# Patient Record
Sex: MALE | Race: WHITE | NOT HISPANIC OR LATINO | Employment: UNEMPLOYED | URBAN - METROPOLITAN AREA
[De-identification: names, ages, dates, MRNs, and addresses within clinical notes are randomized per-mention and may not be internally consistent; named-entity substitution may affect disease eponyms.]

---

## 2017-01-12 ENCOUNTER — TELEPHONE (OUTPATIENT)
Dept: INTERNAL MEDICINE | Facility: CLINIC | Age: 57
End: 2017-01-12

## 2017-01-12 DIAGNOSIS — E11.9 TYPE 2 DIABETES MELLITUS WITHOUT COMPLICATION, WITH LONG-TERM CURRENT USE OF INSULIN (H): Primary | ICD-10-CM

## 2017-01-12 DIAGNOSIS — Z79.4 TYPE 2 DIABETES MELLITUS WITHOUT COMPLICATION, WITH LONG-TERM CURRENT USE OF INSULIN (H): Primary | ICD-10-CM

## 2017-01-12 NOTE — TELEPHONE ENCOUNTER
"Reason for Call:  Medication or medication refill:    Do you use a [facility]?  Name of the pharmacy and phone number for the current request:  [facility] [address]    Name of the medication requested: test strips and lancets for \"one touch verio\" meter    Other request: Pt's insurance company changed the type of meter and supplies that they will cover.  Pt has a meter.    Can we leave a detailed message on this number? YES    Phone number patient can be reached at: Home number on file [phone] (home)    Best Time: anytime    Call taken on 1/12/2017 at 8:54 AM by MAGDY FERREIRA    "

## 2017-01-13 ENCOUNTER — TELEPHONE (OUTPATIENT)
Dept: INTERNAL MEDICINE | Facility: CLINIC | Age: 57
End: 2017-01-13

## 2017-01-13 DIAGNOSIS — Z79.4 TYPE 2 DIABETES MELLITUS WITHOUT COMPLICATION, WITH LONG-TERM CURRENT USE OF INSULIN (H): Primary | ICD-10-CM

## 2017-01-13 DIAGNOSIS — E11.9 TYPE 2 DIABETES MELLITUS WITHOUT COMPLICATION, WITH LONG-TERM CURRENT USE OF INSULIN (H): Primary | ICD-10-CM

## 2017-01-13 RX ORDER — INSULIN GLARGINE 100 [IU]/ML
60 INJECTION, SOLUTION SUBCUTANEOUS AT BEDTIME
Qty: 60 ML | Refills: 3 | Status: SHIPPED | OUTPATIENT
Start: 2017-01-13 | End: 2018-01-26

## 2017-01-13 NOTE — TELEPHONE ENCOUNTER
Lantus is no longer covered.  Will need prescription for Basaglar.  Please send new prescription to pharm listed.

## 2017-01-13 NOTE — TELEPHONE ENCOUNTER
Reason for call: Other   Patient called regarding (reason for call): patient called wanted to talk to a nurse about his lantus solostar meds as insurance wont cover this med anymore  Additional comments: Please call patient to discuss what he should do now    Phone Number Pt can be reached at: Home number on file [phone] (home)  Best Time: anytime  Can we leave a detailed message on this number? YES

## 2017-01-24 ENCOUNTER — ANTICOAGULATION THERAPY VISIT (OUTPATIENT)
Dept: ANTICOAGULATION | Facility: CLINIC | Age: 57
End: 2017-01-24
Payer: COMMERCIAL

## 2017-01-24 DIAGNOSIS — I48.91 NEW ONSET ATRIAL FIBRILLATION (H): Primary | ICD-10-CM

## 2017-01-24 LAB — INR POINT OF CARE: 3.3 (ref 0.86–1.14)

## 2017-01-24 PROCEDURE — 99207 ZZC NO CHARGE NURSE ONLY: CPT

## 2017-01-24 PROCEDURE — 36416 COLLJ CAPILLARY BLOOD SPEC: CPT

## 2017-01-24 PROCEDURE — 85610 PROTHROMBIN TIME: CPT | Mod: QW

## 2017-01-24 RX ORDER — WARFARIN SODIUM 7.5 MG/1
TABLET ORAL
Qty: 100 TABLET | Refills: 0 | Status: SHIPPED | OUTPATIENT
Start: 2017-01-24 | End: 2017-04-18

## 2017-01-24 NOTE — PROGRESS NOTES
ANTICOAGULATION FOLLOW-UP CLINIC VISIT    Patient Name:  Mckay Hsu  Date:  1/24/2017  Contact Type:  Face to Face    SUBJECTIVE:     Patient Findings     Positives No Problem Findings           OBJECTIVE    INR PROTIME   Date Value Ref Range Status   01/24/2017 3.3* 0.86 - 1.14 Final       ASSESSMENT / PLAN  INR assessment SUPRA    Recheck INR In: 6 WEEKS    INR Location Clinic      Anticoagulation Summary as of 1/24/2017     INR goal 2.0-3.0   Selected INR 3.3! (1/24/2017)   Maintenance plan 11.25 mg (7.5 mg x 1.5) on Mon; 7.5 mg (7.5 mg x 1) all other days   Full instructions 1/24: 3.75 mg; Otherwise 11.25 mg on Mon; 7.5 mg all other days   Weekly total 56.25 mg   Plan last modified Rosalee Billingsley, RN (1/5/2016)   Next INR check 3/7/2017   Target end date     Indications   Long-term (current) use of anticoagulants [Z79.01] [Z79.01]  New onset atrial fibrillation (H) [I48.91]         Anticoagulation Episode Summary     INR check location     Preferred lab     Send INR reminders to [facility]    Comments             See the Encounter Report to view Anticoagulation Flowsheet and Dosing Calendar (Go to Encounters tab in chart review, and find the Anticoagulation Therapy Visit)        Rosalee Billingsley, RN

## 2017-01-26 DIAGNOSIS — Z79.4 TYPE 2 DIABETES MELLITUS WITHOUT COMPLICATION, WITH LONG-TERM CURRENT USE OF INSULIN (H): Primary | ICD-10-CM

## 2017-01-26 DIAGNOSIS — E11.9 TYPE 2 DIABETES MELLITUS WITHOUT COMPLICATION, WITH LONG-TERM CURRENT USE OF INSULIN (H): Primary | ICD-10-CM

## 2017-01-26 DIAGNOSIS — I10 ESSENTIAL HYPERTENSION, BENIGN: Primary | ICD-10-CM

## 2017-01-26 RX ORDER — METOPROLOL SUCCINATE 100 MG/1
100 TABLET, EXTENDED RELEASE ORAL DAILY
Qty: 90 TABLET | Refills: 2 | Status: SHIPPED | OUTPATIENT
Start: 2017-01-26 | End: 2017-10-17

## 2017-01-26 NOTE — TELEPHONE ENCOUNTER
metoprolol (TOPROL-XL) 100 MG 24 hr tablet      Last Written Prescription Date: 8/04/2016  Last Fill Quantity: 90, # refills: 1    Last Office Visit with [facility], [facility] or [facility] prescribing provider:  10/31/2016   Future Office Visit:        BP Readings from Last 3 Encounters:   10/31/16 122/72   08/11/16 128/82   02/09/16 132/80

## 2017-01-26 NOTE — TELEPHONE ENCOUNTER
insulin lispro          Last Written Prescription Date: 12/27/16  Last Fill Quantity: 3 ml, # refills: 0  Last Office Visit with [facility], P or [facility] prescribing provider:  10/31/16        BP Readings from Last 3 Encounters:   10/31/16 122/72   08/11/16 128/82   02/09/16 132/80     MICROL      169   5/28/2015  No results found for this basename: microalbumin  CREATININE   Date Value Ref Range Status   02/12/2016 0.86 0.66 - 1.25 mg/dL Final   ]  GFR ESTIMATE   Date Value Ref Range Status   02/12/2016 >90  Non  GFR Calc   >60 mL/min/1.7m2 Final   07/13/2015 >90  Non  GFR Calc   >60 mL/min/1.7m2 Final   05/28/2015 80 >60 mL/min/1.7m2 Final     Comment:     Non  GFR Calc     GFR ESTIMATE IF BLACK   Date Value Ref Range Status   02/12/2016 >90   GFR Calc   >60 mL/min/1.7m2 Final   07/13/2015 >90   GFR Calc   >60 mL/min/1.7m2 Final   05/28/2015 >90   GFR Calc   >60 mL/min/1.7m2 Final     CHOL      107   2/12/2016  HDL       45   2/12/2016  LDL       40   2/12/2016  TRIG      108   2/12/2016  CHOLHDLRATIO      2.6   10/2/2014  AST       19   2/12/2016  ALT       28   2/12/2016  A1C      5.6   10/31/2016  A1C      6.1   2/12/2016  A1C      8.5   7/13/2015  A1C      8.7   5/19/2015  A1C      9.0   10/2/2014  POTASSIUM   Date Value Ref Range Status   02/12/2016 4.0 3.4 - 5.3 mmol/L Final

## 2017-02-03 ENCOUNTER — OFFICE VISIT (OUTPATIENT)
Dept: CARDIOLOGY | Facility: CLINIC | Age: 57
End: 2017-02-03
Payer: COMMERCIAL

## 2017-02-03 DIAGNOSIS — Z95.0 CARDIAC PACEMAKER IN SITU: Primary | ICD-10-CM

## 2017-02-03 PROCEDURE — 93293 PM PHONE R-STRIP DEVICE EVAL: CPT | Performed by: INTERNAL MEDICINE

## 2017-02-03 NOTE — PROGRESS NOTES
~ 30 day phone teletrace ~  Appropriate  at time of check. Chronic Afib, taking Warfarin. Magnet response WNL. F/U scheduled q month. Shane FRIAS

## 2017-02-27 DIAGNOSIS — Z79.4 TYPE 2 DIABETES MELLITUS WITHOUT COMPLICATION, WITH LONG-TERM CURRENT USE OF INSULIN (H): ICD-10-CM

## 2017-02-27 DIAGNOSIS — E78.5 HYPERLIPIDEMIA LDL GOAL <100: ICD-10-CM

## 2017-02-27 DIAGNOSIS — E11.9 TYPE 2 DIABETES MELLITUS WITHOUT COMPLICATION, WITH LONG-TERM CURRENT USE OF INSULIN (H): ICD-10-CM

## 2017-02-27 RX ORDER — SIMVASTATIN 80 MG
40 TABLET ORAL AT BEDTIME
Qty: 45 TABLET | Refills: 0 | Status: SHIPPED | OUTPATIENT
Start: 2017-02-27 | End: 2017-05-22

## 2017-02-27 NOTE — TELEPHONE ENCOUNTER
simvastatin (ZOCOR) 80 MG tablet     Last Written Prescription Date: 2/09/2016  Last Fill Quantity: 45, # refills: 3  Last Office Visit with [facility] or [facility] prescribing provider: 10/31/2016       Lab Results   Component Value Date    CHOL 107 02/12/2016     Lab Results   Component Value Date    HDL 45 02/12/2016     Lab Results   Component Value Date    LDL 40 02/12/2016     Lab Results   Component Value Date    TRIG 108 02/12/2016     Lab Results   Component Value Date    CHOLHDLRATIO 2.6 10/02/2014     metFORMIN (GLUCOPHAGE) 1000 MG tablet         Last Written Prescription Date: 2/09/2016  Last Fill Quantity: 180, # refills: 3  Last Office Visit with [facility] or [facility] prescribing provider:  10/31/2016        BP Readings from Last 3 Encounters:   10/31/16 122/72   08/11/16 128/82   02/09/16 132/80     Lab Results   Component Value Date    MICROL 169 05/28/2015     No results found for: MICROALBUMIN  Creatinine   Date Value Ref Range Status   02/12/2016 0.86 0.66 - 1.25 mg/dL Final   ]  GFR Estimate   Date Value Ref Range Status   02/12/2016 >90  Non  GFR Calc   >60 mL/min/1.7m2 Final   07/13/2015 >90  Non  GFR Calc   >60 mL/min/1.7m2 Final   05/28/2015 80 >60 mL/min/1.7m2 Final     Comment:     Non  GFR Calc     GFR Estimate If Black   Date Value Ref Range Status   02/12/2016 >90   GFR Calc   >60 mL/min/1.7m2 Final   07/13/2015 >90   GFR Calc   >60 mL/min/1.7m2 Final   05/28/2015 >90   GFR Calc   >60 mL/min/1.7m2 Final     Lab Results   Component Value Date    CHOL 107 02/12/2016     Lab Results   Component Value Date    HDL 45 02/12/2016     Lab Results   Component Value Date    LDL 40 02/12/2016     Lab Results   Component Value Date    TRIG 108 02/12/2016     Lab Results   Component Value Date    CHOLHDLRATIO 2.6 10/02/2014     Lab Results   Component Value Date    AST 19 02/12/2016     Lab Results   Component  Value Date    ALT 28 02/12/2016     Lab Results   Component Value Date    A1C 5.6 10/31/2016    A1C 6.1 02/12/2016    A1C 8.5 07/13/2015    A1C 8.7 05/19/2015    A1C 9.0 10/02/2014     Potassium   Date Value Ref Range Status   02/12/2016 4.0 3.4 - 5.3 mmol/L Final

## 2017-03-03 ENCOUNTER — OFFICE VISIT (OUTPATIENT)
Dept: CARDIOLOGY | Facility: CLINIC | Age: 57
End: 2017-03-03
Payer: COMMERCIAL

## 2017-03-03 DIAGNOSIS — Z95.0 CARDIAC PACEMAKER IN SITU: Primary | ICD-10-CM

## 2017-03-03 PROCEDURE — 99207 ZZC NO CHARGE LOS: CPT | Performed by: INTERNAL MEDICINE

## 2017-03-03 NOTE — PROGRESS NOTES
~ 30 day phone teletrace / NO CHARGE ~  Appropriate  at time of check. Magnet response WNL. F/U scheduled q month. Shane FRIAS

## 2017-03-03 NOTE — MR AVS SNAPSHOT
"              After Visit Summary   3/3/2017    Mckay Hsu    MRN: [id number]           Patient Information     Date Of Birth          1960        Visit Information        Provider Department      3/3/2017 2:00 PM JACK ARCE [facility] AT [place]        Today's Diagnoses     Cardiac pacemaker in situ    -  1       Follow-ups after your visit        Your next 10 appointments already scheduled     Mar 07, 2017  2:30 PM CST   Anticoagulation Visit with [facility]ULATION CLINIC   [facility]    [address] [id number] [phone]            Apr 11, 2017  2:00 PM CDT   30 Day Phone Check with SANTIAGO TECH1   [facility] HEART [facility] ([facility])    [address] [place] [id number] [phone]              Who to contact     If you have questions or need follow up information about today's clinic visit or your schedule please contact [facility] directly at [phone].  Normal or non-critical lab and imaging results will be communicated to you by Muzyhart, letter or phone within 4 business days after the clinic has received the results. If you do not hear from us within 7 days, please contact the clinic through Muzyhart or phone. If you have a critical or abnormal lab result, we will notify you by phone as soon as possible.  Submit refill requests through Barkibu or call your pharmacy and they will forward the refill request to us. Please allow 3 business days for your refill to be completed.          Additional Information About Your Visit        MyChart Information     Barkibu lets you send messages to your doctor, view your test results, renew your prescriptions, schedule appointments and more. To sign up, go to [email or web address] . Click on \"Log in\" on the left side of the screen, which " "will take you to the Welcome page. Then click on \"Sign up Now\" on the right side of the page.     You will be asked to enter the access code listed below, as well as some personal information. Please follow the directions to create your username and password.     Your access code is: [email or web address]  Expires: 2017  2:13 PM     Your access code will  in 90 days. If you need help or a new code, please call your [place] clinic or [phone].        Care EveryWhere ID     This is your Care EveryWhere ID. This could be used by other organizations to access your [place] medical records  [id number]         Blood Pressure from Last 3 Encounters:   10/31/16 122/72   16 128/82   16 132/80    Weight from Last 3 Encounters:   10/31/16 (!) 140 kg (308 lb 11.2 oz)   16 (!) 142.1 kg (313 lb 3.2 oz)   16 (!) 140.8 kg (310 lb 4.8 oz)              Today, you had the following     No orders found for display       Primary Care Provider Office Phone # Fax #    David Almendarez [phone]       [place] [address] [place] [id number]        Thank you!     Thank you for choosing [facility] [place]  for your care. Our goal is always to provide you with excellent care. Hearing back from our patients is one way we can continue to improve our services. Please take a few minutes to complete the written survey that you may receive in the mail after your visit with us. Thank you!             Your Updated Medication List - Protect others around you: Learn how to safely use, store and throw away your medicines at www.disposemymeds.org.          This list is accurate as of: 3/3/17  2:13 PM.  Always use your most recent med list.                   Brand Name Dispense Instructions for use    amLODIPine 10 MG tablet    NORVASC    90 tablet    Take 1 tablet (10 mg) by mouth daily       blood glucose monitoring test strip    ONE TOUCH VERIO IQ "    100 strip    Use to test blood sugars 3 times daily or as directed.       fish oil-omega-3 fatty acids 1000 MG capsule      Take 4 capsules by mouth daily.       insulin glargine 100 UNIT/ML injection     60 mL    Inject 60 Units Subcutaneous At Bedtime       insulin lispro 100 UNIT/ML injection    HumaLOG KWIKpen    3 mL    Inject 1 Units Subcutaneous 3 times daily (before meals)       losartan 100 MG tablet    COZAAR    90 tablet    Take 1 tablet (100 mg) by mouth daily       metFORMIN 1000 MG tablet    GLUCOPHAGE    180 tablet    Take 1 tablet (1,000 mg) by mouth 2 times daily (with meals)       metoprolol 100 MG 24 hr tablet    TOPROL-XL    90 tablet    Take 1 tablet (100 mg) by mouth daily       * MICROLET LANCETS Misc     100 each    1 lancet by In Vitro route 3 times daily       * ONE TOUCH LANCETS Misc     200 each    1 lancet by In Vitro route 3 times daily       MULTIVITAMIN PO      1 qd       NITROSTAT 0.4 MG sublingual tablet   Generic drug:  nitroglycerin      1 TAB EVERY 5 MIN AS NEEDED, UP TO 3 PER EPISODE       simvastatin 80 MG tablet    ZOCOR    45 tablet    Take 0.5 tablets (40 mg) by mouth At Bedtime       warfarin 7.5 MG tablet    COUMADIN    100 tablet    1 tablet daily except 1 1/2 tablets on Mon as directed by the anticoagulation clinic       * Notice:  This list has 2 medication(s) that are the same as other medications prescribed for you. Read the directions carefully, and ask your doctor or other care provider to review them with you.

## 2017-03-07 ENCOUNTER — ANTICOAGULATION THERAPY VISIT (OUTPATIENT)
Dept: ANTICOAGULATION | Facility: CLINIC | Age: 57
End: 2017-03-07
Payer: COMMERCIAL

## 2017-03-07 LAB — INR POINT OF CARE: 2.4 (ref 0.86–1.14)

## 2017-03-07 PROCEDURE — 36416 COLLJ CAPILLARY BLOOD SPEC: CPT

## 2017-03-07 PROCEDURE — 85610 PROTHROMBIN TIME: CPT | Mod: QW

## 2017-03-07 NOTE — PROGRESS NOTES
ANTICOAGULATION FOLLOW-UP CLINIC VISIT    Patient Name:  Mckay Hsu  Date:  3/7/2017  Contact Type:  Face to Face    SUBJECTIVE:     Patient Findings     Positives No Problem Findings           OBJECTIVE    INR Protime   Date Value Ref Range Status   03/07/2017 2.4 (A) 0.86 - 1.14 Final       ASSESSMENT / PLAN  INR assessment THER    Recheck INR In: 6 WEEKS    INR Location Clinic      Anticoagulation Summary as of 3/7/2017     INR goal 2.0-3.0   Today's INR 2.4   Maintenance plan 11.25 mg (7.5 mg x 1.5) on Mon; 7.5 mg (7.5 mg x 1) all other days   Full instructions 11.25 mg on Mon; 7.5 mg all other days   Weekly total 56.25 mg   No change documented Rosalee Billingsley, RN   Plan last modified Rosalee Billingsley, RN (1/5/2016)   Next INR check 4/18/2017   Target end date     Indications   Long-term (current) use of anticoagulants [Z79.01] [Z79.01]  New onset atrial fibrillation (H) [I48.91]         Anticoagulation Episode Summary     INR check location     Preferred lab     Send INR reminders to  [facility]    Comments             See the Encounter Report to view Anticoagulation Flowsheet and Dosing Calendar (Go to Encounters tab in chart review, and find the Anticoagulation Therapy Visit)        Rosalee Billingsley, RN

## 2017-03-07 NOTE — MR AVS SNAPSHOT
Mckay Hsu   3/7/2017 2:30 PM   Anticoagulation Therapy Visit    Description:  56 year old male   Provider:   ANTICOAGULATION CLINIC   Department:   Anti Coagulation           INR as of 3/7/2017     Today's INR 2.4      Anticoagulation Summary as of 3/7/2017     INR goal 2.0-3.0   Today's INR 2.4   Full instructions 11.25 mg on Mon; 7.5 mg all other days   Next INR check 4/18/2017    Indications   Long-term (current) use of anticoagulants [Z79.01] [Z79.01]  New onset atrial fibrillation (H) [I48.91]         Your next Anticoagulation Clinic appointment(s)     Apr 18, 2017  2:30 PM CDT   Anticoagulation Visit with  ANTICOAGULATION CLINIC   [facility] ([facility])    [address] [place] [id number] [phone]              Contact Numbers     [facility]  Please call  [phone] to cancel and/or reschedule your appointment   Please call  [phone] with any problems or questions regarding your therapy.        March 2017 Details    Sun Mon Tue Wed Thu Fri Sat        1               2               3               4                 5               6               7      7.5 mg   See details      8      7.5 mg         9      7.5 mg         10      7.5 mg         11      7.5 mg           12      7.5 mg         13      11.25 mg         14      7.5 mg         15      7.5 mg         16      7.5 mg         17      7.5 mg         18      7.5 mg           19      7.5 mg         20      11.25 mg         21      7.5 mg         22      7.5 mg         23      7.5 mg         24      7.5 mg         25      7.5 mg           26      7.5 mg         27      11.25 mg         28      7.5 mg         29      7.5 mg         30      7.5 mg         31      7.5 mg           Date Details   03/07 This INR check               How to take your warfarin dose     To take:  7.5 mg Take 1 of the 7.5 mg tablets.    To take:  11.25 mg Take 1.5 of the 7.5 mg tablets.            April 2017 Details    Sun Mon Tue Wed Thu Fri Sat           1      7.5 mg           2      7.5 mg         3      11.25 mg         4      7.5 mg         5      7.5 mg         6      7.5 mg         7      7.5 mg         8      7.5 mg           9      7.5 mg         10      11.25 mg         11      7.5 mg         12      7.5 mg         13      7.5 mg         14      7.5 mg         15      7.5 mg           16      7.5 mg         17      11.25 mg         18            19               20               21               22                 23               24               25               26               27               28               29                 30                      Date Details   No additional details    Date of next INR:  4/18/2017         How to take your warfarin dose     To take:  7.5 mg Take 1 of the 7.5 mg tablets.    To take:  11.25 mg Take 1.5 of the 7.5 mg tablets.

## 2017-03-27 DIAGNOSIS — E11.9 TYPE 2 DIABETES MELLITUS WITHOUT COMPLICATION, WITH LONG-TERM CURRENT USE OF INSULIN (H): ICD-10-CM

## 2017-03-27 DIAGNOSIS — Z79.4 TYPE 2 DIABETES MELLITUS WITHOUT COMPLICATION, WITH LONG-TERM CURRENT USE OF INSULIN (H): ICD-10-CM

## 2017-03-28 RX ORDER — INSULIN LISPRO 100 [IU]/ML
INJECTION, SOLUTION INTRAVENOUS; SUBCUTANEOUS
Qty: 15 ML | Refills: 0 | Status: SHIPPED | OUTPATIENT
Start: 2017-03-28 | End: 2017-04-03

## 2017-03-28 NOTE — TELEPHONE ENCOUNTER
Routing refill request to provider for review/approval because:  Labs not current:  Microalbumin, LDL

## 2017-03-28 NOTE — TELEPHONE ENCOUNTER
insulin lispro (HUMALOG KWIKPEN) 100 UNIT/ML injection         Last Written Prescription Date: 2/27/2017  Last Fill Quantity: 3mL, # refills: 0  Last Office Visit with [facility], [facility] or [facility] prescribing provider:  10/31/2016        BP Readings from Last 3 Encounters:   10/31/16 122/72   08/11/16 128/82   02/09/16 132/80     Lab Results   Component Value Date    MICROL 169 05/28/2015     No results found for: MICROALBUMIN  Creatinine   Date Value Ref Range Status   02/12/2016 0.86 0.66 - 1.25 mg/dL Final   ]  GFR Estimate   Date Value Ref Range Status   02/12/2016 >90  Non  GFR Calc   >60 mL/min/1.7m2 Final   07/13/2015 >90  Non  GFR Calc   >60 mL/min/1.7m2 Final   05/28/2015 80 >60 mL/min/1.7m2 Final     Comment:     Non  GFR Calc     GFR Estimate If Black   Date Value Ref Range Status   02/12/2016 >90   GFR Calc   >60 mL/min/1.7m2 Final   07/13/2015 >90   GFR Calc   >60 mL/min/1.7m2 Final   05/28/2015 >90   GFR Calc   >60 mL/min/1.7m2 Final     Lab Results   Component Value Date    CHOL 107 02/12/2016     Lab Results   Component Value Date    HDL 45 02/12/2016     Lab Results   Component Value Date    LDL 40 02/12/2016     Lab Results   Component Value Date    TRIG 108 02/12/2016     Lab Results   Component Value Date    CHOLHDLRATIO 2.6 10/02/2014     Lab Results   Component Value Date    AST 19 02/12/2016     Lab Results   Component Value Date    ALT 28 02/12/2016     Lab Results   Component Value Date    A1C 5.6 10/31/2016    A1C 6.1 02/12/2016    A1C 8.5 07/13/2015    A1C 8.7 05/19/2015    A1C 9.0 10/02/2014     Potassium   Date Value Ref Range Status   02/12/2016 4.0 3.4 - 5.3 mmol/L Final

## 2017-03-29 DIAGNOSIS — I10 ESSENTIAL HYPERTENSION, BENIGN: ICD-10-CM

## 2017-03-29 RX ORDER — AMLODIPINE BESYLATE 10 MG/1
TABLET ORAL
Qty: 90 TABLET | Refills: 1 | Status: SHIPPED | OUTPATIENT
Start: 2017-03-29 | End: 2017-09-15

## 2017-03-29 NOTE — TELEPHONE ENCOUNTER
amLODIPine (NORVASC) 10 MG tablet      Last Written Prescription Date: 7/05/2016  Last Fill Quantity: 90, # refills: 2    Last Office Visit with [facility], [facility] or [facility] prescribing provider:  10/31/2016   Future Office Visit:        BP Readings from Last 3 Encounters:   10/31/16 122/72   08/11/16 128/82   02/09/16 132/80

## 2017-04-03 DIAGNOSIS — Z79.4 TYPE 2 DIABETES MELLITUS WITHOUT COMPLICATION, WITH LONG-TERM CURRENT USE OF INSULIN (H): ICD-10-CM

## 2017-04-03 DIAGNOSIS — E11.9 TYPE 2 DIABETES MELLITUS WITHOUT COMPLICATION, WITH LONG-TERM CURRENT USE OF INSULIN (H): ICD-10-CM

## 2017-04-03 NOTE — TELEPHONE ENCOUNTER
Reason for Call:  Medication or medication refill:    Do you use a [facility]?  Name of the pharmacy and phone number for the current request:  [facility]  [phone]    Name of the medication requested: Humalog    Other request: There is a discrepancy in the dose.  The prescription was written for 1 unit 3 times a day.  It should be 1 unit per 7 grams of carbs at mealtime.  The insurance company is complaining to the pharmacy who told the pt to call the clinic.  Pt does currently have the medication.    Can we leave a detailed message on this number? YES    Phone number patient can be reached at: Home number on file [phone] (home)    Best Time: anytime    Call taken on 4/3/2017 at 12:04 PM by MAGDY FERREIRA

## 2017-04-11 ENCOUNTER — OFFICE VISIT (OUTPATIENT)
Dept: CARDIOLOGY | Facility: CLINIC | Age: 57
End: 2017-04-11
Payer: COMMERCIAL

## 2017-04-11 DIAGNOSIS — Z95.0 CARDIAC PACEMAKER IN SITU: Primary | ICD-10-CM

## 2017-04-11 PROCEDURE — 99207 ZZC NO CHARGE LOS: CPT | Performed by: INTERNAL MEDICINE

## 2017-04-11 NOTE — MR AVS SNAPSHOT
After Visit Summary   4/11/2017    Mckay Hsu    MRN: [id number]           Patient Information     Date Of Birth          1960        Visit Information        Provider Department      4/11/2017 2:00 PM JACK ARCE [facility]        Today's Diagnoses     Cardiac pacemaker in situ    -  1       Follow-ups after your visit        Your next 10 appointments already scheduled     Apr 18, 2017  2:30 PM CDT   Anticoagulation Visit with [facility] AN[facility]ICOAGULATION CLINIC   [facility]    [address] [id number] [phone]            May 16, 2017  2:30 PM CDT   Pacemaker Check with JACK ADAMEN   [facility] ([facility])    [address] [place] [address] [address]  [facility] [id number] [phone]            Jun 20, 2017  2:00 PM CDT   Ech Complete with SHCVECHR1   [facility] CV Echocardiography (Cardiovascular Imaging at [facility])    [address] [place] [address]  [address]  [facility] [id number] [phone]           1.  Please bring or wear a comfortable two-piece outfit. 2.  You may eat, drink and take your normal medicines. 3.  For any questions that cannot be answered, please contact the ordering physician            Jun 27, 2017  3:15 PM CDT   [facility] EP RETURN with Soto Holliday MD   [facility] ([facility])    [address] [place] [address] [address]  [facility] [id number] [phone]              Who to contact     If you have questions or need follow up information about today's clinic visit or your schedule please contact [facility] directly at [phone].  Normal or non-critical lab and imaging results will be communicated to you by MyChart, letter or phone within 4 business days after the clinic has received the results. If you do not  "hear from us within 7 days, please contact the clinic through [email or web address] or phone. If you have a critical or abnormal lab result, we will notify you by phone as soon as possible.  Submit refill requests through [email or web address] or call your pharmacy and they will forward the refill request to us. Please allow 3 business days for your refill to be completed.          Additional Information About Your Visit        TapBookAuthorharTip Network Information     [email or web address] lets you send messages to your doctor, view your test results, renew your prescriptions, schedule appointments and more. To sign up, go to [email or web address] . Click on \"Log in\" on the left side of the screen, which will take you to the Welcome page. Then click on \"Sign up Now\" on the right side of the page.     You will be asked to enter the access code listed below, as well as some personal information. Please follow the directions to create your username and password.     Your access code is: [email or web address]  Expires: 2017  3:13 PM     Your access code will  in 90 days. If you need help or a new code, please call your [place] clinic or [phone].        Care EveryWhere ID     This is your Care EveryWhere ID. This could be used by other organizations to access your [place] medical records  [id number]         Blood Pressure from Last 3 Encounters:   10/31/16 122/72   16 128/82   16 132/80    Weight from Last 3 Encounters:   10/31/16 (!) 140 kg (308 lb 11.2 oz)   16 (!) 142.1 kg (313 lb 3.2 oz)   16 (!) 140.8 kg (310 lb 4.8 oz)              Today, you had the following     No orders found for display       Primary Care Provider Office Phone # Fax #    David Almendarez [phone]       [facility] [address] [facility] [address]        Thank you!     Thank you for choosing [facility] AT [place]  for your care. Our goal is always to provide you with excellent care. Hearing back " from our patients is one way we can continue to improve our services. Please take a few minutes to complete the written survey that you may receive in the mail after your visit with us. Thank you!             Your Updated Medication List - Protect others around you: Learn how to safely use, store and throw away your medicines at www.disposemymeds.org.          This list is accurate as of: 4/11/17  2:09 PM.  Always use your most recent med list.                   Brand Name Dispense Instructions for use    amLODIPine 10 MG tablet    NORVASC    90 tablet    TAKE 1 TABLET(10 MG) BY MOUTH DAILY       blood glucose monitoring test strip    ONE TOUCH VERIO IQ    100 strip    Use to test blood sugars 3 times daily or as directed.       fish oil-omega-3 fatty acids 1000 MG capsule      Take 4 capsules by mouth daily.       insulin glargine 100 UNIT/ML injection     60 mL    Inject 60 Units Subcutaneous At Bedtime       insulin lispro 100 UNIT/ML injection    HumaLOG KWIKpen    15 mL    Due for 6 month A1c check, 1 unit per 7g of carbs three times daily before meals       losartan 100 MG tablet    COZAAR    90 tablet    Take 1 tablet (100 mg) by mouth daily       metFORMIN 1000 MG tablet    GLUCOPHAGE    180 tablet    Take 1 tablet (1,000 mg) by mouth 2 times daily (with meals)       metoprolol 100 MG 24 hr tablet    TOPROL-XL    90 tablet    Take 1 tablet (100 mg) by mouth daily       * MICROLET LANCETS Misc     100 each    1 lancet by In Vitro route 3 times daily       * ONE TOUCH LANCETS Misc     200 each    1 lancet by In Vitro route 3 times daily       MULTIVITAMIN PO      1 qd       NITROSTAT 0.4 MG sublingual tablet   Generic drug:  nitroglycerin      1 TAB EVERY 5 MIN AS NEEDED, UP TO 3 PER EPISODE       simvastatin 80 MG tablet    ZOCOR    45 tablet    Take 0.5 tablets (40 mg) by mouth At Bedtime       warfarin 7.5 MG tablet    COUMADIN    100 tablet    1 tablet daily except 1 1/2 tablets on Mon as directed by the  anticoagulation clinic       * Notice:  This list has 2 medication(s) that are the same as other medications prescribed for you. Read the directions carefully, and ask your doctor or other care provider to review them with you.

## 2017-04-11 NOTE — PROGRESS NOTES
~30 day phone teletrace/ no charge  Appropriate  at time of check.  Magnet response WNL. F/U scheduled q 1 month in the clinic. ASHANTI WolfT

## 2017-04-18 ENCOUNTER — ANTICOAGULATION THERAPY VISIT (OUTPATIENT)
Dept: ANTICOAGULATION | Facility: CLINIC | Age: 57
End: 2017-04-18
Payer: COMMERCIAL

## 2017-04-18 DIAGNOSIS — I48.91 NEW ONSET ATRIAL FIBRILLATION (H): ICD-10-CM

## 2017-04-18 LAB — INR POINT OF CARE: 3.1 (ref 0.86–1.14)

## 2017-04-18 PROCEDURE — 36416 COLLJ CAPILLARY BLOOD SPEC: CPT

## 2017-04-18 PROCEDURE — 85610 PROTHROMBIN TIME: CPT | Mod: QW

## 2017-04-18 RX ORDER — WARFARIN SODIUM 7.5 MG/1
TABLET ORAL
Qty: 100 TABLET | Refills: 0 | Status: SHIPPED | OUTPATIENT
Start: 2017-04-18 | End: 2017-07-26

## 2017-04-18 NOTE — PROGRESS NOTES
ANTICOAGULATION FOLLOW-UP CLINIC VISIT    Patient Name:  Mckay Hsu  Date:  4/18/2017  Contact Type:  Face to Face    SUBJECTIVE:     Patient Findings     Positives No Problem Findings           OBJECTIVE    INR Protime   Date Value Ref Range Status   04/18/2017 3.1 (A) 0.86 - 1.14 Final       ASSESSMENT / PLAN  INR assessment THER    Recheck INR In: 6 WEEKS    INR Location Clinic      Anticoagulation Summary as of 4/18/2017     INR goal 2.0-3.0   Today's INR 3.1!   Maintenance plan 11.25 mg (7.5 mg x 1.5) on Mon; 7.5 mg (7.5 mg x 1) all other days   Full instructions 4/18: 3.75 mg; Otherwise 11.25 mg on Mon; 7.5 mg all other days   Weekly total 56.25 mg   Plan last modified Rosalee Billingsley RN (1/5/2016)   Next INR check    Target end date     Indications   Long-term (current) use of anticoagulants [Z79.01] [Z79.01]  New onset atrial fibrillation (H) [I48.91]         Anticoagulation Episode Summary     INR check location     Preferred lab     Send INR reminders to [facility]    Comments             See the Encounter Report to view Anticoagulation Flowsheet and Dosing Calendar (Go to Encounters tab in chart review, and find the Anticoagulation Therapy Visit)        Rosalee Billingsley RN

## 2017-04-18 NOTE — MR AVS SNAPSHOT
Mckay Hsu   4/18/2017 2:30 PM   Anticoagulation Therapy Visit    Description:  57 year old male   Provider:   ANTICOAGULATION CLINIC   Department:   Anti Coagulation           INR as of 4/18/2017     Today's INR 3.1!      Anticoagulation Summary as of 4/18/2017     INR goal 2.0-3.0   Today's INR 3.1!   Full instructions 4/18: 3.75 mg; Otherwise 11.25 mg on Mon; 7.5 mg all other days   Next INR check 5/30/2017    Indications   Long-term (current) use of anticoagulants [Z79.01] [Z79.01]  New onset atrial fibrillation (H) [I48.91]         Your next Anticoagulation Clinic appointment(s)     May 30, 2017  2:30 PM CDT   Anticoagulation Visit with  ANTICOAGULATION CLINIC   [facility] ([facility])    [address] [address] [phone]              Contact Numbers     [facility]  Please call  [phone] to cancel and/or reschedule your appointment   Please call  [phone] with any problems or questions regarding your therapy.        April 2017 Details    Sun Mon Tue Wed Thu Fri Sat           1                 2               3               4               5               6               7               8                 9               10               11               12               13               14               15                 16               17               18      3.75 mg   See details      19      7.5 mg         20      7.5 mg         21      7.5 mg         22      7.5 mg           23      7.5 mg         24      11.25 mg         25      7.5 mg         26      7.5 mg         27      7.5 mg         28      7.5 mg         29      7.5 mg           30      7.5 mg                Date Details   04/18 This INR check               How to take your warfarin dose     To take:  3.75 mg Take 0.5 of a 7.5 mg tablet.    To take:  7.5 mg Take 1 of the 7.5 mg tablets.    To take:  11.25 mg Take 1.5 of the 7.5 mg tablets.            May 2017 Details    Sun Mon Tue Wed Thu Fri Sat      1      11.25 mg         2      7.5 mg         3      7.5 mg         4      7.5 mg         5      7.5 mg         6      7.5 mg           7      7.5 mg         8      11.25 mg         9      7.5 mg         10      7.5 mg         11      7.5 mg         12      7.5 mg         13      7.5 mg           14      7.5 mg         15      11.25 mg         16      7.5 mg         17      7.5 mg         18      7.5 mg         19      7.5 mg         20      7.5 mg           21      7.5 mg         22      11.25 mg         23      7.5 mg         24      7.5 mg         25      7.5 mg         26      7.5 mg         27      7.5 mg           28      7.5 mg         29      11.25 mg         30            31                   Date Details   No additional details    Date of next INR:  5/30/2017         How to take your warfarin dose     To take:  7.5 mg Take 1 of the 7.5 mg tablets.    To take:  11.25 mg Take 1.5 of the 7.5 mg tablets.

## 2017-05-16 ENCOUNTER — ALLIED HEALTH/NURSE VISIT (OUTPATIENT)
Dept: CARDIOLOGY | Facility: CLINIC | Age: 57
End: 2017-05-16
Payer: COMMERCIAL

## 2017-05-16 DIAGNOSIS — Z95.0 CARDIAC PACEMAKER IN SITU: Primary | ICD-10-CM

## 2017-05-16 DIAGNOSIS — I44.2 ATRIOVENTRICULAR BLOCK, COMPLETE (H): ICD-10-CM

## 2017-05-16 PROCEDURE — 93281 PM DEVICE PROGR EVAL MULTI: CPT | Performed by: INTERNAL MEDICINE

## 2017-05-16 NOTE — MR AVS SNAPSHOT
After Visit Summary   5/16/2017    Mckay Hsu    MRN: [id number]           Patient Information     Date Of Birth          1960        Visit Information        Provider Department      5/16/2017 2:30 PM JACK ADAMEN [facility]        Today's Diagnoses     Cardiac pacemaker in situ    -  1    Atrioventricular block, complete (AV)           Follow-ups after your visit        Your next 10 appointments already scheduled     May 30, 2017  2:30 PM CDT   Anticoagulation Visit with OX ANTICOAGULATION CLINIC   [facility]    [address] [id number] [phone]            Jun 20, 2017 11:15 AM CDT   30 Day Phone Check with SANTIAGO TECH1   [facility] ([facility])    [address] [place], [place] [address]  [facility] [phone]            Jun 20, 2017  2:00 PM CDT   Ech Complete with SHCVECHR3   [facility] CV Echocardiography (Cardiovascular Imaging at [facility])    [address] [place] [address]  [address]  [facility] [phone]           1.  Please bring or wear a comfortable two-piece outfit. 2.  You may eat, drink and take your normal medicines. 3.  For any questions that cannot be answered, please contact the ordering physician            Jun 27, 2017  3:15 PM CDT   [facility] EP RETURN with Soto Holliday MD   [facility] ([facility])    [address] [place] [address] [address]  [facility] [phone] [phone]              Who to contact     If you have questions or need follow up information about today's clinic visit or your schedule please contact [facility]ART [facility] directly at [phone].  Normal or non-critical lab and imaging results will be communicated to you by MyChart, letter or phone within 4 business days after the  "clinic has received the results. If you do not hear from us within 7 days, please contact the clinic through BettingXpert or phone. If you have a critical or abnormal lab result, we will notify you by phone as soon as possible.  Submit refill requests through [employer]tingXpert or call your pharmacy and they will forward the refill request to us. Please allow 3 business days for your refill to be completed.          Additional Information About Your Visit        [email or web address] Information     [employer]tingXpert lets you send messages to your doctor, view your test results, renew your prescriptions, schedule appointments and more. To sign up, go to [email or web address] . Click on \"Log in\" on the left side of the screen, which will take you to the Welcome page. Then click on \"Sign up Now\" on the right side of the page.     You will be asked to enter the access code listed below, as well as some personal information. Please follow the directions to create your username and password.     Your access code is: [email or web address]  Expires: 2017  3:13 PM     Your access code will  in 90 days. If you need help or a new code, please call your [place] clinic or [phone].        Care EveryWhere ID     This is your Care EveryWhere ID. This could be used by other organizations to access your [place] medical records  [id number]         Blood Pressure from Last 3 Encounters:   10/31/16 122/72   16 128/82   16 132/80    Weight from Last 3 Encounters:   10/31/16 (!) 140 kg (308 lb 11.2 oz)   16 (!) 142.1 kg (313 lb 3.2 oz)   16 (!) 140.8 kg (310 lb 4.8 oz)              We Performed the Following     PM DEVICE PROGRAMMING EVAL, MULTI LEAD PACER (25007)        Primary Care Provider Office Phone # Fax #    David Almendarez [phone]       [facility] [address] [facility] [address]        Thank you!     Thank you for choosing [facility] AT [place]  for your care. " Our goal is always to provide you with excellent care. Hearing back from our patients is one way we can continue to improve our services. Please take a few minutes to complete the written survey that you may receive in the mail after your visit with us. Thank you!             Your Updated Medication List - Protect others around you: Learn how to safely use, store and throw away your medicines at www.disposemymeds.org.          This list is accurate as of: 5/16/17  2:44 PM.  Always use your most recent med list.                   Brand Name Dispense Instructions for use    amLODIPine 10 MG tablet    NORVASC    90 tablet    TAKE 1 TABLET(10 MG) BY MOUTH DAILY       blood glucose monitoring test strip    ONE TOUCH VERIO IQ    100 strip    Use to test blood sugars 3 times daily or as directed.       fish oil-omega-3 fatty acids 1000 MG capsule      Take 4 capsules by mouth daily.       insulin glargine 100 UNIT/ML injection     60 mL    Inject 60 Units Subcutaneous At Bedtime       insulin lispro 100 UNIT/ML injection    HumaLOG KWIKpen    15 mL    Due for 6 month A1c check, 1 unit per 7g of carbs three times daily before meals       losartan 100 MG tablet    COZAAR    90 tablet    Take 1 tablet (100 mg) by mouth daily       metFORMIN 1000 MG tablet    GLUCOPHAGE    180 tablet    Take 1 tablet (1,000 mg) by mouth 2 times daily (with meals)       metoprolol 100 MG 24 hr tablet    TOPROL-XL    90 tablet    Take 1 tablet (100 mg) by mouth daily       * MICROLET LANCETS Misc     100 each    1 lancet by In Vitro route 3 times daily       * ONE TOUCH LANCETS Misc     200 each    1 lancet by In Vitro route 3 times daily       MULTIVITAMIN PO      1 qd       NITROSTAT 0.4 MG sublingual tablet   Generic drug:  nitroglycerin      1 TAB EVERY 5 MIN AS NEEDED, UP TO 3 PER EPISODE       simvastatin 80 MG tablet    ZOCOR    45 tablet    Take 0.5 tablets (40 mg) by mouth At Bedtime       warfarin 7.5 MG tablet    COUMADIN    100  tablet    1 tablet daily except 1 1/2 tablets on Mon as directed by the anticoagulation clinic       * Notice:  This list has 2 medication(s) that are the same as other medications prescribed for you. Read the directions carefully, and ask your doctor or other care provider to review them with you.

## 2017-05-16 NOTE — PROGRESS NOTES
Medtronic InSync CRT-P Device Check  AP: N/A % : 100 % BiV paced  Mode: VVIR 70       Underlying Rhythm: permanent AF with a CHB; there is a junctional escape present at 40 BPM  Heart Rate: Stable with minimal variability  Sensing: WNL    Pacing Threshold: WNL   Impedance: WNL  Battery Status: Estimated at 13 months remaining  Atrial Arrhythmia: permanent AF_ On warfarin  Ventricular Arrhythmia: 4 beat run of NSVT  Setting Change: 0    Care Plan: Patient on monthly phone teletraces  As this device is on alert for unstable battery depletion and he is dependent. Return to monthly phone checks with Q 6 month threshold. ELIZABETH Fine    I have reviewed and interpreted the device interrogation, settings, programming and summary. The device is functioning within normal device parameters. I agree with the current findings, assessment and plan.

## 2017-05-22 DIAGNOSIS — E11.9 TYPE 2 DIABETES MELLITUS WITHOUT COMPLICATION, WITH LONG-TERM CURRENT USE OF INSULIN (H): ICD-10-CM

## 2017-05-22 DIAGNOSIS — Z79.4 TYPE 2 DIABETES MELLITUS WITHOUT COMPLICATION, WITH LONG-TERM CURRENT USE OF INSULIN (H): ICD-10-CM

## 2017-05-22 DIAGNOSIS — E78.5 HYPERLIPIDEMIA LDL GOAL <100: ICD-10-CM

## 2017-05-23 RX ORDER — SIMVASTATIN 80 MG
TABLET ORAL
Qty: 15 TABLET | Refills: 0 | Status: SHIPPED | OUTPATIENT
Start: 2017-05-23 | End: 2017-06-22

## 2017-05-23 NOTE — TELEPHONE ENCOUNTER
metFORMIN (GLUCOPHAGE) 1000 MG tablet         Last Written Prescription Date: 2/27/2017  Last Fill Quantity: 180, # refills: 0  Last Office Visit with [facility] or  Health prescribing provider:  10/31/2016        BP Readings from Last 3 Encounters:   10/31/16 122/72   08/11/16 128/82   02/09/16 132/80     Lab Results   Component Value Date    MICROL 169 05/28/2015     Lab Results   Component Value Date    UMALCR 43.33 05/28/2015     Creatinine   Date Value Ref Range Status   02/12/2016 0.86 0.66 - 1.25 mg/dL Final   ]  GFR Estimate   Date Value Ref Range Status   02/12/2016 >90  Non  GFR Calc   >60 mL/min/1.7m2 Final   07/13/2015 >90  Non  GFR Calc   >60 mL/min/1.7m2 Final   05/28/2015 80 >60 mL/min/1.7m2 Final     Comment:     Non  GFR Calc     GFR Estimate If Black   Date Value Ref Range Status   02/12/2016 >90   GFR Calc   >60 mL/min/1.7m2 Final   07/13/2015 >90   GFR Calc   >60 mL/min/1.7m2 Final   05/28/2015 >90   GFR Calc   >60 mL/min/1.7m2 Final     Lab Results   Component Value Date    CHOL 107 02/12/2016     Lab Results   Component Value Date    HDL 45 02/12/2016     Lab Results   Component Value Date    LDL 40 02/12/2016     Lab Results   Component Value Date    TRIG 108 02/12/2016     Lab Results   Component Value Date    CHOLHDLRATIO 2.6 10/02/2014     Lab Results   Component Value Date    AST 19 02/12/2016     Lab Results   Component Value Date    ALT 28 02/12/2016     Lab Results   Component Value Date    A1C 5.6 10/31/2016    A1C 6.1 02/12/2016    A1C 8.5 07/13/2015    A1C 8.7 05/19/2015    A1C 9.0 10/02/2014     Potassium   Date Value Ref Range Status   02/12/2016 4.0 3.4 - 5.3 mmol/L Final     simvastatin (ZOCOR) 80 MG tablet     Last Written Prescription Date: 2/27/2017  Last Fill Quantity: 45, # refills: 0  Last Office Visit with [facility], [facility] or  Health prescribing provider: 10/31/2016       Lab Results    Component Value Date    CHOL 107 02/12/2016     Lab Results   Component Value Date    HDL 45 02/12/2016     Lab Results   Component Value Date    LDL 40 02/12/2016     Lab Results   Component Value Date    TRIG 108 02/12/2016     Lab Results   Component Value Date    CHOLHDLRATIO 2.6 10/02/2014

## 2017-05-30 ENCOUNTER — ANTICOAGULATION THERAPY VISIT (OUTPATIENT)
Dept: ANTICOAGULATION | Facility: CLINIC | Age: 57
End: 2017-05-30
Payer: COMMERCIAL

## 2017-05-30 LAB — INR POINT OF CARE: 2.4 (ref 0.86–1.14)

## 2017-05-30 PROCEDURE — 36416 COLLJ CAPILLARY BLOOD SPEC: CPT

## 2017-05-30 PROCEDURE — 85610 PROTHROMBIN TIME: CPT | Mod: QW

## 2017-05-30 NOTE — PROGRESS NOTES
ANTICOAGULATION FOLLOW-UP CLINIC VISIT    Patient Name:  Mckay Hsu  Date:  5/30/2017  Contact Type:  Face to Face    SUBJECTIVE:     Patient Findings     Positives No Problem Findings           OBJECTIVE    INR Protime   Date Value Ref Range Status   05/30/2017 2.4 (A) 0.86 - 1.14 Final       ASSESSMENT / PLAN  INR assessment THER    Recheck INR In: 6 WEEKS    INR Location Clinic      Anticoagulation Summary as of 5/30/2017     INR goal 2.0-3.0   Today's INR 2.4   Maintenance plan 11.25 mg (7.5 mg x 1.5) on Mon; 7.5 mg (7.5 mg x 1) all other days   Full instructions 11.25 mg on Mon; 7.5 mg all other days   Weekly total 56.25 mg   No change documented Rosalee Billingsley, RN   Plan last modified Rosalee Billingsley, RN (1/5/2016)   Next INR check 7/11/2017   Target end date     Indications   Long-term (current) use of anticoagulants [Z79.01] [Z79.01]  New onset atrial fibrillation (H) [I48.91]         Anticoagulation Episode Summary     INR check location     Preferred lab     Send INR reminders to  [facility]    Comments             See the Encounter Report to view Anticoagulation Flowsheet and Dosing Calendar (Go to Encounters tab in chart review, and find the Anticoagulation Therapy Visit)        Rosalee Billingsley, RN

## 2017-05-30 NOTE — MR AVS SNAPSHOT
Mckay Hsu   5/30/2017 2:30 PM   Anticoagulation Therapy Visit    Description:  57 year old male   Provider:   ANTICOAGULATION CLINIC   Department:   Anti Coagulation           INR as of 5/30/2017     Today's INR 2.4      Anticoagulation Summary as of 5/30/2017     INR goal 2.0-3.0   Today's INR 2.4   Full instructions 11.25 mg on Mon; 7.5 mg all other days   Next INR check 7/11/2017    Indications   Long-term (current) use of anticoagulants [Z79.01] [Z79.01]  New onset atrial fibrillation (H) [I48.91]         Your next Anticoagulation Clinic appointment(s)     Jul 11, 2017  2:30 PM CDT   Anticoagulation Visit with  ANTICOAGULATION CLINIC   [facility]    [address] [place] [address] [phone]              Contact Numbers     [facility]  Please call  [phone] to cancel and/or reschedule your appointment   Please call  [phone] with any problems or questions regarding your therapy.        May 2017 Details    Sun Mon Tue Wed Thu Fri Sat      1               2               3               4               5               6                 7               8               9               10               11               12               13                 14               15               16               17               18               19               20                 21               22               23               24               25               26               27                 28               29               30      7.5 mg   See details      31      7.5 mg             Date Details   05/30 This INR check               How to take your warfarin dose     To take:  7.5 mg Take 1 of the 7.5 mg tablets.           June 2017 Details    Sun Mon Tue Wed Thu Fri Sat         1      7.5 mg         2      7.5 mg         3      7.5 mg           4      7.5 mg         5      11.25 mg         6      7.5 mg          7      7.5 mg         8      7.5 mg         9      7.5 mg         10      7.5 mg           11      7.5 mg         12      11.25 mg         13      7.5 mg         14      7.5 mg         15      7.5 mg         16      7.5 mg         17      7.5 mg           18      7.5 mg         19      11.25 mg         20      7.5 mg         21      7.5 mg         22      7.5 mg         23      7.5 mg         24      7.5 mg           25      7.5 mg         26      11.25 mg         27      7.5 mg         28      7.5 mg         29      7.5 mg         30      7.5 mg           Date Details   No additional details            How to take your warfarin dose     To take:  7.5 mg Take 1 of the 7.5 mg tablets.    To take:  11.25 mg Take 1.5 of the 7.5 mg tablets.           July 2017 Details    Sun Mon Tue Wed Thu Fri Sat           1      7.5 mg           2      7.5 mg         3      11.25 mg         4      7.5 mg         5      7.5 mg         6      7.5 mg         7      7.5 mg         8      7.5 mg           9      7.5 mg         10      11.25 mg         11            12               13               14               15                 16               17               18               19               20               21               22                 23               24               25               26               27               28               29                 30               31                     Date Details   No additional details    Date of next INR:  7/11/2017         How to take your warfarin dose     To take:  7.5 mg Take 1 of the 7.5 mg tablets.    To take:  11.25 mg Take 1.5 of the 7.5 mg tablets.

## 2017-06-08 DIAGNOSIS — Z79.4 TYPE 2 DIABETES MELLITUS WITHOUT COMPLICATION, WITH LONG-TERM CURRENT USE OF INSULIN (H): ICD-10-CM

## 2017-06-08 DIAGNOSIS — E11.9 TYPE 2 DIABETES MELLITUS WITHOUT COMPLICATION, WITH LONG-TERM CURRENT USE OF INSULIN (H): ICD-10-CM

## 2017-06-08 NOTE — TELEPHONE ENCOUNTER
blood glucose monitoring (ONE TOUCH VERIO IQ) test strip      Last Written Prescription Date: 1/12/2017  Last Fill Quantity: 100,  # refills: 3   Last Office Visit with [facility], [facility] or [facility] prescribing provider: 10/31/2016

## 2017-06-15 DIAGNOSIS — E78.5 HYPERLIPIDEMIA LDL GOAL <100: ICD-10-CM

## 2017-06-15 DIAGNOSIS — Z79.4 TYPE 2 DIABETES MELLITUS WITHOUT COMPLICATION, WITH LONG-TERM CURRENT USE OF INSULIN (H): ICD-10-CM

## 2017-06-15 DIAGNOSIS — E11.9 TYPE 2 DIABETES MELLITUS WITHOUT COMPLICATION, WITH LONG-TERM CURRENT USE OF INSULIN (H): ICD-10-CM

## 2017-06-15 NOTE — TELEPHONE ENCOUNTER
BP Readings from Last 3 Encounters:   10/31/16 122/72   08/11/16 128/82   02/09/16 132/80     Lab Results   Component Value Date    MICROL 169 05/28/2015     Lab Results   Component Value Date    UMALCR 43.33 05/28/2015     Creatinine   Date Value Ref Range Status   02/12/2016 0.86 0.66 - 1.25 mg/dL Final   ]  GFR Estimate   Date Value Ref Range Status   02/12/2016 >90  Non  GFR Calc   >60 mL/min/1.7m2 Final   07/13/2015 >90  Non  GFR Calc   >60 mL/min/1.7m2 Final   05/28/2015 80 >60 mL/min/1.7m2 Final     Comment:     Non  GFR Calc     GFR Estimate If Black   Date Value Ref Range Status   02/12/2016 >90   GFR Calc   >60 mL/min/1.7m2 Final   07/13/2015 >90   GFR Calc   >60 mL/min/1.7m2 Final   05/28/2015 >90   GFR Calc   >60 mL/min/1.7m2 Final     Lab Results   Component Value Date    CHOL 107 02/12/2016     Lab Results   Component Value Date    HDL 45 02/12/2016     Lab Results   Component Value Date    LDL 40 02/12/2016     Lab Results   Component Value Date    TRIG 108 02/12/2016     Lab Results   Component Value Date    CHOLHDLRATIO 2.6 10/02/2014     Lab Results   Component Value Date    AST 19 02/12/2016     Lab Results   Component Value Date    ALT 28 02/12/2016     Lab Results   Component Value Date    A1C 5.6 10/31/2016    A1C 6.1 02/12/2016    A1C 8.5 07/13/2015    A1C 8.7 05/19/2015    A1C 9.0 10/02/2014     Potassium   Date Value Ref Range Status   02/12/2016 4.0 3.4 - 5.3 mmol/L Final     Routing refill request to provider for review/approval because:  Colleen given x1 and patient did not follow up, please advise

## 2017-06-19 ENCOUNTER — TELEPHONE (OUTPATIENT)
Dept: INTERNAL MEDICINE | Facility: CLINIC | Age: 57
End: 2017-06-19

## 2017-06-20 ENCOUNTER — HOSPITAL ENCOUNTER (OUTPATIENT)
Dept: CARDIOLOGY | Facility: CLINIC | Age: 57
Discharge: HOME | End: 2017-06-20
Attending: NURSE PRACTITIONER | Admitting: NURSE PRACTITIONER
Payer: COMMERCIAL

## 2017-06-20 ENCOUNTER — OFFICE VISIT (OUTPATIENT)
Dept: CARDIOLOGY | Facility: CLINIC | Age: 57
End: 2017-06-20

## 2017-06-20 DIAGNOSIS — I42.9 PRIMARY CARDIOMYOPATHY (H): ICD-10-CM

## 2017-06-20 DIAGNOSIS — Z95.0 CARDIAC PACEMAKER IN SITU: Primary | ICD-10-CM

## 2017-06-20 PROCEDURE — 93306 TTE W/DOPPLER COMPLETE: CPT | Mod: 26 | Performed by: INTERNAL MEDICINE

## 2017-06-20 PROCEDURE — 25500064 ZZH RX 255 OP 636: Performed by: NURSE PRACTITIONER

## 2017-06-20 PROCEDURE — 40000264 ECHO COMPLETE WITH LUMASON

## 2017-06-20 RX ADMIN — SULFUR HEXAFLUORIDE 5 ML: KIT at 14:45

## 2017-06-20 NOTE — PROGRESS NOTES
~30 day phone teletrace/ NO CHARGE  Appropriate  at time of check.  Magnet response WNL. F/U scheduled q 1 month. ASHANTI WolfT

## 2017-06-20 NOTE — MR AVS SNAPSHOT
After Visit Summary   6/20/2017    Mckay Hsu    MRN: [id number]           Patient Information     Date Of Birth          1960        Visit Information        Provider Department      6/20/2017 11:15 AM JACK ARCE [facility] [place]        Today's Diagnoses     Cardiac pacemaker in situ    -  1       Follow-ups after your visit        Your next 10 appointments already scheduled     Jun 20, 2017  2:00 PM CDT   Ech Complete with [id number]   [facility] CV Echocardiography (Cardiovascular Imaging at [facility])    [address] [place] [address]  [address]  [facility] [address] [phone]           1.  Please bring or wear a comfortable two-piece outfit. 2.  You may eat, drink and take your normal medicines. 3.  For any questions that cannot be answered, please contact the ordering physician            Jun 26, 2017  8:40 AM CDT   Office Visit with David Almendarez MD   [facility] ([facility])    [address] [id number] [phone]           Bring a current list of meds and any records pertaining to this visit.  For Physicals, please bring immunization records and any forms needing to be filled out.  Please arrive 10 minutes early to complete paperwork.            Jun 27, 2017  3:15 PM CDT   P EP RETURN with Soto Holliday MD   [facility] [place] ([facility])    [address] [place] [address] [address]  [facility] [address] [phone]            Jul 11, 2017  2:30 PM CDT   Anticoagulation Visit with [facility] ANTICOAGULATION CLINIC   [facility] ([facility])    [address] [id number] [phone]            Aug 08, 2017  2:30 PM CDT   30 Day Phone Check with JACK ARCE   [facility] ([facility])    [address] [place] [address]  [address]  [place] [id number] [phone]              Who to contact     If you have questions or need follow up information about today's clinic visit or your schedule please contact [facility] [place] directly at [phone].  Normal or non-critical lab and imaging results will be communicated to you by MyChart, letter or phone within 4 business days after the clinic has received the results. If you do not hear from us within 7 days, please contact the clinic through MyChart or phone. If you have a critical or abnormal lab result, we will notify you by phone as soon as possible.  Submit refill requests through [employer] or call your pharmacy and they will forward the refill request to us. Please allow 3 business days for your refill to be completed.          Additional Information About Your Visit        [facility] Information     [employer] lets you send messages to your doctor, view your test results, renew your prescriptions, schedule appointments and more. To sign up, go to [email or web address] . Click on \"Log in\" on the left side of the screen, which will take you to the Welcome page. Then click on \"Sign up Now\" on the right side of the page.     You will be asked to enter the access code listed below, as well as some personal information. Please follow the directions to create your username and password.     Your access code is: [email or web address]  Expires: 2017 11:22 AM     Your access code will  in 90 days. If you need help or a new code, please call your [place] clinic or [phone].        Care EveryWhere ID     This is your Care EveryWhere ID. This could be used by other organizations to access your [place] medical records  [id number]         Blood Pressure from Last 3 Encounters:   10/31/16 122/72   16 128/82   16 132/80    Weight from Last 3 Encounters:   10/31/16 (!) 140 kg (308 lb 11.2 oz)   16 (!) 142.1 kg (313 lb 3.2 oz)   16 (!) " 140.8 kg (310 lb 4.8 oz)              Today, you had the following     No orders found for display       Primary Care Provider Office Phone # Fax #    David Almendarez [phone]       [facility] [address]  [facility] [address]        Thank you!     Thank you for choosing [facility] [place]  for your care. Our goal is always to provide you with excellent care. Hearing back from our patients is one way we can continue to improve our services. Please take a few minutes to complete the written survey that you may receive in the mail after your visit with us. Thank you!             Your Updated Medication List - Protect others around you: Learn how to safely use, store and throw away your medicines at www.disposemymeds.org.          This list is accurate as of: 6/20/17 11:22 AM.  Always use your most recent med list.                   Brand Name Dispense Instructions for use    amLODIPine 10 MG tablet    NORVASC    90 tablet    TAKE 1 TABLET(10 MG) BY MOUTH DAILY       blood glucose monitoring test strip    ONE TOUCH VERIO IQ    100 strip    Use to test blood sugars 3 times daily or as directed.       fish oil-omega-3 fatty acids 1000 MG capsule      Take 4 capsules by mouth daily.       insulin glargine 100 UNIT/ML injection     60 mL    Inject 60 Units Subcutaneous At Bedtime       insulin lispro 100 UNIT/ML injection    HumaLOG KWIKpen    15 mL    Due for 6 month A1c check, 1 unit per 7g of carbs three times daily before meals       losartan 100 MG tablet    COZAAR    90 tablet    Take 1 tablet (100 mg) by mouth daily       metFORMIN 1000 MG tablet    GLUCOPHAGE    60 tablet    TAKE 1 TABLET(1000 MG) BY MOUTH TWICE DAILY WITH MEALS       metoprolol 100 MG 24 hr tablet    TOPROL-XL    90 tablet    Take 1 tablet (100 mg) by mouth daily       * MICROLET LANCETS Misc     100 each    1 lancet by In Vitro route 3 times daily       * ONE TOUCH LANCETS  Misc     200 each    1 lancet by In Vitro route 3 times daily       MULTIVITAMIN PO      1 qd       NITROSTAT 0.4 MG sublingual tablet   Generic drug:  nitroglycerin      1 TAB EVERY 5 MIN AS NEEDED, UP TO 3 PER EPISODE       simvastatin 80 MG tablet    ZOCOR    15 tablet    TAKE 1/2 TABLET(40 MG) BY MOUTH AT BEDTIME       warfarin 7.5 MG tablet    COUMADIN    100 tablet    1 tablet daily except 1 1/2 tablets on Mon as directed by the anticoagulation clinic       * Notice:  This list has 2 medication(s) that are the same as other medications prescribed for you. Read the directions carefully, and ask your doctor or other care provider to review them with you.

## 2017-06-22 RX ORDER — SIMVASTATIN 80 MG
TABLET ORAL
Qty: 15 TABLET | Refills: 0 | Status: SHIPPED | OUTPATIENT
Start: 2017-06-22 | End: 2017-07-26

## 2017-06-26 ENCOUNTER — TELEPHONE (OUTPATIENT)
Dept: INTERNAL MEDICINE | Facility: CLINIC | Age: 57
End: 2017-06-26

## 2017-06-26 ENCOUNTER — OFFICE VISIT (OUTPATIENT)
Dept: INTERNAL MEDICINE | Facility: CLINIC | Age: 57
End: 2017-06-26
Payer: COMMERCIAL

## 2017-06-26 VITALS
HEIGHT: 72 IN | WEIGHT: 312 LBS | DIASTOLIC BLOOD PRESSURE: 78 MMHG | SYSTOLIC BLOOD PRESSURE: 120 MMHG | TEMPERATURE: 97.6 F | BODY MASS INDEX: 42.26 KG/M2 | OXYGEN SATURATION: 99 % | HEART RATE: 72 BPM

## 2017-06-26 DIAGNOSIS — Z79.4 TYPE 2 DIABETES MELLITUS WITHOUT COMPLICATION, WITH LONG-TERM CURRENT USE OF INSULIN (H): Primary | ICD-10-CM

## 2017-06-26 DIAGNOSIS — I48.20 CHRONIC ATRIAL FIBRILLATION (H): ICD-10-CM

## 2017-06-26 DIAGNOSIS — I10 ESSENTIAL HYPERTENSION, BENIGN: ICD-10-CM

## 2017-06-26 DIAGNOSIS — I42.9 PRIMARY CARDIOMYOPATHY (H): ICD-10-CM

## 2017-06-26 DIAGNOSIS — E11.9 TYPE 2 DIABETES MELLITUS WITHOUT COMPLICATION, WITH LONG-TERM CURRENT USE OF INSULIN (H): Primary | ICD-10-CM

## 2017-06-26 LAB
ALBUMIN SERPL-MCNC: 3.5 G/DL (ref 3.4–5)
ALP SERPL-CCNC: 69 U/L (ref 40–150)
ALT SERPL W P-5'-P-CCNC: 26 U/L (ref 0–70)
ANION GAP SERPL CALCULATED.3IONS-SCNC: 9 MMOL/L (ref 3–14)
AST SERPL W P-5'-P-CCNC: 23 U/L (ref 0–45)
BILIRUB SERPL-MCNC: 0.6 MG/DL (ref 0.2–1.3)
BUN SERPL-MCNC: 21 MG/DL (ref 7–30)
CALCIUM SERPL-MCNC: 9.1 MG/DL (ref 8.5–10.1)
CHLORIDE SERPL-SCNC: 103 MMOL/L (ref 94–109)
CHOLEST SERPL-MCNC: 104 MG/DL
CO2 SERPL-SCNC: 26 MMOL/L (ref 20–32)
CREAT SERPL-MCNC: 1.1 MG/DL (ref 0.66–1.25)
CREAT UR-MCNC: 197 MG/DL
GFR SERPL CREATININE-BSD FRML MDRD: 69 ML/MIN/1.7M2
GLUCOSE SERPL-MCNC: 99 MG/DL (ref 70–99)
HBA1C MFR BLD: 5.9 % (ref 4.3–6)
HDLC SERPL-MCNC: 46 MG/DL
LDLC SERPL CALC-MCNC: 28 MG/DL
MICROALBUMIN UR-MCNC: 99 MG/L
MICROALBUMIN/CREAT UR: 50.25 MG/G CR (ref 0–17)
NONHDLC SERPL-MCNC: 58 MG/DL
POTASSIUM SERPL-SCNC: 4 MMOL/L (ref 3.4–5.3)
PROT SERPL-MCNC: 7.7 G/DL (ref 6.8–8.8)
SODIUM SERPL-SCNC: 138 MMOL/L (ref 133–144)
TRIGL SERPL-MCNC: 151 MG/DL
TSH SERPL DL<=0.005 MIU/L-ACNC: 0.57 MU/L (ref 0.4–4)

## 2017-06-26 PROCEDURE — 36415 COLL VENOUS BLD VENIPUNCTURE: CPT | Performed by: INTERNAL MEDICINE

## 2017-06-26 PROCEDURE — 99214 OFFICE O/P EST MOD 30 MIN: CPT | Performed by: INTERNAL MEDICINE

## 2017-06-26 PROCEDURE — 80061 LIPID PANEL: CPT | Performed by: INTERNAL MEDICINE

## 2017-06-26 PROCEDURE — 80053 COMPREHEN METABOLIC PANEL: CPT | Performed by: INTERNAL MEDICINE

## 2017-06-26 PROCEDURE — 82043 UR ALBUMIN QUANTITATIVE: CPT | Performed by: INTERNAL MEDICINE

## 2017-06-26 PROCEDURE — 84443 ASSAY THYROID STIM HORMONE: CPT | Performed by: INTERNAL MEDICINE

## 2017-06-26 PROCEDURE — 83036 HEMOGLOBIN GLYCOSYLATED A1C: CPT | Performed by: INTERNAL MEDICINE

## 2017-06-26 NOTE — NURSING NOTE
Chief Complaint   Patient presents with     Recheck Medication     insurance issues       Initial /78  Pulse 72  Temp 97.6  F (36.4  C) (Oral)  Ht 6' (1.829 m)  Wt (!) 312 lb (141.5 kg)  SpO2 99%  BMI 42.31 kg/m2 Estimated body mass index is 42.31 kg/(m^2) as calculated from the following:    Height as of this encounter: 6' (1.829 m).    Weight as of this encounter: 312 lb (141.5 kg).  Medication Reconciliation: complete     Mary Renteria, CMA

## 2017-06-26 NOTE — TELEPHONE ENCOUNTER
Reason for Call:  Other prescription    Detailed comments: the insurance company, [employer] will pay for this type of RX - Novalog .  They will not pay for Humalog.  Short acting insulin.  He uses the [email or web address] - mail order - info on file    Phone Number Patient can be reached at: Home number on file [phone] (home)    Best Time: anytime    Can we leave a detailed message on this number? YES    Call taken on 6/26/2017 at 10:58 AM by Taylor Bonilla

## 2017-06-26 NOTE — PROGRESS NOTES
SUBJECTIVE:                                                    Mckay Hsu is a 57 year old male who presents to clinic today for the following health issues:    Diabetes Follow-up    Patient is checking blood sugars: three times daily.   Results are as follows:         am - 80         lunchtime - 110         suppertime - 100    Diabetic concerns: None     Symptoms of hypoglycemia (low blood sugar): shaky, hot     Paresthesias (numbness or burning in feet) or sores: No     Date of last diabetic eye exam: 8/2016      Amount of exercise or physical activity: None    Problems taking medications regularly: No    Medication side effects: none    Diet: low salt, low fat/cholesterol, diabetic and carbohydrate counting      Problem list and histories reviewed & adjusted, as indicated.  Additional history: as documented    Patient Active Problem List   Diagnosis     Essential hypertension, benign     Open wound of foot, excluding toe(s)     New onset atrial fibrillation (H)     HYPERLIPIDEMIA LDL GOAL <100     Health Care Home     Subclinical hyperthyroidism     Primary cardiomyopathy (H)     Tachycardia     Atrioventricular block, complete (AV)     Long-term (current) use of anticoagulants [Z79.01]     Chronic systolic congestive heart failure (H)     Morbid obesity due to excess calories (H)     Type 2 diabetes mellitus without complication, with long-term current use of insulin (H)     Past Surgical History:   Procedure Laterality Date     C NONSPECIFIC PROCEDURE  06/2007    debritement     CARDIOVERSION  9/2009, 10/2009     H ABLATION AV NODE  4/8/10     IMPLANT PACEMAKER  4/8/10    BIV PPM- Med[employer] InSync III     TONSILLECTOMY      as kid       Social History   Substance Use Topics     Smoking status: Never Smoker     Smokeless tobacco: Never Used     Alcohol use No     Family History   Problem Relation Age of Onset     DIABETES Mother      Hypertension Father      DIABETES Father      Hypertension Maternal  Grandmother      DIABETES Maternal Grandmother      DIABETES Maternal Grandfather      Hypertension Maternal Grandfather      Cardiovascular Maternal Grandfather      Hypertension Paternal Grandfather      Cardiovascular Paternal Grandfather      DIABETES Sister      Hypertension Sister          Current Outpatient Prescriptions   Medication Sig Dispense Refill     metFORMIN (GLUCOPHAGE) 1000 MG tablet TAKE 1 TABLET(1000 MG) BY MOUTH TWICE DAILY WITH MEALS 60 tablet 0     simvastatin (ZOCOR) 80 MG tablet TAKE 1/2 TABLET(40 MG) BY MOUTH AT BEDTIME 15 tablet 0     insulin lispro (HUMALOG KWIKPEN) 100 UNIT/ML injection Due for 6 month A1c check, 1 unit per 7g of carbs three times daily before meals 15 mL 0     blood glucose monitoring (ONE TOUCH VERIO IQ) test strip Use to test blood sugars 3 times daily or as directed. 100 strip 0     warfarin (COUMADIN) 7.5 MG tablet 1 tablet daily except 1 1/2 tablets on Mon as directed by the anticoagulation clinic 100 tablet 0     amLODIPine (NORVASC) 10 MG tablet TAKE 1 TABLET(10 MG) BY MOUTH DAILY 90 tablet 1     metoprolol (TOPROL-XL) 100 MG 24 hr tablet Take 1 tablet (100 mg) by mouth daily 90 tablet 2     insulin glargine (BASAGLAR KWIKPEN) 100 UNIT/ML injection Inject 60 Units Subcutaneous At Bedtime 60 mL 3     ONE TOUCH LANCETS MISC 1 lancet by In Vitro route 3 times daily 200 each 3     losartan (COZAAR) 100 MG tablet Take 1 tablet (100 mg) by mouth daily 90 tablet 3     MICROLET LANCETS MISC 1 lancet by In Vitro route 3 times daily 100 each 3     fish oil-omega-3 fatty acids 1000 MG capsule Take 4 capsules by mouth daily.       NITROSTAT 0.4 MG SL SUBL 1 TAB EVERY 5 MIN AS NEEDED, UP TO 3 PER EPISODE       MULTIVITAMIN OR 1 qd       No Known Allergies  BP Readings from Last 3 Encounters:   10/31/16 122/72   08/11/16 128/82   02/09/16 132/80    Wt Readings from Last 3 Encounters:   10/31/16 (!) 308 lb 11.2 oz (140 kg)   08/11/16 (!) 313 lb 3.2 oz (142.1 kg)   02/09/16 (!)  310 lb 4.8 oz (140.8 kg)                    Reviewed and updated as needed this visit by clinical staff       Reviewed and updated as needed this visit by Provider       ROS:  C: NEGATIVE for fever, chills, change in weight  E: NEGATIVE for vision changes or irritation  E/M: NEGATIVE for ear, mouth and throat problems  R: NEGATIVE for significant cough or SOB  CV: NEGATIVE for chest pain, palpitations or peripheral edema  GI: NEGATIVE for nausea, abdominal pain, heartburn, or change in bowel habits  : NEGATIVE for frequency, dysuria, or hematuria  M: NEGATIVE for significant arthralgias or myalgia  H: NEGATIVE for bleeding problems  P: NEGATIVE for changes in mood or affect    OBJECTIVE:                                                    /78  Pulse 72  Temp 97.6  F (36.4  C) (Oral)  Ht 6' (1.829 m)  Wt (!) 312 lb (141.5 kg)  SpO2 99%  BMI 42.31 kg/m2  Body mass index is 42.31 kg/(m^2).  GENERAL: alert and no distress  EYES: Eyes grossly normal to inspection, extraocular movements - intact, and PERRL  HENT: ear canals- normal; TMs- normal; Nose- normal; Mouth- no ulcers, no lesions  NECK: no tenderness, no adenopathy, no asymmetry, no masses, no stiffness; thyroid- normal to palpation  RESP: lungs clear to auscultation - no rales, no rhonchi, no wheezes  CV: irregular rates and rhythm, normal S1 S2, no click or rub   ABDOMEN: obese  MS: extremities- no gross deformities noted  PSYCH: Alert and oriented times 3; speech- coherent , normal rate and volume; able to articulate logical thoughts, able to abstract reason, no tangential thoughts, no hallucinations or delusions, affect- normal       ASSESSMENT/PLAN:                                                      (E11.9,  Z79.4) Type 2 diabetes mellitus without complication, with long-term current use of insulin (H)  (primary encounter diagnosis)  Comment: labs as ordered  Plan: Lipid Profile, Hemoglobin A1c, Albumin Random         Urine Quantitative, TSH  with free T4 reflex  He  Is also checking with insurance as apparently he has some issues with coverage as well as the need for ?therapeutic substitutions that he will let us know about.            (I48.2) Chronic atrial fibrillation (H)  Comment: rate controlled on therapy  Plan:     (I10) Essential hypertension, benign  Comment: stable on therapy  Plan: Comprehensive metabolic panel            (I42.8) Primary cardiomyopathy (H)  Comment: appears stable as noted  Plan:     See Patient Instructions    David Almendarez MD  [facility]    THE MEDICATION LIST HAS BEEN FULLY RECONCILED BY THE M.D. AND THE NURSING STAFF.  25 minutes spent with this patient, face to face, discussing treatment options for listed problems above as well as side effects of appropriate medications.  Counseling time extended beyond 50% of the clinic visit.  Medication dosing, treatment plan and follow-up were discussed. Also reviewed need for primary care testing for patient.

## 2017-06-26 NOTE — MR AVS SNAPSHOT
After Visit Summary   6/26/2017    Mckay Hsu    MRN: [id number]           Patient Information     Date Of Birth          1960        Visit Information        Provider Department      6/26/2017 8:40 AM David Almendarez MD [facility]        Today's Diagnoses     Type 2 diabetes mellitus without complication, with long-term current use of insulin (H)    -  1    Chronic atrial fibrillation (H)        Essential hypertension, benign        Primary cardiomyopathy (H)           Follow-ups after your visit        Follow-up notes from your care team     Return in about 6 months (around 12/26/2017), or if symptoms worsen or fail to improve, for Lab Work.      Your next 10 appointments already scheduled     Jun 27, 2017  3:15 PM CDT   [facility] EP RETURN with Soto Holliday MD   [facility] [place] ([facility])    [address] [place] [id number] [phone]            Jul 11, 2017  2:30 PM CDT   Anticoagulation Visit with [facility] ANTICOAGULATION CLINIC   [facility] ([facility])    [address] [id number] [phone]            Aug 08, 2017  2:30 PM CDT   30 Day Phone Check with SANTIAGO TECH1   [facility] HEART [facility] ([facility])    [address] [place] [id number] [phone]              Who to contact     If you have questions or need follow up information about today's clinic visit or your schedule please contact [facility] directly at [phone].  Normal or non-critical lab and imaging results will be communicated to you by MyChart, letter or phone within 4 business days after the clinic has received the results. If you do not hear from us within 7 days, please contact the clinic through MyChart or phone. If you have a critical or abnormal lab result, we will notify  "you by phone as soon as possible.  Submit refill requests through [employer] or call your pharmacy and they will forward the refill request to us. Please allow 3 business days for your refill to be completed.          Additional Information About Your Visit        [employer] Information     [employer] lets you send messages to your doctor, view your test results, renew your prescriptions, schedule appointments and more. To sign up, go to [email or web address] . Click on \"Log in\" on the left side of the screen, which will take you to the Welcome page. Then click on \"Sign up Now\" on the right side of the page.     You will be asked to enter the access code listed below, as well as some personal information. Please follow the directions to create your username and password.     Your access code is: [email or web address]  Expires: 2017 11:22 AM     Your access code will  in 90 days. If you need help or a new code, please call your [place] clinic or [phone].        Care EveryWhere ID     This is your Care EveryWhere ID. This could be used by other organizations to access your [place] medical records  [id number]        Your Vitals Were     Pulse Temperature Height Pulse Oximetry BMI (Body Mass Index)       72 97.6  F (36.4  C) (Oral) 6' (1.829 m) 99% 42.31 kg/m2        Blood Pressure from Last 3 Encounters:   17 120/78   10/31/16 122/72   16 128/82    Weight from Last 3 Encounters:   17 (!) 312 lb (141.5 kg)   10/31/16 (!) 308 lb 11.2 oz (140 kg)   16 (!) 313 lb 3.2 oz (142.1 kg)              We Performed the Following     Albumin Random Urine Quantitative     Comprehensive metabolic panel     Hemoglobin A1c     Lipid Profile     TSH with free T4 reflex        Primary Care Provider Office Phone # Fax #    David Almendarze [phone]       [facility] [address] [facility] [address]        Equal Access to Services     DERIK GRIJALVA AH: Carmelo barnes " Hinatrudy, waefrenda luqadaha, qaybta kaalmada yuko, jessica phipps demetricedane davisrobyn derrick. So [facility] [phone].    ATENCIÓN: Si fernando kelly, tiene a ruiz disposición servicios gratuitos de asistencia lingüística. Crystal al [phone].    We comply with applicable federal civil rights laws and [place] laws. We do not discriminate on the basis of race, color, national origin, age, disability sex, sexual orientation or gender identity.            Thank you!     Thank you for choosing [facility]  for your care. Our goal is always to provide you with excellent care. Hearing back from our patients is one way we can continue to improve our services. Please take a few minutes to complete the written survey that you may receive in the mail after your visit with us. Thank you!             Your Updated Medication List - Protect others around you: Learn how to safely use, store and throw away your medicines at www.disposemymeds.org.          This list is accurate as of: 6/26/17  8:56 AM.  Always use your most recent med list.                   Brand Name Dispense Instructions for use Diagnosis    amLODIPine 10 MG tablet    NORVASC    90 tablet    TAKE 1 TABLET(10 MG) BY MOUTH DAILY    Essential hypertension, benign       blood glucose monitoring test strip    ONE TOUCH VERIO IQ    100 strip    Use to test blood sugars 3 times daily or as directed.    Type 2 diabetes mellitus without complication, with long-term current use of insulin (H)       fish oil-omega-3 fatty acids 1000 MG capsule      Take 4 capsules by mouth daily.        insulin glargine 100 UNIT/ML injection     60 mL    Inject 60 Units Subcutaneous At Bedtime    Type 2 diabetes mellitus without complication, with long-term current use of insulin (H)       insulin lispro 100 UNIT/ML injection    HumaLOG KWIKpen    15 mL    Due for 6 month A1c check, 1 unit per 7g of carbs three times daily before meals    Type 2 diabetes mellitus  without complication, with long-term current use of insulin (H)       losartan 100 MG tablet    COZAAR    90 tablet    Take 1 tablet (100 mg) by mouth daily    Essential hypertension, benign       metFORMIN 1000 MG tablet    GLUCOPHAGE    60 tablet    TAKE 1 TABLET(1000 MG) BY MOUTH TWICE DAILY WITH MEALS    Type 2 diabetes mellitus without complication, with long-term current use of insulin (H)       metoprolol 100 MG 24 hr tablet    TOPROL-XL    90 tablet    Take 1 tablet (100 mg) by mouth daily    Essential hypertension, benign       MULTIVITAMIN PO      1 qd        NITROSTAT 0.4 MG sublingual tablet   Generic drug:  nitroglycerin      1 TAB EVERY 5 MIN AS NEEDED, UP TO 3 PER EPISODE        ONE TOUCH LANCETS Misc     200 each    1 lancet by In Vitro route 3 times daily    Type 2 diabetes mellitus without complication, with long-term current use of insulin (H)       simvastatin 80 MG tablet    ZOCOR    15 tablet    TAKE 1/2 TABLET(40 MG) BY MOUTH AT BEDTIME    Hyperlipidemia LDL goal <100       warfarin 7.5 MG tablet    COUMADIN    100 tablet    1 tablet daily except 1 1/2 tablets on Mon as directed by the anticoagulation clinic    New onset atrial fibrillation (H)

## 2017-06-26 NOTE — LETTER
[facility]  [address]  [address]      June 26, 2017      Mckay Hsu  [address] [place] [id number]          Dear Mckay,      I have enclosed a copy of your most recent labs done here at the clinic and if available some of your prior labs for comparison.     Your Hemoglobin A1C and blood sugar tests look good and I would continue with your medication without change.  These tests should be repeated in 6 months.    Your cholesterol looks good and I would not change anything at this point but would repeat your labs in 12 months.    In addition, your basic panel and liver function tests are completely normal and should be rechecked with your next cholesterol level.    Your thyroid function tests look good and thus I would not change anything at this point.    Please call me if you have further questions.        David Almendarez MD

## 2017-06-27 ENCOUNTER — OFFICE VISIT (OUTPATIENT)
Dept: CARDIOLOGY | Facility: CLINIC | Age: 57
End: 2017-06-27
Attending: INTERNAL MEDICINE
Payer: COMMERCIAL

## 2017-06-27 VITALS
HEIGHT: 72 IN | SYSTOLIC BLOOD PRESSURE: 125 MMHG | HEART RATE: 73 BPM | DIASTOLIC BLOOD PRESSURE: 84 MMHG | WEIGHT: 315 LBS | BODY MASS INDEX: 42.66 KG/M2

## 2017-06-27 DIAGNOSIS — I42.9 PRIMARY CARDIOMYOPATHY (H): ICD-10-CM

## 2017-06-27 PROCEDURE — 93000 ELECTROCARDIOGRAM COMPLETE: CPT | Performed by: INTERNAL MEDICINE

## 2017-06-27 PROCEDURE — 99214 OFFICE O/P EST MOD 30 MIN: CPT | Mod: 25 | Performed by: INTERNAL MEDICINE

## 2017-06-27 NOTE — LETTER
6/27/2017    David Almendarez MD  [address] [facility] [address]    RE: Mckay Hsu       Dear Colleague,    I had the pleasure of seeing Mckay Hsu in the [facility].    HPI and Plan:   See dictation    Orders Placed This Encounter   Procedures     Follow-Up with Cardiac Advanced Practice Provider     Follow-Up with Electrophysiologist     EKG 12-lead complete w/read - Clinics       No orders of the defined types were placed in this encounter.      There are no discontinued medications.      Encounter Diagnosis   Name Primary?     Primary cardiomyopathy (H)        CURRENT MEDICATIONS:  Current Outpatient Prescriptions   Medication Sig Dispense Refill     insulin aspart (NOVOLOG FLEXPEN) 100 UNIT/ML injection 1 unit per 7g of carbs three times daily before meals 30 mL 5     metFORMIN (GLUCOPHAGE) 1000 MG tablet TAKE 1 TABLET(1000 MG) BY MOUTH TWICE DAILY WITH MEALS 60 tablet 0     simvastatin (ZOCOR) 80 MG tablet TAKE 1/2 TABLET(40 MG) BY MOUTH AT BEDTIME 15 tablet 0     blood glucose monitoring (ONE TOUCH VERIO IQ) test strip Use to test blood sugars 3 times daily or as directed. 100 strip 0     warfarin (COUMADIN) 7.5 MG tablet 1 tablet daily except 1 1/2 tablets on Mon as directed by the anticoagulation clinic 100 tablet 0     amLODIPine (NORVASC) 10 MG tablet TAKE 1 TABLET(10 MG) BY MOUTH DAILY 90 tablet 1     metoprolol (TOPROL-XL) 100 MG 24 hr tablet Take 1 tablet (100 mg) by mouth daily 90 tablet 2     insulin glargine (BASAGLAR KWIKPEN) 100 UNIT/ML injection Inject 60 Units Subcutaneous At Bedtime 60 mL 3     ONE TOUCH LANCETS MISC 1 lancet by In Vitro route 3 times daily 200 each 3     losartan (COZAAR) 100 MG tablet Take 1 tablet (100 mg) by mouth daily 90 tablet 3     fish oil-omega-3 fatty acids 1000 MG capsule Take 4 capsules by mouth daily.       NITROSTAT 0.4 MG SL SUBL 1 TAB EVERY 5 MIN AS NEEDED, UP TO 3 PER EPISODE       MULTIVITAMIN OR 1 qd          ALLERGIES   No Known Allergies    PAST MEDICAL HISTORY:  Past Medical History:   Diagnosis Date     Atrial fibrillation (H)     s/p AVN ablation, BIV PPM     Congestive heart failure (H)     tachycardia-induced cardiomyopathy     Essential hypertension, benign      Hyperlipidemia LDL goal <100 10/31/2010     Hyponatremia 9/8/2009     Morbid obesity (H)      Open wound of foot except toe(s) alone, without mention of complication      Type 2 diabetes, HbA1C goal < 8% (H) 6/26/2012       PAST SURGICAL HISTORY:  Past Surgical History:   Procedure Laterality Date     C NONSPECIFIC PROCEDURE  06/2007    debritement     CARDIOVERSION  9/2009, 10/2009     H ABLATION AV NODE  4/8/10     IMPLANT PACEMAKER  4/8/10    BIV PPM- Med[employer] InSync III     TONSILLECTOMY      as kid       FAMILY HISTORY:  Family History   Problem Relation Age of Onset     DIABETES Mother      Hypertension Father      DIABETES Father      Hypertension Maternal Grandmother      DIABETES Maternal Grandmother      DIABETES Maternal Grandfather      Hypertension Maternal Grandfather      Cardiovascular Maternal Grandfather      Hypertension Paternal Grandfather      Cardiovascular Paternal Grandfather      DIABETES Sister      Hypertension Sister        SOCIAL HISTORY:  Social History     Social History     Marital status: Single     Spouse name: N/A     Number of children: N/A     Years of education: N/A     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[employer]     Social History Main Topics     Smoking status: Never Smoker     Smokeless tobacco: Never Used     Alcohol use No     Drug use: No     Sexual activity: No     Other Topics Concern     Caffeine Concern Yes     occasionally soda     Special Diet Yes     low sodium, low fat, DM diet      Exercise Yes     walking, stairs      Social History Narrative       Review of Systems:  Skin:  Negative       Eyes:  Positive for glasses    ENT:  Negative      Respiratory:  Negative       Cardiovascular:   Negative      Gastroenterology: Negative      Genitourinary:  Negative      Musculoskeletal:  Positive for arthritis;joint pain    Neurologic:  Negative      Psychiatric:  Negative      Heme/Lymph/Imm:  Negative      Endocrine:  Positive for diabetes      Physical Exam:  Vitals: /84  Pulse 73  Ht 1.829 m (6')  Wt (!) 143.5 kg (316 lb 4.8 oz)  BMI 42.9 kg/m2    Constitutional:  cooperative, alert and oriented, well developed, well nourished, in no acute distress        Skin:  warm and dry to the touch, no apparent skin lesions or masses noted        Head:  normocephalic, no masses or lesions        Eyes:           ENT:  no pallor or cyanosis, dentition good        Neck:  JVP normal;no carotid bruit        Chest:  clear to auscultation   pacemaker incision in the left infraclavicular area was well-healed      Cardiac: regular rhythm;normal S1 and S2;no S3 or S4;no murmurs, gallops or rubs detected                  Abdomen:  abdomen soft        Vascular: not assessed this visit                                        Extremities and Back:  no edema              Neurological:  affect appropriate, oriented to time, person and place;no gross motor deficits;affect appropriate          CC  Soto Holliday MD   [facility]  [address]  [place] [address]        PHYSICIAN:  Dr. Soto Holliday  PATIENT:  Mckay Hsu  MRN:  [id number]  YOB: 1960    Dr. David Betts    Dear Dr. Betts:    I saw the patient for followup of atrial fibrillation.  He is a 57-year-old, white male with obesity and type 2 diabetes.  He has a history of atrial fibrillation with rapid ventricular rate and tachycardia-induced cardiomyopathy.  After he failed amiodarone therapy, he underwent ablation with BiV pacer implant in April 2010 _____ AV node ablation.  He has not had any readmission for heart failure or cardiology problems.  The patient s ejection fraction has been normal.  He has no palpitations, shortness of  breath or fatigue at the present time.    The patient was an  and he has been looking for a job over the last few years.  So far he has not been successful.    PHYSICAL EXAMINATION:   VITAL SIGNS:  Blood pressure was 125/84, heart rate 70 beats per minute, body weight 316 pounds.  HEENT:  Eyes and ENT were unremarkable.  LUNGS:  Clear.  CARDIAC:  Rhythm was regular.  The heart sounds were normal with a murmur.  ABDOMEN:  Showed no hepatomegaly.    EXTREMITIES:  There was no pedal edema.    IMAGING:  His echocardiography on 06/20/2017 reported normal ejection fraction.      ASSESSMENT AND RECOMMENDATIONS:  The patient is doing well symptomatically.  The biventricular pacemaker function is normal.  Left ventricular function remains normal.  He has difficulty to lose weight and hopefully he will get some effect to his effort in the near future.  The blood pressure is acceptable.  Continue the current medications and return for followup in a year.      Thank you for allowing me to participate in the care of your patient.    Sincerely,     Soto Holliday MD     [facility]

## 2017-06-27 NOTE — MR AVS SNAPSHOT
After Visit Summary   6/27/2017    Mckay Hsu    MRN: [id number]           Patient Information     Date Of Birth          1960        Visit Information        Provider Department      6/27/2017 3:15 PM Soto Holliday MD [facility] AT [place]        Today's Diagnoses     Primary cardiomyopathy (H)           Follow-ups after your visit        Additional Services     Follow-Up with Cardiac Advanced Practice Provider           Follow-Up with Electrophysiologist                 Your next 10 appointments already scheduled     Jul 11, 2017  2:30 PM CDT   Anticoagulation Visit with OX ANTICOAGULATION CLINIC   [facility] ([facility])    [address] [address]   [phone]            Aug 08, 2017  2:30 PM CDT   30 Day Phone Check with JACK ARCE   [facility] HEART AT [place] ([facility])    [address] [place] [id number] [phone]              Future tests that were ordered for you today     Open Future Orders        Priority Expected Expires Ordered    Follow-Up with Cardiac Advanced Practice Provider Routine 6/27/2018 11/9/2018 6/27/2017    Follow-Up with Electrophysiologist Routine 6/27/2019 11/9/2019 6/27/2017            Who to contact     If you have questions or need follow up information about today's clinic visit or your schedule please contact [facility] AT [place] directly at [phone].  Normal or non-critical lab and imaging results will be communicated to you by MyChart, letter or phone within 4 business days after the clinic has received the results. If you do not hear from us within 7 days, please contact the clinic through MyChart or phone. If you have a critical or abnormal lab result, we will notify you by phone as soon as possible.  Submit refill requests through [employer] or call your  "pharmacy and they will forward the refill request to us. Please allow 3 business days for your refill to be completed.          Additional Information About Your Visit        MyChart Information     [employer] lets you send messages to your doctor, view your test results, renew your prescriptions, schedule appointments and more. To sign up, go to [email or web address] . Click on \"Log in\" on the left side of the screen, which will take you to the Welcome page. Then click on \"Sign up Now\" on the right side of the page.     You will be asked to enter the access code listed below, as well as some personal information. Please follow the directions to create your username and password.     Your access code is: [email or web address]  Expires: 2017 11:22 AM     Your access code will  in 90 days. If you need help or a new code, please call your [place] clinic or [phone].        Care EveryWhere ID     This is your [facility] EveryWhere ID. This could be used by other organizations to access your [place] medical records  [id number]        Your Vitals Were     Pulse Height BMI (Body Mass Index)             73 1.829 m (6') 42.9 kg/m2          Blood Pressure from Last 3 Encounters:   17 125/84   17 120/78   10/31/16 122/72    Weight from Last 3 Encounters:   17 (!) 143.5 kg (316 lb 4.8 oz)   17 (!) 141.5 kg (312 lb)   10/31/16 (!) 140 kg (308 lb 11.2 oz)              We Performed the Following     EKG 12-lead complete w/read - Clinics     Follow-Up with Electrophysiologist        Primary Care Provider Office Phone # Fax #    David Almendarez [phone]       [facility] [address] [place] [phone]        Equal Access to Services     DERIK GRIJALVA : Carmelo Solano, randee shah, qaybromaine pintoaljessica amado. So [facility] [phone].    ATENCIÓN: Si habla español, tiene a ruiz disposición servicios gratuitos de " asistencia lingüística. Crystal al [phone].    We comply with applicable federal civil rights laws and [place] laws. We do not discriminate on the basis of race, color, national origin, age, disability sex, sexual orientation or gender identity.            Thank you!     Thank you for choosing [facility] AT [place]  for your care. Our goal is always to provide you with excellent care. Hearing back from our patients is one way we can continue to improve our services. Please take a few minutes to complete the written survey that you may receive in the mail after your visit with us. Thank you!             Your Updated Medication List - Protect others around you: Learn how to safely use, store and throw away your medicines at www.disposemymeds.org.          This list is accurate as of: 6/27/17  3:34 PM.  Always use your most recent med list.                   Brand Name Dispense Instructions for use Diagnosis    amLODIPine 10 MG tablet    NORVASC    90 tablet    TAKE 1 TABLET(10 MG) BY MOUTH DAILY    Essential hypertension, benign       blood glucose monitoring test strip    ONE TOUCH VERIO IQ    100 strip    Use to test blood sugars 3 times daily or as directed.    Type 2 diabetes mellitus without complication, with long-term current use of insulin (H)       fish oil-omega-3 fatty acids 1000 MG capsule      Take 4 capsules by mouth daily.        insulin aspart 100 UNIT/ML injection    NovoLOG FLEXPEN    30 mL    1 unit per 7g of carbs three times daily before meals    Type 2 diabetes mellitus without complication, with long-term current use of insulin (H)       insulin glargine 100 UNIT/ML injection     60 mL    Inject 60 Units Subcutaneous At Bedtime    Type 2 diabetes mellitus without complication, with long-term current use of insulin (H)       losartan 100 MG tablet    COZAAR    90 tablet    Take 1 tablet (100 mg) by mouth daily    Essential hypertension, benign       metFORMIN  1000 MG tablet    GLUCOPHAGE    60 tablet    TAKE 1 TABLET(1000 MG) BY MOUTH TWICE DAILY WITH MEALS    Type 2 diabetes mellitus without complication, with long-term current use of insulin (H)       metoprolol 100 MG 24 hr tablet    TOPROL-XL    90 tablet    Take 1 tablet (100 mg) by mouth daily    Essential hypertension, benign       MULTIVITAMIN PO      1 qd        NITROSTAT 0.4 MG sublingual tablet   Generic drug:  nitroglycerin      1 TAB EVERY 5 MIN AS NEEDED, UP TO 3 PER EPISODE        ONE TOUCH LANCETS Misc     200 each    1 lancet by In Vitro route 3 times daily    Type 2 diabetes mellitus without complication, with long-term current use of insulin (H)       simvastatin 80 MG tablet    ZOCOR    15 tablet    TAKE 1/2 TABLET(40 MG) BY MOUTH AT BEDTIME    Hyperlipidemia LDL goal <100       warfarin 7.5 MG tablet    COUMADIN    100 tablet    1 tablet daily except 1 1/2 tablets on Mon as directed by the anticoagulation clinic    New onset atrial fibrillation (H)

## 2017-06-27 NOTE — PROGRESS NOTES
HPI and Plan:   See dictation    Orders Placed This Encounter   Procedures     Follow-Up with Cardiac Advanced Practice Provider     Follow-Up with Electrophysiologist     EKG 12-lead complete w/read - Clinics       No orders of the defined types were placed in this encounter.      There are no discontinued medications.      Encounter Diagnosis   Name Primary?     Primary cardiomyopathy (H)        CURRENT MEDICATIONS:  Current Outpatient Prescriptions   Medication Sig Dispense Refill     insulin aspart (NOVOLOG FLEXPEN) 100 UNIT/ML injection 1 unit per 7g of carbs three times daily before meals 30 mL 5     metFORMIN (GLUCOPHAGE) 1000 MG tablet TAKE 1 TABLET(1000 MG) BY MOUTH TWICE DAILY WITH MEALS 60 tablet 0     simvastatin (ZOCOR) 80 MG tablet TAKE 1/2 TABLET(40 MG) BY MOUTH AT BEDTIME 15 tablet 0     blood glucose monitoring (ONE TOUCH VERIO IQ) test strip Use to test blood sugars 3 times daily or as directed. 100 strip 0     warfarin (COUMADIN) 7.5 MG tablet 1 tablet daily except 1 1/2 tablets on Mon as directed by the anticoagulation clinic 100 tablet 0     amLODIPine (NORVASC) 10 MG tablet TAKE 1 TABLET(10 MG) BY MOUTH DAILY 90 tablet 1     metoprolol (TOPROL-XL) 100 MG 24 hr tablet Take 1 tablet (100 mg) by mouth daily 90 tablet 2     insulin glargine (BASAGLAR KWIKPEN) 100 UNIT/ML injection Inject 60 Units Subcutaneous At Bedtime 60 mL 3     ONE TOUCH LANCETS MISC 1 lancet by In Vitro route 3 times daily 200 each 3     losartan (COZAAR) 100 MG tablet Take 1 tablet (100 mg) by mouth daily 90 tablet 3     fish oil-omega-3 fatty acids 1000 MG capsule Take 4 capsules by mouth daily.       NITROSTAT 0.4 MG SL SUBL 1 TAB EVERY 5 MIN AS NEEDED, UP TO 3 PER EPISODE       MULTIVITAMIN OR 1 qd         ALLERGIES   No Known Allergies    PAST MEDICAL HISTORY:  Past Medical History:   Diagnosis Date     Atrial fibrillation (H)     s/p AVN ablation, BIV PPM     Congestive heart failure (H)     tachycardia-induced  cardiomyopathy     Essential hypertension, benign      Hyperlipidemia LDL goal <100 10/31/2010     Hyponatremia 9/8/2009     Morbid obesity (H)      Open wound of foot except toe(s) alone, without mention of complication      Type 2 diabetes, HbA1C goal < 8% (H) 6/26/2012       PAST SURGICAL HISTORY:  Past Surgical History:   Procedure Laterality Date     C NONSPECIFIC PROCEDURE  06/2007    debritement     CARDIOVERSION  9/2009, 10/2009     H ABLATION AV NODE  4/8/10     IMPLANT PACEMAKER  4/8/10    BIV PPM- Medtronic InSync III     TONSILLECTOMY      as kid       FAMILY HISTORY:  Family History   Problem Relation Age of Onset     DIABETES Mother      Hypertension Father      DIABETES Father      Hypertension Maternal Grandmother      DIABETES Maternal Grandmother      DIABETES Maternal Grandfather      Hypertension Maternal Grandfather      Cardiovascular Maternal Grandfather      Hypertension Paternal Grandfather      Cardiovascular Paternal Grandfather      DIABETES Sister      Hypertension Sister        SOCIAL HISTORY:  Social History     Social History     Marital status: Single     Spouse name: N/A     Number of children: N/A     Years of education: N/A     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[employer]     Social History Main Topics     Smoking status: Never Smoker     Smokeless tobacco: Never Used     Alcohol use No     Drug use: No     Sexual activity: No     Other Topics Concern     Caffeine Concern Yes     occasionally soda     Special Diet Yes     low sodium, low fat, DM diet      Exercise Yes     walking, stairs      Social History Narrative       Review of Systems:  Skin:  Negative       Eyes:  Positive for glasses    ENT:  Negative      Respiratory:  Negative       Cardiovascular:  Negative      Gastroenterology: Negative      Genitourinary:  Negative      Musculoskeletal:  Positive for arthritis;joint pain    Neurologic:  Negative      Psychiatric:  Negative      Heme/Lymph/Imm:  Negative       Endocrine:  Positive for diabetes      Physical Exam:  Vitals: /84  Pulse 73  Ht 1.829 m (6')  Wt (!) 143.5 kg (316 lb 4.8 oz)  BMI 42.9 kg/m2    Constitutional:  cooperative, alert and oriented, well developed, well nourished, in no acute distress        Skin:  warm and dry to the touch, no apparent skin lesions or masses noted        Head:  normocephalic, no masses or lesions        Eyes:           ENT:  no pallor or cyanosis, dentition good        Neck:  JVP normal;no carotid bruit        Chest:  clear to auscultation   pacemaker incision in the left infraclavicular area was well-healed      Cardiac: regular rhythm;normal S1 and S2;no S3 or S4;no murmurs, gallops or rubs detected                  Abdomen:  abdomen soft        Vascular: not assessed this visit                                        Extremities and Back:  no edema              Neurological:  affect appropriate, oriented to time, person and place;no gross motor deficits;affect appropriate              CC  Soto Holliday MD   [facility]  [address]  [address]  [place] [address]

## 2017-07-11 ENCOUNTER — ANTICOAGULATION THERAPY VISIT (OUTPATIENT)
Dept: ANTICOAGULATION | Facility: CLINIC | Age: 57
End: 2017-07-11
Payer: COMMERCIAL

## 2017-07-11 LAB — INR POINT OF CARE: 3 (ref 0.86–1.14)

## 2017-07-11 PROCEDURE — 36416 COLLJ CAPILLARY BLOOD SPEC: CPT

## 2017-07-11 PROCEDURE — 85610 PROTHROMBIN TIME: CPT | Mod: QW

## 2017-07-11 NOTE — PROGRESS NOTES
ANTICOAGULATION FOLLOW-UP CLINIC VISIT    Patient Name:  Mckay Hsu  Date:  7/11/2017  Contact Type:  Face to Face    SUBJECTIVE:     Patient Findings     Positives No Problem Findings           OBJECTIVE    INR Protime   Date Value Ref Range Status   07/11/2017 3.0 (A) 0.86 - 1.14 Final       ASSESSMENT / PLAN  INR assessment THER    Recheck INR In: 6 WEEKS    INR Location Clinic      Anticoagulation Summary as of 7/11/2017     INR goal 2.0-3.0   Today's INR 3.0   Maintenance plan 11.25 mg (7.5 mg x 1.5) on Mon; 7.5 mg (7.5 mg x 1) all other days   Full instructions 11.25 mg on Mon; 7.5 mg all other days   Weekly total 56.25 mg   No change documented Rosalee Billingsley, RN   Plan last modified Rosalee Billingsley, RN (1/5/2016)   Next INR check 8/22/2017   Target end date     Indications   Long-term (current) use of anticoagulants [Z79.01] [Z79.01]  Chronic atrial fibrillation (H) [I48.2]         Anticoagulation Episode Summary     INR check location     Preferred lab     Send [facility] reminders to [facility]    Comments             See the Encounter Report to view Anticoagulation Flowsheet and Dosing Calendar (Go to Encounters tab in chart review, and find the Anticoagulation Therapy Visit)        Rosalee Billingsley, RN

## 2017-07-11 NOTE — MR AVS SNAPSHOT
Mckay Hsu   7/11/2017 2:30 PM   Anticoagulation Therapy Visit    Description:  57 year old male   Provider:   ANTICOAGULATION CLINIC   Department:   Anti Coagulation           INR as of 7/11/2017     Today's INR 3.0      Anticoagulation Summary as of 7/11/2017     INR goal 2.0-3.0   Today's INR 3.0   Full instructions 11.25 mg on Mon; 7.5 mg all other days   Next INR check 8/22/2017    Indications   Long-term (current) use of anticoagulants [Z79.01] [Z79.01]  Chronic atrial fibrillation (H) [I48.2]         Your next Anticoagulation Clinic appointment(s)     Aug 22, 2017  2:30 PM CDT   Anticoagulation Visit with  ANTICOAGULATION CLINIC   [facility] ([facility])    [address] [place] [address] [phone]              Contact Numbers     [facility]  Please call  [phone] to cancel and/or reschedule your appointment   Please call  [phone] with any problems or questions regarding your therapy.        July 2017 Details    Sun Mon Tue Wed Thu Fri Sat           1                 2               3               4               5               6               7               8                 9               10               11      7.5 mg   See details      12      7.5 mg         13      7.5 mg         14      7.5 mg         15      7.5 mg           16      7.5 mg         17      11.25 mg         18      7.5 mg         19      7.5 mg         20      7.5 mg         21      7.5 mg         22      7.5 mg           23      7.5 mg         24      11.25 mg         25      7.5 mg         26      7.5 mg         27      7.5 mg         28      7.5 mg         29      7.5 mg           30      7.5 mg         31      11.25 mg               Date Details   07/11 This INR check               How to take your warfarin dose     To take:  7.5 mg Take 1 of the 7.5 mg tablets.    To take:  11.25 mg Take 1.5 of the 7.5 mg tablets.            August 2017 Details    Sun Mon Tue Wed Thu Fri Sat       1      7.5 mg         2      7.5 mg         3      7.5 mg         4      7.5 mg         5      7.5 mg           6      7.5 mg         7      11.25 mg         8      7.5 mg         9      7.5 mg         10      7.5 mg         11      7.5 mg         12      7.5 mg           13      7.5 mg         14      11.25 mg         15      7.5 mg         16      7.5 mg         17      7.5 mg         18      7.5 mg         19      7.5 mg           20      7.5 mg         21      11.25 mg         22            23               24               25               26                 27               28               29               30               31                  Date Details   No additional details    Date of next INR:  8/22/2017         How to take your warfarin dose     To take:  7.5 mg Take 1 of the 7.5 mg tablets.    To take:  11.25 mg Take 1.5 of the 7.5 mg tablets.

## 2017-07-12 DIAGNOSIS — E11.9 TYPE 2 DIABETES MELLITUS WITHOUT COMPLICATION, WITH LONG-TERM CURRENT USE OF INSULIN (H): ICD-10-CM

## 2017-07-12 DIAGNOSIS — Z79.4 TYPE 2 DIABETES MELLITUS WITHOUT COMPLICATION, WITH LONG-TERM CURRENT USE OF INSULIN (H): ICD-10-CM

## 2017-07-12 NOTE — TELEPHONE ENCOUNTER
blood glucose monitoring (ONE TOUCH VERIO IQ) test strip      Last Written Prescription Date: 06/08/2017  Last Fill Quantity: 100,  # refills: 0   Last Office Visit with [facility], [facility] or [facility] prescribing provider: 06/26/2017

## 2017-07-26 DIAGNOSIS — I48.91 NEW ONSET ATRIAL FIBRILLATION (H): ICD-10-CM

## 2017-07-26 DIAGNOSIS — E78.5 HYPERLIPIDEMIA LDL GOAL <100: ICD-10-CM

## 2017-07-26 DIAGNOSIS — E11.9 TYPE 2 DIABETES MELLITUS WITHOUT COMPLICATION, WITH LONG-TERM CURRENT USE OF INSULIN (H): ICD-10-CM

## 2017-07-26 DIAGNOSIS — Z79.4 TYPE 2 DIABETES MELLITUS WITHOUT COMPLICATION, WITH LONG-TERM CURRENT USE OF INSULIN (H): ICD-10-CM

## 2017-07-26 RX ORDER — SIMVASTATIN 80 MG
TABLET ORAL
Qty: 45 TABLET | Refills: 3 | Status: SHIPPED | OUTPATIENT
Start: 2017-07-26 | End: 2018-06-13

## 2017-07-26 RX ORDER — WARFARIN SODIUM 7.5 MG/1
TABLET ORAL
Qty: 100 TABLET | Refills: 0 | Status: SHIPPED | OUTPATIENT
Start: 2017-07-26 | End: 2017-10-24

## 2017-07-26 NOTE — TELEPHONE ENCOUNTER
metFORMIN (GLUCOPHAGE) 1000 MG tablet    Last Written Prescription Date: 06/22/2017  Last Fill Quantity: 60, # refills: 0    Last Office Visit with [facility] or [facility] prescribing provider:  06/26/2017   Future Office Visit:        BP Readings from Last 3 Encounters:   06/27/17 125/84   06/26/17 120/78   10/31/16 122/72     warfarin (COUMADIN) 7.5 MG tablet   Last Written Prescription Date: 04/18/2017  Last Fill Qty: 100, # refills: 0  Last Office Visit with [facility], [facility] or [facility] prescribing provider: 06/26/2017       Date and Result of Last PT/INR:   Lab Results   Component Value Date    INR 3.0 07/11/2017    INR 2.4 05/30/2017    INR 1.27 04/09/2010    INR 1.39 04/08/2010          simvastatin (ZOCOR) 80 MG tablet        Last Written Prescription Date: 06/22/2017  Last Fill Quantity: 15, # refills: 0  Last Office Visit with [facility], [facility] or [facility] prescribing provider: 06/26/2017       Lab Results   Component Value Date    CHOL 104 06/26/2017     Lab Results   Component Value Date    HDL 46 06/26/2017     Lab Results   Component Value Date    LDL 28 06/26/2017     Lab Results   Component Value Date    TRIG 151 06/26/2017     Lab Results   Component Value Date    CHOLHDLRATIO 2.6 10/02/2014

## 2017-07-28 NOTE — PROGRESS NOTES
PHYSICIAN:  Dr. Soto Holliday  PATIENT:  Mckay Hsu  MRN:  [id number]  YOB: 1960    Dr. David Betts    Dear Dr. Betts:    I saw the patient for followup of atrial fibrillation.  He is a 57-year-old, white male with obesity and type 2 diabetes.  He has a history of atrial fibrillation with rapid ventricular rate and tachycardia-induced cardiomyopathy.  After he failed amiodarone therapy, he underwent ablation with BiV pacer implant in April 2010 _____ AV node ablation.  He has not had any readmission for heart failure or cardiology problems.  The patient s ejection fraction has been normal.  He has no palpitations, shortness of breath or fatigue at the present time.    The patient was an  and he has been looking for a job over the last few years.  So far he has not been successful.    PHYSICAL EXAMINATION:   VITAL SIGNS:  Blood pressure was 125/84, heart rate 70 beats per minute, body weight 316 pounds.  HEENT:  Eyes and ENT were unremarkable.  LUNGS:  Clear.  CARDIAC:  Rhythm was regular.  The heart sounds were normal with a murmur.  ABDOMEN:  Showed no hepatomegaly.    EXTREMITIES:  There was no pedal edema.    IMAGING:  His echocardiography on 06/20/2017 reported normal ejection fraction.      ASSESSMENT AND RECOMMENDATIONS:  The patient is doing well symptomatically.  The biventricular pacemaker function is normal.  Left ventricular function remains normal.  He has difficulty to lose weight and hopefully he will get some effect to his effort in the near future.  The blood pressure is acceptable.  Continue the current medications and return for followup in a year.

## 2017-08-08 ENCOUNTER — OFFICE VISIT (OUTPATIENT)
Dept: CARDIOLOGY | Facility: CLINIC | Age: 57
End: 2017-08-08
Payer: COMMERCIAL

## 2017-08-08 DIAGNOSIS — Z95.0 CARDIAC PACEMAKER IN SITU: Primary | ICD-10-CM

## 2017-08-08 PROCEDURE — 93293 PM PHONE R-STRIP DEVICE EVAL: CPT | Performed by: INTERNAL MEDICINE

## 2017-08-08 NOTE — MR AVS SNAPSHOT
"              After Visit Summary   8/8/2017    Mckay Hsu    MRN: [id number]           Patient Information     Date Of Birth          1960        Visit Information        Provider Department      8/8/2017 2:30 PM [facility] TE[facility]1 [facility]        Today's Diagnoses     Cardiac pacemaker in situ    -  1       Follow-ups after your visit        Your next 10 appointments already scheduled     Aug 22, 2017  2:30 PM CDT   Anticoagulation Visit with [facility]ULATION CLINIC   [facility]    [address] [address] [phone]            Oct 03, 2017  2:30 PM CDT   Phone Device Check with S[facility]O TECH1   [facility] ([facility])    [address] [place] [address] [phone]              Who to contact     If you have questions or need follow up information about today's clinic visit or your schedule please contact [facility] directly at [phone].  Normal or non-critical lab and imaging results will be communicated to you by Auctomatichart, letter or phone within 4 business days after the clinic has received the results. If you do not hear from us within 7 days, please contact the clinic through Auctomatichart or phone. If you have a critical or abnormal lab result, we will notify you by phone as soon as possible.  Submit refill requests through Anexon or call your pharmacy and they will forward the refill request to us. Please allow 3 business days for your refill to be completed.          Additional Information About Your Visit        MyChart Information     Anexon lets you send messages to your doctor, view your test results, renew your prescriptions, schedule appointments and more. To sign up, go to [email or web address] . Click on \"Log in\" on the left side of the screen, which " "will take you to the Welcome page. Then click on \"Sign up Now\" on the right side of the page.     You will be asked to enter the access code listed below, as well as some personal information. Please follow the directions to create your username and password.     Your access code is: [email or web address]  Expires: 2017 11:22 AM     Your access code will  in 90 days. If you need help or a new code, please call your [place] clinic or [phone].        Care EveryWhere ID     This is your Care EveryWhere ID. This could be used by other organizations to access your [place] medical records  [id number]         Blood Pressure from Last 3 Encounters:   17 125/84   17 120/78   10/31/16 122/72    Weight from Last 3 Encounters:   17 (!) 143.5 kg (316 lb 4.8 oz)   17 (!) 141.5 kg (312 lb)   10/31/16 (!) 140 kg (308 lb 11.2 oz)              We Performed the Following     PM PHONE R STRIP EVAL UP TO 90 DAYS ([phone])        Primary Care Provider Office Phone # Fax #    David Almendarez [phone]       [facility] [address] [facility] [address]        Equal Access to Services     DERIK GRIJALVA AH: Hadii aad ku hadasho Soomaali, waaxda luqadaha, qaybta kaalmada adeegyada, jessica meza. So [facility] [phone].    ATENCIÓN: Si habla español, tiene a ruiz disposición servicios gratuitos de asistencia lingüística. Crystal al [phone].    We comply with applicable federal civil rights laws and [place] laws. We do not discriminate on the basis of race, color, national origin, age, disability sex, sexual orientation or gender identity.            Thank you!     Thank you for choosing [facility] [place]  for your care. Our goal is always to provide you with excellent care. Hearing back from our patients is one way we can continue to improve our services. Please take a few minutes to complete the written survey " that you may receive in the mail after your visit with us. Thank you!             Your Updated Medication List - Protect others around you: Learn how to safely use, store and throw away your medicines at www.disposemymeds.org.          This list is accurate as of: 8/8/17  2:36 PM.  Always use your most recent med list.                   Brand Name Dispense Instructions for use Diagnosis    amLODIPine 10 MG tablet    NORVASC    90 tablet    TAKE 1 TABLET(10 MG) BY MOUTH DAILY    Essential hypertension, benign       blood glucose monitoring test strip    ONE TOUCH VERIO IQ    100 strip    Use to test blood sugars 3 times daily or as directed.    Type 2 diabetes mellitus without complication, with long-term current use of insulin (H)       fish oil-omega-3 fatty acids 1000 MG capsule      Take 4 capsules by mouth daily.        insulin aspart 100 UNIT/ML injection    NovoLOG FLEXPEN    30 mL    1 unit per 7g of carbs three times daily before meals    Type 2 diabetes mellitus without complication, with long-term current use of insulin (H)       insulin glargine 100 UNIT/ML injection     60 mL    Inject 60 Units Subcutaneous At Bedtime    Type 2 diabetes mellitus without complication, with long-term current use of insulin (H)       losartan 100 MG tablet    COZAAR    90 tablet    Take 1 tablet (100 mg) by mouth daily    Essential hypertension, benign       metFORMIN 1000 MG tablet    GLUCOPHAGE    180 tablet    TAKE 1 TABLET(1000 MG) BY MOUTH TWICE DAILY WITH MEALS    Type 2 diabetes mellitus without complication, with long-term current use of insulin (H)       metoprolol 100 MG 24 hr tablet    TOPROL-XL    90 tablet    Take 1 tablet (100 mg) by mouth daily    Essential hypertension, benign       MULTIVITAMIN PO      1 qd        NITROSTAT 0.4 MG sublingual tablet   Generic drug:  nitroGLYcerin      1 TAB EVERY 5 MIN AS NEEDED, UP TO 3 PER EPISODE        ONE TOUCH LANCETS Misc     200 each    1 lancet by In Vitro route 3  times daily    Type 2 diabetes mellitus without complication, with long-term current use of insulin (H)       simvastatin 80 MG tablet    ZOCOR    45 tablet    TAKE 1/2 TABLET(40 MG) BY MOUTH AT BEDTIME    Hyperlipidemia LDL goal <100       warfarin 7.5 MG tablet    COUMADIN    100 tablet    1 tablet daily except 1 1/2 tablets on Mon as directed by the anticoagulation clinic    New onset atrial fibrillation (H)

## 2017-08-08 NOTE — PROGRESS NOTES
~30 day phone teletrace/ charged  Appropriate  at time of check.  Magnet response WNL. F/U scheduled q 1 month. RODRIGUEZ Wolf

## 2017-08-22 ENCOUNTER — ANTICOAGULATION THERAPY VISIT (OUTPATIENT)
Dept: ANTICOAGULATION | Facility: CLINIC | Age: 57
End: 2017-08-22
Payer: COMMERCIAL

## 2017-08-22 LAB — INR POINT OF CARE: 3.5 (ref 0.86–1.14)

## 2017-08-22 PROCEDURE — 36416 COLLJ CAPILLARY BLOOD SPEC: CPT

## 2017-08-22 PROCEDURE — 85610 PROTHROMBIN TIME: CPT | Mod: QW

## 2017-08-22 NOTE — MR AVS SNAPSHOT
Mckay Gonzalesrobyn Hsu   8/22/2017 2:30 PM   Anticoagulation Therapy Visit    Description:  57 year old male   Provider:   ANTICOAGULATION CLINIC   Department:   Anti Coagulation           INR as of 8/22/2017     Today's INR 3.5!      Anticoagulation Summary as of 8/22/2017     INR goal 2.0-3.0   Today's INR 3.5!   Full instructions 8/22: 3.75 mg; Otherwise 7.5 mg every day   Next INR check 9/26/2017    Indications   Long-term (current) use of anticoagulants [Z79.01] [Z79.01]  Chronic atrial fibrillation (H) [I48.2]         Your next Anticoagulation Clinic appointment(s)     Sep 26, 2017  2:30 PM CDT   Anticoagulation Visit with  ANTICOAGULATION CLINIC   [facility] ([facility])    [address] [place] [address] [phone]              Contact Numbers     [facility]  Please call  [phone] to cancel and/or reschedule your appointment   Please call  [phone] with any problems or questions regarding your therapy.        August 2017 Details    Sun Mon Tue Wed Thu Fri Sat       1               2               3               4               5                 6               7               8               9               10               11               12                 13               14               15               16               17               18               19                 20               21               22      3.75 mg   See details      23      7.5 mg         24      7.5 mg         25      7.5 mg         26      7.5 mg           27      7.5 mg         28      7.5 mg         29      7.5 mg         30      7.5 mg         31      7.5 mg            Date Details   08/22 This INR check               How to take your warfarin dose     To take:  3.75 mg Take 0.5 of a 7.5 mg tablet.    To take:  7.5 mg Take 1 of the 7.5 mg tablets.           September 2017 Details    Sun Mon Tue Wed Thu Fri Sat          1      7.5 mg          2      7.5 mg           3      7.5 mg         4      7.5 mg         5      7.5 mg         6      7.5 mg         7      7.5 mg         8      7.5 mg         9      7.5 mg           10      7.5 mg         11      7.5 mg         12      7.5 mg         13      7.5 mg         14      7.5 mg         15      7.5 mg         16      7.5 mg           17      7.5 mg         18      7.5 mg         19      7.5 mg         20      7.5 mg         21      7.5 mg         22      7.5 mg         23      7.5 mg           24      7.5 mg         25      7.5 mg         26            27               28               29               30                Date Details   No additional details    Date of next INR:  9/26/2017         How to take your warfarin dose     To take:  7.5 mg Take 1 of the 7.5 mg tablets.

## 2017-08-22 NOTE — PROGRESS NOTES
ANTICOAGULATION FOLLOW-UP CLINIC VISIT    Patient Name:  Mckay Hsu  Date:  8/22/2017  Contact Type:  Face to Face    SUBJECTIVE:     Patient Findings     Positives No Problem Findings           OBJECTIVE    INR Protime   Date Value Ref Range Status   08/22/2017 3.5 (A) 0.86 - 1.14 Final       ASSESSMENT / PLAN  INR assessment SUPRA    Recheck INR In: 5 WEEKS    INR Location Clinic      Anticoagulation Summary as of 8/22/2017     INR goal 2.0-3.0   Today's INR 3.5!   Maintenance plan 7.5 mg (7.5 mg x 1) every day   Full instructions 8/22: 3.75 mg; Otherwise 7.5 mg every day   Weekly total 52.5 mg   Plan last modified Rosalee Billingsley RN (8/22/2017)   Next INR check 9/26/2017   Target end date     Indications   Long-term (current) use of anticoagulants [Z79.01] [Z79.01]  Chronic atrial fibrillation (H) [I48.2]         Anticoagulation Episode Summary     INR check location     Preferred lab     Send INR reminders to  [facility]    Comments             See the Encounter Report to view Anticoagulation Flowsheet and Dosing Calendar (Go to Encounters tab in chart review, and find the Anticoagulation Therapy Visit)        Rosalee Billingsley RN

## 2017-09-15 DIAGNOSIS — I10 ESSENTIAL HYPERTENSION, BENIGN: ICD-10-CM

## 2017-09-15 RX ORDER — AMLODIPINE BESYLATE 10 MG/1
TABLET ORAL
Qty: 90 TABLET | Refills: 1 | Status: SHIPPED | OUTPATIENT
Start: 2017-09-15 | End: 2017-09-20

## 2017-09-15 NOTE — TELEPHONE ENCOUNTER
amLODIPine (NORVASC) 10 MG tablet      Last Written Prescription Date: 3/29/2017  Last Fill Quantity: 90, # refills: 1    Last Office Visit with [facility], [facility] or [facility] prescribing provider:  6/26/2017   Future Office Visit:        BP Readings from Last 3 Encounters:   06/27/17 125/84   06/26/17 120/78   10/31/16 122/72

## 2017-09-19 DIAGNOSIS — I10 ESSENTIAL HYPERTENSION, BENIGN: ICD-10-CM

## 2017-09-19 NOTE — TELEPHONE ENCOUNTER
Reason for Call:  Medication or medication refill:    Do you use a [facility]?   NO  Name of the pharmacy and phone number for the current request:  Stamped (mail order, info on pt chart)    Name of the medication requested:   amLODIPine (NORVASC) 10 MG tablet     Other request: patient talked to the mail order pharmacy, and they are still waiting on approval for this medication refill    Can we leave a detailed message on this number? YES    Phone number patient can be reached at: Home number on file [phone] (home)    Best Time: anytime    Call taken on 9/19/2017 at 5:34 PM by Taylor Bonilla

## 2017-09-20 RX ORDER — AMLODIPINE BESYLATE 10 MG/1
TABLET ORAL
Qty: 90 TABLET | Refills: 1 | Status: SHIPPED | OUTPATIENT
Start: 2017-09-20 | End: 2018-03-03

## 2017-09-20 NOTE — TELEPHONE ENCOUNTER
Medication was approved 9/15. Appear sent to wrong mail order pharmacy. Please send new Rx to updated pharmacy.

## 2017-09-26 ENCOUNTER — ANTICOAGULATION THERAPY VISIT (OUTPATIENT)
Dept: ANTICOAGULATION | Facility: CLINIC | Age: 57
End: 2017-09-26
Payer: COMMERCIAL

## 2017-09-26 DIAGNOSIS — Z79.01 LONG-TERM (CURRENT) USE OF ANTICOAGULANTS: Primary | ICD-10-CM

## 2017-09-26 LAB — INR POINT OF CARE: 3.5 (ref 0.86–1.14)

## 2017-09-26 PROCEDURE — 85610 PROTHROMBIN TIME: CPT | Mod: QW

## 2017-09-26 PROCEDURE — 36416 COLLJ CAPILLARY BLOOD SPEC: CPT

## 2017-09-26 NOTE — PROGRESS NOTES
ANTICOAGULATION FOLLOW-UP CLINIC VISIT    Patient Name:  Mckay Hsu  Date:  9/26/2017  Contact Type:  Face to Face    SUBJECTIVE:     Patient Findings     Positives No Problem Findings, Unexplained INR or factor level change           OBJECTIVE    INR Protime   Date Value Ref Range Status   09/26/2017 3.5 (A) 0.86 - 1.14 Final       ASSESSMENT / PLAN  INR assessment SUPRA    Recheck INR In: 3 WEEKS    INR Location Clinic      Anticoagulation Summary as of 9/26/2017     INR goal 2.0-3.0   Today's INR 3.5!   Maintenance plan 3.75 mg (7.5 mg x 0.5) on Tue, Fri; 7.5 mg (7.5 mg x 1) all other days   Full instructions 9/26: Hold; Otherwise 3.75 mg on Tue, Fri; 7.5 mg all other days   Weekly total 45 mg   Plan last modified Rosalee Billingsley RN (9/26/2017)   Next INR check 10/17/2017   Target end date     Indications   Long-term (current) use of anticoagulants [Z79.01] [Z79.01]  Chronic atrial fibrillation (H) [I48.2]         Anticoagulation Episode Summary     INR check location     Preferred lab     Send INR reminders to  [facility]    Comments             See the Encounter Report to view Anticoagulation Flowsheet and Dosing Calendar (Go to Encounters tab in chart review, and find the Anticoagulation Therapy Visit)        Rosalee Billingsley RN

## 2017-09-26 NOTE — MR AVS SNAPSHOT
Mckay Hsu   9/26/2017 2:30 PM   Anticoagulation Therapy Visit    Description:  57 year old male   Provider:   ANTICOAGULATION CLINIC   Department:   Anti Coagulation           INR as of 9/26/2017     Today's INR 3.5!      Anticoagulation Summary as of 9/26/2017     INR goal 2.0-3.0   Today's INR 3.5!   Full instructions 9/26: Hold; Otherwise 3.75 mg on Tue, Fri; 7.5 mg all other days   Next INR check 10/17/2017    Indications   Long-term (current) use of anticoagulants [Z79.01] [Z79.01]  Chronic atrial fibrillation (H) [I48.2]         Your next Anticoagulation Clinic appointment(s)     Sep 26, 2017  2:30 PM CDT   Anticoagulation Visit with  ANTICOAGULATION CLINIC   [facility]    [address] [address] [phone]            Oct 17, 2017  2:30 PM CDT   Anticoagulation Visit with  ANTICOAGULATION CLINIC   [facility] ([facility])    [address] [place] [address] [phone]              Contact Numbers     [facility]  Please call  [phone] to cancel and/or reschedule your appointment   Please call  [phone] with any problems or questions regarding your therapy.        September 2017 Details    Sun Mon Tue Wed Thu Fri Sat          1               2                 3               4               5               6               7               8               9                 10               11               12               13               14               15               16                 17               18               19               20               21               22               23                 24               25               26      Hold   See details      27      7.5 mg         28      7.5 mg         29      3.75 mg         30      7.5 mg          Date Details   09/26 This INR check               How to take  your warfarin dose     To take:  3.75 mg Take 0.5 of a 7.5 mg tablet.    To take:  7.5 mg Take 1 of the 7.5 mg tablets.    Hold Do not take your warfarin dose. See the Details table to the right for additional instructions.                October 2017 Details    Sun Mon Tue Wed Thu Fri Sat     1      7.5 mg         2      7.5 mg         3      3.75 mg         4      7.5 mg         5      7.5 mg         6      3.75 mg         7      7.5 mg           8      7.5 mg         9      7.5 mg         10      3.75 mg         11      7.5 mg         12      7.5 mg         13      3.75 mg         14      7.5 mg           15      7.5 mg         16      7.5 mg         17            18               19               20               21                 22               23               24               25               26               27               28                 29               30               31                    Date Details   No additional details    Date of next INR:  10/17/2017         How to take your warfarin dose     To take:  3.75 mg Take 0.5 of a 7.5 mg tablet.    To take:  7.5 mg Take 1 of the 7.5 mg tablets.

## 2017-10-03 ENCOUNTER — ALLIED HEALTH/NURSE VISIT (OUTPATIENT)
Dept: CARDIOLOGY | Facility: CLINIC | Age: 57
End: 2017-10-03

## 2017-10-03 DIAGNOSIS — Z95.0 CARDIAC PACEMAKER IN SITU: Primary | ICD-10-CM

## 2017-10-03 NOTE — MR AVS SNAPSHOT
"              After Visit Summary   10/3/2017    Mckay Hsu    MRN: [id number]           Patient Information     Date Of Birth          1960        Visit Information        Provider Department      10/3/2017 2:30 PM SANTIAGO TECH1 [facility] AT [place]        Today's Diagnoses     Cardiac pacemaker in situ    -  1       Follow-ups after your visit        Your next 10 appointments already scheduled     Oct 17, 2017  2:30 PM CDT   Anticoagulation Visit with [facility]X ANTICOAGULATION CLINIC   [facility] ([facility])    [address] [address] [phone]            Dec 05, 2017  2:30 PM CST   Phone Device Check with SANTIAGO TECH1   [facility] ([facility])    [address] [place] [address] [phone]              Who to contact     If you have questions or need follow up information about today's clinic visit or your schedule please contact [facility] directly at [phone].  Normal or non-critical lab and imaging results will be communicated to you by Veratecthart, letter or phone within 4 business days after the clinic has received the results. If you do not hear from us within 7 days, please contact the clinic through Veratecthart or phone. If you have a critical or abnormal lab result, we will notify you by phone as soon as possible.  Submit refill requests through Goomeo or call your pharmacy and they will forward the refill request to us. Please allow 3 business days for your refill to be completed.          Additional Information About Your Visit        Veratecthart Information     Goomeo lets you send messages to your doctor, view your test results, renew your prescriptions, schedule appointments and more. To sign up, go to [email or web address] . Click on \"Log in\" on the left side of the screen, which " "will take you to the Welcome page. Then click on \"Sign up Now\" on the right side of the page.     You will be asked to enter the access code listed below, as well as some personal information. Please follow the directions to create your username and password.     Your access code is: [email or web address]  Expires: 2018  2:35 PM     Your access code will  in 90 days. If you need help or a new code, please call your [facility] clinic or [phone].        Care EveryWhere ID     This is your Care EveryWhere ID. This could be used by other organizations to access your [facility] medical records  [id number]         Blood Pressure from Last 3 Encounters:   17 125/84   17 120/78   10/31/16 122/72    Weight from Last 3 Encounters:   17 (!) 143.5 kg (316 lb 4.8 oz)   17 (!) 141.5 kg (312 lb)   10/31/16 (!) 140 kg (308 lb 11.2 oz)              Today, you had the following     No orders found for display       Primary Care Provider Office Phone # Fax #    David Almendarez [phone]       [address] [place] [phone]        Equal Access to Services     DERIK GRIJALVA : Hadii aad ku hadasho Soomaali, waaxda luqadaha, qaybta kaalmada adeegyada, jessica hidalgo hayosmani saravia . So [facility] [phone].    ATENCIÓN: Si habla español, tiene a ruiz disposición servicios gratuitos de asistencia lingüística. Llame al [phone].    We comply with applicable federal civil rights laws and [place] laws. We do not discriminate on the basis of race, color, national origin, age, disability, sex, sexual orientation, or gender identity.            Thank you!     Thank you for choosing [facility] [place]  for your care. Our goal is always to provide you with excellent care. Hearing back from our patients is one way we can continue to improve our services. Please take a few minutes to complete the written survey that you may receive in the mail after " your visit with us. Thank you!             Your Updated Medication List - Protect others around you: Learn how to safely use, store and throw away your medicines at www.disposemymeds.org.          This list is accurate as of: 10/3/17  2:35 PM.  Always use your most recent med list.                   Brand Name Dispense Instructions for use Diagnosis    amLODIPine 10 MG tablet    NORVASC    90 tablet    TAKE 1 TABLET(10 MG) BY MOUTH DAILY    Essential hypertension, benign       BASAGLAR 100 UNIT/ML injection     60 mL    Inject 60 Units Subcutaneous At Bedtime    Type 2 diabetes mellitus without complication, with long-term current use of insulin (H)       blood glucose monitoring test strip    ONE TOUCH VERIO IQ    100 strip    Use to test blood sugars 3 times daily or as directed.    Type 2 diabetes mellitus without complication, with long-term current use of insulin (H)       fish oil-omega-3 fatty acids 1000 MG capsule      Take 4 capsules by mouth daily.        insulin aspart 100 UNIT/ML injection    NovoLOG FLEXPEN    30 mL    1 unit per 7g of carbs three times daily before meals    Type 2 diabetes mellitus without complication, with long-term current use of insulin (H)       losartan 100 MG tablet    COZAAR    90 tablet    Take 1 tablet (100 mg) by mouth daily    Essential hypertension, benign       metFORMIN 1000 MG tablet    GLUCOPHAGE    180 tablet    TAKE 1 TABLET(1000 MG) BY MOUTH TWICE DAILY WITH MEALS    Type 2 diabetes mellitus without complication, with long-term current use of insulin (H)       metoprolol 100 MG 24 hr tablet    TOPROL-XL    90 tablet    Take 1 tablet (100 mg) by mouth daily    Essential hypertension, benign       MULTIVITAMIN PO      1 qd        NITROSTAT 0.4 MG sublingual tablet   Generic drug:  nitroGLYcerin      1 TAB EVERY 5 MIN AS NEEDED, UP TO 3 PER EPISODE        ONE TOUCH LANCETS Misc     200 each    1 lancet by In Vitro route 3 times daily    Type 2 diabetes mellitus without  complication, with long-term current use of insulin (H)       simvastatin 80 MG tablet    ZOCOR    45 tablet    TAKE 1/2 TABLET(40 MG) BY MOUTH AT BEDTIME    Hyperlipidemia LDL goal <100       warfarin 7.5 MG tablet    COUMADIN    100 tablet    1 tablet daily except 1 1/2 tablets on Mon as directed by the anticoagulation clinic    New onset atrial fibrillation (H)

## 2017-10-03 NOTE — PROGRESS NOTES
~60 day phone teletrace- no charge  Appropriate  at time of check.  Magnet response WNL. F/U scheduled q 3 months. ASHANTI WolfT

## 2017-10-07 ENCOUNTER — HEALTH MAINTENANCE LETTER (OUTPATIENT)
Age: 57
End: 2017-10-07

## 2017-10-13 DIAGNOSIS — I10 ESSENTIAL HYPERTENSION, BENIGN: ICD-10-CM

## 2017-10-17 ENCOUNTER — ANTICOAGULATION THERAPY VISIT (OUTPATIENT)
Dept: ANTICOAGULATION | Facility: CLINIC | Age: 57
End: 2017-10-17
Payer: COMMERCIAL

## 2017-10-17 DIAGNOSIS — E11.9 TYPE 2 DIABETES MELLITUS WITHOUT COMPLICATION, WITH LONG-TERM CURRENT USE OF INSULIN (H): Primary | ICD-10-CM

## 2017-10-17 DIAGNOSIS — I10 ESSENTIAL HYPERTENSION, BENIGN: ICD-10-CM

## 2017-10-17 DIAGNOSIS — Z79.4 TYPE 2 DIABETES MELLITUS WITHOUT COMPLICATION, WITH LONG-TERM CURRENT USE OF INSULIN (H): Primary | ICD-10-CM

## 2017-10-17 DIAGNOSIS — Z23 NEED FOR PROPHYLACTIC VACCINATION AND INOCULATION AGAINST INFLUENZA: Primary | ICD-10-CM

## 2017-10-17 LAB — INR POINT OF CARE: 1.7 (ref 0.86–1.14)

## 2017-10-17 PROCEDURE — 90471 IMMUNIZATION ADMIN: CPT

## 2017-10-17 PROCEDURE — 36416 COLLJ CAPILLARY BLOOD SPEC: CPT

## 2017-10-17 PROCEDURE — 90686 IIV4 VACC NO PRSV 0.5 ML IM: CPT

## 2017-10-17 PROCEDURE — 85610 PROTHROMBIN TIME: CPT | Mod: QW

## 2017-10-17 RX ORDER — METOPROLOL SUCCINATE 100 MG/1
100 TABLET, EXTENDED RELEASE ORAL DAILY
Qty: 90 TABLET | Refills: 2 | Status: SHIPPED | OUTPATIENT
Start: 2017-10-17 | End: 2018-06-13

## 2017-10-17 RX ORDER — LOSARTAN POTASSIUM 100 MG/1
100 TABLET ORAL DAILY
Qty: 90 TABLET | Refills: 2 | Status: SHIPPED | OUTPATIENT
Start: 2017-10-17 | End: 2018-06-13

## 2017-10-17 NOTE — TELEPHONE ENCOUNTER
metoprolol (TOPROL-XL) 100 MG 24 hr tablet  Last Written Prescription Date: 01/26/2017  Last Fill Quantity: 90, # refills: 2    Last Office Visit with [facility], [facility] or [facility] prescribing provider:  06/26/2017   Future Office Visit:        BP Readings from Last 3 Encounters:   06/27/17 125/84   06/26/17 120/78   10/31/16 122/72     BD pen needles mini [id number]  Last Written Prescription Date:  n/a  Last Fill Quantity: n/a,   # refills: n/a  Last Office Visit with [facility], [facility] or [facility] prescribing provider: 06/26/2017  Future Office visit:       Routing refill request to provider for review/approval because:  Drug not active on patient's medication list

## 2017-10-17 NOTE — PROGRESS NOTES
ANTICOAGULATION FOLLOW-UP CLINIC VISIT    Patient Name:  Mckay Hsu  Date:  10/17/2017  Contact Type:  Face to Face    SUBJECTIVE:     Patient Findings     Positives Inflammation (Pt fell injury to knee, skinned, some pain involved)           OBJECTIVE    INR Protime   Date Value Ref Range Status   10/17/2017 1.7 (A) 0.86 - 1.14 Final       ASSESSMENT / PLAN  INR assessment SUB    Recheck INR In: 3 WEEKS    INR Location Clinic      Anticoagulation Summary as of 10/17/2017     INR goal 2.0-3.0   Today's INR 1.7!   Maintenance plan 3.75 mg (7.5 mg x 0.5) on Tue; 7.5 mg (7.5 mg x 1) all other days   Full instructions 10/17: 7.5 mg; Otherwise 3.75 mg on Tue; 7.5 mg all other days   Weekly total 48.75 mg   Plan last Rosalee Arroyo RN (10/17/2017)   Next INR check 11/7/2017   Target end date     Indications   Long-term (current) use of anticoagulants [Z79.01] [Z79.01]  Chronic atrial fibrillation (H) [I48.2]         Anticoagulation Episode Summary     INR check location     Preferred lab     Send INR reminders to  [facility]    Comments             See the Encounter Report to view Anticoagulation Flowsheet and Dosing Calendar (Go to Encounters tab in chart review, and find the Anticoagulation Therapy Visit)    Dosage adjustment made based on physician directed care plan.    Rosalee Billingsley RN               Injectable Influenza Immunization Documentation    1.  Is the person to be vaccinated sick today?   No    2. Does the person to be vaccinated have an allergy to a component   of the vaccine?   No    3. Has the person to be vaccinated ever had a serious reaction   to influenza vaccine in the past?   No    4. Has the person to be vaccinated ever had Guillain-Barré syndrome?   No    Form completed by Sarah Billingsley RN

## 2017-10-17 NOTE — MR AVS SNAPSHOT
Mckay Adán Hsu   10/17/2017 2:30 PM   Anticoagulation Therapy Visit    Description:  57 year old male   Provider:   ANTICOAGULATION CLINIC   Department:   Anti Coagulation           INR as of 10/17/2017     Today's INR 1.7!      Anticoagulation Summary as of 10/17/2017     INR goal 2.0-3.0   Today's INR 1.7!   Full instructions 10/17: 7.5 mg; Otherwise 3.75 mg on Tue; 7.5 mg all other days   Next INR check 11/7/2017    Indications   Long-term (current) use of anticoagulants [Z79.01] [Z79.01]  Chronic atrial fibrillation (H) [I48.2]         Your next Anticoagulation Clinic appointment(s)     Nov 07, 2017  2:30 PM CST   Anticoagulation Visit with  ANTICOAGULATION CLINIC   [facility] ([facility])    [address] [place] [address] [phone]              Contact Numbers     [facility]  Please call  [phone] to cancel and/or reschedule your appointment   Please call  [phone] with any problems or questions regarding your therapy.        October 2017 Details    Sun Mon Tue Wed Thu Fri Sat     1               2               3               4               5               6               7                 8               9               10               11               12               13               14                 15               16               17      7.5 mg   See details      18      7.5 mg         19      7.5 mg         20      7.5 mg         21      7.5 mg           22      7.5 mg         23      7.5 mg         24      3.75 mg         25      7.5 mg         26      7.5 mg         27      7.5 mg         28      7.5 mg           29      7.5 mg         30      7.5 mg         31      3.75 mg              Date Details   10/17 This INR check               How to take your warfarin dose     To take:  3.75 mg Take 0.5 of a 7.5 mg tablet.    To take:  7.5 mg Take 1 of the 7.5 mg tablets.           November 2017 Details     Sun Mon Tue Wed Thu Fri Sat        1      7.5 mg         2      7.5 mg         3      7.5 mg         4      7.5 mg           5      7.5 mg         6      7.5 mg         7            8               9               10               11                 12               13               14               15               16               17               18                 19               20               21               22               23               24               25                 26               27               28               29               30                  Date Details   No additional details    Date of next INR:  11/7/2017         How to take your warfarin dose     To take:  3.75 mg Take 0.5 of a 7.5 mg tablet.    To take:  7.5 mg Take 1 of the 7.5 mg tablets.

## 2017-10-24 DIAGNOSIS — I48.91 NEW ONSET ATRIAL FIBRILLATION (H): ICD-10-CM

## 2017-10-24 RX ORDER — WARFARIN SODIUM 7.5 MG/1
TABLET ORAL
Qty: 90 TABLET | Refills: 0 | Status: SHIPPED | OUTPATIENT
Start: 2017-10-24 | End: 2018-01-19

## 2017-11-07 ENCOUNTER — ANTICOAGULATION THERAPY VISIT (OUTPATIENT)
Dept: ANTICOAGULATION | Facility: CLINIC | Age: 57
End: 2017-11-07
Payer: COMMERCIAL

## 2017-11-07 LAB — INR POINT OF CARE: 2.6 (ref 0.86–1.14)

## 2017-11-07 PROCEDURE — 36416 COLLJ CAPILLARY BLOOD SPEC: CPT

## 2017-11-07 PROCEDURE — 85610 PROTHROMBIN TIME: CPT | Mod: QW

## 2017-11-07 NOTE — MR AVS SNAPSHOT
Mckay Adán Hsu   11/7/2017 2:30 PM   Anticoagulation Therapy Visit    Description:  57 year old male   Provider:   ANTICOAGULATION CLINIC   Department:   Anti Coagulation           INR as of 11/7/2017     Today's INR 2.6      Anticoagulation Summary as of 11/7/2017     INR goal 2.0-3.0   Today's INR 2.6   Full instructions 3.75 mg on Tue; 7.5 mg all other days   Next INR check 12/5/2017    Indications   Long-term (current) use of anticoagulants [Z79.01] [Z79.01]  Chronic atrial fibrillation (H) [I48.2]         Your next Anticoagulation Clinic appointment(s)     Nov 07, 2017  2:30 PM CST   Anticoagulation Visit with  ANTICOAGULATION CLINIC   [facility] ([facility])    [address] [address] [phone]            Nov 28, 2017  2:30 PM CST   Anticoagulation Visit with  ANTICOAGULATION CLINIC   [facility] ([facility])    [address] [place] [address] [phone]              Contact Numbers     [facility]  Please call  [phone] to cancel and/or reschedule your appointment   Please call  [phone] with any problems or questions regarding your therapy.        November 2017 Details    Sun Mon Tue Wed Thu Fri Sat        1               2               3               4                 5               6               7      3.75 mg   See details      8      7.5 mg         9      7.5 mg         10      7.5 mg         11      7.5 mg           12      7.5 mg         13      7.5 mg         14      3.75 mg         15      7.5 mg         16      7.5 mg         17      7.5 mg         18      7.5 mg           19      7.5 mg         20      7.5 mg         21      3.75 mg         22      7.5 mg         23      7.5 mg         24      7.5 mg         25      7.5 mg           26      7.5 mg         27      7.5 mg         28      3.75 mg         29      7.5 mg          30      7.5 mg            Date Details   11/07 This INR check               How to take your warfarin dose     To take:  3.75 mg Take 0.5 of a 7.5 mg tablet.    To take:  7.5 mg Take 1 of the 7.5 mg tablets.           December 2017 Details    Sun Mon Tue Wed Thu Fri Sat          1      7.5 mg         2      7.5 mg           3      7.5 mg         4      7.5 mg         5            6               7               8               9                 10               11               12               13               14               15               16                 17               18               19               20               21               22               23                 24               25               26               27               28               29               30                 31                      Date Details   No additional details    Date of next INR:  12/5/2017         How to take your warfarin dose     To take:  3.75 mg Take 0.5 of a 7.5 mg tablet.    To take:  7.5 mg Take 1 of the 7.5 mg tablets.

## 2017-11-07 NOTE — PROGRESS NOTES
ANTICOAGULATION FOLLOW-UP CLINIC VISIT    Patient Name:  Mckay Hsu  Date:  11/7/2017  Contact Type:  Face to Face    SUBJECTIVE:     Patient Findings     Positives No Problem Findings           OBJECTIVE    INR Protime   Date Value Ref Range Status   11/07/2017 2.6 (A) 0.86 - 1.14 Final       ASSESSMENT / PLAN  INR assessment THER    Recheck INR In: 3 WEEKS    INR Location Clinic      Anticoagulation Summary as of 11/7/2017     INR goal 2.0-3.0   Today's INR 2.6   Maintenance plan 3.75 mg (7.5 mg x 0.5) on Tue; 7.5 mg (7.5 mg x 1) all other days   Full instructions 3.75 mg on Tue; 7.5 mg all other days   Weekly total 48.75 mg   Plan last modified Rosalee Billingsley RN (10/17/2017)   Next INR check 12/5/2017   Target end date     Indications   Long-term (current) use of anticoagulants [Z79.01] [Z79.01]  Chronic atrial fibrillation (H) [I48.2]         Anticoagulation Episode Summary     INR check location     Preferred lab     Send INR reminders to [facility]    Comments             See the Encounter Report to view Anticoagulation Flowsheet and Dosing Calendar (Go to Encounters tab in chart review, and find the Anticoagulation Therapy Visit)        Rosalee Billingsley RN

## 2017-11-28 ENCOUNTER — ANTICOAGULATION THERAPY VISIT (OUTPATIENT)
Dept: ANTICOAGULATION | Facility: CLINIC | Age: 57
End: 2017-11-28
Payer: COMMERCIAL

## 2017-11-28 LAB — INR POINT OF CARE: 2.7 (ref 0.86–1.14)

## 2017-11-28 PROCEDURE — 85610 PROTHROMBIN TIME: CPT | Mod: QW

## 2017-11-28 PROCEDURE — 36416 COLLJ CAPILLARY BLOOD SPEC: CPT

## 2017-11-28 NOTE — MR AVS SNAPSHOT
Mckay Gonzalesrobyn Hsu   11/28/2017 2:30 PM   Anticoagulation Therapy Visit    Description:  57 year old male   Provider:   ANTICOAGULATION CLINIC   Department:   Anti Coagulation           INR as of 11/28/2017     Today's INR 2.7      Anticoagulation Summary as of 11/28/2017     INR goal 2.0-3.0   Today's INR 2.7   Full instructions 3.75 mg on Tue; 7.5 mg all other days   Next INR check 1/2/2018    Indications   Long-term (current) use of anticoagulants [Z79.01] [Z79.01]  Chronic atrial fibrillation (H) [I48.2]         Your next Anticoagulation Clinic appointment(s)     Jan 02, 2018  2:00 PM CST   Anticoagulation Visit with  ANTICOAGULATION CLINIC   [facility] ([facility])    [address] [place] [address] [phone]              Contact Numbers     [facility]  Please call  [phone] to cancel and/or reschedule your appointment   Please call  [phone] with any problems or questions regarding your therapy.        November 2017 Details    Sun Mon Tue Wed Thu Fri Sat        1               2               3               4                 5               6               7               8               9               10               11                 12               13               14               15               16               17               18                 19               20               21               22               23               24               25                 26               27               28      3.75 mg   See details      29      7.5 mg         30      7.5 mg            Date Details   11/28 This INR check               How to take your warfarin dose     To take:  3.75 mg Take 0.5 of a 7.5 mg tablet.    To take:  7.5 mg Take 1 of the 7.5 mg tablets.           December 2017 Details    Sun Mon Tue Wed Thu Fri Sat          1      7.5 mg         2      7.5 mg           3      7.5 mg         4      7.5  mg         5      3.75 mg         6      7.5 mg         7      7.5 mg         8      7.5 mg         9      7.5 mg           10      7.5 mg         11      7.5 mg         12      3.75 mg         13      7.5 mg         14      7.5 mg         15      7.5 mg         16      7.5 mg           17      7.5 mg         18      7.5 mg         19      3.75 mg         20      7.5 mg         21      7.5 mg         22      7.5 mg         23      7.5 mg           24      7.5 mg         25      7.5 mg         26      3.75 mg         27      7.5 mg         28      7.5 mg         29      7.5 mg         30      7.5 mg           31      7.5 mg                Date Details   No additional details            How to take your warfarin dose     To take:  3.75 mg Take 0.5 of a 7.5 mg tablet.    To take:  7.5 mg Take 1 of the 7.5 mg tablets.           January 2018 Details    Sun Mon Tue Wed Thu Fri Sat      1      7.5 mg         2            3               4               5               6                 7               8               9               10               11               12               13                 14               15               16               17               18               19               20                 21               22               23               24               25               26               27                 28               29               30               31                   Date Details   No additional details    Date of next INR:  1/2/2018         How to take your warfarin dose     To take:  3.75 mg Take 0.5 of a 7.5 mg tablet.    To take:  7.5 mg Take 1 of the 7.5 mg tablets.

## 2017-11-28 NOTE — PROGRESS NOTES
ANTICOAGULATION FOLLOW-UP CLINIC VISIT    Patient Name:  Mckay Hsu  Date:  11/28/2017  Contact Type:  Face to Face    SUBJECTIVE:        OBJECTIVE    INR Protime   Date Value Ref Range Status   11/28/2017 2.7 (A) 0.86 - 1.14 Final       ASSESSMENT / PLAN  INR assessment THER    Recheck INR In: 5 WEEKS    INR Location Clinic      Anticoagulation Summary as of 11/28/2017     INR goal 2.0-3.0   Today's INR 2.7   Maintenance plan 3.75 mg (7.5 mg x 0.5) on Tue; 7.5 mg (7.5 mg x 1) all other days   Full instructions 3.75 mg on Tue; 7.5 mg all other days   Weekly total 48.75 mg   No change documented Rosalee Billingsley RN   Plan last modified Rosalee Billingsley RN (10/17/2017)   Next INR check 1/2/2018   Target end date     Indications   Long-term (current) use of anticoagulants [Z79.01] [Z79.01]  Chronic atrial fibrillation (H) [I48.2]         Anticoagulation Episode Summary     INR check location     Preferred lab     Send INR reminders to [facility]    Comments             See the Encounter Report to view Anticoagulation Flowsheet and Dosing Calendar (Go to Encounters tab in chart review, and find the Anticoagulation Therapy Visit)    Dosage adjustment made based on physician directed care plan.    Rosalee Billingsley RN

## 2017-12-05 ENCOUNTER — ALLIED HEALTH/NURSE VISIT (OUTPATIENT)
Dept: CARDIOLOGY | Facility: CLINIC | Age: 57
End: 2017-12-05
Payer: COMMERCIAL

## 2017-12-05 DIAGNOSIS — Z95.0 CARDIAC PACEMAKER IN SITU: Primary | ICD-10-CM

## 2017-12-05 PROCEDURE — 93293 PM PHONE R-STRIP DEVICE EVAL: CPT | Performed by: INTERNAL MEDICINE

## 2017-12-05 NOTE — PROGRESS NOTES
~ 60 day phone teletrace ~  Appropriate  at time of check. Chronic Afib, AVNA, taking Warfarin. F/U scheduled q month. Shane FRIAS

## 2017-12-05 NOTE — MR AVS SNAPSHOT
"              After Visit Summary   12/5/2017    Mckay Hsu    MRN: [id number]           Patient Information     Date Of Birth          1960        Visit Information        Provider Department      12/5/2017 2:30 PM JACK ARCE [facility]        Today's Diagnoses     Cardiac pacemaker in situ    -  1       Follow-ups after your visit        Your next 10 appointments already scheduled     Jan 02, 2018  2:00 PM CST   Anticoagulation Visit with  ANTICOAGULATION CLINIC   [facility] ([facility])    [address] [id number] [phone]            Jan 16, 2018  2:30 PM CST   30 Day Phone Check with JACK ARCE   [facility]   [place] ([facility])    [address] [place], [place] [address]  [facility] [address] [phone]              Who to contact     If you have questions or need follow up information about today's clinic visit or your schedule please contact [facility] directly at [phone].  Normal or non-critical lab and imaging results will be communicated to you by AwesomePiecehart, letter or phone within 4 business days after the clinic has received the results. If you do not hear from us within 7 days, please contact the clinic through AwesomePiece[employer] or phone. If you have a critical or abnormal lab result, we will notify you by phone as soon as possible.  Submit refill requests through [employer] or call your pharmacy and they will forward the refill request to us. Please allow 3 business days for your refill to be completed.          Additional Information About Your Visit        MyChart Information     [employer] lets you send messages to your doctor, view your test results, renew your prescriptions, schedule appointments and more. To sign up, go to [email or web address] . Click on \"Log in\" on the left side of the screen, which will take " "you to the Welcome page. Then click on \"Sign up Now\" on the right side of the page.     You will be asked to enter the access code listed below, as well as some personal information. Please follow the directions to create your username and password.     Your access code is: [email or web address]  Expires: 2018  1:35 PM     Your access code will  in 90 days. If you need help or a new code, please call your [facility] clinic or [phone].        Care EveryWhere ID     This is your Care EveryWhere ID. This could be used by other organizations to access your [facility] medical records  [id number]         Blood Pressure from Last 3 Encounters:   17 125/84   17 120/78   10/31/16 122/72    Weight from Last 3 Encounters:   17 (!) 143.5 kg (316 lb 4.8 oz)   17 (!) 141.5 kg (312 lb)   10/31/16 (!) 140 kg (308 lb 11.2 oz)              We Performed the Following     PM PHONE R STRIP EVAL UP TO 90 DAYS (42017)        Primary Care Provider Office Phone # Fax #    David Almendarez [phone]       [address] [place] [phone]        Equal Access to Services     [facility]: Hadii aad ku hadasho Soomaali, waaxda luqadaha, qaybta kaalmada adeegyada, waxay gwen hayosmani saravia . So [facility] [phone].    ATENCIÓN: Si habla español, tiene a ruiz disposición servicios gratuitos de asistencia lingüística. Llame al [phone].    We comply with applicable federal civil rights laws and [place] laws. We do not discriminate on the basis of race, color, national origin, age, disability, sex, sexual orientation, or gender identity.            Thank you!     Thank you for choosing [facility]  for your care. Our goal is always to provide you with excellent care. Hearing back from our patients is one way we can continue to improve our services. Please take a few minutes to complete the written survey that you may receive in the mail " after your visit with us. Thank you!             Your Updated Medication List - Protect others around you: Learn how to safely use, store and throw away your medicines at www.disposemymeds.org.          This list is accurate as of: 12/5/17  2:40 PM.  Always use your most recent med list.                   Brand Name Dispense Instructions for use Diagnosis    amLODIPine 10 MG tablet    NORVASC    90 tablet    TAKE 1 TABLET(10 MG) BY MOUTH DAILY    Essential hypertension, benign       BASAGLAR 100 UNIT/ML injection     60 mL    Inject 60 Units Subcutaneous At Bedtime    Type 2 diabetes mellitus without complication, with long-term current use of insulin (H)       blood glucose monitoring test strip    ONETOUCH VERIO IQ    100 strip    Use to test blood sugars 3 times daily or as directed.    Type 2 diabetes mellitus without complication, with long-term current use of insulin (H)       fish oil-omega-3 fatty acids 1000 MG capsule      Take 4 capsules by mouth daily.        insulin aspart 100 UNIT/ML injection    NovoLOG FLEXPEN    30 mL    1 unit per 7g of carbs three times daily before meals    Type 2 diabetes mellitus without complication, with long-term current use of insulin (H)       insulin pen needle 31G X 5 MM    B-D U/F    100 each    Use 1 daily or as directed.    Type 2 diabetes mellitus without complication, with long-term current use of insulin (H)       losartan 100 MG tablet    COZAAR    90 tablet    Take 1 tablet (100 mg) by mouth daily    Essential hypertension, benign       metFORMIN 1000 MG tablet    GLUCOPHAGE    180 tablet    TAKE 1 TABLET(1000 MG) BY MOUTH TWICE DAILY WITH MEALS    Type 2 diabetes mellitus without complication, with long-term current use of insulin (H)       metoprolol 100 MG 24 hr tablet    TOPROL-XL    90 tablet    Take 1 tablet (100 mg) by mouth daily    Essential hypertension, benign       MULTIVITAMIN PO      1 qd        NITROSTAT 0.4 MG sublingual tablet   Generic drug:   nitroGLYcerin      1 TAB EVERY 5 MIN AS NEEDED, UP TO 3 PER EPISODE        ONETOUCH LANCETS Misc     200 each    1 lancet by In Vitro route 3 times daily    Type 2 diabetes mellitus without complication, with long-term current use of insulin (H)       simvastatin 80 MG tablet    ZOCOR    45 tablet    TAKE 1/2 TABLET(40 MG) BY MOUTH AT BEDTIME    Hyperlipidemia LDL goal <100       warfarin 7.5 MG tablet    COUMADIN    90 tablet    1 tablet daily except  1/2 tablets on Mon as directed by the anticoagulation clinic    New onset atrial fibrillation (H)

## 2017-12-21 DIAGNOSIS — Z79.4 TYPE 2 DIABETES MELLITUS WITHOUT COMPLICATION, WITH LONG-TERM CURRENT USE OF INSULIN (H): ICD-10-CM

## 2017-12-21 DIAGNOSIS — E11.9 TYPE 2 DIABETES MELLITUS WITHOUT COMPLICATION, WITH LONG-TERM CURRENT USE OF INSULIN (H): ICD-10-CM

## 2017-12-21 NOTE — LETTER
[facility]  [address] [phone]            Mckay Hsu  [address]  [place] [address]        December 21, 2017    Dear Doyle,    While refilling your prescription today, we noticed that you are due for an appointment with your provider.  We will refill your prescription for 30 days, but a follow-up appointment must be made before any additional refills can be approved.     Taking care of your health is important to us and we look forward to seeing you in the near future.  Please call us at [phone] or [phone] (or use [employer]) to schedule an appointment.     Please disregard this notice if you have already made an appointment.    Sincerely,        [facility]

## 2018-01-02 ENCOUNTER — ANTICOAGULATION THERAPY VISIT (OUTPATIENT)
Dept: ANTICOAGULATION | Facility: CLINIC | Age: 58
End: 2018-01-02
Payer: COMMERCIAL

## 2018-01-02 LAB — INR POINT OF CARE: 3 (ref 0.86–1.14)

## 2018-01-02 PROCEDURE — 36416 COLLJ CAPILLARY BLOOD SPEC: CPT

## 2018-01-02 PROCEDURE — 85610 PROTHROMBIN TIME: CPT | Mod: QW

## 2018-01-03 DIAGNOSIS — E11.9 TYPE 2 DIABETES MELLITUS WITHOUT COMPLICATION, WITH LONG-TERM CURRENT USE OF INSULIN (H): ICD-10-CM

## 2018-01-03 DIAGNOSIS — Z79.4 TYPE 2 DIABETES MELLITUS WITHOUT COMPLICATION, WITH LONG-TERM CURRENT USE OF INSULIN (H): ICD-10-CM

## 2018-01-03 NOTE — TELEPHONE ENCOUNTER
Last Written Prescription Date:  7/26/17  Last Fill Quantity: 180,  # refills: 1   Last Office Visit with [facility] or [facility] prescribing provider:  6/26/17   Future Office Visit:       Requested Prescriptions   Pending Prescriptions Disp Refills     metFORMIN (GLUCOPHAGE) 1000 MG tablet 180 tablet 1     Sig: TAKE 1 TABLET(1000 MG) BY MOUTH TWICE DAILY WITH MEALS    Biguanide Agents Failed    1/3/2018 10:34 AM       Failed - Patient's BP is less than 140/90    BP Readings from Last 3 Encounters:   06/27/17 125/84   06/26/17 120/78   10/31/16 122/72                Failed - Patient has documented A1c within the specified period of time.    Recent Labs   Lab Test  06/26/17   0845   A1C  5.9            Failed - Patient does NOT have a diagnosis of CHF.       Failed - Recent (6 mos) or future visit with authorizing provider's specialty    Patient had office visit in the last 6 months or has a visit in the next 30 days with authorizing provider.  See chart review.            Passed - Patient has documented LDL within the past 12 mos.    Recent Labs   Lab Test  06/26/17   0845   LDL  28            Passed - Patient has had a Microalbumin in the past 12 mos.    Recent Labs   Lab Test  06/26/17   0926   MICROL  99   UMALCR  50.25*            Passed - Patient is age 10 or older       Passed - Patient's CR is NOT>1.4 OR Patient's EGFR is NOT<45 within past 12 mos.    Recent Labs   Lab Test  06/26/17   0845   GFRESTIMATED  69   GFRESTBLACK  84       Recent Labs   Lab Test  06/26/17   0845   CR  1.10

## 2018-01-03 NOTE — LETTER
[facility]  [address] [id number] [phone]            Mckay Hsu  [address]  [place] [address]        January 3, 2018    Dear Doyle,    While refilling your prescription today, we noticed that you are due to have labs drawn.  We will refill your prescription for 30 days, but a follow-up appointment must be made before any additional refills can be approved.     Taking care of your health is important to us and we look forward to seeing you in the near future.  Please call us at [phone] or [phone] (or use [employer]) to schedule an appointment.     Please disregard this notice if you have already made an appointment.    Sincerely,        [facility]

## 2018-01-16 ENCOUNTER — OFFICE VISIT (OUTPATIENT)
Dept: CARDIOLOGY | Facility: CLINIC | Age: 58
End: 2018-01-16
Payer: COMMERCIAL

## 2018-01-16 DIAGNOSIS — Z95.0 CARDIAC PACEMAKER IN SITU: Primary | ICD-10-CM

## 2018-01-16 PROCEDURE — 99207 ZZC NO CHARGE LOS: CPT

## 2018-01-16 NOTE — MR AVS SNAPSHOT
After Visit Summary   1/16/2018    Mckay Hsu    MRN: [id number]           Patient Information     Date Of Birth          1960        Visit Information        Provider Department      1/16/2018 2:30 PM JACK ARCE [facility]        Today's Diagnoses     Cardiac pacemaker in situ    -  1       Follow-ups after your visit        Your next 10 appointments already scheduled     Jan 23, 2018 10:00 AM CST   Office Visit with David Almendarez MD   [facility] ([facility])    [address] [phone]           Bring a current list of meds and any records pertaining to this visit. For Physicals, please bring immunization records and any forms needing to be filled out. Please arrive 10 minutes early to complete paperwork.            Feb 06, 2018  2:30 PM CST   Anticoagulation Visit with [facility] ANTICOAGULATION CLINIC   [facility] ([facility])    [address] [place] [phone]            Feb 13, 2018  2:30 PM CST   30 Day Phone Check with JACK ARCE   [facility] ([facility])    [address] [place] [phone]              Who to contact     If you have questions or need follow up information about today's clinic visit or your schedule please contact [facility] directly at [phone].  Normal or non-critical lab and imaging results will be communicated to you by MyChart, letter or phone within 4 business days after the clinic has received the results. If you do not hear from us within 7 days, please contact the clinic through MyChart or phone. If you have a critical or abnormal lab result, we will notify you by phone as soon as possible.  Submit refill requests through [email or web address] or call  "your pharmacy and they will forward the refill request to us. Please allow 3 business days for your refill to be completed.          Additional Information About Your Visit        MyChart Information     [employer] lets you send messages to your doctor, view your test results, renew your prescriptions, schedule appointments and more. To sign up, go to [email or web address] . Click on \"Log in\" on the left side of the screen, which will take you to the Welcome page. Then click on \"Sign up Now\" on the right side of the page.     You will be asked to enter the access code listed below, as well as some personal information. Please follow the directions to create your username and password.     Your access code is: [email or web address]  Expires: 2018  3:01 PM     Your access code will  in 90 days. If you need help or a new code, please call your [facility] clinic or [phone].        Care EveryWhere ID     This is your Care EveryWhere ID. This could be used by other organizations to access your [facility] medical records  [id number]         Blood Pressure from Last 3 Encounters:   17 125/84   17 120/78   10/31/16 122/72    Weight from Last 3 Encounters:   17 (!) 143.5 kg (316 lb 4.8 oz)   17 (!) 141.5 kg (312 lb)   10/31/16 (!) 140 kg (308 lb 11.2 oz)              Today, you had the following     No orders found for display       Primary Care Provider Office Phone # Fax #    David Almendarez [phone]       [address] [place] [phone]        Equal Access to Services     [facility] : Hadii quin Solano, waefrenda luqadaha, qaybta kaalmada yuko, jessica meza. So [facility] [phone].    ATENCIÓN: Si habla español, tiene a ruiz disposición servicios gratuitos de asistencia lingüística. Llame al [phone].    We comply with applicable federal civil rights laws and [place] laws. We do not discriminate on the basis of race, " color, national origin, age, disability, sex, sexual orientation, or gender identity.            Thank you!     Thank you for choosing [facility]  for your care. Our goal is always to provide you with excellent care. Hearing back from our patients is one way we can continue to improve our services. Please take a few minutes to complete the written survey that you may receive in the mail after your visit with us. Thank you!             Your Updated Medication List - Protect others around you: Learn how to safely use, store and throw away your medicines at www.disposemymeds.org.          This list is accurate as of: 1/16/18  3:01 PM.  Always use your most recent med list.                   Brand Name Dispense Instructions for use Diagnosis    amLODIPine 10 MG tablet    NORVASC    90 tablet    TAKE 1 TABLET(10 MG) BY MOUTH DAILY    Essential hypertension, benign       BASAGLAR 100 UNIT/ML injection     60 mL    Inject 60 Units Subcutaneous At Bedtime    Type 2 diabetes mellitus without complication, with long-term current use of insulin (H)       blood glucose monitoring test strip    ONETOUCH VERIO IQ    100 strip    Use to test blood sugars 3 times daily or as directed.    Type 2 diabetes mellitus without complication, with long-term current use of insulin (H)       fish oil-omega-3 fatty acids 1000 MG capsule      Take 4 capsules by mouth daily.        insulin aspart 100 UNIT/ML injection    NovoLOG FLEXPEN    30 mL    1 unit per 7g of carbs three times daily before meals    Type 2 diabetes mellitus without complication, with long-term current use of insulin (H)       insulin pen needle 31G X 5 MM    B-D U/F    100 each    Use 1 daily or as directed.    Type 2 diabetes mellitus without complication, with long-term current use of insulin (H)       losartan 100 MG tablet    COZAAR    90 tablet    Take 1 tablet (100 mg) by mouth daily    Essential hypertension, benign        metFORMIN 1000 MG tablet    GLUCOPHAGE    60 tablet    TAKE 1 TABLET(1000 MG) BY MOUTH TWICE DAILY WITH MEALS    Type 2 diabetes mellitus without complication, with long-term current use of insulin (H)       metoprolol succinate 100 MG 24 hr tablet    TOPROL-XL    90 tablet    Take 1 tablet (100 mg) by mouth daily    Essential hypertension, benign       MULTIVITAMIN PO      1 qd        NITROSTAT 0.4 MG sublingual tablet   Generic drug:  nitroGLYcerin      1 TAB EVERY 5 MIN AS NEEDED, UP TO 3 PER EPISODE        ONETOUCH LANCETS Misc     200 each    1 lancet by In Vitro route 3 times daily    Type 2 diabetes mellitus without complication, with long-term current use of insulin (H)       simvastatin 80 MG tablet    ZOCOR    45 tablet    TAKE 1/2 TABLET(40 MG) BY MOUTH AT BEDTIME    Hyperlipidemia LDL goal <100       warfarin 7.5 MG tablet    COUMADIN    90 tablet    1 tablet daily except  1/2 tablets on Mon as directed by the anticoagulation clinic    New onset atrial fibrillation (H)

## 2018-01-19 DIAGNOSIS — I48.91 NEW ONSET ATRIAL FIBRILLATION (H): ICD-10-CM

## 2018-01-19 RX ORDER — WARFARIN SODIUM 7.5 MG/1
TABLET ORAL
Qty: 90 TABLET | Refills: 0 | Status: SHIPPED | OUTPATIENT
Start: 2018-01-19 | End: 2018-05-02

## 2018-01-19 NOTE — TELEPHONE ENCOUNTER
"Requested Prescriptions   Pending Prescriptions Disp Refills     warfarin (COUMADIN) 7.5 MG tablet 90 tablet 0     Si tablet daily except  1/2 tablets on Mon as directed by the anticoagulation clinic    Vitamin K Antagonists Failed    2018  2:12 PM       Failed - INR is within goal in the past 6 weeks    Confirm INR is within goal in the past 6 weeks.     Recent Labs   Lab Test 18   INR  3.0*                      Passed - Recent or future visit with authorizing provider's specialty    Patient had office visit in the last year or has a visit in the next 30 days with authorizing provider.  See \"Patient Info\" tab in inbasket, or \"Choose Columns\" in Meds & Orders section of the refill encounter.            Passed - Patient is 18 years of age or older          Last Written Prescription Date:  10/24/2017  Last Fill Quantity: 90,  # refills: 0   Last Office Visit with G, P or [facility] prescribing provider:  2017   Future Office Visit:    Next 5 appointments (look out 90 days)     2018 10:00 AM CST   Office Visit with David Almendarez MD   [facility] ([facility])    [address] [address] [phone]                   "

## 2018-01-23 ENCOUNTER — OFFICE VISIT (OUTPATIENT)
Dept: INTERNAL MEDICINE | Facility: CLINIC | Age: 58
End: 2018-01-23
Payer: COMMERCIAL

## 2018-01-23 VITALS
HEIGHT: 72 IN | SYSTOLIC BLOOD PRESSURE: 128 MMHG | BODY MASS INDEX: 42.66 KG/M2 | WEIGHT: 315 LBS | OXYGEN SATURATION: 99 % | TEMPERATURE: 98 F | HEART RATE: 99 BPM | DIASTOLIC BLOOD PRESSURE: 82 MMHG

## 2018-01-23 DIAGNOSIS — Z79.4 TYPE 2 DIABETES MELLITUS WITHOUT COMPLICATION, WITH LONG-TERM CURRENT USE OF INSULIN (H): Primary | ICD-10-CM

## 2018-01-23 DIAGNOSIS — I10 ESSENTIAL HYPERTENSION, BENIGN: ICD-10-CM

## 2018-01-23 DIAGNOSIS — I48.20 CHRONIC ATRIAL FIBRILLATION (H): ICD-10-CM

## 2018-01-23 DIAGNOSIS — I50.22 CHRONIC SYSTOLIC CONGESTIVE HEART FAILURE (H): ICD-10-CM

## 2018-01-23 DIAGNOSIS — E11.9 TYPE 2 DIABETES MELLITUS WITHOUT COMPLICATION, WITH LONG-TERM CURRENT USE OF INSULIN (H): Primary | ICD-10-CM

## 2018-01-23 DIAGNOSIS — E78.5 HYPERLIPIDEMIA LDL GOAL <100: ICD-10-CM

## 2018-01-23 DIAGNOSIS — I42.9 PRIMARY CARDIOMYOPATHY (H): ICD-10-CM

## 2018-01-23 DIAGNOSIS — Z12.11 COLON CANCER SCREENING: ICD-10-CM

## 2018-01-23 LAB — HBA1C MFR BLD: 6.1 % (ref 4.3–6)

## 2018-01-23 PROCEDURE — 99214 OFFICE O/P EST MOD 30 MIN: CPT | Performed by: INTERNAL MEDICINE

## 2018-01-23 PROCEDURE — 36415 COLL VENOUS BLD VENIPUNCTURE: CPT | Performed by: INTERNAL MEDICINE

## 2018-01-23 PROCEDURE — 83036 HEMOGLOBIN GLYCOSYLATED A1C: CPT | Performed by: INTERNAL MEDICINE

## 2018-01-23 NOTE — LETTER
[facility]  [address] [address]  [phone]      1/23/2018       Mckay Hsu  [address] [place] [id number]        Dear Mckay,      Your Hemoglobin A1C and blood sugar tests look good and I would continue with your medication without change.  These tests should be repeated in 6 months.    Sincerely,      David Almendarez MD  Internal Medicine

## 2018-01-23 NOTE — PROGRESS NOTES
SUBJECTIVE:   Mckay Hsu is a 57 year old male who presents to clinic today for the following health issues:    Diabetes Follow-up    Patient is checking blood sugars: three times daily.   Blood sugar testing frequency justification: Patient modifying lifestyle changes (diet, exercise) with blood sugars--watching diet   Results are as follows:       am - 108-109s       lunchtime - 118       suppertime - 105        Diabetic concerns: None     Symptoms of hypoglycemia (low blood sugar): none     Paresthesias (numbness or burning in feet) or sores: No     Date of last diabetic eye exam: due for one   BP Readings from Last 2 Encounters:   01/23/18 (!) 150/100   06/27/17 125/84     Hemoglobin A1C (%)   Date Value   06/26/2017 5.9   10/31/2016 5.6     LDL Cholesterol Calculated (mg/dL)   Date Value   06/26/2017 28   02/12/2016 40         Amount of exercise or physical activity: 2-3 days/week for an average of 15-30 minutes    Problems taking medications regularly: No    Medication side effects: none    Diet: regular (no restrictions)      Problem list and histories reviewed & adjusted, as indicated.  Additional history: as documented    Patient Active Problem List   Diagnosis     Essential hypertension, benign     Open wound of foot, excluding toe(s)     Chronic atrial fibrillation (H)     HYPERLIPIDEMIA LDL GOAL <100     Health Care Home     Subclinical hyperthyroidism     Primary cardiomyopathy (H)     Tachycardia     Atrioventricular block, complete (AV)     Long-term (current) use of anticoagulants [Z79.01]     Chronic systolic congestive heart failure (H)     Morbid obesity due to excess calories (H)     Type 2 diabetes mellitus without complication, with long-term current use of insulin (H)     Past Surgical History:   Procedure Laterality Date     C NONSPECIFIC PROCEDURE  06/2007    debritement     CARDIOVERSION  9/2009, 10/2009     H ABLATION AV NODE  4/8/10     IMPLANT PACEMAKER  4/8/10    BIV PPM-  Medtronic InSync III     TONSILLECTOMY      as kid       Social History   Substance Use Topics     Smoking status: Never Smoker     Smokeless tobacco: Never Used     Alcohol use No     Family History   Problem Relation Age of Onset     DIABETES Mother      Hypertension Father      DIABETES Father      Hypertension Maternal Grandmother      DIABETES Maternal Grandmother      DIABETES Maternal Grandfather      Hypertension Maternal Grandfather      Cardiovascular Maternal Grandfather      Hypertension Paternal Grandfather      Cardiovascular Paternal Grandfather      DIABETES Sister      Hypertension Sister          Current Outpatient Prescriptions   Medication Sig Dispense Refill     metFORMIN (GLUCOPHAGE) 1000 MG tablet TAKE 1 TABLET(1000 MG) BY MOUTH TWICE DAILY WITH MEALS 180 tablet 3     warfarin (COUMADIN) 7.5 MG tablet 1 tablet daily except  1/2 tablets on Mon as directed by the anticoagulation clinic 90 tablet 0     blood glucose monitoring (ONETOUCH VERIO IQ) test strip Use to test blood sugars 3 times daily or as directed. 100 strip 0     losartan (COZAAR) 100 MG tablet Take 1 tablet (100 mg) by mouth daily 90 tablet 2     metoprolol (TOPROL-XL) 100 MG 24 hr tablet Take 1 tablet (100 mg) by mouth daily 90 tablet 2     insulin pen needle (B-D U/F) 31G X 5 MM Use 1 daily or as directed. 100 each prn     amLODIPine (NORVASC) 10 MG tablet TAKE 1 TABLET(10 MG) BY MOUTH DAILY 90 tablet 1     simvastatin (ZOCOR) 80 MG tablet TAKE 1/2 TABLET(40 MG) BY MOUTH AT BEDTIME 45 tablet 3     insulin aspart (NOVOLOG FLEXPEN) 100 UNIT/ML injection 1 unit per 7g of carbs three times daily before meals 30 mL 5     insulin glargine (BASAGLAR KWIKPEN) 100 UNIT/ML injection Inject 60 Units Subcutaneous At Bedtime 60 mL 3     ONE TOUCH LANCETS MISC 1 lancet by In Vitro route 3 times daily 200 each 3     fish oil-omega-3 fatty acids 1000 MG capsule Take 4 capsules by mouth daily.       NITROSTAT 0.4 MG SL SUBL 1 TAB EVERY 5 MIN AS  NEEDED, UP TO 3 PER EPISODE       MULTIVITAMIN OR 1 qd       [DISCONTINUED] metFORMIN (GLUCOPHAGE) 1000 MG tablet TAKE 1 TABLET(1000 MG) BY MOUTH TWICE DAILY WITH MEALS 60 tablet 0     No Known Allergies  BP Readings from Last 3 Encounters:   06/27/17 125/84   06/26/17 120/78   10/31/16 122/72    Wt Readings from Last 3 Encounters:   06/27/17 (!) 316 lb 4.8 oz (143.5 kg)   06/26/17 (!) 312 lb (141.5 kg)   10/31/16 (!) 308 lb 11.2 oz (140 kg)           Reviewed and updated as needed this visit by clinical staff     Reviewed and updated as needed this visit by Provider         ROS:  C: NEGATIVE for fever, chills, change in weight  E/M: NEGATIVE for ear, mouth and throat problems  R: NEGATIVE for significant cough or SOB  CV: NEGATIVE for chest pain, palpitations  GI: NEGATIVE for nausea, abdominal pain, heartburn, or change in bowel habits  : NEGATIVE for frequency, dysuria, or hematuria  M: NEGATIVE for significant arthralgias or myalgia  H: NEGATIVE for bleeding problems  P: NEGATIVE for changes in mood or affect    OBJECTIVE:                                                    /82 (BP Location: Left arm, Patient Position: Chair, Cuff Size: Adult Large)  Pulse 99  Temp 98  F (36.7  C) (Oral)  Ht 6' (1.829 m)  Wt (!) 317 lb 11.2 oz (144.1 kg)  SpO2 99%  BMI 43.09 kg/m2  Body mass index is 43.09 kg/(m^2).  GENERAL: healthy, alert and no distress  EYES: Eyes grossly normal to inspection, extraocular movements - intact, and PERRL  HENT: ear canals- normal; TMs- normal; Nose- normal; Mouth- no ulcers, no lesions  NECK: no tenderness, no adenopathy, no asymmetry, no masses, no stiffness; thyroid- normal to palpation  RESP: lungs clear to auscultation - no rales, no rhonchi, no wheezes, pacer left chest  CV: regular rates and rhythm, normal S1 S2, no S3 or S4 and no click or rub   MS: extremities- no gross deformities noted  NEURO: no focal changes  PSYCH: Alert and oriented times 3; speech- coherent , normal  rate and volume; able to articulate logical thoughts, able to abstract reason, no tangential thoughts, no hallucinations or delusions, affect- normal     ASSESSMENT/PLAN:                                                      (E11.9,  Z79.4) Type 2 diabetes mellitus without complication, with long-term current use of insulin (H)  (primary encounter diagnosis)  Comment: Blood sugars appear to be fairly well controlled based on last labs and per patient discussion.  We will recheck an A1c level today and recommend he get all his routine labs done in July.  Plan: Hemoglobin A1c, metFORMIN (GLUCOPHAGE) 1000 MG         tablet, Comprehensive metabolic panel, Lipid         Profile, Hemoglobin A1c, Albumin Random Urine         Quantitative with Creat Ratio, TSH with free T4        reflex            (I42.8) Primary cardiomyopathy (H)  Comment: Currently stable medically managed with no acute complaints.  Plan: No changes in current medication therapy per    (I48.2) Chronic atrial fibrillation (H)  Comment: Rate controlled on therapy.  Plan: Tinea with Coumadin as ordered    (I50.22) Chronic systolic congestive heart failure (H)  Comment: Stable and appears euvolemic.  No adjustments in medication needed at this point.  Plan: We will follow up with cardiology as directed.    (I10) Essential hypertension, benign  Comment: Stable on current therapy continue medications as ordered.  Plan: Comprehensive metabolic panel            (E78.5) Hyperlipidemia LDL goal <100  Comment:   LDL Cholesterol Calculated   Date Value Ref Range Status   06/26/2017 28 <100 mg/dL Final     Comment:     Desirable:       <100 mg/dl   ]  Plan: Lipid Profile        At goal on therapy.  Repeat labs do as ordered continue with statin.    (Z12.11) Colon cancer screening  Comment: ADVISED COLONOSCOPY FOR ROUTINE PREVENTATIVE CARE.  Plan: GASTROENTEROLOGY ADULT REF PROCEDURE ONLY            See Patient Instructions    David Almendarez MD  [facility]  [facility]    THE MEDICATION LIST HAS BEEN FULLY RECONCILED BY THE [facility] AND THE NURSING STAFF.

## 2018-01-23 NOTE — MR AVS SNAPSHOT
After Visit Summary   1/23/2018    Mckay Hsu    MRN: [id number]           Patient Information     Date Of Birth          1960        Visit Information        Provider Department      1/23/2018 10:00 AM David Almendarez MD [facility]        Today's Diagnoses     Type 2 diabetes mellitus without complication, with long-term current use of insulin (H)    -  1    Primary cardiomyopathy (H)        Chronic atrial fibrillation (H)        Chronic systolic congestive heart failure (H)        Essential hypertension, benign        Hyperlipidemia LDL goal <100        Colon cancer screening           Follow-ups after your visit        Additional Services     GASTROENTEROLOGY ADULT REF PROCEDURE ONLY       Last Lab Result: Creatinine (mg/dL)       Date                     Value                 06/26/2017               1.10             ----------  Body mass index is 43.09 kg/(m^2).     Needed:  No  Language:  English    Patient will be contacted to schedule procedure.     Please be aware that coverage of these services is subject to the terms and limitations of your health insurance plan.  Call member services at your health plan with any benefit or coverage questions.  Any procedures must be performed at a [facility] facility OR coordinated by your clinic's referral office.    Please bring the following with you to your appointment:    (1) Any X-Rays, CTs or MRIs which have been performed.  Contact the facility where they were done to arrange for  prior to your scheduled appointment.    (2) List of current medications   (3) This referral request   (4) Any documents/labs given to you for this referral                  Follow-up notes from your care team     Return in about 6 months (around 7/23/2018), or if symptoms worsen or fail to improve.      Your next 10 appointments already scheduled     Feb 06, 2018  2:30 PM CST   Anticoagulation Visit with   "ANTICOAGULATION CLINIC   [facility]    [address] [id number] [phone]            Feb 13, 2018  2:30 PM CST   30 Day Phone Check with JACK ARCE   [facility]   [place] ([facility])    [address]  [place] [address] [phone]              Future tests that were ordered for you today     Open Future Orders        Priority Expected Expires Ordered    Comprehensive metabolic panel Routine 7/1/2018 7/31/2018 1/23/2018    Lipid Profile Routine 7/1/2018 7/31/2018 1/23/2018    Hemoglobin A1c Routine 7/1/2018 7/31/2018 1/23/2018    Albumin Random Urine Quantitative with Creat Ratio Routine 7/1/2018 7/31/2018 1/23/2018    TSH with free T4 reflex Routine 7/1/2018 7/31/2018 1/23/2018            Who to contact     If you have questions or need follow up information about today's clinic visit or your schedule please contact [facility] directly at [phone].  Normal or non-critical lab and imaging results will be communicated to you by MyChart, letter or phone within 4 business days after the clinic has received the results. If you do not hear from us within 7 days, please contact the clinic through [facility] or phone. If you have a critical or abnormal lab result, we will notify you by phone as soon as possible.  Submit refill requests through [facility] or call your pharmacy and they will forward the refill request to us. Please allow 3 business days for your refill to be completed.          Additional Information About Your Visit        [facility] Information     [facility] lets you send messages to your doctor, view your test results, renew your prescriptions, schedule appointments and more. To sign up, go to [email or web address] . Click on \"Log in\" on the left side of the screen, which will take you to the Welcome page. Then click on \"Sign up Now\" on " the right side of the page.     You will be asked to enter the access code listed below, as well as some personal information. Please follow the directions to create your username and password.     Your access code is: [email or web address]  Expires: 2018  3:01 PM     Your access code will  in 90 days. If you need help or a new code, please call your [place] clinic or [phone].        Care EveryWhere ID     This is your Care EveryWhere ID. This could be used by other organizations to access your [place] medical records  [id number]        Your Vitals Were     Pulse Temperature Height Pulse Oximetry BMI (Body Mass Index)       99 98  F (36.7  C) (Oral) 6' (1.829 m) 99% 43.09 kg/m2        Blood Pressure from Last 3 Encounters:   18 128/82   17 125/84   17 120/78    Weight from Last 3 Encounters:   18 (!) 317 lb 11.2 oz (144.1 kg)   17 (!) 316 lb 4.8 oz (143.5 kg)   17 (!) 312 lb (141.5 kg)              We Performed the Following     GASTROENTEROLOGY ADULT REF PROCEDURE ONLY     Hemoglobin A1c          Where to get your medicines      These medications were sent to [facility] - [place] - [address]  [address] [place] [address]     Phone:  [phone]     metFORMIN 1000 MG tablet          Primary Care Provider Office Phone # Fax #    David Almendarez [phone]       [address] [place] [address]        Equal Access to Services     [facility] : Hadmaicol Solano, randee shah, qajessica guallpa. So [facility] [phone].    ATENCIÓN: Si habla español, tiene a ruiz disposición servicios gratuitos de asistencia lingüística. Crystal al [phone].    We comply with applicable federal civil rights laws and [place] laws. We do not discriminate on the basis of race, color, national origin, age, disability, sex, sexual orientation, or gender identity.             Thank you!     Thank you for choosing [facility]  for your care. Our goal is always to provide you with excellent care. Hearing back from our patients is one way we can continue to improve our services. Please take a few minutes to complete the written survey that you may receive in the mail after your visit with us. Thank you!             Your Updated Medication List - Protect others around you: Learn how to safely use, store and throw away your medicines at www.disposemymeds.org.          This list is accurate as of: 1/23/18 10:08 AM.  Always use your most recent med list.                   Brand Name Dispense Instructions for use Diagnosis    amLODIPine 10 MG tablet    NORVASC    90 tablet    TAKE 1 TABLET(10 MG) BY MOUTH DAILY    Essential hypertension, benign       BASAGLAR 100 UNIT/ML injection     60 mL    Inject 60 Units Subcutaneous At Bedtime    Type 2 diabetes mellitus without complication, with long-term current use of insulin (H)       blood glucose monitoring test strip    ONETOUCH VERIO IQ    100 strip    Use to test blood sugars 3 times daily or as directed.    Type 2 diabetes mellitus without complication, with long-term current use of insulin (H)       fish oil-omega-3 fatty acids 1000 MG capsule      Take 4 capsules by mouth daily.        insulin aspart 100 UNIT/ML injection    NovoLOG FLEXPEN    30 mL    1 unit per 7g of carbs three times daily before meals    Type 2 diabetes mellitus without complication, with long-term current use of insulin (H)       insulin pen needle 31G X 5 MM    B-D U/F    100 each    Use 1 daily or as directed.    Type 2 diabetes mellitus without complication, with long-term current use of insulin (H)       losartan 100 MG tablet    COZAAR    90 tablet    Take 1 tablet (100 mg) by mouth daily    Essential hypertension, benign       metFORMIN 1000 MG tablet    GLUCOPHAGE    180 tablet    TAKE 1 TABLET(1000 MG) BY MOUTH TWICE DAILY WITH MEALS     Type 2 diabetes mellitus without complication, with long-term current use of insulin (H)       metoprolol succinate 100 MG 24 hr tablet    TOPROL-XL    90 tablet    Take 1 tablet (100 mg) by mouth daily    Essential hypertension, benign       MULTIVITAMIN PO      1 qd        NITROSTAT 0.4 MG sublingual tablet   Generic drug:  nitroGLYcerin      1 TAB EVERY 5 MIN AS NEEDED, UP TO 3 PER EPISODE        ONETOUCH LANCETS Misc     200 each    1 lancet by In Vitro route 3 times daily    Type 2 diabetes mellitus without complication, with long-term current use of insulin (H)       simvastatin 80 MG tablet    ZOCOR    45 tablet    TAKE 1/2 TABLET(40 MG) BY MOUTH AT BEDTIME    Hyperlipidemia LDL goal <100       warfarin 7.5 MG tablet    COUMADIN    90 tablet    1 tablet daily except  1/2 tablets on Mon as directed by the anticoagulation clinic    New onset atrial fibrillation (H)

## 2018-01-26 DIAGNOSIS — E11.9 TYPE 2 DIABETES MELLITUS WITHOUT COMPLICATION, WITH LONG-TERM CURRENT USE OF INSULIN (H): ICD-10-CM

## 2018-01-26 DIAGNOSIS — Z79.4 TYPE 2 DIABETES MELLITUS WITHOUT COMPLICATION, WITH LONG-TERM CURRENT USE OF INSULIN (H): ICD-10-CM

## 2018-01-26 RX ORDER — INSULIN GLARGINE 100 [IU]/ML
60 INJECTION, SOLUTION SUBCUTANEOUS AT BEDTIME
Qty: 60 ML | Refills: 3 | Status: SHIPPED | OUTPATIENT
Start: 2018-01-26 | End: 2019-03-06

## 2018-02-03 DIAGNOSIS — Z79.4 TYPE 2 DIABETES MELLITUS WITHOUT COMPLICATION, WITH LONG-TERM CURRENT USE OF INSULIN (H): ICD-10-CM

## 2018-02-03 DIAGNOSIS — E11.9 TYPE 2 DIABETES MELLITUS WITHOUT COMPLICATION, WITH LONG-TERM CURRENT USE OF INSULIN (H): ICD-10-CM

## 2018-02-03 NOTE — TELEPHONE ENCOUNTER
Requested Prescriptions   Pending Prescriptions Disp Refills     blood glucose monitoring (ONETOUCH VERIO IQ) test strip 100 strip 0     Sig: Use to test blood sugars 3 times daily or as directed.    There is no refill protocol information for this order        Last Written Prescription Date:  12/21/17  Last Fill Quantity: 100 STRIPS,  # refills: 0   Last Office Visit with [facility] provider:  01/23/18   Future Office Visit:

## 2018-02-06 ENCOUNTER — ANTICOAGULATION THERAPY VISIT (OUTPATIENT)
Dept: ANTICOAGULATION | Facility: CLINIC | Age: 58
End: 2018-02-06
Payer: COMMERCIAL

## 2018-02-06 LAB — INR POINT OF CARE: 2.4 (ref 0.86–1.14)

## 2018-02-06 PROCEDURE — 85610 PROTHROMBIN TIME: CPT | Mod: QW

## 2018-02-06 PROCEDURE — 36416 COLLJ CAPILLARY BLOOD SPEC: CPT

## 2018-02-06 NOTE — MR AVS SNAPSHOT
Mckay Gonzalesrobyn Hsu   2/6/2018 2:30 PM   Anticoagulation Therapy Visit    Description:  57 year old male   Provider:   ANTICOAGULATION CLINIC   Department:   Anti Coagulation           INR as of 2/6/2018     Today's INR 2.4      Anticoagulation Summary as of 2/6/2018     INR goal 2.0-3.0   Today's INR 2.4   Full instructions 3.75 mg on Tue; 7.5 mg all other days   Next INR check 3/20/2018    Indications   Long-term (current) use of anticoagulants [Z79.01] [Z79.01]  Chronic atrial fibrillation (H) [I48.2]         Your next Anticoagulation Clinic appointment(s)     Mar 20, 2018  2:30 PM CDT   Anticoagulation Visit with  ANTICOAGULATION CLINIC   [facility] ([facility]    [address] [place] [address] [phone]              Contact Numbers     [facility]  Please call  [phone] to cancel and/or reschedule your appointment   Please call  [phone] with any problems or questions regarding your therapy.        February 2018 Details    Sun Mon Tue Wed Thu Fri Sat         1               2               3                 4               5               6      3.75 mg   See details      7      7.5 mg         8      7.5 mg         9      7.5 mg         10      7.5 mg           11      7.5 mg         12      7.5 mg         13      3.75 mg         14      7.5 mg         15      7.5 mg         16      7.5 mg         17      7.5 mg           18      7.5 mg         19      7.5 mg         20      3.75 mg         21      7.5 mg         22      7.5 mg         23      7.5 mg         24      7.5 mg           25      7.5 mg         26      7.5 mg         27      3.75 mg         28      7.5 mg             Date Details   02/06 This INR check               How to take your warfarin dose     To take:  3.75 mg Take 0.5 of a 7.5 mg tablet.    To take:  7.5 mg Take 1 of the 7.5 mg tablets.           March 2018 Details    Sun Mon Tue Wed Thu Fri Sat          1      7.5 mg         2      7.5 mg         3      7.5 mg           4      7.5 mg         5      7.5 mg         6      3.75 mg         7      7.5 mg         8      7.5 mg         9      7.5 mg         10      7.5 mg           11      7.5 mg         12      7.5 mg         13      3.75 mg         14      7.5 mg         15      7.5 mg         16      7.5 mg         17      7.5 mg           18      7.5 mg         19      7.5 mg         20            21               22               23               24                 25               26               27               28               29               30               31                Date Details   No additional details    Date of next INR:  3/20/2018         How to take your warfarin dose     To take:  3.75 mg Take 0.5 of a 7.5 mg tablet.    To take:  7.5 mg Take 1 of the 7.5 mg tablets.

## 2018-02-06 NOTE — TELEPHONE ENCOUNTER
Requested Prescriptions   Pending Prescriptions Disp Refills     blood glucose monitoring (ONETOUCH VERIO IQ) test strip 100 strip 0     Sig: Use to test blood sugars 3 times daily or as directed.    Diabetic Supplies Protocol Passed    2/3/2018 11:52 AM       Passed - Patient is 18 years of age or older       Passed - Patient has had appt within past 6 mos    IV to PO - Antibiotics     None

## 2018-02-06 NOTE — PROGRESS NOTES
ANTICOAGULATION FOLLOW-UP CLINIC VISIT    Patient Name:  Mckay Hsu  Date:  2/6/2018  Contact Type:  Face to Face    SUBJECTIVE:     Patient Findings     Positives No Problem Findings           OBJECTIVE    INR Protime   Date Value Ref Range Status   02/06/2018 2.4 (A) 0.86 - 1.14 Final       ASSESSMENT / PLAN  INR assessment THER    Recheck INR In: 6 WEEKS    INR Location Clinic      Anticoagulation Summary as of 2/6/2018     INR goal 2.0-3.0   Today's INR 2.4   Maintenance plan 3.75 mg (7.5 mg x 0.5) on Tue; 7.5 mg (7.5 mg x 1) all other days   Full instructions 3.75 mg on Tue; 7.5 mg all other days   Weekly total 48.75 mg   No change documented Rosalee Billingsley RN   Plan last modified Rosalee Billingsley RN (10/17/2017)   Next INR check 3/20/2018   Target end date     Indications   Long-term (current) use of anticoagulants [Z79.01] [Z79.01]  Chronic atrial fibrillation (H) [I48.2]         Anticoagulation Episode Summary     INR check location     Preferred lab     Send INR reminders to [facility]    Comments             See the Encounter Report to view Anticoagulation Flowsheet and Dosing Calendar (Go to Encounters tab in chart review, and find the Anticoagulation Therapy Visit)    Dosage adjustment made based on physician directed care plan.    Rosalee Billingsley RN

## 2018-02-13 ENCOUNTER — OFFICE VISIT (OUTPATIENT)
Dept: CARDIOLOGY | Facility: CLINIC | Age: 58
End: 2018-02-13
Payer: COMMERCIAL

## 2018-02-13 DIAGNOSIS — Z95.0 CARDIAC PACEMAKER IN SITU: Primary | ICD-10-CM

## 2018-02-13 PROCEDURE — 99207 ZZC NO CHARGE LOS: CPT | Performed by: INTERNAL MEDICINE

## 2018-02-13 NOTE — PROGRESS NOTES
~ 30 day phone teletrace / NO CHARGE ~  Appropriate  at time of check. Magnet response WNL. F/U scheduled q 1 month. RODRIGUEZ Conrad

## 2018-02-13 NOTE — MR AVS SNAPSHOT
"              After Visit Summary   2/13/2018    Mckay Hsu    MRN: [id number]           Patient Information     Date Of Birth          1960        Visit Information        Provider Department      2/13/2018 2:30 PM JACK ARCE [facility]   [place]        Today's Diagnoses     Cardiac pacemaker in situ    -  1       Follow-ups after your visit        Your next 10 appointments already scheduled     Mar 20, 2018  2:30 PM CDT   Anticoagulation Visit with  ANTICOAGULATION CLINIC   [facility] ([facility])    [address] [address] [phone]            Mar 27, 2018  2:30 PM CDT   30 Day Phone Check with JACK ARCE   [facility]   [place] ([facility])    [address] [place], [place] [id number]  [facility] [address] [phone]              Who to contact     If you have questions or need follow up information about today's clinic visit or your schedule please contact [facility] directly at [phone].  Normal or non-critical lab and imaging results will be communicated to you by [facility]hart, letter or phone within 4 business days after the clinic has received the results. If you do not hear from us within 7 days, please contact the clinic through [facility] [facility]hart or phone. If you have a critical or abnormal lab result, we will notify you by phone as soon as possible.  Submit refill requests through [facility] or call your pharmacy and they will forward the refill request to us. Please allow 3 business days for your refill to be completed.          Additional Information About Your Visit        MyChart Information     [facility] lets you send messages to your doctor, view your test results, renew your prescriptions, schedule appointments and more. To sign up, go to [email or web address] . Click on \"Log in\" on the left side of the screen, which will take " "you to the Welcome page. Then click on \"Sign up Now\" on the right side of the page.     You will be asked to enter the access code listed below, as well as some personal information. Please follow the directions to create your username and password.     Your access code is: [email or web address]  Expires: 2018  3:01 PM     Your access code will  in 90 days. If you need help or a new code, please call your [place] clinic or [phone].        Care EveryWhere ID     This is your Care EveryWhere ID. This could be used by other organizations to access your [place] medical records  [id number]         Blood Pressure from Last 3 Encounters:   18 128/82   17 125/84   17 120/78    Weight from Last 3 Encounters:   18 (!) 144.1 kg (317 lb 11.2 oz)   17 (!) 143.5 kg (316 lb 4.8 oz)   17 (!) 141.5 kg (312 lb)              Today, you had the following     No orders found for display       Primary Care Provider Office Phone # Fax #    David Almendarez [phone]       [address] [place] [address]        Equal Access to Services     DERIK GRIJALVA : Hadii aad ku hadasho Soomaali, waaxda luqadaha, qaybta kaalmada adeegyada, jessica hidalgo hayosmani saravia . So [facility] [phone].    ATENCIÓN: Si habla español, tiene a ruiz disposición servicios gratuitos de asistencia lingüística. Llame al [phone].    We comply with applicable federal civil rights laws and [place] laws. We do not discriminate on the basis of race, color, national origin, age, disability, sex, sexual orientation, or gender identity.            Thank you!     Thank you for choosing [facility]  for your care. Our goal is always to provide you with excellent care. Hearing back from our patients is one way we can continue to improve our services. Please take a few minutes to complete the written survey that you may receive in the mail after your visit " with us. Thank you!             Your Updated Medication List - Protect others around you: Learn how to safely use, store and throw away your medicines at www.disposemymeds.org.          This list is accurate as of 2/13/18  2:39 PM.  Always use your most recent med list.                   Brand Name Dispense Instructions for use Diagnosis    amLODIPine 10 MG tablet    NORVASC    90 tablet    TAKE 1 TABLET(10 MG) BY MOUTH DAILY    Essential hypertension, benign       BASAGLAR 100 UNIT/ML injection     60 mL    Inject 60 Units Subcutaneous At Bedtime    Type 2 diabetes mellitus without complication, with long-term current use of insulin (H)       blood glucose monitoring test strip    ONETOUCH VERIO IQ    100 strip    Use to test blood sugars 3 times daily or as directed.    Type 2 diabetes mellitus without complication, with long-term current use of insulin (H)       fish oil-omega-3 fatty acids 1000 MG capsule      Take 4 capsules by mouth daily.        insulin aspart 100 UNIT/ML injection    NovoLOG FLEXPEN    30 mL    1 unit per 7g of carbs three times daily before meals    Type 2 diabetes mellitus without complication, with long-term current use of insulin (H)       insulin pen needle 31G X 5 MM    B-D U/F    100 each    Use 1 daily or as directed.    Type 2 diabetes mellitus without complication, with long-term current use of insulin (H)       losartan 100 MG tablet    COZAAR    90 tablet    Take 1 tablet (100 mg) by mouth daily    Essential hypertension, benign       metFORMIN 1000 MG tablet    GLUCOPHAGE    180 tablet    TAKE 1 TABLET(1000 MG) BY MOUTH TWICE DAILY WITH MEALS    Type 2 diabetes mellitus without complication, with long-term current use of insulin (H)       metoprolol succinate 100 MG 24 hr tablet    TOPROL-XL    90 tablet    Take 1 tablet (100 mg) by mouth daily    Essential hypertension, benign       MULTIVITAMIN PO      1 qd        NITROSTAT 0.4 MG sublingual tablet   Generic drug:   nitroGLYcerin      1 TAB EVERY 5 MIN AS NEEDED, UP TO 3 PER EPISODE        ONETOUCH LANCETS Misc     200 each    1 lancet by In Vitro route 3 times daily    Type 2 diabetes mellitus without complication, with long-term current use of insulin (H)       simvastatin 80 MG tablet    ZOCOR    45 tablet    TAKE 1/2 TABLET(40 MG) BY MOUTH AT BEDTIME    Hyperlipidemia LDL goal <100       warfarin 7.5 MG tablet    COUMADIN    90 tablet    1 tablet daily except  1/2 tablets on Mon as directed by the anticoagulation clinic    New onset atrial fibrillation (H)

## 2018-03-03 DIAGNOSIS — Z79.4 TYPE 2 DIABETES MELLITUS WITHOUT COMPLICATION, WITH LONG-TERM CURRENT USE OF INSULIN (H): ICD-10-CM

## 2018-03-03 DIAGNOSIS — E11.9 TYPE 2 DIABETES MELLITUS WITHOUT COMPLICATION, WITH LONG-TERM CURRENT USE OF INSULIN (H): ICD-10-CM

## 2018-03-03 DIAGNOSIS — I10 ESSENTIAL HYPERTENSION, BENIGN: ICD-10-CM

## 2018-03-03 NOTE — TELEPHONE ENCOUNTER
"Requested Prescriptions   Pending Prescriptions Disp Refills     amLODIPine (NORVASC) 10 MG tablet  Last Written Prescription Date:  17  Last Fill Quantity: 90 TABLET,  # refills: 1   Last office visit: 2018 with prescribing provider:  CATRACHO   Future Office Visit:     90 tablet 1     Sig: TAKE 1 TABLET(10 MG) BY MOUTH DAILY    Calcium Channel Blockers Protocol  Passed    3/3/2018  5:45 PM       Passed - Blood pressure under 140/90 in past 12 months    BP Readings from Last 3 Encounters:   18 128/82   17 125/84   17 120/78                Passed - Recent (12 mo) or future (30 days) visit within the authorizing provider's specialty    Patient had office visit in the last year or has a visit in the next 30 days with authorizing provider.  See \"Patient Info\" tab in inbasket, or \"Choose Columns\" in Meds & Orders section of the refill encounter.            Passed - Patient is age 18 or older       Passed - Normal serum creatinine on file in past 12 months    Recent Labs   Lab Test  17   0845   CR  1.10             ONETOUCH LANCETS MISC  Last Written Prescription Date:  17  Last Fill Quantity: 200 EACH,  # refills: 3   Last office visit: 2018 with prescribing provider:  CATRACHO   Future Office Visit:     200 each 3     Si lancet by In Vitro route 3 times daily    Diabetic Supplies Protocol Passed    3/3/2018  5:45 PM       Passed - Patient is 18 years of age or older       Passed - Recent (6 mo) or future (30 days) visit within the authorizing provider's specialty    Patient had office visit in the last 6 months or has a visit in the next 30 days with authorizing provider.  See \"Patient Info\" tab in inbasket, or \"Choose Columns\" in Meds & Orders section of the refill encounter.              "

## 2018-03-06 RX ORDER — AMLODIPINE BESYLATE 10 MG/1
TABLET ORAL
Qty: 90 TABLET | Refills: 3 | Status: SHIPPED | OUTPATIENT
Start: 2018-03-06 | End: 2018-08-30

## 2018-03-20 ENCOUNTER — ANTICOAGULATION THERAPY VISIT (OUTPATIENT)
Dept: ANTICOAGULATION | Facility: CLINIC | Age: 58
End: 2018-03-20
Payer: COMMERCIAL

## 2018-03-20 LAB — INR POINT OF CARE: 2.5 (ref 0.86–1.14)

## 2018-03-20 PROCEDURE — 85610 PROTHROMBIN TIME: CPT | Mod: QW

## 2018-03-20 PROCEDURE — 36416 COLLJ CAPILLARY BLOOD SPEC: CPT

## 2018-03-20 NOTE — PROGRESS NOTES
ANTICOAGULATION FOLLOW-UP CLINIC VISIT    Patient Name:  Mckay Hsu  Date:  3/20/2018  Contact Type:  Face to Face    SUBJECTIVE:     Patient Findings     Positives No Problem Findings           OBJECTIVE    INR Protime   Date Value Ref Range Status   03/20/2018 2.5 (A) 0.86 - 1.14 Final       ASSESSMENT / PLAN  INR assessment THER    Recheck INR In: 6 WEEKS    INR Location Clinic      Anticoagulation Summary as of 3/20/2018     INR goal 2.0-3.0   Today's INR 2.5   Maintenance plan 3.75 mg (7.5 mg x 0.5) on Tue; 7.5 mg (7.5 mg x 1) all other days   Full instructions 3.75 mg on Tue; 7.5 mg all other days   Weekly total 48.75 mg   Plan last modified Rosalee Billingsley RN (10/17/2017)   Next INR check 5/1/2018   Target end date     Indications   Long-term (current) use of anticoagulants [Z79.01] [Z79.01]  Chronic atrial fibrillation (H) [I48.2]         Anticoagulation Episode Summary     INR check location     Preferred lab     Send INR reminders to [facility]    Comments             See the Encounter Report to view Anticoagulation Flowsheet and Dosing Calendar (Go to Encounters tab in chart review, and find the Anticoagulation Therapy Visit)    Dosage adjustment made based on physician directed care plan.    Rosalee Billingsley RN

## 2018-03-20 NOTE — MR AVS SNAPSHOT
Mckay Gonzalesrobyn Hsu   3/20/2018 2:30 PM   Anticoagulation Therapy Visit    Description:  58 year old male   Provider:   ANTICOAGULATION CLINIC   Department:   Anti Coagulation           INR as of 3/20/2018     Today's INR 2.5      Anticoagulation Summary as of 3/20/2018     INR goal 2.0-3.0   Today's INR 2.5   Full instructions 3.75 mg on Tue; 7.5 mg all other days   Next INR check 5/1/2018    Indications   Long-term (current) use of anticoagulants [Z79.01] [Z79.01]  Chronic atrial fibrillation (H) [I48.2]         Your next Anticoagulation Clinic appointment(s)     May 01, 2018  2:30 PM CDT   Anticoagulation Visit with  ANTICOAGULATION CLINIC   [facility]    [address] [place] [address] [phone]              Contact Numbers     [facility]  Please call  [phone] to cancel and/or reschedule your appointment   Please call  [phone] with any problems or questions regarding your therapy.        March 2018 Details    Sun Mon Tue Wed Thu Fri Sat         1               2               3                 4               5               6               7               8               9               10                 11               12               13               14               15               16               17                 18               19               20      3.75 mg   See details      21      7.5 mg         22      7.5 mg         23      7.5 mg         24      7.5 mg           25      7.5 mg         26      7.5 mg         27      3.75 mg         28      7.5 mg         29      7.5 mg         30      7.5 mg         31      7.5 mg          Date Details   03/20 This INR check               How to take your warfarin dose     To take:  3.75 mg Take 0.5 of a 7.5 mg tablet.    To take:  7.5 mg Take 1 of the 7.5 mg tablets.           April 2018 Details    Sun Mon Tue Wed Thu Fri Sat     1      7.5 mg         2       7.5 mg         3      3.75 mg         4      7.5 mg         5      7.5 mg         6      7.5 mg         7      7.5 mg           8      7.5 mg         9      7.5 mg         10      3.75 mg         11      7.5 mg         12      7.5 mg         13      7.5 mg         14      7.5 mg           15      7.5 mg         16      7.5 mg         17      3.75 mg         18      7.5 mg         19      7.5 mg         20      7.5 mg         21      7.5 mg           22      7.5 mg         23      7.5 mg         24      3.75 mg         25      7.5 mg         26      7.5 mg         27      7.5 mg         28      7.5 mg           29      7.5 mg         30      7.5 mg               Date Details   No additional details            How to take your warfarin dose     To take:  3.75 mg Take 0.5 of a 7.5 mg tablet.    To take:  7.5 mg Take 1 of the 7.5 mg tablets.           May 2018 Details    Sun Mon Tue Wed Thu Fri Sat       1            2               3               4               5                 6               7               8               9               10               11               12                 13               14               15               16               17               18               19                 20               21               22               23               24               25               26                 27               28               29               30               31                  Date Details   No additional details    Date of next INR:  5/1/2018         How to take your warfarin dose     To take:  3.75 mg Take 0.5 of a 7.5 mg tablet.

## 2018-03-27 ENCOUNTER — OFFICE VISIT (OUTPATIENT)
Dept: CARDIOLOGY | Facility: CLINIC | Age: 58
End: 2018-03-27
Payer: COMMERCIAL

## 2018-03-27 DIAGNOSIS — Z95.0 CARDIAC PACEMAKER IN SITU: Primary | ICD-10-CM

## 2018-03-27 PROCEDURE — 93293 PM PHONE R-STRIP DEVICE EVAL: CPT | Performed by: INTERNAL MEDICINE

## 2018-03-27 NOTE — MR AVS SNAPSHOT
"              After Visit Summary   3/27/2018    Mckay Hsu    MRN: [id number]           Patient Information     Date Of Birth          1960        Visit Information        Provider Department      3/27/2018 2:30 PM JACK ARCE [facility]        Today's Diagnoses     Cardiac pacemaker in situ    -  1       Follow-ups after your visit        Your next 10 appointments already scheduled     Apr 24, 2018  2:30 PM CDT   30 Day Phone Check with JACK ARCE   [facility]   [place] ([facility])    [address] [facility] [address]  [facility] [id number] [phone] OPT 2            May 01, 2018  2:30 PM CDT   Anticoagulation Visit with  ANTICOAGULATION CLINIC   [facility]    [address] [address] [phone]              Who to contact     If you have questions or need follow up information about today's clinic visit or your schedule please contact [facility] directly at [phone].  Normal or non-critical lab and imaging results will be communicated to you by [employer], letter or phone within 4 business days after the clinic has received the results. If you do not hear from us within 7 days, please contact the clinic through [employer] or phone. If you have a critical or abnormal lab result, we will notify you by phone as soon as possible.  Submit refill requests through [employer] or call your pharmacy and they will forward the refill request to us. Please allow 3 business days for your refill to be completed.          Additional Information About Your Visit        MyChart Information     [employer] lets you send messages to your doctor, view your test results, renew your prescriptions, schedule appointments and more. To sign up, go to [email or web address] . Click on \"Log in\" on the left side of the screen, which will " "take you to the Welcome page. Then click on \"Sign up Now\" on the right side of the page.     You will be asked to enter the access code listed below, as well as some personal information. Please follow the directions to create your username and password.     Your access code is: [email or web address]  Expires: 2018  4:01 PM     Your access code will  in 90 days. If you need help or a new code, please call your [place] clinic or [phone].        Care EveryWhere ID     This is your Care EveryWhere ID. This could be used by other organizations to access your [place] medical records  [id number]         Blood Pressure from Last 3 Encounters:   18 128/82   17 125/84   17 120/78    Weight from Last 3 Encounters:   18 (!) 144.1 kg (317 lb 11.2 oz)   17 (!) 143.5 kg (316 lb 4.8 oz)   17 (!) 141.5 kg (312 lb)              We Performed the Following     PM PHONE R STRIP EVAL UP TO 90 DAYS ([phone])        Primary Care Provider Office Phone # Fax #    David Almendarez [phone]       [address] [place] [address]        Equal Access to Services     DERIK GRIJALVA : Hadii aad ku hadasho Soomaali, waaxda luqadaha, qaybta kaalmada adeegyada, jessica meza. So [facility] [phone].    ATENCIÓN: Si habla español, tiene a ruiz disposición servicios gratuitos de asistencia lingüística. Llmarly al [phone].    We comply with applicable federal civil rights laws and [place] laws. We do not discriminate on the basis of race, color, national origin, age, disability, sex, sexual orientation, or gender identity.            Thank you!     Thank you for choosing [facility]  for your care. Our goal is always to provide you with excellent care. Hearing back from our patients is one way we can continue to improve our services. Please take a few minutes to complete the written survey that you may receive in the " mail after your visit with us. Thank you!             Your Updated Medication List - Protect others around you: Learn how to safely use, store and throw away your medicines at www.disposemymeds.org.          This list is accurate as of 3/27/18  2:36 PM.  Always use your most recent med list.                   Brand Name Dispense Instructions for use Diagnosis    amLODIPine 10 MG tablet    NORVASC    90 tablet    TAKE 1 TABLET(10 MG) BY MOUTH DAILY    Essential hypertension, benign       BASAGLAR 100 UNIT/ML injection     60 mL    Inject 60 Units Subcutaneous At Bedtime    Type 2 diabetes mellitus without complication, with long-term current use of insulin (H)       blood glucose monitoring test strip    ONETOUCH VERIO IQ    100 strip    Use to test blood sugars 3 times daily or as directed.    Type 2 diabetes mellitus without complication, with long-term current use of insulin (H)       fish oil-omega-3 fatty acids 1000 MG capsule      Take 4 capsules by mouth daily.        insulin aspart 100 UNIT/ML injection    NovoLOG FLEXPEN    30 mL    1 unit per 7g of carbs three times daily before meals    Type 2 diabetes mellitus without complication, with long-term current use of insulin (H)       insulin pen needle 31G X 5 MM    B-D U/F    100 each    Use 1 daily or as directed.    Type 2 diabetes mellitus without complication, with long-term current use of insulin (H)       losartan 100 MG tablet    COZAAR    90 tablet    Take 1 tablet (100 mg) by mouth daily    Essential hypertension, benign       metFORMIN 1000 MG tablet    GLUCOPHAGE    180 tablet    TAKE 1 TABLET(1000 MG) BY MOUTH TWICE DAILY WITH MEALS    Type 2 diabetes mellitus without complication, with long-term current use of insulin (H)       metoprolol succinate 100 MG 24 hr tablet    TOPROL-XL    90 tablet    Take 1 tablet (100 mg) by mouth daily    Essential hypertension, benign       MULTIVITAMIN PO      1 qd        NITROSTAT 0.4 MG sublingual tablet    Generic drug:  nitroGLYcerin      1 TAB EVERY 5 MIN AS NEEDED, UP TO 3 PER EPISODE        ONETOUCH LANCETS Misc     200 each    1 lancet by In Vitro route 3 times daily    Type 2 diabetes mellitus without complication, with long-term current use of insulin (H)       simvastatin 80 MG tablet    ZOCOR    45 tablet    TAKE 1/2 TABLET(40 MG) BY MOUTH AT BEDTIME    Hyperlipidemia LDL goal <100       warfarin 7.5 MG tablet    COUMADIN    90 tablet    1 tablet daily except  1/2 tablets on Mon as directed by the anticoagulation clinic    New onset atrial fibrillation (H)

## 2018-03-27 NOTE — PROGRESS NOTES
~ 30 day phone teletrace  Appropriate  at time of check. Magnet response WNL. F/U scheduled q 1 month. RODRIGUEZ Conrad

## 2018-04-24 ENCOUNTER — OFFICE VISIT (OUTPATIENT)
Dept: CARDIOLOGY | Facility: CLINIC | Age: 58
End: 2018-04-24
Payer: COMMERCIAL

## 2018-04-24 DIAGNOSIS — Z95.0 CARDIAC PACEMAKER IN SITU: Primary | ICD-10-CM

## 2018-04-24 PROCEDURE — 99207 ZZC NO CHARGE LOS: CPT | Performed by: INTERNAL MEDICINE

## 2018-04-24 NOTE — PROGRESS NOTES
~ 30 day phone teletrace / no charge  Appropriate  at time of check. Magnet response WNL. F/U scheduled q 1 month. RODRIGUEZ Conrad

## 2018-04-24 NOTE — MR AVS SNAPSHOT
"              After Visit Summary   4/24/2018    Mckay Hsu    MRN: [id number]           Patient Information     Date Of Birth          1960        Visit Information        Provider Department      4/24/2018 2:30 PM SANTIAGO TECH1 [facility]        Today's Diagnoses     Cardiac pacemaker in situ    -  1       Follow-ups after your visit        Your next 10 appointments already scheduled     May 01, 2018  2:30 PM CDT   Anticoagulation Visit with  ANTICOAGULATION CLINIC   [facility] ([facility])    [address] [address] [phone]            May 22, 2018  2:30 PM CDT   Pacemaker Check with JACK ADAMEN   [facility] ([facility])    [address] [place], [place] [address]  [facility] [address] [phone] OPT 2              Who to contact     If you have questions or need follow up information about today's clinic visit or your schedule please contact [facility] directly at [phone].  Normal or non-critical lab and imaging results will be communicated to you by CityScanhart, letter or phone within 4 business days after the clinic has received the results. If you do not hear from us within 7 days, please contact the clinic through Porphyriot or phone. If you have a critical or abnormal lab result, we will notify you by phone as soon as possible.  Submit refill requests through Blue Perch or call your pharmacy and they will forward the refill request to us. Please allow 3 business days for your refill to be completed.          Additional Information About Your Visit        MyChart Information     Blue Perch lets you send messages to your doctor, view your test results, renew your prescriptions, schedule appointments and more. To sign up, go to [email or web address] . Click on \"Log in\" on the left side of the screen, which will " "take you to the Welcome page. Then click on \"Sign up Now\" on the right side of the page.     You will be asked to enter the access code listed below, as well as some personal information. Please follow the directions to create your username and password.     Your access code is: [email or web address]  Expires: 2018  2:36 PM     Your access code will  in 90 days. If you need help or a new code, please call your [place] clinic or [phone].        Care EveryWhere ID     This is your Care EveryWhere ID. This could be used by other organizations to access your [place] medical records  [id number]         Blood Pressure from Last 3 Encounters:   18 128/82   17 125/84   17 120/78    Weight from Last 3 Encounters:   18 (!) 144.1 kg (317 lb 11.2 oz)   17 (!) 143.5 kg (316 lb 4.8 oz)   17 (!) 141.5 kg (312 lb)              Today, you had the following     No orders found for display       Primary Care Provider Office Phone # Fax #    David Almendarez [phone]       [address] [place] [address]        Equal Access to Services     DERIK GRIJALVA : Hadii aad ku hadasho Soomaali, waaxda luqadaha, qaybta kaalmada adeegyada, jessica hidalgo hayosmani saravia . So [facility] [phone].    ATENCIÓN: Si habla español, tiene a ruiz disposición servicios gratuitos de asistencia lingüística. Llame al [phone].    We comply with applicable federal civil rights laws and [place] laws. We do not discriminate on the basis of race, color, national origin, age, disability, sex, sexual orientation, or gender identity.            Thank you!     Thank you for choosing [facility]  for your care. Our goal is always to provide you with excellent care. Hearing back from our patients is one way we can continue to improve our services. Please take a few minutes to complete the written survey that you may receive in the mail after your " visit with us. Thank you!             Your Updated Medication List - Protect others around you: Learn how to safely use, store and throw away your medicines at www.disposemymeds.org.          This list is accurate as of 4/24/18  2:36 PM.  Always use your most recent med list.                   Brand Name Dispense Instructions for use Diagnosis    amLODIPine 10 MG tablet    NORVASC    90 tablet    TAKE 1 TABLET(10 MG) BY MOUTH DAILY    Essential hypertension, benign       BASAGLAR 100 UNIT/ML injection     60 mL    Inject 60 Units Subcutaneous At Bedtime    Type 2 diabetes mellitus without complication, with long-term current use of insulin (H)       blood glucose monitoring test strip    ONETOUCH VERIO IQ    100 strip    Use to test blood sugars 3 times daily or as directed.    Type 2 diabetes mellitus without complication, with long-term current use of insulin (H)       fish oil-omega-3 fatty acids 1000 MG capsule      Take 4 capsules by mouth daily.        insulin aspart 100 UNIT/ML injection    NovoLOG FLEXPEN    30 mL    1 unit per 7g of carbs three times daily before meals    Type 2 diabetes mellitus without complication, with long-term current use of insulin (H)       insulin pen needle 31G X 5 MM    B-D U/F    100 each    Use 1 daily or as directed.    Type 2 diabetes mellitus without complication, with long-term current use of insulin (H)       losartan 100 MG tablet    COZAAR    90 tablet    Take 1 tablet (100 mg) by mouth daily    Essential hypertension, benign       metFORMIN 1000 MG tablet    GLUCOPHAGE    180 tablet    TAKE 1 TABLET(1000 MG) BY MOUTH TWICE DAILY WITH MEALS    Type 2 diabetes mellitus without complication, with long-term current use of insulin (H)       metoprolol succinate 100 MG 24 hr tablet    TOPROL-XL    90 tablet    Take 1 tablet (100 mg) by mouth daily    Essential hypertension, benign       MULTIVITAMIN PO      1 qd        NITROSTAT 0.4 MG sublingual tablet   Generic drug:   nitroGLYcerin      1 TAB EVERY 5 MIN AS NEEDED, UP TO 3 PER EPISODE        ONETOUCH LANCETS Misc     200 each    1 lancet by In Vitro route 3 times daily    Type 2 diabetes mellitus without complication, with long-term current use of insulin (H)       simvastatin 80 MG tablet    ZOCOR    45 tablet    TAKE 1/2 TABLET(40 MG) BY MOUTH AT BEDTIME    Hyperlipidemia LDL goal <100       warfarin 7.5 MG tablet    COUMADIN    90 tablet    1 tablet daily except  1/2 tablets on Mon as directed by the anticoagulation clinic    New onset atrial fibrillation (H)

## 2018-05-01 ENCOUNTER — ANTICOAGULATION THERAPY VISIT (OUTPATIENT)
Dept: ANTICOAGULATION | Facility: CLINIC | Age: 58
End: 2018-05-01
Payer: COMMERCIAL

## 2018-05-01 LAB — INR POINT OF CARE: 3.1 (ref 0.86–1.14)

## 2018-05-01 PROCEDURE — 36416 COLLJ CAPILLARY BLOOD SPEC: CPT

## 2018-05-01 PROCEDURE — 85610 PROTHROMBIN TIME: CPT | Mod: QW

## 2018-05-01 NOTE — PROGRESS NOTES
ANTICOAGULATION FOLLOW-UP CLINIC VISIT    Patient Name:  Mckay Hsu  Date:  5/1/2018  Contact Type:  Face to Face    SUBJECTIVE:     Patient Findings     Positives No Problem Findings           OBJECTIVE    INR Protime   Date Value Ref Range Status   05/01/2018 3.1 (A) 0.86 - 1.14 Final       ASSESSMENT / PLAN  INR assessment THER    Recheck INR In: 6 WEEKS    INR Location Clinic      Anticoagulation Summary as of 5/1/2018     INR goal 2.0-3.0   Today's INR 3.1!   Maintenance plan 3.75 mg (7.5 mg x 0.5) on Tue; 7.5 mg (7.5 mg x 1) all other days   Full instructions 3.75 mg on Tue; 7.5 mg all other days   Weekly total 48.75 mg   Plan last modified Rosalee Billingsley RN (10/17/2017)   Next INR check 6/12/2018   Target end date     Indications   Long-term (current) use of anticoagulants [Z79.01] [Z79.01]  Chronic atrial fibrillation (H) [I48.2]         Anticoagulation Episode Summary     INR check location     Preferred lab     Send INR reminders to [facility]    Comments             See the Encounter Report to view Anticoagulation Flowsheet and Dosing Calendar (Go to Encounters tab in chart review, and find the Anticoagulation Therapy Visit)    Dosage adjustment made based on physician directed care plan.    Rosalee Billingsley RN

## 2018-05-01 NOTE — MR AVS SNAPSHOT
Mckay Hsu   5/1/2018 2:30 PM   Anticoagulation Therapy Visit    Description:  58 year old male   Provider:   ANTICOAGULATION CLINIC   Department:   Anti Coagulation           INR as of 5/1/2018     Today's INR 3.1!      Anticoagulation Summary as of 5/1/2018     INR goal 2.0-3.0   Today's INR 3.1!   Full instructions 3.75 mg on Tue; 7.5 mg all other days   Next INR check 6/12/2018    Indications   Long-term (current) use of anticoagulants [Z79.01] [Z79.01]  Chronic atrial fibrillation (H) [I48.2]         Your next Anticoagulation Clinic appointment(s)     Jun 12, 2018  2:30 PM CDT   Anticoagulation Visit with  ANTICOAGULATION CLINIC   [facility] ([facility])    [address] [place] [address] [phone]              Contact Numbers     [facility]  Please call  [phone] to cancel and/or reschedule your appointment   Please call  [phone] with any problems or questions regarding your therapy.        May 2018 Details    Sun Mon Tue Wed Thu Fri Sat       1      3.75 mg   See details      2      7.5 mg         3      7.5 mg         4      7.5 mg         5      7.5 mg           6      7.5 mg         7      7.5 mg         8      3.75 mg         9      7.5 mg         10      7.5 mg         11      7.5 mg         12      7.5 mg           13      7.5 mg         14      7.5 mg         15      3.75 mg         16      7.5 mg         17      7.5 mg         18      7.5 mg         19      7.5 mg           20      7.5 mg         21      7.5 mg         22      3.75 mg         23      7.5 mg         24      7.5 mg         25      7.5 mg         26      7.5 mg           27      7.5 mg         28      7.5 mg         29      3.75 mg         30      7.5 mg         31      7.5 mg            Date Details   05/01 This INR check               How to take your warfarin dose     To take:  3.75 mg Take 0.5 of a 7.5 mg tablet.    To take:  7.5 mg  Take 1 of the 7.5 mg tablets.           June 2018 Details    Sun Mon Tue Wed Thu Fri Sat          1      7.5 mg         2      7.5 mg           3      7.5 mg         4      7.5 mg         5      3.75 mg         6      7.5 mg         7      7.5 mg         8      7.5 mg         9      7.5 mg           10      7.5 mg         11      7.5 mg         12            13               14               15               16                 17               18               19               20               21               22               23                 24               25               26               27               28               29               30                Date Details   No additional details    Date of next INR:  6/12/2018         How to take your warfarin dose     To take:  3.75 mg Take 0.5 of a 7.5 mg tablet.    To take:  7.5 mg Take 1 of the 7.5 mg tablets.

## 2018-05-02 DIAGNOSIS — I48.91 NEW ONSET ATRIAL FIBRILLATION (H): ICD-10-CM

## 2018-05-03 NOTE — TELEPHONE ENCOUNTER
"Requested Prescriptions   Pending Prescriptions Disp Refills     warfarin (COUMADIN) 7.5 MG tablet 90 tablet 0     Si tablet daily except  1/2 tablets on Mon as directed by the anticoagulation clinic    Vitamin K Antagonists Failed    2018  7:50 PM       Failed - INR is within goal in the past 6 weeks    Confirm INR is within goal in the past 6 weeks.     Recent Labs   Lab Test 18   INR  3.1*                      Passed - Recent (12 mo) or future (30 days) visit within the authorizing provider's specialty    Patient had office visit in the last 12 months or has a visit in the next 30 days with authorizing provider or within the authorizing provider's specialty.  See \"Patient Info\" tab in inbasket, or \"Choose Columns\" in Meds & Orders section of the refill encounter.           Passed - Patient is 18 years of age or older   Last Written Prescription Date:  2018  Last Fill Quantity: 90,  # refills: 0   Last office visit: 2018 with prescribing provider:  2018   Future Office Visit:           "

## 2018-05-04 RX ORDER — WARFARIN SODIUM 7.5 MG/1
TABLET ORAL
Qty: 90 TABLET | Refills: 0 | Status: SHIPPED | OUTPATIENT
Start: 2018-05-04 | End: 2018-08-30

## 2018-05-22 ENCOUNTER — ALLIED HEALTH/NURSE VISIT (OUTPATIENT)
Dept: CARDIOLOGY | Facility: CLINIC | Age: 58
End: 2018-05-22
Payer: COMMERCIAL

## 2018-05-22 DIAGNOSIS — Z95.0 CARDIAC PACEMAKER IN SITU: Primary | ICD-10-CM

## 2018-05-22 PROCEDURE — 93281 PM DEVICE PROGR EVAL MULTI: CPT | Performed by: INTERNAL MEDICINE

## 2018-05-22 NOTE — PROGRESS NOTES
Medtronic Insync III 8042 Pacemaker Device Check  AP: NA % BVP: 99.6 %  Mode: VVIR  bpm        Underlying Rhythm: AF with CHB achieved by an AVNA with a junctional escape in the 30's  Heart Rate: Stable with flat histogram, patient active is flat on the clinical trends.  Sensing: WNL    Pacing Threshold: WNL   Impedance: WNL  Battery Status: Approximately 5 months longevity.  Device Site: Good  Atrial Arrhythmia: Permanent AF, patient is on warfari.  Ventricular Arrhythmia: 0  Setting Change: 0    Care Plan: Follow up with Q monthly phone teletraces as device is nearing KAREL Thomason RN

## 2018-05-22 NOTE — MR AVS SNAPSHOT
"              After Visit Summary   5/22/2018    Mckay Hsu    MRN: [id number]           Patient Information     Date Of Birth          1960        Visit Information        Provider Department      5/22/2018 2:30 PM JACK ADAMEN [facility]        Today's Diagnoses     Cardiac pacemaker in situ    -  1       Follow-ups after your visit        Your next 10 appointments already scheduled     Jun 12, 2018  2:30 PM CDT   Anticoagulation Visit with  ANTICOAGULATION CLINIC   [facility] ([facility])    [address] [id number] [phone]            Jun 22, 2018 10:30 AM CDT   Phone Device Check with SANTIAGO TECH1   [facility] ([facility])    [address] [place], [place] [id number]  [facility] [address] [phone] OPT 2              Who to contact     If you have questions or need follow up information about today's clinic visit or your schedule please contact [facility] directly at [phone].  Normal or non-critical lab and imaging results will be communicated to you by [email or web address], letter or phone within 4 business days after the clinic has received the results. If you do not hear from us within 7 days, please contact the clinic through [email or web address] or phone. If you have a critical or abnormal lab result, we will notify you by phone as soon as possible.  Submit refill requests through Dazo or call your pharmacy and they will forward the refill request to us. Please allow 3 business days for your refill to be completed.          Additional Information About Your Visit        MyChart Information     Dazo lets you send messages to your doctor, view your test results, renew your prescriptions, schedule appointments and more. To sign up, go to [email or web address] . Click on \"Log in\" on the left side of the screen, which will " "take you to the Welcome page. Then click on \"Sign up Now\" on the right side of the page.     You will be asked to enter the access code listed below, as well as some personal information. Please follow the directions to create your username and password.     Your access code is: [email or web address]  Expires: 2018  2:36 PM     Your access code will  in 90 days. If you need help or a new code, please call your [place] clinic or [phone].        Care EveryWhere ID     This is your Care EveryWhere ID. This could be used by other organizations to access your [place] medical records  [id number]         Blood Pressure from Last 3 Encounters:   18 128/82   17 125/84   17 120/78    Weight from Last 3 Encounters:   18 (!) 144.1 kg (317 lb 11.2 oz)   17 (!) 143.5 kg (316 lb 4.8 oz)   17 (!) 141.5 kg (312 lb)              We Performed the Following     PM DEVICE PROGRAMMING EVAL, MULTI LEAD PACER (77102)        Primary Care Provider Office Phone # Fax #    David Almendarez [phone]       [address] [place] [address]        Equal Access to Services     DERIK GRIJALVA : Hadii aad ku hadasho Soomaali, waaxda luqadaha, qaybta kaalmada adeegyada, jessica meza. So [facility] [phone].    ATENCIÓN: Si habla español, tiene a ruiz disposición servicios gratuitos de asistencia lingüística. Llame al [phone].    We comply with applicable federal civil rights laws and [place] laws. We do not discriminate on the basis of race, color, national origin, age, disability, sex, sexual orientation, or gender identity.            Thank you!     Thank you for choosing [facility]  for your care. Our goal is always to provide you with excellent care. Hearing back from our patients is one way we can continue to improve our services. Please take a few minutes to complete the written survey that you may receive " in the mail after your visit with us. Thank you!             Your Updated Medication List - Protect others around you: Learn how to safely use, store and throw away your medicines at www.disposemymeds.org.          This list is accurate as of 5/22/18  3:04 PM.  Always use your most recent med list.                   Brand Name Dispense Instructions for use Diagnosis    amLODIPine 10 MG tablet    NORVASC    90 tablet    TAKE 1 TABLET(10 MG) BY MOUTH DAILY    Essential hypertension, benign       BASAGLAR 100 UNIT/ML injection     60 mL    Inject 60 Units Subcutaneous At Bedtime    Type 2 diabetes mellitus without complication, with long-term current use of insulin (H)       blood glucose monitoring test strip    ONETOUCH VERIO IQ    100 strip    Use to test blood sugars 3 times daily or as directed.    Type 2 diabetes mellitus without complication, with long-term current use of insulin (H)       fish oil-omega-3 fatty acids 1000 MG capsule      Take 4 capsules by mouth daily.        insulin aspart 100 UNIT/ML injection    NovoLOG FLEXPEN    30 mL    1 unit per 7g of carbs three times daily before meals    Type 2 diabetes mellitus without complication, with long-term current use of insulin (H)       insulin pen needle 31G X 5 MM    B-D U/F    100 each    Use 1 daily or as directed.    Type 2 diabetes mellitus without complication, with long-term current use of insulin (H)       losartan 100 MG tablet    COZAAR    90 tablet    Take 1 tablet (100 mg) by mouth daily    Essential hypertension, benign       metFORMIN 1000 MG tablet    GLUCOPHAGE    180 tablet    TAKE 1 TABLET(1000 MG) BY MOUTH TWICE DAILY WITH MEALS    Type 2 diabetes mellitus without complication, with long-term current use of insulin (H)       metoprolol succinate 100 MG 24 hr tablet    TOPROL-XL    90 tablet    Take 1 tablet (100 mg) by mouth daily    Essential hypertension, benign       MULTIVITAMIN PO      1 qd        NITROSTAT 0.4 MG sublingual tablet    Generic drug:  nitroGLYcerin      1 TAB EVERY 5 MIN AS NEEDED, UP TO 3 PER EPISODE        ONETOUCH LANCETS Misc     200 each    1 lancet by In Vitro route 3 times daily    Type 2 diabetes mellitus without complication, with long-term current use of insulin (H)       simvastatin 80 MG tablet    ZOCOR    45 tablet    TAKE 1/2 TABLET(40 MG) BY MOUTH AT BEDTIME    Hyperlipidemia LDL goal <100       warfarin 7.5 MG tablet    COUMADIN    90 tablet    1 tablet daily except  1/2 tablets on Mon as directed by the anticoagulation clinic    New onset atrial fibrillation (H)

## 2018-06-11 DIAGNOSIS — E11.9 TYPE 2 DIABETES MELLITUS WITHOUT COMPLICATION, WITH LONG-TERM CURRENT USE OF INSULIN (H): ICD-10-CM

## 2018-06-11 DIAGNOSIS — Z79.4 TYPE 2 DIABETES MELLITUS WITHOUT COMPLICATION, WITH LONG-TERM CURRENT USE OF INSULIN (H): ICD-10-CM

## 2018-06-11 DIAGNOSIS — E78.5 HYPERLIPIDEMIA LDL GOAL <100: ICD-10-CM

## 2018-06-11 DIAGNOSIS — I10 ESSENTIAL HYPERTENSION, BENIGN: ICD-10-CM

## 2018-06-11 LAB
ALBUMIN SERPL-MCNC: 3.4 G/DL (ref 3.4–5)
ALP SERPL-CCNC: 71 U/L (ref 40–150)
ALT SERPL W P-5'-P-CCNC: 28 U/L (ref 0–70)
ANION GAP SERPL CALCULATED.3IONS-SCNC: 6 MMOL/L (ref 3–14)
AST SERPL W P-5'-P-CCNC: 20 U/L (ref 0–45)
BILIRUB SERPL-MCNC: 0.5 MG/DL (ref 0.2–1.3)
BUN SERPL-MCNC: 20 MG/DL (ref 7–30)
CALCIUM SERPL-MCNC: 9.1 MG/DL (ref 8.5–10.1)
CHLORIDE SERPL-SCNC: 103 MMOL/L (ref 94–109)
CHOLEST SERPL-MCNC: 99 MG/DL
CO2 SERPL-SCNC: 28 MMOL/L (ref 20–32)
CREAT SERPL-MCNC: 1.1 MG/DL (ref 0.66–1.25)
CREAT UR-MCNC: 10 MG/DL
GFR SERPL CREATININE-BSD FRML MDRD: 69 ML/MIN/1.7M2
GLUCOSE SERPL-MCNC: 118 MG/DL (ref 70–99)
HBA1C MFR BLD: 6.4 % (ref 0–5.6)
HDLC SERPL-MCNC: 44 MG/DL
LDLC SERPL CALC-MCNC: 25 MG/DL
MICROALBUMIN UR-MCNC: 36 MG/L
MICROALBUMIN/CREAT UR: 339.05 MG/G CR (ref 0–17)
NONHDLC SERPL-MCNC: 55 MG/DL
POTASSIUM SERPL-SCNC: 4.5 MMOL/L (ref 3.4–5.3)
PROT SERPL-MCNC: 7.5 G/DL (ref 6.8–8.8)
SODIUM SERPL-SCNC: 137 MMOL/L (ref 133–144)
TRIGL SERPL-MCNC: 149 MG/DL
TSH SERPL DL<=0.005 MIU/L-ACNC: 0.42 MU/L (ref 0.4–4)

## 2018-06-11 PROCEDURE — 36415 COLL VENOUS BLD VENIPUNCTURE: CPT | Performed by: INTERNAL MEDICINE

## 2018-06-11 PROCEDURE — 80053 COMPREHEN METABOLIC PANEL: CPT | Performed by: INTERNAL MEDICINE

## 2018-06-11 PROCEDURE — 83036 HEMOGLOBIN GLYCOSYLATED A1C: CPT | Performed by: INTERNAL MEDICINE

## 2018-06-11 PROCEDURE — 82043 UR ALBUMIN QUANTITATIVE: CPT | Performed by: INTERNAL MEDICINE

## 2018-06-11 PROCEDURE — 80061 LIPID PANEL: CPT | Performed by: INTERNAL MEDICINE

## 2018-06-11 PROCEDURE — 84443 ASSAY THYROID STIM HORMONE: CPT | Performed by: INTERNAL MEDICINE

## 2018-06-12 ENCOUNTER — ANTICOAGULATION THERAPY VISIT (OUTPATIENT)
Dept: ANTICOAGULATION | Facility: CLINIC | Age: 58
End: 2018-06-12
Payer: COMMERCIAL

## 2018-06-12 LAB — INR POINT OF CARE: 2.9 (ref 0.86–1.14)

## 2018-06-12 PROCEDURE — 36416 COLLJ CAPILLARY BLOOD SPEC: CPT

## 2018-06-12 PROCEDURE — 85610 PROTHROMBIN TIME: CPT | Mod: QW

## 2018-06-12 NOTE — PROGRESS NOTES
ANTICOAGULATION FOLLOW-UP CLINIC VISIT    Patient Name:  Mckay Hsu  Date:  6/12/2018  Contact Type:  Face to Face    SUBJECTIVE:     Patient Findings     Positives No Problem Findings           OBJECTIVE    INR Protime   Date Value Ref Range Status   06/12/2018 2.9 (A) 0.86 - 1.14 Final       ASSESSMENT / PLAN  INR assessment THER    Recheck INR In: 6 WEEKS    INR Location Clinic      Anticoagulation Summary as of 6/12/2018     INR goal 2.0-3.0   Today's INR 2.9   Warfarin maintenance plan 3.75 mg (7.5 mg x 0.5) on Tue; 7.5 mg (7.5 mg x 1) all other days   Full warfarin instructions 3.75 mg on Tue; 7.5 mg all other days   Weekly warfarin total 48.75 mg   Plan last modified Rosalee Billingsley RN (10/17/2017)   Next INR check 7/24/2018   Target end date     Indications   Long-term (current) use of anticoagulants [Z79.01] [Z79.01]  Chronic atrial fibrillation (H) [I48.2]         Anticoagulation Episode Summary     INR check location     Preferred lab     Send INR reminders to [facility]    Comments             See the Encounter Report to view Anticoagulation Flowsheet and Dosing Calendar (Go to Encounters tab in chart review, and find the Anticoagulation Therapy Visit)    Dosage adjustment made based on physician directed care plan.    Rosalee Billingsley RN

## 2018-06-12 NOTE — MR AVS SNAPSHOT
Mckay Gonzalesrobyn Hsu   6/12/2018 2:30 PM   Anticoagulation Therapy Visit    Description:  58 year old male   Provider:   ANTICOAGULATION CLINIC   Department:   Anti Coagulation           INR as of 6/12/2018     Today's INR 2.9      Anticoagulation Summary as of 6/12/2018     INR goal 2.0-3.0   Today's INR 2.9   Full warfarin instructions 3.75 mg on Tue; 7.5 mg all other days   Next INR check 7/24/2018    Indications   Long-term (current) use of anticoagulants [Z79.01] [Z79.01]  Chronic atrial fibrillation (H) [I48.2]         Your next Anticoagulation Clinic appointment(s)     Jul 24, 2018  2:30 PM CDT   Anticoagulation Visit with  ANTICOAGULATION CLINIC   [facility]    [address] [place] [address] [phone]              Contact Numbers     [facility]  Please call  [phone] to cancel and/or reschedule your appointment   Please call  [phone] with any problems or questions regarding your therapy.        June 2018 Details    Sun Mon Tue Wed Thu Fri Sat          1               2                 3               4               5               6               7               8               9                 10               11               12      3.75 mg   See details      13      7.5 mg         14      7.5 mg         15      7.5 mg         16      7.5 mg           17      7.5 mg         18      7.5 mg         19      3.75 mg         20      7.5 mg         21      7.5 mg         22      7.5 mg         23      7.5 mg           24      7.5 mg         25      7.5 mg         26      3.75 mg         27      7.5 mg         28      7.5 mg         29      7.5 mg         30      7.5 mg          Date Details   06/12 This INR check               How to take your warfarin dose     To take:  3.75 mg Take 0.5 of a 7.5 mg tablet.    To take:  7.5 mg Take 1 of the 7.5 mg tablets.           July 2018 Details    Sun Mon Tue Wed Thu  Fri Sat     1      7.5 mg         2      7.5 mg         3      3.75 mg         4      7.5 mg         5      7.5 mg         6      7.5 mg         7      7.5 mg           8      7.5 mg         9      7.5 mg         10      3.75 mg         11      7.5 mg         12      7.5 mg         13      7.5 mg         14      7.5 mg           15      7.5 mg         16      7.5 mg         17      3.75 mg         18      7.5 mg         19      7.5 mg         20      7.5 mg         21      7.5 mg           22      7.5 mg         23      7.5 mg         24            25               26               27               28                 29               30               31                    Date Details   No additional details    Date of next INR:  7/24/2018         How to take your warfarin dose     To take:  3.75 mg Take 0.5 of a 7.5 mg tablet.    To take:  7.5 mg Take 1 of the 7.5 mg tablets.

## 2018-06-13 ENCOUNTER — OFFICE VISIT (OUTPATIENT)
Dept: INTERNAL MEDICINE | Facility: CLINIC | Age: 58
End: 2018-06-13
Payer: COMMERCIAL

## 2018-06-13 VITALS
OXYGEN SATURATION: 98 % | HEIGHT: 72 IN | SYSTOLIC BLOOD PRESSURE: 124 MMHG | WEIGHT: 315 LBS | HEART RATE: 90 BPM | DIASTOLIC BLOOD PRESSURE: 84 MMHG | BODY MASS INDEX: 42.66 KG/M2 | TEMPERATURE: 97.8 F | RESPIRATION RATE: 16 BRPM

## 2018-06-13 DIAGNOSIS — I10 ESSENTIAL HYPERTENSION, BENIGN: ICD-10-CM

## 2018-06-13 DIAGNOSIS — Z23 NEED FOR PROPHYLACTIC VACCINATION WITH TETANUS-DIPHTHERIA (TD): ICD-10-CM

## 2018-06-13 DIAGNOSIS — Z23 NEED FOR TDAP VACCINATION: ICD-10-CM

## 2018-06-13 DIAGNOSIS — E11.9 TYPE 2 DIABETES MELLITUS WITHOUT COMPLICATION, WITH LONG-TERM CURRENT USE OF INSULIN (H): ICD-10-CM

## 2018-06-13 DIAGNOSIS — Z79.4 TYPE 2 DIABETES MELLITUS WITHOUT COMPLICATION, WITH LONG-TERM CURRENT USE OF INSULIN (H): ICD-10-CM

## 2018-06-13 DIAGNOSIS — Z11.59 NEED FOR HEPATITIS C SCREENING TEST: ICD-10-CM

## 2018-06-13 DIAGNOSIS — E78.5 HYPERLIPIDEMIA LDL GOAL <100: ICD-10-CM

## 2018-06-13 DIAGNOSIS — I48.20 CHRONIC ATRIAL FIBRILLATION (H): ICD-10-CM

## 2018-06-13 DIAGNOSIS — Z11.4 SCREENING FOR HIV (HUMAN IMMUNODEFICIENCY VIRUS): ICD-10-CM

## 2018-06-13 DIAGNOSIS — I50.22 CHRONIC SYSTOLIC CONGESTIVE HEART FAILURE (H): ICD-10-CM

## 2018-06-13 DIAGNOSIS — E66.01 MORBID OBESITY DUE TO EXCESS CALORIES (H): ICD-10-CM

## 2018-06-13 DIAGNOSIS — Z00.00 ENCOUNTER FOR ROUTINE ADULT HEALTH EXAMINATION WITHOUT ABNORMAL FINDINGS: Primary | ICD-10-CM

## 2018-06-13 DIAGNOSIS — Z12.11 SCREEN FOR COLON CANCER: ICD-10-CM

## 2018-06-13 LAB — HGB BLD-MCNC: 14 G/DL (ref 13.3–17.7)

## 2018-06-13 PROCEDURE — 90715 TDAP VACCINE 7 YRS/> IM: CPT | Performed by: INTERNAL MEDICINE

## 2018-06-13 PROCEDURE — 86803 HEPATITIS C AB TEST: CPT | Performed by: INTERNAL MEDICINE

## 2018-06-13 PROCEDURE — 87389 HIV-1 AG W/HIV-1&-2 AB AG IA: CPT | Performed by: INTERNAL MEDICINE

## 2018-06-13 PROCEDURE — 85018 HEMOGLOBIN: CPT | Performed by: INTERNAL MEDICINE

## 2018-06-13 PROCEDURE — 99396 PREV VISIT EST AGE 40-64: CPT | Mod: 25 | Performed by: INTERNAL MEDICINE

## 2018-06-13 PROCEDURE — 90471 IMMUNIZATION ADMIN: CPT | Performed by: INTERNAL MEDICINE

## 2018-06-13 PROCEDURE — 36415 COLL VENOUS BLD VENIPUNCTURE: CPT | Performed by: INTERNAL MEDICINE

## 2018-06-13 RX ORDER — LOSARTAN POTASSIUM 100 MG/1
100 TABLET ORAL DAILY
Qty: 90 TABLET | Refills: 3 | Status: SHIPPED | OUTPATIENT
Start: 2018-06-13 | End: 2019-07-12

## 2018-06-13 RX ORDER — SIMVASTATIN 80 MG
TABLET ORAL
Qty: 45 TABLET | Refills: 3 | Status: SHIPPED | OUTPATIENT
Start: 2018-06-13 | End: 2019-07-11

## 2018-06-13 RX ORDER — METOPROLOL SUCCINATE 100 MG/1
100 TABLET, EXTENDED RELEASE ORAL DAILY
Qty: 90 TABLET | Refills: 3 | Status: SHIPPED | OUTPATIENT
Start: 2018-06-13 | End: 2019-06-04

## 2018-06-13 ASSESSMENT — PAIN SCALES - GENERAL: PAINLEVEL: NO PAIN (0)

## 2018-06-13 NOTE — MR AVS SNAPSHOT
After Visit Summary   6/13/2018    Mckay Hsu    MRN: [id number]           Patient Information     Date Of Birth          1960        Visit Information        Provider Department      6/13/2018 1:40 PM David Almendarez MD [facility]        Today's Diagnoses     Encounter for routine adult health examination without abnormal findings    -  1    Type 2 diabetes mellitus without complication, with long-term current use of insulin (H)        Chronic systolic congestive heart failure (H)        Chronic atrial fibrillation (H)        BENIGN HYPERTENSION        Morbid obesity due to excess calories (H)        Hyperlipidemia LDL goal <100        Screen for colon cancer        Need for hepatitis C screening test        Screening for HIV (human immunodeficiency virus)        Need for prophylactic vaccination with tetanus-diphtheria (TD)        Need for Tdap vaccination          Care Instructions      Preventive Health Recommendations  Male Ages 50 - 64    Yearly exam:             See your health care provider every year in order to  o   Review health changes.   o   Discuss preventive care.    o   Review your medicines if your doctor has prescribed any.     Have a cholesterol test every 5 years, or more frequently if you are at risk for high cholesterol/heart disease.     Have a diabetes test (fasting glucose) every three years. If you are at risk for diabetes, you should have this test more often.     Have a colonoscopy at age 50, or have a yearly FIT test (stool test). These exams will check for colon cancer.      Talk with your health care provider about whether or not a prostate cancer screening test (PSA) is right for you.    You should be tested each year for STDs (sexually transmitted diseases), if you re at risk.     Shots: Get a flu shot each year. Get a tetanus shot every 10 years.     Nutrition:    Eat at least 5 servings of fruits and vegetables daily.     Eat  whole-grain bread, whole-wheat pasta and brown rice instead of white grains and rice.     Talk to your provider about Calcium and Vitamin D.     Lifestyle    Exercise for at least 150 minutes a week (30 minutes a day, 5 days a week). This will help you control your weight and prevent disease.     Limit alcohol to one drink per day.     No smoking.     Wear sunscreen to prevent skin cancer.     See your dentist every six months for an exam and cleaning.     See your eye doctor every 1 to 2 years.            Follow-ups after your visit        Additional Services     GASTROENTEROLOGY ADULT REF PROCEDURE ONLY [facility] [phone]; No Provider Preference       Last Lab Result: Creatinine (mg/dL)       Date                     Value                 06/11/2018               1.10             ----------  There is no height or weight on file to calculate BMI.     Needed:  No  Language:  English    Patient will be contacted to schedule procedure.     Please be aware that coverage of these services is subject to the terms and limitations of your health insurance plan.  Call member services at your health plan with any benefit or coverage questions.  Any procedures must be performed at a [facility] facility OR coordinated by your clinic's referral office.    Please bring the following with you to your appointment:    (1) Any X-Rays, CTs or MRIs which have been performed.  Contact the facility where they were done to arrange for  prior to your scheduled appointment.    (2) List of current medications   (3) This referral request   (4) Any documents/labs given to you for this referral                  Follow-up notes from your care team     Return if symptoms worsen or fail to improve.      Your next 10 appointments already scheduled     Jun 13, 2018  1:40 PM CDT   PHYSICAL with David Almendarez MD   [facility]    [address] [place] [address]  "[facility] [id number] [phone]            Jun 22, 2018 10:30 AM CDT   Phone Device Check with SANTIAGO TECH1   [facility] ([facility])    [address]  [facility] [address] [phone] OPT 2            Jun 29, 2018  1:10 PM CDT   [facility] EP RETURN with Peggy Caruso PA-C   [facility] ([facility])    [address] [place], [place] [id number]  [facility] [address] [phone] OPT 2            Jul 24, 2018  2:30 PM CDT   Anticoagulation Visit with [facility]AGULATION CLINIC   [facility] ([facility])    [address] [id number] [phone]              Who to contact     If you have questions or need follow up information about today's clinic visit or your schedule please contact [facility] directly at [phone].  Normal or non-critical lab and imaging results will be communicated to you by [employer], letter or phone within 4 business days after the clinic has received the results. If you do not hear from us within 7 days, please contact the clinic through [facility] or phone. If you have a critical or abnormal lab result, we will notify you by phone as soon as possible.  Submit refill requests through [facility] or call your pharmacy and they will forward the refill request to us. Please allow 3 business days for your refill to be completed.          Additional Information About Your Visit        [facility] Information     [facility] lets you send messages to your doctor, view your test results, renew your prescriptions, schedule appointments and more. To sign up, go to [email or web address] . Click on \"Log in\" on the left side of the screen, which will take you to the Welcome page. Then click on \"Sign up Now\" on the right side of the page.     You will be asked to enter the access code listed below, as " well as some personal information. Please follow the directions to create your username and password.     Your access code is: [email or web address]  Expires: 2018  1:28 PM     Your access code will  in 90 days. If you need help or a new code, please call your [facility] clinic or [phone].        Care EveryWhere ID     This is your Care EveryWhere ID. This could be used by other organizations to access your [facility] medical records  [id number]        Your Vitals Were     Pulse Temperature Respirations Height Pulse Oximetry BMI (Body Mass Index)    90 97.8  F (36.6  C) (Oral) 16 6' (1.829 m) 98% 43.29 kg/m2       Blood Pressure from Last 3 Encounters:   18 124/84   18 128/82   17 125/84    Weight from Last 3 Encounters:   18 319 lb 3.2 oz (144.8 kg)   18 (!) 317 lb 11.2 oz (144.1 kg)   17 (!) 316 lb 4.8 oz (143.5 kg)              We Performed the Following     GASTROENTEROLOGY ADULT REF PROCEDURE ONLY [facility] [phone]; No Provider Preference     Hemoglobin     Hepatitis C Screen Reflex to HCV RNA Quant and Genotype     HIV Screening     TDAP VACCINE (ADACEL)     VACCINE ADMINISTRATION, INITIAL          Where to get your medicines      These medications were sent to [id number] - [place], [place] - [address]  [address] [place] [address] [place] [address]     Phone:  [phone]     insulin aspart 100 UNIT/ML injection    losartan 100 MG tablet    metoprolol succinate 100 MG 24 hr tablet    simvastatin 80 MG tablet          Primary Care Provider Office Phone # Fax #    David Almendarez [phone]       [address] [place] [phone]        Equal Access to Services     DERIK GRIJALVA : Carmelo Solano, wameghan shah, qaybta kaalmary huntley, jessica meza. [facility] [phone].    ATENCIÓN: Si habla español, tiene a ruiz disposición servicios gratuitos de asistencia lingüística. Llame al  [phone].    We comply with applicable federal civil rights laws and [place] laws. We do not discriminate on the basis of race, color, national origin, age, disability, sex, sexual orientation, or gender identity.            Thank you!     Thank you for choosing [facility]  for your care. Our goal is always to provide you with excellent care. Hearing back from our patients is one way we can continue to improve our services. Please take a few minutes to complete the written survey that you may receive in the mail after your visit with us. Thank you!             Your Updated Medication List - Protect others around you: Learn how to safely use, store and throw away your medicines at www.disposemymeds.org.          This list is accurate as of 6/13/18  1:39 PM.  Always use your most recent med list.                   Brand Name Dispense Instructions for use Diagnosis    amLODIPine 10 MG tablet    NORVASC    90 tablet    TAKE 1 TABLET(10 MG) BY MOUTH DAILY    Essential hypertension, benign       BASAGLAR 100 UNIT/ML injection     60 mL    Inject 60 Units Subcutaneous At Bedtime    Type 2 diabetes mellitus without complication, with long-term current use of insulin (H)       blood glucose monitoring test strip    ONETOUCH VERIO IQ    100 strip    Use to test blood sugars 3 times daily or as directed.    Type 2 diabetes mellitus without complication, with long-term current use of insulin (H)       fish oil-omega-3 fatty acids 1000 MG capsule      Take 4 capsules by mouth daily.        insulin aspart 100 UNIT/ML injection    NovoLOG FLEXPEN    30 mL    1 unit per 7g of carbs three times daily before meals    Type 2 diabetes mellitus without complication, with long-term current use of insulin (H)       insulin pen needle 31G X 5 MM    B-D U/F    100 each    Use 1 daily or as directed.    Type 2 diabetes mellitus without complication, with long-term current use of insulin (H)       losartan 100  MG tablet    COZAAR    90 tablet    Take 1 tablet (100 mg) by mouth daily    Essential hypertension, benign       metFORMIN 1000 MG tablet    GLUCOPHAGE    180 tablet    TAKE 1 TABLET(1000 MG) BY MOUTH TWICE DAILY WITH MEALS    Type 2 diabetes mellitus without complication, with long-term current use of insulin (H)       metoprolol succinate 100 MG 24 hr tablet    TOPROL-XL    90 tablet    Take 1 tablet (100 mg) by mouth daily    Essential hypertension, benign       MULTIVITAMIN PO      1 qd        NITROSTAT 0.4 MG sublingual tablet   Generic drug:  nitroGLYcerin      1 TAB EVERY 5 MIN AS NEEDED, UP TO 3 PER EPISODE        ONETOUCH LANCETS Misc     200 each    1 lancet by In Vitro route 3 times daily    Type 2 diabetes mellitus without complication, with long-term current use of insulin (H)       simvastatin 80 MG tablet    ZOCOR    45 tablet    TAKE 1/2 TABLET(40 MG) BY MOUTH AT BEDTIME    Hyperlipidemia LDL goal <100       warfarin 7.5 MG tablet    COUMADIN    90 tablet    1 tablet daily except  1/2 tablets on Mon as directed by the anticoagulation clinic    New onset atrial fibrillation (H)

## 2018-06-13 NOTE — PROGRESS NOTES
SUBJECTIVE:   CC: Mckay Hsu is an 58 year old male who presents for preventative health visit.     Healthy Habits:    Do you get at least three servings of calcium containing foods daily (dairy, green leafy vegetables, etc.)? yes    Amount of exercise or daily activities, outside of work: minimal     Problems taking medications regularly No    Medication side effects: No    Have you had an eye exam in the past two years? no    Do you see a dentist twice per year? no    Do you have sleep apnea, excessive snoring or daytime drowsiness? Unknown      Today's PHQ-2 Score:   PHQ-2 ( 1999 Pfizer) 6/26/2017 10/31/2016   Q1: Little interest or pleasure in doing things 0 0   Q2: Feeling down, depressed or hopeless 0 0   PHQ-2 Score 0 0       Abuse: Current or Past(Physical, Sexual or Emotional)- No  Do you feel safe in your environment - Yes    Social History   Substance Use Topics     Smoking status: Never Smoker     Smokeless tobacco: Never Used     Alcohol use No      If you drink alcohol do you typically have >3 drinks per day or >7 drinks per week? No                      Last PSA:   PSA   Date Value Ref Range Status   10/31/2016 0.56 0 - 4 ug/L Final     Comment:     Assay Method:  Chemiluminescence using Siemens Vista analyzer       Reviewed orders with patient. Reviewed health maintenance and updated orders accordingly - Yes      Reviewed and updated as needed this visit by clinical staff  Tobacco  Allergies  Meds  Problems  Med Hx  Surg Hx  Fam Hx  Soc Hx          Reviewed and updated as needed this visit by Provider       ROS:  CONSTITUTIONAL: NEGATIVE for fever, chills, change in weight  INTEGUMENTARY/SKIN: NEGATIVE for worrisome rashes, moles or lesions  EYES: NEGATIVE for vision changes or irritation  ENT: NEGATIVE for ear, mouth and throat problems  RESP: NEGATIVE for significant cough or SOB  CV: NEGATIVE for chest pain, palpitations or peripheral edema  GI: NEGATIVE for nausea, abdominal  pain, heartburn, or change in bowel habits   male: negative for dysuria, hematuria, decreased urinary stream, erectile dysfunction, urethral discharge  MUSCULOSKELETAL: NEGATIVE for significant arthralgias or myalgia  NEURO: NEGATIVE for weakness, dizziness or paresthesias  PSYCHIATRIC: NEGATIVE for changes in mood or affect    OBJECTIVE:   /84  Pulse 90  Temp 97.8  F (36.6  C) (Oral)  Resp 16  Ht 6' (1.829 m)  Wt 319 lb 3.2 oz (144.8 kg)  SpO2 98%  BMI 43.29 kg/m2  EXAM:  GENERAL: healthy, alert and no distress  EYES: Eyes grossly normal to inspection, PERRL and conjunctivae and sclerae normal  HENT: ear canals and TM's normal, nose and mouth without ulcers or lesions  NECK: no adenopathy, no asymmetry, masses, or scars and thyroid normal to palpation  RESP: lungs clear to auscultation - no rales, rhonchi or wheezes  CV: regular rate and rhythm, normal S1 S2, no S3 or S4, no murmur, click or rub, no peripheral edema and peripheral pulses strong  ABDOMEN: soft, nontender, no hepatosplenomegaly, no masses and bowel sounds normal, obese  RECTAL: normal sphincter tone, no rectal masses, prostate normal size, smooth, nontender without nodules or masses  MS: no gross musculoskeletal defects noted, trace edema  NEURO: No focal changes  PSYCH: mentation appears normal, affect normal/bright    ASSESSMENT/PLAN:   1. Encounter for routine adult health examination without abnormal findings  Advised the patient should consider getting a shingles vaccination  - Hemoglobin    2. Type 2 diabetes mellitus without complication, with long-term current use of insulin (H)  Stable on current therapy and continuing with medical management as ordered, recheck A1c in 6 months, recommended annual eye exam, reviewed proteinuria and need to continue ARB per  - insulin aspart (NOVOLOG FLEXPEN) 100 UNIT/ML injection; 1 unit per 7g of carbs three times daily before meals  Dispense: 30 mL; Refill: 5    3. Chronic systolic  congestive heart failure (H)  Stable and euvolemic with no acute complaints at present time.    4. Chronic atrial fibrillation (H)  Rate controlled on therapy continue with medication as ordered    5. BENIGN HYPERTENSION  At goal on therapy  - losartan (COZAAR) 100 MG tablet; Take 1 tablet (100 mg) by mouth daily  Dispense: 90 tablet; Refill: 3  - metoprolol succinate (TOPROL-XL) 100 MG 24 hr tablet; Take 1 tablet (100 mg) by mouth daily  Dispense: 90 tablet; Refill: 3    6. Morbid obesity due to excess calories (H)  Advised and reviewed weight chart and graft and advised continued and ongoing weight reduction    7. Hyperlipidemia LDL goal <100  At goal on therapy continue with notable LDLs.    LDL Cholesterol Calculated   Date Value Ref Range Status   06/11/2018 25 <100 mg/dL Final     Comment:     Desirable:       <100 mg/dl     - simvastatin (ZOCOR) 80 MG tablet; TAKE 1/2 TABLET(40 MG) BY MOUTH AT BEDTIME  Dispense: 45 tablet; Refill: 3    8. Screen for colon cancer  ADVISED COLONOSCOPY FOR ROUTINE PREVENTATIVE CARE.    - GASTROENTEROLOGY ADULT REF PROCEDURE ONLY [facility] [phone]; No Provider Preference    9. Need for hepatitis C screening test  Routine hepatitis C screening advised  - Hepatitis C Screen Reflex to HCV RNA Quant and Genotype    10. Screening for HIV (human immunodeficiency virus)  Routine HIV recommended  - HIV Screening    11. Need for prophylactic vaccination with tetanus-diphtheria (TD)  Updated    12. Need for Tdap vaccination  Updated  - TDAP VACCINE (ADACEL)  - VACCINE ADMINISTRATION, INITIAL    COUNSELING:  Reviewed preventive health counseling, as reflected in patient instructions       Regular exercise       Healthy diet/nutrition       reports that he has never smoked. He has never used smokeless tobacco.    Estimated body mass index is 43.09 kg/(m^2) as calculated from the following:    Height as of 1/23/18: 6' (1.829 m).    Weight as of 1/23/18: 317 lb 11.2 oz (144.1  kg).       Counseling Resources:  ATP IV Guidelines  Pooled Cohorts Equation Calculator  FRAX Risk Assessment  ICSI Preventive Guidelines  Dietary Guidelines for Americans, 2010  USDA's MyPlate  ASA Prophylaxis  Lung CA Screening    David Almendarez MD  [facility]    THE MEDICATION LIST HAS BEEN FULLY RECONCILED BY THE M.D. AND THE NURSING STAFF.

## 2018-06-13 NOTE — LETTER
[facility]  [address] [address]  [phone]      6/14/2018       Mckay Hsu  [address] [place] [address] [address] [place] [id number]        Dear Mckay,    Your hemoglobin is normal.    Your HIV and hepatitis C screen were normal.    Sincerely,      David Almendarez MD  Internal Medicine

## 2018-06-14 LAB
HCV AB SERPL QL IA: NONREACTIVE
HIV 1+2 AB+HIV1 P24 AG SERPL QL IA: NONREACTIVE

## 2018-06-22 ENCOUNTER — ALLIED HEALTH/NURSE VISIT (OUTPATIENT)
Dept: CARDIOLOGY | Facility: CLINIC | Age: 58
End: 2018-06-22
Payer: COMMERCIAL

## 2018-06-22 DIAGNOSIS — Z95.0 CARDIAC PACEMAKER IN SITU: Primary | ICD-10-CM

## 2018-06-22 PROCEDURE — 99207 ZZC NO CHARGE LOS: CPT

## 2018-06-22 NOTE — PROGRESS NOTES
~30 day phone teletrace- no charge  Appropriate  at time of check.  Magnet response WNL. F/U scheduled q 1 month. ASHANTI WolfT

## 2018-06-22 NOTE — MR AVS SNAPSHOT
After Visit Summary   6/22/2018    Mckay Hus    MRN: [id number]           Patient Information     Date Of Birth          1960        Visit Information        Provider Department      6/22/2018 10:30 AM JACK ARCE [facility]        Today's Diagnoses     Cardiac pacemaker in situ    -  1       Follow-ups after your visit        Your next 10 appointments already scheduled     Jun 29, 2018  1:10 PM CDT   [facility] EP RETURN with Peggy Caruso PA-C   [facility] ([facility])    [address] [place] [address] [address]  [facility] [id number] [phone] OPT 2            Jul 24, 2018  2:30 PM CDT   Anticoagulation Visit with [facility] ANTICOAGULATION CLINIC   [facility] ([facility])    [address] [id number] [phone]            Aug 03, 2018  2:30 PM CDT   Phone Device Check with JACK ARCE   [facility] ([facility])    [address] [place] [address] [address]  [facility] [address] [phone] OPT 2              Who to contact     If you have questions or need follow up information about today's clinic visit or your schedule please contact [facility] directly at [phone].  Normal or non-critical lab and imaging results will be communicated to you by MyChart, letter or phone within 4 business days after the clinic has received the results. If you do not hear from us within 7 days, please contact the clinic through MyChart or phone. If you have a critical or abnormal lab result, we will notify you by phone as soon as possible.  Submit refill requests through [employer] or call your pharmacy and they will forward the refill request to us. Please allow 3 business days for your refill to be completed.          Additional Information About Your Visit        [facility]  "Information     Access Systems lets you send messages to your doctor, view your test results, renew your prescriptions, schedule appointments and more. To sign up, go to [email or web address] . Click on \"Log in\" on the left side of the screen, which will take you to the Welcome page. Then click on \"Sign up Now\" on the right side of the page.     You will be asked to enter the access code listed below, as well as some personal information. Please follow the directions to create your username and password.     Your access code is: [email or web address]  Expires: 2018  1:28 PM     Your access code will  in 90 days. If you need help or a new code, please call your [place] clinic or [phone].        Care EveryWhere ID     This is your Care EveryWhere ID. This could be used by other organizations to access your [place] medical records  [id number]         Blood Pressure from Last 3 Encounters:   18 124/84   18 128/82   17 125/84    Weight from Last 3 Encounters:   18 144.8 kg (319 lb 3.2 oz)   18 (!) 144.1 kg (317 lb 11.2 oz)   17 (!) 143.5 kg (316 lb 4.8 oz)              Today, you had the following     No orders found for display       Primary Care Provider Office Phone # Fax #    David Almendarez [phone]       [address] [place] [address]        Equal Access to Services     [facility] : Hadii quin clements hadasho Soomaali, waaxda luqadaha, qaybta kaalmajessica howard . So [facility] [phone].    ATENCIÓN: Si habla español, tiene a ruiz disposición servicios gratuitos de asistencia lingüística. Llame al [phone].    We comply with applicable federal civil rights laws and [place] laws. We do not discriminate on the basis of race, color, national origin, age, disability, sex, sexual orientation, or gender identity.            Thank you!     Thank you for choosing [facility]   [facility]" for your care. Our goal is always to provide you with excellent care. Hearing back from our patients is one way we can continue to improve our services. Please take a few minutes to complete the written survey that you may receive in the mail after your visit with us. Thank you!             Your Updated Medication List - Protect others around you: Learn how to safely use, store and throw away your medicines at www.disposemymeds.org.          This list is accurate as of 6/22/18 10:39 AM.  Always use your most recent med list.                   Brand Name Dispense Instructions for use Diagnosis    amLODIPine 10 MG tablet    NORVASC    90 tablet    TAKE 1 TABLET(10 MG) BY MOUTH DAILY    Essential hypertension, benign       BASAGLAR 100 UNIT/ML injection     60 mL    Inject 60 Units Subcutaneous At Bedtime    Type 2 diabetes mellitus without complication, with long-term current use of insulin (H)       blood glucose monitoring test strip    ONETOUCH VERIO IQ    100 strip    Use to test blood sugars 3 times daily or as directed.    Type 2 diabetes mellitus without complication, with long-term current use of insulin (H)       fish oil-omega-3 fatty acids 1000 MG capsule      Take 4 capsules by mouth daily.        insulin aspart 100 UNIT/ML injection    NovoLOG FLEXPEN    30 mL    1 unit per 7g of carbs three times daily before meals    Type 2 diabetes mellitus without complication, with long-term current use of insulin (H)       insulin pen needle 31G X 5 MM    B-D U/F    100 each    Use 1 daily or as directed.    Type 2 diabetes mellitus without complication, with long-term current use of insulin (H)       losartan 100 MG tablet    COZAAR    90 tablet    Take 1 tablet (100 mg) by mouth daily    Essential hypertension, benign       metFORMIN 1000 MG tablet    GLUCOPHAGE    180 tablet    TAKE 1 TABLET(1000 MG) BY MOUTH TWICE DAILY WITH MEALS    Type 2 diabetes mellitus without complication, with long-term current use of  insulin (H)       metoprolol succinate 100 MG 24 hr tablet    TOPROL-XL    90 tablet    Take 1 tablet (100 mg) by mouth daily    Essential hypertension, benign       MULTIVITAMIN PO      1 qd        NITROSTAT 0.4 MG sublingual tablet   Generic drug:  nitroGLYcerin      1 TAB EVERY 5 MIN AS NEEDED, UP TO 3 PER EPISODE        ONETOUCH LANCETS Misc     200 each    1 lancet by In Vitro route 3 times daily    Type 2 diabetes mellitus without complication, with long-term current use of insulin (H)       simvastatin 80 MG tablet    ZOCOR    45 tablet    TAKE 1/2 TABLET(40 MG) BY MOUTH AT BEDTIME    Hyperlipidemia LDL goal <100       warfarin 7.5 MG tablet    COUMADIN    90 tablet    1 tablet daily except  1/2 tablets on Mon as directed by the anticoagulation clinic    New onset atrial fibrillation (H)

## 2018-06-29 ENCOUNTER — OFFICE VISIT (OUTPATIENT)
Dept: CARDIOLOGY | Facility: CLINIC | Age: 58
End: 2018-06-29
Attending: INTERNAL MEDICINE
Payer: COMMERCIAL

## 2018-06-29 VITALS
SYSTOLIC BLOOD PRESSURE: 122 MMHG | HEIGHT: 72 IN | DIASTOLIC BLOOD PRESSURE: 72 MMHG | BODY MASS INDEX: 42.66 KG/M2 | WEIGHT: 315 LBS | HEART RATE: 89 BPM

## 2018-06-29 DIAGNOSIS — I42.9 PRIMARY CARDIOMYOPATHY (H): ICD-10-CM

## 2018-06-29 DIAGNOSIS — Z95.0 CARDIAC PACEMAKER IN SITU: Primary | ICD-10-CM

## 2018-06-29 PROCEDURE — 99213 OFFICE O/P EST LOW 20 MIN: CPT | Performed by: PHYSICIAN ASSISTANT

## 2018-06-29 NOTE — LETTER
6/29/2018    David Almendarez MD  [address] [facility] [address]    RE: Mckay Hsu       Dear Colleague,    I had the pleasure of seeing Mckay Hsu in the [facility].    HPI and Plan:   See dictation #[id number]    Orders Placed This Encounter   Procedures     Follow-Up with Electrophysiologist       No orders of the defined types were placed in this encounter.      There are no discontinued medications.      Encounter Diagnoses   Name Primary?     Primary cardiomyopathy (H)      Cardiac pacemaker in situ Yes       CURRENT MEDICATIONS:  Current Outpatient Prescriptions   Medication Sig Dispense Refill     amLODIPine (NORVASC) 10 MG tablet TAKE 1 TABLET(10 MG) BY MOUTH DAILY 90 tablet 3     BASAGLAR 100 UNIT/ML injection Inject 60 Units Subcutaneous At Bedtime 60 mL 3     blood glucose monitoring (ONETOUCH VERIO IQ) test strip Use to test blood sugars 3 times daily or as directed. 100 strip 5     fish oil-omega-3 fatty acids 1000 MG capsule Take 4 capsules by mouth daily.       insulin aspart (NOVOLOG FLEXPEN) 100 UNIT/ML injection 1 unit per 7g of carbs three times daily before meals 30 mL 5     insulin pen needle (B-D U/F) 31G X 5 MM Use 1 daily or as directed. 100 each prn     losartan (COZAAR) 100 MG tablet Take 1 tablet (100 mg) by mouth daily 90 tablet 3     metFORMIN (GLUCOPHAGE) 1000 MG tablet TAKE 1 TABLET(1000 MG) BY MOUTH TWICE DAILY WITH MEALS 180 tablet 3     metoprolol succinate (TOPROL-XL) 100 MG 24 hr tablet Take 1 tablet (100 mg) by mouth daily 90 tablet 3     MULTIVITAMIN OR 1 qd       NITROSTAT 0.4 MG SL SUBL 1 TAB EVERY 5 MIN AS NEEDED, UP TO 3 PER EPISODE       ONETOUCH LANCETS MISC 1 lancet by In Vitro route 3 times daily 200 each 3     simvastatin (ZOCOR) 80 MG tablet TAKE 1/2 TABLET(40 MG) BY MOUTH AT BEDTIME 45 tablet 3     warfarin (COUMADIN) 7.5 MG tablet 1 tablet daily except  1/2 tablets on Mon as directed by the anticoagulation clinic 90  tablet 0       ALLERGIES   No Known Allergies    PAST MEDICAL HISTORY:  Past Medical History:   Diagnosis Date     Atrial fibrillation (H)     s/p AVN ablation, BIV PPM     Congestive heart failure (H)     tachycardia-induced cardiomyopathy     Essential hypertension, benign      Hyperlipidemia LDL goal <100 10/31/2010     Hyponatremia 9/8/2009     Morbid obesity (H)      Open wound of foot except toe(s) alone, without mention of complication      Type 2 diabetes, HbA1C goal < 8% (H) 6/26/2012       PAST SURGICAL HISTORY:  Past Surgical History:   Procedure Laterality Date     C NONSPECIFIC PROCEDURE  06/2007    debritement     CARDIOVERSION  9/2009, 10/2009     H ABLATION AV NODE  4/8/10     IMPLANT PACEMAKER  4/8/10    BIV PPM- Medtronic InSync III     TONSILLECTOMY      as kid       FAMILY HISTORY:  Family History   Problem Relation Age of Onset     Diabetes Mother      Hypertension Father      Diabetes Father      Hypertension Maternal Grandmother      Diabetes Maternal Grandmother      Diabetes Maternal Grandfather      Hypertension Maternal Grandfather      Cardiovascular Maternal Grandfather      Hypertension Paternal Grandfather      Cardiovascular Paternal Grandfather      Diabetes Sister      Hypertension Sister        SOCIAL HISTORY:  Social History     Social History     Marital status: Single     Spouse name: N/A     Number of children: N/A     Years of education: N/A     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[employer]     Social History Main Topics     Smoking status: Never Smoker     Smokeless tobacco: Never Used     Alcohol use No     Drug use: No     Sexual activity: No     Other Topics Concern     Caffeine Concern Yes     occasionally soda     Special Diet Yes     low sodium, low fat, DM diet      Exercise Yes     walking, stairs      Social History Narrative       Review of Systems:  Skin:  Negative       Eyes:  Positive for glasses    ENT:  Negative      Respiratory:  Negative for shortness  of breath;dyspnea on exertion;cough     Cardiovascular:  Negative for;palpitations;chest pain;fatigue;lightheadedness;dizziness Positive for;edema Edema 1-2 times per month  Gastroenterology: Negative for melena;hematochezia    Genitourinary:  Negative      Musculoskeletal:  Positive for arthritis    Neurologic:  Negative      Psychiatric:  Negative      Heme/Lymph/Imm:  Negative      Endocrine:  Positive for diabetes      Physical Exam:  Vitals: /72  Pulse 89  Ht 1.829 m (6')  Wt 143.8 kg (317 lb)  BMI 42.99 kg/m2    Constitutional:  cooperative, alert and oriented, well developed, well nourished, in no acute distress        Skin:  warm and dry to the touch, no apparent skin lesions or masses noted   pacemaker incision in the left infraclavicular area was well-healed        Eyes:  pupils equal and round;conjunctivae and lids unremarkable;sclera white;no xanthalasma;EOMS intact          ENT:  no pallor or cyanosis, dentition good        Neck:  JVP normal;no carotid bruit        Respiratory:  clear to auscultation         Cardiac: regular rhythm;normal S1 and S2;no S3 or S4;no murmurs, gallops or rubs detected                not assessed this visit                                        GI:  abdomen soft obese      Extremities and Muscular Skeletal:  no deformities, clubbing, cyanosis, erythema observed   bilateral LE edema;1+          Neurological:  no gross motor deficits        Psych:  Alert and Oriented x 3          Thank you for allowing me to participate in the care of your patient.      Sincerely,     BONIFACIO QuinteroC     [facility]    cc:   Soto Holliday MD  [address]  [address] [place] [id number]

## 2018-06-29 NOTE — MR AVS SNAPSHOT
After Visit Summary   6/29/2018    Mckay Hsu    MRN: [id number]           Patient Information     Date Of Birth          1960        Visit Information        Provider Department      6/29/2018 1:10 PM Peggy Caruso PA-C [facility]        Today's Diagnoses     Cardiac pacemaker in situ    -  1    Primary cardiomyopathy (H)          Care Instructions    1. Things today look good!      2. Blood pressure was rechecked and was 122/72.  This is good!    3. See us again in 1 year but call if issues prior! [phone]          Follow-ups after your visit        Additional Services     Follow-Up with Electrophysiologist                 Your next 10 appointments already scheduled     Jul 24, 2018  2:30 PM CDT   Anticoagulation Visit with  ANTICOAGULATION CLINIC   [facility] ([facility])    [address] [id number] [phone]            Aug 03, 2018  2:30 PM CDT   Phone Device Check with SANTIAGO TECH1   [facility] ([facility])    [address] [place], [place] [id number]  [facility] [id number] [phone] OPT 2              Future tests that were ordered for you today     Open Future Orders        Priority Expected Expires Ordered    Follow-Up with Electrophysiologist Routine 6/29/2019 6/30/2019 6/29/2018            Who to contact     If you have questions or need follow up information about today's clinic visit or your schedule please contact [facility] directly at [phone].  Normal or non-critical lab and imaging results will be communicated to you by MyChart, letter or phone within 4 business days after the clinic has received the results. If you do not hear from us within 7 days, please contact the clinic through MyChart or phone. If you have a critical or abnormal lab result, we will notify  "you by phone as soon as possible.  Submit refill requests through [employer] or call your pharmacy and they will forward the refill request to us. Please allow 3 business days for your refill to be completed.          Additional Information About Your Visit        [employer] Information     [employer] lets you send messages to your doctor, view your test results, renew your prescriptions, schedule appointments and more. To sign up, go to [email or web address] . Click on \"Log in\" on the left side of the screen, which will take you to the Welcome page. Then click on \"Sign up Now\" on the right side of the page.     You will be asked to enter the access code listed below, as well as some personal information. Please follow the directions to create your username and password.     Your access code is: [email or web address]  Expires: 2018  1:28 PM     Your access code will  in 90 days. If you need help or a new code, please call your [place] clinic or [phone].        Care EveryWhere ID     This is your Care EveryWhere ID. This could be used by other organizations to access your [place] medical records  [id number]        Your Vitals Were     Pulse Height BMI (Body Mass Index)             89 1.829 m (6') 42.99 kg/m2          Blood Pressure from Last 3 Encounters:   18 130/83   18 124/84   18 128/82    Weight from Last 3 Encounters:   18 143.8 kg (317 lb)   18 144.8 kg (319 lb 3.2 oz)   18 (!) 144.1 kg (317 lb 11.2 oz)              We Performed the Following     Follow-Up with Cardiac Advanced Practice Provider        Primary Care Provider Office Phone # Fax #    David Almendarez [phone]       [address] [place] [phone]        Equal Access to Services     KRISTY GRIJALVA : Carmelo Solano, randee shah, jessica castillo . [facility] [phone].    ATENCIÓN: Si lauren fish " disposición servicios gratuitos de asistencia lingüística. Crystal angel [phone].    We comply with applicable federal civil rights laws and [place] laws. We do not discriminate on the basis of race, color, national origin, age, disability, sex, sexual orientation, or gender identity.            Thank you!     Thank you for choosing [facility]  for your care. Our goal is always to provide you with excellent care. Hearing back from our patients is one way we can continue to improve our services. Please take a few minutes to complete the written survey that you may receive in the mail after your visit with us. Thank you!             Your Updated Medication List - Protect others around you: Learn how to safely use, store and throw away your medicines at www.disposemymeds.org.          This list is accurate as of 6/29/18  1:16 PM.  Always use your most recent med list.                   Brand Name Dispense Instructions for use Diagnosis    amLODIPine 10 MG tablet    NORVASC    90 tablet    TAKE 1 TABLET(10 MG) BY MOUTH DAILY    Essential hypertension, benign       BASAGLAR 100 UNIT/ML injection     60 mL    Inject 60 Units Subcutaneous At Bedtime    Type 2 diabetes mellitus without complication, with long-term current use of insulin (H)       blood glucose monitoring test strip    ONETOUCH VERIO IQ    100 strip    Use to test blood sugars 3 times daily or as directed.    Type 2 diabetes mellitus without complication, with long-term current use of insulin (H)       fish oil-omega-3 fatty acids 1000 MG capsule      Take 4 capsules by mouth daily.        insulin aspart 100 UNIT/ML injection    NovoLOG FLEXPEN    30 mL    1 unit per 7g of carbs three times daily before meals    Type 2 diabetes mellitus without complication, with long-term current use of insulin (H)       insulin pen needle 31G X 5 MM    B-D U/F    100 each    Use 1 daily or as directed.    Type 2 diabetes mellitus  without complication, with long-term current use of insulin (H)       losartan 100 MG tablet    COZAAR    90 tablet    Take 1 tablet (100 mg) by mouth daily    Essential hypertension, benign       metFORMIN 1000 MG tablet    GLUCOPHAGE    180 tablet    TAKE 1 TABLET(1000 MG) BY MOUTH TWICE DAILY WITH MEALS    Type 2 diabetes mellitus without complication, with long-term current use of insulin (H)       metoprolol succinate 100 MG 24 hr tablet    TOPROL-XL    90 tablet    Take 1 tablet (100 mg) by mouth daily    Essential hypertension, benign       MULTIVITAMIN PO      1 qd        NITROSTAT 0.4 MG sublingual tablet   Generic drug:  nitroGLYcerin      1 TAB EVERY 5 MIN AS NEEDED, UP TO 3 PER EPISODE        ONETOUCH LANCETS Misc     200 each    1 lancet by In Vitro route 3 times daily    Type 2 diabetes mellitus without complication, with long-term current use of insulin (H)       simvastatin 80 MG tablet    ZOCOR    45 tablet    TAKE 1/2 TABLET(40 MG) BY MOUTH AT BEDTIME    Hyperlipidemia LDL goal <100       warfarin 7.5 MG tablet    COUMADIN    90 tablet    1 tablet daily except  1/2 tablets on Mon as directed by the anticoagulation clinic    New onset atrial fibrillation (H)

## 2018-06-29 NOTE — PATIENT INSTRUCTIONS
1. Things today look good!      2. Blood pressure was rechecked and was 122/72.  This is good!    3. See us again in 1 year but call if issues prior! [phone]

## 2018-06-29 NOTE — PROGRESS NOTES
HPI and Plan:   See dictation #[id number]    Orders Placed This Encounter   Procedures     Follow-Up with Electrophysiologist       No orders of the defined types were placed in this encounter.      There are no discontinued medications.      Encounter Diagnoses   Name Primary?     Primary cardiomyopathy (H)      Cardiac pacemaker in situ Yes       CURRENT MEDICATIONS:  Current Outpatient Prescriptions   Medication Sig Dispense Refill     amLODIPine (NORVASC) 10 MG tablet TAKE 1 TABLET(10 MG) BY MOUTH DAILY 90 tablet 3     BASAGLAR 100 UNIT/ML injection Inject 60 Units Subcutaneous At Bedtime 60 mL 3     blood glucose monitoring (ONETOUCH VERIO IQ) test strip Use to test blood sugars 3 times daily or as directed. 100 strip 5     fish oil-omega-3 fatty acids 1000 MG capsule Take 4 capsules by mouth daily.       insulin aspart (NOVOLOG FLEXPEN) 100 UNIT/ML injection 1 unit per 7g of carbs three times daily before meals 30 mL 5     insulin pen needle (B-D U/F) 31G X 5 MM Use 1 daily or as directed. 100 each prn     losartan (COZAAR) 100 MG tablet Take 1 tablet (100 mg) by mouth daily 90 tablet 3     metFORMIN (GLUCOPHAGE) 1000 MG tablet TAKE 1 TABLET(1000 MG) BY MOUTH TWICE DAILY WITH MEALS 180 tablet 3     metoprolol succinate (TOPROL-XL) 100 MG 24 hr tablet Take 1 tablet (100 mg) by mouth daily 90 tablet 3     MULTIVITAMIN OR 1 qd       NITROSTAT 0.4 MG SL SUBL 1 TAB EVERY 5 MIN AS NEEDED, UP TO 3 PER EPISODE       ONETOUCH LANCETS MISC 1 lancet by In Vitro route 3 times daily 200 each 3     simvastatin (ZOCOR) 80 MG tablet TAKE 1/2 TABLET(40 MG) BY MOUTH AT BEDTIME 45 tablet 3     warfarin (COUMADIN) 7.5 MG tablet 1 tablet daily except  1/2 tablets on Mon as directed by the anticoagulation clinic 90 tablet 0       ALLERGIES   No Known Allergies    PAST MEDICAL HISTORY:  Past Medical History:   Diagnosis Date     Atrial fibrillation (H)     s/p AVN ablation, BIV PPM     Congestive heart failure (H)      tachycardia-induced cardiomyopathy     Essential hypertension, benign      Hyperlipidemia LDL goal <100 10/31/2010     Hyponatremia 9/8/2009     Morbid obesity (H)      Open wound of foot except toe(s) alone, without mention of complication      Type 2 diabetes, HbA1C goal < 8% (H) 6/26/2012       PAST SURGICAL HISTORY:  Past Surgical History:   Procedure Laterality Date     C NONSPECIFIC PROCEDURE  06/2007    debritement     CARDIOVERSION  9/2009, 10/2009     H ABLATION AV NODE  4/8/10     IMPLANT PACEMAKER  4/8/10    BIV PPM- Medtronic InSync III     TONSILLECTOMY      as kid       FAMILY HISTORY:  Family History   Problem Relation Age of Onset     Diabetes Mother      Hypertension Father      Diabetes Father      Hypertension Maternal Grandmother      Diabetes Maternal Grandmother      Diabetes Maternal Grandfather      Hypertension Maternal Grandfather      Cardiovascular Maternal Grandfather      Hypertension Paternal Grandfather      Cardiovascular Paternal Grandfather      Diabetes Sister      Hypertension Sister        SOCIAL HISTORY:  Social History     Social History     Marital status: Single     Spouse name: N/A     Number of children: N/A     Years of education: N/A     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[employer]     Social History Main Topics     Smoking status: Never Smoker     Smokeless tobacco: Never Used     Alcohol use No     Drug use: No     Sexual activity: No     Other Topics Concern     Caffeine Concern Yes     occasionally soda     Special Diet Yes     low sodium, low fat, DM diet      Exercise Yes     walking, stairs      Social History Narrative       Review of Systems:  Skin:  Negative       Eyes:  Positive for glasses    ENT:  Negative      Respiratory:  Negative for shortness of breath;dyspnea on exertion;cough     Cardiovascular:  Negative for;palpitations;chest pain;fatigue;lightheadedness;dizziness Positive for;edema Edema 1-2 times per month  Gastroenterology: Negative for  melena;hematochezia    Genitourinary:  Negative      Musculoskeletal:  Positive for arthritis    Neurologic:  Negative      Psychiatric:  Negative      Heme/Lymph/Imm:  Negative      Endocrine:  Positive for diabetes      Physical Exam:  Vitals: /72  Pulse 89  Ht 1.829 m (6')  Wt 143.8 kg (317 lb)  BMI 42.99 kg/m2    Constitutional:  cooperative, alert and oriented, well developed, well nourished, in no acute distress        Skin:  warm and dry to the touch, no apparent skin lesions or masses noted   pacemaker incision in the left infraclavicular area was well-healed        Eyes:  pupils equal and round;conjunctivae and lids unremarkable;sclera white;no xanthalasma;EOMS intact          ENT:  no pallor or cyanosis, dentition good        Neck:  JVP normal;no carotid bruit        Respiratory:  clear to auscultation         Cardiac: regular rhythm;normal S1 and S2;no S3 or S4;no murmurs, gallops or rubs detected                not assessed this visit                                        GI:  abdomen soft obese      Extremities and Muscular Skeletal:  no deformities, clubbing, cyanosis, erythema observed   bilateral LE edema;1+          Neurological:  no gross motor deficits        Psych:  Alert and Oriented x 3

## 2018-06-29 NOTE — LETTER
6/29/2018      David Almendarez MD  [address] [facility] [address]      RE: Octavia Hsu       Dear Colleague,    I had the pleasure of seeing Octavia Hsu in the [facility].    Service Date: 06/29/2018      HISTORY OF PRESENT ILLNESS:  I had the pleasure of seeing Mr. Hsu today when he came for annual followup.  He is a 58-year-old with a history of obesity and type 2 diabetes.  He had atrial fibrillation with a tachycardia-induced cardiomyopathy.  Unfortunately, he failed amiodarone and underwent AV node ablation and biventricular pacemaker implantation in 04/2010 (Medtronic).  He is currently getting monthly device interrogation secondary to approaching JULIA.      His last echocardiogram 06/2017 showed a preserved ejection fraction of 55-60% with 1+ tricuspid regurgitation.  Last full device interrogation was 05/22 showing he was 99.6% BiV paced.      Doyle tells me that overall he has done well.  He does continue to note some mild lower extremity edema which is worse in the hot humid weather such as what we are having today.  He denies any problems with orthopnea or PND.  He denies chest pain, pressure or tightness.  He notes that when he walks up a set of stairs he gets short of breath, but otherwise really feels like he is stable.        ASSESSMENT AND PLAN:     1.  Atrial fibrillation.  He remains with permanent atrial fibrillation status post AV node ablation and biventricular pacemaker.  We are following this closely given its issues with battery.      He will see Dr. Holliday again in 1 year, but I expect that we will end up seeing him sooner for generator replacement.      At this time, he will continue warfarin therapy which has been quite stable.  He will call us if he runs into any issues or concerns in the interim.         PATRIA SPENCER PA-C             D: 06/29/2018   T: 06/29/2018   MT: MARGIE      Name:     OCTAVIA HSU   MRN:      [id number]         Account:      [id number]   :      1960           Service Date: 2018      Document: [id number]           Outpatient Encounter Prescriptions as of 2018   Medication Sig Dispense Refill     amLODIPine (NORVASC) 10 MG tablet TAKE 1 TABLET(10 MG) BY MOUTH DAILY 90 tablet 3     BASAGLAR 100 UNIT/ML injection Inject 60 Units Subcutaneous At Bedtime 60 mL 3     blood glucose monitoring (ONETOUCH VERIO IQ) test strip Use to test blood sugars 3 times daily or as directed. 100 strip 5     fish oil-omega-3 fatty acids 1000 MG capsule Take 4 capsules by mouth daily.       insulin aspart (NOVOLOG FLEXPEN) 100 UNIT/ML injection 1 unit per 7g of carbs three times daily before meals 30 mL 5     insulin pen needle (B-D U/F) 31G X 5 MM Use 1 daily or as directed. 100 each prn     losartan (COZAAR) 100 MG tablet Take 1 tablet (100 mg) by mouth daily 90 tablet 3     metFORMIN (GLUCOPHAGE) 1000 MG tablet TAKE 1 TABLET(1000 MG) BY MOUTH TWICE DAILY WITH MEALS 180 tablet 3     metoprolol succinate (TOPROL-XL) 100 MG 24 hr tablet Take 1 tablet (100 mg) by mouth daily 90 tablet 3     MULTIVITAMIN OR 1 qd       NITROSTAT 0.4 MG SL SUBL 1 TAB EVERY 5 MIN AS NEEDED, UP TO 3 PER EPISODE       ONETOUCH LANCETS MISC 1 lancet by In Vitro route 3 times daily 200 each 3     simvastatin (ZOCOR) 80 MG tablet TAKE 1/2 TABLET(40 MG) BY MOUTH AT BEDTIME 45 tablet 3     warfarin (COUMADIN) 7.5 MG tablet 1 tablet daily except  1/2 tablets on Mon as directed by the anticoagulation clinic 90 tablet 0     No facility-administered encounter medications on file as of 2018.        Again, thank you for allowing me to participate in the care of your patient.      Sincerely,    Peggy Caruso PA-C     [facility]

## 2018-06-29 NOTE — PROGRESS NOTES
Service Date: 2018      HISTORY OF PRESENT ILLNESS:  I had the pleasure of seeing Mr. Zhao today when he came for annual followup.  He is a 58-year-old with a history of obesity and type 2 diabetes.  He had atrial fibrillation with a tachycardia-induced cardiomyopathy.  Unfortunately, he failed amiodarone and underwent AV node ablation and biventricular pacemaker implantation in 2010 (Medtronic).  He is currently getting monthly device interrogation secondary to approaching JULIA.      His last echocardiogram 2017 showed a preserved ejection fraction of 55-60% with 1+ tricuspid regurgitation.  Last full device interrogation was  showing he was 99.6% BiV paced.      Doyle tells me that overall he has done well.  He does continue to note some mild lower extremity edema which is worse in the hot humid weather such as what we are having today.  He denies any problems with orthopnea or PND.  He denies chest pain, pressure or tightness.  He notes that when he walks up a set of stairs he gets short of breath, but otherwise really feels like he is stable.        ASSESSMENT AND PLAN:     1.  Atrial fibrillation.  He remains with permanent atrial fibrillation status post AV node ablation and biventricular pacemaker.  We are following this closely given its issues with battery.      He will see Dr. Holliday again in 1 year, but I expect that we will end up seeing him sooner for generator replacement.      At this time, he will continue warfarin therapy which has been quite stable.  He will call us if he runs into any issues or concerns in the interim.         PATRIA SPENCER PA-C             D: 2018   T: 2018   MT: MARGIE      Name:     OCTAVIA ZHAO   MRN:      [id number]        Account:      [id number]   :      1960           Service Date: 2018      Document: [id number]

## 2018-07-04 DIAGNOSIS — I10 ESSENTIAL HYPERTENSION, BENIGN: ICD-10-CM

## 2018-07-04 DIAGNOSIS — E78.5 HYPERLIPIDEMIA LDL GOAL <100: ICD-10-CM

## 2018-07-04 RX ORDER — METOPROLOL SUCCINATE 100 MG/1
100 TABLET, EXTENDED RELEASE ORAL DAILY
Qty: 90 TABLET | Refills: 3 | Status: CANCELLED | OUTPATIENT
Start: 2018-07-04

## 2018-07-04 RX ORDER — SIMVASTATIN 80 MG
TABLET ORAL
Qty: 45 TABLET | Refills: 3 | Status: CANCELLED | OUTPATIENT
Start: 2018-07-04

## 2018-07-24 ENCOUNTER — ANTICOAGULATION THERAPY VISIT (OUTPATIENT)
Dept: ANTICOAGULATION | Facility: CLINIC | Age: 58
End: 2018-07-24
Payer: COMMERCIAL

## 2018-07-24 LAB — INR POINT OF CARE: 2.9 (ref 0.86–1.14)

## 2018-07-24 PROCEDURE — 36416 COLLJ CAPILLARY BLOOD SPEC: CPT

## 2018-07-24 PROCEDURE — 99207 ZZC NO CHARGE NURSE ONLY: CPT

## 2018-07-24 PROCEDURE — 85610 PROTHROMBIN TIME: CPT | Mod: QW

## 2018-07-24 NOTE — MR AVS SNAPSHOT
Mckay Gonzalesrobyn Hsu   7/24/2018 2:30 PM   Anticoagulation Therapy Visit    Description:  58 year old male   Provider:   ANTICOAGULATION CLINIC   Department:   Anti Coagulation           INR as of 7/24/2018     Today's INR 2.9      Anticoagulation Summary as of 7/24/2018     INR goal 2.0-3.0   Today's INR 2.9   Full warfarin instructions 3.75 mg on Tue; 7.5 mg all other days   Next INR check 9/4/2018    Indications   Long-term (current) use of anticoagulants [Z79.01] [Z79.01]  Chronic atrial fibrillation (H) [I48.2]         Your next Anticoagulation Clinic appointment(s)     Sep 04, 2018  2:30 PM CDT   Anticoagulation Visit with  ANTICOAGULATION CLINIC   [facility] ([facility]    [address] [place] [address] [phone]              Contact Numbers     [facility]  Please call  [phone] to cancel and/or reschedule your appointment   Please call  [phone] with any problems or questions regarding your therapy.        July 2018 Details    Sun Mon Tue Wed Thu Fri Sat     1               2               3               4               5               6               7                 8               9               10               11               12               13               14                 15               16               17               18               19               20               21                 22               23               24      3.75 mg   See details      25      7.5 mg         26      7.5 mg         27      7.5 mg         28      7.5 mg           29      7.5 mg         30      7.5 mg         31      3.75 mg              Date Details   07/24 This INR check               How to take your warfarin dose     To take:  3.75 mg Take 0.5 of a 7.5 mg tablet.    To take:  7.5 mg Take 1 of the 7.5 mg tablets.           August 2018 Details    Sun Mon Tue Wed Thu Fri Sat        1      7.5 mg         2      7.5 mg          3      7.5 mg         4      7.5 mg           5      7.5 mg         6      7.5 mg         7      3.75 mg         8      7.5 mg         9      7.5 mg         10      7.5 mg         11      7.5 mg           12      7.5 mg         13      7.5 mg         14      3.75 mg         15      7.5 mg         16      7.5 mg         17      7.5 mg         18      7.5 mg           19      7.5 mg         20      7.5 mg         21      3.75 mg         22      7.5 mg         23      7.5 mg         24      7.5 mg         25      7.5 mg           26      7.5 mg         27      7.5 mg         28      3.75 mg         29      7.5 mg         30      7.5 mg         31      7.5 mg           Date Details   No additional details            How to take your warfarin dose     To take:  3.75 mg Take 0.5 of a 7.5 mg tablet.    To take:  7.5 mg Take 1 of the 7.5 mg tablets.           September 2018 Details    Sun Mon Tue Wed Thu Fri Sat           1      7.5 mg           2      7.5 mg         3      7.5 mg         4            5               6               7               8                 9               10               11               12               13               14               15                 16               17               18               19               20               21               22                 23               24               25               26               27               28               29                 30                      Date Details   No additional details    Date of next INR:  9/4/2018         How to take your warfarin dose     To take:  3.75 mg Take 0.5 of a 7.5 mg tablet.    To take:  7.5 mg Take 1 of the 7.5 mg tablets.

## 2018-07-24 NOTE — PROGRESS NOTES
ANTICOAGULATION FOLLOW-UP CLINIC VISIT    Patient Name:  Mckay Hsu  Date:  7/24/2018  Contact Type:  Face to Face    SUBJECTIVE:     Patient Findings     Positives No Problem Findings           OBJECTIVE    INR Protime   Date Value Ref Range Status   07/24/2018 2.9 (A) 0.86 - 1.14 Final       ASSESSMENT / PLAN  INR assessment THER    Recheck INR In: 6 WEEKS    INR Location Clinic      Anticoagulation Summary as of 7/24/2018     INR goal 2.0-3.0   Today's INR 2.9   Warfarin maintenance plan 3.75 mg (7.5 mg x 0.5) on Tue; 7.5 mg (7.5 mg x 1) all other days   Full warfarin instructions 3.75 mg on Tue; 7.5 mg all other days   Weekly warfarin total 48.75 mg   Plan last modified Rosalee Billingsley RN (10/17/2017)   Next INR check 9/4/2018   Target end date     Indications   Long-term (current) use of anticoagulants [Z79.01] [Z79.01]  Chronic atrial fibrillation (H) [I48.2]         Anticoagulation Episode Summary     INR check location     Preferred lab     Send INR reminders to [facility]    Comments             See the Encounter Report to view Anticoagulation Flowsheet and Dosing Calendar (Go to Encounters tab in chart review, and find the Anticoagulation Therapy Visit)    Dosage adjustment made based on physician directed care plan.    Rosalee Billingsley RN

## 2018-07-31 ENCOUNTER — ALLIED HEALTH/NURSE VISIT (OUTPATIENT)
Dept: CARDIOLOGY | Facility: CLINIC | Age: 58
End: 2018-07-31
Payer: COMMERCIAL

## 2018-07-31 DIAGNOSIS — Z95.0 CARDIAC PACEMAKER IN SITU: Primary | ICD-10-CM

## 2018-07-31 PROCEDURE — 93293 PM PHONE R-STRIP DEVICE EVAL: CPT | Performed by: INTERNAL MEDICINE

## 2018-07-31 NOTE — PROGRESS NOTES
~ 30 day phone teletrace ~  Appropriate  at time of check. Chronic Afib, taking Warfarin. Magnet response WNL. F/U phone teletrace q month. Shane FRIAS

## 2018-07-31 NOTE — MR AVS SNAPSHOT
After Visit Summary   7/31/2018    Mckay Hsu    MRN: [id number]           Patient Information     Date Of Birth          1960        Visit Information        Provider Department      7/31/2018 2:45 PM JACK ARCE [facility]        Today's Diagnoses     Cardiac pacemaker in situ    -  1       Follow-ups after your visit        Your next 10 appointments already scheduled     Aug 28, 2018  2:30 PM CDT   30 Day Phone Check with JACK ARCE   [facility]   [place] ([facility])    [address]  [facility] [address] [phone] OPT 2            Sep 04, 2018  2:30 PM CDT   Anticoagulation Visit with  ANTICOAGULATION CLINIC   [facility] ([facility])    [address] [address] [phone]              Who to contact     If you have questions or need follow up information about today's clinic visit or your schedule please contact [facility] directly at [phone].  Normal or non-critical lab and imaging results will be communicated to you by MyChart, letter or phone within 4 business days after the clinic has received the results. If you do not hear from us within 7 days, please contact the clinic through MyChart or phone. If you have a critical or abnormal lab result, we will notify you by phone as soon as possible.  Submit refill requests through [employer] or call your pharmacy and they will forward the refill request to us. Please allow 3 business days for your refill to be completed.          Additional Information About Your Visit        Care EveryWhere ID     This is your Care EveryWhere ID. This could be used by other organizations to access your [facility] medical records  [id number]         Blood Pressure from Last 3 Encounters:   06/29/18 122/72   06/13/18 124/84   01/23/18 128/82     Weight from Last 3 Encounters:   06/29/18 143.8 kg (317 lb)   06/13/18 144.8 kg (319 lb 3.2 oz)   01/23/18 (!) 144.1 kg (317 lb 11.2 oz)              We Performed the Following     PM PHONE R STRIP EVAL UP TO 90 DAYS ([phone])        Primary Care Provider Office Phone # Fax #    David Almendarez [phone]       [address] [facility] [address]        Equal Access to Services     [facility] : Hadii aad ku hadasho Soomaali, waaxda luqadaha, qaybta kaalmada adeegyada, waxay idiin hayaan adeeg kharajack saravia . So [facility] [place] [facility] [phone].    ATENCIÓN: Si habla español, tiene a ruiz disposición servicios gratuitos de asistencia lingüística. [facility] [phone].    We comply with applicable federal civil rights laws and [place] laws. We do not discriminate on the basis of race, color, national origin, age, disability, sex, sexual orientation, or gender identity.            Thank you!     Thank you for choosing [facility]  for your care. Our goal is always to provide you with excellent care. Hearing back from our patients is one way we can continue to improve our services. Please take a few minutes to complete the written survey that you may receive in the mail after your visit with us. Thank you!             Your Updated Medication List - Protect others around you: Learn how to safely use, store and throw away your medicines at www.disposemymeds.org.          This list is accurate as of 7/31/18  2:57 PM.  Always use your most recent med list.                   Brand Name Dispense Instructions for use Diagnosis    amLODIPine 10 MG tablet    NORVASC    90 tablet    TAKE 1 TABLET(10 MG) BY MOUTH DAILY    Essential hypertension, benign       BASAGLAR 100 UNIT/ML injection     60 mL    Inject 60 Units Subcutaneous At Bedtime    Type 2 diabetes mellitus without complication, with long-term current use of insulin (H)       blood glucose monitoring test strip     ONETOUCH VERIO IQ    100 strip    Use to test blood sugars 3 times daily or as directed.    Type 2 diabetes mellitus without complication, with long-term current use of insulin (H)       fish oil-omega-3 fatty acids 1000 MG capsule      Take 4 capsules by mouth daily.        insulin aspart 100 UNIT/ML injection    NovoLOG FLEXPEN    30 mL    1 unit per 7g of carbs three times daily before meals    Type 2 diabetes mellitus without complication, with long-term current use of insulin (H)       insulin pen needle 31G X 5 MM    B-D U/F    100 each    Use 1 daily or as directed.    Type 2 diabetes mellitus without complication, with long-term current use of insulin (H)       losartan 100 MG tablet    COZAAR    90 tablet    Take 1 tablet (100 mg) by mouth daily    Essential hypertension, benign       metFORMIN 1000 MG tablet    GLUCOPHAGE    180 tablet    TAKE 1 TABLET(1000 MG) BY MOUTH TWICE DAILY WITH MEALS    Type 2 diabetes mellitus without complication, with long-term current use of insulin (H)       metoprolol succinate 100 MG 24 hr tablet    TOPROL-XL    90 tablet    Take 1 tablet (100 mg) by mouth daily    Essential hypertension, benign       MULTIVITAMIN PO      1 qd        NITROSTAT 0.4 MG sublingual tablet   Generic drug:  nitroGLYcerin      1 TAB EVERY 5 MIN AS NEEDED, UP TO 3 PER EPISODE        ONETOUCH LANCETS Misc     200 each    1 lancet by In Vitro route 3 times daily    Type 2 diabetes mellitus without complication, with long-term current use of insulin (H)       simvastatin 80 MG tablet    ZOCOR    45 tablet    TAKE 1/2 TABLET(40 MG) BY MOUTH AT BEDTIME    Hyperlipidemia LDL goal <100       warfarin 7.5 MG tablet    COUMADIN    90 tablet    1 tablet daily except  1/2 tablets on Mon as directed by the anticoagulation clinic    New onset atrial fibrillation (H)

## 2018-08-07 ENCOUNTER — TRANSFERRED RECORDS (OUTPATIENT)
Dept: HEALTH INFORMATION MANAGEMENT | Facility: CLINIC | Age: 58
End: 2018-08-07

## 2018-08-28 ENCOUNTER — OFFICE VISIT (OUTPATIENT)
Dept: CARDIOLOGY | Facility: CLINIC | Age: 58
End: 2018-08-28
Payer: COMMERCIAL

## 2018-08-28 DIAGNOSIS — Z95.0 CARDIAC PACEMAKER IN SITU: Primary | ICD-10-CM

## 2018-08-28 NOTE — PROGRESS NOTES
~ 30 day phone teletrace ~  Appropriate  at time of check. Chronic Afib, taking Warfarin. Magnet response WNL. F/U phone teletrace q month. RODRIGUEZ Conrad

## 2018-08-28 NOTE — MR AVS SNAPSHOT
After Visit Summary   8/28/2018    Mckay Hsu    MRN: [id number]           Patient Information     Date Of Birth          1960        Visit Information        Provider Department      8/28/2018 2:30 PM JACK ARCE [facility]        Today's Diagnoses     Cardiac pacemaker in situ    -  1       Follow-ups after your visit        Your next 10 appointments already scheduled     Sep 04, 2018  2:30 PM CDT   Anticoagulation Visit with  ANTICOAGULATION CLINIC   [facility] ([facility])    [address] [address] [phone]            Sep 25, 2018  2:30 PM CDT   Phone Device Check with SANTIAGO TECH1   [facility] [place] ([facility])    [address] [place], [place] [id number]  [facility] [address] [phone] OPT 2              Who to contact     If you have questions or need follow up information about today's clinic visit or your schedule please contact [facility] directly at [phone].  Normal or non-critical lab and imaging results will be communicated to you by MyChart, letter or phone within 4 business days after the clinic has received the results. If you do not hear from us within 7 days, please contact the clinic through MyChart or phone. If you have a critical or abnormal lab result, we will notify you by phone as soon as possible.  Submit refill requests through Solfo or call your pharmacy and they will forward the refill request to us. Please allow 3 business days for your refill to be completed.          Additional Information About Your Visit        Care EveryWhere ID     This is your Care EveryWhere ID. This could be used by other organizations to access your [facility] medical records  [id number]         Blood Pressure from Last 3 Encounters:   06/29/18 122/72   06/13/18 124/84   01/23/18 128/82     Weight from Last 3 Encounters:   06/29/18 143.8 kg (317 lb)   06/13/18 144.8 kg (319 lb 3.2 oz)   01/23/18 (!) 144.1 kg (317 lb 11.2 oz)              Today, you had the following     No orders found for display       Primary Care Provider Office Phone # Fax #    David Almendarez [phone]       [address] [facility] [address]        Equal Access to Services     DERIK GRIJALVA : Hadii aad ku hadasho Soomaali, waaxda luqadaha, qaybta kaalmada adeegyada, waxay idiin hayaan adeeg kharash laluis . So [facility] [phone].    ATENCIÓN: Si habla español, tiene a ruiz disposición servicios gratuitos de asistencia lingüística. Llame al [phone].    We comply with applicable federal civil rights laws and [place] laws. We do not discriminate on the basis of race, color, national origin, age, disability, sex, sexual orientation, or gender identity.            Thank you!     Thank you for choosing [facility]  for your care. Our goal is always to provide you with excellent care. Hearing back from our patients is one way we can continue to improve our services. Please take a few minutes to complete the written survey that you may receive in the mail after your visit with us. Thank you!             Your Updated Medication List - Protect others around you: Learn how to safely use, store and throw away your medicines at www.disposemymeds.org.          This list is accurate as of 8/28/18  2:47 PM.  Always use your most recent med list.                   Brand Name Dispense Instructions for use Diagnosis    amLODIPine 10 MG tablet    NORVASC    90 tablet    TAKE 1 TABLET(10 MG) BY MOUTH DAILY    Essential hypertension, benign       BASAGLAR 100 UNIT/ML injection     60 mL    Inject 60 Units Subcutaneous At Bedtime    Type 2 diabetes mellitus without complication, with long-term current use of insulin (H)       blood glucose monitoring test strip    ONETOUCH VERIO IQ     100 strip    Use to test blood sugars 3 times daily or as directed.    Type 2 diabetes mellitus without complication, with long-term current use of insulin (H)       fish oil-omega-3 fatty acids 1000 MG capsule      Take 4 capsules by mouth daily.        insulin aspart 100 UNIT/ML injection    NovoLOG FLEXPEN    30 mL    1 unit per 7g of carbs three times daily before meals    Type 2 diabetes mellitus without complication, with long-term current use of insulin (H)       insulin pen needle 31G X 5 MM    B-D U/F    100 each    Use 1 daily or as directed.    Type 2 diabetes mellitus without complication, with long-term current use of insulin (H)       losartan 100 MG tablet    COZAAR    90 tablet    Take 1 tablet (100 mg) by mouth daily    Essential hypertension, benign       metFORMIN 1000 MG tablet    GLUCOPHAGE    180 tablet    TAKE 1 TABLET(1000 MG) BY MOUTH TWICE DAILY WITH MEALS    Type 2 diabetes mellitus without complication, with long-term current use of insulin (H)       metoprolol succinate 100 MG 24 hr tablet    TOPROL-XL    90 tablet    Take 1 tablet (100 mg) by mouth daily    Essential hypertension, benign       MULTIVITAMIN PO      1 qd        NITROSTAT 0.4 MG sublingual tablet   Generic drug:  nitroGLYcerin      1 TAB EVERY 5 MIN AS NEEDED, UP TO 3 PER EPISODE        ONETOUCH LANCETS Misc     200 each    1 lancet by In Vitro route 3 times daily    Type 2 diabetes mellitus without complication, with long-term current use of insulin (H)       simvastatin 80 MG tablet    ZOCOR    45 tablet    TAKE 1/2 TABLET(40 MG) BY MOUTH AT BEDTIME    Hyperlipidemia LDL goal <100       warfarin 7.5 MG tablet    COUMADIN    90 tablet    1 tablet daily except  1/2 tablets on Mon as directed by the anticoagulation clinic    New onset atrial fibrillation (H)

## 2018-08-30 DIAGNOSIS — I48.91 NEW ONSET ATRIAL FIBRILLATION (H): ICD-10-CM

## 2018-08-30 DIAGNOSIS — I10 ESSENTIAL HYPERTENSION, BENIGN: ICD-10-CM

## 2018-08-30 RX ORDER — AMLODIPINE BESYLATE 10 MG/1
TABLET ORAL
Qty: 90 TABLET | Refills: 2 | Status: SHIPPED | OUTPATIENT
Start: 2018-08-30 | End: 2019-06-04

## 2018-08-30 NOTE — TELEPHONE ENCOUNTER
"Requested Prescriptions   Pending Prescriptions Disp Refills     amLODIPine (NORVASC) 10 MG tablet  Last Written Prescription Date:  03/06/2018  Last Fill Quantity: 90,  # refills: 03   Last Office Visit: 6/13/2018   Future Office Visit:      90 tablet 3     Sig: TAKE 1 TABLET(10 MG) BY MOUTH DAILY    Calcium Channel Blockers Protocol  Passed    8/30/2018  1:12 PM       Passed - Blood pressure under 140/90 in past 12 months    BP Readings from Last 3 Encounters:   06/29/18 122/72   06/13/18 124/84   01/23/18 128/82                Passed - Recent (12 mo) or future (30 days) visit within the authorizing provider's specialty    Patient had office visit in the last 12 months or has a visit in the next 30 days with authorizing provider or within the authorizing provider's specialty.  See \"Patient Info\" tab in inbasket, or \"Choose Columns\" in Meds & Orders section of the refill encounter.           Passed - Patient is age 18 or older       Passed - Normal serum creatinine on file in past 12 months    Recent Labs   Lab Test  06/11/18   0838   CR  1.10               "

## 2018-08-31 RX ORDER — WARFARIN SODIUM 7.5 MG/1
TABLET ORAL
Qty: 90 TABLET | Refills: 0 | Status: SHIPPED | OUTPATIENT
Start: 2018-08-31 | End: 2018-12-11

## 2018-09-04 ENCOUNTER — ANTICOAGULATION THERAPY VISIT (OUTPATIENT)
Dept: ANTICOAGULATION | Facility: CLINIC | Age: 58
End: 2018-09-04
Payer: COMMERCIAL

## 2018-09-04 LAB — INR POINT OF CARE: 2.7 (ref 0.86–1.14)

## 2018-09-04 PROCEDURE — 99207 ZZC NO CHARGE NURSE ONLY: CPT

## 2018-09-04 PROCEDURE — 36416 COLLJ CAPILLARY BLOOD SPEC: CPT

## 2018-09-04 PROCEDURE — 85610 PROTHROMBIN TIME: CPT | Mod: QW

## 2018-09-04 NOTE — MR AVS SNAPSHOT
Mckay Gonzalesrobyn Hsu   9/4/2018 2:30 PM   Anticoagulation Therapy Visit    Description:  58 year old male   Provider:   ANTICOAGULATION CLINIC   Department:   Anti Coagulation           INR as of 9/4/2018     Today's INR 2.7      Anticoagulation Summary as of 9/4/2018     INR goal 2.0-3.0   Today's INR 2.7   Full warfarin instructions 3.75 mg on Tue; 7.5 mg all other days   Next INR check 10/16/2018    Indications   Long-term (current) use of anticoagulants [Z79.01] [Z79.01]  Chronic atrial fibrillation (H) [I48.2]         Your next Anticoagulation Clinic appointment(s)     Oct 16, 2018  2:30 PM CDT   Anticoagulation Visit with  ANTICOAGULATION CLINIC   [facility] ([facility])    [address] [place] [address] [phone]              Contact Numbers     [facility]  Please call  [phone] to cancel and/or reschedule your appointment   Please call  [phone] with any problems or questions regarding your therapy.        September 2018 Details    Sun Mon Tue Wed Thu Fri Sat           1                 2               3               4      3.75 mg   See details      5      7.5 mg         6      7.5 mg         7      7.5 mg         8      7.5 mg           9      7.5 mg         10      7.5 mg         11      3.75 mg         12      7.5 mg         13      7.5 mg         14      7.5 mg         15      7.5 mg           16      7.5 mg         17      7.5 mg         18      3.75 mg         19      7.5 mg         20      7.5 mg         21      7.5 mg         22      7.5 mg           23      7.5 mg         24      7.5 mg         25      3.75 mg         26      7.5 mg         27      7.5 mg         28      7.5 mg         29      7.5 mg           30      7.5 mg                Date Details   09/04 This INR check               How to take your warfarin dose     To take:  3.75 mg Take 0.5 of a 7.5 mg tablet.    To take:  7.5 mg Take 1 of the 7.5 mg  tablets.           October 2018 Details    Sun Mon Tue Wed Thu Fri Sat      1      7.5 mg         2      3.75 mg         3      7.5 mg         4      7.5 mg         5      7.5 mg         6      7.5 mg           7      7.5 mg         8      7.5 mg         9      3.75 mg         10      7.5 mg         11      7.5 mg         12      7.5 mg         13      7.5 mg           14      7.5 mg         15      7.5 mg         16            17               18               19               20                 21               22               23               24               25               26               27                 28               29               30               31                   Date Details   No additional details    Date of next INR:  10/16/2018         How to take your warfarin dose     To take:  3.75 mg Take 0.5 of a 7.5 mg tablet.    To take:  7.5 mg Take 1 of the 7.5 mg tablets.

## 2018-09-04 NOTE — PROGRESS NOTES
ANTICOAGULATION FOLLOW-UP CLINIC VISIT    Patient Name:  Mckay Hsu  Date:  9/4/2018  Contact Type:  Face to Face    SUBJECTIVE:     Patient Findings     Positives No Problem Findings           OBJECTIVE    INR Protime   Date Value Ref Range Status   09/04/2018 2.7 (A) 0.86 - 1.14 Final       ASSESSMENT / PLAN  INR assessment THER    Recheck INR In: 6 WEEKS    INR Location Clinic      Anticoagulation Summary as of 9/4/2018     INR goal 2.0-3.0   Today's INR 2.7   Warfarin maintenance plan 3.75 mg (7.5 mg x 0.5) on Tue; 7.5 mg (7.5 mg x 1) all other days   Full warfarin instructions 3.75 mg on Tue; 7.5 mg all other days   Weekly warfarin total 48.75 mg   No change documented Rosalee Billingsley RN   Plan last modified Rosalee Billingsley RN (10/17/2017)   Next INR check 10/16/2018   Target end date     Indications   Long-term (current) use of anticoagulants [Z79.01] [Z79.01]  Chronic atrial fibrillation (H) [I48.2]         Anticoagulation Episode Summary     INR check location     Preferred lab     Send INR reminders to [facility]    Comments             See the Encounter Report to view Anticoagulation Flowsheet and Dosing Calendar (Go to Encounters tab in chart review, and find the Anticoagulation Therapy Visit)    Dosage adjustment made based on physician directed care plan.    Rosalee Billingsley RN

## 2018-09-25 ENCOUNTER — ALLIED HEALTH/NURSE VISIT (OUTPATIENT)
Dept: CARDIOLOGY | Facility: CLINIC | Age: 58
End: 2018-09-25
Payer: COMMERCIAL

## 2018-09-25 DIAGNOSIS — Z95.0 CARDIAC PACEMAKER IN SITU: Primary | ICD-10-CM

## 2018-09-25 PROCEDURE — 99207 ZZC NO CHARGE LOS: CPT

## 2018-09-25 NOTE — MR AVS SNAPSHOT
After Visit Summary   9/25/2018    Mckay Hsu    MRN: [id number]           Patient Information     Date Of Birth          1960        Visit Information        Provider Department      9/25/2018 2:30 PM JACK ARCE [facility]        Today's Diagnoses     Cardiac pacemaker in situ    -  1       Follow-ups after your visit        Your next 10 appointments already scheduled     Oct 16, 2018  2:30 PM CDT   Anticoagulation Visit with  ANTICOAGULATION CLINIC   [facility]    [address] [address] [phone]            Oct 30, 2018  2:30 PM CDT   Phone Device Check with SANTIAGO TECH1   [facility]   [place] ([facility])    [address] [id number]  [facility] [address] [phone] OPT 2              Who to contact     If you have questions or need follow up information about today's clinic visit or your schedule please contact [facility] directly at [phone].  Normal or non-critical lab and imaging results will be communicated to you by MyChart, letter or phone within 4 business days after the clinic has received the results. If you do not hear from us within 7 days, please contact the clinic through MyChart or phone. If you have a critical or abnormal lab result, we will notify you by phone as soon as possible.  Submit refill requests through Incuvo or call your pharmacy and they will forward the refill request to us. Please allow 3 business days for your refill to be completed.          Additional Information About Your Visit        Care EveryWhere ID     This is your Care EveryWhere ID. This could be used by other organizations to access your [facility] medical records  [id number]         Blood Pressure from Last 3 Encounters:   06/29/18 122/72   06/13/18 124/84   01/23/18 128/82     Weight from Last 3 Encounters:   06/29/18 143.8 kg (317 lb)   06/13/18 144.8 kg (319 lb 3.2 oz)   01/23/18 (!) 144.1 kg (317 lb 11.2 oz)              Today, you had the following     No orders found for display       Primary Care Provider Office Phone # Fax #    David Almendarez [phone]       [address] [facility] [address]        Equal Access to Services     DERIK GRIJALVA : Hadii aad ku hadasho Soomaali, waaxda luqadaha, qaybta kaalmada adeegyada, waxay idiin hayaan adeeg kharash laluis . So [facility] [phone].    ATENCIÓN: Si habla español, tiene a ruiz disposición servicios gratuitos de asistencia lingüística. Llame al [phone].    We comply with applicable federal civil rights laws and [place] laws. We do not discriminate on the basis of race, color, national origin, age, disability, sex, sexual orientation, or gender identity.            Thank you!     Thank you for choosing [facility]  for your care. Our goal is always to provide you with excellent care. Hearing back from our patients is one way we can continue to improve our services. Please take a few minutes to complete the written survey that you may receive in the mail after your visit with us. Thank you!             Your Updated Medication List - Protect others around you: Learn how to safely use, store and throw away your medicines at www.disposemymeds.org.          This list is accurate as of 9/25/18  2:37 PM.  Always use your most recent med list.                   Brand Name Dispense Instructions for use Diagnosis    amLODIPine 10 MG tablet    NORVASC    90 tablet    TAKE 1 TABLET(10 MG) BY MOUTH DAILY    Essential hypertension, benign       BASAGLAR 100 UNIT/ML injection     60 mL    Inject 60 Units Subcutaneous At Bedtime    Type 2 diabetes mellitus without complication, with long-term current use of insulin (H)       blood glucose monitoring test strip    ONETOUCH VERIO IQ     100 strip    Use to test blood sugars 3 times daily or as directed.    Type 2 diabetes mellitus without complication, with long-term current use of insulin (H)       fish oil-omega-3 fatty acids 1000 MG capsule      Take 4 capsules by mouth daily.        insulin aspart 100 UNIT/ML injection    NovoLOG FLEXPEN    30 mL    1 unit per 7g of carbs three times daily before meals    Type 2 diabetes mellitus without complication, with long-term current use of insulin (H)       insulin pen needle 31G X 5 MM    B-D U/F    100 each    Use 1 daily or as directed.    Type 2 diabetes mellitus without complication, with long-term current use of insulin (H)       losartan 100 MG tablet    COZAAR    90 tablet    Take 1 tablet (100 mg) by mouth daily    Essential hypertension, benign       metFORMIN 1000 MG tablet    GLUCOPHAGE    180 tablet    TAKE 1 TABLET(1000 MG) BY MOUTH TWICE DAILY WITH MEALS    Type 2 diabetes mellitus without complication, with long-term current use of insulin (H)       metoprolol succinate 100 MG 24 hr tablet    TOPROL-XL    90 tablet    Take 1 tablet (100 mg) by mouth daily    Essential hypertension, benign       MULTIVITAMIN PO      1 qd        NITROSTAT 0.4 MG sublingual tablet   Generic drug:  nitroGLYcerin      1 TAB EVERY 5 MIN AS NEEDED, UP TO 3 PER EPISODE        ONETOUCH LANCETS Misc     200 each    1 lancet by In Vitro route 3 times daily    Type 2 diabetes mellitus without complication, with long-term current use of insulin (H)       simvastatin 80 MG tablet    ZOCOR    45 tablet    TAKE 1/2 TABLET(40 MG) BY MOUTH AT BEDTIME    Hyperlipidemia LDL goal <100       warfarin 7.5 MG tablet    COUMADIN    90 tablet    1 tablet daily except  1/2 tablets on Mon as directed by the anticoagulation clinic    New onset atrial fibrillation (H)

## 2018-10-12 DIAGNOSIS — Z79.4 TYPE 2 DIABETES MELLITUS WITHOUT COMPLICATION, WITH LONG-TERM CURRENT USE OF INSULIN (H): ICD-10-CM

## 2018-10-12 DIAGNOSIS — E11.9 TYPE 2 DIABETES MELLITUS WITHOUT COMPLICATION, WITH LONG-TERM CURRENT USE OF INSULIN (H): ICD-10-CM

## 2018-10-12 NOTE — TELEPHONE ENCOUNTER
"Requested Prescriptions   Pending Prescriptions Disp Refills     blood glucose monitoring (ONETOUCH VERIO IQ) test strip 100 strip 5     Sig: Use to test blood sugars 3 times daily or as directed.    Diabetic Supplies Protocol Passed    10/12/2018 11:26 AM       Passed - Patient is 18 years of age or older       Passed - Recent (6 mo) or future (30 days) visit within the authorizing provider's specialty    Patient had office visit in the last 6 months or has a visit in the next 30 days with authorizing provider.  See \"Patient Info\" tab in inbasket, or \"Choose Columns\" in Meds & Orders section of the refill encounter.            Last Written Prescription Date:  2/06/2018  Last Fill Quantity: 100,  # refills: 5   Last office visit: 6/13/2018 with prescribing provider:  6/13/2018   Future Office Visit:      "

## 2018-10-16 ENCOUNTER — ANTICOAGULATION THERAPY VISIT (OUTPATIENT)
Dept: ANTICOAGULATION | Facility: CLINIC | Age: 58
End: 2018-10-16
Payer: COMMERCIAL

## 2018-10-16 DIAGNOSIS — Z23 NEED FOR PROPHYLACTIC VACCINATION AND INOCULATION AGAINST INFLUENZA: Primary | ICD-10-CM

## 2018-10-16 LAB — INR POINT OF CARE: 3.8 (ref 0.86–1.14)

## 2018-10-16 PROCEDURE — 90471 IMMUNIZATION ADMIN: CPT

## 2018-10-16 PROCEDURE — 36416 COLLJ CAPILLARY BLOOD SPEC: CPT

## 2018-10-16 PROCEDURE — 90682 RIV4 VACC RECOMBINANT DNA IM: CPT

## 2018-10-16 PROCEDURE — 85610 PROTHROMBIN TIME: CPT | Mod: QW

## 2018-10-16 NOTE — MR AVS SNAPSHOT
Mckay Gonzalesrobyn Hsu   10/16/2018 2:30 PM   Anticoagulation Therapy Visit    Description:  58 year old male   Provider:   ANTICOAGULATION CLINIC   Department:   Anti Coagulation           INR as of 10/16/2018     Today's INR 3.8!      Anticoagulation Summary as of 10/16/2018     INR goal 2.0-3.0   Today's INR 3.8!   Full warfarin instructions 10/16: Hold; Otherwise 3.75 mg on Tue; 7.5 mg all other days   Next INR check 11/20/2018    Indications   Long-term (current) use of anticoagulants [Z79.01] [Z79.01]  Chronic atrial fibrillation (H) [I48.2]         Your next Anticoagulation Clinic appointment(s)     Nov 20, 2018  1:30 PM CST   Anticoagulation Visit with  ANTICOAGULATION CLINIC   [facility] ([facility])    [address] [place] [address] [phone]              Contact Numbers     [facility]  Please call  [phone] to cancel and/or reschedule your appointment   Please call  [phone] with any problems or questions regarding your therapy.        October 2018 Details    Sun Mon Tue Wed Thu Fri Sat      1               2               3               4               5               6                 7               8               9               10               11               12               13                 14               15               16      Hold   See details      17      7.5 mg         18      7.5 mg         19      7.5 mg         20      7.5 mg           21      7.5 mg         22      7.5 mg         23      3.75 mg         24      7.5 mg         25      7.5 mg         26      7.5 mg         27      7.5 mg           28      7.5 mg         29      7.5 mg         30      3.75 mg         31      7.5 mg             Date Details   10/16 This INR check               How to take your warfarin dose     To take:  3.75 mg Take 0.5 of a 7.5 mg tablet.    To take:  7.5 mg Take 1 of the 7.5 mg tablets.    Hold Do not take your  warfarin dose. See the Details table to the right for additional instructions.                November 2018 Details    Sun Mon Tue Wed Thu Fri Sat         1      7.5 mg         2      7.5 mg         3      7.5 mg           4      7.5 mg         5      7.5 mg         6      3.75 mg         7      7.5 mg         8      7.5 mg         9      7.5 mg         10      7.5 mg           11      7.5 mg         12      7.5 mg         13      3.75 mg         14      7.5 mg         15      7.5 mg         16      7.5 mg         17      7.5 mg           18      7.5 mg         19      7.5 mg         20            21               22               23               24                 25               26               27               28               29               30                 Date Details   No additional details    Date of next INR:  11/20/2018         How to take your warfarin dose     To take:  3.75 mg Take 0.5 of a 7.5 mg tablet.    To take:  7.5 mg Take 1 of the 7.5 mg tablets.

## 2018-10-30 ENCOUNTER — ALLIED HEALTH/NURSE VISIT (OUTPATIENT)
Dept: CARDIOLOGY | Facility: CLINIC | Age: 58
End: 2018-10-30
Payer: COMMERCIAL

## 2018-10-30 ENCOUNTER — DOCUMENTATION ONLY (OUTPATIENT)
Dept: CARDIOLOGY | Facility: CLINIC | Age: 58
End: 2018-10-30

## 2018-10-30 DIAGNOSIS — I49.5 SINUS NODE DYSFUNCTION (H): Primary | ICD-10-CM

## 2018-10-30 DIAGNOSIS — Z95.0 CARDIAC PACEMAKER IN SITU: Primary | ICD-10-CM

## 2018-10-30 PROCEDURE — 93293 PM PHONE R-STRIP DEVICE EVAL: CPT | Performed by: INTERNAL MEDICINE

## 2018-10-30 NOTE — MR AVS SNAPSHOT
"              After Visit Summary   10/30/2018    Mckay Hsu    MRN: [id number]           Patient Information     Date Of Birth          1960        Visit Information        Provider Department      10/30/2018 2:30 PM [facility]        Today's Diagnoses     Cardiac pacemaker in situ    -  1       Follow-ups after your visit        Your next 10 appointments already scheduled     Nov 20, 2018  1:30 PM CST   Anticoagulation Visit with  ANTICOAGULATION CLINIC   [facility] ([facility])    [address] [address] [phone]              Who to contact     If you have questions or need follow up information about today's clinic visit or your schedule please contact [facility] directly at [phone].  Normal or non-critical lab and imaging results will be communicated to you by RECESS.hart, letter or phone within 4 business days after the clinic has received the results. If you do not hear from us within 7 days, please contact the clinic through [email or web address] or phone. If you have a critical or abnormal lab result, we will notify you by phone as soon as possible.  Submit refill requests through innRoad or call your pharmacy and they will forward the refill request to us. Please allow 3 business days for your refill to be completed.          Additional Information About Your Visit        MyChart Information     innRoad lets you send messages to your doctor, view your test results, renew your prescriptions, schedule appointments and more. To sign up, go to [email or web address] . Click on \"Log in\" on the left side of the screen, which will take you to the Welcome page. Then click on \"Sign up Now\" on the right side of the page.     You will be asked to enter the access code listed below, as well as some personal information. Please follow the directions to " create your username and password.     Your access code is: [email or web address]  Expires: 2019  2:57 PM     Your access code will  in 90 days. If you need help or a new code, please call your [facility] clinic or [phone].        Care EveryWhere ID     This is your Care EveryWhere ID. This could be used by other organizations to access your [facility] medical records  [id number]         Blood Pressure from Last 3 Encounters:   18 122/72   18 124/84   18 128/82    Weight from Last 3 Encounters:   18 143.8 kg (317 lb)   18 144.8 kg (319 lb 3.2 oz)   18 (!) 144.1 kg (317 lb 11.2 oz)              We Performed the Following     PM PHONE R STRIP EVAL UP TO 90 DAYS ([phone])        Primary Care Provider Office Phone # Fax #    David Almendarez [phone]       [address] [place] [phone]        Equal Access to Services     [facility] : Hadii aad ku hadasho Soomaali, waaxda luqadaha, qaybta kaalmada adeprudenceyada, jessica saravia . So [facility] [phone].    ATENCIÓN: Si habla español, tiene a ruiz disposición servicios gratuitos de asistencia lingüística. [facility] [phone].    We comply with applicable federal civil rights laws and [place] laws. We do not discriminate on the basis of race, color, national origin, age, disability, sex, sexual orientation, or gender identity.            Thank you!     Thank you for choosing [facility]  for your care. Our goal is always to provide you with excellent care. Hearing back from our patients is one way we can continue to improve our services. Please take a few minutes to complete the written survey that you may receive in the mail after your visit with us. Thank you!             Your Updated Medication List - Protect others around you: Learn how to safely use, store and throw away your medicines at www.myVBOemFixyaeds.org.          This list is accurate  as of 10/30/18  2:57 PM.  Always use your most recent med list.                   Brand Name Dispense Instructions for use Diagnosis    amLODIPine 10 MG tablet    NORVASC    90 tablet    TAKE 1 TABLET(10 MG) BY MOUTH DAILY    Essential hypertension, benign       BASAGLAR 100 UNIT/ML injection     60 mL    Inject 60 Units Subcutaneous At Bedtime    Type 2 diabetes mellitus without complication, with long-term current use of insulin (H)       blood glucose monitoring test strip    ONETOUCH VERIO IQ    100 strip    Use to test blood sugars 3 times daily or as directed.    Type 2 diabetes mellitus without complication, with long-term current use of insulin (H)       fish oil-omega-3 fatty acids 1000 MG capsule      Take 4 capsules by mouth daily.        insulin aspart 100 UNIT/ML injection    NovoLOG FLEXPEN    30 mL    1 unit per 7g of carbs three times daily before meals    Type 2 diabetes mellitus without complication, with long-term current use of insulin (H)       insulin pen needle 31G X 5 MM    B-D U/F    100 each    Use 1 daily or as directed.    Type 2 diabetes mellitus without complication, with long-term current use of insulin (H)       losartan 100 MG tablet    COZAAR    90 tablet    Take 1 tablet (100 mg) by mouth daily    Essential hypertension, benign       metFORMIN 1000 MG tablet    GLUCOPHAGE    180 tablet    TAKE 1 TABLET(1000 MG) BY MOUTH TWICE DAILY WITH MEALS    Type 2 diabetes mellitus without complication, with long-term current use of insulin (H)       metoprolol succinate 100 MG 24 hr tablet    TOPROL-XL    90 tablet    Take 1 tablet (100 mg) by mouth daily    Essential hypertension, benign       MULTIVITAMIN PO      1 qd        NITROSTAT 0.4 MG sublingual tablet   Generic drug:  nitroGLYcerin      1 TAB EVERY 5 MIN AS NEEDED, UP TO 3 PER EPISODE        ONETOUCH LANCETS Misc     200 each    1 lancet by In Vitro route 3 times daily    Type 2 diabetes mellitus without complication, with long-term  current use of insulin (H)       simvastatin 80 MG tablet    ZOCOR    45 tablet    TAKE 1/2 TABLET(40 MG) BY MOUTH AT BEDTIME    Hyperlipidemia LDL goal <100       warfarin 7.5 MG tablet    COUMADIN    90 tablet    1 tablet daily except  1/2 tablets on Mon as directed by the anticoagulation clinic    New onset atrial fibrillation (H)

## 2018-10-30 NOTE — PROGRESS NOTES
~30 day phone teletrace  Device is at JULIA. Will notify EP RN to schedule H&P and generator change. RODRIGUEZ Conrad

## 2018-10-30 NOTE — PROGRESS NOTES
Device has reached JULIA - paper orders filled out and signed by Dr Holliday, epic orders entered. Given to Madelaine in scheduling. SK

## 2018-11-16 ENCOUNTER — OFFICE VISIT (OUTPATIENT)
Dept: CARDIOLOGY | Facility: CLINIC | Age: 58
End: 2018-11-16
Payer: COMMERCIAL

## 2018-11-16 VITALS
WEIGHT: 315 LBS | DIASTOLIC BLOOD PRESSURE: 72 MMHG | SYSTOLIC BLOOD PRESSURE: 128 MMHG | HEIGHT: 72 IN | BODY MASS INDEX: 42.66 KG/M2

## 2018-11-16 DIAGNOSIS — Z95.0 BIVENTRICULAR CARDIAC PACEMAKER IN SITU: ICD-10-CM

## 2018-11-16 DIAGNOSIS — I48.20 CHRONIC ATRIAL FIBRILLATION (H): Primary | ICD-10-CM

## 2018-11-16 PROCEDURE — 99214 OFFICE O/P EST MOD 30 MIN: CPT | Performed by: PHYSICIAN ASSISTANT

## 2018-11-16 PROCEDURE — 93000 ELECTROCARDIOGRAM COMPLETE: CPT | Performed by: PHYSICIAN ASSISTANT

## 2018-11-16 NOTE — MR AVS SNAPSHOT
After Visit Summary   11/16/2018    Mckay Hsu    MRN: [id number]           Patient Information     Date Of Birth          1960        Visit Information        Provider Department      11/16/2018 3:10 PM Peggy Caruso PA-C [facility]        Today's Diagnoses     Chronic atrial fibrillation (H)    -  1      Care Instructions    1. Discussed plan for generator replacement 11/29    2. Device nurses are @ [phone] if any questions          Follow-ups after your visit        Your next 10 appointments already scheduled     Nov 20, 2018  1:30 PM CST   Anticoagulation Visit with [facility]X ANTICOAGULATION CLINIC   [facility]    [address] [address] [phone]            Nov 29, 2018  1:00 PM CST   Ep 90 Minute with SHCVR3   [facility] ([facility])    [address]  [facility] [id number] [phone]              Who to contact     If you have questions or need follow up information about today's clinic visit or your schedule please contact [facility] directly at [phone].  Normal or non-critical lab and imaging results will be communicated to you by GreatCallhart, letter or phone within 4 business days after the clinic has received the results. If you do not hear from us within 7 days, please contact the clinic through GreatCallhart or phone. If you have a critical or abnormal lab result, we will notify you by phone as soon as possible.  Submit refill requests through ProspectStream or call your pharmacy and they will forward the refill request to us. Please allow 3 business days for your refill to be completed.          Additional Information About Your Visit        GreatCallharNogle Technologies Information     ProspectStream lets you send messages to your doctor, view your test results, renew your  "prescriptions, schedule appointments and more. To sign up, go to [email or web address] . Click on \"Log in\" on the left side of the screen, which will take you to the Welcome page. Then click on \"Sign up Now\" on the right side of the page.     You will be asked to enter the access code listed below, as well as some personal information. Please follow the directions to create your username and password.     Your access code is: [email or web address]  Expires: 2019  1:57 PM     Your access code will  in 90 days. If you need help or a new code, please call your [facility] clinic or [phone].        Care EveryWhere ID     This is your Care EveryWhere ID. This could be used by other organizations to access your [facility] medical records  [id number]        Your Vitals Were     Height BMI (Body Mass Index)                1.829 m (6') 43.94 kg/m2           Blood Pressure from Last 3 Encounters:   18 128/72   18 122/72   18 124/84    Weight from Last 3 Encounters:   18 147 kg (324 lb)   18 143.8 kg (317 lb)   18 144.8 kg (319 lb 3.2 oz)              We Performed the Following     EKG 12-lead complete w/read - Clinics (performed today)        Primary Care Provider Office Phone # Fax #    David Almendarez [phone]       [address] [place] [phone]        Equal Access to Services     [facility]: Hadii quin clements hadasho Soomaali, waaxda luqadaha, qaybta kaalmada adeorlin, jessica saravia . So [facility] [phone].    ATENCIÓN: Si habla español, tiene a ruiz disposición servicios gratuitos de asistencia lingüística. Llame al [phone].    We comply with applicable federal civil rights laws and [place] laws. We do not discriminate on the basis of race, color, national origin, age, disability, sex, sexual orientation, or gender identity.            Thank you!     Thank you for choosing [facility]" [facility]  for your care. Our goal is always to provide you with excellent care. Hearing back from our patients is one way we can continue to improve our services. Please take a few minutes to complete the written survey that you may receive in the mail after your visit with us. Thank you!             Your Updated Medication List - Protect others around you: Learn how to safely use, store and throw away your medicines at www.disposemymeds.org.          This list is accurate as of 11/16/18  3:29 PM.  Always use your most recent med list.                   Brand Name Dispense Instructions for use Diagnosis    amLODIPine 10 MG tablet    NORVASC    90 tablet    TAKE 1 TABLET(10 MG) BY MOUTH DAILY    Essential hypertension, benign       blood glucose monitoring test strip    ONETOUCH VERIO IQ    100 strip    Use to test blood sugars 3 times daily or as directed.    Type 2 diabetes mellitus without complication, with long-term current use of insulin (H)       fish oil-omega-3 fatty acids 1000 MG capsule      Take 4 capsules by mouth daily.        insulin aspart 100 UNIT/ML injection    NovoLOG FLEXPEN    30 mL    1 unit per 7g of carbs three times daily before meals    Type 2 diabetes mellitus without complication, with long-term current use of insulin (H)       insulin glargine 100 UNIT/ML pen     60 mL    Inject 60 Units Subcutaneous At Bedtime    Type 2 diabetes mellitus without complication, with long-term current use of insulin (H)       insulin pen needle 31G X 5 MM miscellaneous    B-D U/F    100 each    Use 1 daily or as directed.    Type 2 diabetes mellitus without complication, with long-term current use of insulin (H)       losartan 100 MG tablet    COZAAR    90 tablet    Take 1 tablet (100 mg) by mouth daily    Essential hypertension, benign       metFORMIN 1000 MG tablet    GLUCOPHAGE    180 tablet    TAKE 1 TABLET(1000 MG) BY MOUTH TWICE DAILY WITH MEALS    Type 2 diabetes mellitus without complication, with  long-term current use of insulin (H)       metoprolol succinate 100 MG 24 hr tablet    TOPROL-XL    90 tablet    Take 1 tablet (100 mg) by mouth daily    Essential hypertension, benign       MULTIVITAMIN PO      1 qd        NITROSTAT 0.4 MG sublingual tablet   Generic drug:  nitroGLYcerin      1 TAB EVERY 5 MIN AS NEEDED, UP TO 3 PER EPISODE        ONETOUCH LANCETS Misc     200 each    1 lancet by In Vitro route 3 times daily    Type 2 diabetes mellitus without complication, with long-term current use of insulin (H)       simvastatin 80 MG tablet    ZOCOR    45 tablet    TAKE 1/2 TABLET(40 MG) BY MOUTH AT BEDTIME    Hyperlipidemia LDL goal <100       warfarin 7.5 MG tablet    COUMADIN    90 tablet    1 tablet daily except  1/2 tablets on Mon as directed by the anticoagulation clinic    New onset atrial fibrillation (H)

## 2018-11-16 NOTE — LETTER
"11/16/2018    David Almendarez MD  [address] [facility] [address]    RE: Mckay Hsu       Dear Colleague,    I had the pleasure of seeing Mckay Adán Shadi in the [facility].    HPI:   I had the pleasure of seeing Doyle when he came for preoperative evaluation prior to planned generator replacement. He sees Dr. Holliday for his history of:    1.  Atrial fibrillation with tachycardia induced cardiomyopathy, status post AV node ablation and biventricular pacemaker implant 4/2010.  There was concern for early battery depletion, and his device has been checked routinely.  He previously failed amiodarone.  He remains on warfarin    2.  Diabetes, on metformin and insulin   3.  Hypertension    I saw him back in 6/2018 at which time he was doing well.  He was noting some mild lower extremity edema which always appeared to be worse in the hot weather, but overall was doing well.  He has continued to get his device checked routinely, and on 10/30, triggered JULIA.  He has been scheduled for Medtronic biventricular generator replacement on 10/29.    Overall, he feels \"pretty well.\"  He knows that he does not feel quite as good since he triggered JULIA, and has noticed that his heart rate has dropped.  He notes this especially when trying to go up 14 stairs at his home.  I did confirm with Niru, our device nurse, that his device would automatically reprogram to VVI 65 to conserve battery status.    Echocardiogram 6/2017 showed an EF of 55-60% with 1+ tricuspid regurgitation.  EKG today showed  (does not appear to be Bi-V Capturing despite 2 V spikes b/c positive in both I and V1)    Assessment & Plan:    1.  Atrial fibrillation, status post AV node ablation and BiV pacemaker, now at JULIA    Patient is dependent, status post AV node ablation.  Last full device interrogation 5/2018 showed good sensing, pacing and impedance.    He is now switched to VVI 65, and does note some mild shortness of " breath with exertion    Remains on warfarin    PLAN:    Biventricular pacemaker generator replacement is scheduled for 11/29.  We discussed the risks, benefits and indications of the procedure, including but not limited to peripheral vessel injury, pneumothorax, cardiac puncture and/or tamponade, device malfunction, infection requiring explantation of the device and long term antibiotics. He voiced understanding and is willing to proceed. A consent form will be signed by the procedural physician.    He understands that he may be sent home the same day, and require a .  Has he is dependent, he may be requested to stay overnight.    I explained that the device clinic will likely call him the following day to set up 7-10-day follow-up, but then he will likely proceed with just routine 3-month remote monitoring.    Continue warfarin.  He tells me he was told to hold warfarin for 1 day prior to the procedure      2.  Tachycardia induced cardiomyopathy    Ejection fraction improved to 55-60% on echocardiogram 6/2017    Lower extremity edema noted, but no evidence of overt heart failure    PLAN:    Continue ARB    Continue device checks for AV node ablation/BiV pacemaker, ensuring that EKG changes after device reprogramming/reimplant are noted.        Jane Caruso PA-C, MSPAS      Orders Placed This Encounter   Procedures     EKG 12-lead complete w/read - Clinics (performed today)     No orders of the defined types were placed in this encounter.    There are no discontinued medications.      Encounter Diagnosis   Name Primary?     Chronic atrial fibrillation (H) Yes       CURRENT MEDICATIONS:  Current Outpatient Prescriptions   Medication Sig Dispense Refill     amLODIPine (NORVASC) 10 MG tablet TAKE 1 TABLET(10 MG) BY MOUTH DAILY 90 tablet 2     BASAGLAR 100 UNIT/ML injection Inject 60 Units Subcutaneous At Bedtime 60 mL 3     blood glucose monitoring (ONETOUCH VERIO IQ) test strip Use to test blood sugars 3 times  daily or as directed. 100 strip 1     fish oil-omega-3 fatty acids 1000 MG capsule Take 4 capsules by mouth daily.       insulin aspart (NOVOLOG FLEXPEN) 100 UNIT/ML injection 1 unit per 7g of carbs three times daily before meals 30 mL 5     insulin pen needle (B-D U/F) 31G X 5 MM Use 1 daily or as directed. 100 each prn     losartan (COZAAR) 100 MG tablet Take 1 tablet (100 mg) by mouth daily 90 tablet 3     metoprolol succinate (TOPROL-XL) 100 MG 24 hr tablet Take 1 tablet (100 mg) by mouth daily 90 tablet 3     MULTIVITAMIN OR 1 qd       NITROSTAT 0.4 MG SL SUBL 1 TAB EVERY 5 MIN AS NEEDED, UP TO 3 PER EPISODE       ONETOUCH LANCETS MISC 1 lancet by In Vitro route 3 times daily 200 each 3     simvastatin (ZOCOR) 80 MG tablet TAKE 1/2 TABLET(40 MG) BY MOUTH AT BEDTIME 45 tablet 3     warfarin (COUMADIN) 7.5 MG tablet 1 tablet daily except  1/2 tablets on Mon as directed by the anticoagulation clinic 90 tablet 0     metFORMIN (GLUCOPHAGE) 1000 MG tablet TAKE 1 TABLET(1000 MG) BY MOUTH TWICE DAILY WITH MEALS 180 tablet 3       ALLERGIES   No Known Allergies    PAST MEDICAL HISTORY:  Past Medical History:   Diagnosis Date     Atrial fibrillation (H)     s/p AVN ablation, BIV PPM     Congestive heart failure (H)     tachycardia-induced cardiomyopathy     Essential hypertension, benign      Hyperlipidemia LDL goal <100 10/31/2010     Hyponatremia 9/8/2009     Morbid obesity (H)      Open wound of foot except toe(s) alone, without mention of complication      Type 2 diabetes, HbA1C goal < 8% (H) 6/26/2012       PAST SURGICAL HISTORY:  Past Surgical History:   Procedure Laterality Date     C NONSPECIFIC PROCEDURE  06/2007    debritement     CARDIOVERSION  9/2009, 10/2009     H ABLATION AV NODE  4/8/10     IMPLANT PACEMAKER  4/8/10    BIV PPM- Medtronic InSync III     TONSILLECTOMY      as kid       FAMILY HISTORY:  Family History   Problem Relation Age of Onset     Diabetes Mother      Hypertension Father      Diabetes  Father      Hypertension Maternal Grandmother      Diabetes Maternal Grandmother      Diabetes Maternal Grandfather      Hypertension Maternal Grandfather      Cardiovascular Maternal Grandfather      Hypertension Paternal Grandfather      Cardiovascular Paternal Grandfather      Diabetes Sister      Hypertension Sister        SOCIAL HISTORY:  Social History     Social History     Marital status: Single     Spouse name: N/A     Number of children: N/A     Years of education: N/A     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[employer]     Social History Main Topics     Smoking status: Never Smoker     Smokeless tobacco: Never Used     Alcohol use No     Drug use: No     Sexual activity: No     Other Topics Concern     Caffeine Concern Yes     occasionally soda     Special Diet Yes     low sodium, low fat, DM diet      Exercise Yes     walking, stairs      Social History Narrative       Review of Systems:  Skin:  Negative     Eyes:  Positive for glasses;visual blurring  ENT:  Negative    Respiratory:  Positive for dyspnea on exertion  Cardiovascular:  Negative for;palpitations;chest pain Positive for;exercise intolerance;edema  Gastroenterology: Negative for melena;hematochezia  Genitourinary:  Negative    Musculoskeletal:  Positive for arthritis  Neurologic:  Negative    Psychiatric:  Negative    Heme/Lymph/Imm:  Negative    Endocrine:  Positive for diabetes    Physical Exam:  Vitals: /72  Ht 1.829 m (6')  Wt 147 kg (324 lb)  BMI 43.94 kg/m2    Constitutional:  cooperative, alert and oriented, well developed, well nourished, in no acute distress        Skin:  warm and dry to the touch, no apparent skin lesions or masses noted        Head:  normocephalic, no masses or lesions        Eyes:  pupils equal and round;conjunctivae and lids unremarkable;sclera white;no xanthalasma        ENT:  no pallor or cyanosis, dentition good        Neck:  JVP normal;no carotid bruit        Chest:  clear to auscultation         Cardiac: regular rhythm;normal S1 and S2;no S3 or S4;no murmurs, gallops or rubs detected                  Abdomen:  abdomen soft obese      Vascular: pulses full and equal                                      Extremities and Back:  no deformities, clubbing, cyanosis, erythema observed        Neurological:  no gross motor deficits          Recent Lab Results:  LIPID RESULTS:  Lab Results   Component Value Date    CHOL 99 06/11/2018    HDL 44 06/11/2018    LDL 25 06/11/2018    TRIG 149 06/11/2018    CHOLHDLRATIO 2.6 10/02/2014       LIVER ENZYME RESULTS:  Lab Results   Component Value Date    AST 20 06/11/2018    ALT 28 06/11/2018       CBC RESULTS:  Lab Results   Component Value Date    WBC 10.4 05/28/2015    RBC 4.88 05/28/2015    HGB 14.0 06/13/2018    HCT 43.4 05/28/2015    MCV 89 05/28/2015    MCH 29.7 05/28/2015    MCHC 33.4 05/28/2015    RDW 14.3 05/28/2015     05/28/2015       BMP RESULTS:  Lab Results   Component Value Date     06/11/2018    POTASSIUM 4.5 06/11/2018    CHLORIDE 103 06/11/2018    CO2 28 06/11/2018    ANIONGAP 6 06/11/2018     (H) 06/11/2018    BUN 20 06/11/2018    CR 1.10 06/11/2018    GFRESTIMATED 69 06/11/2018    GFRESTBLACK 83 06/11/2018    JACI 9.1 06/11/2018        A1C RESULTS:  Lab Results   Component Value Date    A1C 6.4 (H) 06/11/2018       INR RESULTS:  Lab Results   Component Value Date    INR 3.8 (A) 10/16/2018    INR 2.7 (A) 09/04/2018    INR 1.27 (H) 04/09/2010    INR 1.39 (H) 04/08/2010         Thank you for allowing me to participate in the care of your patient.    Sincerely,     Peggy Caruso PA-C     [facility]

## 2018-11-16 NOTE — PROGRESS NOTES
"HPI:   I had the pleasure of seeing Doyle when he came for preoperative evaluation prior to planned generator replacement. He sees Dr. Holliday for his history of:    1.  Atrial fibrillation with tachycardia induced cardiomyopathy, status post AV node ablation and biventricular pacemaker implant 4/2010.  There was concern for early battery depletion, and his device has been checked routinely.  He previously failed amiodarone.  He remains on warfarin    2.  Diabetes, on metformin and insulin   3.  Hypertension    I saw him back in 6/2018 at which time he was doing well.  He was noting some mild lower extremity edema which always appeared to be worse in the hot weather, but overall was doing well.  He has continued to get his device checked routinely, and on 10/30, triggered JULIA.  He has been scheduled for Medtronic biventricular generator replacement on 10/29.    Overall, he feels \"pretty well.\"  He knows that he does not feel quite as good since he triggered JULIA, and has noticed that his heart rate has dropped.  He notes this especially when trying to go up 14 stairs at his home.  I did confirm with Niru, our device nurse, that his device would automatically reprogram to VVI 65 to conserve battery status.    Echocardiogram 6/2017 showed an EF of 55-60% with 1+ tricuspid regurgitation.  EKG today showed  (does not appear to be Bi-V Capturing despite 2 V spikes b/c positive in both I and V1)    Assessment & Plan:    1.  Atrial fibrillation, status post AV node ablation and BiV pacemaker, now at JULIA    Patient is dependent, status post AV node ablation.  Last full device interrogation 5/2018 showed good sensing, pacing and impedance.    He is now switched to VVI 65, and does note some mild shortness of breath with exertion    Remains on warfarin    PLAN:    Biventricular pacemaker generator replacement is scheduled for 11/29.  We discussed the risks, benefits and indications of the procedure, including but not limited to " peripheral vessel injury, pneumothorax, cardiac puncture and/or tamponade, device malfunction, infection requiring explantation of the device and long term antibiotics. He voiced understanding and is willing to proceed. A consent form will be signed by the procedural physician.    He understands that he may be sent home the same day, and require a .  Has he is dependent, he may be requested to stay overnight.    I explained that the device clinic will likely call him the following day to set up 7-10-day follow-up, but then he will likely proceed with just routine 3-month remote monitoring.    Continue warfarin.  He tells me he was told to hold warfarin for 1 day prior to the procedure      2.  Tachycardia induced cardiomyopathy    Ejection fraction improved to 55-60% on echocardiogram 6/2017    Lower extremity edema noted, but no evidence of overt heart failure    PLAN:    Continue ARB    Continue device checks for AV node ablation/BiV pacemaker, ensuring that EKG changes after device reprogramming/reimplant are noted.        Jane Caruso PA-C, MSPAS      Orders Placed This Encounter   Procedures     EKG 12-lead complete w/read - Clinics (performed today)     No orders of the defined types were placed in this encounter.    There are no discontinued medications.      Encounter Diagnosis   Name Primary?     Chronic atrial fibrillation (H) Yes       CURRENT MEDICATIONS:  Current Outpatient Prescriptions   Medication Sig Dispense Refill     amLODIPine (NORVASC) 10 MG tablet TAKE 1 TABLET(10 MG) BY MOUTH DAILY 90 tablet 2     BASAGLAR 100 UNIT/ML injection Inject 60 Units Subcutaneous At Bedtime 60 mL 3     blood glucose monitoring (ONETOUCH VERIO IQ) test strip Use to test blood sugars 3 times daily or as directed. 100 strip 1     fish oil-omega-3 fatty acids 1000 MG capsule Take 4 capsules by mouth daily.       insulin aspart (NOVOLOG FLEXPEN) 100 UNIT/ML injection 1 unit per 7g of carbs three times daily before  meals 30 mL 5     insulin pen needle (B-D U/F) 31G X 5 MM Use 1 daily or as directed. 100 each prn     losartan (COZAAR) 100 MG tablet Take 1 tablet (100 mg) by mouth daily 90 tablet 3     metoprolol succinate (TOPROL-XL) 100 MG 24 hr tablet Take 1 tablet (100 mg) by mouth daily 90 tablet 3     MULTIVITAMIN OR 1 qd       NITROSTAT 0.4 MG SL SUBL 1 TAB EVERY 5 MIN AS NEEDED, UP TO 3 PER EPISODE       ONETOUCH LANCETS MISC 1 lancet by In Vitro route 3 times daily 200 each 3     simvastatin (ZOCOR) 80 MG tablet TAKE 1/2 TABLET(40 MG) BY MOUTH AT BEDTIME 45 tablet 3     warfarin (COUMADIN) 7.5 MG tablet 1 tablet daily except  1/2 tablets on Mon as directed by the anticoagulation clinic 90 tablet 0     metFORMIN (GLUCOPHAGE) 1000 MG tablet TAKE 1 TABLET(1000 MG) BY MOUTH TWICE DAILY WITH MEALS 180 tablet 3       ALLERGIES   No Known Allergies    PAST MEDICAL HISTORY:  Past Medical History:   Diagnosis Date     Atrial fibrillation (H)     s/p AVN ablation, BIV PPM     Congestive heart failure (H)     tachycardia-induced cardiomyopathy     Essential hypertension, benign      Hyperlipidemia LDL goal <100 10/31/2010     Hyponatremia 9/8/2009     Morbid obesity (H)      Open wound of foot except toe(s) alone, without mention of complication      Type 2 diabetes, HbA1C goal < 8% (H) 6/26/2012       PAST SURGICAL HISTORY:  Past Surgical History:   Procedure Laterality Date     C NONSPECIFIC PROCEDURE  06/2007    debritement     CARDIOVERSION  9/2009, 10/2009     H ABLATION AV NODE  4/8/10     IMPLANT PACEMAKER  4/8/10    BIV PPM- Medtronic InSync III     TONSILLECTOMY      as kid       FAMILY HISTORY:  Family History   Problem Relation Age of Onset     Diabetes Mother      Hypertension Father      Diabetes Father      Hypertension Maternal Grandmother      Diabetes Maternal Grandmother      Diabetes Maternal Grandfather      Hypertension Maternal Grandfather      Cardiovascular Maternal Grandfather      Hypertension Paternal  Grandfather      Cardiovascular Paternal Grandfather      Diabetes Sister      Hypertension Sister        SOCIAL HISTORY:  Social History     Social History     Marital status: Single     Spouse name: N/A     Number of children: N/A     Years of education: N/A     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[employer]     Social History Main Topics     Smoking status: Never Smoker     Smokeless tobacco: Never Used     Alcohol use No     Drug use: No     Sexual activity: No     Other Topics Concern     Caffeine Concern Yes     occasionally soda     Special Diet Yes     low sodium, low fat, DM diet      Exercise Yes     walking, stairs      Social History Narrative       Review of Systems:  Skin:  Negative     Eyes:  Positive for glasses;visual blurring  ENT:  Negative    Respiratory:  Positive for dyspnea on exertion  Cardiovascular:  Negative for;palpitations;chest pain Positive for;exercise intolerance;edema  Gastroenterology: Negative for melena;hematochezia  Genitourinary:  Negative    Musculoskeletal:  Positive for arthritis  Neurologic:  Negative    Psychiatric:  Negative    Heme/Lymph/Imm:  Negative    Endocrine:  Positive for diabetes    Physical Exam:  Vitals: /72  Ht 1.829 m (6')  Wt 147 kg (324 lb)  BMI 43.94 kg/m2    Constitutional:  cooperative, alert and oriented, well developed, well nourished, in no acute distress        Skin:  warm and dry to the touch, no apparent skin lesions or masses noted        Head:  normocephalic, no masses or lesions        Eyes:  pupils equal and round;conjunctivae and lids unremarkable;sclera white;no xanthalasma        ENT:  no pallor or cyanosis, dentition good        Neck:  JVP normal;no carotid bruit        Chest:  clear to auscultation        Cardiac: regular rhythm;normal S1 and S2;no S3 or S4;no murmurs, gallops or rubs detected                  Abdomen:  abdomen soft obese      Vascular: pulses full and equal                                      Extremities  and Back:  no deformities, clubbing, cyanosis, erythema observed        Neurological:  no gross motor deficits          Recent Lab Results:  LIPID RESULTS:  Lab Results   Component Value Date    CHOL 99 06/11/2018    HDL 44 06/11/2018    LDL 25 06/11/2018    TRIG 149 06/11/2018    CHOLHDLRATIO 2.6 10/02/2014       LIVER ENZYME RESULTS:  Lab Results   Component Value Date    AST 20 06/11/2018    ALT 28 06/11/2018       CBC RESULTS:  Lab Results   Component Value Date    WBC 10.4 05/28/2015    RBC 4.88 05/28/2015    HGB 14.0 06/13/2018    HCT 43.4 05/28/2015    MCV 89 05/28/2015    MCH 29.7 05/28/2015    MCHC 33.4 05/28/2015    RDW 14.3 05/28/2015     05/28/2015       BMP RESULTS:  Lab Results   Component Value Date     06/11/2018    POTASSIUM 4.5 06/11/2018    CHLORIDE 103 06/11/2018    CO2 28 06/11/2018    ANIONGAP 6 06/11/2018     (H) 06/11/2018    BUN 20 06/11/2018    CR 1.10 06/11/2018    GFRESTIMATED 69 06/11/2018    GFRESTBLACK 83 06/11/2018    JACI 9.1 06/11/2018        A1C RESULTS:  Lab Results   Component Value Date    A1C 6.4 (H) 06/11/2018       INR RESULTS:  Lab Results   Component Value Date    INR 3.8 (A) 10/16/2018    INR 2.7 (A) 09/04/2018    INR 1.27 (H) 04/09/2010    INR 1.39 (H) 04/08/2010

## 2018-11-16 NOTE — LETTER
"11/16/2018    David Almendarez MD  [address] [facility] [address]    RE: Mckay Hsu       Dear Colleague,    I had the pleasure of seeing Mckay Adán Shadi in the [facility].    HPI:   I had the pleasure of seeing Doyle when he came for preoperative evaluation prior to planned generator replacement. He sees Dr. Holliday for his history of:    1.  Atrial fibrillation with tachycardia induced cardiomyopathy, status post AV node ablation and biventricular pacemaker implant 4/2010.  There was concern for early battery depletion, and his device has been checked routinely.  He previously failed amiodarone.  He remains on warfarin    2.  Diabetes, on metformin and insulin   3.  Hypertension    I saw him back in 6/2018 at which time he was doing well.  He was noting some mild lower extremity edema which always appeared to be worse in the hot weather, but overall was doing well.  He has continued to get his device checked routinely, and on 10/30, triggered JULIA.  He has been scheduled for Medtronic biventricular generator replacement on 10/29.    Overall, he feels \"pretty well.\"  He knows that he does not feel quite as good since he triggered JULIA, and has noticed that his heart rate has dropped.  He notes this especially when trying to go up 14 stairs at his home.  I did confirm with Niru, our device nurse, that his device would automatically reprogram to VVI 65 to conserve battery status.    Echocardiogram 6/2017 showed an EF of 55-60% with 1+ tricuspid regurgitation.  EKG today showed  (does not appear to be Bi-V Capturing despite 2 V spikes b/c positive in both I and V1)    Assessment & Plan:    1.  Atrial fibrillation, status post AV node ablation and BiV pacemaker, now at JULIA    Patient is dependent, status post AV node ablation.  Last full device interrogation 5/2018 showed good sensing, pacing and impedance.    He is now switched to VVI 65, and does note some mild shortness of " breath with exertion    Remains on warfarin    PLAN:    Biventricular pacemaker generator replacement is scheduled for 11/29.  We discussed the risks, benefits and indications of the procedure, including but not limited to peripheral vessel injury, pneumothorax, cardiac puncture and/or tamponade, device malfunction, infection requiring explantation of the device and long term antibiotics. He voiced understanding and is willing to proceed. A consent form will be signed by the procedural physician.    He understands that he may be sent home the same day, and require a .  Has he is dependent, he may be requested to stay overnight.    I explained that the device clinic will likely call him the following day to set up 7-10-day follow-up, but then he will likely proceed with just routine 3-month remote monitoring.    Continue warfarin.  He tells me he was told to hold warfarin for 1 day prior to the procedure      2.  Tachycardia induced cardiomyopathy    Ejection fraction improved to 55-60% on echocardiogram 6/2017    Lower extremity edema noted, but no evidence of overt heart failure    PLAN:    Continue ARB    Continue device checks for AV node ablation/BiV pacemaker, ensuring that EKG changes after device reprogramming/reimplant are noted.        Jane Caruso PA-C, MSPAS      Orders Placed This Encounter   Procedures     EKG 12-lead complete w/read - Clinics (performed today)     No orders of the defined types were placed in this encounter.    There are no discontinued medications.      Encounter Diagnosis   Name Primary?     Chronic atrial fibrillation (H) Yes       CURRENT MEDICATIONS:  Current Outpatient Prescriptions   Medication Sig Dispense Refill     amLODIPine (NORVASC) 10 MG tablet TAKE 1 TABLET(10 MG) BY MOUTH DAILY 90 tablet 2     BASAGLAR 100 UNIT/ML injection Inject 60 Units Subcutaneous At Bedtime 60 mL 3     blood glucose monitoring (ONETOUCH VERIO IQ) test strip Use to test blood sugars 3 times  daily or as directed. 100 strip 1     fish oil-omega-3 fatty acids 1000 MG capsule Take 4 capsules by mouth daily.       insulin aspart (NOVOLOG FLEXPEN) 100 UNIT/ML injection 1 unit per 7g of carbs three times daily before meals 30 mL 5     insulin pen needle (B-D U/F) 31G X 5 MM Use 1 daily or as directed. 100 each prn     losartan (COZAAR) 100 MG tablet Take 1 tablet (100 mg) by mouth daily 90 tablet 3     metoprolol succinate (TOPROL-XL) 100 MG 24 hr tablet Take 1 tablet (100 mg) by mouth daily 90 tablet 3     MULTIVITAMIN OR 1 qd       NITROSTAT 0.4 MG SL SUBL 1 TAB EVERY 5 MIN AS NEEDED, UP TO 3 PER EPISODE       ONETOUCH LANCETS MISC 1 lancet by In Vitro route 3 times daily 200 each 3     simvastatin (ZOCOR) 80 MG tablet TAKE 1/2 TABLET(40 MG) BY MOUTH AT BEDTIME 45 tablet 3     warfarin (COUMADIN) 7.5 MG tablet 1 tablet daily except  1/2 tablets on Mon as directed by the anticoagulation clinic 90 tablet 0     metFORMIN (GLUCOPHAGE) 1000 MG tablet TAKE 1 TABLET(1000 MG) BY MOUTH TWICE DAILY WITH MEALS 180 tablet 3       ALLERGIES   No Known Allergies    PAST MEDICAL HISTORY:  Past Medical History:   Diagnosis Date     Atrial fibrillation (H)     s/p AVN ablation, BIV PPM     Congestive heart failure (H)     tachycardia-induced cardiomyopathy     Essential hypertension, benign      Hyperlipidemia LDL goal <100 10/31/2010     Hyponatremia 9/8/2009     Morbid obesity (H)      Open wound of foot except toe(s) alone, without mention of complication      Type 2 diabetes, HbA1C goal < 8% (H) 6/26/2012       PAST SURGICAL HISTORY:  Past Surgical History:   Procedure Laterality Date     C NONSPECIFIC PROCEDURE  06/2007    debritement     CARDIOVERSION  9/2009, 10/2009     H ABLATION AV NODE  4/8/10     IMPLANT PACEMAKER  4/8/10    BIV PPM- Medtronic InSync III     TONSILLECTOMY      as kid       FAMILY HISTORY:  Family History   Problem Relation Age of Onset     Diabetes Mother      Hypertension Father      Diabetes  Father      Hypertension Maternal Grandmother      Diabetes Maternal Grandmother      Diabetes Maternal Grandfather      Hypertension Maternal Grandfather      Cardiovascular Maternal Grandfather      Hypertension Paternal Grandfather      Cardiovascular Paternal Grandfather      Diabetes Sister      Hypertension Sister        SOCIAL HISTORY:  Social History     Social History     Marital status: Single     Spouse name: N/A     Number of children: N/A     Years of education: N/A     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[employer]     Social History Main Topics     Smoking status: Never Smoker     Smokeless tobacco: Never Used     Alcohol use No     Drug use: No     Sexual activity: No     Other Topics Concern     Caffeine Concern Yes     occasionally soda     Special Diet Yes     low sodium, low fat, DM diet      Exercise Yes     walking, stairs      Social History Narrative       Review of Systems:  Skin:  Negative     Eyes:  Positive for glasses;visual blurring  ENT:  Negative    Respiratory:  Positive for dyspnea on exertion  Cardiovascular:  Negative for;palpitations;chest pain Positive for;exercise intolerance;edema  Gastroenterology: Negative for melena;hematochezia  Genitourinary:  Negative    Musculoskeletal:  Positive for arthritis  Neurologic:  Negative    Psychiatric:  Negative    Heme/Lymph/Imm:  Negative    Endocrine:  Positive for diabetes    Physical Exam:  Vitals: /72  Ht 1.829 m (6')  Wt 147 kg (324 lb)  BMI 43.94 kg/m2    Constitutional:  cooperative, alert and oriented, well developed, well nourished, in no acute distress        Skin:  warm and dry to the touch, no apparent skin lesions or masses noted        Head:  normocephalic, no masses or lesions        Eyes:  pupils equal and round;conjunctivae and lids unremarkable;sclera white;no xanthalasma        ENT:  no pallor or cyanosis, dentition good        Neck:  JVP normal;no carotid bruit        Chest:  clear to auscultation         Cardiac: regular rhythm;normal S1 and S2;no S3 or S4;no murmurs, gallops or rubs detected                  Abdomen:  abdomen soft obese      Vascular: pulses full and equal                                      Extremities and Back:  no deformities, clubbing, cyanosis, erythema observed        Neurological:  no gross motor deficits          Recent Lab Results:  LIPID RESULTS:  Lab Results   Component Value Date    CHOL 99 06/11/2018    HDL 44 06/11/2018    LDL 25 06/11/2018    TRIG 149 06/11/2018    CHOLHDLRATIO 2.6 10/02/2014       LIVER ENZYME RESULTS:  Lab Results   Component Value Date    AST 20 06/11/2018    ALT 28 06/11/2018       CBC RESULTS:  Lab Results   Component Value Date    WBC 10.4 05/28/2015    RBC 4.88 05/28/2015    HGB 14.0 06/13/2018    HCT 43.4 05/28/2015    MCV 89 05/28/2015    MCH 29.7 05/28/2015    MCHC 33.4 05/28/2015    RDW 14.3 05/28/2015     05/28/2015       BMP RESULTS:  Lab Results   Component Value Date     06/11/2018    POTASSIUM 4.5 06/11/2018    CHLORIDE 103 06/11/2018    CO2 28 06/11/2018    ANIONGAP 6 06/11/2018     (H) 06/11/2018    BUN 20 06/11/2018    CR 1.10 06/11/2018    GFRESTIMATED 69 06/11/2018    GFRESTBLACK 83 06/11/2018    JACI 9.1 06/11/2018        A1C RESULTS:  Lab Results   Component Value Date    A1C 6.4 (H) 06/11/2018       INR RESULTS:  Lab Results   Component Value Date    INR 3.8 (A) 10/16/2018    INR 2.7 (A) 09/04/2018    INR 1.27 (H) 04/09/2010    INR 1.39 (H) 04/08/2010                 Thank you for allowing me to participate in the care of your patient.      Sincerely,     Peggy Caruso PA-C     [facility]    cc:   No referring provider defined for this encounter.

## 2018-11-16 NOTE — PATIENT INSTRUCTIONS
1. Discussed plan for generator replacement 11/29    2. Device nurses are @ [phone] if any questions

## 2018-11-20 ENCOUNTER — ANTICOAGULATION THERAPY VISIT (OUTPATIENT)
Dept: ANTICOAGULATION | Facility: CLINIC | Age: 58
End: 2018-11-20
Payer: COMMERCIAL

## 2018-11-20 LAB — INR POINT OF CARE: 2.1 (ref 0.86–1.14)

## 2018-11-20 PROCEDURE — 36416 COLLJ CAPILLARY BLOOD SPEC: CPT

## 2018-11-20 PROCEDURE — 85610 PROTHROMBIN TIME: CPT | Mod: QW

## 2018-11-20 NOTE — PROGRESS NOTES
ANTICOAGULATION FOLLOW-UP CLINIC VISIT    Patient Name:  Mckay Hsu  Date:  11/20/2018  Contact Type:  Face to Face    SUBJECTIVE:     Patient Findings     Positives Hospital admission (Pt is having pacemaker replacement done 11/29/18, will hold warfain prior 3 days), No Problem Findings           OBJECTIVE    INR Protime   Date Value Ref Range Status   11/20/2018 2.1 (A) 0.86 - 1.14 Final       ASSESSMENT / PLAN  INR assessment THER    Recheck INR In: 3 WEEKS    INR Location Clinic      Anticoagulation Summary as of 11/20/2018     INR goal 2.0-3.0   Today's INR 2.1   Warfarin maintenance plan 3.75 mg (7.5 mg x 0.5) on Tue; 7.5 mg (7.5 mg x 1) all other days   Full warfarin instructions 11/26: Hold; 11/27: Hold; 11/28: Hold; Otherwise 3.75 mg on Tue; 7.5 mg all other days   Weekly warfarin total 48.75 mg   Plan last modified Rosalee Billingsley RN (10/17/2017)   Next INR check 12/11/2018   Target end date     Indications   Long-term (current) use of anticoagulants [Z79.01] [Z79.01]  Chronic atrial fibrillation (H) [I48.2]         Anticoagulation Episode Summary     INR check location     Preferred lab     Send INR reminders to  [facility]    Comments             See the Encounter Report to view Anticoagulation Flowsheet and Dosing Calendar (Go to Encounters tab in chart review, and find the Anticoagulation Therapy Visit)    Dosage adjustment made based on physician directed care plan.    Rosalee Billingsley RN

## 2018-11-20 NOTE — MR AVS SNAPSHOT
Mckay Gonzalesn Shadi   11/20/2018 1:30 PM   Anticoagulation Therapy Visit    Description:  58 year old male   Provider:   ANTICOAGULATION CLINIC   Department:   Anti Coagulation           INR as of 11/20/2018     Today's INR 2.1      Anticoagulation Summary as of 11/20/2018     INR goal 2.0-3.0   Today's INR 2.1   Full warfarin instructions 11/26: Hold; 11/27: Hold; 11/28: Hold; Otherwise 3.75 mg on Tue; 7.5 mg all other days   Next INR check 12/11/2018    Indications   Long-term (current) use of anticoagulants [Z79.01] [Z79.01]  Chronic atrial fibrillation (H) [I48.2]         Contact Numbers     [facility]  Please call  [phone] to cancel and/or reschedule your appointment   Please call  [phone] with any problems or questions regarding your therapy.        November 2018 Details    Sun Mon Tue Wed Thu Fri Sat         1               2               3                 4               5               6               7               8               9               10                 11               12               13               14               15               16               17                 18               19               20      3.75 mg   See details      21      7.5 mg         22      7.5 mg         23      7.5 mg         24      7.5 mg           25      7.5 mg         26      Hold         27      Hold         28      Hold         29      7.5 mg         30      7.5 mg           Date Details   11/20 This INR check               How to take your warfarin dose     To take:  3.75 mg Take 0.5 of a 7.5 mg tablet.    To take:  7.5 mg Take 1 of the 7.5 mg tablets.    Hold Do not take your warfarin dose. See the Details table to the right for additional instructions.                December 2018 Details    Sun Mon Tue Wed Thu Fri Sat           1      7.5 mg           2      7.5 mg         3      7.5 mg         4      3.75 mg         5      7.5 mg         6      7.5 mg         7      7.5 mg          8      7.5 mg           9      7.5 mg         10      7.5 mg         11            12               13               14               15                 16               17               18               19               20               21               22                 23               24               25               26               27               28               29                 30               31                     Date Details   No additional details    Date of next INR:  12/11/2018         How to take your warfarin dose     To take:  3.75 mg Take 0.5 of a 7.5 mg tablet.    To take:  7.5 mg Take 1 of the 7.5 mg tablets.

## 2018-11-26 RX ORDER — CEFAZOLIN SODIUM IN 0.9 % NACL 3 G/100 ML
3 INTRAVENOUS SOLUTION, PIGGYBACK (ML) INTRAVENOUS
Status: CANCELLED | OUTPATIENT
Start: 2018-11-26

## 2018-11-26 RX ORDER — LIDOCAINE 40 MG/G
CREAM TOPICAL
Status: CANCELLED | OUTPATIENT
Start: 2018-11-26

## 2018-11-26 RX ORDER — SODIUM CHLORIDE 450 MG/100ML
INJECTION, SOLUTION INTRAVENOUS CONTINUOUS
Status: CANCELLED | OUTPATIENT
Start: 2018-11-26

## 2018-11-29 ENCOUNTER — HOSPITAL ENCOUNTER (OUTPATIENT)
Facility: CLINIC | Age: 58
Discharge: HOME | End: 2018-11-29
Attending: INTERNAL MEDICINE | Admitting: INTERNAL MEDICINE
Payer: COMMERCIAL

## 2018-11-29 ENCOUNTER — APPOINTMENT (OUTPATIENT)
Dept: CARDIOLOGY | Facility: CLINIC | Age: 58
End: 2018-11-29
Attending: INTERNAL MEDICINE
Payer: COMMERCIAL

## 2018-11-29 VITALS
BODY MASS INDEX: 42.66 KG/M2 | HEART RATE: 68 BPM | SYSTOLIC BLOOD PRESSURE: 136 MMHG | HEIGHT: 72 IN | RESPIRATION RATE: 16 BRPM | TEMPERATURE: 97.6 F | WEIGHT: 315 LBS | OXYGEN SATURATION: 95 % | DIASTOLIC BLOOD PRESSURE: 73 MMHG

## 2018-11-29 DIAGNOSIS — I49.5 SINUS NODE DYSFUNCTION (H): ICD-10-CM

## 2018-11-29 LAB
ANION GAP SERPL CALCULATED.3IONS-SCNC: 9 MMOL/L (ref 3–14)
BUN SERPL-MCNC: 18 MG/DL (ref 7–30)
CALCIUM SERPL-MCNC: 9.2 MG/DL (ref 8.5–10.1)
CHLORIDE SERPL-SCNC: 103 MMOL/L (ref 94–109)
CO2 SERPL-SCNC: 25 MMOL/L (ref 20–32)
CREAT SERPL-MCNC: 1.01 MG/DL (ref 0.66–1.25)
ERYTHROCYTE [DISTWIDTH] IN BLOOD BY AUTOMATED COUNT: 13.1 % (ref 10–15)
GFR SERPL CREATININE-BSD FRML MDRD: 76 ML/MIN/1.7M2
GLUCOSE SERPL-MCNC: 129 MG/DL (ref 70–99)
HCT VFR BLD AUTO: 39.2 % (ref 40–53)
HGB BLD-MCNC: 13.2 G/DL (ref 13.3–17.7)
INR BLD: 1.2 (ref 0.86–1.14)
MCH RBC QN AUTO: 29.9 PG (ref 26.5–33)
MCHC RBC AUTO-ENTMCNC: 33.7 G/DL (ref 31.5–36.5)
MCV RBC AUTO: 89 FL (ref 78–100)
PLATELET # BLD AUTO: 290 10E9/L (ref 150–450)
POTASSIUM SERPL-SCNC: 4 MMOL/L (ref 3.4–5.3)
RBC # BLD AUTO: 4.42 10E12/L (ref 4.4–5.9)
SODIUM SERPL-SCNC: 137 MMOL/L (ref 133–144)
WBC # BLD AUTO: 10.2 10E9/L (ref 4–11)

## 2018-11-29 PROCEDURE — 27210995 ZZH RX 272: Performed by: INTERNAL MEDICINE

## 2018-11-29 PROCEDURE — 27210784 ZZH KIT PACEMAKER CR8

## 2018-11-29 PROCEDURE — 36415 COLL VENOUS BLD VENIPUNCTURE: CPT | Performed by: INTERNAL MEDICINE

## 2018-11-29 PROCEDURE — 85610 PROTHROMBIN TIME: CPT | Mod: QW | Performed by: INTERNAL MEDICINE

## 2018-11-29 PROCEDURE — C2621 PMKR, OTHER THAN SING/DUAL: HCPCS

## 2018-11-29 PROCEDURE — 40000852 ZZH STATISTIC HEART CATH LAB OR EP LAB

## 2018-11-29 PROCEDURE — 25000128 H RX IP 250 OP 636: Performed by: INTERNAL MEDICINE

## 2018-11-29 PROCEDURE — 99152 MOD SED SAME PHYS/QHP 5/>YRS: CPT | Performed by: INTERNAL MEDICINE

## 2018-11-29 PROCEDURE — 25000125 ZZHC RX 250: Performed by: INTERNAL MEDICINE

## 2018-11-29 PROCEDURE — 27210795 ZZH PAD DEFIB QUICK CR4

## 2018-11-29 PROCEDURE — 99153 MOD SED SAME PHYS/QHP EA: CPT

## 2018-11-29 PROCEDURE — 80048 BASIC METABOLIC PNL TOTAL CA: CPT | Performed by: INTERNAL MEDICINE

## 2018-11-29 PROCEDURE — 40000065 ZZH STATISTIC EKG NON-CHARGEABLE

## 2018-11-29 PROCEDURE — 33229 REMV&REPLC PM GEN MULT LEADS: CPT

## 2018-11-29 PROCEDURE — 99152 MOD SED SAME PHYS/QHP 5/>YRS: CPT

## 2018-11-29 PROCEDURE — 85027 COMPLETE CBC AUTOMATED: CPT | Performed by: INTERNAL MEDICINE

## 2018-11-29 PROCEDURE — 33229 REMV&REPLC PM GEN MULT LEADS: CPT | Performed by: INTERNAL MEDICINE

## 2018-11-29 PROCEDURE — 93010 ELECTROCARDIOGRAM REPORT: CPT | Performed by: INTERNAL MEDICINE

## 2018-11-29 PROCEDURE — 93005 ELECTROCARDIOGRAM TRACING: CPT

## 2018-11-29 RX ORDER — PROTAMINE SULFATE 10 MG/ML
1-5 INJECTION, SOLUTION INTRAVENOUS
Status: DISCONTINUED | OUTPATIENT
Start: 2018-11-29 | End: 2018-11-29 | Stop reason: HOSPADM

## 2018-11-29 RX ORDER — FLUMAZENIL 0.1 MG/ML
0.2 INJECTION, SOLUTION INTRAVENOUS
Status: DISCONTINUED | OUTPATIENT
Start: 2018-11-29 | End: 2018-11-29 | Stop reason: HOSPADM

## 2018-11-29 RX ORDER — ATROPINE SULFATE 0.1 MG/ML
.5-1 INJECTION INTRAVENOUS
Status: DISCONTINUED | OUTPATIENT
Start: 2018-11-29 | End: 2018-11-29 | Stop reason: HOSPADM

## 2018-11-29 RX ORDER — CEFAZOLIN SODIUM IN 0.9 % NACL 3 G/100 ML
3 INTRAVENOUS SOLUTION, PIGGYBACK (ML) INTRAVENOUS
Status: COMPLETED | OUTPATIENT
Start: 2018-11-29 | End: 2018-11-29

## 2018-11-29 RX ORDER — NALOXONE HYDROCHLORIDE 0.4 MG/ML
0.4 INJECTION, SOLUTION INTRAMUSCULAR; INTRAVENOUS; SUBCUTANEOUS EVERY 5 MIN PRN
Status: DISCONTINUED | OUTPATIENT
Start: 2018-11-29 | End: 2018-11-29 | Stop reason: HOSPADM

## 2018-11-29 RX ORDER — LIDOCAINE HYDROCHLORIDE AND EPINEPHRINE 10; 10 MG/ML; UG/ML
10-30 INJECTION, SOLUTION INFILTRATION; PERINEURAL
Status: DISCONTINUED | OUTPATIENT
Start: 2018-11-29 | End: 2018-11-29 | Stop reason: HOSPADM

## 2018-11-29 RX ORDER — FENTANYL CITRATE 50 UG/ML
25-50 INJECTION, SOLUTION INTRAMUSCULAR; INTRAVENOUS
Status: DISCONTINUED | OUTPATIENT
Start: 2018-11-29 | End: 2018-11-29 | Stop reason: HOSPADM

## 2018-11-29 RX ORDER — SODIUM CHLORIDE 450 MG/100ML
INJECTION, SOLUTION INTRAVENOUS CONTINUOUS
Status: DISCONTINUED | OUTPATIENT
Start: 2018-11-29 | End: 2018-11-29 | Stop reason: HOSPADM

## 2018-11-29 RX ORDER — BUPIVACAINE HYDROCHLORIDE 2.5 MG/ML
10-30 INJECTION, SOLUTION EPIDURAL; INFILTRATION; INTRACAUDAL
Status: DISCONTINUED | OUTPATIENT
Start: 2018-11-29 | End: 2018-11-29 | Stop reason: HOSPADM

## 2018-11-29 RX ORDER — NALOXONE HYDROCHLORIDE 0.4 MG/ML
.1-.4 INJECTION, SOLUTION INTRAMUSCULAR; INTRAVENOUS; SUBCUTANEOUS
Status: DISCONTINUED | OUTPATIENT
Start: 2018-11-29 | End: 2018-11-29 | Stop reason: HOSPADM

## 2018-11-29 RX ORDER — ADENOSINE 3 MG/ML
6-12 INJECTION, SOLUTION INTRAVENOUS EVERY 5 MIN PRN
Status: DISCONTINUED | OUTPATIENT
Start: 2018-11-29 | End: 2018-11-29 | Stop reason: HOSPADM

## 2018-11-29 RX ORDER — KETOROLAC TROMETHAMINE 30 MG/ML
15-30 INJECTION, SOLUTION INTRAMUSCULAR; INTRAVENOUS
Status: DISCONTINUED | OUTPATIENT
Start: 2018-11-29 | End: 2018-11-29 | Stop reason: HOSPADM

## 2018-11-29 RX ORDER — ONDANSETRON 2 MG/ML
4 INJECTION INTRAMUSCULAR; INTRAVENOUS EVERY 4 HOURS PRN
Status: DISCONTINUED | OUTPATIENT
Start: 2018-11-29 | End: 2018-11-29 | Stop reason: HOSPADM

## 2018-11-29 RX ORDER — DIPHENHYDRAMINE HYDROCHLORIDE 50 MG/ML
25-50 INJECTION INTRAMUSCULAR; INTRAVENOUS
Status: DISCONTINUED | OUTPATIENT
Start: 2018-11-29 | End: 2018-11-29 | Stop reason: HOSPADM

## 2018-11-29 RX ORDER — IBUTILIDE FUMARATE 1 MG/10ML
1 INJECTION, SOLUTION INTRAVENOUS
Status: DISCONTINUED | OUTPATIENT
Start: 2018-11-29 | End: 2018-11-29 | Stop reason: HOSPADM

## 2018-11-29 RX ORDER — LORAZEPAM 2 MG/ML
.5-2 INJECTION INTRAMUSCULAR EVERY 10 MIN PRN
Status: DISCONTINUED | OUTPATIENT
Start: 2018-11-29 | End: 2018-11-29 | Stop reason: HOSPADM

## 2018-11-29 RX ORDER — BUPIVACAINE HYDROCHLORIDE 2.5 MG/ML
10-30 INJECTION, SOLUTION EPIDURAL; INFILTRATION; INTRACAUDAL
Status: COMPLETED | OUTPATIENT
Start: 2018-11-29 | End: 2018-11-29

## 2018-11-29 RX ORDER — LIDOCAINE HYDROCHLORIDE 10 MG/ML
10-30 INJECTION, SOLUTION EPIDURAL; INFILTRATION; INTRACAUDAL; PERINEURAL
Status: COMPLETED | OUTPATIENT
Start: 2018-11-29 | End: 2018-11-29

## 2018-11-29 RX ORDER — LIDOCAINE 40 MG/G
CREAM TOPICAL
Status: DISCONTINUED | OUTPATIENT
Start: 2018-11-29 | End: 2018-11-29 | Stop reason: HOSPADM

## 2018-11-29 RX ORDER — DOBUTAMINE HYDROCHLORIDE 200 MG/100ML
5-40 INJECTION INTRAVENOUS CONTINUOUS PRN
Status: DISCONTINUED | OUTPATIENT
Start: 2018-11-29 | End: 2018-11-29 | Stop reason: HOSPADM

## 2018-11-29 RX ORDER — MORPHINE SULFATE 2 MG/ML
1-2 INJECTION, SOLUTION INTRAMUSCULAR; INTRAVENOUS EVERY 5 MIN PRN
Status: DISCONTINUED | OUTPATIENT
Start: 2018-11-29 | End: 2018-11-29 | Stop reason: HOSPADM

## 2018-11-29 RX ORDER — FUROSEMIDE 10 MG/ML
20-100 INJECTION INTRAMUSCULAR; INTRAVENOUS
Status: DISCONTINUED | OUTPATIENT
Start: 2018-11-29 | End: 2018-11-29 | Stop reason: HOSPADM

## 2018-11-29 RX ORDER — OXYCODONE AND ACETAMINOPHEN 5; 325 MG/1; MG/1
1 TABLET ORAL EVERY 4 HOURS PRN
Status: DISCONTINUED | OUTPATIENT
Start: 2018-11-29 | End: 2018-11-29 | Stop reason: HOSPADM

## 2018-11-29 RX ORDER — IBUTILIDE FUMARATE 1 MG/10ML
0.01 INJECTION, SOLUTION INTRAVENOUS
Status: DISCONTINUED | OUTPATIENT
Start: 2018-11-29 | End: 2018-11-29 | Stop reason: HOSPADM

## 2018-11-29 RX ORDER — PROTAMINE SULFATE 10 MG/ML
5-40 INJECTION, SOLUTION INTRAVENOUS EVERY 10 MIN PRN
Status: DISCONTINUED | OUTPATIENT
Start: 2018-11-29 | End: 2018-11-29 | Stop reason: HOSPADM

## 2018-11-29 RX ORDER — LIDOCAINE HYDROCHLORIDE 10 MG/ML
10-30 INJECTION, SOLUTION EPIDURAL; INFILTRATION; INTRACAUDAL; PERINEURAL
Status: DISCONTINUED | OUTPATIENT
Start: 2018-11-29 | End: 2018-11-29 | Stop reason: HOSPADM

## 2018-11-29 RX ORDER — HEPARIN SODIUM 1000 [USP'U]/ML
1000-10000 INJECTION, SOLUTION INTRAVENOUS; SUBCUTANEOUS EVERY 5 MIN PRN
Status: DISCONTINUED | OUTPATIENT
Start: 2018-11-29 | End: 2018-11-29 | Stop reason: HOSPADM

## 2018-11-29 RX ADMIN — LIDOCAINE HYDROCHLORIDE 10 ML: 10 INJECTION, SOLUTION EPIDURAL; INFILTRATION; INTRACAUDAL; PERINEURAL at 13:26

## 2018-11-29 RX ADMIN — Medication 3 G: at 13:15

## 2018-11-29 RX ADMIN — SODIUM CHLORIDE: 4.5 INJECTION, SOLUTION INTRAVENOUS at 11:22

## 2018-11-29 RX ADMIN — FENTANYL CITRATE 50 MCG: 50 INJECTION, SOLUTION INTRAMUSCULAR; INTRAVENOUS at 13:30

## 2018-11-29 RX ADMIN — BACITRACIN 25000 UNITS: 5000 INJECTION, POWDER, FOR SOLUTION INTRAMUSCULAR at 13:30

## 2018-11-29 RX ADMIN — MIDAZOLAM 1 MG: 1 INJECTION INTRAMUSCULAR; INTRAVENOUS at 13:30

## 2018-11-29 RX ADMIN — MIDAZOLAM 1 MG: 1 INJECTION INTRAMUSCULAR; INTRAVENOUS at 13:20

## 2018-11-29 RX ADMIN — BUPIVACAINE HYDROCHLORIDE 25 MG: 2.5 INJECTION, SOLUTION EPIDURAL; INFILTRATION; INTRACAUDAL at 13:25

## 2018-11-29 RX ADMIN — FENTANYL CITRATE 50 MCG: 50 INJECTION, SOLUTION INTRAMUSCULAR; INTRAVENOUS at 13:20

## 2018-11-29 NOTE — PROGRESS NOTES
biv pacemaker replacement.  No complications.  May be discharged around 3-4 PM.  Resume home medications.

## 2018-11-29 NOTE — IP AVS SNAPSHOT
[place] [address]    [place] [address]    Phone:  [phone]                                       After Visit Summary   11/29/2018    Mckay Hsu    MRN: [id number]           After Visit Summary Signature Page     I have received my discharge instructions, and my questions have been answered. I have discussed any challenges I see with this plan with the nurse or doctor.    ..........................................................................................................................................  Patient/Patient Representative Signature      ..........................................................................................................................................  Patient Representative Print Name and Relationship to Patient    ..................................................               ................................................  Date                                   Time    ..........................................................................................................................................  Reviewed by Signature/Title    ...................................................              ..............................................  Date                                               Time          22EPIC Rev 08/18

## 2018-11-29 NOTE — PROGRESS NOTES
Patient is here for Bi-V PM Generator change, accompanied by his father/, Manuel.  No cell phone.  Patient has appropriately held his warfarin and diabetic meds.  POC INR 1.2.  [employer] rep here pre-procedure.  States understanding of discharge instructions/AVS to patient.    1400  Patient returned to [facility], s/p generator change to left chest, dressing is CDI/no bleeding or hematoma.    1430  Small amount of bleeding on dressing, less than dime size.  Denies pain.  Taking po liquids, declines food.  Received PM booklet/temporary ID/instructions of where to call when he receives home monitoring equipment.    1526  Patient was discharged per W/C with .

## 2018-11-29 NOTE — DISCHARGE INSTRUCTIONS
Pacemaker Generator Change Discharge Instructions    Dr. Holliday    After you go home:      Have an adult stay with you until tomorrow.    You may resume your normal diet.       For 24 hours - due to the sedation you received:    Relax and take it easy.    Do NOT make any important or legal decisions.    Do NOT drive or operate machines at home or at work.    Do NOT drink alcohol.    Care of Chest Incision:      Keep the bandage on at least 3 days. You may remove the dressing on 12/2/18. Change it only if it gets loose or soaked. If you need to change it, use 4x4-inch gauze and a large clear bandage.     If there is a pressure dressing (gauze & tape) - 24 hours after your procedure you may remove ONLY the top dressing. Leave the bottom dressing on.    Leave the strips of tape on. They will fall off on their own, or we will remove them at your first check-up.    Check your wound daily for signs of infection, such as increased redness, severe swelling or draining. Fever may also be a sign of infection. Call us if you see any of these signs.    If there are no signs of infection, you may shower after the bandage comes off in 3 days. If you take a tub bath, keep the wound dry.    No soaking the incision (swimming pool, bathtub, hot tub) for 2 weeks.    You may have mild to medium pain for 3 to 5 days. Take Acetaminophen (Tylenol) or Ibuprofen (Advil) for the pain. If the pain persists or is severe, call us.    Activity:      You can begin to use your arm as it feels comfortable to you.    No driving for one day & limit to necessary driving for one week.    Bleeding:      If you start bleeding from the incision site, sit down and press firmly on the site for 10 minutes.     Once bleeding stops, call [facility] as soon as you can.       Call 911 right away if you have heavy bleeding or bleeding that does not stop.      Medicines:      Take your medications, including blood thinners, unless your provider tells you not  to.    If you have stopped any other medicines, check with your provider about when to restart them.    Follow Up Appointments:      Follow up with Device Clinic at [facility] of patient preference in 7-10 days.    Call the clinic if:      You have a large or growing hard lump around the site.    The site is red, swollen, hot or tender.    Blood or fluid is draining from the site.    You have chills or a fever greater than 101 F (38 C).    You feel dizzy or light-headed.    Questions or concerns.      Telling others about your device:      Before you leave the hospital, you will receive a temporary ID card. A permanent card will be mailed to you about 6 to 8 weeks later. Always carry the ID card with you. It has important details about your device.    You may also get a Medical Alert bracelet or tag that says you have a pacemaker.  Go to [email or web address].     Always tell doctors, dentists and other care providers that you have a device implanted in you.    Let us know before you plan any surgeries. Your care team must take special steps to keep you safe during certain procedures. These steps will depend on the type of device you have. Your provider will need to see your ID card. They may need to call us for instructions.    Device Safety:      Please refer to device  s booklet for further information.        [facility] [place]:    [phone] [facility] (7 days a week)

## 2018-11-29 NOTE — IP AVS SNAPSHOT
MRN:[id number]                      After Visit Summary   11/29/2018    Mckay Hsu    MRN: [id number]           Visit Information        Department      11/29/2018 10:27 AM [facility]          Review of your medicines      UNREVIEWED medicines. Ask your doctor about these medicines        Dose / Directions    amLODIPine 10 MG tablet   Commonly known as:  NORVASC   Used for:  Essential hypertension, benign        TAKE 1 TABLET(10 MG) BY MOUTH DAILY   Quantity:  90 tablet   Refills:  2       fish oil-omega-3 fatty acids 1000 MG capsule        Dose:  4 g   Take 4 capsules by mouth daily.   Refills:  0       insulin aspart 100 UNIT/ML pen   Commonly known as:  NovoLOG FLEXPEN   Used for:  Type 2 diabetes mellitus without complication, with long-term current use of insulin (H)        1 unit per 7g of carbs three times daily before meals   Quantity:  30 mL   Refills:  5       insulin glargine 100 UNIT/ML pen   Used for:  Type 2 diabetes mellitus without complication, with long-term current use of insulin (H)        Dose:  60 Units   Inject 60 Units Subcutaneous At Bedtime   Quantity:  60 mL   Refills:  3       losartan 100 MG tablet   Commonly known as:  COZAAR   Used for:  Essential hypertension, benign        Dose:  100 mg   Take 1 tablet (100 mg) by mouth daily   Quantity:  90 tablet   Refills:  3       metFORMIN 1000 MG tablet   Commonly known as:  GLUCOPHAGE   Used for:  Type 2 diabetes mellitus without complication, with long-term current use of insulin (H)        TAKE 1 TABLET(1000 MG) BY MOUTH TWICE DAILY WITH MEALS   Quantity:  180 tablet   Refills:  3       metoprolol succinate  MG 24 hr tablet   Commonly known as:  TOPROL-XL   Used for:  Essential hypertension, benign        Dose:  100 mg   Take 1 tablet (100 mg) by mouth daily   Quantity:  90 tablet   Refills:  3       MULTIVITAMIN PO        1 qd   Refills:  0       NITROSTAT 0.4 MG sublingual tablet   Generic  drug:  nitroGLYcerin        1 TAB EVERY 5 MIN AS NEEDED, UP TO 3 PER EPISODE   Refills:  0       simvastatin 80 MG tablet   Commonly known as:  ZOCOR   Used for:  Hyperlipidemia LDL goal <100        TAKE 1/2 TABLET(40 MG) BY MOUTH AT BEDTIME   Quantity:  45 tablet   Refills:  3       warfarin 7.5 MG tablet   Commonly known as:  COUMADIN   Used for:  New onset atrial fibrillation (H)        1 tablet daily except  1/2 tablets on Mon as directed by the anticoagulation clinic   Quantity:  90 tablet   Refills:  0         CONTINUE these medicines which have NOT CHANGED        Dose / Directions    blood glucose monitoring test strip   Commonly known as:  ONETOUCH VERIO IQ   Used for:  Type 2 diabetes mellitus without complication, with long-term current use of insulin (H)        Use to test blood sugars 3 times daily or as directed.   Quantity:  100 strip   Refills:  1       insulin pen needle 31G X 5 MM miscellaneous   Commonly known as:  B-D U/F   Used for:  Type 2 diabetes mellitus without complication, with long-term current use of insulin (H)        Use 1 daily or as directed.   Quantity:  100 each   Refills:  prn       ONETOUCH LANCETS Misc   Used for:  Type 2 diabetes mellitus without complication, with long-term current use of insulin (H)        Dose:  1 lancet   1 lancet by In Vitro route 3 times daily   Quantity:  200 each   Refills:  3                Protect others around you: Learn how to safely use, store and throw away your medicines at www.disposemymeds.org.         Follow-ups after your visit        Your next 10 appointments already scheduled     Nov 29, 2018  1:00 PM CST   Ep 90 Minute with SHCVR2   [facility] ([facility])    [address]  [place] [address]   [phone]               Care Instructions        Further instructions from your care team       Pacemaker Generator Change Discharge Instructions    Dr. Holliday    After you go home:      Have  an adult stay with you until tomorrow.    You may resume your normal diet.       For 24 hours - due to the sedation you received:    Relax and take it easy.    Do NOT make any important or legal decisions.    Do NOT drive or operate machines at home or at work.    Do NOT drink alcohol.    Care of Chest Incision:      Keep the bandage on at least 3 days. You may remove the dressing on 12/2/18. Change it only if it gets loose or soaked. If you need to change it, use 4x4-inch gauze and a large clear bandage.     If there is a pressure dressing (gauze & tape) - 24 hours after your procedure you may remove ONLY the top dressing. Leave the bottom dressing on.    Leave the strips of tape on. They will fall off on their own, or we will remove them at your first check-up.    Check your wound daily for signs of infection, such as increased redness, severe swelling or draining. Fever may also be a sign of infection. Call us if you see any of these signs.    If there are no signs of infection, you may shower after the bandage comes off in 3 days. If you take a tub bath, keep the wound dry.    No soaking the incision (swimming pool, bathtub, hot tub) for 2 weeks.    You may have mild to medium pain for 3 to 5 days. Take Acetaminophen (Tylenol) or Ibuprofen (Advil) for the pain. If the pain persists or is severe, call us.    Activity:      You can begin to use your arm as it feels comfortable to you.    No driving for one day & limit to necessary driving for one week.    Bleeding:      If you start bleeding from the incision site, sit down and press firmly on the site for 10 minutes.     Once bleeding stops, call [email or web address] as soon as you can.       Call 911 right away if you have heavy bleeding or bleeding that does not stop.      Medicines:      Take your medications, including blood thinners, unless your provider tells you not to.    If you have stopped any other medicines, check with your provider about when to restart  "them.    Follow Up Appointments:      Follow up with Device Clinic at [facility] of patient preference in 7-10 days.    Call the clinic if:      You have a large or growing hard lump around the site.    The site is red, swollen, hot or tender.    Blood or fluid is draining from the site.    You have chills or a fever greater than 101 F (38 C).    You feel dizzy or light-headed.    Questions or concerns.      Telling others about your device:      Before you leave the hospital, you will receive a temporary ID card. A permanent card will be mailed to you about 6 to 8 weeks later. Always carry the ID card with you. It has important details about your device.    You may also get a Medical Alert bracelet or tag that says you have a pacemaker.  Go to [email or web address].     Always tell doctors, dentists and other care providers that you have a device implanted in you.    Let us know before you plan any surgeries. Your care team must take special steps to keep you safe during certain procedures. These steps will depend on the type of device you have. Your provider will need to see your ID card. They may need to call us for instructions.    Device Safety:      Please refer to device  s booklet for further information.        [facility] [place]:    [phone] [facility] (7 days a week)                   Additional Information About Your Visit        MyChart Information     [employer] lets you send messages to your doctor, view your test results, renew your prescriptions, schedule appointments and more. To sign up, go to [email or web address] . Click on \"Log in\" on the left side of the screen, which will take you to the Welcome page. Then click on \"Sign up Now\" on the right side of the page.     You will be asked to enter the access code listed below, as well as some personal information. Please follow the directions to create your username and password.     Your access code is: " [email or web address]  Expires: 2019  1:57 PM     Your access code will  in 90 days. If you need help or a new code, please call your [facility] clinic or [phone].        Care EveryWhere ID     This is your Care EveryWhere ID. This could be used by other organizations to access your [facility] medical records  [id number]        Your Vitals Were     Blood Pressure Pulse Temperature Respirations Height Weight    167/89 (BP Location: Right arm) 68 97.6  F (36.4  C) (Oral) 18 1.829 m (6') 145.2 kg (320 lb 1.6 oz)    Pulse Oximetry BMI (Body Mass Index)                97% 43.41 kg/m2           Primary Care Provider Office Phone # Fax #    David Almendarez [phone]      Equal Access to Services     [facility]: Hadii quin clements hadasho Soomaali, waaxda luqadaha, qaybta kaalmada adeegyada, jessica hidalgo hayosmani saravia . So [facility] [phone].    ATENCIÓN: Si habla español, tiene a ruiz disposición servicios gratuitos de asistencia lingüística. Crystal al [phone].    We comply with applicable federal civil rights laws and [place] laws. We do not discriminate on the basis of race, color, national origin, age, disability, sex, sexual orientation, or gender identity.            Thank you!     Thank you for choosing [facility] for your care. Our goal is always to provide you with excellent care. Hearing back from our patients is one way we can continue to improve our services. Please take a few minutes to complete the written survey that you may receive in the mail after you visit with us. Thank you!             Medication List: This is a list of all your medications and when to take them. Check marks below indicate your daily home schedule. Keep this list as a reference.      Medications           Morning Afternoon Evening Bedtime As Needed    amLODIPine 10 MG tablet   Commonly known as:  NORVASC   TAKE 1 TABLET(10 MG) BY MOUTH DAILY                                blood glucose monitoring test  strip   Commonly known as:  ONETOUCH VERIO IQ   Use to test blood sugars 3 times daily or as directed.                                fish oil-omega-3 fatty acids 1000 MG capsule   Take 4 capsules by mouth daily.                                insulin aspart 100 UNIT/ML pen   Commonly known as:  NovoLOG FLEXPEN   1 unit per 7g of carbs three times daily before meals                                insulin glargine 100 UNIT/ML pen   Inject 60 Units Subcutaneous At Bedtime                                insulin pen needle 31G X 5 MM miscellaneous   Commonly known as:  B-D U/F   Use 1 daily or as directed.                                losartan 100 MG tablet   Commonly known as:  COZAAR   Take 1 tablet (100 mg) by mouth daily                                metFORMIN 1000 MG tablet   Commonly known as:  GLUCOPHAGE   TAKE 1 TABLET(1000 MG) BY MOUTH TWICE DAILY WITH MEALS                                metoprolol succinate  MG 24 hr tablet   Commonly known as:  TOPROL-XL   Take 1 tablet (100 mg) by mouth daily                                MULTIVITAMIN PO   1 qd                                NITROSTAT 0.4 MG sublingual tablet   1 TAB EVERY 5 MIN AS NEEDED, UP TO 3 PER EPISODE   Generic drug:  nitroGLYcerin                                ONETOUCH LANCETS Misc   1 lancet by In Vitro route 3 times daily                                simvastatin 80 MG tablet   Commonly known as:  ZOCOR   TAKE 1/2 TABLET(40 MG) BY MOUTH AT BEDTIME                                warfarin 7.5 MG tablet   Commonly known as:  COUMADIN   1 tablet daily except  1/2 tablets on Mon as directed by the anticoagulation clinic

## 2018-11-30 ENCOUNTER — TELEPHONE (OUTPATIENT)
Dept: CARDIOLOGY | Facility: CLINIC | Age: 58
End: 2018-11-30

## 2018-11-30 NOTE — TELEPHONE ENCOUNTER
Post device implant discharge phone call.    Reviewed the following:  Remove outer dressing 3 days after implant. May shower after outer dressing removed. Leave steri-strips in place, will be removed at 1 week device check  Watch for redness, drainage, warmth, or fever. Call device clinic if any signs of infection.     1 week device check scheduled: 12/6/2018 at 1 PM    Pt states understanding of all instructions.

## 2018-12-05 LAB — INTERPRETATION ECG - MUSE: NORMAL

## 2018-12-06 ENCOUNTER — ALLIED HEALTH/NURSE VISIT (OUTPATIENT)
Dept: CARDIOLOGY | Facility: CLINIC | Age: 58
End: 2018-12-06
Payer: COMMERCIAL

## 2018-12-06 DIAGNOSIS — E11.9 TYPE 2 DIABETES MELLITUS WITHOUT COMPLICATION, WITH LONG-TERM CURRENT USE OF INSULIN (H): ICD-10-CM

## 2018-12-06 DIAGNOSIS — Z95.0 CARDIAC PACEMAKER IN SITU: Primary | ICD-10-CM

## 2018-12-06 DIAGNOSIS — Z79.4 TYPE 2 DIABETES MELLITUS WITHOUT COMPLICATION, WITH LONG-TERM CURRENT USE OF INSULIN (H): ICD-10-CM

## 2018-12-06 DIAGNOSIS — I42.9 PRIMARY CARDIOMYOPATHY (H): ICD-10-CM

## 2018-12-06 PROCEDURE — 93281 PM DEVICE PROGR EVAL MULTI: CPT | Performed by: INTERNAL MEDICINE

## 2018-12-06 NOTE — MR AVS SNAPSHOT
After Visit Summary   12/6/2018    Mckay Hsu    MRN: [id number]           Patient Information     Date Of Birth          1960        Visit Information        Provider Department      12/6/2018 1:00 PM [facility]        Today's Diagnoses     Cardiac pacemaker in situ    -  1    Primary cardiomyopathy (H)           Follow-ups after your visit        Your next 10 appointments already scheduled     Dec 11, 2018 12:00 PM CST   Anticoagulation Visit with  ANTICOAGULATION CLINIC   [facility] ([facility])    [address] [address] [phone]              Who to contact     If you have questions or need follow up information about today's clinic visit or your schedule please contact [facility] directly at [phone].  Normal or non-critical lab and imaging results will be communicated to you by MyChart, letter or phone within 4 business days after the clinic has received the results. If you do not hear from us within 7 days, please contact the clinic through MyChart or phone. If you have a critical or abnormal lab result, we will notify you by phone as soon as possible.  Submit refill requests through [employer] or call your pharmacy and they will forward the refill request to us. Please allow 3 business days for your refill to be completed.          Additional Information About Your Visit        Care EveryWhere ID     This is your Care EveryWhere ID. This could be used by other organizations to access your [facility] medical records  [id number]         Blood Pressure from Last 3 Encounters:   11/29/18 136/73   11/16/18 128/72   06/29/18 122/72    Weight from Last 3 Encounters:   11/29/18 145.2 kg (320 lb 1.6 oz)   11/16/18 147 kg (324 lb)   06/29/18 143.8 kg (317 lb)              We Performed the Following     PM DEVICE PROGRAMMING  GUTIERREZ MULTI LEAD PACER (85622)          Today's Medication Changes          These changes are accurate as of 12/6/18  1:14 PM.  If you have any questions, ask your nurse or doctor.               These medicines have changed or have updated prescriptions.        Dose/Directions    warfarin 7.5 MG tablet   Commonly known as:  COUMADIN   This may have changed:  additional instructions   Used for:  New onset atrial fibrillation (H)        1 tablet daily except  1/2 tablets on Mon as directed by the anticoagulation clinic   Quantity:  90 tablet   Refills:  0            Where to get your medicines      These medications were sent to [facility] - [place], [place] - [address] AT [facility]  [address], [place] [address]     Phone:  [phone]     insulin pen needle 31G X 5 MM miscellaneous                Primary Care Provider Office Phone # Fax #    David Almendarez [phone]       [address] [place] [address]        Equal Access to Services     [facility] : Hadii aad ku hadasho Soomaali, waaxda luqadaha, qaybta kaalmada adeegyada, waxay idiin hayaan moise kharajack saravia . So [facility] [phone].    ATENCIÓN: Si habla español, tiene a ruiz disposición servicios gratuitos de asistencia lingüística. [facility] [phone].    We comply with applicable federal civil rights laws and [place] laws. We do not discriminate on the basis of race, color, national origin, age, disability, sex, sexual orientation, or gender identity.            Thank you!     Thank you for choosing [facility]  for your care. Our goal is always to provide you with excellent care. Hearing back from our patients is one way we can continue to improve our services. Please take a few minutes to complete the written survey that you may receive in the mail after your visit with us. Thank you!             Your Updated Medication List -  Protect others around you: Learn how to safely use, store and throw away your medicines at www.disposemymeds.org.          This list is accurate as of 12/6/18  1:14 PM.  Always use your most recent med list.                   Brand Name Dispense Instructions for use Diagnosis    amLODIPine 10 MG tablet    NORVASC    90 tablet    TAKE 1 TABLET(10 MG) BY MOUTH DAILY    Essential hypertension, benign       blood glucose monitoring test strip    ONETOUCH VERIO IQ    100 strip    Use to test blood sugars 3 times daily or as directed.    Type 2 diabetes mellitus without complication, with long-term current use of insulin (H)       fish oil-omega-3 fatty acids 1000 MG capsule      Take 4 capsules by mouth daily.        insulin aspart 100 UNIT/ML pen    NovoLOG FLEXPEN    30 mL    1 unit per 7g of carbs three times daily before meals    Type 2 diabetes mellitus without complication, with long-term current use of insulin (H)       insulin glargine 100 UNIT/ML pen     60 mL    Inject 60 Units Subcutaneous At Bedtime    Type 2 diabetes mellitus without complication, with long-term current use of insulin (H)       insulin pen needle 31G X 5 MM miscellaneous    B-D U/F    100 each    Use 1 daily or as directed.    Type 2 diabetes mellitus without complication, with long-term current use of insulin (H)       losartan 100 MG tablet    COZAAR    90 tablet    Take 1 tablet (100 mg) by mouth daily    Essential hypertension, benign       metFORMIN 1000 MG tablet    GLUCOPHAGE    180 tablet    TAKE 1 TABLET(1000 MG) BY MOUTH TWICE DAILY WITH MEALS    Type 2 diabetes mellitus without complication, with long-term current use of insulin (H)       metoprolol succinate  MG 24 hr tablet    TOPROL-XL    90 tablet    Take 1 tablet (100 mg) by mouth daily    Essential hypertension, benign       MULTIVITAMIN PO      1 qd        NITROSTAT 0.4 MG sublingual tablet   Generic drug:  nitroGLYcerin      1 TAB EVERY 5 MIN AS NEEDED, UP TO 3 PER  EPISODE        ONETOUCH LANCETS Misc     200 each    1 lancet by In Vitro route 3 times daily    Type 2 diabetes mellitus without complication, with long-term current use of insulin (H)       simvastatin 80 MG tablet    ZOCOR    45 tablet    TAKE 1/2 TABLET(40 MG) BY MOUTH AT BEDTIME    Hyperlipidemia LDL goal <100       warfarin 7.5 MG tablet    COUMADIN    90 tablet    1 tablet daily except  1/2 tablets on Mon as directed by the anticoagulation clinic    New onset atrial fibrillation (H)

## 2018-12-06 NOTE — PROGRESS NOTES
Medtronic Percepta CRT-P 7 Day Pacemaker Device Check (Gen Change)   : 98.8 % BiV paced with 1.2% VSR   Mode: VVIR 70     Underlying Rhythm: AFib with CHB achieved by AVNA. Has a junctional escape at VVI 30  Heart Rate: Blunted at 70. Patient is inactive and denies activity intolerance  Sensing: WNL  Pacing Threshold: WNL   Impedance: WNL  Battery Status: 11 years   Device Site: Healing well. No hematoma or significant bruising noted. Steri strips removed today.   Atrial Arrhythmia: Chronic afib, on warfarin   Ventricular Arrhythmia: 0  Setting Change: LV amplitude increased from 1.5 V at 0.5ms to 2.0V at 0.5ms because LV threshold was 1.0V at 0.5ms.     Care Plan: Follow up in 3 months with remote check.   Marietta Jeong RN

## 2018-12-06 NOTE — TELEPHONE ENCOUNTER
"Requested Prescriptions   Pending Prescriptions Disp Refills     insulin pen needle (B-D U/F) 31G X 5 MM miscellaneous 100 each prn     Sig: Use 1 daily or as directed.    Diabetic Supplies Protocol Passed    12/6/2018 10:33 AM       Passed - Patient is 18 years of age or older       Passed - Recent (6 mo) or future (30 days) visit within the authorizing provider's specialty    Patient had office visit in the last 6 months or has a visit in the next 30 days with authorizing provider.  See \"Patient Info\" tab in inbasket, or \"Choose Columns\" in Meds & Orders section of the refill encounter.              "

## 2018-12-11 ENCOUNTER — ANTICOAGULATION THERAPY VISIT (OUTPATIENT)
Dept: ANTICOAGULATION | Facility: CLINIC | Age: 58
End: 2018-12-11
Payer: COMMERCIAL

## 2018-12-11 DIAGNOSIS — I48.91 NEW ONSET ATRIAL FIBRILLATION (H): ICD-10-CM

## 2018-12-11 LAB — INR POINT OF CARE: 2.5 (ref 0.86–1.14)

## 2018-12-11 PROCEDURE — 85610 PROTHROMBIN TIME: CPT | Mod: QW

## 2018-12-11 PROCEDURE — 36416 COLLJ CAPILLARY BLOOD SPEC: CPT

## 2018-12-11 RX ORDER — WARFARIN SODIUM 7.5 MG/1
TABLET ORAL
Qty: 90 TABLET | Refills: 0 | Status: SHIPPED | OUTPATIENT
Start: 2018-12-11 | End: 2019-03-14

## 2018-12-11 RX ORDER — WARFARIN SODIUM 7.5 MG/1
TABLET ORAL
Qty: 90 TABLET | Refills: 0 | Status: SHIPPED | OUTPATIENT
Start: 2018-12-11 | End: 2018-12-11

## 2018-12-11 NOTE — PROGRESS NOTES
ANTICOAGULATION FOLLOW-UP CLINIC VISIT    Patient Name:  Mckay Hsu  Date:  2018  Contact Type:  Face to Face    SUBJECTIVE:     Patient Findings     Positives:   Hospital admission (Pt had a pacemaker replacement 18, held warfarin for 3 days prior)           OBJECTIVE    INR Protime   Date Value Ref Range Status   2018 2.5 (A) 0.86 - 1.14 Final       ASSESSMENT / PLAN  INR assessment THER    Recheck INR In: 6 WEEKS    INR Location Clinic      Anticoagulation Summary  As of 2018    INR goal:   2.0-3.0   TTR:   73.5 % (2.8 y)   INR used for dosin.5 (2018)   Warfarin maintenance plan:   3.75 mg (7.5 mg x 0.5) every Tue; 7.5 mg (7.5 mg x 1) all other days   Full warfarin instructions:   3.75 mg every Tue; 7.5 mg all other days   Weekly warfarin total:   48.75 mg   Plan last modified:   Rosalee Billingsley RN (10/17/2017)   Next INR check:   2019   Target end date:       Indications    Long-term (current) use of anticoagulants [Z79.01] [Z79.01]  Chronic atrial fibrillation (H) [I48.2]             Anticoagulation Episode Summary     INR check location:       Preferred lab:       Send INR reminders to:   [facility]    Comments:               See the Encounter Report to view Anticoagulation Flowsheet and Dosing Calendar (Go to Encounters tab in chart review, and find the Anticoagulation Therapy Visit)    Dosage adjustment made based on physician directed care plan.    Rosalee Billingsley RN

## 2018-12-11 NOTE — TELEPHONE ENCOUNTER
Warfarin approved today wrong pharmacy this is the right pharmacy [facility] [place] address

## 2018-12-14 ENCOUNTER — TELEPHONE (OUTPATIENT)
Dept: INTERNAL MEDICINE | Facility: CLINIC | Age: 58
End: 2018-12-14

## 2018-12-14 DIAGNOSIS — Z79.4 TYPE 2 DIABETES MELLITUS WITHOUT COMPLICATION, WITH LONG-TERM CURRENT USE OF INSULIN (H): ICD-10-CM

## 2018-12-14 DIAGNOSIS — E11.9 TYPE 2 DIABETES MELLITUS WITHOUT COMPLICATION, WITH LONG-TERM CURRENT USE OF INSULIN (H): ICD-10-CM

## 2018-12-14 NOTE — TELEPHONE ENCOUNTER
Sent to [facility] pharmacy. Please send to [facility] in [facility].     Pt phone [phone], ok to lv detailed message

## 2018-12-26 ENCOUNTER — TELEPHONE (OUTPATIENT)
Dept: CARDIOLOGY | Facility: CLINIC | Age: 58
End: 2018-12-26

## 2018-12-26 NOTE — TELEPHONE ENCOUNTER
Pt called in with questions about his remote checks. He questioned the midnight time, explained that the time is arbitrary and he can sent manually any time that day. He states understanding.

## 2019-01-22 ENCOUNTER — ANTICOAGULATION THERAPY VISIT (OUTPATIENT)
Dept: ANTICOAGULATION | Facility: CLINIC | Age: 59
End: 2019-01-22
Payer: COMMERCIAL

## 2019-01-22 LAB — INR POINT OF CARE: 2.4 (ref 0.86–1.14)

## 2019-01-22 PROCEDURE — 36416 COLLJ CAPILLARY BLOOD SPEC: CPT

## 2019-01-22 PROCEDURE — 85610 PROTHROMBIN TIME: CPT | Mod: QW

## 2019-01-22 NOTE — PROGRESS NOTES
ANTICOAGULATION FOLLOW-UP CLINIC VISIT    Patient Name:  Mckay Hsu  Date:  2019  Contact Type:  Face to Face    SUBJECTIVE:     Patient Findings     Positives:   No Problem Findings           OBJECTIVE    INR Protime   Date Value Ref Range Status   2019 2.4 (A) 0.86 - 1.14 Final       ASSESSMENT / PLAN  INR assessment THER    Recheck INR In: 6 WEEKS    INR Location Clinic      Anticoagulation Summary  As of 2019    INR goal:   2.0-3.0   TTR:   74.5 % (2.9 y)   INR used for dosin.4 (2019)   Warfarin maintenance plan:   3.75 mg (7.5 mg x 0.5) every Tue; 7.5 mg (7.5 mg x 1) all other days   Full warfarin instructions:   3.75 mg every Tue; 7.5 mg all other days   Weekly warfarin total:   48.75 mg   No change documented:   Rosalee Billingsley RN   Plan last modified:   Rosalee Billingsley RN (10/17/2017)   Next INR check:   3/5/2019   Target end date:       Indications    Long-term (current) use of anticoagulants [Z79.01] [Z79.01]  Chronic atrial fibrillation (H) [I48.2]             Anticoagulation Episode Summary     INR check location:       Preferred lab:       Send INR reminders to:   [facility]    Comments:               See the Encounter Report to view Anticoagulation Flowsheet and Dosing Calendar (Go to Encounters tab in chart review, and find the Anticoagulation Therapy Visit)    Dosage adjustment made based on physician directed care plan.    Rosalee Billingsley RN

## 2019-01-24 DIAGNOSIS — Z79.4 TYPE 2 DIABETES MELLITUS WITHOUT COMPLICATION, WITH LONG-TERM CURRENT USE OF INSULIN (H): ICD-10-CM

## 2019-01-24 DIAGNOSIS — E11.9 TYPE 2 DIABETES MELLITUS WITHOUT COMPLICATION, WITH LONG-TERM CURRENT USE OF INSULIN (H): ICD-10-CM

## 2019-01-24 NOTE — TELEPHONE ENCOUNTER
"Requested Prescriptions   Pending Prescriptions Disp Refills     blood glucose (ONETOUCH VERIO IQ) test strip 100 strip 1     Sig: Use to test blood sugars 3 times daily or as directed.    Diabetic Supplies Protocol Failed - 1/24/2019 10:41 AM       Failed - Recent (6 mo) or future (30 days) visit within the authorizing provider's specialty    Patient had office visit in the last 6 months or has a visit in the next 30 days with authorizing provider.  See \"Patient Info\" tab in inbasket, or \"Choose Columns\" in Meds & Orders section of the refill encounter.           Passed - Medication is active on med list       Passed - Patient is 18 years of age or older        metFORMIN (GLUCOPHAGE) 1000 MG tablet 180 tablet 3     Sig: TAKE 1 TABLET(1000 MG) BY MOUTH TWICE DAILY WITH MEALS    Biguanide Agents Failed - 1/24/2019 10:41 AM       Failed - Patient has documented A1c within the specified period of time.    If HgbA1C is 8 or greater, it needs to be on file within the past 3 months.  If less than 8, must be on file within the past 6 months.     Recent Labs   Lab Test 06/11/18  0838   A1C 6.4*            Failed - Patient does NOT have a diagnosis of CHF.       Failed - Recent (6 mo) or future (30 days) visit within the authorizing provider's specialty    Patient had office visit in the last 6 months or has a visit in the next 30 days with authorizing provider or within the authorizing provider's specialty.  See \"Patient Info\" tab in inbasket, or \"Choose Columns\" in Meds & Orders section of the refill encounter.           Passed - Blood pressure less than 140/90 in past 6 months    BP Readings from Last 3 Encounters:   11/29/18 136/73   11/16/18 128/72   06/29/18 122/72                Passed - Patient has documented LDL within the past 12 mos.    Recent Labs   Lab Test 06/11/18  0838   LDL 25            Passed - Patient has had a Microalbumin in the past 15 mos.    Recent Labs   Lab Test 06/11/18  0850   MICROL 36   UMALCR " 339.05*            Passed - Patient is age 10 or older       Passed - Patient's CR is NOT>1.4 OR Patient's EGFR is NOT<45 within past 12 mos.    Recent Labs   Lab Test 11/29/18  1120   GFRESTIMATED 76   GFRESTBLACK >90       Recent Labs   Lab Test 11/29/18  1120   CR 1.01            Passed - Medication is active on med list        Last Written Prescription Date:  10/12/2018  Last Fill Quantity: 100 ,  # refills: 1   Last office visit: 6/13/2018 with prescribing provider:  Dr. Almendarez   Future Office Visit:      Last Written Prescription Date:  6/13/2018  Last Fill Quantity: *90,  # refills: 3   Last office visit: 6/13/2018 with prescribing provider:  Dr. Almendarez   Future Office Visit:

## 2019-01-24 NOTE — TELEPHONE ENCOUNTER
Medication is being filled for 1 time refill only due to:  due for an appointment.    Letter sent.

## 2019-01-24 NOTE — LETTER
[facility]  [address] [id number] [phone]            Mckay Hsu  [address]  [facility] [address]        January 24, 2019    Dear Doyle,    While refilling your prescription today, we noticed that you are due for an appointment with your provider.  We will refill your prescription for 30 days, but a follow-up appointment must be made before any additional refills can be approved.     Taking care of your health is important to us and we look forward to seeing you in the near future.  Please call us at [phone] or [phone] (or use [employer]) to schedule an appointment.     Please disregard this notice if you have already made an appointment.    Sincerely,        [facility]

## 2019-02-28 ENCOUNTER — TRANSFERRED RECORDS (OUTPATIENT)
Dept: HEALTH INFORMATION MANAGEMENT | Facility: CLINIC | Age: 59
End: 2019-02-28

## 2019-03-05 ENCOUNTER — ANTICOAGULATION THERAPY VISIT (OUTPATIENT)
Dept: ANTICOAGULATION | Facility: CLINIC | Age: 59
End: 2019-03-05
Payer: COMMERCIAL

## 2019-03-05 LAB — INR POINT OF CARE: 2.5 (ref 0.86–1.14)

## 2019-03-05 PROCEDURE — 85610 PROTHROMBIN TIME: CPT | Mod: QW

## 2019-03-05 PROCEDURE — 36416 COLLJ CAPILLARY BLOOD SPEC: CPT

## 2019-03-05 NOTE — PROGRESS NOTES
ANTICOAGULATION FOLLOW-UP CLINIC VISIT    Patient Name:  Mckay Hsu  Date:  3/5/2019  Contact Type:  Face to Face    SUBJECTIVE:     Patient Findings     Positives:   No Problem Findings           OBJECTIVE    INR Protime   Date Value Ref Range Status   2019 2.5 (A) 0.86 - 1.14 Final       ASSESSMENT / PLAN  INR assessment THER    Recheck INR In: 6 WEEKS    INR Location Clinic      Anticoagulation Summary  As of 3/5/2019    INR goal:   2.0-3.0   TTR:   75.5 % (3 y)   INR used for dosin.5 (3/5/2019)   Warfarin maintenance plan:   3.75 mg (7.5 mg x 0.5) every Tue; 7.5 mg (7.5 mg x 1) all other days   Full warfarin instructions:   3.75 mg every Tue; 7.5 mg all other days   Weekly warfarin total:   48.75 mg   No change documented:   Rosalee Billingsley RN   Plan last modified:   Rosalee Billingsley RN (10/17/2017)   Next INR check:   2019   Target end date:       Indications    Long-term (current) use of anticoagulants [Z79.01] [Z79.01]  Chronic atrial fibrillation (H) [I48.2]             Anticoagulation Episode Summary     INR check location:       Preferred lab:       Send INR reminders to:   [facility]    Comments:               See the Encounter Report to view Anticoagulation Flowsheet and Dosing Calendar (Go to Encounters tab in chart review, and find the Anticoagulation Therapy Visit)    Dosage adjustment made based on physician directed care plan.    Rosalee Billingsley RN

## 2019-03-06 ENCOUNTER — OFFICE VISIT (OUTPATIENT)
Dept: INTERNAL MEDICINE | Facility: CLINIC | Age: 59
End: 2019-03-06
Payer: COMMERCIAL

## 2019-03-06 VITALS
WEIGHT: 315 LBS | TEMPERATURE: 98 F | HEART RATE: 88 BPM | OXYGEN SATURATION: 98 % | DIASTOLIC BLOOD PRESSURE: 78 MMHG | BODY MASS INDEX: 42.66 KG/M2 | SYSTOLIC BLOOD PRESSURE: 130 MMHG | RESPIRATION RATE: 14 BRPM | HEIGHT: 72 IN

## 2019-03-06 DIAGNOSIS — E66.01 MORBID OBESITY DUE TO EXCESS CALORIES (H): ICD-10-CM

## 2019-03-06 DIAGNOSIS — Z12.11 SCREEN FOR COLON CANCER: ICD-10-CM

## 2019-03-06 DIAGNOSIS — Z13.89 SCREENING FOR DIABETIC PERIPHERAL NEUROPATHY: ICD-10-CM

## 2019-03-06 DIAGNOSIS — E11.9 TYPE 2 DIABETES MELLITUS WITHOUT COMPLICATION, WITH LONG-TERM CURRENT USE OF INSULIN (H): Primary | ICD-10-CM

## 2019-03-06 DIAGNOSIS — Z12.5 SCREENING PSA (PROSTATE SPECIFIC ANTIGEN): ICD-10-CM

## 2019-03-06 DIAGNOSIS — Z79.4 TYPE 2 DIABETES MELLITUS WITHOUT COMPLICATION, WITH LONG-TERM CURRENT USE OF INSULIN (H): Primary | ICD-10-CM

## 2019-03-06 DIAGNOSIS — I50.22 CHRONIC SYSTOLIC CONGESTIVE HEART FAILURE (H): ICD-10-CM

## 2019-03-06 DIAGNOSIS — I10 ESSENTIAL HYPERTENSION, BENIGN: ICD-10-CM

## 2019-03-06 LAB
HBA1C MFR BLD: 6.4 % (ref 0–5.6)
PSA SERPL-ACNC: 0.4 UG/L (ref 0–4)

## 2019-03-06 PROCEDURE — 36415 COLL VENOUS BLD VENIPUNCTURE: CPT | Performed by: INTERNAL MEDICINE

## 2019-03-06 PROCEDURE — 99214 OFFICE O/P EST MOD 30 MIN: CPT | Performed by: INTERNAL MEDICINE

## 2019-03-06 PROCEDURE — 99207 C FOOT EXAM  NO CHARGE: CPT | Mod: 25 | Performed by: INTERNAL MEDICINE

## 2019-03-06 PROCEDURE — G0103 PSA SCREENING: HCPCS | Performed by: INTERNAL MEDICINE

## 2019-03-06 PROCEDURE — 83036 HEMOGLOBIN GLYCOSYLATED A1C: CPT | Performed by: INTERNAL MEDICINE

## 2019-03-06 RX ORDER — INSULIN GLARGINE 100 [IU]/ML
60 INJECTION, SOLUTION SUBCUTANEOUS AT BEDTIME
Qty: 60 ML | Refills: 3 | Status: SHIPPED | OUTPATIENT
Start: 2019-03-06 | End: 2019-07-15

## 2019-03-06 ASSESSMENT — MIFFLIN-ST. JEOR: SCORE: 2308.16

## 2019-03-06 NOTE — PROGRESS NOTES
SUBJECTIVE:   Mckay Hsu is a 58 year old male who presents to clinic today for the following health issues:    Diabetes Follow-up    Patient is checking blood sugars: two-three time daily.    Blood sugar testing frequency justification: Patient modifying lifestyle changes (diet, exercise) with blood sugars  Results are as follows: 109 three month average          am - 115         suppertime - 90    Diabetic concerns: None     Symptoms of hypoglycemia (low blood sugar): Symptoms occur if sugars < 60.     Paresthesias (numbness or burning in feet) or sores: No     Date of last diabetic eye exam: 2/2019    BP Readings from Last 2 Encounters:   11/29/18 136/73   11/16/18 128/72     Hemoglobin A1C (%)   Date Value   06/11/2018 6.4 (H)   01/23/2018 6.1 (H)     LDL Cholesterol Calculated (mg/dL)   Date Value   06/11/2018 25   06/26/2017 28       Diabetes Management Resources  Hypertension Follow-up      Outpatient blood pressures are being checked at home.  Results are 120/70.    Low Salt Diet: low salt      Amount of exercise or physical activity: None    Problems taking medications regularly: No    Medication side effects: none    Diet: low salt and diabetic      Problem list and histories reviewed & adjusted, as indicated.  Additional history: as documented    Patient Active Problem List   Diagnosis     Essential hypertension, benign     Open wound of foot, excluding toe(s)     Chronic atrial fibrillation (H)     HYPERLIPIDEMIA LDL GOAL <100     Health Care Home     Subclinical hyperthyroidism     Primary cardiomyopathy (H)     Tachycardia     Atrioventricular block, complete (AV)     Long-term (current) use of anticoagulants [Z79.01]     Chronic systolic congestive heart failure (H)     Morbid obesity due to excess calories (H)     Type 2 diabetes mellitus without complication, with long-term current use of insulin (H)     Past Surgical History:   Procedure Laterality Date     C NONSPECIFIC PROCEDURE  06/2007     debritement     CARDIOVERSION  9/2009, 10/2009     H ABLATION AV NODE  4/8/10     IMPLANT PACEMAKER  4/8/10    BIV PPM- Medtronic InSync III     TONSILLECTOMY      as kid       Social History     Tobacco Use     Smoking status: Never Smoker     Smokeless tobacco: Never Used   Substance Use Topics     Alcohol use: No     Family History   Problem Relation Age of Onset     Diabetes Mother      Hypertension Father      Diabetes Father      Hypertension Maternal Grandmother      Diabetes Maternal Grandmother      Diabetes Maternal Grandfather      Hypertension Maternal Grandfather      Cardiovascular Maternal Grandfather      Hypertension Paternal Grandfather      Cardiovascular Paternal Grandfather      Diabetes Sister      Hypertension Sister          Current Outpatient Medications   Medication Sig Dispense Refill     amLODIPine (NORVASC) 10 MG tablet TAKE 1 TABLET(10 MG) BY MOUTH DAILY 90 tablet 2     BASAGLAR 100 UNIT/ML injection Inject 60 Units Subcutaneous At Bedtime 60 mL 3     blood glucose (ONETOUCH VERIO IQ) test strip Use to test blood sugars 3 times daily or as directed. 100 strip 0     fish oil-omega-3 fatty acids 1000 MG capsule Take 4 capsules by mouth daily.       insulin aspart (NOVOLOG FLEXPEN) 100 UNIT/ML injection 1 unit per 7g of carbs three times daily before meals 30 mL 5     insulin pen needle (B-D U/F) 31G X 5 MM miscellaneous Use 1 daily or as directed. 100 each 0     losartan (COZAAR) 100 MG tablet Take 1 tablet (100 mg) by mouth daily 90 tablet 3     metFORMIN (GLUCOPHAGE) 1000 MG tablet TAKE 1 TABLET(1000 MG) BY MOUTH TWICE DAILY WITH MEALS 60 tablet 0     metoprolol succinate (TOPROL-XL) 100 MG 24 hr tablet Take 1 tablet (100 mg) by mouth daily 90 tablet 3     MULTIVITAMIN OR 1 qd       NITROSTAT 0.4 MG SL SUBL 1 TAB EVERY 5 MIN AS NEEDED, UP TO 3 PER EPISODE       ONETOUCH LANCETS MISC 1 lancet by In Vitro route 3 times daily 200 each 3     simvastatin (ZOCOR) 80 MG tablet TAKE 1/2  TABLET(40 MG) BY MOUTH AT BEDTIME 45 tablet 3     warfarin (COUMADIN) 7.5 MG tablet 1 tablet daily except  1/2 tablets on Tues as directed by the anticoagulation clinic 90 tablet 0     No Known Allergies  Recent Labs   Lab Test 11/29/18  1120 06/11/18  0838 01/23/18  1005 06/26/17  0845  02/12/16  0922   A1C  --  6.4* 6.1* 5.9   < > 6.1*   LDL  --  25  --  28  --  40   HDL  --  44  --  46  --  45   TRIG  --  149  --  151*  --  108   ALT  --  28  --  26  --  28   CR 1.01 1.10  --  1.10  --  0.86   GFRESTIMATED 76 69  --  69  --  >90  Non  GFR Calc     GFRESTBLACK >90 83  --  84  --  >90   GFR Calc     POTASSIUM 4.0 4.5  --  4.0  --  4.0   TSH  --  0.42  --  0.57  --  0.69    < > = values in this interval not displayed.      BP Readings from Last 3 Encounters:   11/29/18 136/73   11/16/18 128/72   06/29/18 122/72    Wt Readings from Last 3 Encounters:   11/29/18 145.2 kg (320 lb 1.6 oz)   11/16/18 147 kg (324 lb)   06/29/18 143.8 kg (317 lb)           Labs reviewed in EPIC    Reviewed and updated as needed this visit by clinical staff       Reviewed and updated as needed this visit by Provider       ROS:  CONSTITUTIONAL: NEGATIVE for fever, chills, change in weight  ENT/MOUTH: NEGATIVE for ear, mouth and throat problems  RESP: NEGATIVE for significant cough or SOB  CV: NEGATIVE for chest pain, palpitations or peripheral edema  GI: NEGATIVE for nausea, abdominal pain, heartburn, or change in bowel habits  : NEGATIVE for frequency, dysuria, or hematuria  MUSCULOSKELETAL: NEGATIVE for significant arthralgias or myalgia  HEME: NEGATIVE for bleeding problems  PSYCHIATRIC: NEGATIVE for changes in mood or affect    OBJECTIVE:                                                    /78   Pulse 88   Temp 98  F (36.7  C) (Oral)   Resp 14   Ht 1.829 m (6')   Wt 144.5 kg (318 lb 9.6 oz)   SpO2 98%   BMI 43.21 kg/m    Body mass index is 43.21 kg/m .  GENERAL: alert and no distress  EYES:  Eyes grossly normal to inspection, extraocular movements - intact, and PERRL  HENT: ear canals- normal; TMs- normal; Nose- normal; Mouth- no ulcers, no lesions  NECK: no tenderness, no adenopathy, no asymmetry, no masses, no stiffness; thyroid- normal to palpation  RESP: lungs clear to auscultation - no rales, no rhonchi, no wheezes  CV: regular rates and rhythm, normal S1 S2, no S3 or S4 and no click or rub   ABDOMEN: obese, morbid, soft, no tenderness, no  hepatosplenomegaly, no masses, normal bowel sounds  MS: extremities- no gross deformities noted, no edema  NEURO: nonfocal  PSYCH: Alert and oriented times 3; speech- coherent , normal rate and volume; able to articulate logical thoughts, able to abstract reason, no tangential thoughts, no hallucinations or delusions, affect- normal     ASSESSMENT/PLAN:                                                      (E11.9,  Z79.4) Type 2 diabetes mellitus without complication, with long-term current use of insulin (H)  (primary encounter diagnosis)  Comment: labs ordered as fasting  Plan: Hemoglobin A1c, insulin glargine (BASAGLAR         KWIKPEN) 100 UNIT/ML pen, blood glucose         (ONETOUCH VERIO IQ) test strip, insulin aspart         (NOVOLOG FLEXPEN) 100 UNIT/ML pen, insulin pen         needle (B-D U/F) 31G X 5 MM miscellaneous,         metFORMIN (GLUCOPHAGE) 1000 MG tablet            (I50.22) Chronic systolic congestive heart failure (H)  Comment: stable and appears euvolemic  Plan:     (E66.01) Morbid obesity due to excess calories (H)  Comment: advised weight loss  Plan:     (I10) Essential hypertension, benign  Comment: stable on theapy  Plan: [place] with patient and needs to discuss with his pharmacy whether not he has had 1 of the restricted lot numbers for losartan.    (Z12.11) Screen for colon cancer  Comment: ADVISED COLONOSCOPY FOR ROUTINE PREVENTATIVE CARE.  Plan: GASTROENTEROLOGY ADULT REF PROCEDURE ONLY         [place] [phone]; No  Provider        Preference            (Z13.89) Screening for diabetic peripheral neuropathy  Comment: as noted  Plan: FOOT EXAM  NO CHARGE [63505.114]            (Z12.5) Screening PSA (prostate specific antigen)  Comment: ordered as screening  Plan: PROSTATE SPEC ANTIGEN SCREEN          See Patient Instructions    David Almendarez MD  [facility]    25 minutes spent with this patient, face to face, discussing treatment options for listed problems above as well as side effects of appropriate medications.  Counseling time extended beyond 50% of the clinic visit.  Medication dosing, treatment plan and follow-up were discussed. Also reviewed need for primary care testing for patient.

## 2019-03-06 NOTE — LETTER
[facility]  [address] [address]  [phone]      3/7/2019       Mckay Hsu  [address] [place] [id number]        Dear Mckay,      Your Hemoglobin A1C and blood sugar tests look good and I would continue with your medication without change.  These tests should be repeated in 6 months.    In addition, your PSA or prostate screening antigen is normal and should be repeated annually.    Sincerely,      David Almendarez MD  Internal Medicine

## 2019-03-14 DIAGNOSIS — I48.91 NEW ONSET ATRIAL FIBRILLATION (H): ICD-10-CM

## 2019-03-14 RX ORDER — WARFARIN SODIUM 7.5 MG/1
TABLET ORAL
Qty: 84 TABLET | Refills: 0 | Status: SHIPPED | OUTPATIENT
Start: 2019-03-14 | End: 2019-05-29

## 2019-03-14 NOTE — TELEPHONE ENCOUNTER
"Can you write this for 84 tablets for insurance purposes, please?    Requested Prescriptions   Pending Prescriptions Disp Refills     warfarin (COUMADIN) 7.5 MG tablet 90 tablet 0     Si tablet daily except  1/2 tablets on Tues as directed by the anticoagulation clinic    Vitamin K Antagonists Failed - 3/14/2019  4:02 PM       Failed - INR is within goal in the past 6 weeks    Confirm INR is within goal in the past 6 weeks.     Recent Labs   Lab Test 19   INR 2.5*                      Passed - Recent (12 mo) or future (30 days) visit within the authorizing provider's specialty    Patient had office visit in the last 12 months or has a visit in the next 30 days with authorizing provider or within the authorizing provider's specialty.  See \"Patient Info\" tab in inbasket, or \"Choose Columns\" in Meds & Orders section of the refill encounter.             Passed - Medication is active on med list       Passed - Patient is 18 years of age or older        Last Written Prescription Date:  18  Last Fill Quantity: 90,  # refills: 0   Last office visit: 3/6/2019 with prescribing provider:  3/6/19   Future Office Visit:    e  "

## 2019-04-04 ENCOUNTER — ANCILLARY PROCEDURE (OUTPATIENT)
Dept: CARDIOLOGY | Facility: CLINIC | Age: 59
End: 2019-04-04
Attending: INTERNAL MEDICINE
Payer: COMMERCIAL

## 2019-04-04 DIAGNOSIS — Z95.810 ICD (IMPLANTABLE CARDIOVERTER-DEFIBRILLATOR), BIVENTRICULAR, IN SITU: ICD-10-CM

## 2019-04-04 PROCEDURE — 93296 REM INTERROG EVL PM/IDS: CPT | Performed by: INTERNAL MEDICINE

## 2019-04-05 LAB
MDC_IDC_LEAD_IMPLANT_DT: NORMAL
MDC_IDC_LEAD_IMPLANT_DT: NORMAL
MDC_IDC_LEAD_LOCATION: NORMAL
MDC_IDC_LEAD_LOCATION: NORMAL
MDC_IDC_LEAD_LOCATION_DETAIL_1: NORMAL
MDC_IDC_LEAD_LOCATION_DETAIL_1: NORMAL
MDC_IDC_LEAD_MFG: NORMAL
MDC_IDC_LEAD_MFG: NORMAL
MDC_IDC_LEAD_MODEL: NORMAL
MDC_IDC_LEAD_MODEL: NORMAL
MDC_IDC_LEAD_POLARITY_TYPE: NORMAL
MDC_IDC_LEAD_POLARITY_TYPE: NORMAL
MDC_IDC_LEAD_SERIAL: NORMAL
MDC_IDC_LEAD_SERIAL: NORMAL
MDC_IDC_MSMT_BATTERY_DTM: NORMAL
MDC_IDC_MSMT_BATTERY_REMAINING_LONGEVITY: 125 MO
MDC_IDC_MSMT_BATTERY_RRT_TRIGGER: 2.6
MDC_IDC_MSMT_BATTERY_STATUS: NORMAL
MDC_IDC_MSMT_BATTERY_VOLTAGE: 3.14 V
MDC_IDC_MSMT_LEADCHNL_LV_IMPEDANCE_VALUE: 456 OHM
MDC_IDC_MSMT_LEADCHNL_LV_IMPEDANCE_VALUE: 551 OHM
MDC_IDC_MSMT_LEADCHNL_LV_IMPEDANCE_VALUE: 627 OHM
MDC_IDC_MSMT_LEADCHNL_LV_IMPEDANCE_VALUE: 703 OHM
MDC_IDC_MSMT_LEADCHNL_LV_IMPEDANCE_VALUE: 950 OHM
MDC_IDC_MSMT_LEADCHNL_RA_IMPEDANCE_VALUE: 3401 OHM
MDC_IDC_MSMT_LEADCHNL_RA_IMPEDANCE_VALUE: 3401 OHM
MDC_IDC_MSMT_LEADCHNL_RV_IMPEDANCE_VALUE: 342 OHM
MDC_IDC_MSMT_LEADCHNL_RV_IMPEDANCE_VALUE: 380 OHM
MDC_IDC_MSMT_LEADCHNL_RV_PACING_THRESHOLD_AMPLITUDE: 0.38 V
MDC_IDC_MSMT_LEADCHNL_RV_PACING_THRESHOLD_PULSEWIDTH: 0.4 MS
MDC_IDC_MSMT_LEADCHNL_RV_SENSING_INTR_AMPL: 10.75 MV
MDC_IDC_PG_IMPLANT_DTM: NORMAL
MDC_IDC_PG_MFG: NORMAL
MDC_IDC_PG_MODEL: NORMAL
MDC_IDC_PG_SERIAL: NORMAL
MDC_IDC_PG_TYPE: NORMAL
MDC_IDC_SESS_CLINIC_NAME: NORMAL
MDC_IDC_SESS_DTM: NORMAL
MDC_IDC_SESS_TYPE: NORMAL
MDC_IDC_SET_BRADY_LOWRATE: 70 {BEATS}/MIN
MDC_IDC_SET_BRADY_MAX_SENSOR_RATE: 130 {BEATS}/MIN
MDC_IDC_SET_BRADY_MODE: NORMAL
MDC_IDC_SET_CRT_LVRV_DELAY: 0 MS
MDC_IDC_SET_CRT_PACED_CHAMBERS: NORMAL
MDC_IDC_SET_LEADCHNL_LV_PACING_AMPLITUDE: 2 V
MDC_IDC_SET_LEADCHNL_LV_PACING_ANODE_ELECTRODE_1: NORMAL
MDC_IDC_SET_LEADCHNL_LV_PACING_ANODE_LOCATION_1: NORMAL
MDC_IDC_SET_LEADCHNL_LV_PACING_CAPTURE_MODE: NORMAL
MDC_IDC_SET_LEADCHNL_LV_PACING_CATHODE_ELECTRODE_1: NORMAL
MDC_IDC_SET_LEADCHNL_LV_PACING_CATHODE_LOCATION_1: NORMAL
MDC_IDC_SET_LEADCHNL_LV_PACING_POLARITY: NORMAL
MDC_IDC_SET_LEADCHNL_LV_PACING_PULSEWIDTH: 0.5 MS
MDC_IDC_SET_LEADCHNL_RA_SENSING_ANODE_ELECTRODE_1: NORMAL
MDC_IDC_SET_LEADCHNL_RA_SENSING_ANODE_LOCATION_1: NORMAL
MDC_IDC_SET_LEADCHNL_RA_SENSING_CATHODE_ELECTRODE_1: NORMAL
MDC_IDC_SET_LEADCHNL_RA_SENSING_CATHODE_LOCATION_1: NORMAL
MDC_IDC_SET_LEADCHNL_RA_SENSING_POLARITY: NORMAL
MDC_IDC_SET_LEADCHNL_RA_SENSING_SENSITIVITY: NORMAL
MDC_IDC_SET_LEADCHNL_RV_PACING_AMPLITUDE: 2 V
MDC_IDC_SET_LEADCHNL_RV_PACING_ANODE_ELECTRODE_1: NORMAL
MDC_IDC_SET_LEADCHNL_RV_PACING_ANODE_LOCATION_1: NORMAL
MDC_IDC_SET_LEADCHNL_RV_PACING_CAPTURE_MODE: NORMAL
MDC_IDC_SET_LEADCHNL_RV_PACING_CATHODE_ELECTRODE_1: NORMAL
MDC_IDC_SET_LEADCHNL_RV_PACING_CATHODE_LOCATION_1: NORMAL
MDC_IDC_SET_LEADCHNL_RV_PACING_POLARITY: NORMAL
MDC_IDC_SET_LEADCHNL_RV_PACING_PULSEWIDTH: 0.4 MS
MDC_IDC_SET_LEADCHNL_RV_SENSING_ANODE_ELECTRODE_1: NORMAL
MDC_IDC_SET_LEADCHNL_RV_SENSING_ANODE_LOCATION_1: NORMAL
MDC_IDC_SET_LEADCHNL_RV_SENSING_CATHODE_ELECTRODE_1: NORMAL
MDC_IDC_SET_LEADCHNL_RV_SENSING_CATHODE_LOCATION_1: NORMAL
MDC_IDC_SET_LEADCHNL_RV_SENSING_POLARITY: NORMAL
MDC_IDC_SET_LEADCHNL_RV_SENSING_SENSITIVITY: 0.9 MV
MDC_IDC_SET_ZONE_DETECTION_INTERVAL: 400 MS
MDC_IDC_SET_ZONE_TYPE: NORMAL
MDC_IDC_STAT_BRADY_AP_VP_PERCENT: 0 %
MDC_IDC_STAT_BRADY_AP_VS_PERCENT: 0 %
MDC_IDC_STAT_BRADY_AS_VP_PERCENT: 98.9 %
MDC_IDC_STAT_BRADY_AS_VS_PERCENT: 1.1 %
MDC_IDC_STAT_BRADY_DTM_END: NORMAL
MDC_IDC_STAT_BRADY_DTM_START: NORMAL
MDC_IDC_STAT_BRADY_RA_PERCENT_PACED: 0 %
MDC_IDC_STAT_BRADY_RV_PERCENT_PACED: 98.9 %
MDC_IDC_STAT_CRT_DTM_END: NORMAL
MDC_IDC_STAT_CRT_DTM_START: NORMAL
MDC_IDC_STAT_CRT_LV_PERCENT_PACED: 98.87 %
MDC_IDC_STAT_CRT_PERCENT_PACED: 98.87 %
MDC_IDC_STAT_EPISODE_RECENT_COUNT: 0
MDC_IDC_STAT_EPISODE_RECENT_COUNT_DTM_END: NORMAL
MDC_IDC_STAT_EPISODE_RECENT_COUNT_DTM_START: NORMAL
MDC_IDC_STAT_EPISODE_TOTAL_COUNT: 0
MDC_IDC_STAT_EPISODE_TOTAL_COUNT_DTM_END: NORMAL
MDC_IDC_STAT_EPISODE_TOTAL_COUNT_DTM_START: NORMAL
MDC_IDC_STAT_EPISODE_TYPE: NORMAL

## 2019-04-16 ENCOUNTER — ANTICOAGULATION THERAPY VISIT (OUTPATIENT)
Dept: ANTICOAGULATION | Facility: CLINIC | Age: 59
End: 2019-04-16
Payer: COMMERCIAL

## 2019-04-16 LAB — INR POINT OF CARE: 3 (ref 0.86–1.14)

## 2019-04-16 PROCEDURE — 36416 COLLJ CAPILLARY BLOOD SPEC: CPT

## 2019-04-16 PROCEDURE — 85610 PROTHROMBIN TIME: CPT | Mod: QW

## 2019-04-16 NOTE — PROGRESS NOTES
ANTICOAGULATION FOLLOW-UP CLINIC VISIT    Patient Name:  Mckay Hsu  Date:  4/16/2019  Contact Type:  Face to Face    SUBJECTIVE:     Patient Findings     Comments:   The patient was assessed for diet, medication, and activity level changes, missed or extra doses, bruising or bleeding, with no problem findings.             OBJECTIVE    INR Protime   Date Value Ref Range Status   04/16/2019 3.0 (A) 0.86 - 1.14 Final       ASSESSMENT / PLAN  INR assessment THER    Recheck INR In: 6 WEEKS    INR Location Clinic      Anticoagulation Summary  As of 4/16/2019    INR goal:   2.0-3.0   TTR:   76.4 % (3.2 y)   INR used for dosing:   3.0 (4/16/2019)   Warfarin maintenance plan:   3.75 mg (7.5 mg x 0.5) every Tue; 7.5 mg (7.5 mg x 1) all other days   Full warfarin instructions:   3.75 mg every Tue; 7.5 mg all other days   Weekly warfarin total:   48.75 mg   No change documented:   Rosalee Billingsley RN   Plan last modified:   Rosalee Billingsley RN (10/17/2017)   Next INR check:   5/28/2019   Target end date:       Indications    Long-term (current) use of anticoagulants [Z79.01] [Z79.01]  Chronic atrial fibrillation (H) [I48.2]             Anticoagulation Episode Summary     INR check location:       Preferred lab:       Send INR reminders to:   [facility]    Comments:               See the Encounter Report to view Anticoagulation Flowsheet and Dosing Calendar (Go to Encounters tab in chart review, and find the Anticoagulation Therapy Visit)    Dosage adjustment made based on physician directed care plan.    Rosalee Billingsley RN

## 2019-05-01 ENCOUNTER — TELEPHONE (OUTPATIENT)
Dept: INTERNAL MEDICINE | Facility: CLINIC | Age: 59
End: 2019-05-01

## 2019-05-01 NOTE — TELEPHONE ENCOUNTER
Pt is having a colonoscopy May 9th, is advised to stop coumadin and is diabetic.  Needs to know when to stop coumadin and what to do about the diabetes.   Please Advise    Ok to LM per pt.   Ara Ambrose RN

## 2019-05-01 NOTE — TELEPHONE ENCOUNTER
Left detailed message for pt with below instructions but he may CB anyway's  Arakatty Ambrose RN

## 2019-05-01 NOTE — TELEPHONE ENCOUNTER
Suggest stopping diabetic meds morning of procedure while n.p.o. and may consider dose reducing by 50% if on clear liquid diet day prior.  Suggest stopping Coumadin 5 days prior to procedure restarting the day of that evening

## 2019-05-09 ENCOUNTER — HOSPITAL ENCOUNTER (OUTPATIENT)
Facility: CLINIC | Age: 59
Discharge: HOME | End: 2019-05-09
Attending: SPECIALIST | Admitting: SPECIALIST
Payer: COMMERCIAL

## 2019-05-09 VITALS
WEIGHT: 315 LBS | SYSTOLIC BLOOD PRESSURE: 143 MMHG | BODY MASS INDEX: 41.75 KG/M2 | DIASTOLIC BLOOD PRESSURE: 82 MMHG | HEIGHT: 73 IN | RESPIRATION RATE: 16 BRPM | HEART RATE: 69 BPM | OXYGEN SATURATION: 97 %

## 2019-05-09 LAB
COLONOSCOPY: NORMAL
GLUCOSE BLDC GLUCOMTR-MCNC: 84 MG/DL (ref 70–99)

## 2019-05-09 PROCEDURE — G0105 COLORECTAL SCRN; HI RISK IND: HCPCS | Performed by: SPECIALIST

## 2019-05-09 PROCEDURE — G0500 MOD SEDAT ENDO SERVICE >5YRS: HCPCS | Performed by: SPECIALIST

## 2019-05-09 PROCEDURE — 45378 DIAGNOSTIC COLONOSCOPY: CPT | Performed by: SPECIALIST

## 2019-05-09 PROCEDURE — 82962 GLUCOSE BLOOD TEST: CPT

## 2019-05-09 PROCEDURE — 25000128 H RX IP 250 OP 636: Performed by: SPECIALIST

## 2019-05-09 PROCEDURE — 99153 MOD SED SAME PHYS/QHP EA: CPT | Performed by: SPECIALIST

## 2019-05-09 RX ORDER — ONDANSETRON 2 MG/ML
4 INJECTION INTRAMUSCULAR; INTRAVENOUS
Status: DISCONTINUED | OUTPATIENT
Start: 2019-05-09 | End: 2019-05-09 | Stop reason: HOSPADM

## 2019-05-09 RX ORDER — FENTANYL CITRATE 50 UG/ML
INJECTION, SOLUTION INTRAMUSCULAR; INTRAVENOUS PRN
Status: DISCONTINUED | OUTPATIENT
Start: 2019-05-09 | End: 2019-05-09 | Stop reason: HOSPADM

## 2019-05-09 RX ORDER — LIDOCAINE 40 MG/G
CREAM TOPICAL
Status: DISCONTINUED | OUTPATIENT
Start: 2019-05-09 | End: 2019-05-09 | Stop reason: HOSPADM

## 2019-05-09 ASSESSMENT — MIFFLIN-ST. JEOR: SCORE: 2320.85

## 2019-05-09 NOTE — H&P
Pre-Endoscopy History and Physical     Mckay Hsu MRN# [id number]   YOB: 1960 Age: 59 year old     Date of Procedure: 5/9/2019  Primary care provider: David Almendarez  Type of Endoscopy: Colonoscopy with possible biopsy, possible polypectomy  Reason for Procedure: history of polyps  Type of Anesthesia Anticipated: Conscious Sedation    HPI:    Mckay is a 59 year old male who will be undergoing the above procedure.      A history and physical has been performed. The patient's medications and allergies have been reviewed. The risks and benefits of the procedure and the sedation options and risks were discussed with the patient.  All questions were answered and informed consent was obtained.      He denies a personal or family history of anesthesia complications or bleeding disorders.     Patient Active Problem List   Diagnosis     Essential hypertension, benign     Open wound of foot, excluding toe(s)     Chronic atrial fibrillation (H)     HYPERLIPIDEMIA LDL GOAL <100     Health Care Home     Subclinical hyperthyroidism     Primary cardiomyopathy (H)     Tachycardia     Atrioventricular block, complete (AV)     Long-term (current) use of anticoagulants [Z79.01]     Chronic systolic congestive heart failure (H)     Morbid obesity due to excess calories (H)     Type 2 diabetes mellitus without complication, with long-term current use of insulin (H)        Past Medical History:   Diagnosis Date     Atrial fibrillation (H)     s/p AVN ablation, BIV PPM     Congestive heart failure (H)     tachycardia-induced cardiomyopathy     Essential hypertension, benign      Hyperlipidemia LDL goal <100 10/31/2010     Hyponatremia 9/8/2009     Morbid obesity (H)      Open wound of foot except toe(s) alone, without mention of complication      Retinitis pigmentosa      Type 2 diabetes, HbA1C goal < 8% (H) 6/26/2012        Past Surgical History:   Procedure Laterality Date     C NONSPECIFIC PROCEDURE  06/2007     debritement     CARDIOVERSION  9/2009, 10/2009     H ABLATION AV NODE  4/8/10     IMPLANT PACEMAKER  4/8/10    BIV PPM- Medtronic InSync III     TONSILLECTOMY      as kid       Social History     Tobacco Use     Smoking status: Never Smoker     Smokeless tobacco: Never Used   Substance Use Topics     Alcohol use: No       Family History   Problem Relation Age of Onset     Diabetes Mother      Hypertension Father      Diabetes Father      Hypertension Maternal Grandmother      Diabetes Maternal Grandmother      Diabetes Maternal Grandfather      Hypertension Maternal Grandfather      Cardiovascular Maternal Grandfather      Hypertension Paternal Grandfather      Cardiovascular Paternal Grandfather      Diabetes Sister      Hypertension Sister        Prior to Admission medications    Medication Sig Start Date End Date Taking? Authorizing Provider   amLODIPine (NORVASC) 10 MG tablet TAKE 1 TABLET(10 MG) BY MOUTH DAILY 8/30/18  Yes David Almendarez MD   fish oil-omega-3 fatty acids 1000 MG capsule Take 4 capsules by mouth daily. 1/16/13  Yes David Almendarez MD   insulin aspart (NOVOLOG FLEXPEN) 100 UNIT/ML pen 1 unit per 7g of carbs three times daily before meals 3/6/19  Yes David Almendarez MD   losartan (COZAAR) 100 MG tablet Take 1 tablet (100 mg) by mouth daily 6/13/18  Yes David Almendarez MD   metFORMIN (GLUCOPHAGE) 1000 MG tablet TAKE 1 TABLET(1000 MG) BY MOUTH TWICE DAILY WITH MEALS 3/6/19  Yes David Almendarez MD   metoprolol succinate (TOPROL-XL) 100 MG 24 hr tablet Take 1 tablet (100 mg) by mouth daily 6/13/18  Yes David lAmendarez MD   blood glucose (ONETOUCH VERIO IQ) test strip Use to test blood sugars 3 times daily or as directed. 3/6/19   David Almendarez MD   insulin glargine (BASAGLAR KWIKPEN) 100 UNIT/ML pen Inject 60 Units Subcutaneous At Bedtime 3/6/19   David Almendarez MD   insulin pen needle (B-D U/F) 31G X 5 MM miscellaneous Use 1 daily or as directed. 3/6/19   David Almendarez MD  "  MULTIVITAMIN OR 1 qd    Reported, Patient   NITROSTAT 0.4 MG SL SUBL 1 TAB EVERY 5 MIN AS NEEDED, UP TO 3 PER EPISODE    Reported, Patient   ONETOUCH LANCETS MISC 1 lancet by In Vitro route 3 times daily 3/6/18   David Almendarez MD   simvastatin (ZOCOR) 80 MG tablet TAKE 1/2 TABLET(40 MG) BY MOUTH AT BEDTIME 6/13/18   David Almendarez MD   warfarin (COUMADIN) 7.5 MG tablet 1 tablet daily except  1/2 tablets on Tues as directed by the anticoagulation clinic 3/14/19   David Almendarez MD       No Known Allergies     REVIEW OF SYSTEMS:   5 point ROS negative except as noted above in HPI, including Gen., Resp., CV, GI &  system review.    PHYSICAL EXAM:   BP (!) 170/9   Resp 14   Ht 1.854 m (6' 1\")   Wt 144.7 kg (319 lb)   SpO2 98%   BMI 42.09 kg/m   Estimated body mass index is 42.09 kg/m  as calculated from the following:    Height as of this encounter: 1.854 m (6' 1\").    Weight as of this encounter: 144.7 kg (319 lb).   GENERAL APPEARANCE: alert, and oriented  MENTAL STATUS: alert  AIRWAY EXAM: Mallampatti Class II (visualization of the soft palate, fauces, and uvula)  RESP: lungs clear to auscultation - no rales, rhonchi or wheezes  CV: regular rates and rhythm  DIAGNOSTICS:    Not indicated    IMPRESSION   ASA Class 3 - Severe systemic disease, but not incapacitating    PLAN:   Plan for Colonoscopy with possible biopsy, possible polypectomy. We discussed the risks, benefits and alternatives and the patient wished to proceed.    The above has been forwarded to the consulting provider.      Signed Electronically by: Godwin Santillan  May 9, 2019          "

## 2019-05-28 ENCOUNTER — ANTICOAGULATION THERAPY VISIT (OUTPATIENT)
Dept: ANTICOAGULATION | Facility: CLINIC | Age: 59
End: 2019-05-28
Payer: COMMERCIAL

## 2019-05-28 LAB — INR POINT OF CARE: 2.8 (ref 0.86–1.14)

## 2019-05-28 PROCEDURE — 85610 PROTHROMBIN TIME: CPT | Mod: QW

## 2019-05-28 PROCEDURE — 36416 COLLJ CAPILLARY BLOOD SPEC: CPT

## 2019-05-28 NOTE — PROGRESS NOTES
ANTICOAGULATION FOLLOW-UP CLINIC VISIT    Patient Name:  Mckay Hsu  Date:  2019  Contact Type:  Face to Face    SUBJECTIVE:  Patient Findings     Positives:   Hospital admission (colonoscopy 19, held warfarin 5 days prior, and started same day with warfarin)             OBJECTIVE    INR Protime   Date Value Ref Range Status   2019 2.8 (A) 0.86 - 1.14 Final       ASSESSMENT / PLAN  INR assessment THER    Recheck INR In: 6 WEEKS    INR Location Clinic      Anticoagulation Summary  As of 2019    INR goal:   2.0-3.0   TTR:   77.2 % (3.3 y)   INR used for dosin.8 (2019)   Warfarin maintenance plan:   3.75 mg (7.5 mg x 0.5) every Tue; 7.5 mg (7.5 mg x 1) all other days   Full warfarin instructions:   3.75 mg every Tue; 7.5 mg all other days   Weekly warfarin total:   48.75 mg   No change documented:   Rosalee Billingsley RN   Plan last modified:   Rosalee Billingsley RN (10/17/2017)   Next INR check:   2019   Target end date:       Indications    Long-term (current) use of anticoagulants [Z79.01] [Z79.01]  Chronic atrial fibrillation (H) [I48.2]             Anticoagulation Episode Summary     INR check location:       Preferred lab:       Send INR reminders to:   [facility]    Comments:               See the Encounter Report to view Anticoagulation Flowsheet and Dosing Calendar (Go to Encounters tab in chart review, and find the Anticoagulation Therapy Visit)    Dosage adjustment made based on physician directed care plan.    Rosalee Billingsley RN

## 2019-05-29 DIAGNOSIS — I48.91 NEW ONSET ATRIAL FIBRILLATION (H): ICD-10-CM

## 2019-05-29 RX ORDER — WARFARIN SODIUM 7.5 MG/1
TABLET ORAL
Qty: 84 TABLET | Refills: 0 | Status: SHIPPED | OUTPATIENT
Start: 2019-05-29 | End: 2019-08-08

## 2019-05-29 NOTE — TELEPHONE ENCOUNTER
"Requested Prescriptions   Pending Prescriptions Disp Refills     warfarin (COUMADIN) 7.5 MG tablet [Pharmacy Med Name: WARFARIN SOD 7.5MG TABLETS (YELLOW)] 84 tablet 0     Sig: TAKE 1 TABLET BY MOUTH DAILY EXCEPT ONE-HALF TABLET ON TUESDAYS AS DIRECTED BY THE ANTICOAGULATION CLINIC       Vitamin K Antagonists Failed - 5/29/2019  9:47 AM        Failed - INR is within goal in the past 6 weeks     Confirm INR is within goal in the past 6 weeks.     Recent Labs   Lab Test 05/28/19   INR 2.8*                       Passed - Recent (12 mo) or future (30 days) visit within the authorizing provider's specialty     Patient had office visit in the last 12 months or has a visit in the next 30 days with authorizing provider or within the authorizing provider's specialty.  See \"Patient Info\" tab in inbasket, or \"Choose Columns\" in Meds & Orders section of the refill encounter.              Passed - Medication is active on med list        Passed - Patient is 18 years of age or older          "

## 2019-05-31 DIAGNOSIS — I10 ESSENTIAL HYPERTENSION, BENIGN: ICD-10-CM

## 2019-05-31 DIAGNOSIS — E11.9 TYPE 2 DIABETES MELLITUS WITHOUT COMPLICATION, WITH LONG-TERM CURRENT USE OF INSULIN (H): ICD-10-CM

## 2019-05-31 DIAGNOSIS — Z79.4 TYPE 2 DIABETES MELLITUS WITHOUT COMPLICATION, WITH LONG-TERM CURRENT USE OF INSULIN (H): ICD-10-CM

## 2019-05-31 NOTE — TELEPHONE ENCOUNTER
"Requested Prescriptions   Pending Prescriptions Disp Refills     amLODIPine (NORVASC) 10 MG tablet 90 tablet 2     Sig: TAKE 1 TABLET(10 MG) BY MOUTH DAILY   Last Written Prescription Date:  8/30/2018  Last Fill Quantity: 90,  # refills: 2   Last Office Visit: 3/6/2019   Future Office Visit:         Calcium Channel Blockers Protocol  Failed - 5/31/2019  7:34 AM        Failed - Blood pressure under 140/90 in past 12 months     BP Readings from Last 3 Encounters:   05/09/19 143/82   03/06/19 130/78   11/29/18 136/73                 Passed - Recent (12 mo) or future (30 days) visit within the authorizing provider's specialty     Patient had office visit in the last 12 months or has a visit in the next 30 days with authorizing provider or within the authorizing provider's specialty.  See \"Patient Info\" tab in inbasket, or \"Choose Columns\" in Meds & Orders section of the refill encounter.              Passed - Medication is active on med list        Passed - Patient is age 18 or older        Passed - Normal serum creatinine on file in past 12 months     Recent Labs   Lab Test 11/29/18  1120   CR 1.01             metoprolol succinate ER (TOPROL-XL) 100 MG 24 hr tablet 90 tablet 3     Sig: Take 1 tablet (100 mg) by mouth daily   Last Written Prescription Date:  6/13/2018  Last Fill Quantity: 90,  # refills: 3   Last Office Visit: 3/6/2019   Future Office Visit:         Beta-Blockers Protocol Failed - 5/31/2019  7:34 AM        Failed - Blood pressure under 140/90 in past 12 months     BP Readings from Last 3 Encounters:   05/09/19 143/82   03/06/19 130/78   11/29/18 136/73                 Passed - Patient is age 6 or older        Passed - Recent (12 mo) or future (30 days) visit within the authorizing provider's specialty     Patient had office visit in the last 12 months or has a visit in the next 30 days with authorizing provider or within the authorizing provider's specialty.  See \"Patient Info\" tab in inbasket, or " "\"Choose Columns\" in Meds & Orders section of the refill encounter.              Passed - Medication is active on med list        insulin aspart (NOVOLOG FLEXPEN) 100 UNIT/ML pen 30 mL 5     Si unit per 7g of carbs three times daily before meals   Last Written Prescription Date:  3/6/2019  Last Fill Quantity: 30mL,  # refills: 5   Last Office Visit: 3/6/2019   Future Office Visit:         Short Acting Insulin Protocol Failed - 2019  7:34 AM        Failed - Blood pressure less than 140/90 in past 6 months     BP Readings from Last 3 Encounters:   19 143/82   19 130/78   18 136/73                 Passed - LDL on file in past 12 months     Recent Labs   Lab Test 18  0838   LDL 25             Passed - Microalbumin on file in past 12 months     Recent Labs   Lab Test 18  0850   MICROL 36   UMALCR 339.05*             Passed - Serum creatinine on file in past 12 months     Recent Labs   Lab Test 18  1120   CR 1.01             Passed - HgbA1C in past 3 or 6 months     If HgbA1C is 8 or greater, it needs to be on file within the past 3 months.  If less than 8, must be on file within the past 6 months.     Recent Labs   Lab Test 19  1452   A1C 6.4*             Passed - Medication is active on med list        Passed - Patient is age 18 or older        Passed - Recent (6 mo) or future (30 days) visit within the authorizing provider's specialty     Patient had office visit in the last 6 months or has a visit in the next 30 days with authorizing provider or within the authorizing provider's specialty.  See \"Patient Info\" tab in inbasket, or \"Choose Columns\" in Meds & Orders section of the refill encounter.              "

## 2019-06-04 RX ORDER — AMLODIPINE BESYLATE 10 MG/1
TABLET ORAL
Qty: 90 TABLET | Refills: 0 | Status: SHIPPED | OUTPATIENT
Start: 2019-06-04 | End: 2019-09-03

## 2019-06-04 RX ORDER — METOPROLOL SUCCINATE 100 MG/1
100 TABLET, EXTENDED RELEASE ORAL DAILY
Qty: 90 TABLET | Refills: 0 | Status: SHIPPED | OUTPATIENT
Start: 2019-06-04 | End: 2019-09-03

## 2019-06-04 NOTE — TELEPHONE ENCOUNTER
Prescription approved per [facility] Refill Protocol.last MD note says return in 6 months .Moni Galeana RN

## 2019-06-18 ENCOUNTER — TELEPHONE (OUTPATIENT)
Dept: INTERNAL MEDICINE | Facility: CLINIC | Age: 59
End: 2019-06-18

## 2019-06-18 RX ORDER — LANCETS
EACH MISCELLANEOUS
Qty: 100 EACH | Refills: 6 | Status: CANCELLED | OUTPATIENT
Start: 2019-06-18

## 2019-06-18 RX ORDER — GLUCOSAMINE HCL/CHONDROITIN SU 500-400 MG
CAPSULE ORAL
Qty: 100 EACH | Refills: 3 | Status: CANCELLED | OUTPATIENT
Start: 2019-06-18

## 2019-06-18 NOTE — TELEPHONE ENCOUNTER
Reason for Call:  Other call back    Detailed comments: Pt states that [employer] has changed their formulary.  Pt will now need changes to his diabetic  medication and supplies.  He will now need to use Humalog.  He will need to use Staci through [employer] for meter and test strips.  Please call pt to discuss.    Phone Number Patient can be reached at: Home number on file [phone] (home)    Best Time: anytime    Can we leave a detailed message on this number? YES    Call taken on 6/18/2019 at 12:53 PM by MAGDY FERREIRA

## 2019-06-18 NOTE — TELEPHONE ENCOUNTER
Effective July 1 formulary has changed insurance will cover Humalog, Staci [facility] meter,  And test strips. New meter and supplies pending. Irma Morales on 6/18/2019 at 2:07 PM

## 2019-06-18 NOTE — TELEPHONE ENCOUNTER
When due for refills patient should request and we can switch at that point.  request for new refills need to be entered appropriately for approval

## 2019-06-21 ENCOUNTER — OFFICE VISIT (OUTPATIENT)
Dept: CARDIOLOGY | Facility: CLINIC | Age: 59
End: 2019-06-21
Attending: PHYSICIAN ASSISTANT
Payer: COMMERCIAL

## 2019-06-21 VITALS
WEIGHT: 315 LBS | DIASTOLIC BLOOD PRESSURE: 85 MMHG | HEART RATE: 89 BPM | BODY MASS INDEX: 42.75 KG/M2 | SYSTOLIC BLOOD PRESSURE: 135 MMHG

## 2019-06-21 DIAGNOSIS — I48.19 PERSISTENT ATRIAL FIBRILLATION (H): Primary | ICD-10-CM

## 2019-06-21 DIAGNOSIS — I42.9 PRIMARY CARDIOMYOPATHY (H): ICD-10-CM

## 2019-06-21 DIAGNOSIS — Z95.0 CARDIAC PACEMAKER IN SITU: ICD-10-CM

## 2019-06-21 PROCEDURE — 99214 OFFICE O/P EST MOD 30 MIN: CPT | Performed by: INTERNAL MEDICINE

## 2019-06-21 NOTE — LETTER
6/21/2019    David Almendarez MD  [address] [facility] [address]    RE: Mckay Hsu       Dear Colleague,    I had the pleasure of seeing Mckay Hsu in the [facility].    HPI and Plan:   See dictation    Orders Placed This Encounter   Procedures     Follow-Up with Cardiac Advanced Practice Provider       No orders of the defined types were placed in this encounter.      There are no discontinued medications.      Encounter Diagnoses   Name Primary?     Primary cardiomyopathy (H)      Cardiac pacemaker in situ      Persistent atrial fibrillation (H) Yes       CURRENT MEDICATIONS:  Current Outpatient Medications   Medication Sig Dispense Refill     amLODIPine (NORVASC) 10 MG tablet TAKE 1 TABLET(10 MG) BY MOUTH DAILY 90 tablet 0     blood glucose (ONETOUCH VERIO IQ) test strip Use to test blood sugars 3 times daily or as directed. 100 strip 3     fish oil-omega-3 fatty acids 1000 MG capsule Take 4 capsules by mouth daily.       insulin aspart (NOVOLOG FLEXPEN) 100 UNIT/ML pen 1 unit per 7g of carbs three times daily before meals 30 mL 0     insulin glargine (BASAGLAR KWIKPEN) 100 UNIT/ML pen Inject 60 Units Subcutaneous At Bedtime 60 mL 3     insulin pen needle (B-D U/F) 31G X 5 MM miscellaneous Use 1 daily or as directed. 100 each 3     losartan (COZAAR) 100 MG tablet Take 1 tablet (100 mg) by mouth daily 90 tablet 3     metFORMIN (GLUCOPHAGE) 1000 MG tablet TAKE 1 TABLET(1000 MG) BY MOUTH TWICE DAILY WITH MEALS 180 tablet 3     metoprolol succinate ER (TOPROL-XL) 100 MG 24 hr tablet Take 1 tablet (100 mg) by mouth daily 90 tablet 0     MULTIVITAMIN OR 1 qd       NITROSTAT 0.4 MG SL SUBL 1 TAB EVERY 5 MIN AS NEEDED, UP TO 3 PER EPISODE       ONETOUCH LANCETS MISC 1 lancet by In Vitro route 3 times daily 200 each 3     simvastatin (ZOCOR) 80 MG tablet TAKE 1/2 TABLET(40 MG) BY MOUTH AT BEDTIME 45 tablet 3     warfarin (COUMADIN) 7.5 MG tablet TAKE 1 TABLET BY MOUTH  DAILY EXCEPT ONE-HALF TABLET ON TUESDAYS AS DIRECTED BY THE ANTICOAGULATION CLINIC 84 tablet 0       ALLERGIES   No Known Allergies    PAST MEDICAL HISTORY:  Past Medical History:   Diagnosis Date     Atrial fibrillation (H)     s/p AVN ablation, BIV PPM     Congestive heart failure (H)     tachycardia-induced cardiomyopathy     Essential hypertension, benign      Hyperlipidemia LDL goal <100 10/31/2010     Hyponatremia 9/8/2009     Morbid obesity (H)      Open wound of foot except toe(s) alone, without mention of complication      Retinitis pigmentosa      Type 2 diabetes, HbA1C goal < 8% (H) 6/26/2012       PAST SURGICAL HISTORY:  Past Surgical History:   Procedure Laterality Date     C NONSPECIFIC PROCEDURE  06/2007    debritement     CARDIOVERSION  9/2009, 10/2009     COLONOSCOPY N/A 5/9/2019    Procedure: COLONOSCOPY;  Surgeon: Godwin Santillan MD;  Location: [facility]     H ABLATION AV NODE  4/8/10     IMPLANT PACEMAKER  4/8/10    BIV PPM- Medtronic InSync III     TONSILLECTOMY      as kid       FAMILY HISTORY:  Family History   Problem Relation Age of Onset     Diabetes Mother      Hypertension Father      Diabetes Father      Hypertension Maternal Grandmother      Diabetes Maternal Grandmother      Diabetes Maternal Grandfather      Hypertension Maternal Grandfather      Cardiovascular Maternal Grandfather      Hypertension Paternal Grandfather      Cardiovascular Paternal Grandfather      Diabetes Sister      Hypertension Sister        SOCIAL HISTORY:  Social History     Socioeconomic History     Marital status: Single     Spouse name: None     Number of children: None     Years of education: None     Highest education level: None   Occupational History     Occupation: repair printer     Employer: [employer]   Social Needs     Financial resource strain: None     Food insecurity:     Worry: None     Inability: None     Transportation needs:     Medical: None     Non-medical: None   Tobacco Use     Smoking  status: Never Smoker     Smokeless tobacco: Never Used   Substance and Sexual Activity     Alcohol use: No     Drug use: No     Sexual activity: Never   Lifestyle     Physical activity:     Days per week: None     Minutes per session: None     Stress: None   Relationships     Social connections:     Talks on phone: None     Gets together: None     Attends Moravian service: None     Active member of club or organization: None     Attends meetings of clubs or organizations: None     Relationship status: None     Intimate partner violence:     Fear of current or ex partner: None     Emotionally abused: None     Physically abused: None     Forced sexual activity: None   Other Topics Concern      Service Not Asked     Blood Transfusions Not Asked     Caffeine Concern Yes     Comment: occasionally soda     Occupational Exposure Not Asked     Hobby Hazards Not Asked     Sleep Concern Not Asked     Stress Concern Not Asked     Weight Concern Not Asked     Special Diet Yes     Comment: low sodium, low fat, DM diet      Back Care Not Asked     Exercise Yes     Comment: walking, stairs      Bike Helmet Not Asked     Seat Belt Not Asked     Self-Exams Not Asked     Parent/sibling w/ CABG, MI or angioplasty before 65F 55M? Not Asked   Social History Narrative     None       Review of Systems:  Skin:  Negative       Eyes:  Positive for glasses;visual blurring    ENT:  Negative      Respiratory:  Negative       Cardiovascular:  Negative      Gastroenterology: Negative      Genitourinary:  Negative      Musculoskeletal:  Positive for arthritis    Neurologic:  Negative      Psychiatric:  Negative      Heme/Lymph/Imm:  Negative      Endocrine:  Positive for diabetes      Physical Exam:  Vitals: /85   Pulse 89   Wt 147 kg (324 lb)   BMI 42.75 kg/m       Constitutional:  cooperative, alert and oriented, well developed, well nourished, in no acute distress        Skin:  warm and dry to the touch, no apparent skin  lesions or masses noted   pacemaker incision in the left infraclavicular area was well-healed      Head:  normocephalic, no masses or lesions        Eyes:  pupils equal and round;conjunctivae and lids unremarkable;sclera white;no xanthalasma        Lymph:      ENT:  no pallor or cyanosis, dentition good        Neck:  JVP normal;no carotid bruit        Respiratory:  clear to auscultation         Cardiac: regular rhythm;normal S1 and S2;no S3 or S4;no murmurs, gallops or rubs detected                pulses full and equal                                        GI:  abdomen soft obese      Extremities and Muscular Skeletal:  no deformities, clubbing, cyanosis, erythema observed   bilateral LE edema;1+          Neurological:  no gross motor deficits        Psych:  Alert and Oriented x 3        CC  Peggy Caruso PA-C  [address]  [place] [id number]                Thank you for allowing me to participate in the care of your patient.      Sincerely,     Soto Holliday MD     [facility]    cc:   Peggy Caruso PA-C  [address]  [place] [id number]

## 2019-06-21 NOTE — LETTER
6/21/2019      David Almendarez MD  [address] [place] [id number]      RE: Octavia Hsu       Dear Colleague,    I had the pleasure of seeing Octavia Hsu in the [facility].    Service Date: 06/21/2019      HISTORY OF PRESENT ILLNESS:  I saw Mr. Hsu for followup of atrial fibrillation, status post AV node ablation with BiV pacemaker implantation.  He is a 59-year-old white male with a history of obesity and type 2 diabetes.  He initially presented with atrial fibrillation with tachycardia-induced cardiomyopathy.  He chose to have AV node ablation and BiV pacemaker implantation that was initially performed on 04/08/2010.  The patient had a pacemaker pulse generator replacement on 11/29/2018.  He has normalization of the LV function after AV node ablation.  Symptomatically, he has been doing well with no shortness of breath, palpitation or chest pain.  He is now unemployed.  On the other hand, he has no hospitalization or ER visit.      PHYSICAL EXAMINATION:   VITAL SIGNS:  Blood pressure was 135/85, heart rate 80 beats per minute, body weight is 324 pounds.   CHEST:  The pacemaker site looked well.   LUNGS:  Clear.   CARDIAC:  Rhythm was regular, and the heart sounds were normal with no murmur.   ABDOMEN:  Examination showed severe obesity.   EXTREMITIES:  There was no pedal edema.      ASSESSMENT AND RECOMMENDATIONS:  Mr. Hsu is doing well symptomatically.  The blood pressure is relatively stable, and he is encouraged to lose weight.  He will continue anticoagulation and return for Cardiology followup in 1 year.  If he has a stable condition, he can continue to see Ms. Caruso annually and see me again only as needed.      cc:   David Almendarez MD    [address]  [phone]         MAREN DIETRICH MD             D: 06/21/2019   T: 06/21/2019   MT: LINDA      Name:     OCTAVIA HSU   MRN:      [id number]        Account:       [id number]   :      1960           Service Date: 2019      Document: [id number]           Outpatient Encounter Medications as of 2019   Medication Sig Dispense Refill     amLODIPine (NORVASC) 10 MG tablet TAKE 1 TABLET(10 MG) BY MOUTH DAILY 90 tablet 0     blood glucose (ONETOUCH VERIO IQ) test strip Use to test blood sugars 3 times daily or as directed. 100 strip 3     fish oil-omega-3 fatty acids 1000 MG capsule Take 4 capsules by mouth daily.       insulin aspart (NOVOLOG FLEXPEN) 100 UNIT/ML pen 1 unit per 7g of carbs three times daily before meals 30 mL 0     insulin glargine (BASAGLAR KWIKPEN) 100 UNIT/ML pen Inject 60 Units Subcutaneous At Bedtime 60 mL 3     insulin pen needle (B-D U/F) 31G X 5 MM miscellaneous Use 1 daily or as directed. 100 each 3     losartan (COZAAR) 100 MG tablet Take 1 tablet (100 mg) by mouth daily 90 tablet 3     metFORMIN (GLUCOPHAGE) 1000 MG tablet TAKE 1 TABLET(1000 MG) BY MOUTH TWICE DAILY WITH MEALS 180 tablet 3     metoprolol succinate ER (TOPROL-XL) 100 MG 24 hr tablet Take 1 tablet (100 mg) by mouth daily 90 tablet 0     MULTIVITAMIN OR 1 qd       NITROSTAT 0.4 MG SL SUBL 1 TAB EVERY 5 MIN AS NEEDED, UP TO 3 PER EPISODE       ONETOUCH LANCETS MISC 1 lancet by In Vitro route 3 times daily 200 each 3     simvastatin (ZOCOR) 80 MG tablet TAKE 1/2 TABLET(40 MG) BY MOUTH AT BEDTIME 45 tablet 3     warfarin (COUMADIN) 7.5 MG tablet TAKE 1 TABLET BY MOUTH DAILY EXCEPT ONE-HALF TABLET ON  AS DIRECTED BY THE ANTICOAGULATION CLINIC 84 tablet 0     No facility-administered encounter medications on file as of 2019.        Again, thank you for allowing me to participate in the care of your patient.      Sincerely,    Soto Holliday MD     [facility]

## 2019-06-21 NOTE — PROGRESS NOTES
HPI and Plan:   See dictation    Orders Placed This Encounter   Procedures     Follow-Up with Cardiac Advanced Practice Provider       No orders of the defined types were placed in this encounter.      There are no discontinued medications.      Encounter Diagnoses   Name Primary?     Primary cardiomyopathy (H)      Cardiac pacemaker in situ      Persistent atrial fibrillation (H) Yes       CURRENT MEDICATIONS:  Current Outpatient Medications   Medication Sig Dispense Refill     amLODIPine (NORVASC) 10 MG tablet TAKE 1 TABLET(10 MG) BY MOUTH DAILY 90 tablet 0     blood glucose (ONETOUCH VERIO IQ) test strip Use to test blood sugars 3 times daily or as directed. 100 strip 3     fish oil-omega-3 fatty acids 1000 MG capsule Take 4 capsules by mouth daily.       insulin aspart (NOVOLOG FLEXPEN) 100 UNIT/ML pen 1 unit per 7g of carbs three times daily before meals 30 mL 0     insulin glargine (BASAGLAR KWIKPEN) 100 UNIT/ML pen Inject 60 Units Subcutaneous At Bedtime 60 mL 3     insulin pen needle (B-D U/F) 31G X 5 MM miscellaneous Use 1 daily or as directed. 100 each 3     losartan (COZAAR) 100 MG tablet Take 1 tablet (100 mg) by mouth daily 90 tablet 3     metFORMIN (GLUCOPHAGE) 1000 MG tablet TAKE 1 TABLET(1000 MG) BY MOUTH TWICE DAILY WITH MEALS 180 tablet 3     metoprolol succinate ER (TOPROL-XL) 100 MG 24 hr tablet Take 1 tablet (100 mg) by mouth daily 90 tablet 0     MULTIVITAMIN OR 1 qd       NITROSTAT 0.4 MG SL SUBL 1 TAB EVERY 5 MIN AS NEEDED, UP TO 3 PER EPISODE       ONETOUCH LANCETS MISC 1 lancet by In Vitro route 3 times daily 200 each 3     simvastatin (ZOCOR) 80 MG tablet TAKE 1/2 TABLET(40 MG) BY MOUTH AT BEDTIME 45 tablet 3     warfarin (COUMADIN) 7.5 MG tablet TAKE 1 TABLET BY MOUTH DAILY EXCEPT ONE-HALF TABLET ON TUESDAYS AS DIRECTED BY THE ANTICOAGULATION CLINIC 84 tablet 0       ALLERGIES   No Known Allergies    PAST MEDICAL HISTORY:  Past Medical History:   Diagnosis Date     Atrial fibrillation (H)      s/p AVN ablation, BIV PPM     Congestive heart failure (H)     tachycardia-induced cardiomyopathy     Essential hypertension, benign      Hyperlipidemia LDL goal <100 10/31/2010     Hyponatremia 9/8/2009     Morbid obesity (H)      Open wound of foot except toe(s) alone, without mention of complication      Retinitis pigmentosa      Type 2 diabetes, HbA1C goal < 8% (H) 6/26/2012       PAST SURGICAL HISTORY:  Past Surgical History:   Procedure Laterality Date     C NONSPECIFIC PROCEDURE  06/2007    debritement     CARDIOVERSION  9/2009, 10/2009     COLONOSCOPY N/A 5/9/2019    Procedure: COLONOSCOPY;  Surgeon: Godwin Santillan MD;  Location: [facility]     H ABLATION AV NODE  4/8/10     IMPLANT PACEMAKER  4/8/10    BIV PPM- Medtronic InSync III     TONSILLECTOMY      as kid       FAMILY HISTORY:  Family History   Problem Relation Age of Onset     Diabetes Mother      Hypertension Father      Diabetes Father      Hypertension Maternal Grandmother      Diabetes Maternal Grandmother      Diabetes Maternal Grandfather      Hypertension Maternal Grandfather      Cardiovascular Maternal Grandfather      Hypertension Paternal Grandfather      Cardiovascular Paternal Grandfather      Diabetes Sister      Hypertension Sister        SOCIAL HISTORY:  Social History     Socioeconomic History     Marital status: Single     Spouse name: None     Number of children: None     Years of education: None     Highest education level: None   Occupational History     Occupation: repair printer     Employer: [employer]     Financial resource strain: None     Food insecurity:     Worry: None     Inability: None     Transportation needs:     Medical: None     Non-medical: None   Tobacco Use     Smoking status: Never Smoker     Smokeless tobacco: Never Used   Substance and Sexual Activity     Alcohol use: No     Drug use: No     Sexual activity: Never   Lifestyle     Physical activity:     Days per week: None     Minutes per  session: None     Stress: None   Relationships     Social connections:     Talks on phone: None     Gets together: None     Attends Orthodoxy service: None     Active member of club or organization: None     Attends meetings of clubs or organizations: None     Relationship status: None     Intimate partner violence:     Fear of current or ex partner: None     Emotionally abused: None     Physically abused: None     Forced sexual activity: None   Other Topics Concern      Service Not Asked     Blood Transfusions Not Asked     Caffeine Concern Yes     Comment: occasionally soda     Occupational Exposure Not Asked     Hobby Hazards Not Asked     Sleep Concern Not Asked     Stress Concern Not Asked     Weight Concern Not Asked     Special Diet Yes     Comment: low sodium, low fat, DM diet      Back Care Not Asked     Exercise Yes     Comment: walking, stairs      Bike Helmet Not Asked     Seat Belt Not Asked     Self-Exams Not Asked     Parent/sibling w/ CABG, MI or angioplasty before 65F 55M? Not Asked   Social History Narrative     None       Review of Systems:  Skin:  Negative       Eyes:  Positive for glasses;visual blurring    ENT:  Negative      Respiratory:  Negative       Cardiovascular:  Negative      Gastroenterology: Negative      Genitourinary:  Negative      Musculoskeletal:  Positive for arthritis    Neurologic:  Negative      Psychiatric:  Negative      Heme/Lymph/Imm:  Negative      Endocrine:  Positive for diabetes      Physical Exam:  Vitals: /85   Pulse 89   Wt 147 kg (324 lb)   BMI 42.75 kg/m      Constitutional:  cooperative, alert and oriented, well developed, well nourished, in no acute distress        Skin:  warm and dry to the touch, no apparent skin lesions or masses noted   pacemaker incision in the left infraclavicular area was well-healed      Head:  normocephalic, no masses or lesions        Eyes:  pupils equal and round;conjunctivae and lids unremarkable;sclera white;no  xanthalasma        Lymph:      ENT:  no pallor or cyanosis, dentition good        Neck:  JVP normal;no carotid bruit        Respiratory:  clear to auscultation         Cardiac: regular rhythm;normal S1 and S2;no S3 or S4;no murmurs, gallops or rubs detected                pulses full and equal                                        GI:  abdomen soft obese      Extremities and Muscular Skeletal:  no deformities, clubbing, cyanosis, erythema observed   bilateral LE edema;1+          Neurological:  no gross motor deficits        Psych:  Alert and Oriented x 3        CC  Peggy Caruso PA-C  [address]  [place] [address]

## 2019-06-21 NOTE — PROGRESS NOTES
Service Date: 2019      HISTORY OF PRESENT ILLNESS:  I saw Mr. Hsu for followup of atrial fibrillation, status post AV node ablation with BiV pacemaker implantation.  He is a 59-year-old white male with a history of obesity and type 2 diabetes.  He initially presented with atrial fibrillation with tachycardia-induced cardiomyopathy.  He chose to have AV node ablation and BiV pacemaker implantation that was initially performed on 2010.  The patient had a pacemaker pulse generator replacement on 2018.  He has normalization of the LV function after AV node ablation.  Symptomatically, he has been doing well with no shortness of breath, palpitation or chest pain.  He is now unemployed.  On the other hand, he has no hospitalization or ER visit.      PHYSICAL EXAMINATION:   VITAL SIGNS:  Blood pressure was 135/85, heart rate 80 beats per minute, body weight is 324 pounds.   CHEST:  The pacemaker site looked well.   LUNGS:  Clear.   CARDIAC:  Rhythm was regular, and the heart sounds were normal with no murmur.   ABDOMEN:  Examination showed severe obesity.   EXTREMITIES:  There was no pedal edema.      ASSESSMENT AND RECOMMENDATIONS:  Mr. Hsu is doing well symptomatically.  The blood pressure is relatively stable, and he is encouraged to lose weight.  He will continue anticoagulation and return for Cardiology followup in 1 year.  If he has a stable condition, he can continue to see Ms. Caruso annually and see me again only as needed.      cc:   David Almendarez MD    [address]  [phone]         MAREN DIETRICH MD             D: 2019   T: 2019   MT: LINDA      Name:     OCTAVIA HSU   MRN:      [id number]        Account:      [id number]   :      1960           Service Date: 2019      Document: [id number]

## 2019-07-03 DIAGNOSIS — Z79.4 TYPE 2 DIABETES MELLITUS WITHOUT COMPLICATION, WITH LONG-TERM CURRENT USE OF INSULIN (H): ICD-10-CM

## 2019-07-03 DIAGNOSIS — E11.9 TYPE 2 DIABETES MELLITUS WITHOUT COMPLICATION, WITH LONG-TERM CURRENT USE OF INSULIN (H): ICD-10-CM

## 2019-07-03 NOTE — TELEPHONE ENCOUNTER
Prescription approved per [facility] Refill Protocol.    Micaela URENA RN, BSN, PHN

## 2019-07-03 NOTE — TELEPHONE ENCOUNTER
"Requested Prescriptions   Pending Prescriptions Disp Refills     blood glucose (ONETOUCH VERIO IQ) test strip 100 strip 3     Sig: Use to test blood sugars 3 times daily or as directed.       Diabetic Supplies Protocol Passed - 7/3/2019  1:51 PM        Passed - Medication is active on med list        Passed - Patient is 18 years of age or older        Passed - Recent (6 mo) or future (30 days) visit within the authorizing provider's specialty     Patient had office visit in the last 6 months or has a visit in the next 30 days with authorizing provider.  See \"Patient Info\" tab in inbasket, or \"Choose Columns\" in Meds & Orders section of the refill encounter.            Last Written Prescription Date:  3/6/19  Last Fill Quantity: 100,  # refills: 3   Last office visit: 3/6/2019 with prescribing provider:  3/6/19   Future Office Visit:      "

## 2019-07-09 ENCOUNTER — ANTICOAGULATION THERAPY VISIT (OUTPATIENT)
Dept: ANTICOAGULATION | Facility: CLINIC | Age: 59
End: 2019-07-09
Payer: COMMERCIAL

## 2019-07-09 LAB — INR POINT OF CARE: 2.8 (ref 0.86–1.14)

## 2019-07-09 PROCEDURE — 36416 COLLJ CAPILLARY BLOOD SPEC: CPT

## 2019-07-09 PROCEDURE — 85610 PROTHROMBIN TIME: CPT | Mod: QW

## 2019-07-09 NOTE — PROGRESS NOTES
ANTICOAGULATION FOLLOW-UP CLINIC VISIT    Patient Name:  Mckay Hsu  Date:  2019  Contact Type:  Face to Face    SUBJECTIVE:  Patient Findings     Comments:   The patient was assessed for diet, medication, and activity level changes, missed or extra doses, bruising or bleeding, with no problem findings.          Clinical Outcomes     Comments:   The patient was assessed for diet, medication, and activity level changes, missed or extra doses, bruising or bleeding, with no problem findings.             OBJECTIVE    INR Protime   Date Value Ref Range Status   2019 2.8 (A) 0.86 - 1.14 Final       ASSESSMENT / PLAN  INR assessment THER    Recheck INR In: 6 WEEKS    INR Location Clinic      Anticoagulation Summary  As of 2019    INR goal:   2.0-3.0   TTR:   78.0 % (3.4 y)   INR used for dosin.8 (2019)   Warfarin maintenance plan:   3.75 mg (7.5 mg x 0.5) every Tue; 7.5 mg (7.5 mg x 1) all other days   Full warfarin instructions:   3.75 mg every Tue; 7.5 mg all other days   Weekly warfarin total:   48.75 mg   Plan last modified:   Rosalee Billingsley RN (10/17/2017)   Next INR check:   2019   Target end date:       Indications    Long-term (current) use of anticoagulants [Z79.01] [Z79.01]  Chronic atrial fibrillation (H) [I48.2]             Anticoagulation Episode Summary     INR check location:       Preferred lab:       Send INR reminders to:   [facility]    Comments:               See the Encounter Report to view Anticoagulation Flowsheet and Dosing Calendar (Go to Encounters tab in chart review, and find the Anticoagulation Therapy Visit)    Dosage adjustment made based on physician directed care plan.    Rosalee Billingsley RN

## 2019-07-11 ENCOUNTER — ANCILLARY PROCEDURE (OUTPATIENT)
Dept: CARDIOLOGY | Facility: CLINIC | Age: 59
End: 2019-07-11
Attending: INTERNAL MEDICINE
Payer: COMMERCIAL

## 2019-07-11 ENCOUNTER — TELEPHONE (OUTPATIENT)
Dept: INTERNAL MEDICINE | Facility: CLINIC | Age: 59
End: 2019-07-11

## 2019-07-11 ENCOUNTER — TELEPHONE (OUTPATIENT)
Dept: CARDIOLOGY | Facility: CLINIC | Age: 59
End: 2019-07-11

## 2019-07-11 DIAGNOSIS — Z79.4 TYPE 2 DIABETES MELLITUS WITHOUT COMPLICATION, WITH LONG-TERM CURRENT USE OF INSULIN (H): Primary | ICD-10-CM

## 2019-07-11 DIAGNOSIS — E78.5 HYPERLIPIDEMIA LDL GOAL <100: ICD-10-CM

## 2019-07-11 DIAGNOSIS — Z95.810 ICD (IMPLANTABLE CARDIOVERTER-DEFIBRILLATOR), BIVENTRICULAR, IN SITU: ICD-10-CM

## 2019-07-11 DIAGNOSIS — E11.9 TYPE 2 DIABETES MELLITUS WITHOUT COMPLICATION, WITH LONG-TERM CURRENT USE OF INSULIN (H): Primary | ICD-10-CM

## 2019-07-11 PROCEDURE — 93294 REM INTERROG EVL PM/LDLS PM: CPT | Performed by: INTERNAL MEDICINE

## 2019-07-11 PROCEDURE — 93296 REM INTERROG EVL PM/IDS: CPT | Performed by: INTERNAL MEDICINE

## 2019-07-11 RX ORDER — GLUCOSAMINE HCL/CHONDROITIN SU 500-400 MG
CAPSULE ORAL
Qty: 100 EACH | Refills: 3 | Status: SHIPPED | OUTPATIENT
Start: 2019-07-11 | End: 2024-07-06

## 2019-07-11 RX ORDER — SIMVASTATIN 80 MG
TABLET ORAL
Qty: 45 TABLET | Refills: 0 | Status: SHIPPED | OUTPATIENT
Start: 2019-07-11 | End: 2019-10-01

## 2019-07-11 RX ORDER — LANCETS
EACH MISCELLANEOUS
Qty: 100 EACH | Refills: 6 | Status: SHIPPED | OUTPATIENT
Start: 2019-07-11 | End: 2019-07-16

## 2019-07-11 NOTE — TELEPHONE ENCOUNTER
"Requested Prescriptions   Pending Prescriptions Disp Refills     simvastatin (ZOCOR) 80 MG tablet [Pharmacy Med Name: SIMVASTATIN 80MG TABLETS] 45 tablet 0     Sig: TAKE ONE-HALF TABLET( 40 MG) BY MOUTH AT BEDTIME       Statins Protocol Failed - 7/11/2019  2:15 PM        Failed - LDL on file in past 12 months     Recent Labs   Lab Test 06/11/18  0838   LDL 25             Passed - No abnormal creatine kinase in past 12 months     No lab results found.             Passed - Recent (12 mo) or future (30 days) visit within the authorizing provider's specialty     Patient had office visit in the last 12 months or has a visit in the next 30 days with authorizing provider or within the authorizing provider's specialty.  See \"Patient Info\" tab in inbasket, or \"Choose Columns\" in Meds & Orders section of the refill encounter.              Passed - Medication is active on med list        Passed - Patient is age 18 or older        Last Written Prescription Date:  6/13/2018  Last Fill Quantity: 45,  # refills: 3   Last office visit: 3/6/2019 with prescribing provider:  David Almendarez     Future Office Visit:      Medication is being filled for 1 time refill only due to:  Patient needs to be seen because due for fasting labs.     Prescription approved per [facility] Refill Protocol.    Micaela URENA RN, BSN, PHN      "

## 2019-07-11 NOTE — TELEPHONE ENCOUNTER
Dr. Holliday,    [facility]: Your patient had an episode of NSVT on today's remote transmission. Episode lasted 7 beats, rates 160-285bpm. EF 55-60% (2017). No associated symptoms.         Medtronic Percepta CRT-P Remote PPM Device Check   BIVP: >99% VSR Pace: <0.1%; Chronic AFib, AVNA, taking Warfarin  Mode: VVIR  Presenting Rhythm: BIVP  Heart Rate: Adequate rates per histogram; OptiVol levels at baseline  Sensing: stable  Pacing Threshold: stable  Impedance: stable  Battery Status: 10.1 years  Atrial Arrhythmia: n/a  Ventricular Arrhythmia: 1 ventricular high rate. EGM shows VS events for NSVT lasting 7 beats, rates 160-285bpm. EF 55-60% (2017). No associated symptoms. Will notify Dr. Holliday of findings. Reviewed with SLangenbrunner,RN.     Care Plan: f/U PPM [facility] q 3 months. Gave patient results over the phone. ASHANTI WolfT

## 2019-07-11 NOTE — TELEPHONE ENCOUNTER
Reason for Call:  Other call back    Detailed comments: pt called to request new prescription be sent to his pharmacy due to the formulary list change. Pt has [employer] and they are no longer covering his diabetic meds and supplies unless pt changes to the new formulary list. Pt said that [employer] will cover Accu check  Guide Me meter, test stripes and lancets and Ariva Meter, test stripes, and lancets but pt said this one is too small and is pocket size. Pt prefers the Accu Check Guide Me. Pt also said that basaglar long acting insulin will not be covered but pt can switch to humolog that is covered. Pt also said that he used to take humolog and it worked fine then. Please call pt is any questions. Pt is out of the test stripes and will need the new prescriptions sent soon. Pt prefers [facility] mail order pharmacy in [place], [place].    Phone Number Patient can be reached at: Home number on file [phone] (home)    Best Time: anytime    Can we leave a detailed message on this number? YES    Call taken on 7/11/2019 at 3:31 PM by HOUSTON GARSIA

## 2019-07-12 DIAGNOSIS — I10 ESSENTIAL HYPERTENSION, BENIGN: ICD-10-CM

## 2019-07-12 RX ORDER — LOSARTAN POTASSIUM 100 MG/1
100 TABLET ORAL DAILY
Qty: 90 TABLET | Refills: 2 | Status: SHIPPED | OUTPATIENT
Start: 2019-07-12 | End: 2019-08-08

## 2019-07-12 NOTE — TELEPHONE ENCOUNTER
These messages need to include which insulin is now covered by the patients insurance then please place that order in computer at equivalent dosing for cosign.

## 2019-07-12 NOTE — TELEPHONE ENCOUNTER
Spoke to patient and informed him diabetic supplies had been ordered. Patient states that [facility] will no longer pay for the insulin Basaglar and would like doctor to prescribe a different long acting insulin insurance will pay for.

## 2019-07-12 NOTE — TELEPHONE ENCOUNTER
"Requested Prescriptions   Pending Prescriptions Disp Refills     losartan (COZAAR) 100 MG tablet 90 tablet 3     Sig: Take 1 tablet (100 mg) by mouth daily       Angiotensin-II Receptors Passed - 7/12/2019  9:43 AM        Passed - Blood pressure under 140/90 in past 12 months     BP Readings from Last 3 Encounters:   06/21/19 135/85   05/09/19 143/82   03/06/19 130/78                 Passed - Recent (12 mo) or future (30 days) visit within the authorizing provider's specialty     Patient had office visit in the last 12 months or has a visit in the next 30 days with authorizing provider or within the authorizing provider's specialty.  See \"Patient Info\" tab in inbasket, or \"Choose Columns\" in Meds & Orders section of the refill encounter.              Passed - Medication is active on med list        Passed - Patient is age 18 or older        Passed - Normal serum creatinine on file in past 12 months     Recent Labs   Lab Test 11/29/18  1120   CR 1.01             Passed - Normal serum potassium on file in past 12 months     Recent Labs   Lab Test 11/29/18  1120   POTASSIUM 4.0                    Last Written Prescription Date:  6/13/2018  Last Fill Quantity: 90,  # refills: 3   Last office visit: 3/6/2019 with prescribing provider:  3/06/2019   Future Office Visit:      "

## 2019-07-12 NOTE — TELEPHONE ENCOUNTER
Diabetic supplies filled but message needs to be verified as cannot substitute humalog for basaglar as the latter is long acting and the humalog is short acting.

## 2019-07-15 LAB
MDC_IDC_EPISODE_DTM: NORMAL
MDC_IDC_EPISODE_DURATION: 1 S
MDC_IDC_EPISODE_ID: 1
MDC_IDC_EPISODE_TYPE: NORMAL
MDC_IDC_LEAD_IMPLANT_DT: NORMAL
MDC_IDC_LEAD_IMPLANT_DT: NORMAL
MDC_IDC_LEAD_LOCATION: NORMAL
MDC_IDC_LEAD_LOCATION: NORMAL
MDC_IDC_LEAD_LOCATION_DETAIL_1: NORMAL
MDC_IDC_LEAD_LOCATION_DETAIL_1: NORMAL
MDC_IDC_LEAD_MFG: NORMAL
MDC_IDC_LEAD_MFG: NORMAL
MDC_IDC_LEAD_MODEL: NORMAL
MDC_IDC_LEAD_MODEL: NORMAL
MDC_IDC_LEAD_POLARITY_TYPE: NORMAL
MDC_IDC_LEAD_POLARITY_TYPE: NORMAL
MDC_IDC_LEAD_SERIAL: NORMAL
MDC_IDC_LEAD_SERIAL: NORMAL
MDC_IDC_MSMT_BATTERY_DTM: NORMAL
MDC_IDC_MSMT_BATTERY_REMAINING_LONGEVITY: 121 MO
MDC_IDC_MSMT_BATTERY_RRT_TRIGGER: 2.6
MDC_IDC_MSMT_BATTERY_STATUS: NORMAL
MDC_IDC_MSMT_BATTERY_VOLTAGE: 3.08 V
MDC_IDC_MSMT_LEADCHNL_LV_IMPEDANCE_VALUE: 456 OHM
MDC_IDC_MSMT_LEADCHNL_LV_IMPEDANCE_VALUE: 513 OHM
MDC_IDC_MSMT_LEADCHNL_LV_IMPEDANCE_VALUE: 627 OHM
MDC_IDC_MSMT_LEADCHNL_LV_IMPEDANCE_VALUE: 684 OHM
MDC_IDC_MSMT_LEADCHNL_LV_IMPEDANCE_VALUE: 931 OHM
MDC_IDC_MSMT_LEADCHNL_RA_IMPEDANCE_VALUE: 3401 OHM
MDC_IDC_MSMT_LEADCHNL_RA_IMPEDANCE_VALUE: 3401 OHM
MDC_IDC_MSMT_LEADCHNL_RV_IMPEDANCE_VALUE: 342 OHM
MDC_IDC_MSMT_LEADCHNL_RV_IMPEDANCE_VALUE: 380 OHM
MDC_IDC_MSMT_LEADCHNL_RV_PACING_THRESHOLD_AMPLITUDE: 0.62 V
MDC_IDC_MSMT_LEADCHNL_RV_PACING_THRESHOLD_PULSEWIDTH: 0.4 MS
MDC_IDC_MSMT_LEADCHNL_RV_SENSING_INTR_AMPL: 10.75 MV
MDC_IDC_PG_IMPLANT_DTM: NORMAL
MDC_IDC_PG_MFG: NORMAL
MDC_IDC_PG_MODEL: NORMAL
MDC_IDC_PG_SERIAL: NORMAL
MDC_IDC_PG_TYPE: NORMAL
MDC_IDC_SESS_CLINIC_NAME: NORMAL
MDC_IDC_SESS_DTM: NORMAL
MDC_IDC_SESS_TYPE: NORMAL
MDC_IDC_SET_BRADY_LOWRATE: 70 {BEATS}/MIN
MDC_IDC_SET_BRADY_MAX_SENSOR_RATE: 130 {BEATS}/MIN
MDC_IDC_SET_BRADY_MODE: NORMAL
MDC_IDC_SET_CRT_LVRV_DELAY: 0 MS
MDC_IDC_SET_CRT_PACED_CHAMBERS: NORMAL
MDC_IDC_SET_LEADCHNL_LV_PACING_AMPLITUDE: 2 V
MDC_IDC_SET_LEADCHNL_LV_PACING_ANODE_ELECTRODE_1: NORMAL
MDC_IDC_SET_LEADCHNL_LV_PACING_ANODE_LOCATION_1: NORMAL
MDC_IDC_SET_LEADCHNL_LV_PACING_CAPTURE_MODE: NORMAL
MDC_IDC_SET_LEADCHNL_LV_PACING_CATHODE_ELECTRODE_1: NORMAL
MDC_IDC_SET_LEADCHNL_LV_PACING_CATHODE_LOCATION_1: NORMAL
MDC_IDC_SET_LEADCHNL_LV_PACING_POLARITY: NORMAL
MDC_IDC_SET_LEADCHNL_LV_PACING_PULSEWIDTH: 0.5 MS
MDC_IDC_SET_LEADCHNL_RA_SENSING_ANODE_ELECTRODE_1: NORMAL
MDC_IDC_SET_LEADCHNL_RA_SENSING_ANODE_LOCATION_1: NORMAL
MDC_IDC_SET_LEADCHNL_RA_SENSING_CATHODE_ELECTRODE_1: NORMAL
MDC_IDC_SET_LEADCHNL_RA_SENSING_CATHODE_LOCATION_1: NORMAL
MDC_IDC_SET_LEADCHNL_RA_SENSING_POLARITY: NORMAL
MDC_IDC_SET_LEADCHNL_RA_SENSING_SENSITIVITY: NORMAL
MDC_IDC_SET_LEADCHNL_RV_PACING_AMPLITUDE: 2 V
MDC_IDC_SET_LEADCHNL_RV_PACING_ANODE_ELECTRODE_1: NORMAL
MDC_IDC_SET_LEADCHNL_RV_PACING_ANODE_LOCATION_1: NORMAL
MDC_IDC_SET_LEADCHNL_RV_PACING_CAPTURE_MODE: NORMAL
MDC_IDC_SET_LEADCHNL_RV_PACING_CATHODE_ELECTRODE_1: NORMAL
MDC_IDC_SET_LEADCHNL_RV_PACING_CATHODE_LOCATION_1: NORMAL
MDC_IDC_SET_LEADCHNL_RV_PACING_POLARITY: NORMAL
MDC_IDC_SET_LEADCHNL_RV_PACING_PULSEWIDTH: 0.4 MS
MDC_IDC_SET_LEADCHNL_RV_SENSING_ANODE_ELECTRODE_1: NORMAL
MDC_IDC_SET_LEADCHNL_RV_SENSING_ANODE_LOCATION_1: NORMAL
MDC_IDC_SET_LEADCHNL_RV_SENSING_CATHODE_ELECTRODE_1: NORMAL
MDC_IDC_SET_LEADCHNL_RV_SENSING_CATHODE_LOCATION_1: NORMAL
MDC_IDC_SET_LEADCHNL_RV_SENSING_POLARITY: NORMAL
MDC_IDC_SET_LEADCHNL_RV_SENSING_SENSITIVITY: 0.9 MV
MDC_IDC_SET_ZONE_DETECTION_INTERVAL: 400 MS
MDC_IDC_SET_ZONE_TYPE: NORMAL
MDC_IDC_STAT_BRADY_AP_VP_PERCENT: 0 %
MDC_IDC_STAT_BRADY_AP_VS_PERCENT: 0 %
MDC_IDC_STAT_BRADY_AS_VP_PERCENT: 99.93 %
MDC_IDC_STAT_BRADY_AS_VS_PERCENT: 0.07 %
MDC_IDC_STAT_BRADY_DTM_END: NORMAL
MDC_IDC_STAT_BRADY_DTM_START: NORMAL
MDC_IDC_STAT_BRADY_RA_PERCENT_PACED: 0 %
MDC_IDC_STAT_BRADY_RV_PERCENT_PACED: 99.93 %
MDC_IDC_STAT_CRT_DTM_END: NORMAL
MDC_IDC_STAT_CRT_DTM_START: NORMAL
MDC_IDC_STAT_CRT_LV_PERCENT_PACED: 99.91 %
MDC_IDC_STAT_CRT_PERCENT_PACED: 99.91 %
MDC_IDC_STAT_EPISODE_RECENT_COUNT: 0
MDC_IDC_STAT_EPISODE_RECENT_COUNT: 0
MDC_IDC_STAT_EPISODE_RECENT_COUNT: 1
MDC_IDC_STAT_EPISODE_RECENT_COUNT_DTM_END: NORMAL
MDC_IDC_STAT_EPISODE_RECENT_COUNT_DTM_START: NORMAL
MDC_IDC_STAT_EPISODE_TOTAL_COUNT: 0
MDC_IDC_STAT_EPISODE_TOTAL_COUNT: 0
MDC_IDC_STAT_EPISODE_TOTAL_COUNT: 1
MDC_IDC_STAT_EPISODE_TOTAL_COUNT_DTM_END: NORMAL
MDC_IDC_STAT_EPISODE_TOTAL_COUNT_DTM_START: NORMAL
MDC_IDC_STAT_EPISODE_TYPE: NORMAL

## 2019-07-16 ENCOUNTER — TELEPHONE (OUTPATIENT)
Dept: INTERNAL MEDICINE | Facility: CLINIC | Age: 59
End: 2019-07-16

## 2019-07-16 DIAGNOSIS — E11.9 TYPE 2 DIABETES MELLITUS WITHOUT COMPLICATION, WITH LONG-TERM CURRENT USE OF INSULIN (H): ICD-10-CM

## 2019-07-16 DIAGNOSIS — Z79.4 TYPE 2 DIABETES MELLITUS WITHOUT COMPLICATION, WITH LONG-TERM CURRENT USE OF INSULIN (H): ICD-10-CM

## 2019-07-16 RX ORDER — LANCETS
EACH MISCELLANEOUS
Qty: 100 EACH | Refills: 6 | Status: SHIPPED | OUTPATIENT
Start: 2019-07-16 | End: 2021-01-19

## 2019-07-16 NOTE — TELEPHONE ENCOUNTER
Chart reviewed patient stated on 7/11/2019 TE he wanted request for diabetic suppliest to be sent to [facility].    Sent prescription's there.     Micaela URENA RN, BSN, PHN

## 2019-07-16 NOTE — TELEPHONE ENCOUNTER
Reason for Call:  Medication or medication refill:    Do you use a [facility]?  Name of the pharmacy and phone number for the current request:  [facility] tele# [phone] same # for e-prescribe    Name of the medication requested: accu smart view controller, accu guide smart test strps, accu fast-clix lancets, alcohol prep pads, accu chek guide me kit, rx's sent in on the July 15 also affected    Other request: above meds affected sent to wrong pharmacy [facility] not the pts' pharmacy    Can we leave a detailed message on this number?     Phone number patient can be reached at: Other phone number:  See above    Best Time: asap    Call taken on 7/16/2019 at 12:32 PM by Mariela Torres

## 2019-07-17 ENCOUNTER — TELEPHONE (OUTPATIENT)
Dept: INTERNAL MEDICINE | Facility: CLINIC | Age: 59
End: 2019-07-17

## 2019-07-17 DIAGNOSIS — E11.9 TYPE 2 DIABETES MELLITUS WITHOUT COMPLICATION, WITH LONG-TERM CURRENT USE OF INSULIN (H): ICD-10-CM

## 2019-07-17 DIAGNOSIS — Z79.4 TYPE 2 DIABETES MELLITUS WITHOUT COMPLICATION, WITH LONG-TERM CURRENT USE OF INSULIN (H): ICD-10-CM

## 2019-07-17 NOTE — TELEPHONE ENCOUNTER
insulin glargine (LANTUS SOLOSTAR PEN) 100 UNIT/ML pen  Needs to go to [facility]    [facility] will not pay for [facility]

## 2019-07-26 ENCOUNTER — TELEPHONE (OUTPATIENT)
Dept: INTERNAL MEDICINE | Facility: CLINIC | Age: 59
End: 2019-07-26

## 2019-07-26 NOTE — TELEPHONE ENCOUNTER
Triage placed call to [facility].     There records show that Lantus and Lancets were shipped to address as of 7/18/2019 for a 30day supply.     Test strips will be expedited next day for him as a courtesy on behave of [employer].comargelia URENA RN, BSN, PHN

## 2019-07-26 NOTE — TELEPHONE ENCOUNTER
Reason for Call:  Other call back    Detailed comments: Patient would like his prescription for Lantus to be resent to [facility] in [place], [place].  Patient also needs new prescriptions for his Accu-chek Test strips, and Accu-Chek Lancets enough for a 30 day supply. Please contact patient first if you have any questions he is working with [facility] pharmacy at [phone].    Phone Number Patient can be reached at: Home number on file [phone] (home)    Best Time: Anytime    Can we leave a detailed message on this number? YES    Call taken on 7/26/2019 at 2:42 PM by Forest Elizondo

## 2019-07-26 NOTE — TELEPHONE ENCOUNTER
Attempted to reach patient. Busy tone x3. Please try again and inform him his scripts were sent to the [facility] he preferred.     Micaela URENA RN, BSN, PHN

## 2019-08-08 DIAGNOSIS — I48.91 NEW ONSET ATRIAL FIBRILLATION (H): ICD-10-CM

## 2019-08-08 DIAGNOSIS — I10 ESSENTIAL HYPERTENSION, BENIGN: ICD-10-CM

## 2019-08-08 RX ORDER — LOSARTAN POTASSIUM 100 MG/1
100 TABLET ORAL DAILY
Qty: 90 TABLET | Refills: 2 | Status: SHIPPED | OUTPATIENT
Start: 2019-08-08 | End: 2020-05-11

## 2019-08-08 RX ORDER — WARFARIN SODIUM 7.5 MG/1
TABLET ORAL
Qty: 84 TABLET | Refills: 0 | Status: SHIPPED | OUTPATIENT
Start: 2019-08-08 | End: 2019-10-19

## 2019-08-08 NOTE — TELEPHONE ENCOUNTER
"Requested Prescriptions   Signed Prescriptions Disp Refills    warfarin (COUMADIN) 7.5 MG tablet 84 tablet 0     Sig: TAKE 1 TABLET BY MOUTH DAILY EXCEPT ONE-HALF TABLET ON TUESDAY AS DIRECTED BY THE ANTICOAGULATION CLINIC       Vitamin K Antagonists Failed - 8/8/2019  1:53 PM        Failed - INR is within goal in the past 6 weeks     Confirm INR is within goal in the past 6 weeks.     Recent Labs   Lab Test 07/09/19   INR 2.8*                       Passed - Recent (12 mo) or future (30 days) visit within the authorizing provider's specialty     Patient had office visit in the last 12 months or has a visit in the next 30 days with authorizing provider or within the authorizing provider's specialty.  See \"Patient Info\" tab in inbasket, or \"Choose Columns\" in Meds & Orders section of the refill encounter.              Passed - Medication is active on med list        Passed - Patient is 18 years of age or older      Prescription approved per [facility] Refill Protocol.  "

## 2019-08-08 NOTE — TELEPHONE ENCOUNTER
"Requested Prescriptions   Pending Prescriptions Disp Refills     losartan (COZAAR) 100 MG tablet 90 tablet 2     Sig: Take 1 tablet (100 mg) by mouth daily       Angiotensin-II Receptors Passed - 8/8/2019  3:49 PM        Passed - Last blood pressure under 140/90 in past 12 months     BP Readings from Last 3 Encounters:   06/21/19 135/85   05/09/19 143/82   03/06/19 130/78                 Passed - Recent (12 mo) or future (30 days) visit within the authorizing provider's specialty     Patient had office visit in the last 12 months or has a visit in the next 30 days with authorizing provider or within the authorizing provider's specialty.  See \"Patient Info\" tab in inbasket, or \"Choose Columns\" in Meds & Orders section of the refill encounter.              Passed - Medication is active on med list        Passed - Patient is age 18 or older        Passed - Normal serum creatinine on file in past 12 months     Recent Labs   Lab Test 11/29/18  1120   CR 1.01             Passed - Normal serum potassium on file in past 12 months     Recent Labs   Lab Test 11/29/18  1120   POTASSIUM 4.0                      Prescription approved per [facility] Refill Protocol.    Micaela URENA, RN, BSN, PHN      "

## 2019-08-08 NOTE — TELEPHONE ENCOUNTER
Requested Prescriptions   Pending Prescriptions Disp Refills     losartan (COZAAR) 100 MG tablet 90 tablet 2     Sig: Take 1 tablet (100 mg) by mouth daily       There is no refill protocol information for this order      Last Written Prescription Date: 07/12/19  Last Fill Quantity: 90,  # refills: 2   Last office visit: 3/6/2019 with prescribing provider:  03/06/19   Future Office Visit:  0

## 2019-08-20 ENCOUNTER — ANTICOAGULATION THERAPY VISIT (OUTPATIENT)
Dept: ANTICOAGULATION | Facility: CLINIC | Age: 59
End: 2019-08-20
Payer: COMMERCIAL

## 2019-08-20 LAB — INR POINT OF CARE: 2.7 (ref 0.86–1.14)

## 2019-08-20 PROCEDURE — 36416 COLLJ CAPILLARY BLOOD SPEC: CPT

## 2019-08-20 PROCEDURE — 85610 PROTHROMBIN TIME: CPT | Mod: QW

## 2019-08-20 NOTE — PROGRESS NOTES
ANTICOAGULATION FOLLOW-UP CLINIC VISIT    Patient Name:  Mckay Hsu  Date:  2019  Contact Type:  Face to Face    SUBJECTIVE:  Patient Findings     Comments:   The patient was assessed for diet, medication, and activity level changes, missed or extra doses, bruising or bleeding, with no problem findings.          Clinical Outcomes     Comments:   The patient was assessed for diet, medication, and activity level changes, missed or extra doses, bruising or bleeding, with no problem findings.             OBJECTIVE    INR Protime   Date Value Ref Range Status   2019 2.7 (A) 0.86 - 1.14 Final       ASSESSMENT / PLAN  INR assessment THER    Recheck INR In: 6 WEEKS    INR Location Clinic      Anticoagulation Summary  As of 2019    INR goal:   2.0-3.0   TTR:   78.7 % (3.5 y)   INR used for dosin.7 (2019)   Warfarin maintenance plan:   3.75 mg (7.5 mg x 0.5) every Tue; 7.5 mg (7.5 mg x 1) all other days   Full warfarin instructions:   3.75 mg every Tue; 7.5 mg all other days   Weekly warfarin total:   48.75 mg   Plan last modified:   Rosalee Billingsley RN (10/17/2017)   Next INR check:   10/1/2019   Target end date:       Indications    Long-term (current) use of anticoagulants [Z79.01] [Z79.01]  Chronic atrial fibrillation (H) [I48.2]             Anticoagulation Episode Summary     INR check location:       Preferred lab:       Send INR reminders to:   [facility]    Comments:               See the Encounter Report to view Anticoagulation Flowsheet and Dosing Calendar (Go to Encounters tab in chart review, and find the Anticoagulation Therapy Visit)    Dosage adjustment made based on physician directed care plan.    Rosalee Billingsley RN

## 2019-09-03 DIAGNOSIS — I10 ESSENTIAL HYPERTENSION, BENIGN: ICD-10-CM

## 2019-09-03 RX ORDER — AMLODIPINE BESYLATE 10 MG/1
TABLET ORAL
Qty: 90 TABLET | Refills: 0 | Status: SHIPPED | OUTPATIENT
Start: 2019-09-03 | End: 2019-10-01

## 2019-09-03 RX ORDER — METOPROLOL SUCCINATE 100 MG/1
100 TABLET, EXTENDED RELEASE ORAL DAILY
Qty: 90 TABLET | Refills: 0 | Status: SHIPPED | OUTPATIENT
Start: 2019-09-03 | End: 2019-11-26

## 2019-09-03 NOTE — TELEPHONE ENCOUNTER
"Requested Prescriptions   Pending Prescriptions Disp Refills     metoprolol succinate ER (TOPROL-XL) 100 MG 24 hr tablet 90 tablet 0     Sig: Take 1 tablet (100 mg) by mouth daily       Beta-Blockers Protocol Passed - 9/3/2019  3:19 PM        Passed - Blood pressure under 140/90 in past 12 months     BP Readings from Last 3 Encounters:   06/21/19 135/85   05/09/19 143/82   03/06/19 130/78                 Passed - Patient is age 6 or older        Passed - Recent (12 mo) or future (30 days) visit within the authorizing provider's specialty     Patient had office visit in the last 12 months or has a visit in the next 30 days with authorizing provider or within the authorizing provider's specialty.  See \"Patient Info\" tab in inbasket, or \"Choose Columns\" in Meds & Orders section of the refill encounter.              Passed - Medication is active on med list        amLODIPine (NORVASC) 10 MG tablet 90 tablet 0     Sig: TAKE 1 TABLET(10 MG) BY MOUTH DAILY       Calcium Channel Blockers Protocol  Passed - 9/3/2019  3:19 PM        Passed - Blood pressure under 140/90 in past 12 months     BP Readings from Last 3 Encounters:   06/21/19 135/85   05/09/19 143/82   03/06/19 130/78                 Passed - Recent (12 mo) or future (30 days) visit within the authorizing provider's specialty     Patient had office visit in the last 12 months or has a visit in the next 30 days with authorizing provider or within the authorizing provider's specialty.  See \"Patient Info\" tab in inbasket, or \"Choose Columns\" in Meds & Orders section of the refill encounter.              Passed - Medication is active on med list        Passed - Patient is age 18 or older        Passed - Normal serum creatinine on file in past 12 months     Recent Labs   Lab Test 11/29/18  1120   CR 1.01               "

## 2019-10-01 ENCOUNTER — ANTICOAGULATION THERAPY VISIT (OUTPATIENT)
Dept: ANTICOAGULATION | Facility: CLINIC | Age: 59
End: 2019-10-01
Payer: COMMERCIAL

## 2019-10-01 DIAGNOSIS — E11.9 TYPE 2 DIABETES MELLITUS WITHOUT COMPLICATION, WITH LONG-TERM CURRENT USE OF INSULIN (H): ICD-10-CM

## 2019-10-01 DIAGNOSIS — Z79.4 TYPE 2 DIABETES MELLITUS WITHOUT COMPLICATION, WITH LONG-TERM CURRENT USE OF INSULIN (H): ICD-10-CM

## 2019-10-01 DIAGNOSIS — I10 ESSENTIAL HYPERTENSION, BENIGN: ICD-10-CM

## 2019-10-01 DIAGNOSIS — E78.5 HYPERLIPIDEMIA LDL GOAL <100: ICD-10-CM

## 2019-10-01 LAB — INR POINT OF CARE: 2.4 (ref 0.86–1.14)

## 2019-10-01 PROCEDURE — 85610 PROTHROMBIN TIME: CPT | Mod: QW

## 2019-10-01 PROCEDURE — 36416 COLLJ CAPILLARY BLOOD SPEC: CPT

## 2019-10-01 RX ORDER — AMLODIPINE BESYLATE 10 MG/1
TABLET ORAL
Qty: 90 TABLET | Refills: 0 | Status: SHIPPED | OUTPATIENT
Start: 2019-10-01 | End: 2019-12-18

## 2019-10-01 RX ORDER — SIMVASTATIN 80 MG
TABLET ORAL
Qty: 15 TABLET | Refills: 0 | Status: SHIPPED | OUTPATIENT
Start: 2019-10-01 | End: 2019-11-05

## 2019-10-01 NOTE — TELEPHONE ENCOUNTER
"Requested Prescriptions   Pending Prescriptions Disp Refills     simvastatin (ZOCOR) 80 MG tablet 45 tablet 0     Sig: TAKE ONE-HALF TABLET( 40 MG) BY MOUTH AT BEDTIME       Statins Protocol Failed - 10/1/2019 11:27 AM        Failed - LDL on file in past 12 months     Recent Labs   Lab Test 06/11/18  0838   LDL 25             Passed - No abnormal creatine kinase in past 12 months     No lab results found.             Passed - Recent (12 mo) or future (30 days) visit within the authorizing provider's specialty     Patient has had an office visit with the authorizing provider or a provider within the authorizing providers department within the previous 12 mos or has a future within next 30 days. See \"Patient Info\" tab in inbasket, or \"Choose Columns\" in Meds & Orders section of the refill encounter.              Passed - Medication is active on med list        Passed - Patient is age 18 or older        amLODIPine (NORVASC) 10 MG tablet 90 tablet 0     Sig: TAKE 1 TABLET(10 MG) BY MOUTH DAILY       Calcium Channel Blockers Protocol  Passed - 10/1/2019 11:27 AM        Passed - Blood pressure under 140/90 in past 12 months     BP Readings from Last 3 Encounters:   06/21/19 135/85   05/09/19 143/82   03/06/19 130/78                 Passed - Recent (12 mo) or future (30 days) visit within the authorizing provider's specialty     Patient has had an office visit with the authorizing provider or a provider within the authorizing providers department within the previous 12 mos or has a future within next 30 days. See \"Patient Info\" tab in inbasket, or \"Choose Columns\" in Meds & Orders section of the refill encounter.              Passed - Medication is active on med list        Passed - Patient is age 18 or older        Passed - Normal serum creatinine on file in past 12 months     Recent Labs   Lab Test 11/29/18  1120   CR 1.01             Medication is being filled for 1 time refill only due to:  Patient needs labs " lipids.

## 2019-10-01 NOTE — PROGRESS NOTES
ANTICOAGULATION FOLLOW-UP CLINIC VISIT    Patient Name:  Mckay Hsu  Date:  10/1/2019  Contact Type:  Face to Face    SUBJECTIVE:  Patient Findings     Comments:   The patient was assessed for diet, medication, and activity level changes, missed or extra doses, bruising or bleeding, with no problem findings.          Clinical Outcomes     Comments:   The patient was assessed for diet, medication, and activity level changes, missed or extra doses, bruising or bleeding, with no problem findings.             OBJECTIVE    INR Protime   Date Value Ref Range Status   10/01/2019 2.4 (A) 0.86 - 1.14 Final       ASSESSMENT / PLAN  INR assessment THER    Recheck INR In: 6 WEEKS    INR Location Clinic      Anticoagulation Summary  As of 10/1/2019    INR goal:   2.0-3.0   TTR:   79.4 % (3.6 y)   INR used for dosin.4 (10/1/2019)   Warfarin maintenance plan:   3.75 mg (7.5 mg x 0.5) every Tue; 7.5 mg (7.5 mg x 1) all other days   Full warfarin instructions:   3.75 mg every Tue; 7.5 mg all other days   Weekly warfarin total:   48.75 mg   Plan last modified:   Rosalee Billingsley RN (10/17/2017)   Next INR check:   2019   Target end date:       Indications    Long-term (current) use of anticoagulants [Z79.01] [Z79.01]  Chronic atrial fibrillation [I48.20]             Anticoagulation Episode Summary     INR check location:       Preferred lab:       [facility]    Comments:               See the Encounter Report to view Anticoagulation Flowsheet and Dosing Calendar (Go to Encounters tab in chart review, and find the Anticoagulation Therapy Visit)    Dosage adjustment made based on physician directed care plan.    Rosalee Billingsley RN

## 2019-10-17 ENCOUNTER — ANCILLARY PROCEDURE (OUTPATIENT)
Dept: CARDIOLOGY | Facility: CLINIC | Age: 59
End: 2019-10-17
Attending: INTERNAL MEDICINE
Payer: COMMERCIAL

## 2019-10-17 DIAGNOSIS — Z95.0 CARDIAC PACEMAKER IN SITU: Primary | ICD-10-CM

## 2019-10-17 DIAGNOSIS — Z95.810 ICD (IMPLANTABLE CARDIOVERTER-DEFIBRILLATOR), BIVENTRICULAR, IN SITU: ICD-10-CM

## 2019-10-17 PROCEDURE — 93296 REM INTERROG EVL PM/IDS: CPT | Performed by: INTERNAL MEDICINE

## 2019-10-17 PROCEDURE — 93294 REM INTERROG EVL PM/LDLS PM: CPT | Performed by: INTERNAL MEDICINE

## 2019-10-18 LAB
MDC_IDC_LEAD_IMPLANT_DT: NORMAL
MDC_IDC_LEAD_IMPLANT_DT: NORMAL
MDC_IDC_LEAD_LOCATION: NORMAL
MDC_IDC_LEAD_LOCATION: NORMAL
MDC_IDC_LEAD_LOCATION_DETAIL_1: NORMAL
MDC_IDC_LEAD_LOCATION_DETAIL_1: NORMAL
MDC_IDC_LEAD_MFG: NORMAL
MDC_IDC_LEAD_MFG: NORMAL
MDC_IDC_LEAD_MODEL: NORMAL
MDC_IDC_LEAD_MODEL: NORMAL
MDC_IDC_LEAD_POLARITY_TYPE: NORMAL
MDC_IDC_LEAD_POLARITY_TYPE: NORMAL
MDC_IDC_LEAD_SERIAL: NORMAL
MDC_IDC_LEAD_SERIAL: NORMAL
MDC_IDC_MSMT_BATTERY_DTM: NORMAL
MDC_IDC_MSMT_BATTERY_REMAINING_LONGEVITY: 117 MO
MDC_IDC_MSMT_BATTERY_RRT_TRIGGER: 2.6
MDC_IDC_MSMT_BATTERY_STATUS: NORMAL
MDC_IDC_MSMT_BATTERY_VOLTAGE: 3.05 V
MDC_IDC_MSMT_LEADCHNL_LV_IMPEDANCE_VALUE: 475 OHM
MDC_IDC_MSMT_LEADCHNL_LV_IMPEDANCE_VALUE: 551 OHM
MDC_IDC_MSMT_LEADCHNL_LV_IMPEDANCE_VALUE: 627 OHM
MDC_IDC_MSMT_LEADCHNL_LV_IMPEDANCE_VALUE: 684 OHM
MDC_IDC_MSMT_LEADCHNL_LV_IMPEDANCE_VALUE: 950 OHM
MDC_IDC_MSMT_LEADCHNL_RA_IMPEDANCE_VALUE: 3401 OHM
MDC_IDC_MSMT_LEADCHNL_RA_IMPEDANCE_VALUE: 3401 OHM
MDC_IDC_MSMT_LEADCHNL_RV_IMPEDANCE_VALUE: 323 OHM
MDC_IDC_MSMT_LEADCHNL_RV_IMPEDANCE_VALUE: 361 OHM
MDC_IDC_MSMT_LEADCHNL_RV_PACING_THRESHOLD_AMPLITUDE: 0.62 V
MDC_IDC_MSMT_LEADCHNL_RV_PACING_THRESHOLD_PULSEWIDTH: 0.4 MS
MDC_IDC_MSMT_LEADCHNL_RV_SENSING_INTR_AMPL: 10.75 MV
MDC_IDC_PG_IMPLANT_DTM: NORMAL
MDC_IDC_PG_MFG: NORMAL
MDC_IDC_PG_MODEL: NORMAL
MDC_IDC_PG_SERIAL: NORMAL
MDC_IDC_PG_TYPE: NORMAL
MDC_IDC_SESS_CLINIC_NAME: NORMAL
MDC_IDC_SESS_DTM: NORMAL
MDC_IDC_SESS_TYPE: NORMAL
MDC_IDC_SET_BRADY_LOWRATE: 70 {BEATS}/MIN
MDC_IDC_SET_BRADY_MAX_SENSOR_RATE: 130 {BEATS}/MIN
MDC_IDC_SET_BRADY_MODE: NORMAL
MDC_IDC_SET_CRT_LVRV_DELAY: 0 MS
MDC_IDC_SET_CRT_PACED_CHAMBERS: NORMAL
MDC_IDC_SET_LEADCHNL_LV_PACING_AMPLITUDE: 2 V
MDC_IDC_SET_LEADCHNL_LV_PACING_ANODE_ELECTRODE_1: NORMAL
MDC_IDC_SET_LEADCHNL_LV_PACING_ANODE_LOCATION_1: NORMAL
MDC_IDC_SET_LEADCHNL_LV_PACING_CAPTURE_MODE: NORMAL
MDC_IDC_SET_LEADCHNL_LV_PACING_CATHODE_ELECTRODE_1: NORMAL
MDC_IDC_SET_LEADCHNL_LV_PACING_CATHODE_LOCATION_1: NORMAL
MDC_IDC_SET_LEADCHNL_LV_PACING_POLARITY: NORMAL
MDC_IDC_SET_LEADCHNL_LV_PACING_PULSEWIDTH: 0.5 MS
MDC_IDC_SET_LEADCHNL_RA_SENSING_ANODE_ELECTRODE_1: NORMAL
MDC_IDC_SET_LEADCHNL_RA_SENSING_ANODE_LOCATION_1: NORMAL
MDC_IDC_SET_LEADCHNL_RA_SENSING_CATHODE_ELECTRODE_1: NORMAL
MDC_IDC_SET_LEADCHNL_RA_SENSING_CATHODE_LOCATION_1: NORMAL
MDC_IDC_SET_LEADCHNL_RA_SENSING_POLARITY: NORMAL
MDC_IDC_SET_LEADCHNL_RA_SENSING_SENSITIVITY: NORMAL
MDC_IDC_SET_LEADCHNL_RV_PACING_AMPLITUDE: 2 V
MDC_IDC_SET_LEADCHNL_RV_PACING_ANODE_ELECTRODE_1: NORMAL
MDC_IDC_SET_LEADCHNL_RV_PACING_ANODE_LOCATION_1: NORMAL
MDC_IDC_SET_LEADCHNL_RV_PACING_CAPTURE_MODE: NORMAL
MDC_IDC_SET_LEADCHNL_RV_PACING_CATHODE_ELECTRODE_1: NORMAL
MDC_IDC_SET_LEADCHNL_RV_PACING_CATHODE_LOCATION_1: NORMAL
MDC_IDC_SET_LEADCHNL_RV_PACING_POLARITY: NORMAL
MDC_IDC_SET_LEADCHNL_RV_PACING_PULSEWIDTH: 0.4 MS
MDC_IDC_SET_LEADCHNL_RV_SENSING_ANODE_ELECTRODE_1: NORMAL
MDC_IDC_SET_LEADCHNL_RV_SENSING_ANODE_LOCATION_1: NORMAL
MDC_IDC_SET_LEADCHNL_RV_SENSING_CATHODE_ELECTRODE_1: NORMAL
MDC_IDC_SET_LEADCHNL_RV_SENSING_CATHODE_LOCATION_1: NORMAL
MDC_IDC_SET_LEADCHNL_RV_SENSING_POLARITY: NORMAL
MDC_IDC_SET_LEADCHNL_RV_SENSING_SENSITIVITY: 0.9 MV
MDC_IDC_SET_ZONE_DETECTION_INTERVAL: 400 MS
MDC_IDC_SET_ZONE_TYPE: NORMAL
MDC_IDC_STAT_BRADY_AP_VP_PERCENT: 0 %
MDC_IDC_STAT_BRADY_AP_VS_PERCENT: 0 %
MDC_IDC_STAT_BRADY_AS_VP_PERCENT: 99.94 %
MDC_IDC_STAT_BRADY_AS_VS_PERCENT: 0.06 %
MDC_IDC_STAT_BRADY_DTM_END: NORMAL
MDC_IDC_STAT_BRADY_DTM_START: NORMAL
MDC_IDC_STAT_BRADY_RA_PERCENT_PACED: 0 %
MDC_IDC_STAT_BRADY_RV_PERCENT_PACED: 99.94 %
MDC_IDC_STAT_CRT_DTM_END: NORMAL
MDC_IDC_STAT_CRT_DTM_START: NORMAL
MDC_IDC_STAT_CRT_LV_PERCENT_PACED: 99.92 %
MDC_IDC_STAT_CRT_PERCENT_PACED: 99.92 %
MDC_IDC_STAT_EPISODE_RECENT_COUNT: 0
MDC_IDC_STAT_EPISODE_RECENT_COUNT_DTM_END: NORMAL
MDC_IDC_STAT_EPISODE_RECENT_COUNT_DTM_START: NORMAL
MDC_IDC_STAT_EPISODE_TOTAL_COUNT: 0
MDC_IDC_STAT_EPISODE_TOTAL_COUNT: 0
MDC_IDC_STAT_EPISODE_TOTAL_COUNT: 1
MDC_IDC_STAT_EPISODE_TOTAL_COUNT_DTM_END: NORMAL
MDC_IDC_STAT_EPISODE_TOTAL_COUNT_DTM_START: NORMAL
MDC_IDC_STAT_EPISODE_TYPE: NORMAL

## 2019-10-19 DIAGNOSIS — I48.91 NEW ONSET ATRIAL FIBRILLATION (H): ICD-10-CM

## 2019-10-20 NOTE — TELEPHONE ENCOUNTER
"Requested Prescriptions   Pending Prescriptions Disp Refills     warfarin ANTICOAGULANT (COUMADIN) 7.5 MG tablet [Pharmacy Med Name: WARFARIN SOD 7.5MG TABLETS (YELLOW)] 84 tablet 0     Sig: TAKE 1 TABLET BY MOUTH DAILY EXCEPT ONE-HALF TABLET ON TUESDAY AS DIRECTED BY THE ANTICOAGULATION CLINIC   Last Written Prescription Date:  8/8/2019  Last Fill Quantity: 84,  # refills: 0   Last Office Visit: 3/6/2019   Future Office Visit:         Vitamin K Antagonists Failed - 10/19/2019  9:38 AM        Failed - INR is within goal in the past 6 weeks     Confirm INR is within goal in the past 6 weeks.     Recent Labs   Lab Test 10/01/19   INR 2.4*                       Failed - Medication is active on med list        Passed - Recent (12 mo) or future (30 days) visit within the authorizing provider's specialty     Patient has had an office visit with the authorizing provider or a provider within the authorizing providers department within the previous 12 mos or has a future within next 30 days. See \"Patient Info\" tab in inbasket, or \"Choose Columns\" in Meds & Orders section of the refill encounter.              Passed - Patient is 18 years of age or older          "

## 2019-10-21 ENCOUNTER — DOCUMENTATION ONLY (OUTPATIENT)
Dept: INTERNAL MEDICINE | Facility: CLINIC | Age: 59
End: 2019-10-21

## 2019-10-21 DIAGNOSIS — Z79.4 TYPE 2 DIABETES MELLITUS WITHOUT COMPLICATION, WITH LONG-TERM CURRENT USE OF INSULIN (H): Primary | ICD-10-CM

## 2019-10-21 DIAGNOSIS — E11.9 TYPE 2 DIABETES MELLITUS WITHOUT COMPLICATION, WITH LONG-TERM CURRENT USE OF INSULIN (H): Primary | ICD-10-CM

## 2019-10-21 RX ORDER — WARFARIN SODIUM 7.5 MG/1
TABLET ORAL
Qty: 84 TABLET | Refills: 0 | Status: SHIPPED | OUTPATIENT
Start: 2019-10-21 | End: 2020-01-02

## 2019-10-22 ENCOUNTER — IMMUNIZATION (OUTPATIENT)
Dept: NURSING | Facility: CLINIC | Age: 59
End: 2019-10-22
Payer: COMMERCIAL

## 2019-10-22 DIAGNOSIS — Z79.4 TYPE 2 DIABETES MELLITUS WITHOUT COMPLICATION, WITH LONG-TERM CURRENT USE OF INSULIN (H): ICD-10-CM

## 2019-10-22 DIAGNOSIS — E78.5 HYPERLIPIDEMIA LDL GOAL <100: ICD-10-CM

## 2019-10-22 DIAGNOSIS — E11.9 TYPE 2 DIABETES MELLITUS WITHOUT COMPLICATION, WITH LONG-TERM CURRENT USE OF INSULIN (H): ICD-10-CM

## 2019-10-22 LAB
ALT SERPL W P-5'-P-CCNC: 25 U/L (ref 0–70)
ANION GAP SERPL CALCULATED.3IONS-SCNC: 8 MMOL/L (ref 3–14)
BUN SERPL-MCNC: 21 MG/DL (ref 7–30)
CALCIUM SERPL-MCNC: 9 MG/DL (ref 8.5–10.1)
CHLORIDE SERPL-SCNC: 102 MMOL/L (ref 94–109)
CHOLEST SERPL-MCNC: 114 MG/DL
CO2 SERPL-SCNC: 25 MMOL/L (ref 20–32)
CREAT SERPL-MCNC: 1.09 MG/DL (ref 0.66–1.25)
CREAT UR-MCNC: 151 MG/DL
GFR SERPL CREATININE-BSD FRML MDRD: 74 ML/MIN/{1.73_M2}
GLUCOSE SERPL-MCNC: 97 MG/DL (ref 70–99)
HBA1C MFR BLD: 6.1 % (ref 0–5.6)
HDLC SERPL-MCNC: 47 MG/DL
LDLC SERPL CALC-MCNC: 40 MG/DL
MICROALBUMIN UR-MCNC: 98 MG/L
MICROALBUMIN/CREAT UR: 64.77 MG/G CR (ref 0–17)
NONHDLC SERPL-MCNC: 67 MG/DL
POTASSIUM SERPL-SCNC: 4 MMOL/L (ref 3.4–5.3)
SODIUM SERPL-SCNC: 135 MMOL/L (ref 133–144)
TRIGL SERPL-MCNC: 134 MG/DL

## 2019-10-22 PROCEDURE — 84460 ALANINE AMINO (ALT) (SGPT): CPT | Performed by: INTERNAL MEDICINE

## 2019-10-22 PROCEDURE — 80061 LIPID PANEL: CPT | Performed by: INTERNAL MEDICINE

## 2019-10-22 PROCEDURE — 90471 IMMUNIZATION ADMIN: CPT

## 2019-10-22 PROCEDURE — 36415 COLL VENOUS BLD VENIPUNCTURE: CPT | Performed by: INTERNAL MEDICINE

## 2019-10-22 PROCEDURE — 82043 UR ALBUMIN QUANTITATIVE: CPT | Performed by: INTERNAL MEDICINE

## 2019-10-22 PROCEDURE — 80048 BASIC METABOLIC PNL TOTAL CA: CPT | Performed by: INTERNAL MEDICINE

## 2019-10-22 PROCEDURE — 83036 HEMOGLOBIN GLYCOSYLATED A1C: CPT | Performed by: INTERNAL MEDICINE

## 2019-10-22 PROCEDURE — 90682 RIV4 VACC RECOMBINANT DNA IM: CPT

## 2019-11-01 DIAGNOSIS — Z79.4 TYPE 2 DIABETES MELLITUS WITHOUT COMPLICATION, WITH LONG-TERM CURRENT USE OF INSULIN (H): ICD-10-CM

## 2019-11-01 DIAGNOSIS — E11.9 TYPE 2 DIABETES MELLITUS WITHOUT COMPLICATION, WITH LONG-TERM CURRENT USE OF INSULIN (H): ICD-10-CM

## 2019-11-01 NOTE — TELEPHONE ENCOUNTER
"Requested Prescriptions   Pending Prescriptions Disp Refills     insulin aspart (NOVOLOG FLEXPEN) 100 UNIT/ML pen 30 mL 0     Si unit per 7g of carbs three times daily before meals       Short Acting Insulin Protocol Passed - 2019  9:36 AM        Passed - Blood pressure less than 140/90 in past 6 months     BP Readings from Last 3 Encounters:   19 135/85   19 143/82   19 130/78                 Passed - LDL on file in past 12 months     Recent Labs   Lab Test 10/22/19  0803   LDL 40             Passed - Microalbumin on file in past 12 months     Recent Labs   Lab Test 10/22/19  0804   MICROL 98   UMALCR 64.77*             Passed - Serum creatinine on file in past 12 months     Recent Labs   Lab Test 10/22/19  0803   CR 1.09             Passed - HgbA1C in past 3 or 6 months     If HgbA1C is 8 or greater, it needs to be on file within the past 3 months.  If less than 8, must be on file within the past 6 months.     Recent Labs   Lab Test 10/22/19  0803   A1C 6.1*             Passed - Medication is active on med list        Passed - Patient is age 18 or older        Passed - Recent (6 mo) or future (30 days) visit within the authorizing provider's specialty     Patient had office visit in the last 6 months or has a visit in the next 30 days with authorizing provider or within the authorizing provider's specialty.  See \"Patient Info\" tab in inbasket, or \"Choose Columns\" in Meds & Orders section of the refill encounter.            Last Written Prescription Date:  2019  Last Fill Quantity: 30 mL,  # refills: 0   Last office visit: 3/6/2019 with prescribing provider:  3/06/2019   Future Office Visit:      "

## 2019-11-01 NOTE — TELEPHONE ENCOUNTER
Prescription approved per [facility] Refill Protocol.    Micaela URENA RN, BSN, PHN

## 2019-11-05 DIAGNOSIS — E78.5 HYPERLIPIDEMIA LDL GOAL <100: ICD-10-CM

## 2019-11-05 RX ORDER — SIMVASTATIN 80 MG
TABLET ORAL
Qty: 90 TABLET | Refills: 0 | Status: SHIPPED | OUTPATIENT
Start: 2019-11-05 | End: 2020-06-25

## 2019-11-05 NOTE — TELEPHONE ENCOUNTER
"Requested Prescriptions   Pending Prescriptions Disp Refills     simvastatin (ZOCOR) 80 MG tablet 90 tablet 3     Sig: TAKE ONE-HALF TABLET( 40 MG) BY MOUTH AT BEDTIME       Statins Protocol Passed - 11/5/2019  7:47 AM        Passed - LDL on file in past 12 months     Recent Labs   Lab Test 10/22/19  0803   LDL 40             Passed - No abnormal creatine kinase in past 12 months     No lab results found.             Passed - Recent (12 mo) or future (30 days) visit within the authorizing provider's specialty     Patient has had an office visit with the authorizing provider or a provider within the authorizing providers department within the previous 12 mos or has a future within next 30 days. See \"Patient Info\" tab in inbasket, or \"Choose Columns\" in Meds & Orders section of the refill encounter.              Passed - Medication is active on med list        Passed - Patient is age 18 or older          "

## 2019-11-12 ENCOUNTER — ANTICOAGULATION THERAPY VISIT (OUTPATIENT)
Dept: ANTICOAGULATION | Facility: CLINIC | Age: 59
End: 2019-11-12
Payer: COMMERCIAL

## 2019-11-12 LAB — INR POINT OF CARE: 2.6 (ref 0.86–1.14)

## 2019-11-12 PROCEDURE — 85610 PROTHROMBIN TIME: CPT | Mod: QW

## 2019-11-12 PROCEDURE — 36416 COLLJ CAPILLARY BLOOD SPEC: CPT

## 2019-11-12 NOTE — PROGRESS NOTES
ANTICOAGULATION FOLLOW-UP CLINIC VISIT    Patient Name:  Mckay Hsu  Date:  2019  Contact Type:  Face to Face    SUBJECTIVE:  Patient Findings     Comments:   The patient was assessed for diet, medication, and activity level changes, missed or extra doses, bruising or bleeding, with no problem findings.          Clinical Outcomes     Comments:   The patient was assessed for diet, medication, and activity level changes, missed or extra doses, bruising or bleeding, with no problem findings.             OBJECTIVE    INR Protime   Date Value Ref Range Status   2019 2.6 (A) 0.86 - 1.14 Final       ASSESSMENT / PLAN  INR assessment THER    Recheck INR In: 6 WEEKS    INR Location Clinic      Anticoagulation Summary  As of 2019    INR goal:   2.0-3.0   TTR:   80.0 % (3.7 y)   INR used for dosin.6 (2019)   Warfarin maintenance plan:   3.75 mg (7.5 mg x 0.5) every Tue; 7.5 mg (7.5 mg x 1) all other days   Full warfarin instructions:   3.75 mg every Tue; 7.5 mg all other days   Weekly warfarin total:   48.75 mg   No change documented:   Rosalee Billingsley, RN   Plan last modified:   Rosalee Billingsley RN (10/17/2017)   Next INR check:   2020   Target end date:       Indications    Long-term (current) use of anticoagulants [Z79.01] [Z79.01]  Chronic atrial fibrillation [I48.20]             Anticoagulation Episode Summary     INR check location:       Preferred lab:       Send INR reminders to:   [facility]    Comments:               See the Encounter Report to view Anticoagulation Flowsheet and Dosing Calendar (Go to Encounters tab in chart review, and find the Anticoagulation Therapy Visit)    Dosage adjustment made based on physician directed care plan.    Rosalee Billingsley RN

## 2019-11-26 DIAGNOSIS — I10 ESSENTIAL HYPERTENSION, BENIGN: ICD-10-CM

## 2019-11-26 RX ORDER — METOPROLOL SUCCINATE 100 MG/1
100 TABLET, EXTENDED RELEASE ORAL DAILY
Qty: 90 TABLET | Refills: 0 | Status: SHIPPED | OUTPATIENT
Start: 2019-11-26 | End: 2020-02-28

## 2019-11-26 NOTE — TELEPHONE ENCOUNTER
"Requested Prescriptions   Pending Prescriptions Disp Refills     metoprolol succinate ER (TOPROL-XL) 100 MG 24 hr tablet 90 tablet 0     Sig: Take 1 tablet (100 mg) by mouth daily       Beta-Blockers Protocol Passed - 11/26/2019  8:01 AM        Passed - Blood pressure under 140/90 in past 12 months     BP Readings from Last 3 Encounters:   06/21/19 135/85   05/09/19 143/82   03/06/19 130/78                 Passed - Patient is age 6 or older        Passed - Recent (12 mo) or future (30 days) visit within the authorizing provider's specialty     Patient has had an office visit with the authorizing provider or a provider within the authorizing providers department within the previous 12 mos or has a future within next 30 days. See \"Patient Info\" tab in inbasket, or \"Choose Columns\" in Meds & Orders section of the refill encounter.              Passed - Medication is active on med list        Routing refill request to provider for review/approval because:  Colleen given x1 and patient did not follow up, please advise  Patient needs to be seen because it has been more than 1 year since last office visit.        "

## 2019-12-18 DIAGNOSIS — I10 ESSENTIAL HYPERTENSION, BENIGN: ICD-10-CM

## 2019-12-18 DIAGNOSIS — E11.9 TYPE 2 DIABETES MELLITUS WITHOUT COMPLICATION, WITH LONG-TERM CURRENT USE OF INSULIN (H): ICD-10-CM

## 2019-12-18 DIAGNOSIS — Z79.4 TYPE 2 DIABETES MELLITUS WITHOUT COMPLICATION, WITH LONG-TERM CURRENT USE OF INSULIN (H): ICD-10-CM

## 2019-12-18 RX ORDER — AMLODIPINE BESYLATE 10 MG/1
TABLET ORAL
Qty: 90 TABLET | Refills: 0 | Status: SHIPPED | OUTPATIENT
Start: 2019-12-18 | End: 2020-03-09

## 2019-12-18 NOTE — TELEPHONE ENCOUNTER
"Requested Prescriptions   Pending Prescriptions Disp Refills     insulin aspart (NOVOLOG FLEXPEN) 100 UNIT/ML pen 30 mL 0     Si unit per 7g of carbs three times daily before meals       Short Acting Insulin Protocol Passed - 2019  8:10 AM        Passed - Blood pressure less than 140/90 in past 6 months     BP Readings from Last 3 Encounters:   19 135/85   19 143/82   19 130/78                 Passed - LDL on file in past 12 months     Recent Labs   Lab Test 10/22/19  0803   LDL 40             Passed - Microalbumin on file in past 12 months     Recent Labs   Lab Test 10/22/19  0804   MICROL 98   UMALCR 64.77*             Passed - Serum creatinine on file in past 12 months     Recent Labs   Lab Test 10/22/19  0803   CR 1.09             Passed - HgbA1C in past 3 or 6 months     If HgbA1C is 8 or greater, it needs to be on file within the past 3 months.  If less than 8, must be on file within the past 6 months.     Recent Labs   Lab Test 10/22/19  0803   A1C 6.1*             Passed - Medication is active on med list        Passed - Patient is age 18 or older        Passed - Recent (6 mo) or future (30 days) visit within the authorizing provider's specialty     Patient had office visit in the last 6 months or has a visit in the next 30 days with authorizing provider or within the authorizing provider's specialty.  See \"Patient Info\" tab in inbasket, or \"Choose Columns\" in Meds & Orders section of the refill encounter.            amLODIPine (NORVASC) 10 MG tablet 90 tablet 0     Sig: TAKE 1 TABLET(10 MG) BY MOUTH DAILY       Calcium Channel Blockers Protocol  Passed - 2019  8:10 AM        Passed - Blood pressure under 140/90 in past 12 months     BP Readings from Last 3 Encounters:   19 135/85   19 143/82   19 130/78                 Passed - Recent (12 mo) or future (30 days) visit within the authorizing provider's specialty     Patient has had an office visit with the " "authorizing provider or a provider within the authorizing providers department within the previous 12 mos or has a future within next 30 days. See \"Patient Info\" tab in inbasket, or \"Choose Columns\" in Meds & Orders section of the refill encounter.              Passed - Medication is active on med list        Passed - Patient is age 18 or older        Passed - Normal serum creatinine on file in past 12 months     Recent Labs   Lab Test 10/22/19  0803   CR 1.09             Medication is being filled for 1 time refill only due to:  due for office visit    "

## 2020-01-02 ENCOUNTER — ANTICOAGULATION THERAPY VISIT (OUTPATIENT)
Dept: ANTICOAGULATION | Facility: CLINIC | Age: 60
End: 2020-01-02
Payer: COMMERCIAL

## 2020-01-02 LAB — INR POINT OF CARE: 2.3 (ref 0.86–1.14)

## 2020-01-02 PROCEDURE — 85610 PROTHROMBIN TIME: CPT | Mod: QW

## 2020-01-02 PROCEDURE — 99207 ZZC NO CHARGE NURSE ONLY: CPT

## 2020-01-02 PROCEDURE — 36416 COLLJ CAPILLARY BLOOD SPEC: CPT

## 2020-01-02 NOTE — PROGRESS NOTES
ANTICOAGULATION FOLLOW-UP CLINIC VISIT    Patient Name:  Mckay Hsu  Date:  2020  Contact Type:  Face to Face    SUBJECTIVE:         OBJECTIVE    INR Protime   Date Value Ref Range Status   2020 2.3 (A) 0.86 - 1.14 Final       ASSESSMENT / PLAN  INR assessment THER    Recheck INR In: 6 WEEKS    INR Location Clinic      Anticoagulation Summary  As of 2020    INR goal:   2.0-3.0   TTR:   100.0 % (1 y)   INR used for dosin.3 (2020)   Warfarin maintenance plan:   3.75 mg (7.5 mg x 0.5) every Tue; 7.5 mg (7.5 mg x 1) all other days   Full warfarin instructions:   3.75 mg every Tue; 7.5 mg all other days   Weekly warfarin total:   48.75 mg   No change documented:   Rosalee Billingsley RN   Plan last modified:   Rosalee Billingsley RN (10/17/2017)   Next INR check:   2020   Target end date:       Indications    Long-term (current) use of anticoagulants [Z79.01] [Z79.01]  Chronic atrial fibrillation [I48.20]             Anticoagulation Episode Summary     INR check location:       Preferred lab:       Send INR reminders to:   [facility]    Comments:               See the Encounter Report to view Anticoagulation Flowsheet and Dosing Calendar (Go to Encounters tab in chart review, and find the Anticoagulation Therapy Visit)    Dosage adjustment made based on physician directed care plan.    Rosalee Billingsley RN

## 2020-01-07 ENCOUNTER — TELEPHONE (OUTPATIENT)
Dept: ANTICOAGULATION | Facility: CLINIC | Age: 60
End: 2020-01-07

## 2020-01-07 DIAGNOSIS — I48.91 NEW ONSET ATRIAL FIBRILLATION (H): Primary | ICD-10-CM

## 2020-01-08 PROBLEM — I48.91 NEW ONSET ATRIAL FIBRILLATION (H): Status: ACTIVE | Noted: 2020-01-08

## 2020-01-14 ENCOUNTER — TELEPHONE (OUTPATIENT)
Dept: INTERNAL MEDICINE | Facility: CLINIC | Age: 60
End: 2020-01-14

## 2020-01-14 DIAGNOSIS — Z79.4 TYPE 2 DIABETES MELLITUS WITHOUT COMPLICATION, WITH LONG-TERM CURRENT USE OF INSULIN (H): ICD-10-CM

## 2020-01-14 DIAGNOSIS — E11.9 TYPE 2 DIABETES MELLITUS WITHOUT COMPLICATION, WITH LONG-TERM CURRENT USE OF INSULIN (H): ICD-10-CM

## 2020-01-14 NOTE — TELEPHONE ENCOUNTER
Last office visit: 3/6/2019 with prescribing provider:     Future Office Visit:   Next 5 appointments (look out 90 days)    Feb 03, 2020  1:40 PM CST  Pre-Op physical with David Almendarez MD  [facility] [address] [place] [id number] [phone]

## 2020-01-14 NOTE — TELEPHONE ENCOUNTER
Requested Prescriptions   Pending Prescriptions Disp Refills     insulin glargine (LANTUS PEN) 100 UNIT/ML pen 60 mL 1     Sig: Inject 60 Units Subcutaneous At Bedtime       There is no refill protocol information for this order

## 2020-01-14 NOTE — TELEPHONE ENCOUNTER
Reason for Call:  Medication or medication refill:    Do you use a [facility]?  Name of the pharmacy and phone number for the current request:  [facility], [place] tele # [phone]    Name of the medication requested: lantus pt using last box    Other request: new rx questions on how it is written and the quantity    Can we leave a detailed message on this number? YES    Phone number patient can be reached at: Home number on file [phone] (home)    Best Time: asap    Call taken on 1/14/2020 at 3:33 PM by Mariela Torres

## 2020-01-21 ENCOUNTER — ANCILLARY PROCEDURE (OUTPATIENT)
Dept: CARDIOLOGY | Facility: CLINIC | Age: 60
End: 2020-01-21
Attending: INTERNAL MEDICINE
Payer: COMMERCIAL

## 2020-01-21 DIAGNOSIS — Z95.0 CARDIAC PACEMAKER IN SITU: ICD-10-CM

## 2020-01-21 DIAGNOSIS — I49.5 SINUS NODE DYSFUNCTION (H): Primary | ICD-10-CM

## 2020-01-21 DIAGNOSIS — Z95.0 BIVENTRICULAR CARDIAC PACEMAKER IN SITU: ICD-10-CM

## 2020-01-21 PROCEDURE — 93281 PM DEVICE PROGR EVAL MULTI: CPT | Performed by: INTERNAL MEDICINE

## 2020-02-03 ENCOUNTER — OFFICE VISIT (OUTPATIENT)
Dept: INTERNAL MEDICINE | Facility: CLINIC | Age: 60
End: 2020-02-03
Payer: COMMERCIAL

## 2020-02-03 VITALS
OXYGEN SATURATION: 98 % | HEART RATE: 75 BPM | RESPIRATION RATE: 14 BRPM | TEMPERATURE: 97.6 F | SYSTOLIC BLOOD PRESSURE: 128 MMHG | DIASTOLIC BLOOD PRESSURE: 72 MMHG

## 2020-02-03 DIAGNOSIS — I10 ESSENTIAL HYPERTENSION, BENIGN: ICD-10-CM

## 2020-02-03 DIAGNOSIS — Z01.818 PREOP GENERAL PHYSICAL EXAM: Primary | ICD-10-CM

## 2020-02-03 DIAGNOSIS — I50.22 CHRONIC SYSTOLIC CONGESTIVE HEART FAILURE (H): ICD-10-CM

## 2020-02-03 DIAGNOSIS — H25.9 SENILE CATARACT, UNSPECIFIED AGE-RELATED CATARACT TYPE, UNSPECIFIED LATERALITY: ICD-10-CM

## 2020-02-03 DIAGNOSIS — I42.9 PRIMARY CARDIOMYOPATHY (H): ICD-10-CM

## 2020-02-03 DIAGNOSIS — E11.9 TYPE 2 DIABETES MELLITUS WITHOUT COMPLICATION, WITH LONG-TERM CURRENT USE OF INSULIN (H): ICD-10-CM

## 2020-02-03 DIAGNOSIS — I48.20 CHRONIC ATRIAL FIBRILLATION (H): ICD-10-CM

## 2020-02-03 DIAGNOSIS — Z79.4 TYPE 2 DIABETES MELLITUS WITHOUT COMPLICATION, WITH LONG-TERM CURRENT USE OF INSULIN (H): ICD-10-CM

## 2020-02-03 LAB
GLUCOSE SERPL-MCNC: 114 MG/DL (ref 70–99)
HBA1C MFR BLD: 6.2 % (ref 0–5.6)
HGB BLD-MCNC: 14.1 G/DL (ref 13.3–17.7)
MDC_IDC_LEAD_IMPLANT_DT: NORMAL
MDC_IDC_LEAD_IMPLANT_DT: NORMAL
MDC_IDC_LEAD_LOCATION: NORMAL
MDC_IDC_LEAD_LOCATION: NORMAL
MDC_IDC_LEAD_LOCATION_DETAIL_1: NORMAL
MDC_IDC_LEAD_LOCATION_DETAIL_1: NORMAL
MDC_IDC_LEAD_MFG: NORMAL
MDC_IDC_LEAD_MFG: NORMAL
MDC_IDC_LEAD_MODEL: NORMAL
MDC_IDC_LEAD_MODEL: NORMAL
MDC_IDC_LEAD_POLARITY_TYPE: NORMAL
MDC_IDC_LEAD_POLARITY_TYPE: NORMAL
MDC_IDC_LEAD_SERIAL: NORMAL
MDC_IDC_LEAD_SERIAL: NORMAL
MDC_IDC_MSMT_BATTERY_DTM: NORMAL
MDC_IDC_MSMT_BATTERY_REMAINING_LONGEVITY: 117 MO
MDC_IDC_MSMT_BATTERY_RRT_TRIGGER: 2.6
MDC_IDC_MSMT_BATTERY_STATUS: NORMAL
MDC_IDC_MSMT_BATTERY_VOLTAGE: 3.03 V
MDC_IDC_MSMT_LEADCHNL_LV_IMPEDANCE_VALUE: 456 OHM
MDC_IDC_MSMT_LEADCHNL_LV_IMPEDANCE_VALUE: 551 OHM
MDC_IDC_MSMT_LEADCHNL_LV_IMPEDANCE_VALUE: 665 OHM
MDC_IDC_MSMT_LEADCHNL_LV_IMPEDANCE_VALUE: 741 OHM
MDC_IDC_MSMT_LEADCHNL_LV_IMPEDANCE_VALUE: 969 OHM
MDC_IDC_MSMT_LEADCHNL_RA_IMPEDANCE_VALUE: 3401 OHM
MDC_IDC_MSMT_LEADCHNL_RA_IMPEDANCE_VALUE: 3401 OHM
MDC_IDC_MSMT_LEADCHNL_RV_IMPEDANCE_VALUE: 380 OHM
MDC_IDC_MSMT_LEADCHNL_RV_IMPEDANCE_VALUE: 399 OHM
MDC_IDC_MSMT_LEADCHNL_RV_PACING_THRESHOLD_AMPLITUDE: 0.75 V
MDC_IDC_MSMT_LEADCHNL_RV_PACING_THRESHOLD_PULSEWIDTH: 0.4 MS
MDC_IDC_MSMT_LEADCHNL_RV_SENSING_INTR_AMPL: 11.75 MV
MDC_IDC_PG_IMPLANT_DTM: NORMAL
MDC_IDC_PG_MFG: NORMAL
MDC_IDC_PG_MODEL: NORMAL
MDC_IDC_PG_SERIAL: NORMAL
MDC_IDC_PG_TYPE: NORMAL
MDC_IDC_SESS_CLINIC_NAME: NORMAL
MDC_IDC_SESS_DTM: NORMAL
MDC_IDC_SESS_TYPE: NORMAL
MDC_IDC_SET_BRADY_LOWRATE: 70 {BEATS}/MIN
MDC_IDC_SET_BRADY_MAX_SENSOR_RATE: 130 {BEATS}/MIN
MDC_IDC_SET_BRADY_MODE: NORMAL
MDC_IDC_SET_CRT_LVRV_DELAY: 0 MS
MDC_IDC_SET_CRT_PACED_CHAMBERS: NORMAL
MDC_IDC_SET_LEADCHNL_LV_PACING_AMPLITUDE: 2 V
MDC_IDC_SET_LEADCHNL_LV_PACING_ANODE_ELECTRODE_1: NORMAL
MDC_IDC_SET_LEADCHNL_LV_PACING_ANODE_LOCATION_1: NORMAL
MDC_IDC_SET_LEADCHNL_LV_PACING_CAPTURE_MODE: NORMAL
MDC_IDC_SET_LEADCHNL_LV_PACING_CATHODE_ELECTRODE_1: NORMAL
MDC_IDC_SET_LEADCHNL_LV_PACING_CATHODE_LOCATION_1: NORMAL
MDC_IDC_SET_LEADCHNL_LV_PACING_POLARITY: NORMAL
MDC_IDC_SET_LEADCHNL_LV_PACING_PULSEWIDTH: 0.5 MS
MDC_IDC_SET_LEADCHNL_RA_SENSING_ANODE_ELECTRODE_1: NORMAL
MDC_IDC_SET_LEADCHNL_RA_SENSING_ANODE_LOCATION_1: NORMAL
MDC_IDC_SET_LEADCHNL_RA_SENSING_CATHODE_ELECTRODE_1: NORMAL
MDC_IDC_SET_LEADCHNL_RA_SENSING_CATHODE_LOCATION_1: NORMAL
MDC_IDC_SET_LEADCHNL_RA_SENSING_POLARITY: NORMAL
MDC_IDC_SET_LEADCHNL_RA_SENSING_SENSITIVITY: NORMAL
MDC_IDC_SET_LEADCHNL_RV_PACING_AMPLITUDE: 2 V
MDC_IDC_SET_LEADCHNL_RV_PACING_ANODE_ELECTRODE_1: NORMAL
MDC_IDC_SET_LEADCHNL_RV_PACING_ANODE_LOCATION_1: NORMAL
MDC_IDC_SET_LEADCHNL_RV_PACING_CAPTURE_MODE: NORMAL
MDC_IDC_SET_LEADCHNL_RV_PACING_CATHODE_ELECTRODE_1: NORMAL
MDC_IDC_SET_LEADCHNL_RV_PACING_CATHODE_LOCATION_1: NORMAL
MDC_IDC_SET_LEADCHNL_RV_PACING_POLARITY: NORMAL
MDC_IDC_SET_LEADCHNL_RV_PACING_PULSEWIDTH: 0.4 MS
MDC_IDC_SET_LEADCHNL_RV_SENSING_ANODE_ELECTRODE_1: NORMAL
MDC_IDC_SET_LEADCHNL_RV_SENSING_ANODE_LOCATION_1: NORMAL
MDC_IDC_SET_LEADCHNL_RV_SENSING_CATHODE_ELECTRODE_1: NORMAL
MDC_IDC_SET_LEADCHNL_RV_SENSING_CATHODE_LOCATION_1: NORMAL
MDC_IDC_SET_LEADCHNL_RV_SENSING_POLARITY: NORMAL
MDC_IDC_SET_LEADCHNL_RV_SENSING_SENSITIVITY: 0.9 MV
MDC_IDC_SET_ZONE_DETECTION_INTERVAL: 400 MS
MDC_IDC_SET_ZONE_TYPE: NORMAL
MDC_IDC_STAT_BRADY_AP_VP_PERCENT: 0 %
MDC_IDC_STAT_BRADY_AP_VS_PERCENT: 0 %
MDC_IDC_STAT_BRADY_AS_VP_PERCENT: 99.5 %
MDC_IDC_STAT_BRADY_AS_VS_PERCENT: 0.5 %
MDC_IDC_STAT_BRADY_DTM_END: NORMAL
MDC_IDC_STAT_BRADY_DTM_START: NORMAL
MDC_IDC_STAT_BRADY_RA_PERCENT_PACED: 0 %
MDC_IDC_STAT_BRADY_RV_PERCENT_PACED: 99.5 %
MDC_IDC_STAT_CRT_DTM_END: NORMAL
MDC_IDC_STAT_CRT_DTM_START: NORMAL
MDC_IDC_STAT_CRT_LV_PERCENT_PACED: 99.48 %
MDC_IDC_STAT_CRT_PERCENT_PACED: 99.48 %
MDC_IDC_STAT_EPISODE_RECENT_COUNT: 0
MDC_IDC_STAT_EPISODE_RECENT_COUNT: 0
MDC_IDC_STAT_EPISODE_RECENT_COUNT: 1
MDC_IDC_STAT_EPISODE_RECENT_COUNT_DTM_END: NORMAL
MDC_IDC_STAT_EPISODE_RECENT_COUNT_DTM_START: NORMAL
MDC_IDC_STAT_EPISODE_TOTAL_COUNT: 0
MDC_IDC_STAT_EPISODE_TOTAL_COUNT: 0
MDC_IDC_STAT_EPISODE_TOTAL_COUNT: 1
MDC_IDC_STAT_EPISODE_TOTAL_COUNT_DTM_END: NORMAL
MDC_IDC_STAT_EPISODE_TOTAL_COUNT_DTM_START: NORMAL
MDC_IDC_STAT_EPISODE_TYPE: NORMAL
PLATELET # BLD AUTO: 276 10E9/L (ref 150–450)
POTASSIUM SERPL-SCNC: 4.2 MMOL/L (ref 3.4–5.3)
T4 FREE SERPL-MCNC: 1.32 NG/DL (ref 0.76–1.46)
TSH SERPL DL<=0.005 MIU/L-ACNC: 0.39 MU/L (ref 0.4–4)

## 2020-02-03 PROCEDURE — 84132 ASSAY OF SERUM POTASSIUM: CPT | Performed by: INTERNAL MEDICINE

## 2020-02-03 PROCEDURE — 85049 AUTOMATED PLATELET COUNT: CPT | Performed by: INTERNAL MEDICINE

## 2020-02-03 PROCEDURE — 85018 HEMOGLOBIN: CPT | Performed by: INTERNAL MEDICINE

## 2020-02-03 PROCEDURE — 84439 ASSAY OF FREE THYROXINE: CPT | Performed by: INTERNAL MEDICINE

## 2020-02-03 PROCEDURE — 99215 OFFICE O/P EST HI 40 MIN: CPT | Performed by: INTERNAL MEDICINE

## 2020-02-03 PROCEDURE — 83036 HEMOGLOBIN GLYCOSYLATED A1C: CPT | Performed by: INTERNAL MEDICINE

## 2020-02-03 PROCEDURE — 36415 COLL VENOUS BLD VENIPUNCTURE: CPT | Performed by: INTERNAL MEDICINE

## 2020-02-03 PROCEDURE — 82947 ASSAY GLUCOSE BLOOD QUANT: CPT | Performed by: INTERNAL MEDICINE

## 2020-02-03 PROCEDURE — 84443 ASSAY THYROID STIM HORMONE: CPT | Performed by: INTERNAL MEDICINE

## 2020-02-03 NOTE — PROGRESS NOTES
[facility]  [address] [address]  [phone]  Dept: [phone]    PRE-OP EVALUATION:  Today's date: 2/3/2020    Mckay Hsu (: 1960) presents for pre-operative evaluation assessment as requested by Dr. Travis.  He requires evaluation and anesthesia risk assessment prior to undergoing surgery/procedure for treatment of right eye catract.    Proposed Surgery/ Procedure: Right eye cataract replacement as well as left  Date of Surgery/ Procedure: 20, 3/18/20  Time of Surgery/ Procedure: TBD   Hospital/Surgical Facility: [facility]   Fax number for surgical facility: [phone]  Primary Physician: David Almendarez  Type of Anesthesia Anticipated: to be determined    Patient has a Health Care Directive or Living Will:  NO    HPI:     HPI related to upcoming procedure: cataract extraction    See problem list for active medical problems.  Problems all longstanding and stable, except as noted/documented.  See ROS for pertinent symptoms related to these conditions.      MEDICAL HISTORY:     Patient Active Problem List    Diagnosis Date Noted     Health Care Home 2011     Priority: High     x  DX V65.8 REPLACED WITH [facility] (2013)       New onset atrial fibrillation (H) 2020     Priority: Medium     Type 2 diabetes mellitus without complication, with long-term current use of insulin (H) 10/31/2016     Priority: Medium     Chronic systolic congestive heart failure (H) 2016     Priority: Medium     Morbid obesity due to excess calories (H) 2016     Priority: Medium     Long-term (current) use of anticoagulants [Z79.01] 2016     Priority: Medium     Primary cardiomyopathy (H) 2014     Priority: Medium     Problem list name updated by automated process. Provider to review       Tachycardia 2014     Priority: Medium     Atrioventricular block, complete (AV) 2014     Priority:  Medium     S/p BIV PPM 4/2010-Medtronic InSync III 8042       Subclinical hyperthyroidism 06/26/2012     Priority: Medium     HYPERLIPIDEMIA LDL GOAL <100 10/31/2010     Priority: Medium     Chronic atrial fibrillation 09/08/2009     Priority: Medium     Essential hypertension, benign      Priority: Medium     Open wound of foot, excluding toe(s)      Priority: Medium     Problem list name updated by automated process. Provider to review        Past Medical History:   Diagnosis Date     Atrial fibrillation (H)     s/p AVN ablation, BIV PPM     Congestive heart failure (H)     tachycardia-induced cardiomyopathy     Essential hypertension, benign      Hyperlipidemia LDL goal <100 10/31/2010     Hyponatremia 9/8/2009     Morbid obesity (H)      Open wound of foot except toe(s) alone, without mention of complication      Retinitis pigmentosa      Type 2 diabetes, HbA1C goal < 8% (H) 6/26/2012     Past Surgical History:   Procedure Laterality Date     C NONSPECIFIC PROCEDURE  06/2007    debritement     CARDIOVERSION  9/2009, 10/2009     COLONOSCOPY N/A 5/9/2019    Procedure: COLONOSCOPY;  Surgeon: Godwin Santillan MD;  Location: [facility]     H ABLATION AV NODE  4/8/10     IMPLANT PACEMAKER  4/8/10    BIV PPM- Medtronic InSync III     TONSILLECTOMY      as kid     Current Outpatient Medications   Medication Sig Dispense Refill     alcohol swab prep pads Use to swab area of injection/lily as directed. 100 each 3     amLODIPine (NORVASC) 10 MG tablet TAKE 1 TABLET(10 MG) BY MOUTH DAILY 90 tablet 0     blood glucose (NO BRAND SPECIFIED) test strip Use to test blood sugar 3 times daily or as directed. To accompany: ACCU-CHEK GUIDE  strip 6     blood glucose (ONETOUCH VERIO IQ) test strip Use to test blood sugars 3 times daily or as directed. 100 strip 3     blood glucose calibration (NO BRAND SPECIFIED) solution To accompany: ACCU-CHEK GUIDE ME 1 Bottle 3     blood glucose monitoring (NO BRAND SPECIFIED) meter device kit  Use to test blood sugar 3 times daily or as directed. Preferred blood glucose meter OR supplies to accompany: ACCU-CHEK GUIDE ME 1 kit 0     fish oil-omega-3 fatty acids 1000 MG capsule Take 4 capsules by mouth daily.       insulin aspart (NOVOLOG FLEXPEN) 100 UNIT/ML pen 1 unit per 7g of carbs three times daily before meals 30 mL 0     insulin glargine (LANTUS PEN) 100 UNIT/ML pen Inject 60 Units Subcutaneous At Bedtime 60 mL 1     insulin pen needle (B-D U/F) 31G X 5 MM miscellaneous Use 1 daily or as directed. 100 each 3     losartan (COZAAR) 100 MG tablet Take 1 tablet (100 mg) by mouth daily 90 tablet 2     metFORMIN (GLUCOPHAGE) 1000 MG tablet TAKE 1 TABLET(1000 MG) BY MOUTH TWICE DAILY WITH MEALS 180 tablet 3     metoprolol succinate ER (TOPROL-XL) 100 MG 24 hr tablet Take 1 tablet (100 mg) by mouth daily 90 tablet 0     MULTIVITAMIN OR 1 qd       NITROSTAT 0.4 MG SL SUBL 1 TAB EVERY 5 MIN AS NEEDED, UP TO 3 PER EPISODE       ONETOUCH LANCETS MISC 1 lancet by In Vitro route 3 times daily 200 each 3     simvastatin (ZOCOR) 80 MG tablet TAKE ONE-HALF TABLET( 40 MG) BY MOUTH AT BEDTIME 90 tablet 0     thin (NO BRAND SPECIFIED) lancets Use with lanceting device. To accompany: Blood Glucose Monitor Brands: ACCU-CHEK GUIDE  each 6     warfarin ANTICOAGULANT (COUMADIN) 7.5 MG tablet TAKE 1 TABLET BY MOUTH DAILY EXCEPT ONE-HALF TABLET ON TUESDAY AS DIRECTED BY THE ANTICOAGULATION CLINIC 84 tablet 0     OTC products: None, except as noted above    No Known Allergies   Latex Allergy: NO    Social History     Tobacco Use     Smoking status: Never Smoker     Smokeless tobacco: Never Used   Substance Use Topics     Alcohol use: No     History   Drug Use No       REVIEW OF SYSTEMS:   CONSTITUTIONAL: NEGATIVE for fever, chills, change in weight  ENT/MOUTH: NEGATIVE for ear, mouth and throat problems  RESP: NEGATIVE for significant cough or SOB  CV: NEGATIVE for chest pain, palpitations or peripheral edema  GI:  NEGATIVE for nausea, abdominal pain, heartburn, or change in bowel habits  : NEGATIVE for frequency, dysuria, or hematuria  MUSCULOSKELETAL: NEGATIVE for significant arthralgias or myalgia  NEURO: NEGATIVE for weakness, dizziness or paresthesias  HEME: NEGATIVE for bleeding problems  PSYCHIATRIC: NEGATIVE for changes in mood or affect    EXAM:   /72 (BP Location: Left arm, Patient Position: Sitting, Cuff Size: Adult Large)   Pulse 75   Temp 97.6  F (36.4  C)   Resp 14   SpO2 98%     GENERAL APPEARANCE:  alert and no distress     EYES: + cataracts OU     HENT: ear canals and TM's normal and nose and mouth without ulcers or lesions     NECK: no adenopathy, no asymmetry, masses, or scars and thyroid normal to palpation     RESP: lungs clear to auscultation - no rales, rhonchi or wheezes     CV: regular rates and rhythm, normal S1 S2 and no click or rub, pacer site left chest     ABDOMEN:  Obese, soft, nontender, no HSM or masses and bowel sounds normal     MS: extremities normal- no gross deformities noted     NEURO: No focal changes     PSYCH: mentation appears normal. and affect normal/bright    DIAGNOSTICS:   No EKG required for low risk surgery (cataract, skin procedure, breast biopsy, etc)    Recent Labs   Lab Test 01/02/20 11/12/19 10/22/19  0803  03/06/19  1452  11/29/18  1120  06/13/18  1339  05/28/15  1413   HGB  --   --   --   --   --   --  13.2*  --  14.0  --  14.5   PLT  --   --   --   --   --   --  290  --   --   --  264   INR 2.3* 2.6*  --    < >  --    < >  --    < >  --    < >  --    NA  --   --  135  --   --   --  137  --   --    < > 136   POTASSIUM  --   --  4.0  --   --   --  4.0  --   --    < > 4.2   CR  --   --  1.09  --   --   --  1.01  --   --    < > 0.98   A1C  --   --  6.1*  --  6.4*  --   --   --   --    < >  --     < > = values in this interval not displayed.        IMPRESSION:   Reason for surgery/procedure: visual change    The proposed surgical procedure is considered LOW risk  for this procedure.    REVISED CARDIAC RISK INDEX  The patient has the following serious cardiovascular risks for perioperative complications such as (MI, PE, VFib and 3  AV Block):  Congestive Heart Failure (pulmonary edema, PND, s3 mary, CXR with pulmonary congestion, basilar rales) stable  INTERPRETATION: 2 risks: Class III (moderate risk - 6.6% complication rate)    The patient has the following additional risks for perioperative complications:      ICD-10-CM    1. Preop general physical exam Z01.818 Potassium     Hemoglobin     Platelet count   2. Senile cataract, unspecified age-related cataract type, unspecified laterality H25.9    3. Type 2 diabetes mellitus without complication, with long-term current use of insulin (H) E11.9 Glucose    Z79.4 TSH with free T4 reflex     Hemoglobin A1c   4. Primary cardiomyopathy (H) I42.8    5. Chronic systolic congestive heart failure (H) I50.22    6. Essential hypertension, benign I10    7. Chronic atrial fibrillation I48.20        RECOMMENDATIONS:     --Consult hospital rounder / IM to assist post-op medical management    Cardiovascular Risk  Performs 4 METs exercise without symptoms (Light housework (dusting, washing dishes), Climb a flight of stairs and Walk on level ground at 15 minutes per mile (4 miles/hour)) .   Patient is already on a Beta Blocker. Continue Betablocker therapy after surgery, using Beta blocker order set as necessary for NPO status.    Patient's been advised that if he is n.p.o. that he should be holding his insulin regimen until tolerating p.o. intake.    He has been advised to continue with the routine eye drops that have been recommended per his ophthalmologist.    He is also been informed that he may need another preop if his second eye procedure does not follow within 30 days from the last.    --Patient is to take all scheduled medications on the day of surgery EXCEPT for modifications listed below.    APPROVAL GIVEN to proceed with proposed  procedure, without further diagnostic evaluation       Signed Electronically by: David Almendarez MD    Copy of this evaluation report is provided to requesting physician., Dr. Thaddeus Yancey Preop Guidelines    Revised Cardiac Risk Index

## 2020-02-03 NOTE — LETTER
[facility][1]  [address] [address]  [phone]      2/3/2020       Mckay Hsu  [address] [facility] [address] [place] [id number]        Dear Mckay,    I have faxed the labs over for your surgery as scheduled.    Your Hemoglobin A1C and blood sugar tests look good and I would continue with your medication without change.  These tests should be repeated in 6 months.    Your thyroid function tests are slightly abnormal but stable and should be rechecked here in the clinic in 3 months with a follow-up visit with me.  I will look forward to seeing you at that time and please call to make an appointment.    Sincerely,      David Almendarez MD  Internal Medicine

## 2020-02-06 DIAGNOSIS — Z79.4 TYPE 2 DIABETES MELLITUS WITHOUT COMPLICATION, WITH LONG-TERM CURRENT USE OF INSULIN (H): ICD-10-CM

## 2020-02-06 DIAGNOSIS — E11.9 TYPE 2 DIABETES MELLITUS WITHOUT COMPLICATION, WITH LONG-TERM CURRENT USE OF INSULIN (H): ICD-10-CM

## 2020-02-06 NOTE — TELEPHONE ENCOUNTER
Pharmacy calling.     Need to know what max daily dose is on Novolog for insurance. Per patient he may use max of 60.     PCP please see updated sig. And sign if appropriate.    Micaela SMALLS, RN, PHN

## 2020-02-20 ENCOUNTER — ANTICOAGULATION THERAPY VISIT (OUTPATIENT)
Dept: ANTICOAGULATION | Facility: CLINIC | Age: 60
End: 2020-02-20
Payer: COMMERCIAL

## 2020-02-20 DIAGNOSIS — I48.91 NEW ONSET ATRIAL FIBRILLATION (H): Primary | ICD-10-CM

## 2020-02-20 LAB — INR POINT OF CARE: 2.6 (ref 0.86–1.14)

## 2020-02-20 PROCEDURE — 36416 COLLJ CAPILLARY BLOOD SPEC: CPT

## 2020-02-20 PROCEDURE — 85610 PROTHROMBIN TIME: CPT | Mod: QW

## 2020-02-20 NOTE — PROGRESS NOTES
ANTICOAGULATION FOLLOW-UP CLINIC VISIT    Patient Name:  Mckay Hsu  Date:  2020  Contact Type:  Face to Face    SUBJECTIVE:  Patient Findings     Comments:   The patient was assessed for diet, medication, and activity level changes, missed or extra doses, bruising or bleeding, with no problem findings.    Pt had cataract surgery this past week.        Clinical Outcomes     Comments:   The patient was assessed for diet, medication, and activity level changes, missed or extra doses, bruising or bleeding, with no problem findings.    Pt had cataract surgery this past week.           OBJECTIVE    INR Protime   Date Value Ref Range Status   2020 2.6 (A) 0.86 - 1.14 Final       ASSESSMENT / PLAN  INR assessment THER    Recheck INR In: 6 WEEKS    INR Location Clinic      Anticoagulation Summary  As of 2020    INR goal:   2.0-3.0   TTR:   100.0 % (1 y)   INR used for dosin.6 (2020)   Warfarin maintenance plan:   3.75 mg (7.5 mg x 0.5) every Tue; 7.5 mg (7.5 mg x 1) all other days   Full warfarin instructions:   3.75 mg every Tue; 7.5 mg all other days   Weekly warfarin total:   48.75 mg   No change documented:   Rosalee Billingsley, RN   Plan last modified:   Rosalee Billingsley RN (10/17/2017)   Next INR check:   2020   Target end date:   Indefinite    Indications    Long-term (current) use of anticoagulants [Z79.01] [Z79.01]  Chronic atrial fibrillation [I48.20]  New onset atrial fibrillation (H) [I48.91]             Anticoagulation Episode Summary     INR check location:       Preferred lab:       Send INR reminders to:   [facility]    Comments:         Anticoagulation Care Providers     Provider Role Specialty Phone number    David Almendarez MD Referring Internal Medicine [phone]            See the Encounter Report to view Anticoagulation Flowsheet and Dosing Calendar (Go to Encounters tab in chart review, and find the Anticoagulation Therapy  Visit)    Dosage adjustment made based on physician directed care plan.    Rosalee Billingsley RN

## 2020-02-28 DIAGNOSIS — I10 ESSENTIAL HYPERTENSION, BENIGN: ICD-10-CM

## 2020-02-28 RX ORDER — METOPROLOL SUCCINATE 100 MG/1
100 TABLET, EXTENDED RELEASE ORAL DAILY
Qty: 90 TABLET | Refills: 0 | Status: SHIPPED | OUTPATIENT
Start: 2020-02-28 | End: 2020-06-02

## 2020-02-28 NOTE — TELEPHONE ENCOUNTER
"Requested Prescriptions   Pending Prescriptions Disp Refills     metoprolol succinate ER (TOPROL-XL) 100 MG 24 hr tablet 90 tablet 0     Sig: Take 1 tablet (100 mg) by mouth daily       Beta-Blockers Protocol Passed - 2/28/2020 10:02 AM        Passed - Blood pressure under 140/90 in past 12 months     BP Readings from Last 3 Encounters:   02/03/20 128/72   06/21/19 135/85   05/09/19 143/82                 Passed - Patient is age 6 or older        Passed - Recent (12 mo) or future (30 days) visit within the authorizing provider's specialty     Patient has had an office visit with the authorizing provider or a provider within the authorizing providers department within the previous 12 mos or has a future within next 30 days. See \"Patient Info\" tab in inbasket, or \"Choose Columns\" in Meds & Orders section of the refill encounter.              Passed - Medication is active on med list          Prescription approved per [facility] Refill Protocol.    Micaela LOTTN, RN, PHN      "

## 2020-03-01 ENCOUNTER — TRANSFERRED RECORDS (OUTPATIENT)
Dept: HEALTH INFORMATION MANAGEMENT | Facility: CLINIC | Age: 60
End: 2020-03-01

## 2020-03-01 LAB — RETINOPATHY: NORMAL

## 2020-03-09 ENCOUNTER — OFFICE VISIT (OUTPATIENT)
Dept: INTERNAL MEDICINE | Facility: CLINIC | Age: 60
End: 2020-03-09
Payer: COMMERCIAL

## 2020-03-09 VITALS
TEMPERATURE: 97.5 F | HEART RATE: 76 BPM | WEIGHT: 315 LBS | DIASTOLIC BLOOD PRESSURE: 72 MMHG | OXYGEN SATURATION: 98 % | BODY MASS INDEX: 41.75 KG/M2 | SYSTOLIC BLOOD PRESSURE: 124 MMHG | RESPIRATION RATE: 15 BRPM | HEIGHT: 73 IN

## 2020-03-09 DIAGNOSIS — E05.90 SUBCLINICAL HYPERTHYROIDISM: ICD-10-CM

## 2020-03-09 DIAGNOSIS — I10 ESSENTIAL HYPERTENSION, BENIGN: ICD-10-CM

## 2020-03-09 DIAGNOSIS — I42.9 PRIMARY CARDIOMYOPATHY (H): ICD-10-CM

## 2020-03-09 DIAGNOSIS — Z79.4 TYPE 2 DIABETES MELLITUS WITHOUT COMPLICATION, WITH LONG-TERM CURRENT USE OF INSULIN (H): ICD-10-CM

## 2020-03-09 DIAGNOSIS — Z01.818 PREOP GENERAL PHYSICAL EXAM: Primary | ICD-10-CM

## 2020-03-09 DIAGNOSIS — I48.20 CHRONIC ATRIAL FIBRILLATION (H): ICD-10-CM

## 2020-03-09 DIAGNOSIS — E66.01 MORBID OBESITY DUE TO EXCESS CALORIES (H): ICD-10-CM

## 2020-03-09 DIAGNOSIS — H25.9 SENILE CATARACT, UNSPECIFIED AGE-RELATED CATARACT TYPE, UNSPECIFIED LATERALITY: ICD-10-CM

## 2020-03-09 DIAGNOSIS — E11.9 TYPE 2 DIABETES MELLITUS WITHOUT COMPLICATION, WITH LONG-TERM CURRENT USE OF INSULIN (H): ICD-10-CM

## 2020-03-09 PROCEDURE — 99215 OFFICE O/P EST HI 40 MIN: CPT | Performed by: INTERNAL MEDICINE

## 2020-03-09 RX ORDER — AMLODIPINE BESYLATE 10 MG/1
TABLET ORAL
Qty: 90 TABLET | Refills: 0 | Status: SHIPPED | OUTPATIENT
Start: 2020-03-09 | End: 2020-05-11

## 2020-03-09 ASSESSMENT — MIFFLIN-ST. JEOR: SCORE: 2361.68

## 2020-03-09 NOTE — PROGRESS NOTES
[facility]  [address] [address]  [phone]  Dept: [phone]    PRE-OP EVALUATION:  Today's date: 3/9/2020    Mckay Hsu (: 1960) presents for pre-operative evaluation assessment as requested by Dr. Travis.  He requires evaluation and anesthesia risk assessment prior to undergoing surgery/procedure for treatment of left eye cataract.    Proposed Surgery/ Procedure: Left eye cataract replacement   Date of Surgery/ Procedure: 20  Time of Surgery/ Procedure: [facility]   Hospital/Surgical Facility: [facility]   Fax number for surgical facility: [phone]  Primary Physician: David Almendarez  Type of Anesthesia Anticipated: Retrobulbar    Patient has a Health Care Directive or Living Will:  NO    HPI:     HPI related to upcoming procedure: He requires evaluation and anesthesia risk assessment prior to undergoing surgery/procedure for treatment of left eye cataract.    Proposed Surgery/ Procedure: Left eye cataract replacement   Date of Surgery/ Procedure: 20    See problem list for active medical problems.  Problems all longstanding and stable, except as noted/documented.  See ROS for pertinent symptoms related to these conditions.      MEDICAL HISTORY:     Patient Active Problem List    Diagnosis Date Noted     Health Care Home 2011     Priority: High     x  DX V65.8 REPLACED WITH [facility] (2013)       New onset atrial fibrillation (H) 2020     Priority: Medium     Type 2 diabetes mellitus without complication, with long-term current use of insulin (H) 10/31/2016     Priority: Medium     Chronic systolic congestive heart failure (H) 2016     Priority: Medium     Morbid obesity due to excess calories (H) 2016     Priority: Medium     Long-term (current) use of anticoagulants [Z79.01] 2016     Priority: Medium     Primary cardiomyopathy (H) 2014     Priority: Medium     Problem  list name updated by automated process. Provider to review       Tachycardia 06/17/2014     Priority: Medium     Atrioventricular block, complete (AV) 06/17/2014     Priority: Medium     S/p BIV PPM 4/2010-Medtronic InSync III 8042       Subclinical hyperthyroidism 06/26/2012     Priority: Medium     HYPERLIPIDEMIA LDL GOAL <100 10/31/2010     Priority: Medium     Chronic atrial fibrillation 09/08/2009     Priority: Medium     Essential hypertension, benign      Priority: Medium     Open wound of foot, excluding toe(s)      Priority: Medium     Problem list name updated by automated process. Provider to review        Past Medical History:   Diagnosis Date     Atrial fibrillation (H)     s/p AVN ablation, BIV PPM     Congestive heart failure (H)     tachycardia-induced cardiomyopathy     Essential hypertension, benign      Hyperlipidemia LDL goal <100 10/31/2010     Hyponatremia 9/8/2009     Morbid obesity (H)      Open wound of foot except toe(s) alone, without mention of complication      Retinitis pigmentosa      Type 2 diabetes, HbA1C goal < 8% (H) 6/26/2012     Past Surgical History:   Procedure Laterality Date     C NONSPECIFIC PROCEDURE  06/2007    debritement     CARDIOVERSION  9/2009, 10/2009     COLONOSCOPY N/A 5/9/2019    Procedure: COLONOSCOPY;  Surgeon: Godwin Santillan MD;  Location: [facility]     H ABLATION AV NODE  4/8/10     IMPLANT PACEMAKER  4/8/10    BIV PPM- Medtronic InSync III     TONSILLECTOMY      as kid     Current Outpatient Medications   Medication Sig Dispense Refill     alcohol swab prep pads Use to swab area of injection/lily as directed. 100 each 3     amLODIPine (NORVASC) 10 MG tablet TAKE 1 TABLET(10 MG) BY MOUTH DAILY 90 tablet 0     blood glucose (NO BRAND SPECIFIED) test strip Use to test blood sugar 3 times daily or as directed. To accompany: ACCU-CHEK GUIDE  strip 6     blood glucose (ONETOUCH VERIO IQ) test strip Use to test blood sugars 3 times daily or as directed. 100  strip 3     blood glucose calibration (NO BRAND SPECIFIED) solution To accompany: ACCU-CHEK GUIDE ME 1 Bottle 3     blood glucose monitoring (NO BRAND SPECIFIED) meter device kit Use to test blood sugar 3 times daily or as directed. Preferred blood glucose meter OR supplies to accompany: ACCU-CHEK GUIDE ME 1 kit 0     fish oil-omega-3 fatty acids 1000 MG capsule Take 4 capsules by mouth daily.       insulin aspart (NOVOLOG FLEXPEN) 100 UNIT/ML pen 1 unit per 7g of carbs three times daily before meals; Max daily dose of 60 30 mL 3     insulin glargine (LANTUS PEN) 100 UNIT/ML pen Inject 60 Units Subcutaneous At Bedtime 60 mL 1     insulin pen needle (B-D U/F) 31G X 5 MM miscellaneous Use 1 daily or as directed. 100 each 3     losartan (COZAAR) 100 MG tablet Take 1 tablet (100 mg) by mouth daily 90 tablet 2     metFORMIN (GLUCOPHAGE) 1000 MG tablet TAKE 1 TABLET(1000 MG) BY MOUTH TWICE DAILY WITH MEALS 180 tablet 3     metoprolol succinate ER (TOPROL-XL) 100 MG 24 hr tablet Take 1 tablet (100 mg) by mouth daily 90 tablet 0     MULTIVITAMIN OR 1 qd       NITROSTAT 0.4 MG SL SUBL 1 TAB EVERY 5 MIN AS NEEDED, UP TO 3 PER EPISODE       simvastatin (ZOCOR) 80 MG tablet TAKE ONE-HALF TABLET( 40 MG) BY MOUTH AT BEDTIME 90 tablet 0     thin (NO BRAND SPECIFIED) lancets Use with lanceting device. To accompany: Blood Glucose Monitor Brands: ACCU-CHEK GUIDE  each 6     warfarin ANTICOAGULANT (COUMADIN) 7.5 MG tablet TAKE 1 TABLET BY MOUTH DAILY EXCEPT ONE-HALF TABLET ON TUESDAY AS DIRECTED BY THE ANTICOAGULATION CLINIC 84 tablet 0     OTC products: None, except as noted above    No Known Allergies   Latex Allergy: NO    Social History     Tobacco Use     Smoking status: Never Smoker     Smokeless tobacco: Never Used   Substance Use Topics     Alcohol use: No     History   Drug Use No       REVIEW OF SYSTEMS:   CONSTITUTIONAL: NEGATIVE for fever, chills, change in weight  ENT/MOUTH: NEGATIVE for ear, mouth and throat  "problems  RESP: NEGATIVE for significant cough or SOB  CV: NEGATIVE for chest pain, palpitations or peripheral edema  GI: NEGATIVE for nausea, abdominal pain, heartburn, or change in bowel habits  : NEGATIVE for frequency, dysuria, or hematuria  MUSCULOSKELETAL: NEGATIVE for significant arthralgias or myalgia  NEURO: NEGATIVE for weakness, dizziness or paresthesias  ENDOCRINE: NEGATIVE for temperature intolerance, skin/hair changes  HEME: NEGATIVE for bleeding problems  PSYCHIATRIC: NEGATIVE for changes in mood or affect    EXAM:   /72   Pulse 76   Temp 97.5  F (36.4  C) (Oral)   Resp 15   Ht 1.854 m (6' 1\")   Wt 148.8 kg (328 lb)   SpO2 98%   BMI 43.27 kg/m      GENERAL APPEARANCE: healthy, alert and no distress     EYES: +cataract, + decreased VA     HENT: ear canals and TM's normal and nose and mouth without ulcers or lesions     NECK: no adenopathy, no asymmetry, masses, or scars and thyroid normal to palpation     RESP: lungs clear to auscultation - no rales, rhonchi or wheezes     CV: regular rates and rhythm, normal S1 S2, no S3 or S4 and no click or rub     ABDOMEN:  Obese, soft, nontender, no HSM or masses and bowel sounds normal     MS: extremities normal- no gross deformities noted     NEURO: No focal changes     PSYCH: mentation appears normal and affect normal/bright    DIAGNOSTICS:   No further labs or EKG required for low risk surgery (cataract, skin procedure, breast biopsy, etc)    Recent Labs   Lab Test 02/20/20 02/03/20  1308 01/02/20  10/22/19  0803  11/29/18  1120   HGB  --  14.1  --   --   --   --  13.2*   PLT  --  276  --   --   --   --  290   INR 2.6*  --  2.3*   < >  --    < >  --    NA  --   --   --   --  135  --  137   POTASSIUM  --  4.2  --   --  4.0  --  4.0   CR  --   --   --   --  1.09  --  1.01   A1C  --  6.2*  --   --  6.1*   < >  --     < > = values in this interval not displayed.     IMPRESSION:   Reason for surgery/procedure: visual change    The proposed surgical " procedure is considered LOW risk for this procedure.    REVISED CARDIAC RISK INDEX  The patient has the following serious cardiovascular risks for perioperative complications such as (MI, PE, VFib and 3  AV Block):  Diabetes Mellitus  INTERPRETATION: 1 risks: Class II (low risk - 0.9% complication rate) for this procedure    The patient has the following additional risks for perioperative complications:  No identified additional risks      ICD-10-CM    1. Preop general physical exam  Z01.818    2. Senile cataract, unspecified age-related cataract type, unspecified laterality  H25.9    3. Type 2 diabetes mellitus without complication, with long-term current use of insulin (H)  E11.9 metFORMIN (GLUCOPHAGE) 1000 MG tablet    Z79.4    4. Chronic atrial fibrillation  I48.20    5. Primary cardiomyopathy (H)  I42.8    6. Morbid obesity due to excess calories (H)  E66.01    7. Essential hypertension, benign  I10    8. Subclinical hyperthyroidism  E05.90 TSH with free T4 reflex   9. BENIGN HYPERTENSION  I10 amLODIPine (NORVASC) 10 MG tablet       RECOMMENDATIONS:       Cardiovascular Risk  Performs 4 METs exercise without symptoms (Light housework (dusting, washing dishes), Climb a flight of stairs and Walk on level ground at 15 minutes per mile (4 miles/hour)) .     --Patient is to take all scheduled medications on the day of surgery EXCEPT for modifications listed below.    Advised patient that if he is told to be NPO prior to his procedure that he should hold his diabetic medications accordingly    APPROVAL GIVEN to proceed with proposed procedure, without further diagnostic evaluation     Signed Electronically by: David Almendarez MD    Copy of this evaluation report is provided to requesting physician, Dr. Thaddeus Yancey Preop Guidelines    Revised Cardiac Risk Index

## 2020-03-31 ENCOUNTER — ANTICOAGULATION THERAPY VISIT (OUTPATIENT)
Dept: ANTICOAGULATION | Facility: CLINIC | Age: 60
End: 2020-03-31

## 2020-03-31 DIAGNOSIS — Z79.01 LONG TERM CURRENT USE OF ANTICOAGULANT THERAPY: ICD-10-CM

## 2020-03-31 DIAGNOSIS — I48.20 CHRONIC ATRIAL FIBRILLATION (H): Primary | ICD-10-CM

## 2020-03-31 DIAGNOSIS — Z79.01 LONG TERM (CURRENT) USE OF ANTICOAGULANTS: ICD-10-CM

## 2020-03-31 DIAGNOSIS — I48.20 CHRONIC ATRIAL FIBRILLATION (H): ICD-10-CM

## 2020-03-31 LAB
CAPILLARY BLOOD COLLECTION: NORMAL
INR PPP: 3.1 (ref 0.86–1.14)
INR PPP: NORMAL (ref 0.86–1.14)

## 2020-03-31 PROCEDURE — 85610 PROTHROMBIN TIME: CPT | Performed by: INTERNAL MEDICINE

## 2020-03-31 PROCEDURE — 36416 COLLJ CAPILLARY BLOOD SPEC: CPT | Performed by: INTERNAL MEDICINE

## 2020-03-31 NOTE — PROGRESS NOTES
INR 3.1, no changed in health, Pt will take 3.75mg x 2 days, then continue 7.5mg al days except 3.75mg Tues. INR check 6 weeks, Sarah Billingsley RN

## 2020-05-03 ENCOUNTER — ANCILLARY PROCEDURE (OUTPATIENT)
Dept: CARDIOLOGY | Facility: CLINIC | Age: 60
End: 2020-05-03
Attending: INTERNAL MEDICINE
Payer: COMMERCIAL

## 2020-05-03 DIAGNOSIS — Z95.0 BIVENTRICULAR CARDIAC PACEMAKER IN SITU: ICD-10-CM

## 2020-05-03 DIAGNOSIS — I49.5 SINUS NODE DYSFUNCTION (H): ICD-10-CM

## 2020-05-03 PROCEDURE — 93296 REM INTERROG EVL PM/IDS: CPT | Performed by: INTERNAL MEDICINE

## 2020-05-03 PROCEDURE — 93294 REM INTERROG EVL PM/LDLS PM: CPT | Performed by: INTERNAL MEDICINE

## 2020-05-04 DIAGNOSIS — I44.2 ATRIOVENTRICULAR BLOCK, COMPLETE (H): Primary | ICD-10-CM

## 2020-05-11 DIAGNOSIS — I10 ESSENTIAL HYPERTENSION, BENIGN: ICD-10-CM

## 2020-05-11 RX ORDER — LOSARTAN POTASSIUM 100 MG/1
100 TABLET ORAL DAILY
Qty: 90 TABLET | Refills: 2 | Status: SHIPPED | OUTPATIENT
Start: 2020-05-11 | End: 2020-08-18

## 2020-05-11 RX ORDER — AMLODIPINE BESYLATE 10 MG/1
TABLET ORAL
Qty: 90 TABLET | Refills: 2 | Status: SHIPPED | OUTPATIENT
Start: 2020-05-11 | End: 2021-01-22

## 2020-05-12 ENCOUNTER — ANTICOAGULATION THERAPY VISIT (OUTPATIENT)
Dept: ANTICOAGULATION | Facility: CLINIC | Age: 60
End: 2020-05-12

## 2020-05-12 DIAGNOSIS — Z79.01 LONG TERM CURRENT USE OF ANTICOAGULANT THERAPY: ICD-10-CM

## 2020-05-12 DIAGNOSIS — E05.90 SUBCLINICAL HYPERTHYROIDISM: ICD-10-CM

## 2020-05-12 DIAGNOSIS — I48.91 NEW ONSET ATRIAL FIBRILLATION (H): ICD-10-CM

## 2020-05-12 DIAGNOSIS — I48.20 CHRONIC ATRIAL FIBRILLATION (H): ICD-10-CM

## 2020-05-12 LAB
INR PPP: 3.4 (ref 0.86–1.14)
MDC_IDC_EPISODE_DTM: NORMAL
MDC_IDC_EPISODE_DURATION: 4 S
MDC_IDC_EPISODE_DURATION: 5 S
MDC_IDC_EPISODE_DURATION: 6 S
MDC_IDC_EPISODE_ID: 2
MDC_IDC_EPISODE_ID: 3
MDC_IDC_EPISODE_ID: 4
MDC_IDC_EPISODE_TYPE: NORMAL
MDC_IDC_LEAD_IMPLANT_DT: NORMAL
MDC_IDC_LEAD_IMPLANT_DT: NORMAL
MDC_IDC_LEAD_LOCATION: NORMAL
MDC_IDC_LEAD_LOCATION: NORMAL
MDC_IDC_LEAD_LOCATION_DETAIL_1: NORMAL
MDC_IDC_LEAD_LOCATION_DETAIL_1: NORMAL
MDC_IDC_LEAD_MFG: NORMAL
MDC_IDC_LEAD_MFG: NORMAL
MDC_IDC_LEAD_MODEL: NORMAL
MDC_IDC_LEAD_MODEL: NORMAL
MDC_IDC_LEAD_POLARITY_TYPE: NORMAL
MDC_IDC_LEAD_POLARITY_TYPE: NORMAL
MDC_IDC_LEAD_SERIAL: NORMAL
MDC_IDC_LEAD_SERIAL: NORMAL
MDC_IDC_MSMT_BATTERY_DTM: NORMAL
MDC_IDC_MSMT_BATTERY_REMAINING_LONGEVITY: 112 MO
MDC_IDC_MSMT_BATTERY_RRT_TRIGGER: 2.6
MDC_IDC_MSMT_BATTERY_STATUS: NORMAL
MDC_IDC_MSMT_BATTERY_VOLTAGE: 3.02 V
MDC_IDC_MSMT_LEADCHNL_LV_IMPEDANCE_VALUE: 456 OHM
MDC_IDC_MSMT_LEADCHNL_LV_IMPEDANCE_VALUE: 532 OHM
MDC_IDC_MSMT_LEADCHNL_LV_IMPEDANCE_VALUE: 627 OHM
MDC_IDC_MSMT_LEADCHNL_LV_IMPEDANCE_VALUE: 703 OHM
MDC_IDC_MSMT_LEADCHNL_LV_IMPEDANCE_VALUE: 931 OHM
MDC_IDC_MSMT_LEADCHNL_RA_IMPEDANCE_VALUE: 3401 OHM
MDC_IDC_MSMT_LEADCHNL_RA_IMPEDANCE_VALUE: 3401 OHM
MDC_IDC_MSMT_LEADCHNL_RV_IMPEDANCE_VALUE: 342 OHM
MDC_IDC_MSMT_LEADCHNL_RV_IMPEDANCE_VALUE: 380 OHM
MDC_IDC_MSMT_LEADCHNL_RV_PACING_THRESHOLD_AMPLITUDE: 0.75 V
MDC_IDC_MSMT_LEADCHNL_RV_PACING_THRESHOLD_PULSEWIDTH: 0.4 MS
MDC_IDC_MSMT_LEADCHNL_RV_SENSING_INTR_AMPL: 11.75 MV
MDC_IDC_PG_IMPLANT_DTM: NORMAL
MDC_IDC_PG_MFG: NORMAL
MDC_IDC_PG_MODEL: NORMAL
MDC_IDC_PG_SERIAL: NORMAL
MDC_IDC_PG_TYPE: NORMAL
MDC_IDC_SESS_CLINIC_NAME: NORMAL
MDC_IDC_SESS_DTM: NORMAL
MDC_IDC_SESS_TYPE: NORMAL
MDC_IDC_SET_BRADY_LOWRATE: 70 {BEATS}/MIN
MDC_IDC_SET_BRADY_MAX_SENSOR_RATE: 130 {BEATS}/MIN
MDC_IDC_SET_BRADY_MODE: NORMAL
MDC_IDC_SET_CRT_LVRV_DELAY: 0 MS
MDC_IDC_SET_CRT_PACED_CHAMBERS: NORMAL
MDC_IDC_SET_LEADCHNL_LV_PACING_AMPLITUDE: 2 V
MDC_IDC_SET_LEADCHNL_LV_PACING_ANODE_ELECTRODE_1: NORMAL
MDC_IDC_SET_LEADCHNL_LV_PACING_ANODE_LOCATION_1: NORMAL
MDC_IDC_SET_LEADCHNL_LV_PACING_CAPTURE_MODE: NORMAL
MDC_IDC_SET_LEADCHNL_LV_PACING_CATHODE_ELECTRODE_1: NORMAL
MDC_IDC_SET_LEADCHNL_LV_PACING_CATHODE_LOCATION_1: NORMAL
MDC_IDC_SET_LEADCHNL_LV_PACING_POLARITY: NORMAL
MDC_IDC_SET_LEADCHNL_LV_PACING_PULSEWIDTH: 0.5 MS
MDC_IDC_SET_LEADCHNL_RA_SENSING_ANODE_ELECTRODE_1: NORMAL
MDC_IDC_SET_LEADCHNL_RA_SENSING_ANODE_LOCATION_1: NORMAL
MDC_IDC_SET_LEADCHNL_RA_SENSING_CATHODE_ELECTRODE_1: NORMAL
MDC_IDC_SET_LEADCHNL_RA_SENSING_CATHODE_LOCATION_1: NORMAL
MDC_IDC_SET_LEADCHNL_RA_SENSING_POLARITY: NORMAL
MDC_IDC_SET_LEADCHNL_RA_SENSING_SENSITIVITY: NORMAL
MDC_IDC_SET_LEADCHNL_RV_PACING_AMPLITUDE: 2 V
MDC_IDC_SET_LEADCHNL_RV_PACING_ANODE_ELECTRODE_1: NORMAL
MDC_IDC_SET_LEADCHNL_RV_PACING_ANODE_LOCATION_1: NORMAL
MDC_IDC_SET_LEADCHNL_RV_PACING_CAPTURE_MODE: NORMAL
MDC_IDC_SET_LEADCHNL_RV_PACING_CATHODE_ELECTRODE_1: NORMAL
MDC_IDC_SET_LEADCHNL_RV_PACING_CATHODE_LOCATION_1: NORMAL
MDC_IDC_SET_LEADCHNL_RV_PACING_POLARITY: NORMAL
MDC_IDC_SET_LEADCHNL_RV_PACING_PULSEWIDTH: 0.4 MS
MDC_IDC_SET_LEADCHNL_RV_SENSING_ANODE_ELECTRODE_1: NORMAL
MDC_IDC_SET_LEADCHNL_RV_SENSING_ANODE_LOCATION_1: NORMAL
MDC_IDC_SET_LEADCHNL_RV_SENSING_CATHODE_ELECTRODE_1: NORMAL
MDC_IDC_SET_LEADCHNL_RV_SENSING_CATHODE_LOCATION_1: NORMAL
MDC_IDC_SET_LEADCHNL_RV_SENSING_POLARITY: NORMAL
MDC_IDC_SET_LEADCHNL_RV_SENSING_SENSITIVITY: 0.9 MV
MDC_IDC_SET_ZONE_DETECTION_INTERVAL: 400 MS
MDC_IDC_SET_ZONE_TYPE: NORMAL
MDC_IDC_STAT_BRADY_AP_VP_PERCENT: 0 %
MDC_IDC_STAT_BRADY_AP_VS_PERCENT: 0 %
MDC_IDC_STAT_BRADY_AS_VP_PERCENT: 99.42 %
MDC_IDC_STAT_BRADY_AS_VS_PERCENT: 0.58 %
MDC_IDC_STAT_BRADY_DTM_END: NORMAL
MDC_IDC_STAT_BRADY_DTM_START: NORMAL
MDC_IDC_STAT_BRADY_RA_PERCENT_PACED: 0 %
MDC_IDC_STAT_BRADY_RV_PERCENT_PACED: 99.42 %
MDC_IDC_STAT_CRT_DTM_END: NORMAL
MDC_IDC_STAT_CRT_DTM_START: NORMAL
MDC_IDC_STAT_CRT_LV_PERCENT_PACED: 99.39 %
MDC_IDC_STAT_CRT_PERCENT_PACED: 99.39 %
MDC_IDC_STAT_EPISODE_RECENT_COUNT: 0
MDC_IDC_STAT_EPISODE_RECENT_COUNT_DTM_END: NORMAL
MDC_IDC_STAT_EPISODE_RECENT_COUNT_DTM_START: NORMAL
MDC_IDC_STAT_EPISODE_TOTAL_COUNT: 0
MDC_IDC_STAT_EPISODE_TOTAL_COUNT: 0
MDC_IDC_STAT_EPISODE_TOTAL_COUNT: 1
MDC_IDC_STAT_EPISODE_TOTAL_COUNT_DTM_END: NORMAL
MDC_IDC_STAT_EPISODE_TOTAL_COUNT_DTM_START: NORMAL
MDC_IDC_STAT_EPISODE_TYPE: NORMAL
T4 FREE SERPL-MCNC: 1.4 NG/DL (ref 0.76–1.46)
TSH SERPL DL<=0.005 MIU/L-ACNC: 0.31 MU/L (ref 0.4–4)

## 2020-05-12 PROCEDURE — 84443 ASSAY THYROID STIM HORMONE: CPT | Performed by: INTERNAL MEDICINE

## 2020-05-12 PROCEDURE — 84439 ASSAY OF FREE THYROXINE: CPT | Performed by: INTERNAL MEDICINE

## 2020-05-12 PROCEDURE — 85610 PROTHROMBIN TIME: CPT | Performed by: INTERNAL MEDICINE

## 2020-05-12 PROCEDURE — 36415 COLL VENOUS BLD VENIPUNCTURE: CPT | Performed by: INTERNAL MEDICINE

## 2020-05-12 NOTE — PROGRESS NOTES
Last 2 readings have been supra therapeutic, will decrease maintenance dose ~ 8% and recheck INR in 3 weeks.    Irma Villeda RN  [facility]

## 2020-05-12 NOTE — LETTER
[facility]  [address] [place] [address]  [phone]      5/12/2020       Mckay Hsu  [address] [facility] [address] [place] [id number]        Dear Mckay,    Your thyroid function tests are still just slightly abnormal and fall in that in between range and indicate we need to keep a close eye on this.  I would recommend we repeat the labs again in 3 months and at that point make a decision whether or not we need to pursue any further evaluation if they remain slightly abnormal.  I will place orders in the computer for you for your lab appointment at that time.    Sincerely,      David Almendarez MD  Internal Medicine

## 2020-05-31 DIAGNOSIS — I48.91 NEW ONSET ATRIAL FIBRILLATION (H): ICD-10-CM

## 2020-06-01 RX ORDER — WARFARIN SODIUM 7.5 MG/1
TABLET ORAL
Qty: 84 TABLET | Refills: 1 | Status: SHIPPED | OUTPATIENT
Start: 2020-06-01 | End: 2020-11-17

## 2020-06-02 ENCOUNTER — ANTICOAGULATION THERAPY VISIT (OUTPATIENT)
Dept: NURSING | Facility: CLINIC | Age: 60
End: 2020-06-02
Payer: COMMERCIAL

## 2020-06-02 DIAGNOSIS — I48.20 CHRONIC ATRIAL FIBRILLATION (H): ICD-10-CM

## 2020-06-02 DIAGNOSIS — Z79.01 LONG TERM CURRENT USE OF ANTICOAGULANT THERAPY: ICD-10-CM

## 2020-06-02 DIAGNOSIS — I10 ESSENTIAL HYPERTENSION, BENIGN: ICD-10-CM

## 2020-06-02 DIAGNOSIS — I48.91 NEW ONSET ATRIAL FIBRILLATION (H): ICD-10-CM

## 2020-06-02 LAB
CAPILLARY BLOOD COLLECTION: NORMAL
INR PPP: 2.2 (ref 0.86–1.14)

## 2020-06-02 PROCEDURE — 36416 COLLJ CAPILLARY BLOOD SPEC: CPT | Performed by: INTERNAL MEDICINE

## 2020-06-02 PROCEDURE — 99207 ZZC NO CHARGE NURSE ONLY: CPT

## 2020-06-02 PROCEDURE — 85610 PROTHROMBIN TIME: CPT | Performed by: INTERNAL MEDICINE

## 2020-06-02 RX ORDER — METOPROLOL SUCCINATE 100 MG/1
100 TABLET, EXTENDED RELEASE ORAL DAILY
Qty: 90 TABLET | Refills: 0 | Status: SHIPPED | OUTPATIENT
Start: 2020-06-02 | End: 2020-07-09

## 2020-06-02 NOTE — PROGRESS NOTES
ANTICOAGULATION FOLLOW-UP CLINIC VISIT    Patient Name:  Mckay Hsu  Date:  2020  Contact Type:  Telephone    SUBJECTIVE:  Patient Findings     Comments:   The patient was assessed for diet, medication, and activity level changes, missed or extra doses, bruising or bleeding, with no problem findings.          Clinical Outcomes     Comments:   The patient was assessed for diet, medication, and activity level changes, missed or extra doses, bruising or bleeding, with no problem findings.             OBJECTIVE    Recent labs: (last 7 days)     20  1339   INR 2.20*       ASSESSMENT / PLAN  INR assessment THER    Recheck INR In: 4 WEEKS    INR Location Clinic      Anticoagulation Summary  As of 2020    INR goal:   2.0-3.0   TTR:   84.4 % (1 y)   INR used for dosin.20 (2020)   Warfarin maintenance plan:   3.75 mg (7.5 mg x 0.5) every Tue, Fri; 7.5 mg (7.5 mg x 1) all other days   Full warfarin instructions:   3.75 mg every Tue, Fri; 7.5 mg all other days   Weekly warfarin total:   45 mg   Plan last modified:   Irma Villeda RN (2020)   Next INR check:   2020   Target end date:   Indefinite    Indications    Long-term (current) use of anticoagulants [Z79.01] [Z79.01]  Chronic atrial fibrillation [I48.20]  New onset atrial fibrillation (H) [I48.91]             Anticoagulation Episode Summary     INR check location:       Preferred lab:       Send INR reminders to:   [facility]    Comments:         Anticoagulation Care Providers     Provider Role Specialty Phone number    David Almendarez MD Referring Internal Medicine [phone]            See the Encounter Report to view Anticoagulation Flowsheet and Dosing Calendar (Go to Encounters tab in chart review, and find the Anticoagulation Therapy Visit)        Collette Dunbar RN

## 2020-06-09 ENCOUNTER — DOCUMENTATION ONLY (OUTPATIENT)
Dept: INTERNAL MEDICINE | Facility: CLINIC | Age: 60
End: 2020-06-09

## 2020-06-25 DIAGNOSIS — E78.5 HYPERLIPIDEMIA LDL GOAL <100: ICD-10-CM

## 2020-06-25 RX ORDER — SIMVASTATIN 80 MG
TABLET ORAL
Qty: 90 TABLET | Refills: 2 | Status: SHIPPED | OUTPATIENT
Start: 2020-06-25 | End: 2020-07-09

## 2020-06-25 NOTE — TELEPHONE ENCOUNTER
Reason for Call:  Medication or medication refill:    Do you use a [facility]?  Name of the pharmacy and phone number for the current request:    [facility] - [place], [place] - [address]      Name of the medication requested: simvastatin (ZOCOR) 80 MG tablet       Other request: The patient called and stated that he needs this medication filled. He stated that he is very close to being out so he will need this filled as soon as possible.     Can we leave a detailed message on this number? YES    Phone number patient can be reached at: Home number on file [phone] (home)    Best Time: Any    Call taken on 6/25/2020 at 9:05 AM by Marietta Grider

## 2020-07-07 ENCOUNTER — ANTICOAGULATION THERAPY VISIT (OUTPATIENT)
Dept: INTERNAL MEDICINE | Facility: CLINIC | Age: 60
End: 2020-07-07

## 2020-07-07 DIAGNOSIS — I48.20 CHRONIC ATRIAL FIBRILLATION (H): ICD-10-CM

## 2020-07-07 DIAGNOSIS — Z79.01 LONG TERM CURRENT USE OF ANTICOAGULANT THERAPY: ICD-10-CM

## 2020-07-07 DIAGNOSIS — I48.91 NEW ONSET ATRIAL FIBRILLATION (H): ICD-10-CM

## 2020-07-07 LAB
CAPILLARY BLOOD COLLECTION: NORMAL
INR PPP: 2.7 (ref 0.86–1.14)

## 2020-07-07 PROCEDURE — 85610 PROTHROMBIN TIME: CPT | Performed by: INTERNAL MEDICINE

## 2020-07-07 PROCEDURE — 99207 ZZC NO CHARGE NURSE ONLY: CPT | Performed by: INTERNAL MEDICINE

## 2020-07-07 PROCEDURE — 36416 COLLJ CAPILLARY BLOOD SPEC: CPT | Performed by: INTERNAL MEDICINE

## 2020-07-07 NOTE — PROGRESS NOTES
ANTICOAGULATION FOLLOW-UP CLINIC VISIT    Patient Name:  Mckay Hsu  Date:  2020  Contact Type:  Telephone    SUBJECTIVE:  Patient Findings     Comments:   The patient was assessed for diet, medication, and activity level changes, missed or extra doses, bruising or bleeding, with no problem findings.          Clinical Outcomes     Negatives:   Major bleeding event, Thromboembolic event, Anticoagulation-related hospital admission, Anticoagulation-related ED visit, Anticoagulation-related fatality    Comments:   The patient was assessed for diet, medication, and activity level changes, missed or extra doses, bruising or bleeding, with no problem findings.             OBJECTIVE    Recent labs: (last 7 days)     20  1351   INR 2.70*       ASSESSMENT / PLAN  No question data found.  Anticoagulation Summary  As of 2020    INR goal:   2.0-3.0   TTR:   84.4 % (1 y)   INR used for dosin.70 (2020)   Warfarin maintenance plan:   3.75 mg (7.5 mg x 0.5) every Tue, Fri; 7.5 mg (7.5 mg x 1) all other days   Full warfarin instructions:   3.75 mg every Tue, Fri; 7.5 mg all other days   Weekly warfarin total:   45 mg   No change documented:   Divya Greene, RN   Plan last modified:   Irma Villeda RN (2020)   Next INR check:   2020   Priority:   Maintenance   Target end date:   Indefinite    Indications    Long-term (current) use of anticoagulants [Z79.01] [Z79.01]  Chronic atrial fibrillation (H) [I48.20]  New onset atrial fibrillation (H) [I48.91]             Anticoagulation Episode Summary     INR check location:       Preferred lab:       Send INR reminders to:   [facility]    Comments:         Anticoagulation Care Providers     Provider Role Specialty Phone number    David Almendarez MD Referring Internal Medicine [phone]            See the Encounter Report to view Anticoagulation Flowsheet and Dosing Calendar (Go to Encounters tab in chart review, and find  the Anticoagulation Therapy Visit)        Divya Greene RN

## 2020-07-08 DIAGNOSIS — E78.5 HYPERLIPIDEMIA LDL GOAL <100: ICD-10-CM

## 2020-07-08 DIAGNOSIS — I10 ESSENTIAL HYPERTENSION, BENIGN: ICD-10-CM

## 2020-07-09 RX ORDER — METOPROLOL SUCCINATE 100 MG/1
100 TABLET, EXTENDED RELEASE ORAL DAILY
Qty: 90 TABLET | Refills: 0 | Status: SHIPPED | OUTPATIENT
Start: 2020-07-09 | End: 2020-10-28

## 2020-07-09 RX ORDER — SIMVASTATIN 80 MG
TABLET ORAL
Qty: 90 TABLET | Refills: 2 | Status: SHIPPED | OUTPATIENT
Start: 2020-07-09 | End: 2021-07-14

## 2020-07-09 NOTE — TELEPHONE ENCOUNTER
Prescription approved per [facility] Refill Protocol.    Micaela LOTTN, RN, PHN

## 2020-07-24 DIAGNOSIS — E11.9 TYPE 2 DIABETES MELLITUS WITHOUT COMPLICATION, WITH LONG-TERM CURRENT USE OF INSULIN (H): ICD-10-CM

## 2020-07-24 DIAGNOSIS — Z79.4 TYPE 2 DIABETES MELLITUS WITHOUT COMPLICATION, WITH LONG-TERM CURRENT USE OF INSULIN (H): ICD-10-CM

## 2020-07-27 DIAGNOSIS — E11.9 TYPE 2 DIABETES MELLITUS WITHOUT COMPLICATION, WITH LONG-TERM CURRENT USE OF INSULIN (H): ICD-10-CM

## 2020-07-27 DIAGNOSIS — Z79.4 TYPE 2 DIABETES MELLITUS WITHOUT COMPLICATION, WITH LONG-TERM CURRENT USE OF INSULIN (H): ICD-10-CM

## 2020-08-03 ENCOUNTER — ANCILLARY PROCEDURE (OUTPATIENT)
Dept: CARDIOLOGY | Facility: CLINIC | Age: 60
End: 2020-08-03
Attending: INTERNAL MEDICINE
Payer: COMMERCIAL

## 2020-08-03 DIAGNOSIS — I44.2 ATRIOVENTRICULAR BLOCK, COMPLETE (H): ICD-10-CM

## 2020-08-03 PROCEDURE — 93296 REM INTERROG EVL PM/IDS: CPT | Performed by: INTERNAL MEDICINE

## 2020-08-03 PROCEDURE — 93294 REM INTERROG EVL PM/LDLS PM: CPT | Performed by: INTERNAL MEDICINE

## 2020-08-05 LAB
MDC_IDC_EPISODE_DTM: NORMAL
MDC_IDC_EPISODE_DTM: NORMAL
MDC_IDC_EPISODE_DURATION: 1 S
MDC_IDC_EPISODE_DURATION: 10 S
MDC_IDC_EPISODE_ID: 2
MDC_IDC_EPISODE_ID: 5
MDC_IDC_EPISODE_TYPE: NORMAL
MDC_IDC_EPISODE_TYPE: NORMAL
MDC_IDC_LEAD_IMPLANT_DT: NORMAL
MDC_IDC_LEAD_IMPLANT_DT: NORMAL
MDC_IDC_LEAD_LOCATION: NORMAL
MDC_IDC_LEAD_LOCATION: NORMAL
MDC_IDC_LEAD_LOCATION_DETAIL_1: NORMAL
MDC_IDC_LEAD_LOCATION_DETAIL_1: NORMAL
MDC_IDC_LEAD_MFG: NORMAL
MDC_IDC_LEAD_MFG: NORMAL
MDC_IDC_LEAD_MODEL: NORMAL
MDC_IDC_LEAD_MODEL: NORMAL
MDC_IDC_LEAD_POLARITY_TYPE: NORMAL
MDC_IDC_LEAD_POLARITY_TYPE: NORMAL
MDC_IDC_LEAD_SERIAL: NORMAL
MDC_IDC_LEAD_SERIAL: NORMAL
MDC_IDC_MSMT_BATTERY_DTM: NORMAL
MDC_IDC_MSMT_BATTERY_REMAINING_LONGEVITY: 110 MO
MDC_IDC_MSMT_BATTERY_RRT_TRIGGER: 2.6
MDC_IDC_MSMT_BATTERY_STATUS: NORMAL
MDC_IDC_MSMT_BATTERY_VOLTAGE: 3.02 V
MDC_IDC_MSMT_LEADCHNL_LV_IMPEDANCE_VALUE: 437 OHM
MDC_IDC_MSMT_LEADCHNL_LV_IMPEDANCE_VALUE: 532 OHM
MDC_IDC_MSMT_LEADCHNL_LV_IMPEDANCE_VALUE: 684 OHM
MDC_IDC_MSMT_LEADCHNL_LV_IMPEDANCE_VALUE: 741 OHM
MDC_IDC_MSMT_LEADCHNL_LV_IMPEDANCE_VALUE: 931 OHM
MDC_IDC_MSMT_LEADCHNL_RA_IMPEDANCE_VALUE: 3401 OHM
MDC_IDC_MSMT_LEADCHNL_RA_IMPEDANCE_VALUE: 3401 OHM
MDC_IDC_MSMT_LEADCHNL_RV_IMPEDANCE_VALUE: 342 OHM
MDC_IDC_MSMT_LEADCHNL_RV_IMPEDANCE_VALUE: 380 OHM
MDC_IDC_MSMT_LEADCHNL_RV_PACING_THRESHOLD_AMPLITUDE: 0.75 V
MDC_IDC_MSMT_LEADCHNL_RV_PACING_THRESHOLD_PULSEWIDTH: 0.4 MS
MDC_IDC_MSMT_LEADCHNL_RV_SENSING_INTR_AMPL: 11.75 MV
MDC_IDC_PG_IMPLANT_DTM: NORMAL
MDC_IDC_PG_MFG: NORMAL
MDC_IDC_PG_MODEL: NORMAL
MDC_IDC_PG_SERIAL: NORMAL
MDC_IDC_PG_TYPE: NORMAL
MDC_IDC_SESS_CLINIC_NAME: NORMAL
MDC_IDC_SESS_DTM: NORMAL
MDC_IDC_SESS_TYPE: NORMAL
MDC_IDC_SET_BRADY_LOWRATE: 70 {BEATS}/MIN
MDC_IDC_SET_BRADY_MAX_SENSOR_RATE: 130 {BEATS}/MIN
MDC_IDC_SET_BRADY_MODE: NORMAL
MDC_IDC_SET_CRT_LVRV_DELAY: 0 MS
MDC_IDC_SET_CRT_PACED_CHAMBERS: NORMAL
MDC_IDC_SET_LEADCHNL_LV_PACING_AMPLITUDE: 2 V
MDC_IDC_SET_LEADCHNL_LV_PACING_ANODE_ELECTRODE_1: NORMAL
MDC_IDC_SET_LEADCHNL_LV_PACING_ANODE_LOCATION_1: NORMAL
MDC_IDC_SET_LEADCHNL_LV_PACING_CAPTURE_MODE: NORMAL
MDC_IDC_SET_LEADCHNL_LV_PACING_CATHODE_ELECTRODE_1: NORMAL
MDC_IDC_SET_LEADCHNL_LV_PACING_CATHODE_LOCATION_1: NORMAL
MDC_IDC_SET_LEADCHNL_LV_PACING_POLARITY: NORMAL
MDC_IDC_SET_LEADCHNL_LV_PACING_PULSEWIDTH: 0.5 MS
MDC_IDC_SET_LEADCHNL_RA_SENSING_ANODE_ELECTRODE_1: NORMAL
MDC_IDC_SET_LEADCHNL_RA_SENSING_ANODE_LOCATION_1: NORMAL
MDC_IDC_SET_LEADCHNL_RA_SENSING_CATHODE_ELECTRODE_1: NORMAL
MDC_IDC_SET_LEADCHNL_RA_SENSING_CATHODE_LOCATION_1: NORMAL
MDC_IDC_SET_LEADCHNL_RA_SENSING_POLARITY: NORMAL
MDC_IDC_SET_LEADCHNL_RA_SENSING_SENSITIVITY: NORMAL
MDC_IDC_SET_LEADCHNL_RV_PACING_AMPLITUDE: 2 V
MDC_IDC_SET_LEADCHNL_RV_PACING_ANODE_ELECTRODE_1: NORMAL
MDC_IDC_SET_LEADCHNL_RV_PACING_ANODE_LOCATION_1: NORMAL
MDC_IDC_SET_LEADCHNL_RV_PACING_CAPTURE_MODE: NORMAL
MDC_IDC_SET_LEADCHNL_RV_PACING_CATHODE_ELECTRODE_1: NORMAL
MDC_IDC_SET_LEADCHNL_RV_PACING_CATHODE_LOCATION_1: NORMAL
MDC_IDC_SET_LEADCHNL_RV_PACING_POLARITY: NORMAL
MDC_IDC_SET_LEADCHNL_RV_PACING_PULSEWIDTH: 0.4 MS
MDC_IDC_SET_LEADCHNL_RV_SENSING_ANODE_ELECTRODE_1: NORMAL
MDC_IDC_SET_LEADCHNL_RV_SENSING_ANODE_LOCATION_1: NORMAL
MDC_IDC_SET_LEADCHNL_RV_SENSING_CATHODE_ELECTRODE_1: NORMAL
MDC_IDC_SET_LEADCHNL_RV_SENSING_CATHODE_LOCATION_1: NORMAL
MDC_IDC_SET_LEADCHNL_RV_SENSING_POLARITY: NORMAL
MDC_IDC_SET_LEADCHNL_RV_SENSING_SENSITIVITY: 0.9 MV
MDC_IDC_SET_ZONE_DETECTION_INTERVAL: 400 MS
MDC_IDC_SET_ZONE_TYPE: NORMAL
MDC_IDC_STAT_BRADY_AP_VP_PERCENT: 0 %
MDC_IDC_STAT_BRADY_AP_VS_PERCENT: 0 %
MDC_IDC_STAT_BRADY_AS_VP_PERCENT: 99.18 %
MDC_IDC_STAT_BRADY_AS_VS_PERCENT: 0.82 %
MDC_IDC_STAT_BRADY_DTM_END: NORMAL
MDC_IDC_STAT_BRADY_DTM_START: NORMAL
MDC_IDC_STAT_BRADY_RA_PERCENT_PACED: 0 %
MDC_IDC_STAT_BRADY_RV_PERCENT_PACED: 99.18 %
MDC_IDC_STAT_CRT_DTM_END: NORMAL
MDC_IDC_STAT_CRT_DTM_START: NORMAL
MDC_IDC_STAT_CRT_LV_PERCENT_PACED: 99.16 %
MDC_IDC_STAT_CRT_PERCENT_PACED: 99.16 %
MDC_IDC_STAT_EPISODE_RECENT_COUNT: 0
MDC_IDC_STAT_EPISODE_RECENT_COUNT: 0
MDC_IDC_STAT_EPISODE_RECENT_COUNT: 1
MDC_IDC_STAT_EPISODE_RECENT_COUNT_DTM_END: NORMAL
MDC_IDC_STAT_EPISODE_RECENT_COUNT_DTM_START: NORMAL
MDC_IDC_STAT_EPISODE_TOTAL_COUNT: 0
MDC_IDC_STAT_EPISODE_TOTAL_COUNT: 0
MDC_IDC_STAT_EPISODE_TOTAL_COUNT: 2
MDC_IDC_STAT_EPISODE_TOTAL_COUNT_DTM_END: NORMAL
MDC_IDC_STAT_EPISODE_TOTAL_COUNT_DTM_START: NORMAL
MDC_IDC_STAT_EPISODE_TYPE: NORMAL

## 2020-08-14 DIAGNOSIS — I10 ESSENTIAL HYPERTENSION, BENIGN: ICD-10-CM

## 2020-08-14 RX ORDER — LOSARTAN POTASSIUM 100 MG/1
TABLET ORAL
Qty: 90 TABLET | Refills: 2 | OUTPATIENT
Start: 2020-08-14

## 2020-08-17 DIAGNOSIS — I10 ESSENTIAL HYPERTENSION, BENIGN: ICD-10-CM

## 2020-08-17 NOTE — TELEPHONE ENCOUNTER
"Requested Prescriptions   Pending Prescriptions Disp Refills     losartan (COZAAR) 100 MG tablet 90 tablet 2     Sig: Take 1 tablet (100 mg) by mouth daily       There is no refill protocol information for this order      Refused Prescriptions Disp Refills     losartan (COZAAR) 100 MG tablet [Pharmacy Med Name: LOSARTAN 100MG TABLETS] 90 tablet 2     Sig: TAKE 1 TABLET BY MOUTH DAILY       Angiotensin-II Receptors Passed - 8/14/2020 11:06 AM        Passed - Last blood pressure under 140/90 in past 12 months     BP Readings from Last 3 Encounters:   03/09/20 124/72   02/03/20 128/72   06/21/19 135/85                 Passed - Recent (12 mo) or future (30 days) visit within the authorizing provider's specialty     Patient has had an office visit with the authorizing provider or a provider within the authorizing providers department within the previous 12 mos or has a future within next 30 days. See \"Patient Info\" tab in inbasket, or \"Choose Columns\" in Meds & Orders section of the refill encounter.              Passed - Medication is active on med list        Passed - Patient is age 18 or older        Passed - Normal serum creatinine on file in past 12 months     Recent Labs   Lab Test 10/22/19  0803   CR 1.09       Ok to refill medication if creatinine is low          Passed - Normal serum potassium on file in past 12 months     Recent Labs   Lab Test 02/03/20  1308   POTASSIUM 4.2                       "

## 2020-08-18 ENCOUNTER — ANTICOAGULATION THERAPY VISIT (OUTPATIENT)
Dept: ANTICOAGULATION | Facility: CLINIC | Age: 60
End: 2020-08-18

## 2020-08-18 DIAGNOSIS — Z79.01 LONG TERM CURRENT USE OF ANTICOAGULANT THERAPY: ICD-10-CM

## 2020-08-18 DIAGNOSIS — I48.20 CHRONIC ATRIAL FIBRILLATION (H): ICD-10-CM

## 2020-08-18 DIAGNOSIS — I48.91 NEW ONSET ATRIAL FIBRILLATION (H): ICD-10-CM

## 2020-08-18 LAB
CAPILLARY BLOOD COLLECTION: NORMAL
INR PPP: 2.6 (ref 0.86–1.14)

## 2020-08-18 PROCEDURE — 36416 COLLJ CAPILLARY BLOOD SPEC: CPT | Performed by: INTERNAL MEDICINE

## 2020-08-18 PROCEDURE — 85610 PROTHROMBIN TIME: CPT | Performed by: INTERNAL MEDICINE

## 2020-08-18 RX ORDER — LOSARTAN POTASSIUM 100 MG/1
TABLET ORAL
Qty: 90 TABLET | Refills: 2 | OUTPATIENT
Start: 2020-08-18

## 2020-08-18 RX ORDER — LOSARTAN POTASSIUM 100 MG/1
100 TABLET ORAL DAILY
Qty: 90 TABLET | Refills: 1 | Status: SHIPPED | OUTPATIENT
Start: 2020-08-18 | End: 2021-01-19

## 2020-08-18 NOTE — PROGRESS NOTES
ANTICOAGULATION MANAGEMENT     Patient Name:  Mckay Hsu  Date:  8/18/2020    ASSESSMENT /SUBJECTIVE:    Today's INR result of 2.6 is therapeutic. Goal INR of 2.0-3.0      Warfarin dose taken: Warfarin taken as previously instructed    Diet: No new diet changes affecting INR    Medication changes/ interactions: No new medications/supplements affecting INR    Previous INR: Therapeutic     S/S of bleeding or thromboembolism: No    New injury or illness: No    Upcoming surgery, procedure or cardioversion: No    Additional findings: None      PLAN:    Spoke with Mckay regarding INR result and instructed:     Warfarin Dosing Instructions: Continue your current warfarin dose 3.75mg Tu/Fri and 7.5 mg AOD    Instructed patient to follow up no later than: 6 weeks  Lab visit scheduled    Education provided: Monitoring for bleeding signs and symptoms and Monitoring for clotting signs and symptoms      Doyle verbalizes understanding and agrees to warfarin dosing plan.    Instructed to call the Anticoagulation Clinic for any changes, questions or concerns. (#[phone])        Irma Villeda RN      OBJECTIVE:  Recent labs: (last 7 days)     08/18/20  1352   INR 2.60*         INR assessment THER    Recheck INR In: 6 WEEKS    INR Location Clinic      Anticoagulation Summary  As of 8/18/2020    INR goal:   2.0-3.0   TTR:   84.4 % (1 y)   INR used for dosing:   No new INR was available at the time of this encounter.   Warfarin maintenance plan:   3.75 mg (7.5 mg x 0.5) every Tue, Fri; 7.5 mg (7.5 mg x 1) all other days   Full warfarin instructions:   3.75 mg every Tue, Fri; 7.5 mg all other days   Weekly warfarin total:   45 mg   No change documented:   Irma Villeda RN   Plan last modified:   Irma Villeda RN (5/12/2020)   Next INR check:   9/29/2020   Priority:   Maintenance   Target end date:   Indefinite    Indications    Long-term (current) use of anticoagulants [Z79.01] [Z79.01]  Chronic atrial  fibrillation (H) [I48.20]  New onset atrial fibrillation (H) [I48.91]             Anticoagulation Episode Summary     INR check location:       Preferred lab:       [facility]    Comments:         Anticoagulation Care Providers     Provider Role Specialty Phone number    David Almendarez MD Referring Internal Medicine [phone]

## 2020-09-29 ENCOUNTER — ANTICOAGULATION THERAPY VISIT (OUTPATIENT)
Dept: NURSING | Facility: CLINIC | Age: 60
End: 2020-09-29
Payer: COMMERCIAL

## 2020-09-29 DIAGNOSIS — I48.20 CHRONIC ATRIAL FIBRILLATION (H): ICD-10-CM

## 2020-09-29 DIAGNOSIS — I48.91 NEW ONSET ATRIAL FIBRILLATION (H): ICD-10-CM

## 2020-09-29 DIAGNOSIS — Z79.01 LONG TERM CURRENT USE OF ANTICOAGULANT THERAPY: ICD-10-CM

## 2020-09-29 LAB
CAPILLARY BLOOD COLLECTION: NORMAL
INR PPP: 2.5 (ref 0.86–1.14)

## 2020-09-29 PROCEDURE — 85610 PROTHROMBIN TIME: CPT | Performed by: INTERNAL MEDICINE

## 2020-09-29 PROCEDURE — 36416 COLLJ CAPILLARY BLOOD SPEC: CPT | Performed by: INTERNAL MEDICINE

## 2020-09-29 PROCEDURE — 99207 ZZC NO CHARGE NURSE ONLY: CPT

## 2020-09-29 NOTE — PROGRESS NOTES
ANTICOAGULATION FOLLOW-UP CLINIC VISIT    Patient Name:  Mckay Hsu  Date:  2020  Contact Type:  Telephone    SUBJECTIVE:  Patient Findings     Comments:   The patient was assessed for diet, medication, and activity level changes, missed or extra doses, bruising or bleeding, with no problem findings.          Clinical Outcomes     Comments:   The patient was assessed for diet, medication, and activity level changes, missed or extra doses, bruising or bleeding, with no problem findings.             OBJECTIVE    Recent labs: (last 7 days)     20  1356   INR 2.50*       ASSESSMENT / PLAN  INR assessment THER    Recheck INR In: 6 WEEKS    INR Location Clinic      Anticoagulation Summary  As of 2020    INR goal:   2.0-3.0   TTR:   84.4 % (1 y)   INR used for dosin.50 (2020)   Warfarin maintenance plan:   3.75 mg (7.5 mg x 0.5) every Tue, Fri; 7.5 mg (7.5 mg x 1) all other days   Full warfarin instructions:   3.75 mg every Tue, Fri; 7.5 mg all other days   Weekly warfarin total:   45 mg   No change documented:   Collette Dunbar RN   Plan last modified:   Irma Villeda RN (2020)   Next INR check:   11/10/2020   Priority:   Maintenance   Target end date:   Indefinite    Indications    Long-term (current) use of anticoagulants [Z79.01] [Z79.01]  Chronic atrial fibrillation (H) [I48.20]  New onset atrial fibrillation (H) [I48.91]             Anticoagulation Episode Summary     INR check location:       Preferred lab:       Send INR reminders to:   [facility]    Comments:         Anticoagulation Care Providers     Provider Role Specialty Phone number    David Almendarez MD Referring Internal Medicine [phone]            See the Encounter Report to view Anticoagulation Flowsheet and Dosing Calendar (Go to Encounters tab in chart review, and find the Anticoagulation Therapy Visit)        Collette Dunbar, RN

## 2020-10-28 DIAGNOSIS — I10 ESSENTIAL HYPERTENSION, BENIGN: ICD-10-CM

## 2020-10-28 RX ORDER — METOPROLOL SUCCINATE 100 MG/1
100 TABLET, EXTENDED RELEASE ORAL DAILY
Qty: 90 TABLET | Refills: 1 | Status: SHIPPED | OUTPATIENT
Start: 2020-10-28 | End: 2021-05-13

## 2020-11-02 ENCOUNTER — ANCILLARY PROCEDURE (OUTPATIENT)
Dept: CARDIOLOGY | Facility: CLINIC | Age: 60
End: 2020-11-02
Attending: INTERNAL MEDICINE
Payer: COMMERCIAL

## 2020-11-02 DIAGNOSIS — Z95.0 CARDIAC PACEMAKER IN SITU: ICD-10-CM

## 2020-11-02 PROCEDURE — 93296 REM INTERROG EVL PM/IDS: CPT | Performed by: INTERNAL MEDICINE

## 2020-11-02 PROCEDURE — 93294 REM INTERROG EVL PM/LDLS PM: CPT | Performed by: INTERNAL MEDICINE

## 2020-11-04 LAB
MDC_IDC_EPISODE_DTM: NORMAL
MDC_IDC_EPISODE_DURATION: 5 S
MDC_IDC_EPISODE_ID: 6
MDC_IDC_EPISODE_TYPE: NORMAL
MDC_IDC_LEAD_IMPLANT_DT: NORMAL
MDC_IDC_LEAD_IMPLANT_DT: NORMAL
MDC_IDC_LEAD_LOCATION: NORMAL
MDC_IDC_LEAD_LOCATION: NORMAL
MDC_IDC_LEAD_LOCATION_DETAIL_1: NORMAL
MDC_IDC_LEAD_LOCATION_DETAIL_1: NORMAL
MDC_IDC_LEAD_MFG: NORMAL
MDC_IDC_LEAD_MFG: NORMAL
MDC_IDC_LEAD_MODEL: NORMAL
MDC_IDC_LEAD_MODEL: NORMAL
MDC_IDC_LEAD_POLARITY_TYPE: NORMAL
MDC_IDC_LEAD_POLARITY_TYPE: NORMAL
MDC_IDC_LEAD_SERIAL: NORMAL
MDC_IDC_LEAD_SERIAL: NORMAL
MDC_IDC_MSMT_BATTERY_DTM: NORMAL
MDC_IDC_MSMT_BATTERY_REMAINING_LONGEVITY: 105 MO
MDC_IDC_MSMT_BATTERY_RRT_TRIGGER: 2.6
MDC_IDC_MSMT_BATTERY_STATUS: NORMAL
MDC_IDC_MSMT_BATTERY_VOLTAGE: 3.02 V
MDC_IDC_MSMT_LEADCHNL_LV_IMPEDANCE_VALUE: 418 OHM
MDC_IDC_MSMT_LEADCHNL_LV_IMPEDANCE_VALUE: 494 OHM
MDC_IDC_MSMT_LEADCHNL_LV_IMPEDANCE_VALUE: 627 OHM
MDC_IDC_MSMT_LEADCHNL_LV_IMPEDANCE_VALUE: 684 OHM
MDC_IDC_MSMT_LEADCHNL_LV_IMPEDANCE_VALUE: 912 OHM
MDC_IDC_MSMT_LEADCHNL_RA_IMPEDANCE_VALUE: 3401 OHM
MDC_IDC_MSMT_LEADCHNL_RA_IMPEDANCE_VALUE: 3401 OHM
MDC_IDC_MSMT_LEADCHNL_RV_IMPEDANCE_VALUE: 323 OHM
MDC_IDC_MSMT_LEADCHNL_RV_IMPEDANCE_VALUE: 361 OHM
MDC_IDC_MSMT_LEADCHNL_RV_PACING_THRESHOLD_AMPLITUDE: 0.75 V
MDC_IDC_MSMT_LEADCHNL_RV_PACING_THRESHOLD_PULSEWIDTH: 0.4 MS
MDC_IDC_MSMT_LEADCHNL_RV_SENSING_INTR_AMPL: 11.75 MV
MDC_IDC_PG_IMPLANT_DTM: NORMAL
MDC_IDC_PG_MFG: NORMAL
MDC_IDC_PG_MODEL: NORMAL
MDC_IDC_PG_SERIAL: NORMAL
MDC_IDC_PG_TYPE: NORMAL
MDC_IDC_SESS_CLINIC_NAME: NORMAL
MDC_IDC_SESS_DTM: NORMAL
MDC_IDC_SESS_TYPE: NORMAL
MDC_IDC_SET_BRADY_LOWRATE: 70 {BEATS}/MIN
MDC_IDC_SET_BRADY_MAX_SENSOR_RATE: 130 {BEATS}/MIN
MDC_IDC_SET_BRADY_MODE: NORMAL
MDC_IDC_SET_CRT_LVRV_DELAY: 0 MS
MDC_IDC_SET_CRT_PACED_CHAMBERS: NORMAL
MDC_IDC_SET_LEADCHNL_LV_PACING_AMPLITUDE: 2 V
MDC_IDC_SET_LEADCHNL_LV_PACING_ANODE_ELECTRODE_1: NORMAL
MDC_IDC_SET_LEADCHNL_LV_PACING_ANODE_LOCATION_1: NORMAL
MDC_IDC_SET_LEADCHNL_LV_PACING_CAPTURE_MODE: NORMAL
MDC_IDC_SET_LEADCHNL_LV_PACING_CATHODE_ELECTRODE_1: NORMAL
MDC_IDC_SET_LEADCHNL_LV_PACING_CATHODE_LOCATION_1: NORMAL
MDC_IDC_SET_LEADCHNL_LV_PACING_POLARITY: NORMAL
MDC_IDC_SET_LEADCHNL_LV_PACING_PULSEWIDTH: 0.5 MS
MDC_IDC_SET_LEADCHNL_RA_SENSING_ANODE_ELECTRODE_1: NORMAL
MDC_IDC_SET_LEADCHNL_RA_SENSING_ANODE_LOCATION_1: NORMAL
MDC_IDC_SET_LEADCHNL_RA_SENSING_CATHODE_ELECTRODE_1: NORMAL
MDC_IDC_SET_LEADCHNL_RA_SENSING_CATHODE_LOCATION_1: NORMAL
MDC_IDC_SET_LEADCHNL_RA_SENSING_POLARITY: NORMAL
MDC_IDC_SET_LEADCHNL_RA_SENSING_SENSITIVITY: NORMAL
MDC_IDC_SET_LEADCHNL_RV_PACING_AMPLITUDE: 2 V
MDC_IDC_SET_LEADCHNL_RV_PACING_ANODE_ELECTRODE_1: NORMAL
MDC_IDC_SET_LEADCHNL_RV_PACING_ANODE_LOCATION_1: NORMAL
MDC_IDC_SET_LEADCHNL_RV_PACING_CAPTURE_MODE: NORMAL
MDC_IDC_SET_LEADCHNL_RV_PACING_CATHODE_ELECTRODE_1: NORMAL
MDC_IDC_SET_LEADCHNL_RV_PACING_CATHODE_LOCATION_1: NORMAL
MDC_IDC_SET_LEADCHNL_RV_PACING_POLARITY: NORMAL
MDC_IDC_SET_LEADCHNL_RV_PACING_PULSEWIDTH: 0.4 MS
MDC_IDC_SET_LEADCHNL_RV_SENSING_ANODE_ELECTRODE_1: NORMAL
MDC_IDC_SET_LEADCHNL_RV_SENSING_ANODE_LOCATION_1: NORMAL
MDC_IDC_SET_LEADCHNL_RV_SENSING_CATHODE_ELECTRODE_1: NORMAL
MDC_IDC_SET_LEADCHNL_RV_SENSING_CATHODE_LOCATION_1: NORMAL
MDC_IDC_SET_LEADCHNL_RV_SENSING_POLARITY: NORMAL
MDC_IDC_SET_LEADCHNL_RV_SENSING_SENSITIVITY: 0.9 MV
MDC_IDC_SET_ZONE_DETECTION_INTERVAL: 400 MS
MDC_IDC_SET_ZONE_TYPE: NORMAL
MDC_IDC_STAT_BRADY_AP_VP_PERCENT: 0 %
MDC_IDC_STAT_BRADY_AP_VS_PERCENT: 0 %
MDC_IDC_STAT_BRADY_AS_VP_PERCENT: 99.56 %
MDC_IDC_STAT_BRADY_AS_VS_PERCENT: 0.44 %
MDC_IDC_STAT_BRADY_DTM_END: NORMAL
MDC_IDC_STAT_BRADY_DTM_START: NORMAL
MDC_IDC_STAT_BRADY_RA_PERCENT_PACED: 0 %
MDC_IDC_STAT_BRADY_RV_PERCENT_PACED: 99.56 %
MDC_IDC_STAT_CRT_DTM_END: NORMAL
MDC_IDC_STAT_CRT_DTM_START: NORMAL
MDC_IDC_STAT_CRT_LV_PERCENT_PACED: 99.54 %
MDC_IDC_STAT_CRT_PERCENT_PACED: 99.54 %
MDC_IDC_STAT_EPISODE_RECENT_COUNT: 0
MDC_IDC_STAT_EPISODE_RECENT_COUNT_DTM_END: NORMAL
MDC_IDC_STAT_EPISODE_RECENT_COUNT_DTM_START: NORMAL
MDC_IDC_STAT_EPISODE_TOTAL_COUNT: 0
MDC_IDC_STAT_EPISODE_TOTAL_COUNT: 0
MDC_IDC_STAT_EPISODE_TOTAL_COUNT: 2
MDC_IDC_STAT_EPISODE_TOTAL_COUNT_DTM_END: NORMAL
MDC_IDC_STAT_EPISODE_TOTAL_COUNT_DTM_START: NORMAL
MDC_IDC_STAT_EPISODE_TYPE: NORMAL

## 2020-11-10 ENCOUNTER — ANTICOAGULATION THERAPY VISIT (OUTPATIENT)
Dept: ANTICOAGULATION | Facility: CLINIC | Age: 60
End: 2020-11-10

## 2020-11-10 DIAGNOSIS — I48.20 CHRONIC ATRIAL FIBRILLATION (H): ICD-10-CM

## 2020-11-10 DIAGNOSIS — Z79.01 LONG TERM CURRENT USE OF ANTICOAGULANT THERAPY: ICD-10-CM

## 2020-11-10 DIAGNOSIS — I48.91 NEW ONSET ATRIAL FIBRILLATION (H): ICD-10-CM

## 2020-11-10 LAB
CAPILLARY BLOOD COLLECTION: NORMAL
INR PPP: 1.9 (ref 0.86–1.14)

## 2020-11-10 PROCEDURE — 36416 COLLJ CAPILLARY BLOOD SPEC: CPT | Performed by: INTERNAL MEDICINE

## 2020-11-10 PROCEDURE — 85610 PROTHROMBIN TIME: CPT | Performed by: INTERNAL MEDICINE

## 2020-11-10 NOTE — PROGRESS NOTES
ANTICOAGULATION MANAGEMENT     Patient Name:  Mckay Hsu  Date:  11/10/2020    ASSESSMENT /SUBJECTIVE:    Today's INR result of 1.9 is subtherapeutic. Goal INR of 2.0-3.0      Warfarin dose taken: Warfarin taken as instructed    Diet: No new diet changes affecting INR    Medication changes/ interactions: No new medications/supplements affecting INR    Previous INR: Therapeutic     S/S of bleeding or thromboembolism: No    New injury or illness: No    Upcoming surgery, procedure or cardioversion: No    Additional findings: None      PLAN:    Telephone call with Mckay regarding INR result and instructed:     Warfarin Dosing Instructions: Continue your current warfarin dose 3.75mg Tue/Fri and 7.5mg AOD, per protocol, last 2 INR's in range so will continue maintenance dosing     Instructed patient to follow up no later than: 2 weeks  Lab visit scheduled    Education provided: Target INR goal and significance of current INR result      Doyle verbalizes understanding and agrees to warfarin dosing plan.    Instructed to call the Anticoagulation Clinic for any changes, questions or concerns. (#[phone])        Irma Villeda RN      OBJECTIVE:  Recent labs: (last 7 days)     11/10/20  1337   INR 1.90*         INR assessment SUB    Recheck INR In: 2 WEEKS    INR Location Clinic      Anticoagulation Summary  As of 11/10/2020    INR goal:  2.0-3.0   TTR:  82.5 % (1 y)   INR used for dosing:  No new INR was available at the time of this encounter.   Warfarin maintenance plan:  3.75 mg (7.5 mg x 0.5) every Tue, Fri; 7.5 mg (7.5 mg x 1) all other days   Full warfarin instructions:  3.75 mg every Tue, Fri; 7.5 mg all other days   Weekly warfarin total:  45 mg   Plan last modified:  Irma Villeda RN (5/12/2020)   Next INR check:  11/24/2020   Priority:  Maintenance   Target end date:  Indefinite    Indications    Long-term (current) use of anticoagulants [Z79.01] [Z79.01]  Chronic atrial fibrillation (H)  [I48.20]  New onset atrial fibrillation (H) [I48.91]             Anticoagulation Episode Summary     INR check location:      Preferred lab:      [facility]    Comments:        Anticoagulation Care Providers     Provider Role Specialty Phone number    David Almendarez MD Referring Internal Medicine [phone]

## 2020-11-17 DIAGNOSIS — I48.91 NEW ONSET ATRIAL FIBRILLATION (H): ICD-10-CM

## 2020-11-17 RX ORDER — WARFARIN SODIUM 7.5 MG/1
TABLET ORAL
Qty: 84 TABLET | Refills: 1 | Status: SHIPPED | OUTPATIENT
Start: 2020-11-17 | End: 2021-05-13

## 2020-12-01 ENCOUNTER — ANTICOAGULATION THERAPY VISIT (OUTPATIENT)
Dept: ANTICOAGULATION | Facility: CLINIC | Age: 60
End: 2020-12-01

## 2020-12-01 DIAGNOSIS — I48.91 NEW ONSET ATRIAL FIBRILLATION (H): ICD-10-CM

## 2020-12-01 DIAGNOSIS — I48.20 CHRONIC ATRIAL FIBRILLATION (H): ICD-10-CM

## 2020-12-01 DIAGNOSIS — Z79.01 LONG TERM CURRENT USE OF ANTICOAGULANT THERAPY: ICD-10-CM

## 2020-12-01 LAB
CAPILLARY BLOOD COLLECTION: NORMAL
INR PPP: 3.2 (ref 0.86–1.14)

## 2020-12-01 PROCEDURE — 36416 COLLJ CAPILLARY BLOOD SPEC: CPT | Performed by: INTERNAL MEDICINE

## 2020-12-01 PROCEDURE — 99207 PR NO CHARGE NURSE ONLY: CPT

## 2020-12-01 PROCEDURE — 85610 PROTHROMBIN TIME: CPT | Performed by: INTERNAL MEDICINE

## 2020-12-01 NOTE — PROGRESS NOTES
ANTICOAGULATION FOLLOW-UP CLINIC VISIT    Patient Name:  Mckay Hsu  Date:  12/1/2020  Contact Type:  Telephone    SUBJECTIVE:  Patient Findings     Comments:  Signs/symptoms of thrombosis - .nop  Laboratory test error suspected - .nop  The patient was assessed for diet, medication, and activity level changes, missed or extra doses, bruising or bleeding, with no problem findings. Will increase greems          Clinical Outcomes     Comments:  The patient was assessed for diet, medication, and activity level changes, missed or extra doses, bruising or bleeding, with no problem findings. Will increase greems             OBJECTIVE    Recent labs: (last 7 days)     12/01/20  1354   INR 3.20*       ASSESSMENT / PLAN  INR assessment SUPRA    Recheck INR In: 4 WEEKS    INR Location Clinic      Anticoagulation Summary  As of 12/1/2020    INR goal:  2.0-3.0   TTR:  81.1 % (1 y)   INR used for dosing:  3.20 (12/1/2020)   Warfarin maintenance plan:  3.75 mg (7.5 mg x 0.5) every Tue, Fri; 7.5 mg (7.5 mg x 1) all other days   Full warfarin instructions:  3.75 mg every Tue, Fri; 7.5 mg all other days   Weekly warfarin total:  45 mg   No change documented:  Collette Dunbar RN   Plan last modified:  Irma Villeda RN (5/12/2020)   Next INR check:  12/29/2020   Priority:  Maintenance   Target end date:  Indefinite    Indications    Long-term (current) use of anticoagulants [Z79.01] [Z79.01]  Chronic atrial fibrillation (H) [I48.20]  New onset atrial fibrillation (H) [I48.91]             Anticoagulation Episode Summary     INR check location:      Preferred lab:      Send INR reminders to:  [facility]    Comments:        Anticoagulation Care Providers     Provider Role Specialty Phone number    David Almendarez MD Referring Internal Medicine [phone]            See the Encounter Report to view Anticoagulation Flowsheet and Dosing Calendar (Go to Encounters tab in chart review, and find the  Anticoagulation Therapy Visit)        Collette Dunbar RN

## 2020-12-23 DIAGNOSIS — Z79.4 TYPE 2 DIABETES MELLITUS WITHOUT COMPLICATION, WITH LONG-TERM CURRENT USE OF INSULIN (H): ICD-10-CM

## 2020-12-23 DIAGNOSIS — E11.9 TYPE 2 DIABETES MELLITUS WITHOUT COMPLICATION, WITH LONG-TERM CURRENT USE OF INSULIN (H): ICD-10-CM

## 2020-12-23 RX ORDER — INSULIN ASPART 100 [IU]/ML
INJECTION, SOLUTION INTRAVENOUS; SUBCUTANEOUS
Qty: 30 ML | Refills: 0 | Status: SHIPPED | OUTPATIENT
Start: 2020-12-23 | End: 2021-02-10

## 2020-12-23 NOTE — TELEPHONE ENCOUNTER
Routing refill request to provider for review/approval because:  Labs not current:  Creat, a1c

## 2020-12-23 NOTE — LETTER
[facility]  [address] [id number] [phone]            Mckay Hsu  [address]  [place] [address]        December 23, 2020    Dear Doyle,    While refilling your prescription today, we noticed that you are due for an appointment with your provider.  We will refill your prescription for 30 days, but a follow-up appointment must be made before any additional refills can be approved.     Taking care of your health is important to us and we look forward to seeing you in the near future.  Please call us at [phone] or [phone] (or use [employer]) to schedule an appointment.     Please disregard this notice if you have already made an appointment.    Sincerely,        [facility]

## 2021-01-05 ENCOUNTER — DOCUMENTATION ONLY (OUTPATIENT)
Dept: INTERNAL MEDICINE | Facility: CLINIC | Age: 61
End: 2021-01-05

## 2021-01-05 ENCOUNTER — ANTICOAGULATION THERAPY VISIT (OUTPATIENT)
Dept: ANTICOAGULATION | Facility: CLINIC | Age: 61
End: 2021-01-05

## 2021-01-05 DIAGNOSIS — I48.20 CHRONIC ATRIAL FIBRILLATION (H): ICD-10-CM

## 2021-01-05 DIAGNOSIS — Z79.01 LONG TERM CURRENT USE OF ANTICOAGULANT THERAPY: ICD-10-CM

## 2021-01-05 DIAGNOSIS — I48.91 NEW ONSET ATRIAL FIBRILLATION (H): ICD-10-CM

## 2021-01-05 DIAGNOSIS — I48.20 CHRONIC ATRIAL FIBRILLATION (H): Primary | ICD-10-CM

## 2021-01-05 LAB
CAPILLARY BLOOD COLLECTION: NORMAL
INR PPP: 2.8 (ref 0.86–1.14)

## 2021-01-05 PROCEDURE — 36416 COLLJ CAPILLARY BLOOD SPEC: CPT | Performed by: INTERNAL MEDICINE

## 2021-01-05 PROCEDURE — 85610 PROTHROMBIN TIME: CPT | Performed by: INTERNAL MEDICINE

## 2021-01-05 NOTE — PROGRESS NOTES
ANTICOAGULATION MANAGEMENT     Patient Name:  Mckay Hsu  Date:  1/5/2021    ASSESSMENT /SUBJECTIVE:    Today's INR result of 2.8 is therapeutic. Goal INR of 2.0-3.0      Warfarin dose taken: Warfarin taken as instructed    Diet: No new diet changes affecting INR    Medication changes/ interactions: No new medications/supplements affecting INR    Previous INR: Therapeutic     S/S of bleeding or thromboembolism: No    New injury or illness: No    Upcoming surgery, procedure or cardioversion: No    Additional findings: None      PLAN:    Telephone call with Mckay regarding INR result and instructed:     Warfarin Dosing Instructions: Continue your current warfarin dose 3.75mg Tue/Fri and 7.5mg AOD    Instructed patient to follow up no later than: 6 weeks  Lab visit scheduled    Education provided: None required      Doyle verbalizes understanding and agrees to warfarin dosing plan.    Instructed to call the Anticoagulation Clinic for any changes, questions or concerns. (#[phone])        Irma Villeda RN      OBJECTIVE:  Recent labs: (last 7 days)     01/05/21  1258   INR 2.80*         No question data found.  Anticoagulation Summary  As of 1/5/2021    INR goal:  2.0-3.0   TTR:  76.4 % (1 y)   INR used for dosing:  No new INR was available at the time of this encounter.   Warfarin maintenance plan:  3.75 mg (7.5 mg x 0.5) every Tue, Fri; 7.5 mg (7.5 mg x 1) all other days   Full warfarin instructions:  3.75 mg every Tue, Fri; 7.5 mg all other days   Weekly warfarin total:  45 mg   No change documented:  Irma Villeda RN   Plan last modified:  Irma Villeda RN (5/12/2020)   Next INR check:  2/16/2021   Priority:  Maintenance   Target end date:  Indefinite    Indications    Long-term (current) use of anticoagulants [Z79.01] [Z79.01]  Chronic atrial fibrillation (H) [I48.20]  New onset atrial fibrillation (H) [I48.91]             Anticoagulation Episode Summary     INR check location:       Preferred lab:      Send INR reminders to:  [facility]    Comments:        Anticoagulation Care Providers     Provider Role Specialty Phone number    David Almendarez MD Referring Internal Medicine [phone]

## 2021-01-05 NOTE — PROGRESS NOTES
ANTICOAGULATION MANAGEMENT      Mckay Hsu due for annual renewal of referral to anticoagulation monitoring. Order pended for your review and signature.      ANTICOAGULATION SUMMARY      Warfarin indication(s)     Atrial fibrillation    Heart valve present?  NO       Current goal range   INR: 2.0-3.0     Goal appropriate for indication? Yes, INR 2-3 appropriate for hx of DVT, PE, hypercoagulable state, Afib, LVAD, or bileaflet AVR without risk factors     Current duration of therapy Indefinite/long term therapy   Time in Therapeutic Range (TTR)  (Goal > 60%) 76.4 %       Office visit with referring provider's group within last year yes on 3/9/20       Irma Villeda RN

## 2021-01-19 ENCOUNTER — VIRTUAL VISIT (OUTPATIENT)
Dept: INTERNAL MEDICINE | Facility: CLINIC | Age: 61
End: 2021-01-19
Payer: COMMERCIAL

## 2021-01-19 DIAGNOSIS — Z79.4 TYPE 2 DIABETES MELLITUS WITHOUT COMPLICATION, WITH LONG-TERM CURRENT USE OF INSULIN (H): Primary | ICD-10-CM

## 2021-01-19 DIAGNOSIS — E78.5 HYPERLIPIDEMIA LDL GOAL <100: ICD-10-CM

## 2021-01-19 DIAGNOSIS — E11.9 TYPE 2 DIABETES MELLITUS WITHOUT COMPLICATION, WITH LONG-TERM CURRENT USE OF INSULIN (H): Primary | ICD-10-CM

## 2021-01-19 DIAGNOSIS — Z12.5 SCREENING PSA (PROSTATE SPECIFIC ANTIGEN): ICD-10-CM

## 2021-01-19 DIAGNOSIS — I48.20 CHRONIC ATRIAL FIBRILLATION (H): ICD-10-CM

## 2021-01-19 DIAGNOSIS — I10 ESSENTIAL HYPERTENSION, BENIGN: ICD-10-CM

## 2021-01-19 DIAGNOSIS — E05.90 SUBCLINICAL HYPERTHYROIDISM: ICD-10-CM

## 2021-01-19 PROBLEM — I48.91 NEW ONSET ATRIAL FIBRILLATION (H): Status: RESOLVED | Noted: 2020-01-08 | Resolved: 2021-01-19

## 2021-01-19 PROCEDURE — 99214 OFFICE O/P EST MOD 30 MIN: CPT | Mod: 95 | Performed by: INTERNAL MEDICINE

## 2021-01-19 RX ORDER — LOSARTAN POTASSIUM 100 MG/1
100 TABLET ORAL DAILY
Qty: 90 TABLET | Refills: 3 | Status: SHIPPED | OUTPATIENT
Start: 2021-01-19 | End: 2022-02-15

## 2021-01-19 RX ORDER — INSULIN ASPART 100 [IU]/ML
INJECTION, SOLUTION INTRAVENOUS; SUBCUTANEOUS
Qty: 30 ML | Refills: 0 | Status: CANCELLED | OUTPATIENT
Start: 2021-01-19

## 2021-01-19 NOTE — PROGRESS NOTES
"Doyle is a 60 year old who is being evaluated via a billable telephone visit.      What phone number would you like to be contacted at? [phone]  How would you like to obtain your AVS? Mail a copy     Assessment & Plan     Type 2 diabetes mellitus without complication, with long-term current use of insulin (H)  Stable on therapy as noted, labs as fasting  - Comprehensive metabolic panel; Future  - Hemoglobin A1c; Future  - Albumin Random Urine Quantitative with Creat Ratio; Future  - Advised f/u eye exam clinic notes.    Chronic atrial fibrillation (H)  Rate controlled and therapy, on coumadin.    BENIGN HYPERTENSION  Stable on therapy  - losartan (COZAAR) 100 MG tablet; Take 1 tablet (100 mg) by mouth daily  - Comprehensive metabolic panel; Future    Subclinical hyperthyroidism  Labs as ordered  - TSH with free T4 reflex; Future    Hyperlipidemia LDL goal <100  Lipids as fasting ordered  - Lipid Profile; Future    Screening PSA (prostate specific antigen)  As per annual screening  - PSA, screen; Future    Very vaccine for shingles and called Shingrix is recommended + Flu shot.    13 minutes spent on the date of the encounter doing chart review, review of test results, patient visit and documentation      BMI:   Estimated body mass index is 43.27 kg/m  as calculated from the following:    Height as of 3/9/20: 1.854 m (6' 1\").    Weight as of 3/9/20: 148.8 kg (328 lb).   Weight management plan: Discussed healthy diet and exercise guidelines    Work on weight loss  Regular exercise  See Patient Instructions    Return in about 53 weeks (around 1/25/2022) for Annual Wellness Visit.    David Almendarez MD  [facility]    Subjective     Doyle is a 60 year old who presents to clinic today for the following health issues     HPI     Diabetes Follow-up    How often are you checking your blood sugar? Three times daily  Blood sugar testing frequency justification:  Adjustment of " medication(s)  What time of day are you checking your blood sugars (select all that apply)?  Before meals  Have you had any blood sugars above 200?  Yes - a couple of readings   Have you had any blood sugars below 70?  No    What symptoms do you notice when your blood sugar is low?  None    What concerns do you have today about your diabetes? None     Do you have any of these symptoms? (Select all that apply)  No numbness or tingling in feet.  No redness, sores or blisters on feet.  No complaints of excessive thirst.  No reports of blurry vision.  No significant changes to weight.    Have you had a diabetic eye exam in the last 12 months? Yes- Date of last eye exam: March 2020,  Location: [facility]         BP Readings from Last 2 Encounters:   03/09/20 124/72   02/03/20 128/72     Hemoglobin A1C (%)   Date Value   02/03/2020 6.2 (H)   10/22/2019 6.1 (H)     LDL Cholesterol Calculated (mg/dL)   Date Value   10/22/2019 40   06/11/2018 25               Hypertension Follow-up      Do you check your blood pressure regularly outside of the clinic? Yes     Are you following a low salt diet? Yes    Are your blood pressures ever more than 140 on the top number (systolic) OR more   than 90 on the bottom number (diastolic), for example 140/90? No    Review of Systems   CONSTITUTIONAL: NEGATIVE for fever, chills, change in weight  EYES: NEGATIVE for vision changes or irritation  ENT/MOUTH: NEGATIVE for ear, mouth and throat problems  RESP: NEGATIVE for significant cough or SOB  CV: NEGATIVE for chest pain, palpitations or peripheral edema  GI: NEGATIVE for nausea, abdominal pain, heartburn, or change in bowel habits  : NEGATIVE for frequency, dysuria, or hematuria  MUSCULOSKELETAL: NEGATIVE for significant arthralgias or myalgia  NEURO: NEGATIVE for weakness, dizziness or paresthesias  HEME: NEGATIVE for bleeding problems  PSYCHIATRIC: NEGATIVE for changes in mood or affect      Objective       Vitals:  No vitals were  obtained today due to virtual visit.    Physical Exam   healthy, alert and no distress  PSYCH: Alert and oriented times 3; coherent speech, normal   rate and volume, able to articulate logical thoughts, able   to abstract reason, no tangential thoughts, no hallucinations   or delusions  His affect is normal  RESP: No cough, no audible wheezing, able to talk in full sentences  Remainder of exam unable to be completed due to telephone visits      TSH   Date Value Ref Range Status   05/12/2020 0.31 (L) 0.40 - 4.00 mU/L Final       Phone call duration: 13 minutes;

## 2021-01-19 NOTE — PATIENT INSTRUCTIONS
Patient Education   Personalized Prevention Plan  You are due for the preventive services outlined below.  Your care team is available to assist you in scheduling these services.  If you have already completed any of these items, please share that information with your care team to update in your medical record.  Health Maintenance Due   Topic Date Due     Heart Failure Action Plan  1960     Hepatitis B Vaccine (1 of 3 - Risk 3-dose series) 03/20/1980     Zoster (Shingles) Vaccine (1 of 2) 03/20/2011     Preventive Care Visit  06/13/2019     Complete Blood Count  11/29/2019     Eye Exam  02/28/2020     Diabetic Foot Exam  03/06/2020     Basic Metabolic Panel  04/22/2020     A1C Lab  08/03/2020     Flu Vaccine (1) 09/01/2020     Liver Monitoring Lab  10/22/2020     Cholesterol Lab  10/22/2020     Kidney Microalbumin Urine Test  10/22/2020     Creatinine Lab  10/22/2020     PHQ-2  01/01/2021

## 2021-01-20 DIAGNOSIS — I10 ESSENTIAL HYPERTENSION, BENIGN: ICD-10-CM

## 2021-01-22 RX ORDER — AMLODIPINE BESYLATE 10 MG/1
TABLET ORAL
Qty: 90 TABLET | Refills: 2 | Status: SHIPPED | OUTPATIENT
Start: 2021-01-22 | End: 2021-05-26

## 2021-01-26 ENCOUNTER — ANTICOAGULATION THERAPY VISIT (OUTPATIENT)
Dept: ANTICOAGULATION | Facility: CLINIC | Age: 61
End: 2021-01-26

## 2021-01-26 DIAGNOSIS — Z12.5 SCREENING PSA (PROSTATE SPECIFIC ANTIGEN): ICD-10-CM

## 2021-01-26 DIAGNOSIS — I48.20 CHRONIC ATRIAL FIBRILLATION (H): ICD-10-CM

## 2021-01-26 DIAGNOSIS — E78.5 HYPERLIPIDEMIA LDL GOAL <100: ICD-10-CM

## 2021-01-26 DIAGNOSIS — Z79.4 TYPE 2 DIABETES MELLITUS WITHOUT COMPLICATION, WITH LONG-TERM CURRENT USE OF INSULIN (H): ICD-10-CM

## 2021-01-26 DIAGNOSIS — E11.9 TYPE 2 DIABETES MELLITUS WITHOUT COMPLICATION, WITH LONG-TERM CURRENT USE OF INSULIN (H): ICD-10-CM

## 2021-01-26 DIAGNOSIS — Z79.01 LONG TERM CURRENT USE OF ANTICOAGULANT THERAPY: ICD-10-CM

## 2021-01-26 DIAGNOSIS — E05.90 SUBCLINICAL HYPERTHYROIDISM: ICD-10-CM

## 2021-01-26 DIAGNOSIS — I10 ESSENTIAL HYPERTENSION, BENIGN: ICD-10-CM

## 2021-01-26 LAB
ALBUMIN SERPL-MCNC: 3.2 G/DL (ref 3.4–5)
ALP SERPL-CCNC: 74 U/L (ref 40–150)
ALT SERPL W P-5'-P-CCNC: 25 U/L (ref 0–70)
ANION GAP SERPL CALCULATED.3IONS-SCNC: 5 MMOL/L (ref 3–14)
AST SERPL W P-5'-P-CCNC: 23 U/L (ref 0–45)
BILIRUB SERPL-MCNC: 0.5 MG/DL (ref 0.2–1.3)
BUN SERPL-MCNC: 19 MG/DL (ref 7–30)
CALCIUM SERPL-MCNC: 9.7 MG/DL (ref 8.5–10.1)
CAPILLARY BLOOD COLLECTION: NORMAL
CHLORIDE SERPL-SCNC: 101 MMOL/L (ref 94–109)
CHOLEST SERPL-MCNC: 123 MG/DL
CO2 SERPL-SCNC: 28 MMOL/L (ref 20–32)
CREAT SERPL-MCNC: 1.03 MG/DL (ref 0.66–1.25)
CREAT UR-MCNC: 141 MG/DL
GFR SERPL CREATININE-BSD FRML MDRD: 79 ML/MIN/{1.73_M2}
GLUCOSE SERPL-MCNC: 163 MG/DL (ref 70–99)
HBA1C MFR BLD: 6.9 % (ref 0–5.6)
HDLC SERPL-MCNC: 43 MG/DL
INR PPP: 2.3 (ref 0.86–1.14)
LDLC SERPL CALC-MCNC: 40 MG/DL
MICROALBUMIN UR-MCNC: 203 MG/L
MICROALBUMIN/CREAT UR: 143.97 MG/G CR (ref 0–17)
NONHDLC SERPL-MCNC: 80 MG/DL
POTASSIUM SERPL-SCNC: 4.3 MMOL/L (ref 3.4–5.3)
PROT SERPL-MCNC: 8 G/DL (ref 6.8–8.8)
PSA SERPL-ACNC: 0.5 UG/L (ref 0–4)
SODIUM SERPL-SCNC: 134 MMOL/L (ref 133–144)
TRIGL SERPL-MCNC: 200 MG/DL
TSH SERPL DL<=0.005 MIU/L-ACNC: 0.48 MU/L (ref 0.4–4)

## 2021-01-26 PROCEDURE — 80061 LIPID PANEL: CPT | Performed by: INTERNAL MEDICINE

## 2021-01-26 PROCEDURE — 84443 ASSAY THYROID STIM HORMONE: CPT | Performed by: INTERNAL MEDICINE

## 2021-01-26 PROCEDURE — 85610 PROTHROMBIN TIME: CPT | Performed by: INTERNAL MEDICINE

## 2021-01-26 PROCEDURE — 82043 UR ALBUMIN QUANTITATIVE: CPT | Performed by: INTERNAL MEDICINE

## 2021-01-26 PROCEDURE — 80053 COMPREHEN METABOLIC PANEL: CPT | Performed by: INTERNAL MEDICINE

## 2021-01-26 PROCEDURE — 36416 COLLJ CAPILLARY BLOOD SPEC: CPT | Performed by: INTERNAL MEDICINE

## 2021-01-26 PROCEDURE — G0103 PSA SCREENING: HCPCS | Performed by: INTERNAL MEDICINE

## 2021-01-26 PROCEDURE — 83036 HEMOGLOBIN GLYCOSYLATED A1C: CPT | Performed by: INTERNAL MEDICINE

## 2021-01-26 NOTE — PROGRESS NOTES
ANTICOAGULATION MANAGEMENT     Patient Name:  Mckay Hsu  Date:  1/26/2021    ASSESSMENT /SUBJECTIVE:    Today's INR result of 2.3 is therapeutic. Goal INR of 2.0-3.0      Warfarin dose taken: Warfarin taken as instructed    Diet: No new diet changes affecting INR    Medication changes/ interactions: No new medications/supplements affecting INR    Previous INR: Therapeutic     S/S of bleeding or thromboembolism: No    New injury or illness: No    Upcoming surgery, procedure or cardioversion: No    Additional findings: patient had labs done today for his PCP so INR was done also       PLAN:    Telephone call with Mckay regarding INR result and instructed:     Warfarin Dosing Instructions: Continue your current warfarin dose 3.75mg Tue/Fri and 7.5mg AOD    Instructed patient to follow up no later than: 6 weeks  Lab visit scheduled patient to have labs done in [facility]  Much closer to his home.     Education provided: None required      Doyle verbalizes understanding and agrees to warfarin dosing plan.    Instructed to call the Anticoagulation Clinic for any changes, questions or concerns. (#[phone])        Irma Villeda RN      OBJECTIVE:  Recent labs: (last 7 days)     01/26/21  0754   INR 2.30*         INR assessment THER    Recheck INR In: 6 WEEKS    INR Location Clinic      Anticoagulation Summary  As of 1/26/2021    INR goal:  2.0-3.0   TTR:  76.3 % (1 y)   INR used for dosing:  No new INR was available at the time of this encounter.   Warfarin maintenance plan:  3.75 mg (7.5 mg x 0.5) every Tue, Fri; 7.5 mg (7.5 mg x 1) all other days   Full warfarin instructions:  3.75 mg every Tue, Fri; 7.5 mg all other days   Weekly warfarin total:  45 mg   No change documented:  Irma Villeda RN   Plan last modified:  Irma Villeda RN (5/12/2020)   Next INR check:  3/9/2021   Priority:  Maintenance   Target end date:  Indefinite    Indications    Long-term (current) use of anticoagulants [Z79.01]  [Z79.01]  Chronic atrial fibrillation (H) [I48.20]  New onset atrial fibrillation (H) (Resolved) [I48.91]             Anticoagulation Episode Summary     INR check location:      Preferred lab:      Send INR reminders to:  [facility]    Comments:        Anticoagulation Care Providers     Provider Role Specialty Phone number    David Almendarez MD Referring Internal Medicine [phone]

## 2021-02-09 DIAGNOSIS — E11.9 TYPE 2 DIABETES MELLITUS WITHOUT COMPLICATION, WITH LONG-TERM CURRENT USE OF INSULIN (H): ICD-10-CM

## 2021-02-09 DIAGNOSIS — Z79.4 TYPE 2 DIABETES MELLITUS WITHOUT COMPLICATION, WITH LONG-TERM CURRENT USE OF INSULIN (H): ICD-10-CM

## 2021-02-10 RX ORDER — INSULIN ASPART 100 [IU]/ML
INJECTION, SOLUTION INTRAVENOUS; SUBCUTANEOUS
Qty: 30 ML | Refills: 0 | Status: SHIPPED | OUTPATIENT
Start: 2021-02-10 | End: 2021-06-02

## 2021-02-10 RX ORDER — FLURBIPROFEN SODIUM 0.3 MG/ML
SOLUTION/ DROPS OPHTHALMIC
Qty: 100 EACH | Refills: 0 | Status: SHIPPED | OUTPATIENT
Start: 2021-02-10 | End: 2022-04-22

## 2021-03-04 ENCOUNTER — ANCILLARY PROCEDURE (OUTPATIENT)
Dept: CARDIOLOGY | Facility: CLINIC | Age: 61
End: 2021-03-04
Attending: INTERNAL MEDICINE
Payer: COMMERCIAL

## 2021-03-04 DIAGNOSIS — Z95.0 CARDIAC PACEMAKER IN SITU: ICD-10-CM

## 2021-03-04 PROCEDURE — 93296 REM INTERROG EVL PM/IDS: CPT | Performed by: INTERNAL MEDICINE

## 2021-03-04 PROCEDURE — 93294 REM INTERROG EVL PM/LDLS PM: CPT | Performed by: INTERNAL MEDICINE

## 2021-03-09 ENCOUNTER — ANTICOAGULATION THERAPY VISIT (OUTPATIENT)
Dept: ANTICOAGULATION | Facility: CLINIC | Age: 61
End: 2021-03-09

## 2021-03-09 DIAGNOSIS — I48.20 CHRONIC ATRIAL FIBRILLATION (H): ICD-10-CM

## 2021-03-09 DIAGNOSIS — Z79.01 LONG TERM CURRENT USE OF ANTICOAGULANT THERAPY: ICD-10-CM

## 2021-03-09 LAB
CAPILLARY BLOOD COLLECTION: NORMAL
INR PPP: 2.6 (ref 0.86–1.14)
MDC_IDC_EPISODE_DTM: NORMAL
MDC_IDC_EPISODE_DURATION: 1 S
MDC_IDC_EPISODE_DURATION: 4 S
MDC_IDC_EPISODE_ID: 3
MDC_IDC_EPISODE_ID: 7
MDC_IDC_EPISODE_ID: 8
MDC_IDC_EPISODE_ID: 9
MDC_IDC_EPISODE_TYPE: NORMAL
MDC_IDC_LEAD_IMPLANT_DT: NORMAL
MDC_IDC_LEAD_IMPLANT_DT: NORMAL
MDC_IDC_LEAD_LOCATION: NORMAL
MDC_IDC_LEAD_LOCATION: NORMAL
MDC_IDC_LEAD_LOCATION_DETAIL_1: NORMAL
MDC_IDC_LEAD_LOCATION_DETAIL_1: NORMAL
MDC_IDC_LEAD_MFG: NORMAL
MDC_IDC_LEAD_MFG: NORMAL
MDC_IDC_LEAD_MODEL: NORMAL
MDC_IDC_LEAD_MODEL: NORMAL
MDC_IDC_LEAD_POLARITY_TYPE: NORMAL
MDC_IDC_LEAD_POLARITY_TYPE: NORMAL
MDC_IDC_LEAD_SERIAL: NORMAL
MDC_IDC_LEAD_SERIAL: NORMAL
MDC_IDC_MSMT_BATTERY_DTM: NORMAL
MDC_IDC_MSMT_BATTERY_REMAINING_LONGEVITY: 103 MO
MDC_IDC_MSMT_BATTERY_RRT_TRIGGER: 2.6
MDC_IDC_MSMT_BATTERY_STATUS: NORMAL
MDC_IDC_MSMT_BATTERY_VOLTAGE: 3.01 V
MDC_IDC_MSMT_LEADCHNL_LV_IMPEDANCE_VALUE: 399 OHM
MDC_IDC_MSMT_LEADCHNL_LV_IMPEDANCE_VALUE: 494 OHM
MDC_IDC_MSMT_LEADCHNL_LV_IMPEDANCE_VALUE: 608 OHM
MDC_IDC_MSMT_LEADCHNL_LV_IMPEDANCE_VALUE: 684 OHM
MDC_IDC_MSMT_LEADCHNL_LV_IMPEDANCE_VALUE: 893 OHM
MDC_IDC_MSMT_LEADCHNL_RA_IMPEDANCE_VALUE: 3401 OHM
MDC_IDC_MSMT_LEADCHNL_RA_IMPEDANCE_VALUE: 3401 OHM
MDC_IDC_MSMT_LEADCHNL_RV_IMPEDANCE_VALUE: 380 OHM
MDC_IDC_MSMT_LEADCHNL_RV_IMPEDANCE_VALUE: 418 OHM
MDC_IDC_MSMT_LEADCHNL_RV_PACING_THRESHOLD_AMPLITUDE: 0.75 V
MDC_IDC_MSMT_LEADCHNL_RV_PACING_THRESHOLD_PULSEWIDTH: 0.4 MS
MDC_IDC_MSMT_LEADCHNL_RV_SENSING_INTR_AMPL: 11.75 MV
MDC_IDC_PG_IMPLANT_DTM: NORMAL
MDC_IDC_PG_MFG: NORMAL
MDC_IDC_PG_MODEL: NORMAL
MDC_IDC_PG_SERIAL: NORMAL
MDC_IDC_PG_TYPE: NORMAL
MDC_IDC_SESS_CLINIC_NAME: NORMAL
MDC_IDC_SESS_DTM: NORMAL
MDC_IDC_SESS_TYPE: NORMAL
MDC_IDC_SET_BRADY_LOWRATE: 70 {BEATS}/MIN
MDC_IDC_SET_BRADY_MAX_SENSOR_RATE: 130 {BEATS}/MIN
MDC_IDC_SET_BRADY_MODE: NORMAL
MDC_IDC_SET_CRT_LVRV_DELAY: 0 MS
MDC_IDC_SET_CRT_PACED_CHAMBERS: NORMAL
MDC_IDC_SET_LEADCHNL_LV_PACING_AMPLITUDE: 2 V
MDC_IDC_SET_LEADCHNL_LV_PACING_ANODE_ELECTRODE_1: NORMAL
MDC_IDC_SET_LEADCHNL_LV_PACING_ANODE_LOCATION_1: NORMAL
MDC_IDC_SET_LEADCHNL_LV_PACING_CAPTURE_MODE: NORMAL
MDC_IDC_SET_LEADCHNL_LV_PACING_CATHODE_ELECTRODE_1: NORMAL
MDC_IDC_SET_LEADCHNL_LV_PACING_CATHODE_LOCATION_1: NORMAL
MDC_IDC_SET_LEADCHNL_LV_PACING_POLARITY: NORMAL
MDC_IDC_SET_LEADCHNL_LV_PACING_PULSEWIDTH: 0.5 MS
MDC_IDC_SET_LEADCHNL_RA_SENSING_ANODE_ELECTRODE_1: NORMAL
MDC_IDC_SET_LEADCHNL_RA_SENSING_ANODE_LOCATION_1: NORMAL
MDC_IDC_SET_LEADCHNL_RA_SENSING_CATHODE_ELECTRODE_1: NORMAL
MDC_IDC_SET_LEADCHNL_RA_SENSING_CATHODE_LOCATION_1: NORMAL
MDC_IDC_SET_LEADCHNL_RA_SENSING_POLARITY: NORMAL
MDC_IDC_SET_LEADCHNL_RA_SENSING_SENSITIVITY: NORMAL
MDC_IDC_SET_LEADCHNL_RV_PACING_AMPLITUDE: 2 V
MDC_IDC_SET_LEADCHNL_RV_PACING_ANODE_ELECTRODE_1: NORMAL
MDC_IDC_SET_LEADCHNL_RV_PACING_ANODE_LOCATION_1: NORMAL
MDC_IDC_SET_LEADCHNL_RV_PACING_CAPTURE_MODE: NORMAL
MDC_IDC_SET_LEADCHNL_RV_PACING_CATHODE_ELECTRODE_1: NORMAL
MDC_IDC_SET_LEADCHNL_RV_PACING_CATHODE_LOCATION_1: NORMAL
MDC_IDC_SET_LEADCHNL_RV_PACING_POLARITY: NORMAL
MDC_IDC_SET_LEADCHNL_RV_PACING_PULSEWIDTH: 0.4 MS
MDC_IDC_SET_LEADCHNL_RV_SENSING_ANODE_ELECTRODE_1: NORMAL
MDC_IDC_SET_LEADCHNL_RV_SENSING_ANODE_LOCATION_1: NORMAL
MDC_IDC_SET_LEADCHNL_RV_SENSING_CATHODE_ELECTRODE_1: NORMAL
MDC_IDC_SET_LEADCHNL_RV_SENSING_CATHODE_LOCATION_1: NORMAL
MDC_IDC_SET_LEADCHNL_RV_SENSING_POLARITY: NORMAL
MDC_IDC_SET_LEADCHNL_RV_SENSING_SENSITIVITY: 0.9 MV
MDC_IDC_SET_ZONE_DETECTION_INTERVAL: 400 MS
MDC_IDC_SET_ZONE_TYPE: NORMAL
MDC_IDC_STAT_BRADY_AP_VP_PERCENT: 0 %
MDC_IDC_STAT_BRADY_AP_VS_PERCENT: 0 %
MDC_IDC_STAT_BRADY_AS_VP_PERCENT: 99.96 %
MDC_IDC_STAT_BRADY_AS_VS_PERCENT: 0.04 %
MDC_IDC_STAT_BRADY_DTM_END: NORMAL
MDC_IDC_STAT_BRADY_DTM_START: NORMAL
MDC_IDC_STAT_BRADY_RA_PERCENT_PACED: 0 %
MDC_IDC_STAT_BRADY_RV_PERCENT_PACED: 99.96 %
MDC_IDC_STAT_CRT_DTM_END: NORMAL
MDC_IDC_STAT_CRT_DTM_START: NORMAL
MDC_IDC_STAT_CRT_LV_PERCENT_PACED: 99.94 %
MDC_IDC_STAT_CRT_PERCENT_PACED: 99.94 %
MDC_IDC_STAT_EPISODE_RECENT_COUNT: 0
MDC_IDC_STAT_EPISODE_RECENT_COUNT: 0
MDC_IDC_STAT_EPISODE_RECENT_COUNT: 1
MDC_IDC_STAT_EPISODE_RECENT_COUNT_DTM_END: NORMAL
MDC_IDC_STAT_EPISODE_RECENT_COUNT_DTM_START: NORMAL
MDC_IDC_STAT_EPISODE_TOTAL_COUNT: 0
MDC_IDC_STAT_EPISODE_TOTAL_COUNT: 0
MDC_IDC_STAT_EPISODE_TOTAL_COUNT: 3
MDC_IDC_STAT_EPISODE_TOTAL_COUNT_DTM_END: NORMAL
MDC_IDC_STAT_EPISODE_TOTAL_COUNT_DTM_START: NORMAL
MDC_IDC_STAT_EPISODE_TYPE: NORMAL

## 2021-03-09 PROCEDURE — 99207 PR NO CHARGE NURSE ONLY: CPT

## 2021-03-09 PROCEDURE — 36416 COLLJ CAPILLARY BLOOD SPEC: CPT | Performed by: INTERNAL MEDICINE

## 2021-03-09 PROCEDURE — 85610 PROTHROMBIN TIME: CPT | Performed by: INTERNAL MEDICINE

## 2021-03-09 NOTE — PROGRESS NOTES
ANTICOAGULATION FOLLOW-UP CLINIC VISIT    Patient Name:  Mckay Hsu  Date:  3/9/2021  Contact Type:  Telephone    SUBJECTIVE:  Patient Findings     Comments:  The patient was assessed for diet, medication, and activity level changes, missed or extra doses, bruising or bleeding, with no problem findings.          Clinical Outcomes     Comments:  The patient was assessed for diet, medication, and activity level changes, missed or extra doses, bruising or bleeding, with no problem findings.             OBJECTIVE    Recent labs: (last 7 days)     21  1234   INR 2.60*       ASSESSMENT / PLAN  INR assessment THER    Recheck INR In: 6 WEEKS    INR Location Clinic      Anticoagulation Summary  As of 3/9/2021    INR goal:  2.0-3.0   TTR:  76.4 % (1 y)   INR used for dosin.60 (3/9/2021)   Warfarin maintenance plan:  3.75 mg (7.5 mg x 0.5) every Tue, Fri; 7.5 mg (7.5 mg x 1) all other days   Full warfarin instructions:  3.75 mg every Tue, Fri; 7.5 mg all other days   Weekly warfarin total:  45 mg   Plan last modified:  Irma Villeda RN (2020)   Next INR check:  2021   Priority:  Maintenance   Target end date:  Indefinite    Indications    Long-term (current) use of anticoagulants [Z79.01] [Z79.01]  Chronic atrial fibrillation (H) [I48.20]  New onset atrial fibrillation (H) (Resolved) [I48.91]             Anticoagulation Episode Summary     INR check location:      Preferred lab:      Send INR reminders to:  [facility]    Comments:        Anticoagulation Care Providers     Provider Role Specialty Phone number    David Almendarez MD Referring Internal Medicine [phone]            See the Encounter Report to view Anticoagulation Flowsheet and Dosing Calendar (Go to Encounters tab in chart review, and find the Anticoagulation Therapy Visit)        Collette Dunbar, RN

## 2021-03-16 DIAGNOSIS — Z79.4 TYPE 2 DIABETES MELLITUS WITHOUT COMPLICATION, WITH LONG-TERM CURRENT USE OF INSULIN (H): ICD-10-CM

## 2021-03-16 DIAGNOSIS — E11.9 TYPE 2 DIABETES MELLITUS WITHOUT COMPLICATION, WITH LONG-TERM CURRENT USE OF INSULIN (H): ICD-10-CM

## 2021-04-20 ENCOUNTER — ANTICOAGULATION THERAPY VISIT (OUTPATIENT)
Dept: INTERNAL MEDICINE | Facility: CLINIC | Age: 61
End: 2021-04-20

## 2021-04-20 DIAGNOSIS — I48.20 CHRONIC ATRIAL FIBRILLATION (H): ICD-10-CM

## 2021-04-20 DIAGNOSIS — Z79.01 LONG TERM CURRENT USE OF ANTICOAGULANT THERAPY: ICD-10-CM

## 2021-04-20 LAB
CAPILLARY BLOOD COLLECTION: NORMAL
INR PPP: 3 (ref 0.86–1.14)

## 2021-04-20 PROCEDURE — 85610 PROTHROMBIN TIME: CPT | Performed by: INTERNAL MEDICINE

## 2021-04-20 PROCEDURE — 36416 COLLJ CAPILLARY BLOOD SPEC: CPT | Performed by: INTERNAL MEDICINE

## 2021-04-20 PROCEDURE — 99207 PR NO CHARGE NURSE ONLY: CPT | Performed by: INTERNAL MEDICINE

## 2021-04-20 NOTE — PROGRESS NOTES
ANTICOAGULATION FOLLOW-UP CLINIC VISIT    Patient Name:  Mckay Hsu  Date:  4/20/2021  Contact Type:  Telephone    SUBJECTIVE:  Patient Findings     Comments:  The patient was assessed for diet, medication, and activity level changes, missed or extra doses, bruising or bleeding, with no problem findings.          Clinical Outcomes     Comments:  The patient was assessed for diet, medication, and activity level changes, missed or extra doses, bruising or bleeding, with no problem findings.             OBJECTIVE    Recent labs: (last 7 days)     04/20/21  1320   INR 3.00*       ASSESSMENT / PLAN  INR assessment THER    Recheck INR In: 6 WEEKS    INR Location Clinic      Anticoagulation Summary  As of 4/20/2021    INR goal:  2.0-3.0   TTR:  83.9 % (1 y)   INR used for dosing:  3.00 (4/20/2021)   Warfarin maintenance plan:  3.75 mg (7.5 mg x 0.5) every Tue, Fri; 7.5 mg (7.5 mg x 1) all other days   Full warfarin instructions:  3.75 mg every Tue, Fri; 7.5 mg all other days   Weekly warfarin total:  45 mg   Plan last modified:  Irma Villeda RN (5/12/2020)   Next INR check:  6/1/2021   Priority:  Maintenance   Target end date:  Indefinite    Indications    Long-term (current) use of anticoagulants [Z79.01] [Z79.01]  Chronic atrial fibrillation (H) [I48.20]  New onset atrial fibrillation (H) (Resolved) [I48.91]             Anticoagulation Episode Summary     INR check location:      Preferred lab:      Send INR reminders to:  [facility]    Comments:        Anticoagulation Care Providers     Provider Role Specialty Phone number    David Almendarez MD Referring Internal Medicine [phone]            See the Encounter Report to view Anticoagulation Flowsheet and Dosing Calendar (Go to Encounters tab in chart review, and find the Anticoagulation Therapy Visit)        Collette Dunbar, RN

## 2021-04-28 ENCOUNTER — TELEPHONE (OUTPATIENT)
Dept: INTERNAL MEDICINE | Facility: CLINIC | Age: 61
End: 2021-04-28

## 2021-04-28 DIAGNOSIS — E11.9 TYPE 2 DIABETES MELLITUS WITHOUT COMPLICATION, WITH LONG-TERM CURRENT USE OF INSULIN (H): ICD-10-CM

## 2021-04-28 DIAGNOSIS — Z79.4 TYPE 2 DIABETES MELLITUS WITHOUT COMPLICATION, WITH LONG-TERM CURRENT USE OF INSULIN (H): ICD-10-CM

## 2021-05-06 NOTE — TELEPHONE ENCOUNTER
Pt called to check on this. Clarified that it is for Accucheck glide test strips to be sent to    [id number] - [place], [place] - [address] AT [facility] & [facility]

## 2021-05-12 DIAGNOSIS — I48.91 NEW ONSET ATRIAL FIBRILLATION (H): ICD-10-CM

## 2021-05-12 DIAGNOSIS — I10 ESSENTIAL HYPERTENSION, BENIGN: ICD-10-CM

## 2021-05-13 RX ORDER — METOPROLOL SUCCINATE 100 MG/1
100 TABLET, EXTENDED RELEASE ORAL DAILY
Qty: 90 TABLET | Refills: 1 | Status: SHIPPED | OUTPATIENT
Start: 2021-05-13 | End: 2021-11-24

## 2021-05-13 RX ORDER — WARFARIN SODIUM 7.5 MG/1
TABLET ORAL
Qty: 84 TABLET | Refills: 1 | Status: SHIPPED | OUTPATIENT
Start: 2021-05-13 | End: 2021-10-19

## 2021-05-13 NOTE — TELEPHONE ENCOUNTER
Metoprolol    Routing refill request to provider for review/approval because:  BP not at goal    Micaela LOTTN, RN, PHN

## 2021-05-26 DIAGNOSIS — Z79.4 TYPE 2 DIABETES MELLITUS WITHOUT COMPLICATION, WITH LONG-TERM CURRENT USE OF INSULIN (H): ICD-10-CM

## 2021-05-26 DIAGNOSIS — I10 ESSENTIAL HYPERTENSION, BENIGN: ICD-10-CM

## 2021-05-26 DIAGNOSIS — E11.9 TYPE 2 DIABETES MELLITUS WITHOUT COMPLICATION, WITH LONG-TERM CURRENT USE OF INSULIN (H): ICD-10-CM

## 2021-05-26 RX ORDER — AMLODIPINE BESYLATE 10 MG/1
TABLET ORAL
Qty: 90 TABLET | Refills: 2 | Status: SHIPPED | OUTPATIENT
Start: 2021-05-26 | End: 2022-02-15

## 2021-05-26 NOTE — TELEPHONE ENCOUNTER
Routing refill request to provider for review/approval because:  BP not current:    BP Readings from Last 3 Encounters:   03/09/20 124/72   02/03/20 128/72   06/21/19 135/85        Shelly LOPEZ RN, BSN, PHN

## 2021-06-01 ENCOUNTER — ANTICOAGULATION THERAPY VISIT (OUTPATIENT)
Dept: ANTICOAGULATION | Facility: CLINIC | Age: 61
End: 2021-06-01

## 2021-06-01 DIAGNOSIS — Z79.01 LONG TERM CURRENT USE OF ANTICOAGULANT THERAPY: ICD-10-CM

## 2021-06-01 DIAGNOSIS — I48.20 CHRONIC ATRIAL FIBRILLATION (H): ICD-10-CM

## 2021-06-01 LAB — INR PPP: 2.8 (ref 0.86–1.14)

## 2021-06-01 PROCEDURE — 85610 PROTHROMBIN TIME: CPT | Performed by: INTERNAL MEDICINE

## 2021-06-01 PROCEDURE — 99207 PR NO CHARGE NURSE ONLY: CPT

## 2021-06-01 PROCEDURE — 36416 COLLJ CAPILLARY BLOOD SPEC: CPT | Performed by: INTERNAL MEDICINE

## 2021-06-01 NOTE — PROGRESS NOTES
ANTICOAGULATION FOLLOW-UP CLINIC VISIT    Patient Name:  Mckay Hsu  Date:  2021  Contact Type:  Telephone    SUBJECTIVE:  Patient Findings     Comments:  The patient was assessed for diet, medication, and activity level changes, missed or extra doses, bruising or bleeding, with no problem findings.          Clinical Outcomes     Comments:  The patient was assessed for diet, medication, and activity level changes, missed or extra doses, bruising or bleeding, with no problem findings.             OBJECTIVE    Recent labs: (last 7 days)     21  1338   INR 2.80*       ASSESSMENT / PLAN  INR assessment THER    Recheck INR In: 6 WEEKS    INR Location Clinic      Anticoagulation Summary  As of 2021    INR goal:  2.0-3.0   TTR:  92.0 % (1 y)   INR used for dosin.80 (2021)   Warfarin maintenance plan:  3.75 mg (7.5 mg x 0.5) every Tue, Fri; 7.5 mg (7.5 mg x 1) all other days   Full warfarin instructions:  3.75 mg every Tue, Fri; 7.5 mg all other days   Weekly warfarin total:  45 mg   No change documented:  Collette Dunbar RN   Plan last modified:  Irma Villeda RN (2020)   Next INR check:  2021   Priority:  Maintenance   Target end date:  Indefinite    Indications    Long-term (current) use of anticoagulants [Z79.01] [Z79.01]  Chronic atrial fibrillation (H) [I48.20]  New onset atrial fibrillation (H) (Resolved) [I48.91]             Anticoagulation Episode Summary     INR check location:      Preferred lab:      Send INR reminders to:  [facility]    Comments:        Anticoagulation Care Providers     Provider Role Specialty Phone number    David Almendarez MD Referring Internal Medicine [phone]            See the Encounter Report to view Anticoagulation Flowsheet and Dosing Calendar (Go to Encounters tab in chart review, and find the Anticoagulation Therapy Visit)        Collette Dunbar, RN

## 2021-06-02 DIAGNOSIS — Z79.4 TYPE 2 DIABETES MELLITUS WITHOUT COMPLICATION, WITH LONG-TERM CURRENT USE OF INSULIN (H): ICD-10-CM

## 2021-06-02 DIAGNOSIS — E11.9 TYPE 2 DIABETES MELLITUS WITHOUT COMPLICATION, WITH LONG-TERM CURRENT USE OF INSULIN (H): ICD-10-CM

## 2021-06-02 RX ORDER — INSULIN ASPART 100 [IU]/ML
INJECTION, SOLUTION INTRAVENOUS; SUBCUTANEOUS
Qty: 30 ML | Refills: 0 | Status: SHIPPED | OUTPATIENT
Start: 2021-06-02 | End: 2021-07-20

## 2021-06-02 NOTE — TELEPHONE ENCOUNTER
Patient is calling to try to find out what is going on with his medications request. Last week we received a refill request for his insulin aspart (NOVOLOG FLEXPEN) 100 UNIT/ML pen but Rx was sent in and now the encounter is closed.   Please review and advise, patient can be reached at [phone].      Analisa Stack   [facility]  Central Scheduler

## 2021-06-08 ENCOUNTER — ANCILLARY PROCEDURE (OUTPATIENT)
Dept: CARDIOLOGY | Facility: CLINIC | Age: 61
End: 2021-06-08
Attending: INTERNAL MEDICINE
Payer: COMMERCIAL

## 2021-06-08 DIAGNOSIS — Z95.0 CARDIAC PACEMAKER IN SITU: ICD-10-CM

## 2021-06-08 DIAGNOSIS — I49.5 SINUS NODE DYSFUNCTION (H): Primary | ICD-10-CM

## 2021-06-08 PROCEDURE — 93281 PM DEVICE PROGR EVAL MULTI: CPT | Performed by: INTERNAL MEDICINE

## 2021-06-09 LAB
MDC_IDC_EPISODE_DTM: NORMAL
MDC_IDC_EPISODE_DTM: NORMAL
MDC_IDC_EPISODE_DURATION: 2 S
MDC_IDC_EPISODE_DURATION: 5 S
MDC_IDC_EPISODE_ID: 10
MDC_IDC_EPISODE_ID: 4
MDC_IDC_EPISODE_TYPE: NORMAL
MDC_IDC_EPISODE_TYPE: NORMAL
MDC_IDC_LEAD_IMPLANT_DT: NORMAL
MDC_IDC_LEAD_IMPLANT_DT: NORMAL
MDC_IDC_LEAD_LOCATION: NORMAL
MDC_IDC_LEAD_LOCATION: NORMAL
MDC_IDC_LEAD_LOCATION_DETAIL_1: NORMAL
MDC_IDC_LEAD_LOCATION_DETAIL_1: NORMAL
MDC_IDC_LEAD_MFG: NORMAL
MDC_IDC_LEAD_MFG: NORMAL
MDC_IDC_LEAD_MODEL: NORMAL
MDC_IDC_LEAD_MODEL: NORMAL
MDC_IDC_LEAD_POLARITY_TYPE: NORMAL
MDC_IDC_LEAD_POLARITY_TYPE: NORMAL
MDC_IDC_LEAD_SERIAL: NORMAL
MDC_IDC_LEAD_SERIAL: NORMAL
MDC_IDC_MSMT_BATTERY_DTM: NORMAL
MDC_IDC_MSMT_BATTERY_REMAINING_LONGEVITY: 103 MO
MDC_IDC_MSMT_BATTERY_RRT_TRIGGER: 2.6
MDC_IDC_MSMT_BATTERY_STATUS: NORMAL
MDC_IDC_MSMT_BATTERY_VOLTAGE: 3.01 V
MDC_IDC_MSMT_LEADCHNL_LV_IMPEDANCE_VALUE: 418 OHM
MDC_IDC_MSMT_LEADCHNL_LV_IMPEDANCE_VALUE: 532 OHM
MDC_IDC_MSMT_LEADCHNL_LV_IMPEDANCE_VALUE: 684 OHM
MDC_IDC_MSMT_LEADCHNL_LV_IMPEDANCE_VALUE: 760 OHM
MDC_IDC_MSMT_LEADCHNL_LV_IMPEDANCE_VALUE: 950 OHM
MDC_IDC_MSMT_LEADCHNL_LV_PACING_THRESHOLD_AMPLITUDE: 1 V
MDC_IDC_MSMT_LEADCHNL_LV_PACING_THRESHOLD_PULSEWIDTH: 0.5 MS
MDC_IDC_MSMT_LEADCHNL_RA_IMPEDANCE_VALUE: 3401 OHM
MDC_IDC_MSMT_LEADCHNL_RA_IMPEDANCE_VALUE: 3401 OHM
MDC_IDC_MSMT_LEADCHNL_RV_IMPEDANCE_VALUE: 399 OHM
MDC_IDC_MSMT_LEADCHNL_RV_IMPEDANCE_VALUE: 456 OHM
MDC_IDC_MSMT_LEADCHNL_RV_PACING_THRESHOLD_AMPLITUDE: 0.75 V
MDC_IDC_MSMT_LEADCHNL_RV_PACING_THRESHOLD_PULSEWIDTH: 0.4 MS
MDC_IDC_MSMT_LEADCHNL_RV_SENSING_INTR_AMPL: 10.88 MV
MDC_IDC_PG_IMPLANT_DTM: NORMAL
MDC_IDC_PG_MFG: NORMAL
MDC_IDC_PG_MODEL: NORMAL
MDC_IDC_PG_SERIAL: NORMAL
MDC_IDC_PG_TYPE: NORMAL
MDC_IDC_SESS_CLINIC_NAME: NORMAL
MDC_IDC_SESS_DTM: NORMAL
MDC_IDC_SESS_TYPE: NORMAL
MDC_IDC_SET_BRADY_LOWRATE: 70 {BEATS}/MIN
MDC_IDC_SET_BRADY_MAX_SENSOR_RATE: 130 {BEATS}/MIN
MDC_IDC_SET_BRADY_MODE: NORMAL
MDC_IDC_SET_CRT_LVRV_DELAY: 0 MS
MDC_IDC_SET_CRT_PACED_CHAMBERS: NORMAL
MDC_IDC_SET_LEADCHNL_LV_PACING_AMPLITUDE: 2 V
MDC_IDC_SET_LEADCHNL_LV_PACING_ANODE_ELECTRODE_1: NORMAL
MDC_IDC_SET_LEADCHNL_LV_PACING_ANODE_LOCATION_1: NORMAL
MDC_IDC_SET_LEADCHNL_LV_PACING_CAPTURE_MODE: NORMAL
MDC_IDC_SET_LEADCHNL_LV_PACING_CATHODE_ELECTRODE_1: NORMAL
MDC_IDC_SET_LEADCHNL_LV_PACING_CATHODE_LOCATION_1: NORMAL
MDC_IDC_SET_LEADCHNL_LV_PACING_POLARITY: NORMAL
MDC_IDC_SET_LEADCHNL_LV_PACING_PULSEWIDTH: 0.5 MS
MDC_IDC_SET_LEADCHNL_RA_SENSING_ANODE_ELECTRODE_1: NORMAL
MDC_IDC_SET_LEADCHNL_RA_SENSING_ANODE_LOCATION_1: NORMAL
MDC_IDC_SET_LEADCHNL_RA_SENSING_CATHODE_ELECTRODE_1: NORMAL
MDC_IDC_SET_LEADCHNL_RA_SENSING_CATHODE_LOCATION_1: NORMAL
MDC_IDC_SET_LEADCHNL_RA_SENSING_POLARITY: NORMAL
MDC_IDC_SET_LEADCHNL_RA_SENSING_SENSITIVITY: NORMAL
MDC_IDC_SET_LEADCHNL_RV_PACING_AMPLITUDE: 2 V
MDC_IDC_SET_LEADCHNL_RV_PACING_ANODE_ELECTRODE_1: NORMAL
MDC_IDC_SET_LEADCHNL_RV_PACING_ANODE_LOCATION_1: NORMAL
MDC_IDC_SET_LEADCHNL_RV_PACING_CAPTURE_MODE: NORMAL
MDC_IDC_SET_LEADCHNL_RV_PACING_CATHODE_ELECTRODE_1: NORMAL
MDC_IDC_SET_LEADCHNL_RV_PACING_CATHODE_LOCATION_1: NORMAL
MDC_IDC_SET_LEADCHNL_RV_PACING_POLARITY: NORMAL
MDC_IDC_SET_LEADCHNL_RV_PACING_PULSEWIDTH: 0.4 MS
MDC_IDC_SET_LEADCHNL_RV_SENSING_ANODE_ELECTRODE_1: NORMAL
MDC_IDC_SET_LEADCHNL_RV_SENSING_ANODE_LOCATION_1: NORMAL
MDC_IDC_SET_LEADCHNL_RV_SENSING_CATHODE_ELECTRODE_1: NORMAL
MDC_IDC_SET_LEADCHNL_RV_SENSING_CATHODE_LOCATION_1: NORMAL
MDC_IDC_SET_LEADCHNL_RV_SENSING_POLARITY: NORMAL
MDC_IDC_SET_LEADCHNL_RV_SENSING_SENSITIVITY: 0.9 MV
MDC_IDC_SET_ZONE_DETECTION_INTERVAL: 400 MS
MDC_IDC_SET_ZONE_TYPE: NORMAL
MDC_IDC_STAT_BRADY_AP_VP_PERCENT: 0 %
MDC_IDC_STAT_BRADY_AP_VS_PERCENT: 0 %
MDC_IDC_STAT_BRADY_AS_VP_PERCENT: 99.64 %
MDC_IDC_STAT_BRADY_AS_VS_PERCENT: 0.36 %
MDC_IDC_STAT_BRADY_DTM_END: NORMAL
MDC_IDC_STAT_BRADY_DTM_START: NORMAL
MDC_IDC_STAT_BRADY_RA_PERCENT_PACED: 0 %
MDC_IDC_STAT_BRADY_RV_PERCENT_PACED: 99.64 %
MDC_IDC_STAT_CRT_DTM_END: NORMAL
MDC_IDC_STAT_CRT_DTM_START: NORMAL
MDC_IDC_STAT_CRT_LV_PERCENT_PACED: 99.61 %
MDC_IDC_STAT_CRT_PERCENT_PACED: 99.61 %
MDC_IDC_STAT_EPISODE_RECENT_COUNT: 0
MDC_IDC_STAT_EPISODE_RECENT_COUNT: 0
MDC_IDC_STAT_EPISODE_RECENT_COUNT: 3
MDC_IDC_STAT_EPISODE_RECENT_COUNT_DTM_END: NORMAL
MDC_IDC_STAT_EPISODE_RECENT_COUNT_DTM_START: NORMAL
MDC_IDC_STAT_EPISODE_TOTAL_COUNT: 0
MDC_IDC_STAT_EPISODE_TOTAL_COUNT: 0
MDC_IDC_STAT_EPISODE_TOTAL_COUNT: 4
MDC_IDC_STAT_EPISODE_TOTAL_COUNT_DTM_END: NORMAL
MDC_IDC_STAT_EPISODE_TOTAL_COUNT_DTM_START: NORMAL
MDC_IDC_STAT_EPISODE_TYPE: NORMAL

## 2021-06-11 DIAGNOSIS — Z79.4 TYPE 2 DIABETES MELLITUS WITHOUT COMPLICATION, WITH LONG-TERM CURRENT USE OF INSULIN (H): ICD-10-CM

## 2021-06-11 DIAGNOSIS — E11.9 TYPE 2 DIABETES MELLITUS WITHOUT COMPLICATION, WITH LONG-TERM CURRENT USE OF INSULIN (H): ICD-10-CM

## 2021-06-11 NOTE — TELEPHONE ENCOUNTER
Metformin   Routing refill request to provider for review/approval because:  Patient does NOT have a diagnosis of CHF

## 2021-06-23 NOTE — PROGRESS NOTES
"[facility]      I had the pleasure of seeing Doyle when he came for annual follow-up.  This 61 year old sees Dr. Holliday for his history of:    1. AFib; tachycardia-induced CM (resolved), s/p AV node ablation and Medtronic CRT-P 4/2010 with gen change 11/29/2018. He previously failed amiodarone. Remains on warfarin for [id number] 2 (diabetes, hypertension)  2. Diabetes, on metformin and insulin   3. Hypertension  4. NSVT - 17 beat run noted on last PPM check.  Asx'c  5. Retinitis Pigmentosa    I last saw Doyle in 2018 for planned generator replacement.  He saw Dr. Holliday 6/2019 at which time he was doing well.  They reviewed normalization of his LV EF after AV node ablation/CRT-P.  Annual follow-up was recommended and is now back for routine follow-up.    Interval History:  Activity levels have really dropped off with COVID19 restrictions. He's been home most of the time but still able to do all of his AODLs, house cleaning chores, etc without SOB, CP.    No complaints of chest pain, pressure or tightness.  No orthopnea, PND or change in mild LE edema. No change in exercise tolerance or breathing. Overall, he is feeling well.     Takes BPs at home daily, typically 110-120s/70-80s and HR 70s.    Reviewed the episode of NSVT seen on PPM interrogation on 6/5. He was trying to get all of his groceries inside in the heat/humidity. Noted that \"he was working hard\" and \"pushing it\" at the time.     VITALS:  Vitals: /86   Pulse 71   Ht 1.816 m (5' 11.5\")   Wt 142 kg (313 lb)   BMI 43.05 kg/m      Diagnostic Testing:  MDT CRT-P Device interrogation 6/8/2021, showed 99.6% BiV P in VVIR 70/130. 1 NSVT x 17 beats @ 140-160 bpm on 6/5 @ 1332  Echocardiogram 6/2017-EF 55-60%.  No RWMA. Trivial AoScl.  1+ TR  Stress Testing 8/2009-no ischemia/infarction    Plan:  Echo - will call with results  Annual follow-up with PPM check in 1 year    Assessment/Plan:    1. Permanent AFib    S/p AVN ablation/CRT-P (MDT) 2010, " gen change 11/2018    Device interrogation as above.  Adequate histogram    Remains on warfarin for [id number] 2 (HTN, DM)    PLAN:    Continue current medications    Annual in-person PPM check and will schedule with my appt given his Retinitis Pigmentosa makes it hard to drive.      2. NSVT    Noted on PPM interrogation 6/2021.  17 beat run @ 140-160bpm.  Asx'c but remembers was exerting himself at that time.    Last echo 2017 with nl EF    Remains on Metoprolol  mg daily    PLAN:    Updated Echo. Will call with results as it's very difficult for him to get to clinic     Continue metoprolol Xl 100 mg daily      Addendum 7/22: Echo with nl EF.  Will plan stress test if VT reoccurs despite BB therapy.      Jane Caruso PA-C, MSPAS      Orders Placed This Encounter   Procedures     Follow-Up with Cardiac Advanced Practice Provider     Echocardiogram Complete     No orders of the defined types were placed in this encounter.    There are no discontinued medications.      Encounter Diagnoses   Name Primary?     Cardiac pacemaker in situ Yes     ICD (implantable cardioverter-defibrillator), biventricular, in situ        CURRENT MEDICATIONS:  Current Outpatient Medications   Medication Sig Dispense Refill     alcohol swab prep pads Use to swab area of injection/lily as directed. 100 each 3     amLODIPine (NORVASC) 10 MG tablet TAKE 1 TABLET(10 MG) BY MOUTH DAILY 90 tablet 2     blood glucose (NO BRAND SPECIFIED) test strip Use to test blood sugar 3 times daily or as directed. To accompany: ACCU-CHEK GUIDE  strip 11     blood glucose calibration (NO BRAND SPECIFIED) solution To accompany: ACCU-CHEK GUIDE ME 1 Bottle 3     blood glucose monitoring (NO BRAND SPECIFIED) meter device kit Use to test blood sugar 3 times daily or as directed. Preferred blood glucose meter OR supplies to accompany: ACCU-CHEK GUIDE ME 1 kit 0     fish oil-omega-3 fatty acids 1000 MG capsule Take 4 capsules by mouth daily.        "insulin aspart (NOVOLOG FLEXPEN) 100 UNIT/ML pen 1 unit per 7g of carbs three times daily before meals; Max daily dose of 60 30 mL 0     insulin glargine (LANTUS PEN) 100 UNIT/ML pen Inject 60 Units Subcutaneous At Bedtime 60 mL 0     insulin pen needle (B-D U/F) 31G X 5 MM miscellaneous Use 1 daily or as directed. 100 each 0     losartan (COZAAR) 100 MG tablet Take 1 tablet (100 mg) by mouth daily 90 tablet 3     metFORMIN (GLUCOPHAGE) 1000 MG tablet TAKE 1 TABLET(1000 MG) BY MOUTH TWICE DAILY WITH MEALS 180 tablet 0     metoprolol succinate ER (TOPROL-XL) 100 MG 24 hr tablet Take 1 tablet (100 mg) by mouth daily 90 tablet 1     MULTIVITAMIN OR 1 qd       NITROSTAT 0.4 MG SL SUBL 1 TAB EVERY 5 MIN AS NEEDED, UP TO 3 PER EPISODE       simvastatin (ZOCOR) 80 MG tablet TAKE ONE-HALF TABLET( 40 MG) BY MOUTH AT BEDTIME 90 tablet 2     warfarin ANTICOAGULANT (COUMADIN) 7.5 MG tablet TAKE ONE TABLET BY MOUTH DAILY, EXCEPT ONE-HALF TABLET ON TUE/FRI AS DIRECTED BY THE ANTICOAGULATION CLINIC 84 tablet 1       ALLERGIES   No Known Allergies      Review of Systems:  Skin:  Negative     Eyes:  Positive for glasses;visual blurring  ENT:  Negative    Respiratory:  Negative for dyspnea on exertion;shortness of breath;cough  Cardiovascular:  Negative for;palpitations;chest pain;dizziness;lightheadedness;fatigue Positive for;exercise intolerance;edema  Gastroenterology: Negative melena;hematochezia  Genitourinary:  Negative hematuria  Musculoskeletal:  Positive for arthritis  Neurologic:  Negative stroke  Psychiatric:  Negative    Heme/Lymph/Imm:  Negative bleeding disorder  Endocrine:  Positive for diabetes    Physical Exam:  Vitals: /86   Pulse 71   Ht 1.816 m (5' 11.5\")   Wt 142 kg (313 lb)   BMI 43.05 kg/m      Constitutional:  cooperative, alert and oriented, well developed, well nourished, in no acute distress        Skin:  warm and dry to the touch, no apparent skin lesions or masses noted        Head:  " normocephalic, no masses or lesions        Eyes:  pupils equal and round;conjunctivae and lids unremarkable;sclera white        ENT:  no pallor or cyanosis, dentition good        Neck:  JVP normal;no carotid bruit        Chest:  clear to auscultation        Cardiac: regular rhythm;normal S1 and S2;no S3 or S4;no murmurs, gallops or rubs detected                  Abdomen:  abdomen soft obese      Vascular: pulses full and equal                                      Extremities and Back:  no deformities, clubbing, cyanosis, erythema observed        Neurological:  no gross motor deficits            PAST MEDICAL HISTORY:  Past Medical History:   Diagnosis Date     Atrial fibrillation (H)     s/p AVN ablation, BIV PPM     Congestive heart failure (H)     tachycardia-induced cardiomyopathy     Essential hypertension, benign      Hyperlipidemia LDL goal <100 10/31/2010     Hyponatremia 9/8/2009     Morbid obesity (H)      Open wound of foot except toe(s) alone, without mention of complication      Retinitis pigmentosa      Type 2 diabetes, HbA1C goal < 8% (H) 6/26/2012       PAST SURGICAL HISTORY:  Past Surgical History:   Procedure Laterality Date     CARDIOVERSION  9/2009, 10/2009     COLONOSCOPY N/A 5/9/2019    Procedure: COLONOSCOPY;  Surgeon: Godwin Santillan MD;  Location: [facility]     H ABLATION AV NODE  4/8/10     IMPLANT PACEMAKER  4/8/10    BIV PPM- Medtronic InSync III     TONSILLECTOMY      as kid     Z NONSPECIFIC PROCEDURE  06/2007    debritement       FAMILY HISTORY:  Family History   Problem Relation Age of Onset     Diabetes Mother      Hypertension Father      Diabetes Father      Hypertension Maternal Grandmother      Diabetes Maternal Grandmother      Diabetes Maternal Grandfather      Hypertension Maternal Grandfather      Cardiovascular Maternal Grandfather      Hypertension Paternal Grandfather      Cardiovascular Paternal Grandfather      Diabetes Sister      Hypertension Sister        SOCIAL  HISTORY:  Social History     Socioeconomic History     Marital status: Single     Spouse name: None     Number of children: None     Years of education: None     Highest education level: None   Occupational History     Occupation: repair printer     Employer: [employer]   Social [employer]     Financial resource strain: None     Food insecurity     Worry: None     Inability: None     Transportation needs     Medical: None     Non-medical: None   Tobacco Use     Smoking status: Never Smoker     Smokeless tobacco: Never Used   Substance and Sexual Activity     Alcohol use: No     Drug use: No     Sexual activity: Never   Lifestyle     Physical activity     Days per week: None     Minutes per session: None     Stress: None   Relationships     Social connections     Talks on phone: None     Gets together: None     Attends Zoroastrianism service: None     Active member of club or organization: None     Attends meetings of clubs or organizations: None     Relationship status: None     Intimate partner violence     Fear of current or ex partner: None     Emotionally abused: None     Physically abused: None     Forced sexual activity: None   Other Topics Concern      Service Not Asked     Blood Transfusions Not Asked     Caffeine Concern Yes     Comment: occasionally soda     Occupational Exposure Not Asked     Hobby Hazards Not Asked     Sleep Concern Not Asked     Stress Concern Not Asked     Weight Concern Not Asked     Special Diet Yes     Comment: low sodium, low fat, DM diet      Back Care Not Asked     Exercise Yes     Comment: walking, stairs      Bike Helmet Not Asked     Seat Belt Not Asked     Self-Exams Not Asked     Parent/sibling w/ CABG, MI or angioplasty before 65F 55M? Not Asked   Social History Narrative     None

## 2021-06-24 ENCOUNTER — OFFICE VISIT (OUTPATIENT)
Dept: CARDIOLOGY | Facility: CLINIC | Age: 61
End: 2021-06-24
Payer: COMMERCIAL

## 2021-06-24 VITALS
BODY MASS INDEX: 42.39 KG/M2 | SYSTOLIC BLOOD PRESSURE: 136 MMHG | HEART RATE: 71 BPM | DIASTOLIC BLOOD PRESSURE: 86 MMHG | WEIGHT: 313 LBS | HEIGHT: 72 IN

## 2021-06-24 DIAGNOSIS — Z95.810 ICD (IMPLANTABLE CARDIOVERTER-DEFIBRILLATOR), BIVENTRICULAR, IN SITU: ICD-10-CM

## 2021-06-24 DIAGNOSIS — Z95.0 CARDIAC PACEMAKER IN SITU: Primary | ICD-10-CM

## 2021-06-24 PROCEDURE — 99214 OFFICE O/P EST MOD 30 MIN: CPT | Performed by: PHYSICIAN ASSISTANT

## 2021-06-24 ASSESSMENT — MIFFLIN-ST. JEOR: SCORE: 2259.82

## 2021-06-24 NOTE — PATIENT INSTRUCTIONS
"Doyle - it was nice to see you today!    1. Reviewed that overall things are going well heart-wise. Limited activity due to COVID restrictions  2. Reviewed episode on 6/5/2021 - funny rhythm from bottom of heart when \"really pushing it.\"     PLAN:  1. Get echocardiogram to get pumping function of heart update (last checked in 2017). We'll call with results  2. See us back in 1 year (we'll work on getting this set up at same time as Pacer check) but CALL if issues prior! [phone]  "

## 2021-06-24 NOTE — LETTER
"6/24/2021    David Almendarez MD  [address] [facility] [address]    RE: Mckay Hsu       Dear Colleague,    I had the pleasure of seeing Mckay Hsu in the [facility].    [facility]      I had the pleasure of seeing Doyle when he came for annual follow-up.  This 61 year old sees Dr. Holliday for his history of:    1. AFib; tachycardia-induced CM (resolved), s/p AV node ablation and Medtronic CRT-P 4/2010 with gen change 11/29/2018. He previously failed amiodarone. Remains on warfarin for [id number] 2 (diabetes, hypertension)  2. Diabetes, on metformin and insulin   3. Hypertension  4. NSVT - 17 beat run noted on last PPM check.  Asx'c  5. Retinitis Pigmentosa    I last saw Doyle in 2018 for planned generator replacement.  He saw Dr. Holliday 6/2019 at which time he was doing well.  They reviewed normalization of his LV EF after AV node ablation/CRT-P.  Annual follow-up was recommended and is now back for routine follow-up.    Interval History:  Activity levels have really dropped off with COVID19 restrictions. He's been home most of the time but still able to do all of his AODLs, house cleaning chores, etc without SOB, CP.    No complaints of chest pain, pressure or tightness.  No orthopnea, PND or change in mild LE edema. No change in exercise tolerance or breathing. Overall, he is feeling well.     Takes BPs at home daily, typically 110-120s/70-80s and HR 70s.    Reviewed the episode of NSVT seen on PPM interrogation on 6/5. He was trying to get all of his groceries inside in the heat/humidity. Noted that \"he was working hard\" and \"pushing it\" at the time.     VITALS:  Vitals: /86   Pulse 71   Ht 1.816 m (5' 11.5\")   Wt 142 kg (313 lb)   BMI 43.05 kg/m      Diagnostic Testing:  MDT CRT-P Device interrogation 6/8/2021, showed 99.6% BiV P in VVIR 70/130. 1 NSVT x 17 beats @ 140-160 bpm on 6/5 @ 1332  Echocardiogram 6/2017-EF " 55-60%.  No RWMA. Trivial AoScl.  1+ TR  Stress Testing 8/2009-no ischemia/infarction    Plan:  Echo - will call with results  Annual follow-up with PPM check in 1 year    Assessment/Plan:    1. Permanent AFib    S/p AVN ablation/CRT-P (MDT) 2010, gen change 11/2018    Device interrogation as above.  Adequate histogram    Remains on warfarin for [id number] 2 (HTN, DM)    PLAN:    Continue current medications    Annual in-person PPM check and will schedule with my appt given his Retinitis Pigmentosa makes it hard to drive.      2. NSVT    Noted on PPM interrogation 6/2021.  17 beat run @ 140-160bpm.  Asx'c but remembers was exerting himself at that time.    Last echo 2017 with nl EF    Remains on Metoprolol  mg daily    PLAN:    Updated Echo. Will call with results as it's very difficult for him to get to clinic     Continue metoprolol Xl 100 mg daily      Addendum 7/22: Echo with nl EF.  Will plan stress test if VT reoccurs despite BB therapy.      Jane Caruso PA-C, MSPAS      Orders Placed This Encounter   Procedures     Follow-Up with Cardiac Advanced Practice Provider     Echocardiogram Complete     No orders of the defined types were placed in this encounter.    There are no discontinued medications.      Encounter Diagnoses   Name Primary?     Cardiac pacemaker in situ Yes     ICD (implantable cardioverter-defibrillator), biventricular, in situ        CURRENT MEDICATIONS:  Current Outpatient Medications   Medication Sig Dispense Refill     alcohol swab prep pads Use to swab area of injection/lily as directed. 100 each 3     amLODIPine (NORVASC) 10 MG tablet TAKE 1 TABLET(10 MG) BY MOUTH DAILY 90 tablet 2     blood glucose (NO BRAND SPECIFIED) test strip Use to test blood sugar 3 times daily or as directed. To accompany: ACCU-CHEK GUIDE  strip 11     blood glucose calibration (NO BRAND SPECIFIED) solution To accompany: ACCU-CHEK GUIDE ME 1 Bottle 3     blood glucose monitoring (NO BRAND SPECIFIED)  "meter device kit Use to test blood sugar 3 times daily or as directed. Preferred blood glucose meter OR supplies to accompany: ACCU-CHEK GUIDE ME 1 kit 0     fish oil-omega-3 fatty acids 1000 MG capsule Take 4 capsules by mouth daily.       insulin aspart (NOVOLOG FLEXPEN) 100 UNIT/ML pen 1 unit per 7g of carbs three times daily before meals; Max daily dose of 60 30 mL 0     insulin glargine (LANTUS PEN) 100 UNIT/ML pen Inject 60 Units Subcutaneous At Bedtime 60 mL 0     insulin pen needle (B-D U/F) 31G X 5 MM miscellaneous Use 1 daily or as directed. 100 each 0     losartan (COZAAR) 100 MG tablet Take 1 tablet (100 mg) by mouth daily 90 tablet 3     metFORMIN (GLUCOPHAGE) 1000 MG tablet TAKE 1 TABLET(1000 MG) BY MOUTH TWICE DAILY WITH MEALS 180 tablet 0     metoprolol succinate ER (TOPROL-XL) 100 MG 24 hr tablet Take 1 tablet (100 mg) by mouth daily 90 tablet 1     MULTIVITAMIN OR 1 qd       NITROSTAT 0.4 MG SL SUBL 1 TAB EVERY 5 MIN AS NEEDED, UP TO 3 PER EPISODE       simvastatin (ZOCOR) 80 MG tablet TAKE ONE-HALF TABLET( 40 MG) BY MOUTH AT BEDTIME 90 tablet 2     warfarin ANTICOAGULANT (COUMADIN) 7.5 MG tablet TAKE ONE TABLET BY MOUTH DAILY, EXCEPT ONE-HALF TABLET ON TUE/FRI AS DIRECTED BY THE ANTICOAGULATION CLINIC 84 tablet 1       ALLERGIES   No Known Allergies      Review of Systems:  Skin:  Negative     Eyes:  Positive for glasses;visual blurring  ENT:  Negative    Respiratory:  Negative for dyspnea on exertion;shortness of breath;cough  Cardiovascular:  Negative for;palpitations;chest pain;dizziness;lightheadedness;fatigue Positive for;exercise intolerance;edema  Gastroenterology: Negative melena;hematochezia  Genitourinary:  Negative hematuria  Musculoskeletal:  Positive for arthritis  Neurologic:  Negative stroke  Psychiatric:  Negative    Heme/Lymph/Imm:  Negative bleeding disorder  Endocrine:  Positive for diabetes    Physical Exam:  Vitals: /86   Pulse 71   Ht 1.816 m (5' 11.5\")   Wt 142 kg " (313 lb)   BMI 43.05 kg/m      Constitutional:  cooperative, alert and oriented, well developed, well nourished, in no acute distress        Skin:  warm and dry to the touch, no apparent skin lesions or masses noted        Head:  normocephalic, no masses or lesions        Eyes:  pupils equal and round;conjunctivae and lids unremarkable;sclera white        ENT:  no pallor or cyanosis, dentition good        Neck:  JVP normal;no carotid bruit        Chest:  clear to auscultation        Cardiac: regular rhythm;normal S1 and S2;no S3 or S4;no murmurs, gallops or rubs detected                  Abdomen:  abdomen soft obese      Vascular: pulses full and equal                                      Extremities and Back:  no deformities, clubbing, cyanosis, erythema observed        Neurological:  no gross motor deficits            PAST MEDICAL HISTORY:  Past Medical History:   Diagnosis Date     Atrial fibrillation (H)     s/p AVN ablation, BIV PPM     Congestive heart failure (H)     tachycardia-induced cardiomyopathy     Essential hypertension, benign      Hyperlipidemia LDL goal <100 10/31/2010     Hyponatremia 9/8/2009     Morbid obesity (H)      Open wound of foot except toe(s) alone, without mention of complication      Retinitis pigmentosa      Type 2 diabetes, HbA1C goal < 8% (H) 6/26/2012       PAST SURGICAL HISTORY:  Past Surgical History:   Procedure Laterality Date     CARDIOVERSION  9/2009, 10/2009     COLONOSCOPY N/A 5/9/2019    Procedure: COLONOSCOPY;  Surgeon: Godwin Santillan MD;  Location: [facility]     H ABLATION AV NODE  4/8/10     IMPLANT PACEMAKER  4/8/10    BIV PPM- Medtronic InSync III     TONSILLECTOMY      as kid     ZZC NONSPECIFIC PROCEDURE  06/2007    debritement       FAMILY HISTORY:  Family History   Problem Relation Age of Onset     Diabetes Mother      Hypertension Father      Diabetes Father      Hypertension Maternal Grandmother      Diabetes Maternal Grandmother      Diabetes Maternal  Grandfather      Hypertension Maternal Grandfather      Cardiovascular Maternal Grandfather      Hypertension Paternal Grandfather      Cardiovascular Paternal Grandfather      Diabetes Sister      Hypertension Sister        SOCIAL HISTORY:  Social History     Socioeconomic History     Marital status: Single     Spouse name: None     Number of children: None     Years of education: None     Highest education level: None   Occupational History     Occupation: repair printer     Employer: [employer]   Social Needs     Financial resource strain: None     Food insecurity     Worry: None     Inability: None     Transportation needs     Medical: None     Non-medical: None   Tobacco Use     Smoking status: Never Smoker     Smokeless tobacco: Never Used   Substance and Sexual Activity     Alcohol use: No     Drug use: No     Sexual activity: Never   Lifestyle     Physical activity     Days per week: None     Minutes per session: None     Stress: None   Relationships     Social connections     Talks on phone: None     Gets together: None     Attends Lutheran service: None     Active member of club or organization: None     Attends meetings of clubs or organizations: None     Relationship status: None     Intimate partner violence     Fear of current or ex partner: None     Emotionally abused: None     Physically abused: None     Forced sexual activity: None   Other Topics Concern      Service Not Asked     Blood Transfusions Not Asked     Caffeine Concern Yes     Comment: occasionally soda     Occupational Exposure Not Asked     Hobby Hazards Not Asked     Sleep Concern Not Asked     Stress Concern Not Asked     Weight Concern Not Asked     Special Diet Yes     Comment: low sodium, low fat, DM diet      Back Care Not Asked     Exercise Yes     Comment: walking, stairs      Bike Helmet Not Asked     Seat Belt Not Asked     Self-Exams Not Asked     Parent/sibling w/ CABG, MI or angioplasty before 65F 55M? Not Asked    Social History Narrative     None     Thank you for allowing me to participate in the care of your patient.      Sincerely,     Peggy Caruso PA-C     [facility]    cc:   No referring provider defined for this encounter.

## 2021-07-07 DIAGNOSIS — I50.22 CHRONIC SYSTOLIC CONGESTIVE HEART FAILURE (H): Primary | ICD-10-CM

## 2021-07-07 DIAGNOSIS — I48.20 CHRONIC ATRIAL FIBRILLATION (H): ICD-10-CM

## 2021-07-13 ENCOUNTER — LAB (OUTPATIENT)
Dept: LAB | Facility: CLINIC | Age: 61
End: 2021-07-13
Payer: COMMERCIAL

## 2021-07-13 ENCOUNTER — ANTICOAGULATION THERAPY VISIT (OUTPATIENT)
Dept: ANTICOAGULATION | Facility: CLINIC | Age: 61
End: 2021-07-13

## 2021-07-13 DIAGNOSIS — I48.20 CHRONIC ATRIAL FIBRILLATION (H): ICD-10-CM

## 2021-07-13 DIAGNOSIS — Z79.01 LONG TERM CURRENT USE OF ANTICOAGULANT THERAPY: Primary | ICD-10-CM

## 2021-07-13 LAB — INR BLD: 2.5 (ref 0.9–1.1)

## 2021-07-13 PROCEDURE — 85610 PROTHROMBIN TIME: CPT

## 2021-07-13 PROCEDURE — 36416 COLLJ CAPILLARY BLOOD SPEC: CPT

## 2021-07-13 NOTE — PROGRESS NOTES
ANTICOAGULATION MANAGEMENT     Mckay Hsu 61 year old male is on warfarin with therapeutic INR result. (Goal INR 2.0-3.0)    Recent labs: (last 7 days)     07/13/21  1325   INR 2.5*       ASSESSMENT     Source(s): Patient/Caregiver Call       Warfarin doses taken: Warfarin taken as instructed    Diet: No new diet changes identified    New illness, injury, or hospitalization: No    Medication/supplement changes: None noted    Signs or symptoms of bleeding or clotting: No    Previous INR: Therapeutic last 2(+) visits    Additional findings: None     PLAN     Recommended plan for no diet, medication or health factor changes affecting INR     Dosing Instructions: Continue your current warfarin dose with next INR in 6 weeks       Summary  As of 7/13/2021    Next INR check:               Telephone call with Mckay who verbalizes understanding and agrees to plan    Lab visit scheduled    Education provided: None required    Plan made per [facility] anticoagulation protocol    Nevaeh Sanon RN  Anticoagulation Clinic  7/13/2021    _______________________________________________________________________     Anticoagulation Episode Summary     Current INR goal:  2.0-3.0   TTR:  92.0 % (1 y)   Target end date:  Indefinite   Send INR reminders to:  MARILEE DRAKE [facility]    Indications    Long-term (current) use of anticoagulants [Z79.01] [Z79.01]  Chronic atrial fibrillation (H) [I48.20]  New onset atrial fibrillation (H) (Resolved) [I48.91]           Comments:           Anticoagulation Care Providers     Provider Role Specialty Phone number    David Almendarez MD Referring Internal Medicine [phone]

## 2021-07-14 DIAGNOSIS — E78.5 HYPERLIPIDEMIA LDL GOAL <100: ICD-10-CM

## 2021-07-14 RX ORDER — SIMVASTATIN 80 MG
TABLET ORAL
Qty: 90 TABLET | Refills: 1 | Status: SHIPPED | OUTPATIENT
Start: 2021-07-14 | End: 2022-07-08

## 2021-07-14 NOTE — TELEPHONE ENCOUNTER
Routing refill request to provider for review/approval because:  Drug interaction warning      Analilia Black RN  [facility]

## 2021-07-19 DIAGNOSIS — Z79.4 TYPE 2 DIABETES MELLITUS WITHOUT COMPLICATION, WITH LONG-TERM CURRENT USE OF INSULIN (H): ICD-10-CM

## 2021-07-19 DIAGNOSIS — E11.9 TYPE 2 DIABETES MELLITUS WITHOUT COMPLICATION, WITH LONG-TERM CURRENT USE OF INSULIN (H): ICD-10-CM

## 2021-07-20 RX ORDER — INSULIN ASPART 100 [IU]/ML
INJECTION, SOLUTION INTRAVENOUS; SUBCUTANEOUS
Qty: 30 ML | Refills: 0 | Status: SHIPPED | OUTPATIENT
Start: 2021-07-20 | End: 2021-12-31

## 2021-07-20 NOTE — TELEPHONE ENCOUNTER
Routing refill request to provider for review/approval because:  Patient needs to be seen per protocol.     Analilia Black RN  [facility]

## 2021-07-21 ENCOUNTER — HOSPITAL ENCOUNTER (OUTPATIENT)
Dept: CARDIOLOGY | Facility: CLINIC | Age: 61
Discharge: HOME | End: 2021-07-21
Attending: PHYSICIAN ASSISTANT | Admitting: PHYSICIAN ASSISTANT
Payer: COMMERCIAL

## 2021-07-21 DIAGNOSIS — Z95.0 CARDIAC PACEMAKER IN SITU: Primary | ICD-10-CM

## 2021-07-21 DIAGNOSIS — Z95.810 ICD (IMPLANTABLE CARDIOVERTER-DEFIBRILLATOR), BIVENTRICULAR, IN SITU: ICD-10-CM

## 2021-07-21 DIAGNOSIS — Z79.4 TYPE 2 DIABETES MELLITUS WITHOUT COMPLICATION, WITH LONG-TERM CURRENT USE OF INSULIN (H): ICD-10-CM

## 2021-07-21 DIAGNOSIS — E11.9 TYPE 2 DIABETES MELLITUS WITHOUT COMPLICATION, WITH LONG-TERM CURRENT USE OF INSULIN (H): ICD-10-CM

## 2021-07-21 LAB — LVEF ECHO: NORMAL

## 2021-07-21 PROCEDURE — 999N000208 ECHOCARDIOGRAM COMPLETE

## 2021-07-21 PROCEDURE — 255N000002 HC RX 255 OP 636: Performed by: PHYSICIAN ASSISTANT

## 2021-07-21 PROCEDURE — 93306 TTE W/DOPPLER COMPLETE: CPT | Mod: 26 | Performed by: INTERNAL MEDICINE

## 2021-07-21 RX ADMIN — HUMAN ALBUMIN MICROSPHERES AND PERFLUTREN 3 ML: 10; .22 INJECTION, SOLUTION INTRAVENOUS at 14:34

## 2021-07-21 NOTE — TELEPHONE ENCOUNTER
"Patients pharmacy is trying to just dispense 50 strips at at a time for patient twice monthly.    Would provider either please change script to include   \"use to test blood sugar 3 times days daily or as directed please provide 30 day supply at once\"    Or  \"use to test blood sugar 4 times daily or as directed\"    Pharmacist adds up the 3 times daily and since only adds up to 90 strips. Won't fill the 100 qty.    Please change if appropriate and call patient to let him know which change you made.  [phone] ok to leave detailed message    Thank you   "

## 2021-07-21 NOTE — TELEPHONE ENCOUNTER
Please place order as to prescription being requested.  It appears the message is referencing test strips but the prescription that is unsigned is referencing insulin    Thanks

## 2021-07-22 ENCOUNTER — TELEPHONE (OUTPATIENT)
Dept: CARDIOLOGY | Facility: CLINIC | Age: 61
End: 2021-07-22

## 2021-07-22 NOTE — TELEPHONE ENCOUNTER
----- Message from BONIFACIO ReddingC sent at 7/22/2021  7:02 AM CDT -----  Pls let Doyle know good news - Echo done for NSVT showed nl EF 55-60%. No sig valve abnls and no sig change c/w 6/2017. Will keep watching PPM checks - if more VT seen, would get stress test.  No changes needed at this time. See us back 6/2022 with annual PPM check, earlier if needed  Jane ANDRES OV and annual device check already ordered for 6/2022. Called pt, gave him results and Jane LOPEZ's message above. Pt states understanding.

## 2021-07-23 RX ORDER — INSULIN GLARGINE 100 [IU]/ML
INJECTION, SOLUTION SUBCUTANEOUS
Qty: 60 ML | Refills: 0 | Status: SHIPPED | OUTPATIENT
Start: 2021-07-23 | End: 2021-12-31

## 2021-07-23 NOTE — TELEPHONE ENCOUNTER
Pt needed both, reordered as appropriate for both.    Put strips as 90 days supply as this should be available with supplies, but also added 30 day note.   Carrie Kiran, RN  [facility] RN Triage Team

## 2021-07-28 NOTE — TELEPHONE ENCOUNTER
Signed Prescriptions:                        Disp   Refills    LANTUS SOLOSTAR 100 UNIT/ML soln           60 mL  0        Sig: ADMINISTER 60 UNITS UNDER THE SKIN AT BEDTIME  Authorizing Provider: MIKHAIL HAYDEN  Ordering User: GUILHERME MEDEROS    blood glucose (NO BRAND SPECIFIED) test st*300 st*3        Sig: Use to test blood sugar 3 times daily or as directed.           To accompany: ACCU-CHEK GUIDE ME  Please dispense           90 days if able, at least 30 days minimum  Authorizing Provider: MIKHAIL HAYDEN  Ordering User: GUILHERME MEDEROS

## 2021-08-24 ENCOUNTER — ANTICOAGULATION THERAPY VISIT (OUTPATIENT)
Dept: ANTICOAGULATION | Facility: CLINIC | Age: 61
End: 2021-08-24

## 2021-08-24 ENCOUNTER — LAB (OUTPATIENT)
Dept: LAB | Facility: CLINIC | Age: 61
End: 2021-08-24
Payer: COMMERCIAL

## 2021-08-24 DIAGNOSIS — Z79.01 LONG TERM CURRENT USE OF ANTICOAGULANT THERAPY: Primary | ICD-10-CM

## 2021-08-24 DIAGNOSIS — I48.20 CHRONIC ATRIAL FIBRILLATION (H): ICD-10-CM

## 2021-08-24 LAB — INR BLD: 1.6 (ref 0.9–1.1)

## 2021-08-24 PROCEDURE — 99207 PR NO CHARGE NURSE ONLY: CPT

## 2021-08-24 PROCEDURE — 85610 PROTHROMBIN TIME: CPT

## 2021-08-24 PROCEDURE — 36416 COLLJ CAPILLARY BLOOD SPEC: CPT

## 2021-08-24 NOTE — PROGRESS NOTES
ANTICOAGULATION FOLLOW-UP CLINIC VISIT    Patient Name:  Mckay Hsu  Date:  2021  Contact Type:  Telephone    SUBJECTIVE:         OBJECTIVE    Recent labs: (last 7 days)     21  1331   INR 1.6*       ASSESSMENT / PLAN  INR assessment THER    Recheck INR In: 2 WEEKS    INR Location Clinic      Anticoagulation Summary  As of 2021    INR goal:  2.0-3.0   TTR:  86.9 % (1 y)   INR used for dosin.6 (2021)   Warfarin maintenance plan:  3.75 mg (7.5 mg x 0.5) every Tue, Fri; 7.5 mg (7.5 mg x 1) all other days   Full warfarin instructions:  : 7.5 mg; Otherwise 3.75 mg every Tue, Fri; 7.5 mg all other days   Weekly warfarin total:  45 mg   Plan last modified:  Irma Villeda RN (2020)   Next INR check:  2021   Priority:  High   Target end date:  Indefinite    Indications    Long-term (current) use of anticoagulants [Z79.01] [Z79.01]  Chronic atrial fibrillation (H) [I48.20]  New onset atrial fibrillation (H) (Resolved) [I48.91]             Anticoagulation Episode Summary     INR check location:      Preferred lab:      Send INR reminders to:  [facility]    Comments:        Anticoagulation Care Providers     Provider Role Specialty Phone number    David Almendarez MD Referring Internal Medicine [phone]            See the Encounter Report to view Anticoagulation Flowsheet and Dosing Calendar (Go to Encounters tab in chart review, and find the Anticoagulation Therapy Visit)        Collette Dunbar RN

## 2021-09-07 ENCOUNTER — LAB (OUTPATIENT)
Dept: LAB | Facility: CLINIC | Age: 61
End: 2021-09-07
Payer: COMMERCIAL

## 2021-09-07 ENCOUNTER — ANTICOAGULATION THERAPY VISIT (OUTPATIENT)
Dept: ANTICOAGULATION | Facility: CLINIC | Age: 61
End: 2021-09-07

## 2021-09-07 DIAGNOSIS — I48.20 CHRONIC ATRIAL FIBRILLATION (H): ICD-10-CM

## 2021-09-07 DIAGNOSIS — Z79.01 LONG TERM CURRENT USE OF ANTICOAGULANT THERAPY: Primary | ICD-10-CM

## 2021-09-07 LAB — INR BLD: 2.1 (ref 0.9–1.1)

## 2021-09-07 PROCEDURE — 36416 COLLJ CAPILLARY BLOOD SPEC: CPT

## 2021-09-07 PROCEDURE — 85610 PROTHROMBIN TIME: CPT

## 2021-09-07 NOTE — PROGRESS NOTES
ANTICOAGULATION MANAGEMENT     Mckay DIAZ Shadi 61 year old male is on warfarin with therapeutic INR result. (Goal INR 2.0-3.0)    Recent labs: (last 7 days)     09/07/21  1321   INR 2.1*       ASSESSMENT     Source(s): Chart Review and Patient/Caregiver Call       Warfarin doses taken: Warfarin taken as instructed    Diet: No new diet changes identified    New illness, injury, or hospitalization: No    Medication/supplement changes: None noted    Signs or symptoms of bleeding or clotting: No    Previous INR: Subtherapeutic    Additional findings: None     PLAN     Recommended plan for no diet, medication or health factor changes affecting INR     Dosing Instructions: Continue your current warfarin dose with next INR in 2 weeks       Summary  As of 9/7/2021    Full warfarin instructions:  3.75 mg every Tue, Fri; 7.5 mg all other days   Next INR check:  9/21/2021             Telephone call with Mckay who verbalizes understanding and agrees to plan    Lab visit scheduled    Education provided: Please call back if any changes to your diet, medications or how you've been taking warfarin    Plan made per [facility] anticoagulation protocol    Sydni Song RN  Anticoagulation Clinic  9/7/2021    _______________________________________________________________________     Anticoagulation Episode Summary     Current INR goal:  2.0-3.0   TTR:  83.8 % (1 y)   Target end date:  Indefinite   Send INR reminders to:  [facility]    Indications    Long-term (current) use of anticoagulants [Z79.01] [Z79.01]  Chronic atrial fibrillation (H) [I48.20]  New onset atrial fibrillation (H) (Resolved) [I48.91]           Comments:           Anticoagulation Care Providers     Provider Role Specialty Phone number    David Almendarez MD Referring Internal Medicine [phone]

## 2021-09-08 ENCOUNTER — ANCILLARY PROCEDURE (OUTPATIENT)
Dept: CARDIOLOGY | Facility: CLINIC | Age: 61
End: 2021-09-08
Attending: INTERNAL MEDICINE
Payer: COMMERCIAL

## 2021-09-08 DIAGNOSIS — I49.5 SINUS NODE DYSFUNCTION (H): ICD-10-CM

## 2021-09-08 DIAGNOSIS — Z95.0 CARDIAC PACEMAKER IN SITU: ICD-10-CM

## 2021-09-08 LAB
MDC_IDC_LEAD_IMPLANT_DT: NORMAL
MDC_IDC_LEAD_IMPLANT_DT: NORMAL
MDC_IDC_LEAD_LOCATION: NORMAL
MDC_IDC_LEAD_LOCATION: NORMAL
MDC_IDC_LEAD_LOCATION_DETAIL_1: NORMAL
MDC_IDC_LEAD_LOCATION_DETAIL_1: NORMAL
MDC_IDC_LEAD_MFG: NORMAL
MDC_IDC_LEAD_MFG: NORMAL
MDC_IDC_LEAD_MODEL: NORMAL
MDC_IDC_LEAD_MODEL: NORMAL
MDC_IDC_LEAD_POLARITY_TYPE: NORMAL
MDC_IDC_LEAD_POLARITY_TYPE: NORMAL
MDC_IDC_LEAD_SERIAL: NORMAL
MDC_IDC_LEAD_SERIAL: NORMAL
MDC_IDC_MSMT_BATTERY_DTM: NORMAL
MDC_IDC_MSMT_BATTERY_REMAINING_LONGEVITY: 94 MO
MDC_IDC_MSMT_BATTERY_RRT_TRIGGER: 2.6
MDC_IDC_MSMT_BATTERY_STATUS: NORMAL
MDC_IDC_MSMT_BATTERY_VOLTAGE: 3 V
MDC_IDC_MSMT_LEADCHNL_LV_IMPEDANCE_VALUE: 361 OHM
MDC_IDC_MSMT_LEADCHNL_LV_IMPEDANCE_VALUE: 456 OHM
MDC_IDC_MSMT_LEADCHNL_LV_IMPEDANCE_VALUE: 589 OHM
MDC_IDC_MSMT_LEADCHNL_LV_IMPEDANCE_VALUE: 665 OHM
MDC_IDC_MSMT_LEADCHNL_LV_IMPEDANCE_VALUE: 836 OHM
MDC_IDC_MSMT_LEADCHNL_RA_IMPEDANCE_VALUE: 3401 OHM
MDC_IDC_MSMT_LEADCHNL_RA_IMPEDANCE_VALUE: 3401 OHM
MDC_IDC_MSMT_LEADCHNL_RV_IMPEDANCE_VALUE: 323 OHM
MDC_IDC_MSMT_LEADCHNL_RV_IMPEDANCE_VALUE: 361 OHM
MDC_IDC_MSMT_LEADCHNL_RV_PACING_THRESHOLD_AMPLITUDE: 0.75 V
MDC_IDC_MSMT_LEADCHNL_RV_PACING_THRESHOLD_PULSEWIDTH: 0.4 MS
MDC_IDC_MSMT_LEADCHNL_RV_SENSING_INTR_AMPL: 10.88 MV
MDC_IDC_PG_IMPLANT_DTM: NORMAL
MDC_IDC_PG_MFG: NORMAL
MDC_IDC_PG_MODEL: NORMAL
MDC_IDC_PG_SERIAL: NORMAL
MDC_IDC_PG_TYPE: NORMAL
MDC_IDC_SESS_CLINIC_NAME: NORMAL
MDC_IDC_SESS_DTM: NORMAL
MDC_IDC_SESS_TYPE: NORMAL
MDC_IDC_SET_BRADY_LOWRATE: 70 {BEATS}/MIN
MDC_IDC_SET_BRADY_MAX_SENSOR_RATE: 130 {BEATS}/MIN
MDC_IDC_SET_BRADY_MODE: NORMAL
MDC_IDC_SET_CRT_LVRV_DELAY: 0 MS
MDC_IDC_SET_CRT_PACED_CHAMBERS: NORMAL
MDC_IDC_SET_LEADCHNL_LV_PACING_AMPLITUDE: 2 V
MDC_IDC_SET_LEADCHNL_LV_PACING_ANODE_ELECTRODE_1: NORMAL
MDC_IDC_SET_LEADCHNL_LV_PACING_ANODE_LOCATION_1: NORMAL
MDC_IDC_SET_LEADCHNL_LV_PACING_CAPTURE_MODE: NORMAL
MDC_IDC_SET_LEADCHNL_LV_PACING_CATHODE_ELECTRODE_1: NORMAL
MDC_IDC_SET_LEADCHNL_LV_PACING_CATHODE_LOCATION_1: NORMAL
MDC_IDC_SET_LEADCHNL_LV_PACING_POLARITY: NORMAL
MDC_IDC_SET_LEADCHNL_LV_PACING_PULSEWIDTH: 0.5 MS
MDC_IDC_SET_LEADCHNL_RA_SENSING_ANODE_ELECTRODE_1: NORMAL
MDC_IDC_SET_LEADCHNL_RA_SENSING_ANODE_LOCATION_1: NORMAL
MDC_IDC_SET_LEADCHNL_RA_SENSING_CATHODE_ELECTRODE_1: NORMAL
MDC_IDC_SET_LEADCHNL_RA_SENSING_CATHODE_LOCATION_1: NORMAL
MDC_IDC_SET_LEADCHNL_RA_SENSING_POLARITY: NORMAL
MDC_IDC_SET_LEADCHNL_RA_SENSING_SENSITIVITY: NORMAL
MDC_IDC_SET_LEADCHNL_RV_PACING_AMPLITUDE: 2 V
MDC_IDC_SET_LEADCHNL_RV_PACING_ANODE_ELECTRODE_1: NORMAL
MDC_IDC_SET_LEADCHNL_RV_PACING_ANODE_LOCATION_1: NORMAL
MDC_IDC_SET_LEADCHNL_RV_PACING_CAPTURE_MODE: NORMAL
MDC_IDC_SET_LEADCHNL_RV_PACING_CATHODE_ELECTRODE_1: NORMAL
MDC_IDC_SET_LEADCHNL_RV_PACING_CATHODE_LOCATION_1: NORMAL
MDC_IDC_SET_LEADCHNL_RV_PACING_POLARITY: NORMAL
MDC_IDC_SET_LEADCHNL_RV_PACING_PULSEWIDTH: 0.4 MS
MDC_IDC_SET_LEADCHNL_RV_SENSING_ANODE_ELECTRODE_1: NORMAL
MDC_IDC_SET_LEADCHNL_RV_SENSING_ANODE_LOCATION_1: NORMAL
MDC_IDC_SET_LEADCHNL_RV_SENSING_CATHODE_ELECTRODE_1: NORMAL
MDC_IDC_SET_LEADCHNL_RV_SENSING_CATHODE_LOCATION_1: NORMAL
MDC_IDC_SET_LEADCHNL_RV_SENSING_POLARITY: NORMAL
MDC_IDC_SET_LEADCHNL_RV_SENSING_SENSITIVITY: 0.9 MV
MDC_IDC_SET_ZONE_DETECTION_INTERVAL: 400 MS
MDC_IDC_SET_ZONE_TYPE: NORMAL
MDC_IDC_STAT_BRADY_AP_VP_PERCENT: 0 %
MDC_IDC_STAT_BRADY_AP_VS_PERCENT: 0 %
MDC_IDC_STAT_BRADY_AS_VP_PERCENT: 99.98 %
MDC_IDC_STAT_BRADY_AS_VS_PERCENT: 0.02 %
MDC_IDC_STAT_BRADY_DTM_END: NORMAL
MDC_IDC_STAT_BRADY_DTM_START: NORMAL
MDC_IDC_STAT_BRADY_RA_PERCENT_PACED: 0 %
MDC_IDC_STAT_BRADY_RV_PERCENT_PACED: 99.98 %
MDC_IDC_STAT_CRT_DTM_END: NORMAL
MDC_IDC_STAT_CRT_DTM_START: NORMAL
MDC_IDC_STAT_CRT_LV_PERCENT_PACED: 99.95 %
MDC_IDC_STAT_CRT_PERCENT_PACED: 99.95 %
MDC_IDC_STAT_EPISODE_RECENT_COUNT: 0
MDC_IDC_STAT_EPISODE_RECENT_COUNT_DTM_END: NORMAL
MDC_IDC_STAT_EPISODE_RECENT_COUNT_DTM_START: NORMAL
MDC_IDC_STAT_EPISODE_TOTAL_COUNT: 0
MDC_IDC_STAT_EPISODE_TOTAL_COUNT: 4
MDC_IDC_STAT_EPISODE_TOTAL_COUNT_DTM_END: NORMAL
MDC_IDC_STAT_EPISODE_TOTAL_COUNT_DTM_START: NORMAL
MDC_IDC_STAT_EPISODE_TYPE: NORMAL

## 2021-09-08 PROCEDURE — 93296 REM INTERROG EVL PM/IDS: CPT | Performed by: INTERNAL MEDICINE

## 2021-09-08 PROCEDURE — 93294 REM INTERROG EVL PM/LDLS PM: CPT | Performed by: INTERNAL MEDICINE

## 2021-09-09 DIAGNOSIS — E11.9 TYPE 2 DIABETES MELLITUS WITHOUT COMPLICATION, WITH LONG-TERM CURRENT USE OF INSULIN (H): ICD-10-CM

## 2021-09-09 DIAGNOSIS — Z79.4 TYPE 2 DIABETES MELLITUS WITHOUT COMPLICATION, WITH LONG-TERM CURRENT USE OF INSULIN (H): ICD-10-CM

## 2021-09-09 NOTE — LETTER
[facility]  [address] [id number] [phone]            Mckay Hsu  [address]  [place] [address]        September 27, 2021    Dear Doyle,    While refilling your metFORMIN (GLUCOPHAGE) 1000 MG tablet prescription today, we noticed that you are due to have labs drawn.  We will refill your prescription for 90 days, but a follow-up appointment must be made before any additional refills can be approved.     Taking care of your health is important to us and we look forward to seeing you in the near future.  Please call us at [phone] or [phone] (or use [facility]) to schedule an appointment.     Please disregard this notice if you have already made an appointment.    Sincerely,        [facility]

## 2021-09-10 NOTE — TELEPHONE ENCOUNTER
Routing refill request to provider for review/approval because:  Failed protocol due to:   A1c over due  No diagnosis of CHF  No visit last 6 months     Rob Amezcua, RN  [facility] Triage Nurse

## 2021-09-28 ENCOUNTER — ANTICOAGULATION THERAPY VISIT (OUTPATIENT)
Dept: ANTICOAGULATION | Facility: CLINIC | Age: 61
End: 2021-09-28

## 2021-09-28 ENCOUNTER — LAB (OUTPATIENT)
Dept: LAB | Facility: CLINIC | Age: 61
End: 2021-09-28
Payer: COMMERCIAL

## 2021-09-28 DIAGNOSIS — Z79.01 LONG TERM CURRENT USE OF ANTICOAGULANT THERAPY: Primary | ICD-10-CM

## 2021-09-28 DIAGNOSIS — I48.20 CHRONIC ATRIAL FIBRILLATION (H): ICD-10-CM

## 2021-09-28 LAB — INR BLD: 2 (ref 0.9–1.1)

## 2021-09-28 PROCEDURE — 85610 PROTHROMBIN TIME: CPT

## 2021-09-28 PROCEDURE — 36416 COLLJ CAPILLARY BLOOD SPEC: CPT

## 2021-09-28 NOTE — PROGRESS NOTES
ANTICOAGULATION MANAGEMENT     Mckay Hsu 61 year old male is on warfarin with therapeutic INR result. (Goal INR 2.0-3.0)    Recent labs: (last 7 days)     09/28/21  1324   INR 2.0*       ASSESSMENT     Source(s): Patient/Caregiver Call       Warfarin doses taken: Warfarin taken as instructed    Diet: No new diet changes identified    New illness, injury, or hospitalization: No    Medication/supplement changes: None noted    Signs or symptoms of bleeding or clotting: No    Previous INR: Therapeutic last visit; previously outside of goal range    Additional findings: None     PLAN     Recommended plan for no diet, medication or health factor changes affecting INR     Dosing Instructions: Continue your current warfarin dose with next INR in 3 weeks       Summary  As of 9/28/2021    Full warfarin instructions:  3.75 mg every Tue, Fri; 7.5 mg all other days   Next INR check:  10/19/2021             Telephone call with Mckay who verbalizes understanding and agrees to plan    Lab visit scheduled    Education provided: Monitoring for bleeding signs and symptoms and Monitoring for clotting signs and symptoms    Plan made per [facility] anticoagulation protocol    Irma Villeda RN  Anticoagulation Clinic  9/28/2021    _______________________________________________________________________     Anticoagulation Episode Summary     Current INR goal:  2.0-3.0   TTR:  83.8 % (1 y)   Target end date:  Indefinite   Send INR reminders to:  [facility]    Indications    Long-term (current) use of anticoagulants [Z79.01] [Z79.01]  Chronic atrial fibrillation (H) [I48.20]  New onset atrial fibrillation (H) (Resolved) [I48.91]           Comments:           Anticoagulation Care Providers     Provider Role Specialty Phone number    David Almendarez MD Referring Internal Medicine [phone]

## 2021-10-13 NOTE — PROGRESS NOTES
ANTICOAGULATION FOLLOW-UP CLINIC VISIT    Patient Name:  Mckay Hsu  Date:  10/16/2018  Contact Type:  Face to Face    SUBJECTIVE:     Patient Findings     Positives Change in diet/appetite (pt has had less greens than usual)           OBJECTIVE    INR Protime   Date Value Ref Range Status   10/16/2018 3.8 (A) 0.86 - 1.14 Final       ASSESSMENT / PLAN  INR assessment SUPRA    Recheck INR In: 4 WEEKS    INR Location Clinic      Anticoagulation Summary as of 10/16/2018     INR goal 2.0-3.0   Today's INR 3.8!   Warfarin maintenance plan 3.75 mg (7.5 mg x 0.5) on Tue; 7.5 mg (7.5 mg x 1) all other days   Full warfarin instructions 10/16: Hold; Otherwise 3.75 mg on Tue; 7.5 mg all other days   Weekly warfarin total 48.75 mg   Plan last modified Rosalee Billingsley RN (10/17/2017)   Next INR check 11/13/2018   Target end date     Indications   Long-term (current) use of anticoagulants [Z79.01] [Z79.01]  Chronic atrial fibrillation (H) [I48.2]         Anticoagulation Episode Summary     INR check location     Preferred lab     Send INR reminders to  [facility]    Comments             See the Encounter Report to view Anticoagulation Flowsheet and Dosing Calendar (Go to Encounters tab in chart review, and find the Anticoagulation Therapy Visit)    Dosage adjustment made based on physician directed care plan.    Rosalee Billingsley RN               
  Injectable Influenza Immunization Documentation    1.  Is the person to be vaccinated sick today?   No    2. Does the person to be vaccinated have an allergy to a component   of the vaccine?   No  Egg Allergy Algorithm Link    3. Has the person to be vaccinated ever had a serious reaction   to influenza vaccine in the past?   No    4. Has the person to be vaccinated ever had Guillain-Barré syndrome?   No    Form completed by Sarah Billingsley RN           
No

## 2021-10-19 ENCOUNTER — LAB (OUTPATIENT)
Dept: LAB | Facility: CLINIC | Age: 61
End: 2021-10-19
Payer: COMMERCIAL

## 2021-10-19 ENCOUNTER — ANTICOAGULATION THERAPY VISIT (OUTPATIENT)
Dept: ANTICOAGULATION | Facility: CLINIC | Age: 61
End: 2021-10-19

## 2021-10-19 DIAGNOSIS — I48.20 CHRONIC ATRIAL FIBRILLATION (H): ICD-10-CM

## 2021-10-19 DIAGNOSIS — I48.91 NEW ONSET ATRIAL FIBRILLATION (H): ICD-10-CM

## 2021-10-19 DIAGNOSIS — Z79.01 LONG TERM CURRENT USE OF ANTICOAGULANT THERAPY: Primary | ICD-10-CM

## 2021-10-19 LAB — INR BLD: 2.4 (ref 0.9–1.1)

## 2021-10-19 PROCEDURE — 99207 PR NO CHARGE NURSE ONLY: CPT

## 2021-10-19 PROCEDURE — 85610 PROTHROMBIN TIME: CPT

## 2021-10-19 PROCEDURE — 36416 COLLJ CAPILLARY BLOOD SPEC: CPT

## 2021-10-19 RX ORDER — WARFARIN SODIUM 7.5 MG/1
TABLET ORAL
Qty: 84 TABLET | Refills: 1 | Status: SHIPPED | OUTPATIENT
Start: 2021-10-19 | End: 2022-03-22

## 2021-10-19 NOTE — PROGRESS NOTES
ANTICOAGULATION FOLLOW-UP CLINIC VISIT    Patient Name:  Mckay Hsu  Date:  10/19/2021  Contact Type:  Telephone    SUBJECTIVE:         OBJECTIVE    Recent labs: (last 7 days)     10/19/21  1322   INR 2.4*       ASSESSMENT / PLAN  INR assessment THER    Recheck INR In: 4 WEEKS    INR Location Clinic      Anticoagulation Summary  As of 10/19/2021    INR goal:  2.0-3.0   TTR:  83.8 % (1 y)   INR used for dosin.4 (10/19/2021)   Warfarin maintenance plan:  3.75 mg (7.5 mg x 0.5) every Tue, Fri; 7.5 mg (7.5 mg x 1) all other days   Full warfarin instructions:  3.75 mg every Tue, Fri; 7.5 mg all other days   Weekly warfarin total:  45 mg   No change documented:  Collette Dunbar RN   Plan last modified:  Irma Villeda RN (2020)   Next INR check:  2021   Priority:  Maintenance   Target end date:  Indefinite    Indications    Long-term (current) use of anticoagulants [Z79.01] [Z79.01]  Chronic atrial fibrillation (H) [I48.20]  New onset atrial fibrillation (H) (Resolved) [I48.91]             Anticoagulation Episode Summary     INR check location:      Preferred lab:      Send INR reminders to:  [facility]    Comments:        Anticoagulation Care Providers     Provider Role Specialty Phone number    David Almendarez MD Referring Internal Medicine [phone]            See the Encounter Report to view Anticoagulation Flowsheet and Dosing Calendar (Go to Encounters tab in chart review, and find the Anticoagulation Therapy Visit)        Collette Dunbar, RN

## 2021-10-19 NOTE — PROGRESS NOTES
ANTICOAGULATION MANAGEMENT     Mckay Hsu 61 year old male is on warfarin with therapeutic INR result. (Goal INR 2.0-3.0)    Recent labs: (last 7 days)     10/19/21  1322   INR 2.4*       ASSESSMENT     Source(s): Chart Review and Patient/Caregiver Call       Warfarin doses taken: Warfarin taken as instructed    Diet: No new diet changes identified    New illness, injury, or hospitalization: No    Medication/supplement changes: None noted    Signs or symptoms of bleeding or clotting: No    Previous INR: Therapeutic last 2(+) visits    Additional findings: None     PLAN     Recommended plan for no diet, medication or health factor changes affecting INR     Dosing Instructions: Continue your current warfarin dose with next INR in 4 weeks       Summary  As of 10/19/2021    Full warfarin instructions:  3.75 mg every Tue, Fri; 7.5 mg all other days   Next INR check:  11/16/2021             Telephone call with Mckay who verbalizes understanding and agrees to plan    Lab visit scheduled    Education provided: Please call back if any changes to your diet, medications or how you've been taking warfarin    Plan made per [facility] anticoagulation protocol    Collette Dunbar RN  Anticoagulation Clinic  10/19/2021    _______________________________________________________________________     Anticoagulation Episode Summary     Current INR goal:  2.0-3.0   TTR:  83.8 % (1 y)   Target end date:  Indefinite   Send INR reminders to:  [facility]    Indications    Long-term (current) use of anticoagulants [Z79.01] [Z79.01]  Chronic atrial fibrillation (H) [I48.20]  New onset atrial fibrillation (H) (Resolved) [I48.91]           Comments:           Anticoagulation Care Providers     Provider Role Specialty Phone number    David Almendarez MD Referring Internal Medicine [phone]

## 2021-10-22 DIAGNOSIS — Z79.4 TYPE 2 DIABETES MELLITUS WITHOUT COMPLICATION, WITH LONG-TERM CURRENT USE OF INSULIN (H): ICD-10-CM

## 2021-10-22 DIAGNOSIS — E11.9 TYPE 2 DIABETES MELLITUS WITHOUT COMPLICATION, WITH LONG-TERM CURRENT USE OF INSULIN (H): ICD-10-CM

## 2021-10-22 RX ORDER — LANCETS
EACH MISCELLANEOUS
Qty: 100 EACH | Refills: 6 | Status: SHIPPED | OUTPATIENT
Start: 2021-10-22 | End: 2024-07-06

## 2021-10-22 RX ORDER — LANCETS
EACH MISCELLANEOUS
Qty: 100 EACH | Refills: 6 | Status: CANCELLED | OUTPATIENT
Start: 2021-10-22

## 2021-11-02 ENCOUNTER — TELEPHONE (OUTPATIENT)
Dept: INTERNAL MEDICINE | Facility: CLINIC | Age: 61
End: 2021-11-02

## 2021-11-02 NOTE — TELEPHONE ENCOUNTER
Patient called stating he received the wrong lancets from the pharmacy, informed patient that the script we sent over did not specify a brand so recommended he call [facility] to see if he can dispense the brand he is needing. Patient verbalized understanding    Rob Amezcua RN

## 2021-11-16 ENCOUNTER — LAB (OUTPATIENT)
Dept: LAB | Facility: CLINIC | Age: 61
End: 2021-11-16
Payer: COMMERCIAL

## 2021-11-16 ENCOUNTER — ANTICOAGULATION THERAPY VISIT (OUTPATIENT)
Dept: ANTICOAGULATION | Facility: CLINIC | Age: 61
End: 2021-11-16

## 2021-11-16 DIAGNOSIS — Z79.01 LONG TERM CURRENT USE OF ANTICOAGULANT THERAPY: Primary | ICD-10-CM

## 2021-11-16 DIAGNOSIS — I48.20 CHRONIC ATRIAL FIBRILLATION (H): ICD-10-CM

## 2021-11-16 LAB — INR BLD: 3.1 (ref 0.9–1.1)

## 2021-11-16 PROCEDURE — 36416 COLLJ CAPILLARY BLOOD SPEC: CPT

## 2021-11-16 PROCEDURE — 85610 PROTHROMBIN TIME: CPT

## 2021-11-16 NOTE — PROGRESS NOTES
ANTICOAGULATION MANAGEMENT     Mckay Hsu 61 year old male is on warfarin with therapeutic INR result. (Goal INR 2.0-3.0)    Recent labs: (last 7 days)     11/16/21  1308   INR 3.1*       ASSESSMENT     Source(s): Chart Review and Patient/Caregiver Call       Warfarin doses taken: Warfarin taken as instructed    Diet: No new diet changes identified    New illness, injury, or hospitalization: No    Medication/supplement changes: None noted    Signs or symptoms of bleeding or clotting: No    Previous INR: Therapeutic last 2(+) visits    Additional findings: None     PLAN     Recommended plan for no diet, medication or health factor changes affecting INR     Dosing Instructions: Continue your current warfarin dose with next INR in 2 weeks       Summary  As of 11/16/2021    Full warfarin instructions:  3.75 mg every Tue, Fri; 7.5 mg all other days   Next INR check:  11/30/2021             Telephone call with Mckay who verbalizes understanding and agrees to plan    Lab visit scheduled    Education provided: Goal range and significance of current result, Monitoring for bleeding signs and symptoms and Monitoring for clotting signs and symptoms    Plan made per [facility] anticoagulation protocol    Irma Villeda RN  Anticoagulation Clinic  11/16/2021    _______________________________________________________________________     Anticoagulation Episode Summary     Current INR goal:  2.0-3.0   TTR:  85.1 % (1 y)   Target end date:  Indefinite   Send INR reminders to:  [facility]    Indications    Long-term (current) use of anticoagulants [Z79.01] [Z79.01]  Chronic atrial fibrillation (H) [I48.20]  New onset atrial fibrillation (H) (Resolved) [I48.91]           Comments:           Anticoagulation Care Providers     Provider Role Specialty Phone number    David Almendarez MD Referring Internal Medicine [phone]

## 2021-11-24 DIAGNOSIS — I10 ESSENTIAL HYPERTENSION, BENIGN: ICD-10-CM

## 2021-11-24 RX ORDER — METOPROLOL SUCCINATE 100 MG/1
100 TABLET, EXTENDED RELEASE ORAL DAILY
Qty: 90 TABLET | Refills: 0 | Status: SHIPPED | OUTPATIENT
Start: 2021-11-24 | End: 2022-02-07

## 2021-11-24 NOTE — TELEPHONE ENCOUNTER
Prescription approved per [facility] Refill Protocol.  Rob Amezcua RN  [facility] Triage Nurse

## 2021-11-30 ENCOUNTER — ANTICOAGULATION THERAPY VISIT (OUTPATIENT)
Dept: ANTICOAGULATION | Facility: CLINIC | Age: 61
End: 2021-11-30

## 2021-11-30 ENCOUNTER — LAB (OUTPATIENT)
Dept: LAB | Facility: CLINIC | Age: 61
End: 2021-11-30
Payer: COMMERCIAL

## 2021-11-30 DIAGNOSIS — I48.20 CHRONIC ATRIAL FIBRILLATION (H): ICD-10-CM

## 2021-11-30 DIAGNOSIS — Z79.01 LONG TERM CURRENT USE OF ANTICOAGULANT THERAPY: Primary | ICD-10-CM

## 2021-11-30 LAB — INR BLD: 2.9 (ref 0.9–1.1)

## 2021-11-30 PROCEDURE — 36416 COLLJ CAPILLARY BLOOD SPEC: CPT

## 2021-11-30 PROCEDURE — 85610 PROTHROMBIN TIME: CPT

## 2021-11-30 PROCEDURE — 99207 PR NO CHARGE NURSE ONLY: CPT

## 2021-11-30 NOTE — PROGRESS NOTES
ANTICOAGULATION FOLLOW-UP CLINIC VISIT    Patient Name:  Mckay Hsu  Date:  2021  Contact Type:  Telephone    SUBJECTIVE:         OBJECTIVE    Recent labs: (last 7 days)     21  1228   INR 2.9*       ASSESSMENT / PLAN  INR assessment THER    Recheck INR In: 5 WEEKS    INR Location Clinic      Anticoagulation Summary  As of 2021    INR goal:  2.0-3.0   TTR:  83.6 % (1 y)   INR used for dosin.9 (2021)   Warfarin maintenance plan:  3.75 mg (7.5 mg x 0.5) every Tue, Fri; 7.5 mg (7.5 mg x 1) all other days   Full warfarin instructions:  3.75 mg every Tue, Fri; 7.5 mg all other days   Weekly warfarin total:  45 mg   Plan last modified:  Irma Villeda RN (2020)   Next INR check:  2022   Priority:  Maintenance   Target end date:  Indefinite    Indications    Long-term (current) use of anticoagulants [Z79.01] [Z79.01]  Chronic atrial fibrillation (H) [I48.20]  New onset atrial fibrillation (H) (Resolved) [I48.91]             Anticoagulation Episode Summary     INR check location:      Preferred lab:      [place] reminders [place]  [facility] [place] [place]    Comments:        Anticoagulation Care Providers     Provider Role Specialty Phone number    David Almendarez MD Referring Internal Medicine [phone]            See the Encounter Report to view Anticoagulation Flowsheet and Dosing Calendar (Go to Encounters tab in chart review, and find the Anticoagulation Therapy Visit)        Collette Dunbar RN               ANTICOAGULATION MANAGEMENT     Mckay Hsu 61 year old male is on warfarin with therapeutic INR result. (Goal INR 2.0-3.0)    Recent labs: (last 7 days)     21  1228   INR 2.9*       ASSESSMENT     Source(s): Chart Review and Patient/Caregiver Call       Warfarin doses taken: Warfarin taken as instructed    Diet: No new diet changes identified    New illness, injury, or hospitalization: No    Medication/supplement changes: None noted    Signs or symptoms  of bleeding or clotting: No    Previous INR: Supratherapeutic    Additional findings: None     PLAN     Recommended plan for no diet, medication or health factor changes affecting INR     Dosing Instructions: Continue your current warfarin dose with next INR in 5 weeks       Summary  As of 11/30/2021    Full warfarin instructions:  3.75 mg every Tue, Fri; 7.5 mg all other days   Next INR check:  1/4/2022             Telephone call with Mckay who verbalizes understanding and agrees to plan    Lab visit scheduled    Education provided: Please call back if any changes to your diet, medications or how you've been taking warfarin    Plan made per [facility] anticoagulation protocol    Collette Dunbar RN  Anticoagulation Clinic  11/30/2021    _______________________________________________________________________     Anticoagulation Episode Summary     Current INR goal:  2.0-3.0   TTR:  83.6 % (1 y)   Target end date:  Indefinite   Send INR reminders to:  [facility]    Indications    Long-term (current) use of anticoagulants [Z79.01] [Z79.01]  Chronic atrial fibrillation (H) [I48.20]  New onset atrial fibrillation (H) (Resolved) [I48.91]           Comments:           Anticoagulation Care Providers     Provider Role Specialty Phone number    David Almendarez MD Referring Internal Medicine [phone]

## 2021-12-15 ENCOUNTER — ANCILLARY PROCEDURE (OUTPATIENT)
Dept: CARDIOLOGY | Facility: CLINIC | Age: 61
End: 2021-12-15
Attending: INTERNAL MEDICINE
Payer: COMMERCIAL

## 2021-12-15 ENCOUNTER — TELEPHONE (OUTPATIENT)
Dept: CARDIOLOGY | Facility: CLINIC | Age: 61
End: 2021-12-15

## 2021-12-15 DIAGNOSIS — Z95.0 CARDIAC PACEMAKER IN SITU: ICD-10-CM

## 2021-12-15 LAB
MDC_IDC_EPISODE_DTM: NORMAL
MDC_IDC_EPISODE_DTM: NORMAL
MDC_IDC_EPISODE_DURATION: 1 S
MDC_IDC_EPISODE_DURATION: 6 S
MDC_IDC_EPISODE_ID: 11
MDC_IDC_EPISODE_ID: 5
MDC_IDC_EPISODE_TYPE: NORMAL
MDC_IDC_EPISODE_TYPE: NORMAL
MDC_IDC_LEAD_IMPLANT_DT: NORMAL
MDC_IDC_LEAD_IMPLANT_DT: NORMAL
MDC_IDC_LEAD_LOCATION: NORMAL
MDC_IDC_LEAD_LOCATION: NORMAL
MDC_IDC_LEAD_LOCATION_DETAIL_1: NORMAL
MDC_IDC_LEAD_LOCATION_DETAIL_1: NORMAL
MDC_IDC_LEAD_MFG: NORMAL
MDC_IDC_LEAD_MFG: NORMAL
MDC_IDC_LEAD_MODEL: NORMAL
MDC_IDC_LEAD_MODEL: NORMAL
MDC_IDC_LEAD_POLARITY_TYPE: NORMAL
MDC_IDC_LEAD_POLARITY_TYPE: NORMAL
MDC_IDC_LEAD_SERIAL: NORMAL
MDC_IDC_LEAD_SERIAL: NORMAL
MDC_IDC_MSMT_BATTERY_DTM: NORMAL
MDC_IDC_MSMT_BATTERY_REMAINING_LONGEVITY: 93 MO
MDC_IDC_MSMT_BATTERY_RRT_TRIGGER: 2.6
MDC_IDC_MSMT_BATTERY_STATUS: NORMAL
MDC_IDC_MSMT_BATTERY_VOLTAGE: 3 V
MDC_IDC_MSMT_LEADCHNL_LV_IMPEDANCE_VALUE: 361 OHM
MDC_IDC_MSMT_LEADCHNL_LV_IMPEDANCE_VALUE: 475 OHM
MDC_IDC_MSMT_LEADCHNL_LV_IMPEDANCE_VALUE: 608 OHM
MDC_IDC_MSMT_LEADCHNL_LV_IMPEDANCE_VALUE: 684 OHM
MDC_IDC_MSMT_LEADCHNL_LV_IMPEDANCE_VALUE: 874 OHM
MDC_IDC_MSMT_LEADCHNL_RA_IMPEDANCE_VALUE: 3401 OHM
MDC_IDC_MSMT_LEADCHNL_RA_IMPEDANCE_VALUE: 3401 OHM
MDC_IDC_MSMT_LEADCHNL_RV_IMPEDANCE_VALUE: 361 OHM
MDC_IDC_MSMT_LEADCHNL_RV_IMPEDANCE_VALUE: 418 OHM
MDC_IDC_MSMT_LEADCHNL_RV_PACING_THRESHOLD_AMPLITUDE: 0.75 V
MDC_IDC_MSMT_LEADCHNL_RV_PACING_THRESHOLD_PULSEWIDTH: 0.4 MS
MDC_IDC_MSMT_LEADCHNL_RV_SENSING_INTR_AMPL: 10.88 MV
MDC_IDC_PG_IMPLANT_DTM: NORMAL
MDC_IDC_PG_MFG: NORMAL
MDC_IDC_PG_MODEL: NORMAL
MDC_IDC_PG_SERIAL: NORMAL
MDC_IDC_PG_TYPE: NORMAL
MDC_IDC_SESS_CLINIC_NAME: NORMAL
MDC_IDC_SESS_DTM: NORMAL
MDC_IDC_SESS_TYPE: NORMAL
MDC_IDC_SET_BRADY_LOWRATE: 70 {BEATS}/MIN
MDC_IDC_SET_BRADY_MAX_SENSOR_RATE: 130 {BEATS}/MIN
MDC_IDC_SET_BRADY_MODE: NORMAL
MDC_IDC_SET_CRT_LVRV_DELAY: 0 MS
MDC_IDC_SET_CRT_PACED_CHAMBERS: NORMAL
MDC_IDC_SET_LEADCHNL_LV_PACING_AMPLITUDE: 2 V
MDC_IDC_SET_LEADCHNL_LV_PACING_ANODE_ELECTRODE_1: NORMAL
MDC_IDC_SET_LEADCHNL_LV_PACING_ANODE_LOCATION_1: NORMAL
MDC_IDC_SET_LEADCHNL_LV_PACING_CAPTURE_MODE: NORMAL
MDC_IDC_SET_LEADCHNL_LV_PACING_CATHODE_ELECTRODE_1: NORMAL
MDC_IDC_SET_LEADCHNL_LV_PACING_CATHODE_LOCATION_1: NORMAL
MDC_IDC_SET_LEADCHNL_LV_PACING_POLARITY: NORMAL
MDC_IDC_SET_LEADCHNL_LV_PACING_PULSEWIDTH: 0.5 MS
MDC_IDC_SET_LEADCHNL_RA_SENSING_ANODE_ELECTRODE_1: NORMAL
MDC_IDC_SET_LEADCHNL_RA_SENSING_ANODE_LOCATION_1: NORMAL
MDC_IDC_SET_LEADCHNL_RA_SENSING_CATHODE_ELECTRODE_1: NORMAL
MDC_IDC_SET_LEADCHNL_RA_SENSING_CATHODE_LOCATION_1: NORMAL
MDC_IDC_SET_LEADCHNL_RA_SENSING_POLARITY: NORMAL
MDC_IDC_SET_LEADCHNL_RA_SENSING_SENSITIVITY: NORMAL
MDC_IDC_SET_LEADCHNL_RV_PACING_AMPLITUDE: 2 V
MDC_IDC_SET_LEADCHNL_RV_PACING_ANODE_ELECTRODE_1: NORMAL
MDC_IDC_SET_LEADCHNL_RV_PACING_ANODE_LOCATION_1: NORMAL
MDC_IDC_SET_LEADCHNL_RV_PACING_CAPTURE_MODE: NORMAL
MDC_IDC_SET_LEADCHNL_RV_PACING_CATHODE_ELECTRODE_1: NORMAL
MDC_IDC_SET_LEADCHNL_RV_PACING_CATHODE_LOCATION_1: NORMAL
MDC_IDC_SET_LEADCHNL_RV_PACING_POLARITY: NORMAL
MDC_IDC_SET_LEADCHNL_RV_PACING_PULSEWIDTH: 0.4 MS
MDC_IDC_SET_LEADCHNL_RV_SENSING_ANODE_ELECTRODE_1: NORMAL
MDC_IDC_SET_LEADCHNL_RV_SENSING_ANODE_LOCATION_1: NORMAL
MDC_IDC_SET_LEADCHNL_RV_SENSING_CATHODE_ELECTRODE_1: NORMAL
MDC_IDC_SET_LEADCHNL_RV_SENSING_CATHODE_LOCATION_1: NORMAL
MDC_IDC_SET_LEADCHNL_RV_SENSING_POLARITY: NORMAL
MDC_IDC_SET_LEADCHNL_RV_SENSING_SENSITIVITY: 0.9 MV
MDC_IDC_SET_ZONE_DETECTION_INTERVAL: 400 MS
MDC_IDC_SET_ZONE_TYPE: NORMAL
MDC_IDC_STAT_BRADY_AP_VP_PERCENT: 0 %
MDC_IDC_STAT_BRADY_AP_VS_PERCENT: 0 %
MDC_IDC_STAT_BRADY_AS_VP_PERCENT: 99.97 %
MDC_IDC_STAT_BRADY_AS_VS_PERCENT: 0.03 %
MDC_IDC_STAT_BRADY_DTM_END: NORMAL
MDC_IDC_STAT_BRADY_DTM_START: NORMAL
MDC_IDC_STAT_BRADY_RA_PERCENT_PACED: 0 %
MDC_IDC_STAT_BRADY_RV_PERCENT_PACED: 99.97 %
MDC_IDC_STAT_CRT_DTM_END: NORMAL
MDC_IDC_STAT_CRT_DTM_START: NORMAL
MDC_IDC_STAT_CRT_LV_PERCENT_PACED: 99.94 %
MDC_IDC_STAT_CRT_PERCENT_PACED: 99.94 %
MDC_IDC_STAT_EPISODE_RECENT_COUNT: 0
MDC_IDC_STAT_EPISODE_RECENT_COUNT: 1
MDC_IDC_STAT_EPISODE_RECENT_COUNT_DTM_END: NORMAL
MDC_IDC_STAT_EPISODE_RECENT_COUNT_DTM_START: NORMAL
MDC_IDC_STAT_EPISODE_TOTAL_COUNT: 0
MDC_IDC_STAT_EPISODE_TOTAL_COUNT: 5
MDC_IDC_STAT_EPISODE_TOTAL_COUNT_DTM_END: NORMAL
MDC_IDC_STAT_EPISODE_TOTAL_COUNT_DTM_START: NORMAL
MDC_IDC_STAT_EPISODE_TYPE: NORMAL

## 2021-12-15 PROCEDURE — 93296 REM INTERROG EVL PM/IDS: CPT | Performed by: INTERNAL MEDICINE

## 2021-12-15 PROCEDURE — 93294 REM INTERROG EVL PM/LDLS PM: CPT | Performed by: INTERNAL MEDICINE

## 2021-12-15 NOTE — TELEPHONE ENCOUNTER
BRIAN Real your patient had another run of NSVT on his remote check. The episode lasted 7 beats, rates 150-165 bpm. The episode occurred back on 9/8/21. Pt does not recall any symptoms. EF shows 55-60% (2021). I saw a note from you 7/22/21 that you might want to have pt complete a stress test if he has more runs of NSVT. Any changes? Thanks!           ** Data was compiled on 12/14/21 for review and interpretation on the scheduled date 12/15/21**  [employer] Percepta CRT-P Remote PPM Device Check  BiVP: 100%, chronic Afib, AVNA, taking Warfarin  Mode: VVIR 70/130  Presenting Rhythm: BIVP  Heart Rate: adequate heart rates per histogram; OptiVol fluid above baseline. Possible accumulation since 11/10/21  Sensing: stable  Pacing Threshold: stable  Impedance: stable  Battery Status: 7.8 years remaining  Atrial Arrhythmia: N/A  Ventricular Arrhythmia: 1 ventricular high rate logged. EGM shows VS events suggestiv of NSVT (AVNA) lasting 7 beats, rates 150-165 bpm. Epiosde occurred on 9/8/21 at 1:16 am. EF: 55-60% (2021) No symptoms.    Care Plan: F/u PPM [facility] q 3 months. F/U with Jane Caruso PA-C. Gave results over the phone. RODRIGUEZ Flower    I have reviewed and interpreted the device interrogation, settings, programming and nurse's summary. The device is functioning within normal device parameters. I agree with the current findings, assessment and plan.

## 2021-12-20 NOTE — TELEPHONE ENCOUNTER
Sorry for delay - was out last week.    As very brief (7 beats) and asx'c will not plan stress at this time given nl EF. I will check in after his next check 3/16.    Last Lara

## 2021-12-31 DIAGNOSIS — E11.9 TYPE 2 DIABETES MELLITUS WITHOUT COMPLICATION, WITH LONG-TERM CURRENT USE OF INSULIN (H): ICD-10-CM

## 2021-12-31 DIAGNOSIS — Z79.4 TYPE 2 DIABETES MELLITUS WITHOUT COMPLICATION, WITH LONG-TERM CURRENT USE OF INSULIN (H): ICD-10-CM

## 2021-12-31 RX ORDER — INSULIN ASPART 100 [IU]/ML
INJECTION, SOLUTION INTRAVENOUS; SUBCUTANEOUS
Qty: 60 ML | Refills: 0 | Status: SHIPPED | OUTPATIENT
Start: 2021-12-31 | End: 2022-02-15

## 2021-12-31 RX ORDER — INSULIN GLARGINE 100 [IU]/ML
INJECTION, SOLUTION SUBCUTANEOUS
Qty: 60 ML | Refills: 0 | Status: SHIPPED | OUTPATIENT
Start: 2021-12-31 | End: 2022-02-15

## 2021-12-31 NOTE — TELEPHONE ENCOUNTER
Script filled but patient needs to be reminded to follow-up for lab work and clinic visit as is overdue

## 2021-12-31 NOTE — TELEPHONE ENCOUNTER
Routing refill request to provider for review/approval because:  Lab Results   Component Value Date    A1C 6.9 01/26/2021    A1C 6.2 02/03/2020    A1C 6.1 10/22/2019    A1C 6.4 03/06/2019    A1C 6.4 06/11/2018     No visit last 6 months    Lab pended for a1c as patient as lab appointment on 1/4. Please route to team to schedule diabetes follow up visit     Rob Amezcua RN  [facility] Triage Nurse

## 2021-12-31 NOTE — TELEPHONE ENCOUNTER
Called pt and notified him of the message below. He will call to set up appts next week.     Francesca Cheema RN

## 2022-01-04 ENCOUNTER — LAB (OUTPATIENT)
Dept: LAB | Facility: CLINIC | Age: 62
End: 2022-01-04
Payer: COMMERCIAL

## 2022-01-04 ENCOUNTER — ANTICOAGULATION THERAPY VISIT (OUTPATIENT)
Dept: ANTICOAGULATION | Facility: CLINIC | Age: 62
End: 2022-01-04

## 2022-01-04 DIAGNOSIS — I48.20 CHRONIC ATRIAL FIBRILLATION (H): ICD-10-CM

## 2022-01-04 DIAGNOSIS — Z79.01 LONG TERM CURRENT USE OF ANTICOAGULANT THERAPY: Primary | ICD-10-CM

## 2022-01-04 LAB — INR BLD: 2.1 (ref 0.9–1.1)

## 2022-01-04 PROCEDURE — 99207 PR NO CHARGE NURSE ONLY: CPT

## 2022-01-04 PROCEDURE — 85610 PROTHROMBIN TIME: CPT

## 2022-01-04 PROCEDURE — 36416 COLLJ CAPILLARY BLOOD SPEC: CPT

## 2022-01-04 NOTE — PROGRESS NOTES
ANTICOAGULATION FOLLOW-UP CLINIC VISIT    Patient Name:  Mckay Hsu  Date:  2022  Contact Type:  Telephone    SUBJECTIVE:         OBJECTIVE    Recent labs: (last 7 days)     22  1258   INR 2.1*       ASSESSMENT / PLAN  INR assessment THER    Recheck INR In: 5 WEEKS    INR Location Clinic      Anticoagulation Summary  As of 2022    INR goal:  2.0-3.0   TTR:  88.8 % (1 y)   INR used for dosin.1 (2022)   Warfarin maintenance plan:  3.75 mg (7.5 mg x 0.5) every Tue, Fri; 7.5 mg (7.5 mg x 1) all other days   Full warfarin instructions:  3.75 mg every Tue, Fri; 7.5 mg all other days   Weekly warfarin total:  45 mg   No change documented:  Collette Dunbar RN   Plan last modified:  Irma Villeda RN (2020)   Next INR check:  2022   Priority:  Maintenance   Target end date:  Indefinite    Indications    Long-term (current) use of anticoagulants [Z79.01] [Z79.01]  Chronic atrial fibrillation (H) [I48.20]  New onset atrial fibrillation (H) (Resolved) [I48.91]             Anticoagulation Episode Summary     INR check location:      Preferred lab:      Send INR reminders to:  [facility]    Comments:        Anticoagulation Care Providers     Provider Role Specialty Phone number    David Almendarez MD Referring Internal Medicine [phone]            See the Encounter Report to view Anticoagulation Flowsheet and Dosing Calendar (Go to Encounters tab in chart review, and find the Anticoagulation Therapy Visit)        Collette Dunbar RN          ANTICOAGULATION MANAGEMENT     Mckay Hsu 61 year old male is on warfarin with therapeutic INR result. (Goal INR 2.0-3.0)    Recent labs: (last 7 days)     22  1258   INR 2.1*       ASSESSMENT     Source(s): Chart Review talked with SEBASTIEN      Warfarin doses taken: Warfarin taken as instructed    Diet: No new diet changes identified    New illness, injury, or hospitalization: No    Medication/supplement changes: None  noted    Signs or symptoms of bleeding or clotting: No    Previous INR: Therapeutic last 2(+) visits    Additional findings: None     PLAN     Recommended plan for no diet, medication or health factor changes affecting INR     Dosing Instructions: Continue your current warfarin dose with next INR in 5 weeks       Summary  As of 1/4/2022    Full warfarin instructions:  3.75 mg every Tue, Fri; 7.5 mg all other days   Next INR check:  2/8/2022             Telephone call with Mckay who verbalizes understanding and agrees to plan    Lab visit scheduled    Education provided: Please call back if any changes to your diet, medications or how you've been taking warfarin    Plan made per [facility] anticoagulation protocol    Collette Dunbar RN  Anticoagulation Clinic  1/4/2022    _______________________________________________________________________     Anticoagulation Episode Summary     Current INR goal:  2.0-3.0   TTR:  88.8 % (1 y)   Target end date:  Indefinite   Send INR reminders to:  [facility]    Indications    Long-term (current) use of anticoagulants [Z79.01] [Z79.01]  Chronic atrial fibrillation (H) [I48.20]  New onset atrial fibrillation (H) (Resolved) [I48.91]           Comments:           Anticoagulation Care Providers     Provider Role Specialty Phone number    David Almendarez MD Referring Internal Medicine [phone]

## 2022-01-07 ENCOUNTER — DOCUMENTATION ONLY (OUTPATIENT)
Dept: INTERNAL MEDICINE | Facility: CLINIC | Age: 62
End: 2022-01-07
Payer: COMMERCIAL

## 2022-01-07 DIAGNOSIS — I48.20 CHRONIC ATRIAL FIBRILLATION (H): Primary | ICD-10-CM

## 2022-01-07 DIAGNOSIS — Z79.01 LONG TERM CURRENT USE OF ANTICOAGULANT THERAPY: ICD-10-CM

## 2022-01-07 NOTE — PROGRESS NOTES
ANTICOAGULATION MANAGEMENT      Mckay Hsu due for annual renewal of referral to anticoagulation monitoring. Order pended for your review and signature.      ANTICOAGULATION SUMMARY      Warfarin indication(s)     Atrial fibrillation    Heart valve present?  NO       Current goal range   INR: 2.0-3.0     Goal appropriate for indication? Yes, INR 2-3 appropriate for hx of DVT, PE, hypercoagulable state, Afib, LVAD, or bileaflet AVR without risk factors     Current duration of therapy Indefinite/long term therapy   Time in Therapeutic Range (TTR)  (Goal > 60%) 88%       Office visit with referring provider's group within last year yes on 1/19/2021       Sydni Song RN

## 2022-01-18 ENCOUNTER — LAB (OUTPATIENT)
Dept: LAB | Facility: CLINIC | Age: 62
End: 2022-01-18
Payer: COMMERCIAL

## 2022-01-18 DIAGNOSIS — E11.9 TYPE 2 DIABETES MELLITUS WITHOUT COMPLICATION, WITH LONG-TERM CURRENT USE OF INSULIN (H): ICD-10-CM

## 2022-01-18 DIAGNOSIS — Z79.4 TYPE 2 DIABETES MELLITUS WITHOUT COMPLICATION, WITH LONG-TERM CURRENT USE OF INSULIN (H): ICD-10-CM

## 2022-01-18 LAB — HBA1C MFR BLD: 6.6 % (ref 0–5.6)

## 2022-01-18 PROCEDURE — 83036 HEMOGLOBIN GLYCOSYLATED A1C: CPT

## 2022-01-18 PROCEDURE — 36415 COLL VENOUS BLD VENIPUNCTURE: CPT

## 2022-02-06 DIAGNOSIS — I10 ESSENTIAL HYPERTENSION, BENIGN: ICD-10-CM

## 2022-02-07 RX ORDER — METOPROLOL SUCCINATE 100 MG/1
TABLET, EXTENDED RELEASE ORAL
Qty: 90 TABLET | Refills: 0 | Status: SHIPPED | OUTPATIENT
Start: 2022-02-07 | End: 2022-05-09

## 2022-02-07 NOTE — TELEPHONE ENCOUNTER
Prescription approved per [facility] Refill Protocol.  Alicia Pretty, RN  [facility] Triage Nurse

## 2022-02-08 ENCOUNTER — ANTICOAGULATION THERAPY VISIT (OUTPATIENT)
Dept: ANTICOAGULATION | Facility: CLINIC | Age: 62
End: 2022-02-08

## 2022-02-08 ENCOUNTER — LAB (OUTPATIENT)
Dept: LAB | Facility: CLINIC | Age: 62
End: 2022-02-08
Payer: COMMERCIAL

## 2022-02-08 DIAGNOSIS — Z79.01 LONG TERM CURRENT USE OF ANTICOAGULANT THERAPY: ICD-10-CM

## 2022-02-08 DIAGNOSIS — I48.20 CHRONIC ATRIAL FIBRILLATION (H): ICD-10-CM

## 2022-02-08 DIAGNOSIS — Z79.01 LONG TERM CURRENT USE OF ANTICOAGULANT THERAPY: Primary | ICD-10-CM

## 2022-02-08 LAB — INR BLD: 3.3 (ref 0.9–1.1)

## 2022-02-08 PROCEDURE — 85610 PROTHROMBIN TIME: CPT

## 2022-02-08 PROCEDURE — 36416 COLLJ CAPILLARY BLOOD SPEC: CPT

## 2022-02-08 NOTE — PROGRESS NOTES
ANTICOAGULATION MANAGEMENT     Mckay Hsu 61 year old male is on warfarin with supratherapeutic INR result. (Goal INR 2.0-3.0)    Recent labs: (last 7 days)     02/08/22  1250   INR 3.3*       ASSESSMENT     Source(s): Chart Review and Patient/Caregiver Call       Warfarin doses taken: Warfarin taken as instructed    Diet: Decreased greens/vitamin K in diet; plans to resume previous intake    New illness, injury, or hospitalization: No    Medication/supplement changes: None noted    Signs or symptoms of bleeding or clotting: No    Previous INR: Therapeutic last visit; previously outside of goal range    Additional findings: None     PLAN     Recommended plan for no diet, medication or health factor changes affecting INR     Dosing Instructions: Continue your current warfarin dose with next INR in 2 weeks       Summary  As of 2/8/2022    Full warfarin instructions:  3.75 mg every Tue, Fri; 7.5 mg all other days   Next INR check:  2/22/2022             Telephone call with Mckay who verbalizes understanding and agrees to plan    Lab visit scheduled    Education provided: Importance of consistent vitamin K intake    Plan made per [facility] anticoagulation protocol    Collette Dunbar RN  Anticoagulation Clinic  2/8/2022    _______________________________________________________________________     Anticoagulation Episode Summary     Current INR goal:  2.0-3.0   TTR:  86.4 % (1 y)   Target end date:  Indefinite   Send INR reminders to:  [facility]    Indications    Long-term (current) use of anticoagulants [Z79.01] [Z79.01]  Chronic atrial fibrillation (H) [I48.20]  New onset atrial fibrillation (H) (Resolved) [I48.91]           Comments:           Anticoagulation Care Providers     Provider Role Specialty Phone number    David Almendarez MD Referring Internal Medicine [phone]

## 2022-02-14 ENCOUNTER — TRANSFERRED RECORDS (OUTPATIENT)
Dept: HEALTH INFORMATION MANAGEMENT | Facility: CLINIC | Age: 62
End: 2022-02-14
Payer: COMMERCIAL

## 2022-02-15 ENCOUNTER — OFFICE VISIT (OUTPATIENT)
Dept: INTERNAL MEDICINE | Facility: CLINIC | Age: 62
End: 2022-02-15
Payer: COMMERCIAL

## 2022-02-15 VITALS
HEIGHT: 72 IN | OXYGEN SATURATION: 98 % | WEIGHT: 304.9 LBS | SYSTOLIC BLOOD PRESSURE: 126 MMHG | RESPIRATION RATE: 15 BRPM | HEART RATE: 75 BPM | DIASTOLIC BLOOD PRESSURE: 70 MMHG | TEMPERATURE: 98 F | BODY MASS INDEX: 41.3 KG/M2

## 2022-02-15 DIAGNOSIS — E11.9 TYPE 2 DIABETES MELLITUS WITHOUT COMPLICATION, WITH LONG-TERM CURRENT USE OF INSULIN (H): ICD-10-CM

## 2022-02-15 DIAGNOSIS — Z12.5 SCREENING PSA (PROSTATE SPECIFIC ANTIGEN): ICD-10-CM

## 2022-02-15 DIAGNOSIS — H35.52 RETINITIS PIGMENTOSA: Primary | ICD-10-CM

## 2022-02-15 DIAGNOSIS — I10 ESSENTIAL HYPERTENSION, BENIGN: ICD-10-CM

## 2022-02-15 DIAGNOSIS — Z79.4 TYPE 2 DIABETES MELLITUS WITHOUT COMPLICATION, WITH LONG-TERM CURRENT USE OF INSULIN (H): ICD-10-CM

## 2022-02-15 DIAGNOSIS — I50.22 CHRONIC SYSTOLIC CONGESTIVE HEART FAILURE (H): ICD-10-CM

## 2022-02-15 DIAGNOSIS — E78.5 HYPERLIPIDEMIA LDL GOAL <100: ICD-10-CM

## 2022-02-15 PROCEDURE — 99214 OFFICE O/P EST MOD 30 MIN: CPT | Performed by: INTERNAL MEDICINE

## 2022-02-15 RX ORDER — INSULIN GLARGINE 100 [IU]/ML
INJECTION, SOLUTION SUBCUTANEOUS
Qty: 60 ML | Refills: 5 | Status: SHIPPED | OUTPATIENT
Start: 2022-02-15 | End: 2023-03-17

## 2022-02-15 RX ORDER — AMLODIPINE BESYLATE 10 MG/1
TABLET ORAL
Qty: 90 TABLET | Refills: 2 | Status: SHIPPED | OUTPATIENT
Start: 2022-02-15 | End: 2023-02-24

## 2022-02-15 RX ORDER — LOSARTAN POTASSIUM 100 MG/1
100 TABLET ORAL DAILY
Qty: 90 TABLET | Refills: 3 | Status: SHIPPED | OUTPATIENT
Start: 2022-02-15 | End: 2023-02-06

## 2022-02-15 RX ORDER — INSULIN ASPART 100 [IU]/ML
INJECTION, SOLUTION INTRAVENOUS; SUBCUTANEOUS
Qty: 60 ML | Refills: 5 | Status: SHIPPED | OUTPATIENT
Start: 2022-02-15 | End: 2022-04-22

## 2022-02-15 ASSESSMENT — MIFFLIN-ST. JEOR: SCORE: 2223.08

## 2022-02-15 NOTE — PROGRESS NOTES
Assessment & Plan     Retinitis pigmentosa  Noted ongoing and progressive visual loss.  Transportation forms have been filled out for patient on his behalf through his insurance you care.  We have faxed those forms for him.    Chronic systolic congestive heart failure (H)  Stable and appears overall euvolemic.  Continue with current medical manage  - CBC with Platelets; Future    Type 2 diabetes mellitus without complication, with long-term current use of insulin (H)  Lab Results   Component Value Date    A1C 6.6 01/18/2022    A1C 6.9 01/26/2021    A1C 6.2 02/03/2020    A1C 6.1 10/22/2019    A1C 6.4 03/06/2019    A1C 6.4 06/11/2018     Stable continue with medical management as noted.  Refill insulin  - CBC with Platelets; Future  - BASIC METABOLIC PANEL; Future  - Lipid panel reflex to direct LDL Fasting; Future  - Albumin Random Urine Quantitative with Creat Ratio; Future  - insulin aspart (NOVOLOG FLEXPEN) 100 UNIT/ML pen; INJECT 1 UNIT SUBCUTANEOUSLY PER 7 GRAMS OF CARBS THREE TIMES DAILY BEFORE MEALS. MAX 60 UNITS PER DAY  - insulin glargine (LANTUS SOLOSTAR) 100 UNIT/ML pen; ADMINISTER 60 UNITS UNDER THE SKIN AT BEDTIME  - losartan (COZAAR) 100 MG tablet; Take 1 tablet (100 mg) by mouth daily    Essential hypertension, benign  At goal on therapy continue without medication change.  Recheck blood pressure along with A1c 6-month  - BASIC METABOLIC PANEL; Future  - amLODIPine (NORVASC) 10 MG tablet; TAKE 1 TABLET(10 MG) BY MOUTH DAILY    Hyperlipidemia LDL goal <100  LDL Cholesterol Calculated   Date Value Ref Range Status   01/26/2021 40 <100 mg/dL Final     Comment:     Desirable:       <100 mg/dl     Stable at goal on therapy lipid panel pending  - ALT; Future  - Lipid panel reflex to direct LDL Fasting; Future    Screening PSA (prostate specific antigen)  Ordered as routine screening based on age  - PSA, screen; Future      Discussed updated vaccination with Shingrix.     BMI:   Estimated body mass index is  "41.93 kg/m  as calculated from the following:    Height as of this encounter: 1.816 m (5' 11.5\").    Weight as of this encounter: 138.3 kg (304 lb 14.4 oz).   Weight management plan: Discussed healthy diet and exercise guidelines    Work on weight loss  Regular exercise    Return in about 6 months (around 8/15/2022) for BP Recheck, diabetes check 6 months.    David Almendarez MD  [facility]    Joey Kwon is a 61 year old who presents for the following health issues  accompanied by his self.    HPI     Diabetes Follow-up    How often are you checking your blood sugar? Three times daily  Blood sugar testing frequency justification:  Adjustment of medication(s)  What time of day are you checking your blood sugars (select all that apply)?  Before meals  Have you had any blood sugars above 200?  No  Have you had any blood sugars below 70?  No    What symptoms do you notice when your blood sugar is low?  Shaky    What concerns do you have today about your diabetes? None     Do you have any of these symptoms? (Select all that apply)  No numbness or tingling in feet.  No redness, sores or blisters on feet.  No complaints of excessive thirst.  No reports of blurry vision.  No significant changes to weight.    Have you had a diabetic eye exam in the last 12 months? No      Hyperlipidemia Follow-Up      Are you regularly taking any medication or supplement to lower your cholesterol?   Yes- SIMVASTATIN    Are you having muscle aches or other side effects that you think could be caused by your cholesterol lowering medication?  No    Hypertension Follow-up      Do you check your blood pressure regularly outside of the clinic? Yes     Are you following a low salt diet? Yes    Are your blood pressures ever more than 140 on the top number (systolic) OR more   than 90 on the bottom number (diastolic), for example 140/90? No    BP Readings from Last 2 Encounters:   06/24/21 136/86   03/09/20 " "124/72     Hemoglobin A1C POCT (%)   Date Value   01/26/2021 6.9 (H)   02/03/2020 6.2 (H)     Hemoglobin A1C (%)   Date Value   01/18/2022 6.6 (H)     LDL Cholesterol Calculated (mg/dL)   Date Value   01/26/2021 40   10/22/2019 40       Other concerns:  1. [employer] transportation forms due to RP diagnosis  2. Lantus was only authorized for 30 days by insurance       Review of Systems   CONSTITUTIONAL: NEGATIVE for fever, chills, change in weight  ENT/MOUTH: NEGATIVE for ear, mouth and throat problems  RESP: NEGATIVE for significant cough or SOB  CV: NEGATIVE for chest pain, palpitations or peripheral edema  GI: NEGATIVE for nausea, abdominal pain, heartburn, or change in bowel habits  : NEGATIVE for frequency, dysuria, or hematuria  MUSCULOSKELETAL: NEGATIVE for significant arthralgias or myalgia  NEURO: NEGATIVE for weakness, dizziness or paresthesias  HEME: NEGATIVE for bleeding problems  PSYCHIATRIC: NEGATIVE for changes in mood or affect      Objective    /70   Pulse 75   Temp 98  F (36.7  C) (Temporal)   Resp 15   Ht 1.816 m (5' 11.5\")   Wt 138.3 kg (304 lb 14.4 oz)   SpO2 98%   BMI 41.93 kg/m    Body mass index is 41.93 kg/m .  Physical Exam   GENERAL: alert and no distress  EYES: altered with reduced VA.  HENT: ear canals and TM's normal, nose and mouth without ulcers or lesions  NECK: no adenopathy, no asymmetry, masses, or scars and thyroid normal to palpation  RESP: lungs clear to auscultation - no rales, rhonchi or wheezes  CV: regular rate and rhythm, normal S1 S2, no S3 or S4, no click or rub  ABDOMEN: obese  NEURO: No focal changes  PSYCH: mentation appears normal, affect normal/bright      Lab Results   Component Value Date    A1C 6.6 01/18/2022    A1C 6.9 01/26/2021    A1C 6.2 02/03/2020    A1C 6.1 10/22/2019    A1C 6.4 03/06/2019    A1C 6.4 06/11/2018     LDL Cholesterol Calculated   Date Value Ref Range Status   01/26/2021 40 <100 mg/dL Final     Comment:     Desirable:       <100 mg/dl "     Lab Results   Component Value Date    ALT 25 01/26/2021     Creatinine   Date Value Ref Range Status   01/26/2021 1.03 0.66 - 1.25 mg/dL Final     PSA   Date Value Ref Range Status   01/26/2021 0.50 0 - 4 ug/L Final     Comment:     Assay Method:  Chemiluminescence using Siemens Vista analyzer

## 2022-03-01 ENCOUNTER — LAB (OUTPATIENT)
Dept: LAB | Facility: CLINIC | Age: 62
End: 2022-03-01
Payer: COMMERCIAL

## 2022-03-01 ENCOUNTER — ANTICOAGULATION THERAPY VISIT (OUTPATIENT)
Dept: ANTICOAGULATION | Facility: CLINIC | Age: 62
End: 2022-03-01

## 2022-03-01 DIAGNOSIS — Z79.01 LONG TERM CURRENT USE OF ANTICOAGULANT THERAPY: ICD-10-CM

## 2022-03-01 DIAGNOSIS — Z79.01 LONG TERM CURRENT USE OF ANTICOAGULANT THERAPY: Primary | ICD-10-CM

## 2022-03-01 DIAGNOSIS — I48.20 CHRONIC ATRIAL FIBRILLATION (H): ICD-10-CM

## 2022-03-01 LAB — INR BLD: 2.6 (ref 0.9–1.1)

## 2022-03-01 PROCEDURE — 85610 PROTHROMBIN TIME: CPT

## 2022-03-01 PROCEDURE — 36416 COLLJ CAPILLARY BLOOD SPEC: CPT

## 2022-03-01 NOTE — PROGRESS NOTES
ANTICOAGULATION MANAGEMENT     Mckay DIAZ Shadi 61 year old male is on warfarin with therapeutic INR result. (Goal INR 2.0-3.0)    Recent labs: (last 7 days)     03/01/22  1241   INR 2.6*       ASSESSMENT       Source(s): Chart Review and Patient/Caregiver Call       Warfarin doses taken: Warfarin taken as instructed    Diet: No new diet changes identified    New illness, injury, or hospitalization: No    Medication/supplement changes: None noted    Signs or symptoms of bleeding or clotting: No    Previous INR: Supratherapeutic    Additional findings: None       PLAN     Recommended plan for no diet, medication or health factor changes affecting INR     Dosing Instructions: Continue your current warfarin dose with next INR in 3 weeks       Summary  As of 3/1/2022    Full warfarin instructions:  3.75 mg every Tue, Fri; 7.5 mg all other days   Next INR check:  3/22/2022             Telephone call with Mckay who verbalizes understanding and agrees to plan    Lab visit scheduled    Education provided: Please call back if any changes to your diet, medications or how you've been taking warfarin    Plan made per [facility] anticoagulation protocol    Collette Dunbar RN  Anticoagulation Clinic  3/1/2022    _______________________________________________________________________     Anticoagulation Episode Summary     Current INR goal:  2.0-3.0   TTR:  84.0 % (1 y)   Target end date:  Indefinite   Send INR reminders to:  [facility]    Indications    Long-term (current) use of anticoagulants [Z79.01] [Z79.01]  Chronic atrial fibrillation (H) [I48.20]  New onset atrial fibrillation (H) (Resolved) [I48.91]           Comments:           Anticoagulation Care Providers     Provider Role Specialty Phone number    David Almendarez MD Referring Internal Medicine [phone]

## 2022-03-08 DIAGNOSIS — Z79.4 TYPE 2 DIABETES MELLITUS WITHOUT COMPLICATION, WITH LONG-TERM CURRENT USE OF INSULIN (H): ICD-10-CM

## 2022-03-08 DIAGNOSIS — E11.9 TYPE 2 DIABETES MELLITUS WITHOUT COMPLICATION, WITH LONG-TERM CURRENT USE OF INSULIN (H): ICD-10-CM

## 2022-03-08 NOTE — TELEPHONE ENCOUNTER
Routing refill request to provider for review/approval because:  Biguanide Agents Failed 03/08/2022 05:55 AM   Protocol Details  Patient's CR is NOT>1.4 OR Patient's EGFR is NOT<45 within past 12 mos.    Patient does NOT have a diagnosis of CHF.

## 2022-03-16 ENCOUNTER — ANCILLARY PROCEDURE (OUTPATIENT)
Dept: CARDIOLOGY | Facility: CLINIC | Age: 62
End: 2022-03-16
Attending: INTERNAL MEDICINE
Payer: COMMERCIAL

## 2022-03-16 DIAGNOSIS — Z95.0 CARDIAC PACEMAKER IN SITU: ICD-10-CM

## 2022-03-16 PROCEDURE — 93294 REM INTERROG EVL PM/LDLS PM: CPT | Performed by: INTERNAL MEDICINE

## 2022-03-16 PROCEDURE — 93296 REM INTERROG EVL PM/IDS: CPT | Performed by: INTERNAL MEDICINE

## 2022-03-17 ENCOUNTER — DOCUMENTATION ONLY (OUTPATIENT)
Dept: CARDIOLOGY | Facility: CLINIC | Age: 62
End: 2022-03-17
Payer: COMMERCIAL

## 2022-03-17 LAB
MDC_IDC_LEAD_IMPLANT_DT: NORMAL
MDC_IDC_LEAD_IMPLANT_DT: NORMAL
MDC_IDC_LEAD_LOCATION: NORMAL
MDC_IDC_LEAD_LOCATION: NORMAL
MDC_IDC_LEAD_LOCATION_DETAIL_1: NORMAL
MDC_IDC_LEAD_LOCATION_DETAIL_1: NORMAL
MDC_IDC_LEAD_MFG: NORMAL
MDC_IDC_LEAD_MFG: NORMAL
MDC_IDC_LEAD_MODEL: NORMAL
MDC_IDC_LEAD_MODEL: NORMAL
MDC_IDC_LEAD_POLARITY_TYPE: NORMAL
MDC_IDC_LEAD_POLARITY_TYPE: NORMAL
MDC_IDC_LEAD_SERIAL: NORMAL
MDC_IDC_LEAD_SERIAL: NORMAL
MDC_IDC_MSMT_BATTERY_DTM: NORMAL
MDC_IDC_MSMT_BATTERY_REMAINING_LONGEVITY: 87 MO
MDC_IDC_MSMT_BATTERY_RRT_TRIGGER: 2.6
MDC_IDC_MSMT_BATTERY_STATUS: NORMAL
MDC_IDC_MSMT_BATTERY_VOLTAGE: 3 V
MDC_IDC_MSMT_LEADCHNL_LV_IMPEDANCE_VALUE: 361 OHM
MDC_IDC_MSMT_LEADCHNL_LV_IMPEDANCE_VALUE: 456 OHM
MDC_IDC_MSMT_LEADCHNL_LV_IMPEDANCE_VALUE: 570 OHM
MDC_IDC_MSMT_LEADCHNL_LV_IMPEDANCE_VALUE: 627 OHM
MDC_IDC_MSMT_LEADCHNL_LV_IMPEDANCE_VALUE: 817 OHM
MDC_IDC_MSMT_LEADCHNL_RA_IMPEDANCE_VALUE: 3401 OHM
MDC_IDC_MSMT_LEADCHNL_RA_IMPEDANCE_VALUE: 3401 OHM
MDC_IDC_MSMT_LEADCHNL_RV_IMPEDANCE_VALUE: 323 OHM
MDC_IDC_MSMT_LEADCHNL_RV_IMPEDANCE_VALUE: 361 OHM
MDC_IDC_MSMT_LEADCHNL_RV_PACING_THRESHOLD_AMPLITUDE: 0.75 V
MDC_IDC_MSMT_LEADCHNL_RV_PACING_THRESHOLD_PULSEWIDTH: 0.4 MS
MDC_IDC_MSMT_LEADCHNL_RV_SENSING_INTR_AMPL: 10.88 MV
MDC_IDC_PG_IMPLANT_DTM: NORMAL
MDC_IDC_PG_MFG: NORMAL
MDC_IDC_PG_MODEL: NORMAL
MDC_IDC_PG_SERIAL: NORMAL
MDC_IDC_PG_TYPE: NORMAL
MDC_IDC_SESS_CLINIC_NAME: NORMAL
MDC_IDC_SESS_DTM: NORMAL
MDC_IDC_SESS_TYPE: NORMAL
MDC_IDC_SET_BRADY_LOWRATE: 70 {BEATS}/MIN
MDC_IDC_SET_BRADY_MAX_SENSOR_RATE: 130 {BEATS}/MIN
MDC_IDC_SET_BRADY_MODE: NORMAL
MDC_IDC_SET_CRT_LVRV_DELAY: 0 MS
MDC_IDC_SET_CRT_PACED_CHAMBERS: NORMAL
MDC_IDC_SET_LEADCHNL_LV_PACING_AMPLITUDE: 2 V
MDC_IDC_SET_LEADCHNL_LV_PACING_ANODE_ELECTRODE_1: NORMAL
MDC_IDC_SET_LEADCHNL_LV_PACING_ANODE_LOCATION_1: NORMAL
MDC_IDC_SET_LEADCHNL_LV_PACING_CAPTURE_MODE: NORMAL
MDC_IDC_SET_LEADCHNL_LV_PACING_CATHODE_ELECTRODE_1: NORMAL
MDC_IDC_SET_LEADCHNL_LV_PACING_CATHODE_LOCATION_1: NORMAL
MDC_IDC_SET_LEADCHNL_LV_PACING_POLARITY: NORMAL
MDC_IDC_SET_LEADCHNL_LV_PACING_PULSEWIDTH: 0.5 MS
MDC_IDC_SET_LEADCHNL_RA_SENSING_ANODE_ELECTRODE_1: NORMAL
MDC_IDC_SET_LEADCHNL_RA_SENSING_ANODE_LOCATION_1: NORMAL
MDC_IDC_SET_LEADCHNL_RA_SENSING_CATHODE_ELECTRODE_1: NORMAL
MDC_IDC_SET_LEADCHNL_RA_SENSING_CATHODE_LOCATION_1: NORMAL
MDC_IDC_SET_LEADCHNL_RA_SENSING_POLARITY: NORMAL
MDC_IDC_SET_LEADCHNL_RA_SENSING_SENSITIVITY: NORMAL
MDC_IDC_SET_LEADCHNL_RV_PACING_AMPLITUDE: 2 V
MDC_IDC_SET_LEADCHNL_RV_PACING_ANODE_ELECTRODE_1: NORMAL
MDC_IDC_SET_LEADCHNL_RV_PACING_ANODE_LOCATION_1: NORMAL
MDC_IDC_SET_LEADCHNL_RV_PACING_CAPTURE_MODE: NORMAL
MDC_IDC_SET_LEADCHNL_RV_PACING_CATHODE_ELECTRODE_1: NORMAL
MDC_IDC_SET_LEADCHNL_RV_PACING_CATHODE_LOCATION_1: NORMAL
MDC_IDC_SET_LEADCHNL_RV_PACING_POLARITY: NORMAL
MDC_IDC_SET_LEADCHNL_RV_PACING_PULSEWIDTH: 0.4 MS
MDC_IDC_SET_LEADCHNL_RV_SENSING_ANODE_ELECTRODE_1: NORMAL
MDC_IDC_SET_LEADCHNL_RV_SENSING_ANODE_LOCATION_1: NORMAL
MDC_IDC_SET_LEADCHNL_RV_SENSING_CATHODE_ELECTRODE_1: NORMAL
MDC_IDC_SET_LEADCHNL_RV_SENSING_CATHODE_LOCATION_1: NORMAL
MDC_IDC_SET_LEADCHNL_RV_SENSING_POLARITY: NORMAL
MDC_IDC_SET_LEADCHNL_RV_SENSING_SENSITIVITY: 0.9 MV
MDC_IDC_SET_ZONE_DETECTION_INTERVAL: 400 MS
MDC_IDC_SET_ZONE_TYPE: NORMAL
MDC_IDC_STAT_BRADY_AP_VP_PERCENT: 0 %
MDC_IDC_STAT_BRADY_AP_VS_PERCENT: 0 %
MDC_IDC_STAT_BRADY_AS_VP_PERCENT: 99.98 %
MDC_IDC_STAT_BRADY_AS_VS_PERCENT: 0.02 %
MDC_IDC_STAT_BRADY_DTM_END: NORMAL
MDC_IDC_STAT_BRADY_DTM_START: NORMAL
MDC_IDC_STAT_BRADY_RA_PERCENT_PACED: 0 %
MDC_IDC_STAT_BRADY_RV_PERCENT_PACED: 99.98 %
MDC_IDC_STAT_CRT_DTM_END: NORMAL
MDC_IDC_STAT_CRT_DTM_START: NORMAL
MDC_IDC_STAT_CRT_LV_PERCENT_PACED: 99.95 %
MDC_IDC_STAT_CRT_PERCENT_PACED: 99.95 %
MDC_IDC_STAT_EPISODE_RECENT_COUNT: 0
MDC_IDC_STAT_EPISODE_RECENT_COUNT_DTM_END: NORMAL
MDC_IDC_STAT_EPISODE_RECENT_COUNT_DTM_START: NORMAL
MDC_IDC_STAT_EPISODE_TOTAL_COUNT: 0
MDC_IDC_STAT_EPISODE_TOTAL_COUNT: 5
MDC_IDC_STAT_EPISODE_TOTAL_COUNT_DTM_END: NORMAL
MDC_IDC_STAT_EPISODE_TOTAL_COUNT_DTM_START: NORMAL
MDC_IDC_STAT_EPISODE_TYPE: NORMAL

## 2022-03-17 NOTE — PROGRESS NOTES
Reviewed that 3/16 device interrogation showed no ventricular arrhythmias.  Had evaluated to determine if stress testing needed for increasing episodes.    OV set for 6/2022

## 2022-03-22 ENCOUNTER — ANTICOAGULATION THERAPY VISIT (OUTPATIENT)
Dept: ANTICOAGULATION | Facility: CLINIC | Age: 62
End: 2022-03-22

## 2022-03-22 ENCOUNTER — LAB (OUTPATIENT)
Dept: LAB | Facility: CLINIC | Age: 62
End: 2022-03-22
Payer: COMMERCIAL

## 2022-03-22 DIAGNOSIS — I48.20 CHRONIC ATRIAL FIBRILLATION (H): ICD-10-CM

## 2022-03-22 DIAGNOSIS — I48.91 NEW ONSET ATRIAL FIBRILLATION (H): ICD-10-CM

## 2022-03-22 DIAGNOSIS — Z79.01 LONG TERM CURRENT USE OF ANTICOAGULANT THERAPY: ICD-10-CM

## 2022-03-22 DIAGNOSIS — Z79.01 LONG TERM CURRENT USE OF ANTICOAGULANT THERAPY: Primary | ICD-10-CM

## 2022-03-22 LAB — INR BLD: 3 (ref 0.9–1.1)

## 2022-03-22 PROCEDURE — 85610 PROTHROMBIN TIME: CPT

## 2022-03-22 PROCEDURE — 36416 COLLJ CAPILLARY BLOOD SPEC: CPT

## 2022-03-22 RX ORDER — WARFARIN SODIUM 7.5 MG/1
TABLET ORAL
Qty: 80 TABLET | Refills: 1 | Status: SHIPPED | OUTPATIENT
Start: 2022-03-22 | End: 2022-11-17

## 2022-03-22 NOTE — PROGRESS NOTES
ANTICOAGULATION MANAGEMENT     Mckay EMILY Shadi 62 year old male is on warfarin with therapeutic INR result. (Goal INR 2.0-3.0)    Recent labs: (last 7 days)     03/22/22  1304   INR 3.0*       ASSESSMENT       Source(s): Chart Review and Patient/Caregiver Call       Warfarin doses taken: Warfarin taken as instructed    Diet: No new diet changes identified    New illness, injury, or hospitalization: No    Medication/supplement changes: None noted    Signs or symptoms of bleeding or clotting: No    Previous INR: Therapeutic last visit; previously outside of goal range    Additional findings: None       PLAN     Recommended plan for no diet, medication or health factor changes affecting INR     Dosing Instructions: Continue your current warfarin dose with next INR in 4 weeks       Summary  As of 3/22/2022    Full warfarin instructions:  3.75 mg every Tue, Fri; 7.5 mg all other days   Next INR check:  4/19/2022             Telephone call with Doyle who verbalizes understanding and agrees to plan    Lab visit scheduled    Education provided: Please call back if any changes to your diet, medications or how you've been taking warfarin, Monitoring for bleeding signs and symptoms, Monitoring for clotting signs and symptoms and Importance of notifying clinic for changes in medications; a sooner lab recheck maybe needed.    Plan made per [facility] anticoagulation protocol    Elvia Davis RN  Anticoagulation Clinic  3/22/2022    _______________________________________________________________________     Anticoagulation Episode Summary     Current INR goal:  2.0-3.0   TTR:  84.0 % (1 y)   Target end date:  Indefinite   Send INR reminders to:  [facility]    Indications    Long-term (current) use of anticoagulants [Z79.01] [Z79.01]  Chronic atrial fibrillation (H) [I48.20]  New onset atrial fibrillation (H) (Resolved) [I48.91]           Comments:           Anticoagulation Care Providers     Provider Role Specialty  Phone number    David Almendarez MD Referring Internal Medicine [phone]

## 2022-04-05 ENCOUNTER — LAB (OUTPATIENT)
Dept: LAB | Facility: CLINIC | Age: 62
End: 2022-04-05
Payer: COMMERCIAL

## 2022-04-05 DIAGNOSIS — E78.5 HYPERLIPIDEMIA LDL GOAL <100: ICD-10-CM

## 2022-04-05 DIAGNOSIS — Z12.5 SCREENING PSA (PROSTATE SPECIFIC ANTIGEN): ICD-10-CM

## 2022-04-05 DIAGNOSIS — I50.22 CHRONIC SYSTOLIC CONGESTIVE HEART FAILURE (H): ICD-10-CM

## 2022-04-05 DIAGNOSIS — Z79.4 TYPE 2 DIABETES MELLITUS WITHOUT COMPLICATION, WITH LONG-TERM CURRENT USE OF INSULIN (H): ICD-10-CM

## 2022-04-05 DIAGNOSIS — I10 ESSENTIAL HYPERTENSION, BENIGN: ICD-10-CM

## 2022-04-05 DIAGNOSIS — E11.9 TYPE 2 DIABETES MELLITUS WITHOUT COMPLICATION, WITH LONG-TERM CURRENT USE OF INSULIN (H): ICD-10-CM

## 2022-04-05 LAB
CREAT UR-MCNC: 231 MG/DL
ERYTHROCYTE [DISTWIDTH] IN BLOOD BY AUTOMATED COUNT: 14.6 % (ref 10–15)
HCT VFR BLD AUTO: 43.4 % (ref 40–53)
HGB BLD-MCNC: 14.1 G/DL (ref 13.3–17.7)
MCH RBC QN AUTO: 29.6 PG (ref 26.5–33)
MCHC RBC AUTO-ENTMCNC: 32.5 G/DL (ref 31.5–36.5)
MCV RBC AUTO: 91 FL (ref 78–100)
MICROALBUMIN UR-MCNC: 145 MG/L
MICROALBUMIN/CREAT UR: 62.77 MG/G CR (ref 0–17)
PLATELET # BLD AUTO: 306 10E3/UL (ref 150–450)
RBC # BLD AUTO: 4.76 10E6/UL (ref 4.4–5.9)
WBC # BLD AUTO: 9.9 10E3/UL (ref 4–11)

## 2022-04-05 PROCEDURE — 36415 COLL VENOUS BLD VENIPUNCTURE: CPT

## 2022-04-05 PROCEDURE — 80061 LIPID PANEL: CPT

## 2022-04-05 PROCEDURE — 84460 ALANINE AMINO (ALT) (SGPT): CPT

## 2022-04-05 PROCEDURE — G0103 PSA SCREENING: HCPCS

## 2022-04-05 PROCEDURE — 85027 COMPLETE CBC AUTOMATED: CPT

## 2022-04-05 PROCEDURE — 82043 UR ALBUMIN QUANTITATIVE: CPT

## 2022-04-05 PROCEDURE — 80048 BASIC METABOLIC PNL TOTAL CA: CPT

## 2022-04-06 LAB
ALT SERPL W P-5'-P-CCNC: 27 U/L (ref 0–70)
ANION GAP SERPL CALCULATED.3IONS-SCNC: 8 MMOL/L (ref 3–14)
BUN SERPL-MCNC: 16 MG/DL (ref 7–30)
CALCIUM SERPL-MCNC: 9.4 MG/DL (ref 8.5–10.1)
CHLORIDE BLD-SCNC: 103 MMOL/L (ref 94–109)
CHOLEST SERPL-MCNC: 111 MG/DL
CO2 SERPL-SCNC: 25 MMOL/L (ref 20–32)
CREAT SERPL-MCNC: 1.17 MG/DL (ref 0.66–1.25)
FASTING STATUS PATIENT QL REPORTED: YES
GFR SERPL CREATININE-BSD FRML MDRD: 71 ML/MIN/1.73M2
GLUCOSE BLD-MCNC: 120 MG/DL (ref 70–99)
HDLC SERPL-MCNC: 46 MG/DL
LDLC SERPL CALC-MCNC: 36 MG/DL
NONHDLC SERPL-MCNC: 65 MG/DL
POTASSIUM BLD-SCNC: 4.1 MMOL/L (ref 3.4–5.3)
PSA SERPL-MCNC: 0.4 UG/L (ref 0–4)
SODIUM SERPL-SCNC: 136 MMOL/L (ref 133–144)
TRIGL SERPL-MCNC: 147 MG/DL

## 2022-04-19 ENCOUNTER — LAB (OUTPATIENT)
Dept: LAB | Facility: CLINIC | Age: 62
End: 2022-04-19
Payer: COMMERCIAL

## 2022-04-19 ENCOUNTER — ANTICOAGULATION THERAPY VISIT (OUTPATIENT)
Dept: ANTICOAGULATION | Facility: CLINIC | Age: 62
End: 2022-04-19

## 2022-04-19 DIAGNOSIS — I48.20 CHRONIC ATRIAL FIBRILLATION (H): ICD-10-CM

## 2022-04-19 DIAGNOSIS — Z79.01 LONG TERM CURRENT USE OF ANTICOAGULANT THERAPY: Primary | ICD-10-CM

## 2022-04-19 LAB — INR BLD: 4.2 (ref 0.9–1.1)

## 2022-04-19 PROCEDURE — 36416 COLLJ CAPILLARY BLOOD SPEC: CPT

## 2022-04-19 PROCEDURE — 85610 PROTHROMBIN TIME: CPT

## 2022-04-19 NOTE — PROGRESS NOTES
ANTICOAGULATION MANAGEMENT     Mckay EMILY Shadi 62 year old male is on warfarin with supratherapeutic INR result. (Goal INR 2.0-3.0)    Recent labs: (last 7 days)     04/19/22  1230   INR 4.2*       ASSESSMENT       Source(s): Chart Review and Patient/Caregiver Call       Warfarin doses taken: Warfarin taken as instructed    Diet: Decreased greens/vitamin K in diet; plans to resume previous intake    New illness, injury, or hospitalization: No    Medication/supplement changes: None noted    Signs or symptoms of bleeding or clotting: No    Previous INR: Therapeutic last 2(+) visits    Additional findings: None       PLAN     Recommended plan for no diet, medication or health factor changes affecting INR     Dosing Instructions: hold today and 1/2 dose tomorrow with next INR in 1 weeks       Summary  As of 4/19/2022    Full warfarin instructions:  4/19: Hold; 4/20: 3.75 mg; Otherwise 3.75 mg every Tue, Fri; 7.5 mg all other days   Next INR check:  4/26/2022             Telephone call with Doyle who verbalizes understanding and agrees to plan    Lab visit scheduled    Education provided: None required    Plan made per [facility] anticoagulation protocol    Collette Dunbar RN  Anticoagulation Clinic  4/19/2022    _______________________________________________________________________     Anticoagulation Episode Summary     Current INR goal:  2.0-3.0   TTR:  76.3 % (1 y)   Target end date:  Indefinite   Send INR reminders to:  [facility]    Indications    Long-term (current) use of anticoagulants [Z79.01] [Z79.01]  Chronic atrial fibrillation (H) [I48.20]  New onset atrial fibrillation (H) (Resolved) [I48.91]           Comments:           Anticoagulation Care Providers     Provider Role Specialty Phone number    David Almendarez MD Referring Internal Medicine [phone]

## 2022-04-21 DIAGNOSIS — Z79.4 TYPE 2 DIABETES MELLITUS WITHOUT COMPLICATION, WITH LONG-TERM CURRENT USE OF INSULIN (H): ICD-10-CM

## 2022-04-21 DIAGNOSIS — E11.9 TYPE 2 DIABETES MELLITUS WITHOUT COMPLICATION, WITH LONG-TERM CURRENT USE OF INSULIN (H): ICD-10-CM

## 2022-04-22 RX ORDER — INSULIN ASPART 100 [IU]/ML
INJECTION, SOLUTION INTRAVENOUS; SUBCUTANEOUS
Qty: 60 ML | Refills: 0 | Status: SHIPPED | OUTPATIENT
Start: 2022-04-22 | End: 2023-03-17

## 2022-04-22 RX ORDER — FLURBIPROFEN SODIUM 0.3 MG/ML
SOLUTION/ DROPS OPHTHALMIC
Qty: 100 EACH | Refills: 0 | Status: SHIPPED | OUTPATIENT
Start: 2022-04-22 | End: 2023-05-01

## 2022-05-03 ENCOUNTER — LAB (OUTPATIENT)
Dept: LAB | Facility: CLINIC | Age: 62
End: 2022-05-03
Payer: COMMERCIAL

## 2022-05-03 ENCOUNTER — ANTICOAGULATION THERAPY VISIT (OUTPATIENT)
Dept: ANTICOAGULATION | Facility: CLINIC | Age: 62
End: 2022-05-03

## 2022-05-03 DIAGNOSIS — I48.20 CHRONIC ATRIAL FIBRILLATION (H): ICD-10-CM

## 2022-05-03 DIAGNOSIS — Z79.01 LONG TERM CURRENT USE OF ANTICOAGULANT THERAPY: ICD-10-CM

## 2022-05-03 DIAGNOSIS — Z79.01 LONG TERM CURRENT USE OF ANTICOAGULANT THERAPY: Primary | ICD-10-CM

## 2022-05-03 LAB — INR BLD: 2.5 (ref 0.9–1.1)

## 2022-05-03 PROCEDURE — 36415 COLL VENOUS BLD VENIPUNCTURE: CPT

## 2022-05-03 PROCEDURE — 85610 PROTHROMBIN TIME: CPT

## 2022-05-03 NOTE — PROGRESS NOTES
ANTICOAGULATION MANAGEMENT     Mckay EMILY Shadi 62 year old male is on warfarin with therapeutic INR result. (Goal INR 2.0-3.0)    Recent labs: (last 7 days)     05/03/22  1253   INR 2.5*       ASSESSMENT       Source(s): Chart Review and Patient/Caregiver Call       Warfarin doses taken: Warfarin taken as instructed    Diet: No new diet changes identified    New illness, injury, or hospitalization: No    Medication/supplement changes: None noted    Signs or symptoms of bleeding or clotting: No    Previous INR: Supratherapeutic    Additional findings: None       PLAN     Recommended plan for no diet, medication or health factor changes affecting INR     Dosing Instructions: continue your current warfarin dose with next INR in 3 weeks       Summary  As of 5/3/2022    Full warfarin instructions:  3.75 mg every Tue, Fri; 7.5 mg all other days   Next INR check:  5/24/2022             Telephone call with Doyle who verbalizes understanding and agrees to plan    Lab visit scheduled    Education provided: Please call back if any changes to your diet, medications or how you've been taking warfarin    Plan made per [facility] anticoagulation protocol    Collette Dunbar RN  Anticoagulation Clinic  5/3/2022    _______________________________________________________________________     Anticoagulation Episode Summary     Current INR goal:  2.0-3.0   TTR:  73.6 % (1 y)   Target end date:  Indefinite   Send INR reminders to:  [facility]    Indications    Long-term (current) use of anticoagulants [Z79.01] [Z79.01]  Chronic atrial fibrillation (H) [I48.20]  New onset atrial fibrillation (H) (Resolved) [I48.91]           Comments:           Anticoagulation Care Providers     Provider Role Specialty Phone number    David Almendarez MD Referring Internal Medicine [phone]

## 2022-05-07 DIAGNOSIS — I10 ESSENTIAL HYPERTENSION, BENIGN: ICD-10-CM

## 2022-05-09 RX ORDER — METOPROLOL SUCCINATE 100 MG/1
TABLET, EXTENDED RELEASE ORAL
Qty: 90 TABLET | Refills: 2 | Status: SHIPPED | OUTPATIENT
Start: 2022-05-09 | End: 2023-02-24

## 2022-05-09 NOTE — TELEPHONE ENCOUNTER
Prescription approved per [facility] Refill Protocol.    Toña Braswell RN

## 2022-05-24 ENCOUNTER — ANTICOAGULATION THERAPY VISIT (OUTPATIENT)
Dept: ANTICOAGULATION | Facility: CLINIC | Age: 62
End: 2022-05-24

## 2022-05-24 ENCOUNTER — LAB (OUTPATIENT)
Dept: LAB | Facility: CLINIC | Age: 62
End: 2022-05-24
Payer: COMMERCIAL

## 2022-05-24 DIAGNOSIS — Z79.01 LONG TERM CURRENT USE OF ANTICOAGULANT THERAPY: Primary | ICD-10-CM

## 2022-05-24 DIAGNOSIS — I48.20 CHRONIC ATRIAL FIBRILLATION (H): ICD-10-CM

## 2022-05-24 DIAGNOSIS — Z79.01 LONG TERM CURRENT USE OF ANTICOAGULANT THERAPY: ICD-10-CM

## 2022-05-24 LAB — INR BLD: 2.5 (ref 0.9–1.1)

## 2022-05-24 PROCEDURE — 85610 PROTHROMBIN TIME: CPT

## 2022-05-24 PROCEDURE — 36416 COLLJ CAPILLARY BLOOD SPEC: CPT

## 2022-05-24 NOTE — PROGRESS NOTES
ANTICOAGULATION MANAGEMENT     Mckay EMILY Shadi 62 year old male is on warfarin with therapeutic INR result. (Goal INR 2.0-3.0)    Recent labs: (last 7 days)     05/24/22  1242   INR 2.5*       ASSESSMENT       Source(s): Chart Review and Patient/Caregiver Call       Warfarin doses taken: Warfarin taken as instructed    Diet: No new diet changes identified    New illness, injury, or hospitalization: No    Medication/supplement changes: None noted    Signs or symptoms of bleeding or clotting: No    Previous INR: Therapeutic last visit; previously outside of goal range    Additional findings: None       PLAN     Recommended plan for no diet, medication or health factor changes affecting INR     Dosing Instructions: continue your current warfarin dose with next INR in 4 weeks       Summary  As of 5/24/2022    Full warfarin instructions:  3.75 mg every Tue, Fri; 7.5 mg all other days   Next INR check:  6/21/2022             Telephone call with Doyle who verbalizes understanding and agrees to plan    Lab visit scheduled    Education provided: Please call back if any changes to your diet, medications or how you've been taking warfarin    Plan made per [facility] anticoagulation protocol    Collette Dunbar RN  Anticoagulation Clinic  5/24/2022    _______________________________________________________________________     Anticoagulation Episode Summary     Current INR goal:  2.0-3.0   TTR:  73.6 % (1 y)   Target end date:  Indefinite   Send INR reminders to:  [facility]    Indications    Long-term (current) use of anticoagulants [Z79.01] [Z79.01]  Chronic atrial fibrillation (H) [I48.20]  New onset atrial fibrillation (H) (Resolved) [I48.91]           Comments:           Anticoagulation Care Providers     Provider Role Specialty Phone number    David Almendarez MD Referring Internal Medicine [phone]

## 2022-06-13 NOTE — PROGRESS NOTES
"[facility] HEART CLINIC    I had the pleasure of seeing Doyle when he came for annual follow-up.  This 62 year old sees Dr. Holliday for his history of:    1. Permanent  AFib; tachycardia-induced CM (resolved), s/p AV node ablation and Medtronic CRT-P 4/2010 with gen change 11/29/2018. He previously failed amiodarone. Remains on warfarin for [id number] 2 (diabetes, hypertension)  2. Diabetes, on metformin and insulin.  A1c 6.6% 1/2022  3. Hypertension  4. NSVT - 17 beat run noted PPM check 6/2021.  Asx'c.  Normal EF on echo 7/2021. Another episode 12/2021  5. Retinitis Pigmentosa    I saw Doyle 6/2021 at which time he was doing well overall, though activity levels had really dropped off with COVID-19 restrictions.  He continued to have mild LE edema.  BPs were under excellent control 110-120s.  He had had an episode of NSVT noted on PPM interrogation, a/w \"heavy work.\"  I recommended echocardiogram to ensure LVEF remained stable.    Echo done 7/2021 showed normal LVEF 55-60%.  We agreed for annual follow-up with annual pacemaker interrogation, noting we can get a stress test if recurrent VT was seen.  He had a brief, asymptomatic episode noted on interrogation 12/2021.    Interval History:  Doyle continues to do \"OK\" from a CV perspective. Doing all AODLs without issues; he does have groceries delivered. Notes the only thing that seems to limit him is his vision.  No CP, SOB.     No issues with PPM site. No change in chronic R>L LE edema. No orthopnea, PND.     VITALS:  Vitals: /82 (BP Location: Right arm, Patient Position: Sitting)   Pulse 74   Ht 1.816 m (5' 11.5\")   Wt 138.1 kg (304 lb 8 oz)   SpO2 99%   BMI 41.88 kg/m      Diagnostic Testing:  MDT Device interrogation today, showed 99.64%BiVP and VSRP 0.04% in VVIR 70/130. No ventricular arrhythmias  Echocardiogram 7/2021 LVEF 55-60%.  Borderline concentric LVH.  No RWMA.  No sig valve abnormalities  Echocardiogram 6/2017-EF 55-60%.  No RWMA. Trivial " AoScl.  1+ TR  Stress Testing 8/2009-no ischemia/infarction      Plan:  Annual follow-up with annual PPM check    Assessment/Plan:  1. Permanent AFib    S/p AVN ablation and CRT-P as above. Gen change 11/2018    Remains asx'c with permanent AFib.     PLAN:    Continue current device schedule    He does not drive - will see us back at time of annual device interrogation in 1 year      2. NSVT    Noted on PPM interrogation 2019, 6/2021 and again 12/2021. None since    Remains on high dose Metoprolol Xl 100 mg daily    LVEF on echo 2021 wnl    PLAN:    Continue BB    Continue device interrogations. Confirms he has his bedside monitor plugged in at all times        Jane Caruso PA-C, MSPAS      No orders of the defined types were placed in this encounter.    No orders of the defined types were placed in this encounter.    There are no discontinued medications.      Encounter Diagnoses   Name Primary?     Cardiac pacemaker in situ      ICD (implantable cardioverter-defibrillator), biventricular, in situ        CURRENT MEDICATIONS:  Current Outpatient Medications   Medication Sig Dispense Refill     alcohol swab prep pads Use to swab area of injection/lily as directed. 100 each 3     amLODIPine (NORVASC) 10 MG tablet TAKE 1 TABLET(10 MG) BY MOUTH DAILY 90 tablet 2     B-D U/F insulin pen needle USE 1 DAILY OR AS DIRECTED 100 each 0     blood glucose (NO BRAND SPECIFIED) test strip Use to test blood sugar 3 times daily or as directed. To accompany: ACCU-CHEK GUIDE ME  Please dispense 90 days if able, at least 30 days minimum 300 strip 3     blood glucose calibration (NO BRAND SPECIFIED) solution To accompany: ACCU-CHEK GUIDE ME 1 Bottle 3     blood glucose monitoring (NO BRAND SPECIFIED) meter device kit Use to test blood sugar 3 times daily or as directed. Preferred blood glucose meter OR supplies to accompany: ACCU-CHEK GUIDE ME 1 kit 0     fish oil-omega-3 fatty acids 1000 MG capsule Take 4 capsules by mouth daily.    "    insulin aspart (NOVOLOG FLEXPEN) 100 UNIT/ML pen INJECT 1 UNIT SUBCUTANEOUSLY PER 7 GRAMS OF CARBS 3 TIMES DAILY BEFORE MEALS. MAX 60 UNITS PER DAY 60 mL 0     insulin glargine (LANTUS SOLOSTAR) 100 UNIT/ML pen ADMINISTER 60 UNITS UNDER THE SKIN AT BEDTIME 60 mL 5     losartan (COZAAR) 100 MG tablet Take 1 tablet (100 mg) by mouth daily 90 tablet 3     metFORMIN (GLUCOPHAGE) 1000 MG tablet TAKE 1 TABLET(1000 MG) BY MOUTH TWICE DAILY WITH MEALS 180 tablet 1     metoprolol succinate ER (TOPROL XL) 100 MG 24 hr tablet TAKE 1 TABLET(100 MG) BY MOUTH DAILY 90 tablet 2     MULTIVITAMIN OR 1 qd       NITROSTAT 0.4 MG SL SUBL 1 TAB EVERY 5 MIN AS NEEDED, UP TO 3 PER EPISODE       simvastatin (ZOCOR) 80 MG tablet TAKE ONE-HALF TABLET( 40 MG) BY MOUTH AT BEDTIME 90 tablet 1     thin (NO BRAND SPECIFIED) lancets Use to test blood sugar 4 times daily or as directed. 100 each 6     warfarin ANTICOAGULANT (COUMADIN) 7.5 MG tablet TAKE ONE TABLET BY MOUTH DAILY, EXCEPT ONE-HALF TABLET ON TUE/FRI AS DIRECTED BY THE ANTICOAGULATION CLINIC 80 tablet 1       ALLERGIES   No Known Allergies      Review of Systems:  Skin:  Negative     Eyes:  Positive for glasses;visual blurring  ENT:  Negative    Respiratory:  Negative for dyspnea on exertion;shortness of breath;cough  Cardiovascular:  Negative for;palpitations;chest pain;dizziness;lightheadedness;fatigue Positive for;exercise intolerance;edema  Gastroenterology: Negative melena;hematochezia  Genitourinary:  Negative hematuria  Musculoskeletal:  Positive for arthritis  Neurologic:  Negative stroke  Psychiatric:  Negative    Heme/Lymph/Imm:  Negative bleeding disorder  Endocrine:  Positive for diabetes    Physical Exam:  Vitals: /82 (BP Location: Right arm, Patient Position: Sitting)   Pulse 74   Ht 1.816 m (5' 11.5\")   Wt 138.1 kg (304 lb 8 oz)   SpO2 99%   BMI 41.88 kg/m      Constitutional:  cooperative, alert and oriented, well developed, well nourished, in no acute " distress        Skin:  warm and dry to the touch, no apparent skin lesions or masses noted        Head:  normocephalic, no masses or lesions        Eyes:  pupils equal and round;conjunctivae and lids unremarkable;sclera white        ENT:  not assessed this visit        Neck:  not assessed this visit        Chest:  clear to auscultation        Cardiac: regular rhythm;normal S1 and S2;no S3 or S4;no murmurs, gallops or rubs detected                  Abdomen:  abdomen soft obese      Vascular: pulses full and equal                                      Extremities and Back:  no deformities, clubbing, cyanosis, erythema observed        Neurological:  no gross motor deficits            PAST MEDICAL HISTORY:  Past Medical History:   Diagnosis Date     Atrial fibrillation (H)     s/p AVN ablation, BIV PPM     Congestive heart failure (H)     tachycardia-induced cardiomyopathy     Essential hypertension, benign      Hyperlipidemia LDL goal <100 10/31/2010     Hyponatremia 9/8/2009     Morbid obesity (H)      Open wound of foot except toe(s) alone, without mention of complication      Retinitis pigmentosa      Type 2 diabetes, HbA1C goal < 8% (H) 6/26/2012       PAST SURGICAL HISTORY:  Past Surgical History:   Procedure Laterality Date     CARDIOVERSION  9/2009, 10/2009     COLONOSCOPY N/A 5/9/2019    Procedure: COLONOSCOPY;  Surgeon: Godwin Santillan MD;  Location: [facility]     H ABLATION AV NODE  4/8/10     IMPLANT PACEMAKER  4/8/10    BIV PPM- Medtronic InSync III     TONSILLECTOMY      as kid     ZZC NONSPECIFIC PROCEDURE  06/2007    debritement       FAMILY HISTORY:  Family History   Problem Relation Age of Onset     Diabetes Mother      Hypertension Father      Diabetes Father      Hypertension Maternal Grandmother      Diabetes Maternal Grandmother      Diabetes Maternal Grandfather      Hypertension Maternal Grandfather      Cardiovascular Maternal Grandfather      Hypertension Paternal Grandfather      Cardiovascular  Paternal Grandfather      Diabetes Sister      Hypertension Sister        SOCIAL HISTORY:  Social History     Socioeconomic History     Marital status: Single   Occupational History     Occupation: repair printer     Employer: [employer]   Tobacco Use     Smoking status: Never Smoker     Smokeless tobacco: Never Used   Substance and Sexual Activity     Alcohol use: No     Drug use: No     Sexual activity: Never   Other Topics Concern     Caffeine Concern Yes     Comment: occasionally soda     Special Diet Yes     Comment: low sodium, low fat, DM diet      Exercise Yes     Comment: walking, stairs

## 2022-06-14 ENCOUNTER — ANCILLARY PROCEDURE (OUTPATIENT)
Dept: CARDIOLOGY | Facility: CLINIC | Age: 62
End: 2022-06-14
Attending: PHYSICIAN ASSISTANT
Payer: COMMERCIAL

## 2022-06-14 VITALS
SYSTOLIC BLOOD PRESSURE: 125 MMHG | BODY MASS INDEX: 41.24 KG/M2 | DIASTOLIC BLOOD PRESSURE: 82 MMHG | OXYGEN SATURATION: 99 % | HEIGHT: 72 IN | WEIGHT: 304.5 LBS | HEART RATE: 74 BPM

## 2022-06-14 DIAGNOSIS — Z95.810 ICD (IMPLANTABLE CARDIOVERTER-DEFIBRILLATOR), BIVENTRICULAR, IN SITU: ICD-10-CM

## 2022-06-14 DIAGNOSIS — Z95.0 CARDIAC PACEMAKER IN SITU: ICD-10-CM

## 2022-06-14 PROCEDURE — 99214 OFFICE O/P EST MOD 30 MIN: CPT | Mod: 25 | Performed by: PHYSICIAN ASSISTANT

## 2022-06-14 PROCEDURE — 93280 PM DEVICE PROGR EVAL DUAL: CPT | Performed by: INTERNAL MEDICINE

## 2022-06-14 NOTE — LETTER
"6/14/2022    David Almendarez MD  [address] [facility] [address]    RE: Mckay Hsu       Dear Colleague,     I had the pleasure of seeing Mckay EMILY Hsu in the [facility].  [facility]INIC    I had the pleasure of seeing Doyle when he came for annual follow-up.  This 62 year old sees Dr. Holliday for his history of:    1. Permanent  AFib; tachycardia-induced CM (resolved), s/p AV node ablation and Medtronic CRT-P 4/2010 with gen change 11/29/2018. He previously failed amiodarone. Remains on warfarin for [id number] 2 (diabetes, hypertension)  2. Diabetes, on metformin and insulin.  A1c 6.6% 1/2022  3. Hypertension  4. NSVT - 17 beat run noted PPM check 6/2021.  Asx'c.  Normal EF on echo 7/2021. Another episode 12/2021  5. Retinitis Pigmentosa    I saw Doyle 6/2021 at which time he was doing well overall, though activity levels had really dropped off with COVID-19 restrictions.  He continued to have mild LE edema.  BPs were under excellent control 110-120s.  He had had an episode of NSVT noted on PPM interrogation, a/w \"heavy work.\"  I recommended echocardiogram to ensure LVEF remained stable.    Echo done 7/2021 showed normal LVEF 55-60%.  We agreed for annual follow-up with annual pacemaker interrogation, noting we can get a stress test if recurrent VT was seen.  He had a brief, asymptomatic episode noted on interrogation 12/2021.    Interval History:  Doyle continues to do \"OK\" from a CV perspective. Doing all AODLs without issues; he does have groceries delivered. Notes the only thing that seems to limit him is his vision.  No CP, SOB.     No issues with PPM site. No change in chronic R>L LE edema. No orthopnea, PND.     VITALS:  Vitals: /82 (BP Location: Right arm, Patient Position: Sitting)   Pulse 74   Ht 1.816 m (5' 11.5\")   Wt 138.1 kg (304 lb 8 oz)   SpO2 99%   BMI 41.88 kg/m      Diagnostic Testing:  MDT Device interrogation today, showed 99.64%BiVP and VSRP " 0.04% in VVIR 70/130. No ventricular arrhythmias  Echocardiogram 7/2021 LVEF 55-60%.  Borderline concentric LVH.  No RWMA.  No sig valve abnormalities  Echocardiogram 6/2017-EF 55-60%.  No RWMA. Trivial AoScl.  1+ TR  Stress Testing 8/2009-no ischemia/infarction      Plan:  Annual follow-up with annual PPM check    Assessment/Plan:  1. Permanent AFib    S/p AVN ablation and CRT-P as above. Gen change 11/2018    Remains asx'c with permanent AFib.     PLAN:    Continue current device schedule    He does not drive - will see us back at time of annual device interrogation in 1 year      2. NSVT    Noted on PPM interrogation 2019, 6/2021 and again 12/2021. None since    Remains on high dose Metoprolol Xl 100 mg daily    LVEF on echo 2021 wnl    PLAN:    Continue BB    Continue device interrogations. Confirms he has his bedside monitor plugged in at all times        Jane Caruso PA-C, MSPAS      No orders of the defined types were placed in this encounter.    No orders of the defined types were placed in this encounter.    There are no discontinued medications.      Encounter Diagnoses   Name Primary?     Cardiac pacemaker in situ      ICD (implantable cardioverter-defibrillator), biventricular, in situ        CURRENT MEDICATIONS:  Current Outpatient Medications   Medication Sig Dispense Refill     alcohol swab prep pads Use to swab area of injection/lily as directed. 100 each 3     amLODIPine (NORVASC) 10 MG tablet TAKE 1 TABLET(10 MG) BY MOUTH DAILY 90 tablet 2     B-D U/F insulin pen needle USE 1 DAILY OR AS DIRECTED 100 each 0     blood glucose (NO BRAND SPECIFIED) test strip Use to test blood sugar 3 times daily or as directed. To accompany: ACCU-CHEK GUIDE ME  Please dispense 90 days if able, at least 30 days minimum 300 strip 3     blood glucose calibration (NO BRAND SPECIFIED) solution To accompany: ACCU-CHEK GUIDE ME 1 Bottle 3     blood glucose monitoring (NO BRAND SPECIFIED) meter device kit Use to test blood  sugar 3 times daily or as directed. Preferred blood glucose meter OR supplies to accompany: ACCU-CHEK GUIDE ME 1 kit 0     fish oil-omega-3 fatty acids 1000 MG capsule Take 4 capsules by mouth daily.       insulin aspart (NOVOLOG FLEXPEN) 100 UNIT/ML pen INJECT 1 UNIT SUBCUTANEOUSLY PER 7 GRAMS OF CARBS 3 TIMES DAILY BEFORE MEALS. MAX 60 UNITS PER DAY 60 mL 0     insulin glargine (LANTUS SOLOSTAR) 100 UNIT/ML pen ADMINISTER 60 UNITS UNDER THE SKIN AT BEDTIME 60 mL 5     losartan (COZAAR) 100 MG tablet Take 1 tablet (100 mg) by mouth daily 90 tablet 3     metFORMIN (GLUCOPHAGE) 1000 MG tablet TAKE 1 TABLET(1000 MG) BY MOUTH TWICE DAILY WITH MEALS 180 tablet 1     metoprolol succinate ER (TOPROL XL) 100 MG 24 hr tablet TAKE 1 TABLET(100 MG) BY MOUTH DAILY 90 tablet 2     MULTIVITAMIN OR 1 qd       NITROSTAT 0.4 MG SL SUBL 1 TAB EVERY 5 MIN AS NEEDED, UP TO 3 PER EPISODE       simvastatin (ZOCOR) 80 MG tablet TAKE ONE-HALF TABLET( 40 MG) BY MOUTH AT BEDTIME 90 tablet 1     thin (NO BRAND SPECIFIED) lancets Use to test blood sugar 4 times daily or as directed. 100 each 6     warfarin ANTICOAGULANT (COUMADIN) 7.5 MG tablet TAKE ONE TABLET BY MOUTH DAILY, EXCEPT ONE-HALF TABLET ON TUE/FRI AS DIRECTED BY THE ANTICOAGULATION CLINIC 80 tablet 1       ALLERGIES   No Known Allergies      Review of Systems:  Skin:  Negative     Eyes:  Positive for glasses;visual blurring  ENT:  Negative    Respiratory:  Negative for dyspnea on exertion;shortness of breath;cough  Cardiovascular:  Negative for;palpitations;chest pain;dizziness;lightheadedness;fatigue Positive for;exercise intolerance;edema  Gastroenterology: Negative melena;hematochezia  Genitourinary:  Negative hematuria  Musculoskeletal:  Positive for arthritis  Neurologic:  Negative stroke  Psychiatric:  Negative    Heme/Lymph/Imm:  Negative bleeding disorder  Endocrine:  Positive for diabetes    Physical Exam:  Vitals: /82 (BP Location: Right arm, Patient Position:  "Sitting)   Pulse 74   Ht 1.816 m (5' 11.5\")   Wt 138.1 kg (304 lb 8 oz)   SpO2 99%   BMI 41.88 kg/m      Constitutional:  cooperative, alert and oriented, well developed, well nourished, in no acute distress        Skin:  warm and dry to the touch, no apparent skin lesions or masses noted        Head:  normocephalic, no masses or lesions        Eyes:  pupils equal and round;conjunctivae and lids unremarkable;sclera white        ENT:  not assessed this visit        Neck:  not assessed this visit        Chest:  clear to auscultation        Cardiac: regular rhythm;normal S1 and S2;no S3 or S4;no murmurs, gallops or rubs detected                  Abdomen:  abdomen soft obese      Vascular: pulses full and equal                                      Extremities and Back:  no deformities, clubbing, cyanosis, erythema observed        Neurological:  no gross motor deficits            PAST MEDICAL HISTORY:  Past Medical History:   Diagnosis Date     Atrial fibrillation (H)     s/p AVN ablation, BIV PPM     Congestive heart failure (H)     tachycardia-induced cardiomyopathy     Essential hypertension, benign      Hyperlipidemia LDL goal <100 10/31/2010     Hyponatremia 9/8/2009     Morbid obesity (H)      Open wound of foot except toe(s) alone, without mention of complication      Retinitis pigmentosa      Type 2 diabetes, HbA1C goal < 8% (H) 6/26/2012       PAST SURGICAL HISTORY:  Past Surgical History:   Procedure Laterality Date     CARDIOVERSION  9/2009, 10/2009     COLONOSCOPY N/A 5/9/2019    Procedure: COLONOSCOPY;  Surgeon: Godwin Santillan MD;  Location:  GI     H ABLATION AV NODE  4/8/10     IMPLANT PACEMAKER  4/8/10    BIV PPM- Medtronic InSync III     TONSILLECTOMY      as kid     [facility] NONSPECIFIC PROCEDURE  06/2007    debritement       FAMILY HISTORY:  Family History   Problem Relation Age of Onset     Diabetes Mother      Hypertension Father      Diabetes Father      Hypertension Maternal Grandmother      " Diabetes Maternal Grandmother      Diabetes Maternal Grandfather      Hypertension Maternal Grandfather      Cardiovascular Maternal Grandfather      Hypertension Paternal Grandfather      Cardiovascular Paternal Grandfather      Diabetes Sister      Hypertension Sister        SOCIAL HISTORY:  Social History     Socioeconomic History     Marital status: Single   Occupational History     Occupation: repair printer     Employer: [employer]   Tobacco Use     Smoking status: Never Smoker     Smokeless tobacco: Never Used   Substance and Sexual Activity     Alcohol use: No     Drug use: No     Sexual activity: Never   Other Topics Concern     Caffeine Concern Yes     Comment: occasionally soda     Special Diet Yes     Comment: low sodium, low fat, DM diet      Exercise Yes     Comment: walking, stairs            Thank you for allowing me to participate in the care of your patient.      Sincerely,     Peggy Caruso PA-C     [facility]  cc:   Peggy Caruso PA-C  [address] [place] [id number]

## 2022-06-14 NOTE — PATIENT INSTRUCTIONS
Doyle - it was good to see you today!    Pacer check today looked great!        Device nurses for any concerns/questions: [phone]

## 2022-06-16 NOTE — TELEPHONE ENCOUNTER
Addended by: Andreina Herndon on: 6/16/2022 03:59 PM     Modules accepted: Orders Routing refill request to provider for review/approval because:  Patient has a dx of CHF.    Pended 90 days as patient is due for OV A1c in 3 months.    Care Team -  Please contact patient to schedule Diabetes Visit and labs in 3 months in order to obtain additional refills.    Thanks!    Analisa Trevino, MSN, RN  Triage RN  [facility]

## 2022-06-18 LAB
MDC_IDC_EPISODE_DTM: NORMAL
MDC_IDC_EPISODE_DURATION: 12 S
MDC_IDC_EPISODE_ID: 12
MDC_IDC_EPISODE_TYPE: NORMAL
MDC_IDC_LEAD_IMPLANT_DT: NORMAL
MDC_IDC_LEAD_IMPLANT_DT: NORMAL
MDC_IDC_LEAD_LOCATION: NORMAL
MDC_IDC_LEAD_LOCATION: NORMAL
MDC_IDC_LEAD_LOCATION_DETAIL_1: NORMAL
MDC_IDC_LEAD_LOCATION_DETAIL_1: NORMAL
MDC_IDC_LEAD_MFG: NORMAL
MDC_IDC_LEAD_MFG: NORMAL
MDC_IDC_LEAD_MODEL: NORMAL
MDC_IDC_LEAD_MODEL: NORMAL
MDC_IDC_LEAD_POLARITY_TYPE: NORMAL
MDC_IDC_LEAD_POLARITY_TYPE: NORMAL
MDC_IDC_LEAD_SERIAL: NORMAL
MDC_IDC_LEAD_SERIAL: NORMAL
MDC_IDC_MSMT_BATTERY_DTM: NORMAL
MDC_IDC_MSMT_BATTERY_REMAINING_LONGEVITY: 89 MO
MDC_IDC_MSMT_BATTERY_RRT_TRIGGER: 2.6
MDC_IDC_MSMT_BATTERY_STATUS: NORMAL
MDC_IDC_MSMT_BATTERY_VOLTAGE: 3 V
MDC_IDC_MSMT_LEADCHNL_LV_IMPEDANCE_VALUE: 361 OHM
MDC_IDC_MSMT_LEADCHNL_LV_IMPEDANCE_VALUE: 456 OHM
MDC_IDC_MSMT_LEADCHNL_LV_IMPEDANCE_VALUE: 646 OHM
MDC_IDC_MSMT_LEADCHNL_LV_IMPEDANCE_VALUE: 722 OHM
MDC_IDC_MSMT_LEADCHNL_LV_IMPEDANCE_VALUE: 893 OHM
MDC_IDC_MSMT_LEADCHNL_RA_IMPEDANCE_VALUE: 3401 OHM
MDC_IDC_MSMT_LEADCHNL_RA_IMPEDANCE_VALUE: 3401 OHM
MDC_IDC_MSMT_LEADCHNL_RV_IMPEDANCE_VALUE: 361 OHM
MDC_IDC_MSMT_LEADCHNL_RV_IMPEDANCE_VALUE: 418 OHM
MDC_IDC_MSMT_LEADCHNL_RV_PACING_THRESHOLD_AMPLITUDE: 0.75 V
MDC_IDC_MSMT_LEADCHNL_RV_PACING_THRESHOLD_PULSEWIDTH: 0.4 MS
MDC_IDC_MSMT_LEADCHNL_RV_SENSING_INTR_AMPL: 10.12 MV
MDC_IDC_PG_IMPLANT_DTM: NORMAL
MDC_IDC_PG_MFG: NORMAL
MDC_IDC_PG_MODEL: NORMAL
MDC_IDC_PG_SERIAL: NORMAL
MDC_IDC_PG_TYPE: NORMAL
MDC_IDC_SESS_CLINIC_NAME: NORMAL
MDC_IDC_SESS_DTM: NORMAL
MDC_IDC_SESS_TYPE: NORMAL
MDC_IDC_SET_BRADY_LOWRATE: 70 {BEATS}/MIN
MDC_IDC_SET_BRADY_MAX_SENSOR_RATE: 130 {BEATS}/MIN
MDC_IDC_SET_BRADY_MODE: NORMAL
MDC_IDC_SET_CRT_LVRV_DELAY: 0 MS
MDC_IDC_SET_CRT_PACED_CHAMBERS: NORMAL
MDC_IDC_SET_LEADCHNL_LV_PACING_AMPLITUDE: 2 V
MDC_IDC_SET_LEADCHNL_LV_PACING_ANODE_ELECTRODE_1: NORMAL
MDC_IDC_SET_LEADCHNL_LV_PACING_ANODE_LOCATION_1: NORMAL
MDC_IDC_SET_LEADCHNL_LV_PACING_CAPTURE_MODE: NORMAL
MDC_IDC_SET_LEADCHNL_LV_PACING_CATHODE_ELECTRODE_1: NORMAL
MDC_IDC_SET_LEADCHNL_LV_PACING_CATHODE_LOCATION_1: NORMAL
MDC_IDC_SET_LEADCHNL_LV_PACING_POLARITY: NORMAL
MDC_IDC_SET_LEADCHNL_LV_PACING_PULSEWIDTH: 0.5 MS
MDC_IDC_SET_LEADCHNL_RA_SENSING_ANODE_ELECTRODE_1: NORMAL
MDC_IDC_SET_LEADCHNL_RA_SENSING_ANODE_LOCATION_1: NORMAL
MDC_IDC_SET_LEADCHNL_RA_SENSING_CATHODE_ELECTRODE_1: NORMAL
MDC_IDC_SET_LEADCHNL_RA_SENSING_CATHODE_LOCATION_1: NORMAL
MDC_IDC_SET_LEADCHNL_RA_SENSING_POLARITY: NORMAL
MDC_IDC_SET_LEADCHNL_RA_SENSING_SENSITIVITY: NORMAL
MDC_IDC_SET_LEADCHNL_RV_PACING_AMPLITUDE: 2 V
MDC_IDC_SET_LEADCHNL_RV_PACING_ANODE_ELECTRODE_1: NORMAL
MDC_IDC_SET_LEADCHNL_RV_PACING_ANODE_LOCATION_1: NORMAL
MDC_IDC_SET_LEADCHNL_RV_PACING_CAPTURE_MODE: NORMAL
MDC_IDC_SET_LEADCHNL_RV_PACING_CATHODE_ELECTRODE_1: NORMAL
MDC_IDC_SET_LEADCHNL_RV_PACING_CATHODE_LOCATION_1: NORMAL
MDC_IDC_SET_LEADCHNL_RV_PACING_POLARITY: NORMAL
MDC_IDC_SET_LEADCHNL_RV_PACING_PULSEWIDTH: 0.4 MS
MDC_IDC_SET_LEADCHNL_RV_SENSING_ANODE_ELECTRODE_1: NORMAL
MDC_IDC_SET_LEADCHNL_RV_SENSING_ANODE_LOCATION_1: NORMAL
MDC_IDC_SET_LEADCHNL_RV_SENSING_CATHODE_ELECTRODE_1: NORMAL
MDC_IDC_SET_LEADCHNL_RV_SENSING_CATHODE_LOCATION_1: NORMAL
MDC_IDC_SET_LEADCHNL_RV_SENSING_POLARITY: NORMAL
MDC_IDC_SET_LEADCHNL_RV_SENSING_SENSITIVITY: 0.9 MV
MDC_IDC_SET_ZONE_DETECTION_INTERVAL: 400 MS
MDC_IDC_SET_ZONE_TYPE: NORMAL
MDC_IDC_STAT_BRADY_AP_VP_PERCENT: 0 %
MDC_IDC_STAT_BRADY_AP_VS_PERCENT: 0 %
MDC_IDC_STAT_BRADY_AS_VP_PERCENT: 99.98 %
MDC_IDC_STAT_BRADY_AS_VS_PERCENT: 0.02 %
MDC_IDC_STAT_BRADY_DTM_END: NORMAL
MDC_IDC_STAT_BRADY_DTM_START: NORMAL
MDC_IDC_STAT_BRADY_RA_PERCENT_PACED: 0 %
MDC_IDC_STAT_BRADY_RV_PERCENT_PACED: 99.98 %
MDC_IDC_STAT_CRT_DTM_END: NORMAL
MDC_IDC_STAT_CRT_DTM_START: NORMAL
MDC_IDC_STAT_CRT_LV_PERCENT_PACED: 99.95 %
MDC_IDC_STAT_CRT_PERCENT_PACED: 99.95 %
MDC_IDC_STAT_EPISODE_RECENT_COUNT: 0
MDC_IDC_STAT_EPISODE_RECENT_COUNT: 0
MDC_IDC_STAT_EPISODE_RECENT_COUNT: 1
MDC_IDC_STAT_EPISODE_RECENT_COUNT_DTM_END: NORMAL
MDC_IDC_STAT_EPISODE_RECENT_COUNT_DTM_START: NORMAL
MDC_IDC_STAT_EPISODE_TOTAL_COUNT: 0
MDC_IDC_STAT_EPISODE_TOTAL_COUNT: 0
MDC_IDC_STAT_EPISODE_TOTAL_COUNT: 5
MDC_IDC_STAT_EPISODE_TOTAL_COUNT_DTM_END: NORMAL
MDC_IDC_STAT_EPISODE_TOTAL_COUNT_DTM_START: NORMAL
MDC_IDC_STAT_EPISODE_TYPE: NORMAL

## 2022-06-21 ENCOUNTER — LAB (OUTPATIENT)
Dept: LAB | Facility: CLINIC | Age: 62
End: 2022-06-21
Payer: COMMERCIAL

## 2022-06-21 ENCOUNTER — ANTICOAGULATION THERAPY VISIT (OUTPATIENT)
Dept: ANTICOAGULATION | Facility: CLINIC | Age: 62
End: 2022-06-21

## 2022-06-21 DIAGNOSIS — I48.20 CHRONIC ATRIAL FIBRILLATION (H): ICD-10-CM

## 2022-06-21 DIAGNOSIS — Z79.01 LONG TERM CURRENT USE OF ANTICOAGULANT THERAPY: ICD-10-CM

## 2022-06-21 DIAGNOSIS — Z79.01 LONG TERM CURRENT USE OF ANTICOAGULANT THERAPY: Primary | ICD-10-CM

## 2022-06-21 LAB — INR BLD: 2.9 (ref 0.9–1.1)

## 2022-06-21 PROCEDURE — 36415 COLL VENOUS BLD VENIPUNCTURE: CPT

## 2022-06-21 PROCEDURE — 85610 PROTHROMBIN TIME: CPT

## 2022-06-21 NOTE — PROGRESS NOTES
ANTICOAGULATION MANAGEMENT     Mckay EMILY Shadi 62 year old male is on warfarin with therapeutic INR result. (Goal INR 2.0-3.0)    Recent labs: (last 7 days)     06/21/22  1340   INR 2.9*       ASSESSMENT       Source(s): Chart Review and Patient/Caregiver Call       Warfarin doses taken: Warfarin taken as instructed    Diet: Decreased greens/vitamin K in diet; plans to resume previous intake    New illness, injury, or hospitalization: No    Medication/supplement changes: None noted    Signs or symptoms of bleeding or clotting: No    Previous INR: Therapeutic last 2(+) visits    Additional findings: None       PLAN     Recommended plan for temporary change(s) affecting INR     Dosing Instructions: continue your current warfarin dose with next INR in 4 weeks       Summary  As of 6/21/2022    Full warfarin instructions:  3.75 mg every Tue, Fri; 7.5 mg all other days   Next INR check:  7/19/2022             Telephone call with Doyle who verbalizes understanding and agrees to plan    Lab visit scheduled    Education provided: Please call back if any changes to your diet, medications or how you've been taking warfarin, Monitoring for bleeding signs and symptoms, Monitoring for clotting signs and symptoms and Importance of notifying clinic for changes in medications; a sooner lab recheck maybe needed.    Plan made per [facility] anticoagulation protocol    Elvia Davis RN  Anticoagulation Clinic  6/21/2022    _______________________________________________________________________     Anticoagulation Episode Summary     Current INR goal:  2.0-3.0   TTR:  73.6 % (1 y)   Target end date:  Indefinite   Send INR reminders to:  [facility]    Indications    Long-term (current) use of anticoagulants [Z79.01] [Z79.01]  Chronic atrial fibrillation (H) [I48.20]  New onset atrial fibrillation (H) (Resolved) [I48.91]           Comments:           Anticoagulation Care Providers     Provider Role Specialty Phone number     David Almendarez MD [facility] Internal Medicine [phone]

## 2022-07-06 DIAGNOSIS — E78.5 HYPERLIPIDEMIA LDL GOAL <100: ICD-10-CM

## 2022-07-08 RX ORDER — SIMVASTATIN 80 MG
TABLET ORAL
Qty: 90 TABLET | Refills: 1 | Status: SHIPPED | OUTPATIENT
Start: 2022-07-08 | End: 2023-04-25

## 2022-07-08 NOTE — TELEPHONE ENCOUNTER
Prescription approved per [facility] Refill Protocol.  Rob Amezcua RN  [facility] Triage Nurse

## 2022-07-19 ENCOUNTER — ANTICOAGULATION THERAPY VISIT (OUTPATIENT)
Dept: ANTICOAGULATION | Facility: CLINIC | Age: 62
End: 2022-07-19

## 2022-07-19 ENCOUNTER — LAB (OUTPATIENT)
Dept: LAB | Facility: CLINIC | Age: 62
End: 2022-07-19
Payer: COMMERCIAL

## 2022-07-19 DIAGNOSIS — I48.20 CHRONIC ATRIAL FIBRILLATION (H): ICD-10-CM

## 2022-07-19 DIAGNOSIS — Z79.01 LONG TERM CURRENT USE OF ANTICOAGULANT THERAPY: Primary | ICD-10-CM

## 2022-07-19 DIAGNOSIS — Z79.01 LONG TERM CURRENT USE OF ANTICOAGULANT THERAPY: ICD-10-CM

## 2022-07-19 LAB — INR BLD: 2.4 (ref 0.9–1.1)

## 2022-07-19 PROCEDURE — 36415 COLL VENOUS BLD VENIPUNCTURE: CPT

## 2022-07-19 PROCEDURE — 85610 PROTHROMBIN TIME: CPT

## 2022-07-19 NOTE — PROGRESS NOTES
ANTICOAGULATION MANAGEMENT     Mckay EMILY Shadi 62 year old male is on warfarin with therapeutic INR result. (Goal INR 2.0-3.0)    Recent labs: (last 7 days)     07/19/22  1320   INR 2.4*       ASSESSMENT       Source(s): Chart Review and Patient/Caregiver Call       Warfarin doses taken: Warfarin taken as instructed    Diet: No new diet changes identified    New illness, injury, or hospitalization: No    Medication/supplement changes: None noted    Signs or symptoms of bleeding or clotting: No    Previous INR: Therapeutic last 2(+) visits    Additional findings: None       PLAN     Recommended plan for no diet, medication or health factor changes affecting INR     Dosing Instructions: continue your current warfarin dose with next INR in 5 weeks       Summary  As of 7/19/2022    Full warfarin instructions:  3.75 mg every Tue, Fri; 7.5 mg all other days   Next INR check:  8/23/2022             Telephone call with Doyle who verbalizes understanding and agrees to plan    Lab visit scheduled    Education provided: Please call back if any changes to your diet, medications or how you've been taking warfarin    Plan made per [facility] anticoagulation protocol    Gigi Castillo RN  Anticoagulation Clinic  7/19/2022    _______________________________________________________________________     Anticoagulation Episode Summary     Current INR goal:  2.0-3.0   TTR:  73.6 % (1 y)   Target end date:  Indefinite   Send INR reminders to:  [facility]    Indications    Chronic atrial fibrillation (H) [I48.20]  Long-term (current) use of anticoagulants [Z79.01] [Z79.01]           Comments:           Anticoagulation Care Providers     Provider Role Specialty Phone number    David Almendarez MD Referring Internal Medicine [phone]

## 2022-08-12 ENCOUNTER — TELEPHONE (OUTPATIENT)
Dept: INTERNAL MEDICINE | Facility: CLINIC | Age: 62
End: 2022-08-12

## 2022-08-12 NOTE — TELEPHONE ENCOUNTER
Patient called in wanting to know his A1C numbers from April informed it was drawn 1/18/22 a1c number provided and informed that future order is in chart to have lab redrawn    Rob Amezcua RN

## 2022-08-17 DIAGNOSIS — Z79.4 TYPE 2 DIABETES MELLITUS WITHOUT COMPLICATION, WITH LONG-TERM CURRENT USE OF INSULIN (H): ICD-10-CM

## 2022-08-17 DIAGNOSIS — E11.9 TYPE 2 DIABETES MELLITUS WITHOUT COMPLICATION, WITH LONG-TERM CURRENT USE OF INSULIN (H): ICD-10-CM

## 2022-08-23 ENCOUNTER — ANTICOAGULATION THERAPY VISIT (OUTPATIENT)
Dept: ANTICOAGULATION | Facility: CLINIC | Age: 62
End: 2022-08-23

## 2022-08-23 ENCOUNTER — LAB (OUTPATIENT)
Dept: LAB | Facility: CLINIC | Age: 62
End: 2022-08-23
Payer: COMMERCIAL

## 2022-08-23 DIAGNOSIS — I48.20 CHRONIC ATRIAL FIBRILLATION (H): Primary | ICD-10-CM

## 2022-08-23 DIAGNOSIS — I48.20 CHRONIC ATRIAL FIBRILLATION (H): ICD-10-CM

## 2022-08-23 DIAGNOSIS — E11.9 TYPE 2 DIABETES MELLITUS WITHOUT COMPLICATION, WITH LONG-TERM CURRENT USE OF INSULIN (H): Primary | ICD-10-CM

## 2022-08-23 DIAGNOSIS — Z79.4 TYPE 2 DIABETES MELLITUS WITHOUT COMPLICATION, WITH LONG-TERM CURRENT USE OF INSULIN (H): Primary | ICD-10-CM

## 2022-08-23 DIAGNOSIS — Z79.01 LONG TERM CURRENT USE OF ANTICOAGULANT THERAPY: ICD-10-CM

## 2022-08-23 LAB
HBA1C MFR BLD: 6.8 % (ref 0–5.6)
INR BLD: 2.3 (ref 0.9–1.1)

## 2022-08-23 PROCEDURE — 83036 HEMOGLOBIN GLYCOSYLATED A1C: CPT

## 2022-08-23 PROCEDURE — 85610 PROTHROMBIN TIME: CPT

## 2022-08-23 PROCEDURE — 36415 COLL VENOUS BLD VENIPUNCTURE: CPT

## 2022-08-23 NOTE — PROGRESS NOTES
ANTICOAGULATION MANAGEMENT     Mckay EMILY Shadi 62 year old male is on warfarin with therapeutic INR result. (Goal INR 2.0-3.0)    Recent labs: (last 7 days)     08/23/22  1336   INR 2.3*       ASSESSMENT       Source(s): Chart Review and Patient/Caregiver Call       Warfarin doses taken: Warfarin taken as instructed    Diet: No new diet changes identified    New illness, injury, or hospitalization: No    Medication/supplement changes: None noted    Signs or symptoms of bleeding or clotting: No    Previous INR: Therapeutic last 2(+) visits    Additional findings: None       PLAN     Recommended plan for no diet, medication or health factor changes affecting INR     Dosing Instructions: Continue your current warfarin dose with next INR in 6 weeks       Summary  As of 8/23/2022    Full warfarin instructions:  3.75 mg every Tue, Fri; 7.5 mg all other days   Next INR check:  10/4/2022             Telephone call with Doyle who verbalizes understanding and agrees to plan    Lab visit scheduled    Education provided: Please call back if any changes to your diet, medications or how you've been taking warfarin    Plan made per [facility] anticoagulation protocol    Collette Dunbar RN  Anticoagulation Clinic  8/23/2022    _______________________________________________________________________     Anticoagulation Episode Summary     Current INR goal:  2.0-3.0   TTR:  78.3 % (1 y)   Target end date:  Indefinite   Send INR reminders to:  [facility]    Indications    Chronic atrial fibrillation (H) [I48.20]  Long-term (current) use of anticoagulants [Z79.01] [Z79.01]           Comments:           Anticoagulation Care Providers     Provider Role Specialty Phone number    David Almendarez MD Referring Internal Medicine [phone]

## 2022-09-06 DIAGNOSIS — Z79.4 TYPE 2 DIABETES MELLITUS WITHOUT COMPLICATION, WITH LONG-TERM CURRENT USE OF INSULIN (H): ICD-10-CM

## 2022-09-06 DIAGNOSIS — E11.9 TYPE 2 DIABETES MELLITUS WITHOUT COMPLICATION, WITH LONG-TERM CURRENT USE OF INSULIN (H): ICD-10-CM

## 2022-09-07 NOTE — TELEPHONE ENCOUNTER
Routing refill request to provider for review/approval because:  Failed protocol due to:    Patient does NOT have a diagnosis of CHF.    Recent (6 mo) or future (30 days) visit within the authorizing provider's specialty     Alicia Pretty RN

## 2022-09-13 ENCOUNTER — ANCILLARY PROCEDURE (OUTPATIENT)
Dept: CARDIOLOGY | Facility: CLINIC | Age: 62
End: 2022-09-13
Attending: INTERNAL MEDICINE
Payer: COMMERCIAL

## 2022-09-13 DIAGNOSIS — Z95.0 CARDIAC PACEMAKER IN SITU: ICD-10-CM

## 2022-09-13 DIAGNOSIS — Z95.810 ICD (IMPLANTABLE CARDIOVERTER-DEFIBRILLATOR), BIVENTRICULAR, IN SITU: ICD-10-CM

## 2022-09-13 PROCEDURE — 93294 REM INTERROG EVL PM/LDLS PM: CPT | Performed by: INTERNAL MEDICINE

## 2022-09-13 PROCEDURE — 93296 REM INTERROG EVL PM/IDS: CPT | Performed by: INTERNAL MEDICINE

## 2022-09-17 LAB
MDC_IDC_EPISODE_DTM: NORMAL
MDC_IDC_EPISODE_DURATION: 7 S
MDC_IDC_EPISODE_ID: 13
MDC_IDC_EPISODE_TYPE: NORMAL
MDC_IDC_LEAD_IMPLANT_DT: NORMAL
MDC_IDC_LEAD_IMPLANT_DT: NORMAL
MDC_IDC_LEAD_LOCATION: NORMAL
MDC_IDC_LEAD_LOCATION: NORMAL
MDC_IDC_LEAD_LOCATION_DETAIL_1: NORMAL
MDC_IDC_LEAD_LOCATION_DETAIL_1: NORMAL
MDC_IDC_LEAD_MFG: NORMAL
MDC_IDC_LEAD_MFG: NORMAL
MDC_IDC_LEAD_MODEL: NORMAL
MDC_IDC_LEAD_MODEL: NORMAL
MDC_IDC_LEAD_POLARITY_TYPE: NORMAL
MDC_IDC_LEAD_POLARITY_TYPE: NORMAL
MDC_IDC_LEAD_SERIAL: NORMAL
MDC_IDC_LEAD_SERIAL: NORMAL
MDC_IDC_MSMT_BATTERY_DTM: NORMAL
MDC_IDC_MSMT_BATTERY_REMAINING_LONGEVITY: 82 MO
MDC_IDC_MSMT_BATTERY_RRT_TRIGGER: 2.6
MDC_IDC_MSMT_BATTERY_STATUS: NORMAL
MDC_IDC_MSMT_BATTERY_VOLTAGE: 2.99 V
MDC_IDC_MSMT_LEADCHNL_LV_IMPEDANCE_VALUE: 323 OHM
MDC_IDC_MSMT_LEADCHNL_LV_IMPEDANCE_VALUE: 437 OHM
MDC_IDC_MSMT_LEADCHNL_LV_IMPEDANCE_VALUE: 570 OHM
MDC_IDC_MSMT_LEADCHNL_LV_IMPEDANCE_VALUE: 627 OHM
MDC_IDC_MSMT_LEADCHNL_LV_IMPEDANCE_VALUE: 798 OHM
MDC_IDC_MSMT_LEADCHNL_RA_IMPEDANCE_VALUE: 3401 OHM
MDC_IDC_MSMT_LEADCHNL_RA_IMPEDANCE_VALUE: 3401 OHM
MDC_IDC_MSMT_LEADCHNL_RV_IMPEDANCE_VALUE: 342 OHM
MDC_IDC_MSMT_LEADCHNL_RV_IMPEDANCE_VALUE: 361 OHM
MDC_IDC_MSMT_LEADCHNL_RV_PACING_THRESHOLD_AMPLITUDE: 0.62 V
MDC_IDC_MSMT_LEADCHNL_RV_PACING_THRESHOLD_PULSEWIDTH: 0.4 MS
MDC_IDC_MSMT_LEADCHNL_RV_SENSING_INTR_AMPL: 10.12 MV
MDC_IDC_PG_IMPLANT_DTM: NORMAL
MDC_IDC_PG_MFG: NORMAL
MDC_IDC_PG_MODEL: NORMAL
MDC_IDC_PG_SERIAL: NORMAL
MDC_IDC_PG_TYPE: NORMAL
MDC_IDC_SESS_CLINIC_NAME: NORMAL
MDC_IDC_SESS_DTM: NORMAL
MDC_IDC_SESS_TYPE: NORMAL
MDC_IDC_SET_BRADY_LOWRATE: 70 {BEATS}/MIN
MDC_IDC_SET_BRADY_MAX_SENSOR_RATE: 130 {BEATS}/MIN
MDC_IDC_SET_BRADY_MODE: NORMAL
MDC_IDC_SET_CRT_LVRV_DELAY: 0 MS
MDC_IDC_SET_CRT_PACED_CHAMBERS: NORMAL
MDC_IDC_SET_LEADCHNL_LV_PACING_AMPLITUDE: 2 V
MDC_IDC_SET_LEADCHNL_LV_PACING_ANODE_ELECTRODE_1: NORMAL
MDC_IDC_SET_LEADCHNL_LV_PACING_ANODE_LOCATION_1: NORMAL
MDC_IDC_SET_LEADCHNL_LV_PACING_CAPTURE_MODE: NORMAL
MDC_IDC_SET_LEADCHNL_LV_PACING_CATHODE_ELECTRODE_1: NORMAL
MDC_IDC_SET_LEADCHNL_LV_PACING_CATHODE_LOCATION_1: NORMAL
MDC_IDC_SET_LEADCHNL_LV_PACING_POLARITY: NORMAL
MDC_IDC_SET_LEADCHNL_LV_PACING_PULSEWIDTH: 0.5 MS
MDC_IDC_SET_LEADCHNL_RA_SENSING_ANODE_ELECTRODE_1: NORMAL
MDC_IDC_SET_LEADCHNL_RA_SENSING_ANODE_LOCATION_1: NORMAL
MDC_IDC_SET_LEADCHNL_RA_SENSING_CATHODE_ELECTRODE_1: NORMAL
MDC_IDC_SET_LEADCHNL_RA_SENSING_CATHODE_LOCATION_1: NORMAL
MDC_IDC_SET_LEADCHNL_RA_SENSING_POLARITY: NORMAL
MDC_IDC_SET_LEADCHNL_RA_SENSING_SENSITIVITY: NORMAL
MDC_IDC_SET_LEADCHNL_RV_PACING_AMPLITUDE: 2 V
MDC_IDC_SET_LEADCHNL_RV_PACING_ANODE_ELECTRODE_1: NORMAL
MDC_IDC_SET_LEADCHNL_RV_PACING_ANODE_LOCATION_1: NORMAL
MDC_IDC_SET_LEADCHNL_RV_PACING_CAPTURE_MODE: NORMAL
MDC_IDC_SET_LEADCHNL_RV_PACING_CATHODE_ELECTRODE_1: NORMAL
MDC_IDC_SET_LEADCHNL_RV_PACING_CATHODE_LOCATION_1: NORMAL
MDC_IDC_SET_LEADCHNL_RV_PACING_POLARITY: NORMAL
MDC_IDC_SET_LEADCHNL_RV_PACING_PULSEWIDTH: 0.4 MS
MDC_IDC_SET_LEADCHNL_RV_SENSING_ANODE_ELECTRODE_1: NORMAL
MDC_IDC_SET_LEADCHNL_RV_SENSING_ANODE_LOCATION_1: NORMAL
MDC_IDC_SET_LEADCHNL_RV_SENSING_CATHODE_ELECTRODE_1: NORMAL
MDC_IDC_SET_LEADCHNL_RV_SENSING_CATHODE_LOCATION_1: NORMAL
MDC_IDC_SET_LEADCHNL_RV_SENSING_POLARITY: NORMAL
MDC_IDC_SET_LEADCHNL_RV_SENSING_SENSITIVITY: 0.9 MV
MDC_IDC_SET_ZONE_DETECTION_INTERVAL: 400 MS
MDC_IDC_SET_ZONE_TYPE: NORMAL
MDC_IDC_STAT_BRADY_AP_VP_PERCENT: 0 %
MDC_IDC_STAT_BRADY_AP_VS_PERCENT: 0 %
MDC_IDC_STAT_BRADY_AS_VP_PERCENT: 99.98 %
MDC_IDC_STAT_BRADY_AS_VS_PERCENT: 0.02 %
MDC_IDC_STAT_BRADY_DTM_END: NORMAL
MDC_IDC_STAT_BRADY_DTM_START: NORMAL
MDC_IDC_STAT_BRADY_RA_PERCENT_PACED: 0 %
MDC_IDC_STAT_BRADY_RV_PERCENT_PACED: 99.98 %
MDC_IDC_STAT_CRT_DTM_END: NORMAL
MDC_IDC_STAT_CRT_DTM_START: NORMAL
MDC_IDC_STAT_CRT_LV_PERCENT_PACED: 99.95 %
MDC_IDC_STAT_CRT_PERCENT_PACED: 99.95 %
MDC_IDC_STAT_EPISODE_RECENT_COUNT: 0
MDC_IDC_STAT_EPISODE_RECENT_COUNT_DTM_END: NORMAL
MDC_IDC_STAT_EPISODE_RECENT_COUNT_DTM_START: NORMAL
MDC_IDC_STAT_EPISODE_TOTAL_COUNT: 0
MDC_IDC_STAT_EPISODE_TOTAL_COUNT: 5
MDC_IDC_STAT_EPISODE_TOTAL_COUNT_DTM_END: NORMAL
MDC_IDC_STAT_EPISODE_TOTAL_COUNT_DTM_START: NORMAL
MDC_IDC_STAT_EPISODE_TYPE: NORMAL

## 2022-10-11 ENCOUNTER — TELEPHONE (OUTPATIENT)
Dept: INTERNAL MEDICINE | Facility: CLINIC | Age: 62
End: 2022-10-11

## 2022-10-11 NOTE — TELEPHONE ENCOUNTER
ANTICOAGULATION     Mckay Hsu is overdue for INR check.      Spoke with Doyle and scheduled lab appointment on 10/18/22    Gigi Castillo RN

## 2022-10-18 ENCOUNTER — LAB (OUTPATIENT)
Dept: LAB | Facility: CLINIC | Age: 62
End: 2022-10-18
Payer: COMMERCIAL

## 2022-10-18 ENCOUNTER — ANTICOAGULATION THERAPY VISIT (OUTPATIENT)
Dept: ANTICOAGULATION | Facility: CLINIC | Age: 62
End: 2022-10-18

## 2022-10-18 DIAGNOSIS — Z79.01 LONG TERM CURRENT USE OF ANTICOAGULANT THERAPY: ICD-10-CM

## 2022-10-18 DIAGNOSIS — I10 ESSENTIAL HYPERTENSION, BENIGN: Primary | ICD-10-CM

## 2022-10-18 DIAGNOSIS — I48.20 CHRONIC ATRIAL FIBRILLATION (H): Primary | ICD-10-CM

## 2022-10-18 DIAGNOSIS — I48.20 CHRONIC ATRIAL FIBRILLATION (H): ICD-10-CM

## 2022-10-18 LAB — INR BLD: 1.7 (ref 0.9–1.1)

## 2022-10-18 PROCEDURE — 36415 COLL VENOUS BLD VENIPUNCTURE: CPT

## 2022-10-18 PROCEDURE — 85610 PROTHROMBIN TIME: CPT

## 2022-10-18 NOTE — PROGRESS NOTES
ANTICOAGULATION MANAGEMENT     Mckay EMILY Shadi 62 year old male is on warfarin with subtherapeutic INR result. (Goal INR 2.0-3.0)    Recent labs: (last 7 days)     10/18/22  1240   INR 1.7*       ASSESSMENT       Source(s): Chart Review and Patient/Caregiver Call       Warfarin doses taken: Warfarin taken as instructed    Diet: No new diet changes identified    New illness, injury, or hospitalization: Yes: stomach trouble 2 weeks ago    Medication/supplement changes: None noted    Signs or symptoms of bleeding or clotting: No    Previous INR: Therapeutic last 2(+) visits    Additional findings: Maybe alittle less greens       PLAN     Recommended plan for temporary change(s) affecting INR     Dosing Instructions: booster dose then continue your current warfarin dose with next INR in 2 weeks       Summary  As of 10/18/2022    Full warfarin instructions:  10/18: 7.5 mg; Otherwise 3.75 mg every Tue, Fri; 7.5 mg all other days   Next INR check:  11/1/2022             Telephone call with Doyle who verbalizes understanding and agrees to plan    Lab visit scheduled    Education provided: Please call back if any changes to your diet, medications or how you've been taking warfarin    Plan made per [facility] anticoagulation protocol    Collette Dunbar RN  Anticoagulation Clinic  10/18/2022    _______________________________________________________________________     Anticoagulation Episode Summary     Current INR goal:  2.0-3.0   TTR:  74.1 % (1 y)   Target end date:  Indefinite   Send INR reminders to:  [facility]    Indications    Chronic atrial fibrillation (H) [I48.20]  Long-term (current) use of anticoagulants [Z79.01] [Z79.01]           Comments:           Anticoagulation Care Providers     Provider Role Specialty Phone number    David Almendarez MD Referring Internal Medicine [phone]

## 2022-10-24 NOTE — PROGRESS NOTES
Called patient with pre-procedure instructions for device implant:     Anticoagulation: Hold warfarin X 3 days   Oral diabetes meds: Hold Metformin  Insulin: Hold AM insulin  Diuretic: N/A  Contrast allergy: Denies  Pt informed to be NPO at midnight  (if procedure scheduled 1pm or later, may have clear liquid breakfast before 8am)    Pt has post-procedure transportation and 24 hours monitoring set up.   Pt aware of no driving for 24 hours post procedure due to sedation. ELIZABETH Fine    Pt aware of arrival time and location. Pt verbalized understanding of instructions.      Assistance: Needs assistance  Homemaking Responsibilities: No (Meals on Wheels 2x/week, granddaughter/neighbors have been cooking/cleaning)  Ambulation Assistance: Independent (Toby with 4ww)  Transfer Assistance: Independent  Active : No- son drives   Occupation: Retired  Leisure & Hobbies: Walking     Examination of body systems (includes body structures/functions, activity/participation limitations):  Observation:  Supine in bed upon arrival. Cooperative with therapy. Inc anxiety   Vision:  WFL  Hearing:  [facility]  Cardiopulmonary:  stable vitals on room air though heavy breathing with inc anxiety     Musculoskeletal  ROM R/L:  [facility] BLEs  Strength R/L:  Minimal LE strength deficits observed in function and endurance. Grossly 4/5 BLEs though inc pain to right knee against resistance. Mobility/treatment:   Rolling L/R:  SBA for safety with use of bed rail   Supine to sit:  SBA for safety with inc time. HOB elevated ~45 deg   Sit to supine: SBA for safety with inc time. HOB elevated ~45 deg  Transfers:   Sit to stand: CGA for safety from EOB  Stand to sit: CGA for safety to EOB  Step pivot: CGA for safety with RW   Sitting balance:  SBA for safety at EOB, static and light dynamic without UE support   Standing balance:  CGA for safety at RW, static and during mobility with BUE support at all times   Gait: ~30ft with RW CGA for safety. Slower mili with dec bilat step length and slight fwd posture. Felt weak and slightly lightheaded during ambulation limiting further mobility. Educated pt on POC, role of PT, DME use ,discharge recommendations, differences between various d/c options. [facility] 6 Clicks Inpatient Mobility:  AM-PAC Inpatient Mobility Raw Score : 18    Safety: patient left in bed with alarm, call light within reach, RN notified, gait belt used. Assessment:  Pt is a 77year old female admitted with SOB.  Diagnosed with acute hypoxemic respiratory failure with severe sepsis 2* hospital acquired PNA of bilat lungs. Recommend swing bed once medically stable. At baseline she is Toby with gross mobility and needs some assist with ADLs/IADLs. She performed below baseline this date with dec strength, balance ,and activity tolerance. She would benefit from continued therapy to address her current deficits, dec potential fall risk, and restore function. Complexity: Moderate  Prognosis: Good, no significant barriers to participation at this time. Plan: 3-5 times per week  Discharge Recommendations: [address] (swing bed)  Equipment: Pt would benefit from a RW for more stability and control with ambulation.      Goals:  Short Term Goals  Time Frame for Short Term Goals: 2 weeks  Short Term Goal 1: Pt will perform bed mobility Toby  Short Term Goal 2: Pt will transfer Toby  Short Term Goal 3: Pt will ambulate 100ft with LRAD Toby  Short Term Goal 4: Pt will perform standing light dynamic activity with single UE support x 3 mintues Toby       Treatment plan:  Bed mobility, transfers, balance, gait, TA, TX     Recommendations for NURSING mobility: ambulate with RW to the bathroom     Time:   Time in: 1444  Time out: 1504  Timed treatment minutes: 10  Total time: 20 minutes     Electronically signed by:    Davy Lozano [id number]  10/24/2022, 3:11 PM

## 2022-11-01 ENCOUNTER — ANTICOAGULATION THERAPY VISIT (OUTPATIENT)
Dept: ANTICOAGULATION | Facility: CLINIC | Age: 62
End: 2022-11-01

## 2022-11-01 ENCOUNTER — LAB (OUTPATIENT)
Dept: LAB | Facility: CLINIC | Age: 62
End: 2022-11-01
Payer: COMMERCIAL

## 2022-11-01 DIAGNOSIS — Z79.01 LONG TERM CURRENT USE OF ANTICOAGULANT THERAPY: ICD-10-CM

## 2022-11-01 DIAGNOSIS — I48.20 CHRONIC ATRIAL FIBRILLATION (H): ICD-10-CM

## 2022-11-01 DIAGNOSIS — I48.20 CHRONIC ATRIAL FIBRILLATION (H): Primary | ICD-10-CM

## 2022-11-01 LAB — INR BLD: 2.5 (ref 0.9–1.1)

## 2022-11-01 PROCEDURE — 85610 PROTHROMBIN TIME: CPT

## 2022-11-01 PROCEDURE — 36416 COLLJ CAPILLARY BLOOD SPEC: CPT

## 2022-11-01 NOTE — PROGRESS NOTES
ANTICOAGULATION MANAGEMENT     Mckay A Shadi 62 year old male is on warfarin with therapeutic INR result. (Goal INR 2.0-3.0)    Recent labs: (last 7 days)     11/01/22  1327   INR 2.5*       ASSESSMENT       Source(s): Chart Review and Patient/Caregiver Call       Warfarin doses taken: Warfarin taken as instructed    Diet: No new diet changes identified    New illness, injury, or hospitalization: No    Medication/supplement changes: None noted    Signs or symptoms of bleeding or clotting: No    Previous INR: Subtherapeutic    Additional findings: None       PLAN     Recommended plan for no diet, medication or health factor changes affecting INR     Dosing Instructions: Continue your current warfarin dose with next INR in 4 weeks       Summary  As of 11/1/2022    Full warfarin instructions:  3.75 mg every Tue, Fri; 7.5 mg all other days; Starting 11/1/2022   Next INR check:  11/29/2022             Telephone call with Doyle who verbalizes understanding and agrees to plan    Lab visit scheduled    Education provided:     Please call back if any changes to your diet, medications or how you've been taking warfarin    Plan made per [facility] anticoagulation protocol    Collette Dunbar RN  Anticoagulation Clinic  11/1/2022    _______________________________________________________________________     Anticoagulation Episode Summary     Current INR goal:  2.0-3.0   TTR:  72.7 % (1 y)   Target end date:  Indefinite   Send INR reminders to:  [facility]    Indications    Chronic atrial fibrillation (H) [I48.20]  Long-term (current) use of anticoagulants [Z79.01] [Z79.01]           Comments:           Anticoagulation Care Providers     Provider Role Specialty Phone number    David Almendarez MD Referring Internal Medicine [phone]

## 2022-11-15 DIAGNOSIS — I48.91 NEW ONSET ATRIAL FIBRILLATION (H): ICD-10-CM

## 2022-11-17 RX ORDER — WARFARIN SODIUM 7.5 MG/1
TABLET ORAL
Qty: 90 TABLET | Refills: 1 | Status: SHIPPED | OUTPATIENT
Start: 2022-11-17 | End: 2023-04-25

## 2022-11-17 NOTE — TELEPHONE ENCOUNTER
ANTICOAGULATION MANAGEMENT:  Medication Refill    Anticoagulation Summary  As of 11/1/2022    Warfarin maintenance plan:  3.75 mg (7.5 mg x 0.5) every Tue, Fri; 7.5 mg (7.5 mg x 1) all other days; Starting 11/1/2022   Next INR check:  11/29/2022   Target end date:  Indefinite    Indications    Chronic atrial fibrillation (H) [I48.20]  Long-term (current) use of anticoagulants [Z79.01] [Z79.01]             Anticoagulation Care Providers     Provider Role Specialty Phone number    David Almendarez MD Referring Internal Medicine [phone]          Visit with referring provider/group within last year: Yes    [facility] referral signed within last year: Yes    Mckay meets all criteria for refill (current [facility] referral, office visit with referring provider/group in last year, lab monitoring up to date or not exceeding 2 weeks overdue). Rx instructions and quantity supplied updated to match patient's current dosing plan. Warfarin 90 day supply with 1 refill granted per [facility] protocol     Gigi Castillo RN  Anticoagulation Clinic

## 2022-11-17 NOTE — TELEPHONE ENCOUNTER
Routing refill request to anticoagulation for review/approval because:  INR Check    Alicia Pretty, RN

## 2022-12-03 DIAGNOSIS — Z79.4 TYPE 2 DIABETES MELLITUS WITHOUT COMPLICATION, WITH LONG-TERM CURRENT USE OF INSULIN (H): ICD-10-CM

## 2022-12-03 DIAGNOSIS — E11.9 TYPE 2 DIABETES MELLITUS WITHOUT COMPLICATION, WITH LONG-TERM CURRENT USE OF INSULIN (H): ICD-10-CM

## 2022-12-03 NOTE — LETTER
[facility]  [address] [id number] [phone]            Mckay Hsu  [address]  [facility] [place] [facility] [address]        December 5, 2022    Dear Doyle,    While refilling your metFORMIN (GLUCOPHAGE) 1000 MG tablet prescription today, we noticed that you are due for an appointment with your provider.  We will refill your prescription for 90 days, but a follow-up appointment must be made before any additional refills can be approved.     Taking care of your health is important to us and we look forward to seeing you in the near future.  Please call us at [phone] or [phone] (or use [employer]) to schedule an appointment.     Please disregard this notice if you have already made an appointment.    Sincerely,        [facility]   SOB, LE edema

## 2022-12-05 NOTE — TELEPHONE ENCOUNTER
Routing refill request to provider for review/approval because:  Patient needs to be seen because:  has not been seen over 6 months  Failed protocol: pt does NOT have a diagnosis of CHF  Sharon JACOME RN  [facility]

## 2022-12-06 ENCOUNTER — LAB (OUTPATIENT)
Dept: LAB | Facility: CLINIC | Age: 62
End: 2022-12-06
Payer: COMMERCIAL

## 2022-12-06 ENCOUNTER — ANTICOAGULATION THERAPY VISIT (OUTPATIENT)
Dept: ANTICOAGULATION | Facility: CLINIC | Age: 62
End: 2022-12-06

## 2022-12-06 DIAGNOSIS — Z79.01 LONG TERM CURRENT USE OF ANTICOAGULANT THERAPY: ICD-10-CM

## 2022-12-06 DIAGNOSIS — I48.20 CHRONIC ATRIAL FIBRILLATION (H): ICD-10-CM

## 2022-12-06 DIAGNOSIS — I48.20 CHRONIC ATRIAL FIBRILLATION (H): Primary | ICD-10-CM

## 2022-12-06 LAB — INR BLD: 2.7 (ref 0.9–1.1)

## 2022-12-06 PROCEDURE — 85610 PROTHROMBIN TIME: CPT

## 2022-12-06 PROCEDURE — 36416 COLLJ CAPILLARY BLOOD SPEC: CPT

## 2022-12-06 NOTE — PROGRESS NOTES
ANTICOAGULATION MANAGEMENT     Mckay EMILY Shadi 62 year old male is on warfarin with therapeutic INR result. (Goal INR 2.0-3.0)    Recent labs: (last 7 days)     12/06/22  1337   INR 2.7*       ASSESSMENT       Source(s): Chart Review and Patient/Caregiver Call       Warfarin doses taken: Warfarin taken as instructed    Diet: No new diet changes identified    New illness, injury, or hospitalization: No    Medication/supplement changes: None noted    Signs or symptoms of bleeding or clotting: No    Previous INR: Therapeutic last visit; previously outside of goal range    Additional findings: None       PLAN     Recommended plan for no diet, medication or health factor changes affecting INR     Dosing Instructions: Continue your current warfarin dose with next INR in 5 weeks       Summary  As of 12/6/2022    Full warfarin instructions:  3.75 mg every Tue, Fri; 7.5 mg all other days; Starting 12/6/2022   Next INR check:  1/10/2023             Telephone call with Doyle who verbalizes understanding and agrees to plan    Lab visit scheduled    Education provided:     Please call back if any changes to your diet, medications or how you've been taking warfarin    Plan made per [facility] anticoagulation protocol    Collette Dunbar RN  Anticoagulation Clinic  12/6/2022    _______________________________________________________________________     Anticoagulation Episode Summary     Current INR goal:  2.0-3.0   TTR:  75.7 % (1 y)   Target end date:  Indefinite   Send INR reminders to:  [facility]    Indications    Chronic atrial fibrillation (H) [I48.20]  Long-term (current) use of anticoagulants [Z79.01] [Z79.01]           Comments:           Anticoagulation Care Providers     Provider Role Specialty Phone number    David Almendarez MD Referring Internal Medicine [phone]

## 2022-12-20 ENCOUNTER — ANCILLARY PROCEDURE (OUTPATIENT)
Dept: CARDIOLOGY | Facility: CLINIC | Age: 62
End: 2022-12-20
Attending: INTERNAL MEDICINE
Payer: COMMERCIAL

## 2022-12-20 DIAGNOSIS — Z95.0 CARDIAC PACEMAKER IN SITU: ICD-10-CM

## 2022-12-20 DIAGNOSIS — Z95.810 ICD (IMPLANTABLE CARDIOVERTER-DEFIBRILLATOR), BIVENTRICULAR, IN SITU: ICD-10-CM

## 2022-12-20 LAB
MDC_IDC_EPISODE_DTM: NORMAL
MDC_IDC_EPISODE_DTM: NORMAL
MDC_IDC_EPISODE_DURATION: 1 S
MDC_IDC_EPISODE_DURATION: 4 S
MDC_IDC_EPISODE_ID: 14
MDC_IDC_EPISODE_ID: 6
MDC_IDC_EPISODE_TYPE: NORMAL
MDC_IDC_EPISODE_TYPE: NORMAL
MDC_IDC_LEAD_IMPLANT_DT: NORMAL
MDC_IDC_LEAD_IMPLANT_DT: NORMAL
MDC_IDC_LEAD_LOCATION: NORMAL
MDC_IDC_LEAD_LOCATION: NORMAL
MDC_IDC_LEAD_LOCATION_DETAIL_1: NORMAL
MDC_IDC_LEAD_LOCATION_DETAIL_1: NORMAL
MDC_IDC_LEAD_MFG: NORMAL
MDC_IDC_LEAD_MFG: NORMAL
MDC_IDC_LEAD_MODEL: NORMAL
MDC_IDC_LEAD_MODEL: NORMAL
MDC_IDC_LEAD_POLARITY_TYPE: NORMAL
MDC_IDC_LEAD_POLARITY_TYPE: NORMAL
MDC_IDC_LEAD_SERIAL: NORMAL
MDC_IDC_LEAD_SERIAL: NORMAL
MDC_IDC_MSMT_BATTERY_DTM: NORMAL
MDC_IDC_MSMT_BATTERY_REMAINING_LONGEVITY: 79 MO
MDC_IDC_MSMT_BATTERY_RRT_TRIGGER: 2.6
MDC_IDC_MSMT_BATTERY_STATUS: NORMAL
MDC_IDC_MSMT_BATTERY_VOLTAGE: 2.99 V
MDC_IDC_MSMT_LEADCHNL_LV_IMPEDANCE_VALUE: 285 OHM
MDC_IDC_MSMT_LEADCHNL_LV_IMPEDANCE_VALUE: 399 OHM
MDC_IDC_MSMT_LEADCHNL_LV_IMPEDANCE_VALUE: 551 OHM
MDC_IDC_MSMT_LEADCHNL_LV_IMPEDANCE_VALUE: 608 OHM
MDC_IDC_MSMT_LEADCHNL_LV_IMPEDANCE_VALUE: 741 OHM
MDC_IDC_MSMT_LEADCHNL_RA_IMPEDANCE_VALUE: 3401 OHM
MDC_IDC_MSMT_LEADCHNL_RA_IMPEDANCE_VALUE: 3401 OHM
MDC_IDC_MSMT_LEADCHNL_RV_IMPEDANCE_VALUE: 304 OHM
MDC_IDC_MSMT_LEADCHNL_RV_IMPEDANCE_VALUE: 342 OHM
MDC_IDC_MSMT_LEADCHNL_RV_PACING_THRESHOLD_AMPLITUDE: 0.75 V
MDC_IDC_MSMT_LEADCHNL_RV_PACING_THRESHOLD_PULSEWIDTH: 0.4 MS
MDC_IDC_MSMT_LEADCHNL_RV_SENSING_INTR_AMPL: 10.12 MV
MDC_IDC_PG_IMPLANT_DTM: NORMAL
MDC_IDC_PG_MFG: NORMAL
MDC_IDC_PG_MODEL: NORMAL
MDC_IDC_PG_SERIAL: NORMAL
MDC_IDC_PG_TYPE: NORMAL
MDC_IDC_SESS_CLINIC_NAME: NORMAL
MDC_IDC_SESS_DTM: NORMAL
MDC_IDC_SESS_TYPE: NORMAL
MDC_IDC_SET_BRADY_LOWRATE: 70 {BEATS}/MIN
MDC_IDC_SET_BRADY_MAX_SENSOR_RATE: 130 {BEATS}/MIN
MDC_IDC_SET_BRADY_MODE: NORMAL
MDC_IDC_SET_CRT_LVRV_DELAY: 0 MS
MDC_IDC_SET_CRT_PACED_CHAMBERS: NORMAL
MDC_IDC_SET_LEADCHNL_LV_PACING_AMPLITUDE: 2 V
MDC_IDC_SET_LEADCHNL_LV_PACING_ANODE_ELECTRODE_1: NORMAL
MDC_IDC_SET_LEADCHNL_LV_PACING_ANODE_LOCATION_1: NORMAL
MDC_IDC_SET_LEADCHNL_LV_PACING_CAPTURE_MODE: NORMAL
MDC_IDC_SET_LEADCHNL_LV_PACING_CATHODE_ELECTRODE_1: NORMAL
MDC_IDC_SET_LEADCHNL_LV_PACING_CATHODE_LOCATION_1: NORMAL
MDC_IDC_SET_LEADCHNL_LV_PACING_POLARITY: NORMAL
MDC_IDC_SET_LEADCHNL_LV_PACING_PULSEWIDTH: 0.5 MS
MDC_IDC_SET_LEADCHNL_RA_SENSING_ANODE_ELECTRODE_1: NORMAL
MDC_IDC_SET_LEADCHNL_RA_SENSING_ANODE_LOCATION_1: NORMAL
MDC_IDC_SET_LEADCHNL_RA_SENSING_CATHODE_ELECTRODE_1: NORMAL
MDC_IDC_SET_LEADCHNL_RA_SENSING_CATHODE_LOCATION_1: NORMAL
MDC_IDC_SET_LEADCHNL_RA_SENSING_POLARITY: NORMAL
MDC_IDC_SET_LEADCHNL_RA_SENSING_SENSITIVITY: NORMAL
MDC_IDC_SET_LEADCHNL_RV_PACING_AMPLITUDE: 2 V
MDC_IDC_SET_LEADCHNL_RV_PACING_ANODE_ELECTRODE_1: NORMAL
MDC_IDC_SET_LEADCHNL_RV_PACING_ANODE_LOCATION_1: NORMAL
MDC_IDC_SET_LEADCHNL_RV_PACING_CAPTURE_MODE: NORMAL
MDC_IDC_SET_LEADCHNL_RV_PACING_CATHODE_ELECTRODE_1: NORMAL
MDC_IDC_SET_LEADCHNL_RV_PACING_CATHODE_LOCATION_1: NORMAL
MDC_IDC_SET_LEADCHNL_RV_PACING_POLARITY: NORMAL
MDC_IDC_SET_LEADCHNL_RV_PACING_PULSEWIDTH: 0.4 MS
MDC_IDC_SET_LEADCHNL_RV_SENSING_ANODE_ELECTRODE_1: NORMAL
MDC_IDC_SET_LEADCHNL_RV_SENSING_ANODE_LOCATION_1: NORMAL
MDC_IDC_SET_LEADCHNL_RV_SENSING_CATHODE_ELECTRODE_1: NORMAL
MDC_IDC_SET_LEADCHNL_RV_SENSING_CATHODE_LOCATION_1: NORMAL
MDC_IDC_SET_LEADCHNL_RV_SENSING_POLARITY: NORMAL
MDC_IDC_SET_LEADCHNL_RV_SENSING_SENSITIVITY: 0.9 MV
MDC_IDC_SET_ZONE_DETECTION_INTERVAL: 400 MS
MDC_IDC_SET_ZONE_TYPE: NORMAL
MDC_IDC_STAT_BRADY_AP_VP_PERCENT: 0 %
MDC_IDC_STAT_BRADY_AP_VS_PERCENT: 0 %
MDC_IDC_STAT_BRADY_AS_VP_PERCENT: 99.98 %
MDC_IDC_STAT_BRADY_AS_VS_PERCENT: 0.02 %
MDC_IDC_STAT_BRADY_DTM_END: NORMAL
MDC_IDC_STAT_BRADY_DTM_START: NORMAL
MDC_IDC_STAT_BRADY_RA_PERCENT_PACED: 0 %
MDC_IDC_STAT_BRADY_RV_PERCENT_PACED: 99.98 %
MDC_IDC_STAT_CRT_DTM_END: NORMAL
MDC_IDC_STAT_CRT_DTM_START: NORMAL
MDC_IDC_STAT_CRT_LV_PERCENT_PACED: 99.95 %
MDC_IDC_STAT_CRT_PERCENT_PACED: 99.95 %
MDC_IDC_STAT_EPISODE_RECENT_COUNT: 0
MDC_IDC_STAT_EPISODE_RECENT_COUNT: 1
MDC_IDC_STAT_EPISODE_RECENT_COUNT_DTM_END: NORMAL
MDC_IDC_STAT_EPISODE_RECENT_COUNT_DTM_START: NORMAL
MDC_IDC_STAT_EPISODE_TOTAL_COUNT: 0
MDC_IDC_STAT_EPISODE_TOTAL_COUNT: 6
MDC_IDC_STAT_EPISODE_TOTAL_COUNT_DTM_END: NORMAL
MDC_IDC_STAT_EPISODE_TOTAL_COUNT_DTM_START: NORMAL
MDC_IDC_STAT_EPISODE_TYPE: NORMAL

## 2022-12-20 PROCEDURE — 93294 REM INTERROG EVL PM/LDLS PM: CPT | Performed by: INTERNAL MEDICINE

## 2022-12-20 PROCEDURE — 93296 REM INTERROG EVL PM/IDS: CPT | Performed by: INTERNAL MEDICINE

## 2023-01-10 ENCOUNTER — ANTICOAGULATION THERAPY VISIT (OUTPATIENT)
Dept: ANTICOAGULATION | Facility: CLINIC | Age: 63
End: 2023-01-10

## 2023-01-10 ENCOUNTER — DOCUMENTATION ONLY (OUTPATIENT)
Dept: ANTICOAGULATION | Facility: CLINIC | Age: 63
End: 2023-01-10

## 2023-01-10 ENCOUNTER — LAB (OUTPATIENT)
Dept: LAB | Facility: CLINIC | Age: 63
End: 2023-01-10
Payer: COMMERCIAL

## 2023-01-10 DIAGNOSIS — Z79.01 LONG TERM CURRENT USE OF ANTICOAGULANT THERAPY: ICD-10-CM

## 2023-01-10 DIAGNOSIS — I48.20 CHRONIC ATRIAL FIBRILLATION (H): Primary | ICD-10-CM

## 2023-01-10 DIAGNOSIS — I48.20 CHRONIC ATRIAL FIBRILLATION (H): ICD-10-CM

## 2023-01-10 LAB — INR BLD: 2.4 (ref 0.9–1.1)

## 2023-01-10 PROCEDURE — 85610 PROTHROMBIN TIME: CPT

## 2023-01-10 PROCEDURE — 36415 COLL VENOUS BLD VENIPUNCTURE: CPT

## 2023-01-10 NOTE — PROGRESS NOTES
Call received from Lab  No INR order in chart  It  today    I will place standing order and send anticoagulation referral to PCP for review.    Marley Johns RN

## 2023-01-10 NOTE — PROGRESS NOTES
ANTICOAGULATION CLINIC REFERRAL RENEWAL REQUEST       An annual renewal order is required for all patients referred to [facility].?  Please review and sign the pended referral order for Mckay Hsu.       ANTICOAGULATION SUMMARY      Warfarin indication(s)   Atrial Fibrillation    Mechanical heart valve present?  NO       Current goal range   INR: 2.0-3.0     Goal appropriate for indication? Goal INR 2-3, standard for indication(s) above     Time in Therapeutic Range (TTR)  (Goal > 60%) 75.7%       Office visit with referring provider's group within last year yes on 2/15/22       Marley Johns RN  [facility]

## 2023-01-10 NOTE — PROGRESS NOTES
ANTICOAGULATION MANAGEMENT     Mckay EMILY Shadi 62 year old male is on warfarin with therapeutic INR result. (Goal INR 2.0-3.0)    Recent labs: (last 7 days)     01/10/23  1230   INR 2.4*       ASSESSMENT       Source(s): Chart Review and Patient/Caregiver Call       Warfarin doses taken: Warfarin taken as instructed    Diet: No new diet changes identified    New illness, injury, or hospitalization: No    Medication/supplement changes: None noted    Signs or symptoms of bleeding or clotting: No    Previous INR: Therapeutic last 2(+) visits    Additional findings: None       PLAN     Recommended plan for no diet, medication or health factor changes affecting INR     Dosing Instructions: Continue your current warfarin dose with next INR in 6 weeks       Summary  As of 1/10/2023    Full warfarin instructions:  3.75 mg every Tue, Fri; 7.5 mg all other days   Next INR check:  2/21/2023             Telephone call with Doyle who verbalizes understanding and agrees to plan    Lab visit scheduled    Education provided:     Please call back if any changes to your diet, medications or how you've been taking warfarin    Plan made per [facility] anticoagulation protocol    Nilda Cabrera RN  Anticoagulation Clinic  1/10/2023    _______________________________________________________________________     Anticoagulation Episode Summary     Current INR goal:  2.0-3.0   TTR:  75.7 % (1 y)   Target end date:  Indefinite   Send INR reminders to:  [facility]    Indications    Chronic atrial fibrillation (H) [I48.20]  Long-term (current) use of anticoagulants [Z79.01] [Z79.01]           Comments:           Anticoagulation Care Providers     Provider Role Specialty Phone number    David Almendarez MD Referring Internal Medicine [phone]

## 2023-02-21 ENCOUNTER — LAB (OUTPATIENT)
Dept: LAB | Facility: CLINIC | Age: 63
End: 2023-02-21
Payer: COMMERCIAL

## 2023-02-21 ENCOUNTER — ANTICOAGULATION THERAPY VISIT (OUTPATIENT)
Dept: ANTICOAGULATION | Facility: CLINIC | Age: 63
End: 2023-02-21

## 2023-02-21 DIAGNOSIS — Z79.01 LONG TERM CURRENT USE OF ANTICOAGULANT THERAPY: ICD-10-CM

## 2023-02-21 DIAGNOSIS — I48.20 CHRONIC ATRIAL FIBRILLATION (H): Primary | ICD-10-CM

## 2023-02-21 DIAGNOSIS — I48.20 CHRONIC ATRIAL FIBRILLATION (H): ICD-10-CM

## 2023-02-21 LAB — INR BLD: 2.3 (ref 0.9–1.1)

## 2023-02-21 PROCEDURE — 85610 PROTHROMBIN TIME: CPT

## 2023-02-21 PROCEDURE — 36416 COLLJ CAPILLARY BLOOD SPEC: CPT

## 2023-02-21 NOTE — PROGRESS NOTES
ANTICOAGULATION MANAGEMENT     Mckay A Shadi 62 year old male is on warfarin with therapeutic INR result. (Goal INR 2.0-3.0)    Recent labs: (last 7 days)     02/21/23  1233   INR 2.3*       ASSESSMENT       Source(s): Chart Review    Previous INR was Therapeutic last 2(+) visits    Medication, diet, health changes since last INR chart reviewed; none identified    I left a detailed voicemail with the orders reflected in flowsheet. I have also requested a call back if there have been any missed doses, concerns, illness, fever, or if there have been any changes in medications, activity level, or diet         PLAN     Recommended plan for no diet, medication or health factor changes affecting INR     Dosing Instructions: Continue your current warfarin dose with next INR in 6 weeks       Summary  As of 2/21/2023    Full warfarin instructions:  3.75 mg every Tue, Fri; 7.5 mg all other days   Next INR check:  4/4/2023             Detailed voice message left for Doyle with dosing instructions and follow up date.     Contact [phone]  to schedule and with any changes, questions or concerns.     Education provided:     Please call back if any changes to your diet, medications or how you've been taking warfarin    Plan made per [facility] anticoagulation protocol    Madelaine Hopper, RN  Anticoagulation Clinic  2/21/2023    _______________________________________________________________________     Anticoagulation Episode Summary     Current INR goal:  2.0-3.0   TTR:  80.5 % (1 y)   Target end date:  Indefinite   Send INR reminders to:  [facility]    Indications    Chronic atrial fibrillation (H) [I48.20]  Long-term (current) use of anticoagulants [Z79.01] [Z79.01]           Comments:           Anticoagulation Care Providers     Provider Role Specialty Phone number    David Almendarez MD Referring Internal Medicine [phone]

## 2023-03-17 DIAGNOSIS — E11.9 TYPE 2 DIABETES MELLITUS WITHOUT COMPLICATION, WITH LONG-TERM CURRENT USE OF INSULIN (H): ICD-10-CM

## 2023-03-17 DIAGNOSIS — Z79.4 TYPE 2 DIABETES MELLITUS WITHOUT COMPLICATION, WITH LONG-TERM CURRENT USE OF INSULIN (H): ICD-10-CM

## 2023-03-17 RX ORDER — INSULIN ASPART 100 [IU]/ML
INJECTION, SOLUTION INTRAVENOUS; SUBCUTANEOUS
Qty: 60 ML | Refills: 0 | Status: SHIPPED | OUTPATIENT
Start: 2023-03-17 | End: 2023-04-25

## 2023-03-17 RX ORDER — INSULIN GLARGINE 100 [IU]/ML
INJECTION, SOLUTION SUBCUTANEOUS
Qty: 60 ML | Refills: 5 | Status: SHIPPED | OUTPATIENT
Start: 2023-03-17 | End: 2023-08-22

## 2023-03-28 ENCOUNTER — TRANSFERRED RECORDS (OUTPATIENT)
Dept: INTERNAL MEDICINE | Facility: CLINIC | Age: 63
End: 2023-03-28

## 2023-03-28 ENCOUNTER — ANCILLARY PROCEDURE (OUTPATIENT)
Dept: CARDIOLOGY | Facility: CLINIC | Age: 63
End: 2023-03-28
Attending: INTERNAL MEDICINE
Payer: COMMERCIAL

## 2023-03-28 DIAGNOSIS — Z95.810 ICD (IMPLANTABLE CARDIOVERTER-DEFIBRILLATOR), BIVENTRICULAR, IN SITU: ICD-10-CM

## 2023-03-28 DIAGNOSIS — Z95.0 CARDIAC PACEMAKER IN SITU: ICD-10-CM

## 2023-03-28 DIAGNOSIS — Z95.0 CARDIAC PACEMAKER IN SITU: Primary | ICD-10-CM

## 2023-03-28 LAB
MDC_IDC_EPISODE_DTM: NORMAL
MDC_IDC_EPISODE_DURATION: 2 S
MDC_IDC_EPISODE_DURATION: 3 S
MDC_IDC_EPISODE_DURATION: 6 S
MDC_IDC_EPISODE_ID: 15
MDC_IDC_EPISODE_ID: 16
MDC_IDC_EPISODE_ID: 7
MDC_IDC_EPISODE_TYPE: NORMAL
MDC_IDC_LEAD_IMPLANT_DT: NORMAL
MDC_IDC_LEAD_IMPLANT_DT: NORMAL
MDC_IDC_LEAD_LOCATION: NORMAL
MDC_IDC_LEAD_LOCATION: NORMAL
MDC_IDC_LEAD_LOCATION_DETAIL_1: NORMAL
MDC_IDC_LEAD_LOCATION_DETAIL_1: NORMAL
MDC_IDC_LEAD_MFG: NORMAL
MDC_IDC_LEAD_MFG: NORMAL
MDC_IDC_LEAD_MODEL: NORMAL
MDC_IDC_LEAD_MODEL: NORMAL
MDC_IDC_LEAD_POLARITY_TYPE: NORMAL
MDC_IDC_LEAD_POLARITY_TYPE: NORMAL
MDC_IDC_LEAD_SERIAL: NORMAL
MDC_IDC_LEAD_SERIAL: NORMAL
MDC_IDC_MSMT_BATTERY_DTM: NORMAL
MDC_IDC_MSMT_BATTERY_REMAINING_LONGEVITY: 78 MO
MDC_IDC_MSMT_BATTERY_RRT_TRIGGER: 2.6
MDC_IDC_MSMT_BATTERY_STATUS: NORMAL
MDC_IDC_MSMT_BATTERY_VOLTAGE: 2.99 V
MDC_IDC_MSMT_LEADCHNL_LV_IMPEDANCE_VALUE: 285 OHM
MDC_IDC_MSMT_LEADCHNL_LV_IMPEDANCE_VALUE: 361 OHM
MDC_IDC_MSMT_LEADCHNL_LV_IMPEDANCE_VALUE: 570 OHM
MDC_IDC_MSMT_LEADCHNL_LV_IMPEDANCE_VALUE: 627 OHM
MDC_IDC_MSMT_LEADCHNL_LV_IMPEDANCE_VALUE: 760 OHM
MDC_IDC_MSMT_LEADCHNL_RA_IMPEDANCE_VALUE: 3401 OHM
MDC_IDC_MSMT_LEADCHNL_RA_IMPEDANCE_VALUE: 3401 OHM
MDC_IDC_MSMT_LEADCHNL_RV_IMPEDANCE_VALUE: 304 OHM
MDC_IDC_MSMT_LEADCHNL_RV_IMPEDANCE_VALUE: 342 OHM
MDC_IDC_MSMT_LEADCHNL_RV_PACING_THRESHOLD_AMPLITUDE: 0.75 V
MDC_IDC_MSMT_LEADCHNL_RV_PACING_THRESHOLD_PULSEWIDTH: 0.4 MS
MDC_IDC_MSMT_LEADCHNL_RV_SENSING_INTR_AMPL: 10.12 MV
MDC_IDC_PG_IMPLANT_DTM: NORMAL
MDC_IDC_PG_MFG: NORMAL
MDC_IDC_PG_MODEL: NORMAL
MDC_IDC_PG_SERIAL: NORMAL
MDC_IDC_PG_TYPE: NORMAL
MDC_IDC_SESS_CLINIC_NAME: NORMAL
MDC_IDC_SESS_DTM: NORMAL
MDC_IDC_SESS_TYPE: NORMAL
MDC_IDC_SET_BRADY_LOWRATE: 70 {BEATS}/MIN
MDC_IDC_SET_BRADY_MAX_SENSOR_RATE: 130 {BEATS}/MIN
MDC_IDC_SET_BRADY_MODE: NORMAL
MDC_IDC_SET_CRT_LVRV_DELAY: 0 MS
MDC_IDC_SET_CRT_PACED_CHAMBERS: NORMAL
MDC_IDC_SET_LEADCHNL_LV_PACING_AMPLITUDE: 2 V
MDC_IDC_SET_LEADCHNL_LV_PACING_ANODE_ELECTRODE_1: NORMAL
MDC_IDC_SET_LEADCHNL_LV_PACING_ANODE_LOCATION_1: NORMAL
MDC_IDC_SET_LEADCHNL_LV_PACING_CAPTURE_MODE: NORMAL
MDC_IDC_SET_LEADCHNL_LV_PACING_CATHODE_ELECTRODE_1: NORMAL
MDC_IDC_SET_LEADCHNL_LV_PACING_CATHODE_LOCATION_1: NORMAL
MDC_IDC_SET_LEADCHNL_LV_PACING_POLARITY: NORMAL
MDC_IDC_SET_LEADCHNL_LV_PACING_PULSEWIDTH: 0.5 MS
MDC_IDC_SET_LEADCHNL_RA_SENSING_ANODE_ELECTRODE_1: NORMAL
MDC_IDC_SET_LEADCHNL_RA_SENSING_ANODE_LOCATION_1: NORMAL
MDC_IDC_SET_LEADCHNL_RA_SENSING_CATHODE_ELECTRODE_1: NORMAL
MDC_IDC_SET_LEADCHNL_RA_SENSING_CATHODE_LOCATION_1: NORMAL
MDC_IDC_SET_LEADCHNL_RA_SENSING_POLARITY: NORMAL
MDC_IDC_SET_LEADCHNL_RA_SENSING_SENSITIVITY: NORMAL
MDC_IDC_SET_LEADCHNL_RV_PACING_AMPLITUDE: 2 V
MDC_IDC_SET_LEADCHNL_RV_PACING_ANODE_ELECTRODE_1: NORMAL
MDC_IDC_SET_LEADCHNL_RV_PACING_ANODE_LOCATION_1: NORMAL
MDC_IDC_SET_LEADCHNL_RV_PACING_CAPTURE_MODE: NORMAL
MDC_IDC_SET_LEADCHNL_RV_PACING_CATHODE_ELECTRODE_1: NORMAL
MDC_IDC_SET_LEADCHNL_RV_PACING_CATHODE_LOCATION_1: NORMAL
MDC_IDC_SET_LEADCHNL_RV_PACING_POLARITY: NORMAL
MDC_IDC_SET_LEADCHNL_RV_PACING_PULSEWIDTH: 0.4 MS
MDC_IDC_SET_LEADCHNL_RV_SENSING_ANODE_ELECTRODE_1: NORMAL
MDC_IDC_SET_LEADCHNL_RV_SENSING_ANODE_LOCATION_1: NORMAL
MDC_IDC_SET_LEADCHNL_RV_SENSING_CATHODE_ELECTRODE_1: NORMAL
MDC_IDC_SET_LEADCHNL_RV_SENSING_CATHODE_LOCATION_1: NORMAL
MDC_IDC_SET_LEADCHNL_RV_SENSING_POLARITY: NORMAL
MDC_IDC_SET_LEADCHNL_RV_SENSING_SENSITIVITY: 0.9 MV
MDC_IDC_SET_ZONE_DETECTION_INTERVAL: 400 MS
MDC_IDC_SET_ZONE_TYPE: NORMAL
MDC_IDC_STAT_BRADY_AP_VP_PERCENT: 0 %
MDC_IDC_STAT_BRADY_AP_VS_PERCENT: 0 %
MDC_IDC_STAT_BRADY_AS_VP_PERCENT: 99.98 %
MDC_IDC_STAT_BRADY_AS_VS_PERCENT: 0.02 %
MDC_IDC_STAT_BRADY_DTM_END: NORMAL
MDC_IDC_STAT_BRADY_DTM_START: NORMAL
MDC_IDC_STAT_BRADY_RA_PERCENT_PACED: 0 %
MDC_IDC_STAT_BRADY_RV_PERCENT_PACED: 99.98 %
MDC_IDC_STAT_CRT_DTM_END: NORMAL
MDC_IDC_STAT_CRT_DTM_START: NORMAL
MDC_IDC_STAT_CRT_LV_PERCENT_PACED: 99.95 %
MDC_IDC_STAT_CRT_PERCENT_PACED: 99.95 %
MDC_IDC_STAT_EPISODE_RECENT_COUNT: 0
MDC_IDC_STAT_EPISODE_RECENT_COUNT: 1
MDC_IDC_STAT_EPISODE_RECENT_COUNT_DTM_END: NORMAL
MDC_IDC_STAT_EPISODE_RECENT_COUNT_DTM_START: NORMAL
MDC_IDC_STAT_EPISODE_TOTAL_COUNT: 0
MDC_IDC_STAT_EPISODE_TOTAL_COUNT: 7
MDC_IDC_STAT_EPISODE_TOTAL_COUNT_DTM_END: NORMAL
MDC_IDC_STAT_EPISODE_TOTAL_COUNT_DTM_START: NORMAL
MDC_IDC_STAT_EPISODE_TYPE: NORMAL

## 2023-03-28 PROCEDURE — 93296 REM INTERROG EVL PM/IDS: CPT | Performed by: INTERNAL MEDICINE

## 2023-03-28 PROCEDURE — 93294 REM INTERROG EVL PM/LDLS PM: CPT | Performed by: INTERNAL MEDICINE

## 2023-04-04 ENCOUNTER — ANTICOAGULATION THERAPY VISIT (OUTPATIENT)
Dept: ANTICOAGULATION | Facility: CLINIC | Age: 63
End: 2023-04-04

## 2023-04-04 ENCOUNTER — LAB (OUTPATIENT)
Dept: LAB | Facility: CLINIC | Age: 63
End: 2023-04-04
Payer: COMMERCIAL

## 2023-04-04 DIAGNOSIS — I48.20 CHRONIC ATRIAL FIBRILLATION (H): Primary | ICD-10-CM

## 2023-04-04 DIAGNOSIS — Z79.01 LONG TERM CURRENT USE OF ANTICOAGULANT THERAPY: ICD-10-CM

## 2023-04-04 DIAGNOSIS — I48.20 CHRONIC ATRIAL FIBRILLATION (H): ICD-10-CM

## 2023-04-04 LAB — INR BLD: 2.4 (ref 0.9–1.1)

## 2023-04-04 PROCEDURE — 36416 COLLJ CAPILLARY BLOOD SPEC: CPT

## 2023-04-04 PROCEDURE — 85610 PROTHROMBIN TIME: CPT

## 2023-04-04 NOTE — PROGRESS NOTES
ANTICOAGULATION MANAGEMENT     Mckay EMILY Shadi 63 year old male is on warfarin with therapeutic INR result. (Goal INR 2.0-3.0)    Recent labs: (last 7 days)     04/04/23  1255   INR 2.4*       ASSESSMENT       Source(s): Chart Review and Patient/Caregiver Call       Warfarin doses taken: Warfarin taken as instructed    Diet: No new diet changes identified    New illness, injury, or hospitalization: No    Medication/supplement changes: None noted    Signs or symptoms of bleeding or clotting: No    Previous INR: Therapeutic last 2(+) visits    Additional findings: None         PLAN     Recommended plan for no diet, medication or health factor changes affecting INR     Dosing Instructions: Continue your current warfarin dose with next INR in 6 weeks       Summary  As of 4/4/2023    Full warfarin instructions:  3.75 mg every Tue, Fri; 7.5 mg all other days   Next INR check:  5/16/2023             Telephone call with Doyle who verbalizes understanding and agrees to plan    Lab visit scheduled    Education provided:     Please call back if any changes to your diet, medications or how you've been taking warfarin    Plan made per [facility] anticoagulation protocol    Collette Dunbar RN  Anticoagulation Clinic  4/4/2023    _______________________________________________________________________     Anticoagulation Episode Summary     Current INR goal:  2.0-3.0   TTR:  84.0 % (1 y)   Target end date:  Indefinite   Send INR reminders to:  [facility]    Indications    Chronic atrial fibrillation (H) [I48.20]  Long-term (current) use of anticoagulants [Z79.01] [Z79.01]           Comments:           Anticoagulation Care Providers     Provider Role Specialty Phone number    David Almendarez MD Referring Internal Medicine [phone]

## 2023-04-25 ENCOUNTER — TRANSFERRED RECORDS (OUTPATIENT)
Dept: INTERNAL MEDICINE | Facility: CLINIC | Age: 63
End: 2023-04-25

## 2023-04-25 ENCOUNTER — OFFICE VISIT (OUTPATIENT)
Dept: INTERNAL MEDICINE | Facility: CLINIC | Age: 63
End: 2023-04-25
Payer: COMMERCIAL

## 2023-04-25 VITALS
SYSTOLIC BLOOD PRESSURE: 128 MMHG | HEIGHT: 72 IN | WEIGHT: 315 LBS | DIASTOLIC BLOOD PRESSURE: 72 MMHG | OXYGEN SATURATION: 99 % | HEART RATE: 78 BPM | RESPIRATION RATE: 15 BRPM | BODY MASS INDEX: 42.66 KG/M2 | TEMPERATURE: 97.7 F

## 2023-04-25 DIAGNOSIS — I50.22 CHRONIC SYSTOLIC CONGESTIVE HEART FAILURE (H): ICD-10-CM

## 2023-04-25 DIAGNOSIS — Z79.4 TYPE 2 DIABETES MELLITUS WITHOUT COMPLICATION, WITH LONG-TERM CURRENT USE OF INSULIN (H): ICD-10-CM

## 2023-04-25 DIAGNOSIS — Z00.00 ROUTINE GENERAL MEDICAL EXAMINATION AT A HEALTH CARE FACILITY: Primary | ICD-10-CM

## 2023-04-25 DIAGNOSIS — Z12.5 SCREENING PSA (PROSTATE SPECIFIC ANTIGEN): ICD-10-CM

## 2023-04-25 DIAGNOSIS — E05.90 SUBCLINICAL HYPERTHYROIDISM: ICD-10-CM

## 2023-04-25 DIAGNOSIS — Z12.11 SCREEN FOR COLON CANCER: ICD-10-CM

## 2023-04-25 DIAGNOSIS — I48.91 NEW ONSET ATRIAL FIBRILLATION (H): ICD-10-CM

## 2023-04-25 DIAGNOSIS — E78.5 HYPERLIPIDEMIA LDL GOAL <100: ICD-10-CM

## 2023-04-25 DIAGNOSIS — E11.9 TYPE 2 DIABETES MELLITUS WITHOUT COMPLICATION, WITH LONG-TERM CURRENT USE OF INSULIN (H): ICD-10-CM

## 2023-04-25 DIAGNOSIS — I10 ESSENTIAL HYPERTENSION, BENIGN: ICD-10-CM

## 2023-04-25 PROCEDURE — 99396 PREV VISIT EST AGE 40-64: CPT | Performed by: INTERNAL MEDICINE

## 2023-04-25 PROCEDURE — 99214 OFFICE O/P EST MOD 30 MIN: CPT | Mod: 25 | Performed by: INTERNAL MEDICINE

## 2023-04-25 RX ORDER — AMLODIPINE BESYLATE 10 MG/1
TABLET ORAL
Qty: 90 TABLET | Refills: 3 | Status: SHIPPED | OUTPATIENT
Start: 2023-04-25 | End: 2024-02-09

## 2023-04-25 RX ORDER — WARFARIN SODIUM 7.5 MG/1
TABLET ORAL
Qty: 90 TABLET | Refills: 1 | Status: SHIPPED | OUTPATIENT
Start: 2023-04-25 | End: 2023-11-13

## 2023-04-25 RX ORDER — INSULIN ASPART 100 [IU]/ML
INJECTION, SOLUTION INTRAVENOUS; SUBCUTANEOUS
Qty: 60 ML | Refills: 3 | Status: SHIPPED | OUTPATIENT
Start: 2023-04-25 | End: 2023-09-25

## 2023-04-25 RX ORDER — SIMVASTATIN 80 MG
TABLET ORAL
Qty: 90 TABLET | Refills: 3 | Status: SHIPPED | OUTPATIENT
Start: 2023-04-25 | End: 2024-05-07

## 2023-04-25 RX ORDER — LOSARTAN POTASSIUM 100 MG/1
100 TABLET ORAL DAILY
Qty: 90 TABLET | Refills: 3 | Status: SHIPPED | OUTPATIENT
Start: 2023-04-25 | End: 2024-02-09

## 2023-04-25 RX ORDER — METOPROLOL SUCCINATE 100 MG/1
100 TABLET, EXTENDED RELEASE ORAL DAILY
Qty: 90 TABLET | Refills: 3 | Status: SHIPPED | OUTPATIENT
Start: 2023-04-25 | End: 2024-05-09

## 2023-04-25 NOTE — PROGRESS NOTES
SUBJECTIVE:   CC: Doyle is an 63 year old who presents for preventative health visit.   Patient has been advised of split billing requirements and indicates understanding: Yes  Healthy Habits:     Getting at least 3 servings of Calcium per day:  Yes    Bi-annual eye exam:  NO    Dental care twice a year:  NO    Sleep apnea or symptoms of sleep apnea:  Excessive snoring    Diet:  Regular (no restrictions)    Frequency of exercise:  None    Taking medications regularly:  Yes    Barriers to taking medications:  Not applicable    Medication side effects:  None    PHQ-2 Total Score: 0    Additional concerns today:  No      Today's PHQ-2 Score:       4/25/2023     1:37 PM   PHQ-2 ( 1999 [employer])   Q1: Little interest or pleasure in doing things 0   Q2: Feeling down, depressed or hopeless 0   PHQ-2 Score 0   Q1: Little interest or pleasure in doing things Not at all   Q2: Feeling down, depressed or hopeless Not at all   PHQ-2 Score 0           Social History     Tobacco Use     Smoking status: Never     Smokeless tobacco: Never   Vaping Use     Vaping status: Not on file   Substance Use Topics     Alcohol use: No       Last PSA:   PSA   Date Value Ref Range Status   01/26/2021 0.50 0 - 4 ug/L Final     Comment:     Assay Method:  Chemiluminescence using Siemens Vista analyzer     Prostate Specific Antigen Screen   Date Value Ref Range Status   04/05/2022 0.40 0.00 - 4.00 ug/L Final       Reviewed orders with patient. Reviewed health maintenance and updated orders accordingly - Yes        Reviewed and updated as needed this visit by clinical staff   Tobacco  Allergies  Meds              Reviewed and updated as needed this visit by Provider                 Current Outpatient Medications   Medication     alcohol swab prep pads     amLODIPine (NORVASC) 10 MG tablet     B-D U/F insulin pen needle     blood glucose (NO BRAND SPECIFIED) test strip     blood glucose calibration (NO BRAND SPECIFIED) solution     blood glucose  "monitoring (NO BRAND SPECIFIED) meter device kit     fish oil-omega-3 fatty acids 1000 MG capsule     insulin aspart (NOVOLOG FLEXPEN) 100 UNIT/ML pen     insulin glargine (LANTUS SOLOSTAR) 100 UNIT/ML pen     losartan (COZAAR) 100 MG tablet     metFORMIN (GLUCOPHAGE) 1000 MG tablet     metoprolol succinate ER (TOPROL XL) 100 MG 24 hr tablet     MULTIVITAMIN OR     NITROSTAT 0.4 MG SL SUBL     simvastatin (ZOCOR) 80 MG tablet     thin (NO BRAND SPECIFIED) lancets     warfarin ANTICOAGULANT (COUMADIN) 7.5 MG tablet     No current facility-administered medications for this visit.       Review of Systems  CONSTITUTIONAL: NEGATIVE for fever, chills, change in weight  INTEGUMENTARY/SKIN: NEGATIVE for worrisome rashes, moles or lesions  EYES: NEGATIVE for vision changes or irritation  ENT: NEGATIVE for ear, mouth and throat problems  RESP: NEGATIVE for significant cough or SOB  CV: NEGATIVE for chest pain, palpitations  GI: NEGATIVE for nausea, abdominal pain, heartburn, or change in bowel habits   male: negative for dysuria, hematuria, decreased urinary stream, erectile dysfunction, urethral discharge  MUSCULOSKELETAL: NEGATIVE for significant arthralgias or myalgia  NEURO: NEGATIVE for weakness, dizziness or paresthesias  PSYCHIATRIC: NEGATIVE for changes in mood or affect    OBJECTIVE:   /72   Pulse 78   Temp 97.7  F (36.5  C) (Temporal)   Resp 15   Ht 1.816 m (5' 11.5\")   Wt 143.3 kg (316 lb)   SpO2 99%   BMI 43.46 kg/m      Physical Exam  GENERAL: alert and no distress  EYES: Decreased VA each eye.  Eyes grossly normal to inspection, PERRL and conjunctivae and sclerae normal  HENT: ear canals and TM's normal, nose and mouth without ulcers or lesions  NECK: no adenopathy, no asymmetry, masses, or scars and thyroid normal to palpation  Left chest wall device  RESP: lungs clear to auscultation - no rales, rhonchi or wheezes  CV: regular rate and rhythm, normal S1 S2, no S3 or S4, no click or rub  ABDOMEN: " obese  NEURO: No focal changes  PSYCH: mentation appears normal, affect normal/bright    Lab Results   Component Value Date    A1C 6.8 08/23/2022    A1C 6.6 01/18/2022    A1C 6.9 01/26/2021    A1C 6.2 02/03/2020    A1C 6.1 10/22/2019    A1C 6.4 03/06/2019    A1C 6.4 06/11/2018       ASSESSMENT/PLAN:     (Z00.00) Routine general medical examination at a health care facility  (primary encounter diagnosis)  Comment: Commended updated vaccinations inclusive of shingles vaccine, influenza vaccine and COVID-vaccine.  Patient defers  Plan:     (E11.9,  Z79.4) Type 2 diabetes mellitus without complication, with long-term current use of insulin (H)  Comment: Continue with current ongoing medication.  Refilled accordingly.  Check labs as a  Plan: Adult Eye  Referral, HEMOGLOBIN A1C,         Lipid panel reflex to direct LDL Non-fasting,         Albumin Random Urine Quantitative with Creat         Ratio, insulin aspart (NOVOLOG FLEXPEN) 100         UNIT/ML pen, losartan (COZAAR) 100 MG tablet,         metFORMIN (GLUCOPHAGE) 1000 MG tablet,         Comprehensive metabolic panel (BMP + Alb, Alk         Phos, ALT, AST, Total. Bili, TP)            (I50.22) Chronic systolic congestive heart failure (H)  Comment: Appears overall euvolemic.  Continue with current medical management, refill medication  Plan: CBC with Platelets, metoprolol succinate ER         (TOPROL XL) 100 MG 24 hr tablet, OFFICE/OUTPT         VISIT,EST,LEVL IV            (I48.91) New onset atrial fibrillation (H)  Comment: Controlled and anticoagulated.  Continuing with current medical excellent at management  Plan: warfarin ANTICOAGULANT (COUMADIN) 7.5 MG         tablet, OFFICE/OUTPT VISIT,EST,LEVL IV            (I10) Essential hypertension, benign  Comment: Stable on therapy without acute change recommended.  Follow-up blood pressure check in 6  Plan: amLODIPine (NORVASC) 10 MG tablet,         Comprehensive metabolic panel (BMP + Alb, Alk         Phos,  "ALT, AST, Total. Bili, TP), OFFICE/OUTPT         VISIT,EST,LEVL IV            (E05.90) Subclinical hyperthyroidism  Comment: Recheck thyroid function test according  Plan: TSH WITH FREE T4 REFLEX            (E78.5) Hyperlipidemia LDL goal <100  Comment: Recheck lipid panel and continue with simvastatin as previously ordered  Plan: Lipid panel reflex to direct LDL Non-fasting,         simvastatin (ZOCOR) 80 MG tablet, OFFICE/OUTPT         VISIT,EST,LEVL IV            (Z12.11) Screen for colon cancer  Comment: Prior colonoscopy reviewed but patient is not interested in pursuing repeat testing  Plan:     (Z12.5) Screening PSA (prostate specific antigen)  Comment:Labs ordered as fasting per routine and screening  Plan: PSA, screen              Patient has been advised of split billing requirements and indicates understanding: Yes      COUNSELING:   Reviewed preventive health counseling, as reflected in patient instructions       Regular exercise       Healthy diet/nutrition      BMI:   Estimated body mass index is 43.46 kg/m  as calculated from the following:    Height as of this encounter: 1.816 m (5' 11.5\").    Weight as of this encounter: 143.3 kg (316 lb).   Weight management plan: Discussed healthy diet and exercise guidelines      He reports that he has never smoked. He has never used smokeless tobacco.        David Almendarez MD  [facility]  "

## 2023-05-01 DIAGNOSIS — E11.9 TYPE 2 DIABETES MELLITUS WITHOUT COMPLICATION, WITH LONG-TERM CURRENT USE OF INSULIN (H): ICD-10-CM

## 2023-05-01 DIAGNOSIS — Z79.4 TYPE 2 DIABETES MELLITUS WITHOUT COMPLICATION, WITH LONG-TERM CURRENT USE OF INSULIN (H): ICD-10-CM

## 2023-05-02 RX ORDER — FLURBIPROFEN SODIUM 0.3 MG/ML
SOLUTION/ DROPS OPHTHALMIC
Qty: 400 EACH | Refills: 0 | Status: SHIPPED | OUTPATIENT
Start: 2023-05-02 | End: 2024-02-16

## 2023-05-23 ENCOUNTER — TELEPHONE (OUTPATIENT)
Dept: ANTICOAGULATION | Facility: CLINIC | Age: 63
End: 2023-05-23
Payer: COMMERCIAL

## 2023-05-23 NOTE — TELEPHONE ENCOUNTER
ANTICOAGULATION     Mckay Hsu is overdue for INR check.      Left message for patient to call and schedule lab appointment as soon as possible. If returning call, please schedule. Of note, has INR scheduled for 6/6/23    Gigi Castillo RN

## 2023-06-06 ENCOUNTER — LAB (OUTPATIENT)
Dept: LAB | Facility: CLINIC | Age: 63
End: 2023-06-06
Payer: COMMERCIAL

## 2023-06-06 ENCOUNTER — ANTICOAGULATION THERAPY VISIT (OUTPATIENT)
Dept: ANTICOAGULATION | Facility: CLINIC | Age: 63
End: 2023-06-06

## 2023-06-06 DIAGNOSIS — Z79.01 LONG TERM CURRENT USE OF ANTICOAGULANT THERAPY: ICD-10-CM

## 2023-06-06 DIAGNOSIS — I48.20 CHRONIC ATRIAL FIBRILLATION (H): ICD-10-CM

## 2023-06-06 DIAGNOSIS — I50.22 CHRONIC SYSTOLIC CONGESTIVE HEART FAILURE (H): ICD-10-CM

## 2023-06-06 DIAGNOSIS — Z79.4 TYPE 2 DIABETES MELLITUS WITHOUT COMPLICATION, WITH LONG-TERM CURRENT USE OF INSULIN (H): ICD-10-CM

## 2023-06-06 DIAGNOSIS — I48.20 CHRONIC ATRIAL FIBRILLATION (H): Primary | ICD-10-CM

## 2023-06-06 DIAGNOSIS — E05.90 SUBCLINICAL HYPERTHYROIDISM: ICD-10-CM

## 2023-06-06 DIAGNOSIS — E78.5 HYPERLIPIDEMIA LDL GOAL <100: ICD-10-CM

## 2023-06-06 DIAGNOSIS — E11.9 TYPE 2 DIABETES MELLITUS WITHOUT COMPLICATION, WITH LONG-TERM CURRENT USE OF INSULIN (H): ICD-10-CM

## 2023-06-06 LAB
ALT SERPL W P-5'-P-CCNC: 18 U/L (ref 10–50)
CHOLEST SERPL-MCNC: 161 MG/DL
CREAT UR-MCNC: 196 MG/DL
ERYTHROCYTE [DISTWIDTH] IN BLOOD BY AUTOMATED COUNT: 13.5 % (ref 10–15)
HBA1C MFR BLD: 8.4 % (ref 0–5.6)
HCT VFR BLD AUTO: 43.8 % (ref 40–53)
HDLC SERPL-MCNC: 56 MG/DL
HGB BLD-MCNC: 14.4 G/DL (ref 13.3–17.7)
INR BLD: 2.6 (ref 0.9–1.1)
LDLC SERPL CALC-MCNC: 62 MG/DL
MCH RBC QN AUTO: 29.4 PG (ref 26.5–33)
MCHC RBC AUTO-ENTMCNC: 32.9 G/DL (ref 31.5–36.5)
MCV RBC AUTO: 89 FL (ref 78–100)
MICROALBUMIN UR-MCNC: 505 MG/L
MICROALBUMIN/CREAT UR: 257.65 MG/G CR (ref 0–17)
NONHDLC SERPL-MCNC: 105 MG/DL
PLATELET # BLD AUTO: 287 10E3/UL (ref 150–450)
RBC # BLD AUTO: 4.9 10E6/UL (ref 4.4–5.9)
TRIGL SERPL-MCNC: 216 MG/DL
TSH SERPL DL<=0.005 MIU/L-ACNC: 0.46 UIU/ML (ref 0.3–4.2)
WBC # BLD AUTO: 10.5 10E3/UL (ref 4–11)

## 2023-06-06 PROCEDURE — 84460 ALANINE AMINO (ALT) (SGPT): CPT

## 2023-06-06 PROCEDURE — 80061 LIPID PANEL: CPT

## 2023-06-06 PROCEDURE — 85610 PROTHROMBIN TIME: CPT

## 2023-06-06 PROCEDURE — 82570 ASSAY OF URINE CREATININE: CPT

## 2023-06-06 PROCEDURE — 82043 UR ALBUMIN QUANTITATIVE: CPT

## 2023-06-06 PROCEDURE — 36416 COLLJ CAPILLARY BLOOD SPEC: CPT

## 2023-06-06 PROCEDURE — 85027 COMPLETE CBC AUTOMATED: CPT

## 2023-06-06 PROCEDURE — 84443 ASSAY THYROID STIM HORMONE: CPT

## 2023-06-06 PROCEDURE — 36415 COLL VENOUS BLD VENIPUNCTURE: CPT

## 2023-06-06 PROCEDURE — 83036 HEMOGLOBIN GLYCOSYLATED A1C: CPT

## 2023-06-06 NOTE — PROGRESS NOTES
ANTICOAGULATION MANAGEMENT     Mckay EMILY Shadi 63 year old male is on warfarin with therapeutic INR result. (Goal INR 2.0-3.0)    Recent labs: (last 7 days)     06/06/23  0838   INR 2.6*       ASSESSMENT       Source(s): Chart Review and Patient/Caregiver Call       Warfarin doses taken: Warfarin taken as instructed    Diet: No new diet changes identified    Medication/supplement changes: None noted    New illness, injury, or hospitalization: No    Signs or symptoms of bleeding or clotting: No    Previous result: Therapeutic last 2(+) visits    Additional findings: None         PLAN     Recommended plan for no diet, medication or health factor changes affecting INR     Dosing Instructions: Continue your current warfarin dose with next INR in 6 weeks       Summary  As of 6/6/2023    Full warfarin instructions:  3.75 mg every Tue, Fri; 7.5 mg all other days   Next INR check:  7/18/2023             Telephone call with Doyle who verbalizes understanding and agrees to plan    Lab visit scheduled    Education provided:     Please call back if any changes to your diet, medications or how you've been taking warfarin    Contact [phone]  with any changes, questions or concerns.     Plan made per [facility] anticoagulation protocol    Pastora Valenzuela RN  Anticoagulation Clinic  6/6/2023    _______________________________________________________________________     Anticoagulation Episode Summary     Current INR goal:  2.0-3.0   TTR:  90.9 % (1 y)   Target end date:  Indefinite   Send INR reminders to:  [facility]    Indications    Chronic atrial fibrillation (H) [I48.20]  Long-term (current) use of anticoagulants [Z79.01] [Z79.01]           Comments:           Anticoagulation Care Providers     Provider Role Specialty Phone number    David Almendarez MD Referring Internal Medicine [phone]

## 2023-06-14 NOTE — PROGRESS NOTES
"[facility]    I had the pleasure of seeing Doyle when he came for annual follow-up.  This 63 year old had seen Dr. Holliday for his history of:    1. Permanent  AFib; tachycardia-induced CM (resolved), s/p AV node ablation and Medtronic CRT-P 4/2010 with gen change 11/29/2018. He previously failed amiodarone. Remains on warfarin for [id number] 2 (diabetes, hypertension)  2. Diabetes, on metformin and insulin.    3. Hypertension  4. NSVT - noted on PPM interrogations.   5. Retinitis Pigmentosa      Last Visit & Interval History:  I saw Doyle 6/2022 at which time he was doing \"OK\" from a CV standpoint, with lack of vision as his biggest issue.  He'd not had recurrent VT since 12/2021 and we agreed to hold off on a stress test unless recurrence was seen based on his preference (signfiicant difficulty getting to appts)    Today's Visit:  Overall feels things are going well heart-wise. No issues with dizziness, lightheadedness. Remains limited in activity d/t lack of sight.    No issues with orthopnea, PND. Notes swelling, worse when sitting. Denies CP, SOB.     BPs at home wnd133o/80s with HRs 70s    VITALS:  Vitals: /74   Pulse 72   Ht 1.816 m (5' 11.5\")   Wt 146.4 kg (322 lb 11.2 oz)   BMI 44.38 kg/m      Diagnostic Testing:  EKG today, which I overread, showed  70 with underlying AFIb  Device interrogation today, showed 100%  in VVIR 70/130. Underlying AFib/CHB with JE @ VVI 40. 6.5y battery. 7 beat run of NSVT @ 158 bpm @ 1616  Echocardiogram 7/2021 LVEF 55-60%.  Borderline concentric LVH.  No RWMA.  No sig valve abnormalities  Echocardiogram 6/2017-EF 55-60%.  No RWMA. Trivial AoScl.  1+ TR  Stress Testing 8/2009-no ischemia/infarction    Plan:  1. Annual follow-up with PPM interrogation    Assessment/Plan:    1. Permanent AFib    S/p AVN ablation and Medtronic CRT-P, with gen change 11/2018    CHADSVASc 2 (HTN, DM) and remains on wafarin    PLAN:    Continue routine device " checks    Annual follow-up to cut down on visits (transportation issues given blindness)    2. NSVT    Noted on PPM interrogations 2019, 6/2021 and 12/2021 and 6/2023 - 7 beats)    On metoprolol Xl 100    LVEF on echo 2021 wnl    PLAN:    Continue BB therapy    Routine device interrogations      Jane Caruso PA-C, MSPAS      Orders Placed This Encounter   Procedures     EKG 12-lead complete w/read - Clinics (performed today)     No orders of the defined types were placed in this encounter.    There are no discontinued medications.      Encounter Diagnosis   Name Primary?     Cardiac pacemaker in situ        CURRENT MEDICATIONS:  Current Outpatient Medications   Medication Sig Dispense Refill     alcohol swab prep pads Use to swab area of injection/lily as directed. 100 each 3     amLODIPine (NORVASC) 10 MG tablet TAKE 1 TABLET(10 MG) BY MOUTH DAILY 90 tablet 3     blood glucose (NO BRAND SPECIFIED) test strip Use to test blood sugar 3 times daily or as directed. To accompany: ACCU-CHEK GUIDE ME  Please dispense 90 days if able, at least 30 days minimum 300 strip 3     blood glucose calibration (NO BRAND SPECIFIED) solution To accompany: ACCU-CHEK GUIDE ME 1 Bottle 3     blood glucose monitoring (NO BRAND SPECIFIED) meter device kit Use to test blood sugar 3 times daily or as directed. Preferred blood glucose meter OR supplies to accompany: ACCU-CHEK GUIDE ME 1 kit 0     fish oil-omega-3 fatty acids 1000 MG capsule Take 4 capsules by mouth daily.       insulin aspart (NOVOLOG FLEXPEN) 100 UNIT/ML pen INJECT 1 UNIT SUBCUTANEOUSLY PER 7 GRAMS OF CARBS 3 TIMES DAILY BEFORE MEALS. MAX 60 UNITS PER DAY 60 mL 3     insulin glargine (LANTUS SOLOSTAR) 100 UNIT/ML pen ADMINISTER 60 UNITS UNDER THE SKIN AT BEDTIME 60 mL 5     insulin pen needle (B-D U/F) 31G X 5 MM miscellaneous Use 4 pen needles daily or as directed. 400 each 0     losartan (COZAAR) 100 MG tablet Take 1 tablet (100 mg) by mouth daily 90 tablet 3     metFORMIN  "(GLUCOPHAGE) 1000 MG tablet Take 1 tablet (1,000 mg) by mouth 2 times daily (with meals) 180 tablet 3     metoprolol succinate ER (TOPROL XL) 100 MG 24 hr tablet Take 1 tablet (100 mg) by mouth daily 90 tablet 3     MULTIVITAMIN OR 1 qd       NITROSTAT 0.4 MG SL SUBL 1 TAB EVERY 5 MIN AS NEEDED, UP TO 3 PER EPISODE       simvastatin (ZOCOR) 80 MG tablet TAKE ONE-HALF TABLET( 40 MG) BY MOUTH AT BEDTIME 90 tablet 3     thin (NO BRAND SPECIFIED) lancets Use to test blood sugar 4 times daily or as directed. 100 each 6     warfarin ANTICOAGULANT (COUMADIN) 7.5 MG tablet TAKE 1 TABLET BY MOUTH DAILY. EXCEPT ONE-HALF TABLET ON TUESDAY/ FRIDAY AS DIRECTED BY COAGULATION CLINIC 90 tablet 1       ALLERGIES   No Known Allergies      Review of Systems:  Skin:  Negative     Eyes:  Positive for glasses;visual blurring  ENT:  Negative    Respiratory:  Negative for dyspnea on exertion;shortness of breath;cough  Cardiovascular:  Negative for;palpitations;chest pain;dizziness;lightheadedness;fatigue Positive for;exercise intolerance;edema  Gastroenterology: Negative melena;hematochezia  Genitourinary:  Negative hematuria  Musculoskeletal:  Positive for arthritis  Neurologic:  Negative stroke  Psychiatric:  Negative    Heme/Lymph/Imm:  Negative bleeding disorder  Endocrine:  Positive for diabetes    Physical Exam:  Vitals: /74   Pulse 72   Ht 1.816 m (5' 11.5\")   Wt 146.4 kg (322 lb 11.2 oz)   BMI 44.38 kg/m      Constitutional:  cooperative, alert and oriented, well developed, well nourished, in no acute distress        Skin:  warm and dry to the touch, no apparent skin lesions or masses noted        Head:  normocephalic, no masses or lesions        Eyes:  pupils equal and round;conjunctivae and lids unremarkable;sclera white        ENT:  not assessed this visit        Neck:  not assessed this visit        Chest:  clear to auscultation        Cardiac: regular rhythm;normal S1 and S2;no S3 or S4;no murmurs, gallops or rubs " detected                  Abdomen:  abdomen soft obese      Vascular: pulses full and equal                                      Extremities and Back:  no deformities, clubbing, cyanosis, erythema observed        Neurological:  no gross motor deficits            PAST MEDICAL HISTORY:  Past Medical History:   Diagnosis Date     Atrial fibrillation (H)     s/p AVN ablation, BIV PPM     Congestive heart failure (H)     tachycardia-induced cardiomyopathy     Essential hypertension, benign      Hyperlipidemia LDL goal <100 10/31/2010     Hyponatremia 9/8/2009     Morbid obesity (H)      Open wound of foot except toe(s) alone, without mention of complication      Retinitis pigmentosa      Type 2 diabetes, HbA1C goal < 8% (H) 6/26/2012       PAST SURGICAL HISTORY:  Past Surgical History:   Procedure Laterality Date     CARDIOVERSION  9/2009, 10/2009     COLONOSCOPY N/A 5/9/2019    Procedure: COLONOSCOPY;  Surgeon: Godwin Santillan MD;  Location: [facility]     H ABLATION AV NODE  4/8/10     IMPLANT PACEMAKER  4/8/10    BIV PPM- Medtronic InSync III     TONSILLECTOMY      as kid     ZZC NONSPECIFIC PROCEDURE  06/2007    debritement       FAMILY HISTORY:  Family History   Problem Relation Age of Onset     Diabetes Mother      Hypertension Father      Diabetes Father      Hypertension Maternal Grandmother      Diabetes Maternal Grandmother      Diabetes Maternal Grandfather      Hypertension Maternal Grandfather      Cardiovascular Maternal Grandfather      Hypertension Paternal Grandfather      Cardiovascular Paternal Grandfather      Diabetes Sister      Hypertension Sister        SOCIAL HISTORY:  Social History     Socioeconomic History     Marital status: Single     Spouse name: None     Number of children: None     Years of education: None     Highest education level: None   Occupational History     Occupation: repair printer     Employer: [employer]   Tobacco Use     Smoking status: Never     Smokeless tobacco: Never    Substance and Sexual Activity     Alcohol use: No     Drug use: No     Sexual activity: Never   Other Topics Concern     Caffeine Concern Yes     Comment: occasionally soda     Special Diet Yes     Comment: low sodium, low fat, DM diet      Exercise Yes     Comment: walking, stairs

## 2023-06-20 ENCOUNTER — ANCILLARY PROCEDURE (OUTPATIENT)
Dept: CARDIOLOGY | Facility: CLINIC | Age: 63
End: 2023-06-20
Attending: INTERNAL MEDICINE
Payer: COMMERCIAL

## 2023-06-20 VITALS
DIASTOLIC BLOOD PRESSURE: 74 MMHG | HEART RATE: 72 BPM | WEIGHT: 315 LBS | HEIGHT: 72 IN | SYSTOLIC BLOOD PRESSURE: 132 MMHG | BODY MASS INDEX: 42.66 KG/M2

## 2023-06-20 DIAGNOSIS — Z95.0 CARDIAC PACEMAKER IN SITU: ICD-10-CM

## 2023-06-20 PROCEDURE — 99213 OFFICE O/P EST LOW 20 MIN: CPT | Performed by: PHYSICIAN ASSISTANT

## 2023-06-20 PROCEDURE — 93280 PM DEVICE PROGR EVAL DUAL: CPT | Performed by: INTERNAL MEDICINE

## 2023-06-20 PROCEDURE — 93000 ELECTROCARDIOGRAM COMPLETE: CPT | Performed by: PHYSICIAN ASSISTANT

## 2023-06-20 NOTE — LETTER
"6/20/2023    David Almendarez MD  [address] [facility] [address]    RE: Mckay Hsu       Dear Colleague,     I had the pleasure of seeing Mckay Hsu in the [facility].  [facility]    I had the pleasure of seeing Doyle when he came for annual follow-up.  This 63 year old had seen Dr. Holliday for his history of:    1. Permanent  AFib; tachycardia-induced CM (resolved), s/p AV node ablation and Medtronic CRT-P 4/2010 with gen change 11/29/2018. He previously failed amiodarone. Remains on warfarin for [id number] 2 (diabetes, hypertension)  2. Diabetes, on metformin and insulin.    3. Hypertension  4. NSVT - noted on PPM interrogations.   5. Retinitis Pigmentosa      Last Visit & Interval History:  I saw Doyle 6/2022 at which time he was doing \"OK\" from a CV standpoint, with lack of vision as his biggest issue.  He'd not had recurrent VT since 12/2021 and we agreed to hold off on a stress test unless recurrence was seen based on his preference (signfiicant difficulty getting to appts)    Today's Visit:  Overall feels things are going well heart-wise. No issues with dizziness, lightheadedness. Remains limited in activity d/t lack of sight.    No issues with orthopnea, PND. Notes swelling, worse when sitting. Denies CP, SOB.     BPs at home [id number] with HRs 70s    VITALS:  Vitals: /74   Pulse 72   Ht 1.816 m (5' 11.5\")   Wt 146.4 kg (322 lb 11.2 oz)   BMI 44.38 kg/m      Diagnostic Testing:  EKG today, which I overread, showed  70 with underlying AFIb  Device interrogation today, showed 100%  in VVIR 70/130. Underlying AFib/CHB with JE @ VVI 40. 6.5y battery. 7 beat run of NSVT @ 158 bpm @ 1616  Echocardiogram 7/2021 LVEF 55-60%.  Borderline concentric LVH.  No RWMA.  No sig valve abnormalities  Echocardiogram 6/2017-EF 55-60%.  No RWMA. Trivial AoScl.  1+ TR  Stress Testing 8/2009-no ischemia/infarction    Plan:  1. Annual follow-up with PPM " interrogation    Assessment/Plan:    Permanent AFib  S/p AVN ablation and Medtronic CRT-P, with gen change 11/2018  CHADSVASc 2 (HTN, DM) and remains on wafarin    PLAN:  Continue routine device checks  Annual follow-up to cut down on visits (transportation issues given blindness)    NSVT  Noted on PPM interrogations 2019, 6/2021 and 12/2021 and 6/2023 - 7 beats)  On metoprolol Xl 100  LVEF on echo 2021 wnl    PLAN:  Continue BB therapy  Routine device interrogations      Jane Caruso PA-C, MSPAS      Orders Placed This Encounter   Procedures    EKG 12-lead complete w/read - Clinics (performed today)     No orders of the defined types were placed in this encounter.    There are no discontinued medications.      Encounter Diagnosis   Name Primary?    Cardiac pacemaker in situ        CURRENT MEDICATIONS:  Current Outpatient Medications   Medication Sig Dispense Refill    alcohol swab prep pads Use to swab area of injection/lily as directed. 100 each 3    amLODIPine (NORVASC) 10 MG tablet TAKE 1 TABLET(10 MG) BY MOUTH DAILY 90 tablet 3    blood glucose (NO BRAND SPECIFIED) test strip Use to test blood sugar 3 times daily or as directed. To accompany: ACCU-CHEK GUIDE ME  Please dispense 90 days if able, at least 30 days minimum 300 strip 3    blood glucose calibration (NO BRAND SPECIFIED) solution To accompany: ACCU-CHEK GUIDE ME 1 Bottle 3    blood glucose monitoring (NO BRAND SPECIFIED) meter device kit Use to test blood sugar 3 times daily or as directed. Preferred blood glucose meter OR supplies to accompany: ACCU-CHEK GUIDE ME 1 kit 0    fish oil-omega-3 fatty acids 1000 MG capsule Take 4 capsules by mouth daily.      insulin aspart (NOVOLOG FLEXPEN) 100 UNIT/ML pen INJECT 1 UNIT SUBCUTANEOUSLY PER 7 GRAMS OF CARBS 3 TIMES DAILY BEFORE MEALS. MAX 60 UNITS PER DAY 60 mL 3    insulin glargine (LANTUS SOLOSTAR) 100 UNIT/ML pen ADMINISTER 60 UNITS UNDER THE SKIN AT BEDTIME 60 mL 5    insulin pen needle (B-D U/F) 31G X  "5 MM miscellaneous Use 4 pen needles daily or as directed. 400 each 0    losartan (COZAAR) 100 MG tablet Take 1 tablet (100 mg) by mouth daily 90 tablet 3    metFORMIN (GLUCOPHAGE) 1000 MG tablet Take 1 tablet (1,000 mg) by mouth 2 times daily (with meals) 180 tablet 3    metoprolol succinate ER (TOPROL XL) 100 MG 24 hr tablet Take 1 tablet (100 mg) by mouth daily 90 tablet 3    MULTIVITAMIN OR 1 qd      NITROSTAT 0.4 MG SL SUBL 1 TAB EVERY 5 MIN AS NEEDED, UP TO 3 PER EPISODE      simvastatin (ZOCOR) 80 MG tablet TAKE ONE-HALF TABLET( 40 MG) BY MOUTH AT BEDTIME 90 tablet 3    thin (NO BRAND SPECIFIED) lancets Use to test blood sugar 4 times daily or as directed. 100 each 6    warfarin ANTICOAGULANT (COUMADIN) 7.5 MG tablet TAKE 1 TABLET BY MOUTH DAILY. EXCEPT ONE-HALF TABLET ON TUESDAY/ FRIDAY AS DIRECTED BY COAGULATION CLINIC 90 tablet 1       ALLERGIES   No Known Allergies      Review of Systems:  Skin:  Negative     Eyes:  Positive for glasses;visual blurring  ENT:  Negative    Respiratory:  Negative for dyspnea on exertion;shortness of breath;cough  Cardiovascular:  Negative for;palpitations;chest pain;dizziness;lightheadedness;fatigue Positive for;exercise intolerance;edema  Gastroenterology: Negative melena;hematochezia  Genitourinary:  Negative hematuria  Musculoskeletal:  Positive for arthritis  Neurologic:  Negative stroke  Psychiatric:  Negative    Heme/Lymph/Imm:  Negative bleeding disorder  Endocrine:  Positive for diabetes    Physical Exam:  Vitals: /74   Pulse 72   Ht 1.816 m (5' 11.5\")   Wt 146.4 kg (322 lb 11.2 oz)   BMI 44.38 kg/m      Constitutional:  cooperative, alert and oriented, well developed, well nourished, in no acute distress        Skin:  warm and dry to the touch, no apparent skin lesions or masses noted        Head:  normocephalic, no masses or lesions        Eyes:  pupils equal and round;conjunctivae and lids unremarkable;sclera white        ENT:  not assessed this visit    "     Neck:  not assessed this visit        Chest:  clear to auscultation        Cardiac: regular rhythm;normal S1 and S2;no S3 or S4;no murmurs, gallops or rubs detected                  Abdomen:  abdomen soft obese      Vascular: pulses full and equal                                      Extremities and Back:  no deformities, clubbing, cyanosis, erythema observed        Neurological:  no gross motor deficits           PAST MEDICAL HISTORY:  Past Medical History:   Diagnosis Date    Atrial fibrillation (H)     s/p AVN ablation, BIV PPM    Congestive heart failure (H)     tachycardia-induced cardiomyopathy    Essential hypertension, benign     Hyperlipidemia LDL goal <100 10/31/2010    Hyponatremia 9/8/2009    Morbid obesity (H)     Open wound of foot except toe(s) alone, without mention of complication     Retinitis pigmentosa     Type 2 diabetes, HbA1C goal < 8% (H) 6/26/2012       PAST SURGICAL HISTORY:  Past Surgical History:   Procedure Laterality Date    CARDIOVERSION  9/2009, 10/2009    COLONOSCOPY N/A 5/9/2019    Procedure: COLONOSCOPY;  Surgeon: Godwin Santillan MD;  Location: [facility]    H ABLATION AV NODE  4/8/10    IMPLANT PACEMAKER  4/8/10    BIV PPM- Medtronic InSync III    TONSILLECTOMY      as kid    Z NONSPECIFIC PROCEDURE  06/2007    debritement       FAMILY HISTORY:  Family History   Problem Relation Age of Onset    Diabetes Mother     Hypertension Father     Diabetes Father     Hypertension Maternal Grandmother     Diabetes Maternal Grandmother     Diabetes Maternal Grandfather     Hypertension Maternal Grandfather     Cardiovascular Maternal Grandfather     Hypertension Paternal Grandfather     Cardiovascular Paternal Grandfather     Diabetes Sister     Hypertension Sister        SOCIAL HISTORY:  Social History     Socioeconomic History    Marital status: Single     Spouse name: None    Number of children: None    Years of education: None    Highest education level: None   Occupational History     Occupation: repair printer     Employer: [employer] part of the [employer]   Tobacco Use    Smoking status: Never    Smokeless tobacco: Never   Substance and Sexual Activity    Alcohol use: No    Drug use: No    Sexual activity: Never   Other Topics Concern    Caffeine Concern Yes     Comment: occasionally soda    Special Diet Yes     Comment: low sodium, low fat, DM diet     Exercise Yes     Comment: walking, stairs               Thank you for allowing me to participate in the care of your patient.      Sincerely,     Peggy Caruso PA-C     [facility]  cc:   Dane Culp MD  [address]  [place] [address]

## 2023-06-20 NOTE — PATIENT INSTRUCTIONS
Doyle - it was good to see you today!    Pacer check today looked good! Very short run of funny rhythm from bottom of heart (VT) - only 7 beats  BPs at home look OK!  EKG today looked fine.    PLAN:  Annual follow-up with in-office device check.  CALL if issues prior! [phone]

## 2023-06-22 LAB
MDC_IDC_EPISODE_DTM: NORMAL
MDC_IDC_EPISODE_DURATION: 0 S
MDC_IDC_EPISODE_DURATION: 23 S
MDC_IDC_EPISODE_DURATION: 4 S
MDC_IDC_EPISODE_DURATION: 56 S
MDC_IDC_EPISODE_DURATION: 59 S
MDC_IDC_EPISODE_DURATION: 7 S
MDC_IDC_EPISODE_DURATION: 8 S
MDC_IDC_EPISODE_ID: 17
MDC_IDC_EPISODE_ID: 18
MDC_IDC_EPISODE_ID: 19
MDC_IDC_EPISODE_ID: 20
MDC_IDC_EPISODE_ID: 21
MDC_IDC_EPISODE_ID: 22
MDC_IDC_EPISODE_ID: 8
MDC_IDC_EPISODE_TYPE: NORMAL
MDC_IDC_LEAD_IMPLANT_DT: NORMAL
MDC_IDC_LEAD_IMPLANT_DT: NORMAL
MDC_IDC_LEAD_LOCATION: NORMAL
MDC_IDC_LEAD_LOCATION: NORMAL
MDC_IDC_LEAD_LOCATION_DETAIL_1: NORMAL
MDC_IDC_LEAD_LOCATION_DETAIL_1: NORMAL
MDC_IDC_LEAD_MFG: NORMAL
MDC_IDC_LEAD_MFG: NORMAL
MDC_IDC_LEAD_MODEL: NORMAL
MDC_IDC_LEAD_MODEL: NORMAL
MDC_IDC_LEAD_POLARITY_TYPE: NORMAL
MDC_IDC_LEAD_POLARITY_TYPE: NORMAL
MDC_IDC_LEAD_SERIAL: NORMAL
MDC_IDC_LEAD_SERIAL: NORMAL
MDC_IDC_MSMT_BATTERY_DTM: NORMAL
MDC_IDC_MSMT_BATTERY_REMAINING_LONGEVITY: 81 MO
MDC_IDC_MSMT_BATTERY_RRT_TRIGGER: 2.6
MDC_IDC_MSMT_BATTERY_STATUS: NORMAL
MDC_IDC_MSMT_BATTERY_VOLTAGE: 2.99 V
MDC_IDC_MSMT_LEADCHNL_LV_IMPEDANCE_VALUE: 304 OHM
MDC_IDC_MSMT_LEADCHNL_LV_IMPEDANCE_VALUE: 418 OHM
MDC_IDC_MSMT_LEADCHNL_LV_IMPEDANCE_VALUE: 684 OHM
MDC_IDC_MSMT_LEADCHNL_LV_IMPEDANCE_VALUE: 760 OHM
MDC_IDC_MSMT_LEADCHNL_LV_IMPEDANCE_VALUE: 893 OHM
MDC_IDC_MSMT_LEADCHNL_RA_IMPEDANCE_VALUE: 3401 OHM
MDC_IDC_MSMT_LEADCHNL_RA_IMPEDANCE_VALUE: 3401 OHM
MDC_IDC_MSMT_LEADCHNL_RV_IMPEDANCE_VALUE: 361 OHM
MDC_IDC_MSMT_LEADCHNL_RV_IMPEDANCE_VALUE: 418 OHM
MDC_IDC_MSMT_LEADCHNL_RV_PACING_THRESHOLD_AMPLITUDE: 0.62 V
MDC_IDC_MSMT_LEADCHNL_RV_PACING_THRESHOLD_PULSEWIDTH: 0.4 MS
MDC_IDC_MSMT_LEADCHNL_RV_SENSING_INTR_AMPL: 15.88 MV
MDC_IDC_MSMT_LEADCHNL_RV_SENSING_INTR_AMPL: 6.88 MV
MDC_IDC_PG_IMPLANT_DTM: NORMAL
MDC_IDC_PG_MFG: NORMAL
MDC_IDC_PG_MODEL: NORMAL
MDC_IDC_PG_SERIAL: NORMAL
MDC_IDC_PG_TYPE: NORMAL
MDC_IDC_SESS_CLINIC_NAME: NORMAL
MDC_IDC_SESS_DTM: NORMAL
MDC_IDC_SESS_TYPE: NORMAL
MDC_IDC_SET_BRADY_LOWRATE: 70 {BEATS}/MIN
MDC_IDC_SET_BRADY_MAX_SENSOR_RATE: 130 {BEATS}/MIN
MDC_IDC_SET_BRADY_MODE: NORMAL
MDC_IDC_SET_CRT_LVRV_DELAY: 0 MS
MDC_IDC_SET_CRT_PACED_CHAMBERS: NORMAL
MDC_IDC_SET_LEADCHNL_LV_PACING_AMPLITUDE: 2 V
MDC_IDC_SET_LEADCHNL_LV_PACING_ANODE_ELECTRODE_1: NORMAL
MDC_IDC_SET_LEADCHNL_LV_PACING_ANODE_LOCATION_1: NORMAL
MDC_IDC_SET_LEADCHNL_LV_PACING_CAPTURE_MODE: NORMAL
MDC_IDC_SET_LEADCHNL_LV_PACING_CATHODE_ELECTRODE_1: NORMAL
MDC_IDC_SET_LEADCHNL_LV_PACING_CATHODE_LOCATION_1: NORMAL
MDC_IDC_SET_LEADCHNL_LV_PACING_POLARITY: NORMAL
MDC_IDC_SET_LEADCHNL_LV_PACING_PULSEWIDTH: 0.5 MS
MDC_IDC_SET_LEADCHNL_RA_SENSING_ANODE_ELECTRODE_1: NORMAL
MDC_IDC_SET_LEADCHNL_RA_SENSING_ANODE_LOCATION_1: NORMAL
MDC_IDC_SET_LEADCHNL_RA_SENSING_CATHODE_ELECTRODE_1: NORMAL
MDC_IDC_SET_LEADCHNL_RA_SENSING_CATHODE_LOCATION_1: NORMAL
MDC_IDC_SET_LEADCHNL_RA_SENSING_POLARITY: NORMAL
MDC_IDC_SET_LEADCHNL_RA_SENSING_SENSITIVITY: NORMAL
MDC_IDC_SET_LEADCHNL_RV_PACING_AMPLITUDE: 2 V
MDC_IDC_SET_LEADCHNL_RV_PACING_ANODE_ELECTRODE_1: NORMAL
MDC_IDC_SET_LEADCHNL_RV_PACING_ANODE_LOCATION_1: NORMAL
MDC_IDC_SET_LEADCHNL_RV_PACING_CAPTURE_MODE: NORMAL
MDC_IDC_SET_LEADCHNL_RV_PACING_CATHODE_ELECTRODE_1: NORMAL
MDC_IDC_SET_LEADCHNL_RV_PACING_CATHODE_LOCATION_1: NORMAL
MDC_IDC_SET_LEADCHNL_RV_PACING_POLARITY: NORMAL
MDC_IDC_SET_LEADCHNL_RV_PACING_PULSEWIDTH: 0.4 MS
MDC_IDC_SET_LEADCHNL_RV_SENSING_ANODE_ELECTRODE_1: NORMAL
MDC_IDC_SET_LEADCHNL_RV_SENSING_ANODE_LOCATION_1: NORMAL
MDC_IDC_SET_LEADCHNL_RV_SENSING_CATHODE_ELECTRODE_1: NORMAL
MDC_IDC_SET_LEADCHNL_RV_SENSING_CATHODE_LOCATION_1: NORMAL
MDC_IDC_SET_LEADCHNL_RV_SENSING_POLARITY: NORMAL
MDC_IDC_SET_LEADCHNL_RV_SENSING_SENSITIVITY: 0.9 MV
MDC_IDC_SET_ZONE_DETECTION_INTERVAL: 400 MS
MDC_IDC_SET_ZONE_TYPE: NORMAL
MDC_IDC_STAT_BRADY_AP_VP_PERCENT: 0 %
MDC_IDC_STAT_BRADY_AP_VS_PERCENT: 0 %
MDC_IDC_STAT_BRADY_AS_VP_PERCENT: 99.98 %
MDC_IDC_STAT_BRADY_AS_VS_PERCENT: 0.02 %
MDC_IDC_STAT_BRADY_DTM_END: NORMAL
MDC_IDC_STAT_BRADY_DTM_START: NORMAL
MDC_IDC_STAT_BRADY_RA_PERCENT_PACED: 0 %
MDC_IDC_STAT_BRADY_RV_PERCENT_PACED: 99.98 %
MDC_IDC_STAT_CRT_DTM_END: NORMAL
MDC_IDC_STAT_CRT_DTM_START: NORMAL
MDC_IDC_STAT_CRT_LV_PERCENT_PACED: 99.95 %
MDC_IDC_STAT_CRT_PERCENT_PACED: 99.95 %
MDC_IDC_STAT_EPISODE_RECENT_COUNT: 0
MDC_IDC_STAT_EPISODE_RECENT_COUNT: 0
MDC_IDC_STAT_EPISODE_RECENT_COUNT: 3
MDC_IDC_STAT_EPISODE_RECENT_COUNT_DTM_END: NORMAL
MDC_IDC_STAT_EPISODE_RECENT_COUNT_DTM_START: NORMAL
MDC_IDC_STAT_EPISODE_TOTAL_COUNT: 0
MDC_IDC_STAT_EPISODE_TOTAL_COUNT: 0
MDC_IDC_STAT_EPISODE_TOTAL_COUNT: 8
MDC_IDC_STAT_EPISODE_TOTAL_COUNT_DTM_END: NORMAL
MDC_IDC_STAT_EPISODE_TOTAL_COUNT_DTM_START: NORMAL
MDC_IDC_STAT_EPISODE_TYPE: NORMAL

## 2023-07-18 ENCOUNTER — LAB (OUTPATIENT)
Dept: LAB | Facility: CLINIC | Age: 63
End: 2023-07-18
Payer: COMMERCIAL

## 2023-07-18 ENCOUNTER — ANTICOAGULATION THERAPY VISIT (OUTPATIENT)
Dept: ANTICOAGULATION | Facility: CLINIC | Age: 63
End: 2023-07-18

## 2023-07-18 DIAGNOSIS — I48.20 CHRONIC ATRIAL FIBRILLATION (H): ICD-10-CM

## 2023-07-18 DIAGNOSIS — Z79.01 LONG TERM CURRENT USE OF ANTICOAGULANT THERAPY: ICD-10-CM

## 2023-07-18 DIAGNOSIS — I48.20 CHRONIC ATRIAL FIBRILLATION (H): Primary | ICD-10-CM

## 2023-07-18 LAB — INR BLD: 2.8 (ref 0.9–1.1)

## 2023-07-18 PROCEDURE — 36416 COLLJ CAPILLARY BLOOD SPEC: CPT

## 2023-07-18 PROCEDURE — 85610 PROTHROMBIN TIME: CPT

## 2023-07-18 NOTE — PROGRESS NOTES
ANTICOAGULATION MANAGEMENT     Mckay A Shadi 63 year old male is on warfarin with therapeutic INR result. (Goal INR 2.0-3.0)    Recent labs: (last 7 days)     07/18/23  1247   INR 2.8*       ASSESSMENT       Source(s): Chart Review and Patient/Caregiver Call       Warfarin doses taken: Warfarin taken as instructed    Diet: No new diet changes identified    Medication/supplement changes: None noted    New illness, injury, or hospitalization: No    Signs or symptoms of bleeding or clotting: No    Previous result: Therapeutic last 2(+) visits    Additional findings: None         PLAN     Recommended plan for no diet, medication or health factor changes affecting INR     Dosing Instructions: Continue your current warfarin dose with next INR in 6 weeks       Summary  As of 7/18/2023    Full warfarin instructions:  3.75 mg every Tue, Fri; 7.5 mg all other days   Next INR check:  8/29/2023             Telephone call with Dolye who verbalizes understanding and agrees to plan    Lab visit scheduled    Education provided:     Please call back if any changes to your diet, medications or how you've been taking warfarin    Contact [phone]  with any changes, questions or concerns.     Plan made per [facility] anticoagulation protocol    Pastora Valenzuela RN  Anticoagulation Clinic  7/18/2023    _______________________________________________________________________     Anticoagulation Episode Summary     Current INR goal:  2.0-3.0   TTR:  90.9 % (1 y)   Target end date:  Indefinite   Send INR reminders to:  [facility]    Indications    Chronic atrial fibrillation (H) [I48.20]  Long-term (current) use of anticoagulants [Z79.01] [Z79.01]           Comments:           Anticoagulation Care Providers     Provider Role Specialty Phone number    David Almendarez MD Referring Internal Medicine [phone]

## 2023-07-25 ENCOUNTER — TRANSFERRED RECORDS (OUTPATIENT)
Dept: HEALTH INFORMATION MANAGEMENT | Facility: CLINIC | Age: 63
End: 2023-07-25
Payer: COMMERCIAL

## 2023-07-25 LAB — RETINOPATHY: NEGATIVE

## 2023-08-01 ENCOUNTER — TRANSFERRED RECORDS (OUTPATIENT)
Dept: HEALTH INFORMATION MANAGEMENT | Facility: CLINIC | Age: 63
End: 2023-08-01

## 2023-08-22 ENCOUNTER — OFFICE VISIT (OUTPATIENT)
Dept: INTERNAL MEDICINE | Facility: CLINIC | Age: 63
End: 2023-08-22
Payer: COMMERCIAL

## 2023-08-22 VITALS
OXYGEN SATURATION: 98 % | HEIGHT: 72 IN | HEART RATE: 83 BPM | TEMPERATURE: 97.6 F | DIASTOLIC BLOOD PRESSURE: 76 MMHG | BODY MASS INDEX: 42.66 KG/M2 | WEIGHT: 315 LBS | RESPIRATION RATE: 17 BRPM | SYSTOLIC BLOOD PRESSURE: 128 MMHG

## 2023-08-22 DIAGNOSIS — E78.5 HYPERLIPIDEMIA LDL GOAL <100: ICD-10-CM

## 2023-08-22 DIAGNOSIS — Z12.5 SCREENING PSA (PROSTATE SPECIFIC ANTIGEN): ICD-10-CM

## 2023-08-22 DIAGNOSIS — Z79.4 TYPE 2 DIABETES MELLITUS WITHOUT COMPLICATION, WITH LONG-TERM CURRENT USE OF INSULIN (H): Primary | ICD-10-CM

## 2023-08-22 DIAGNOSIS — I10 ESSENTIAL HYPERTENSION, BENIGN: ICD-10-CM

## 2023-08-22 DIAGNOSIS — E11.9 TYPE 2 DIABETES MELLITUS WITHOUT COMPLICATION, WITH LONG-TERM CURRENT USE OF INSULIN (H): Primary | ICD-10-CM

## 2023-08-22 PROCEDURE — 99214 OFFICE O/P EST MOD 30 MIN: CPT | Performed by: INTERNAL MEDICINE

## 2023-08-22 RX ORDER — INSULIN GLARGINE 100 [IU]/ML
INJECTION, SOLUTION SUBCUTANEOUS
Qty: 60 ML | Refills: 5 | Status: SHIPPED | OUTPATIENT
Start: 2023-08-22 | End: 2023-09-07

## 2023-08-22 NOTE — PROGRESS NOTES
Assessment & Plan     Type 2 diabetes mellitus without complication, with long-term current use of insulin (H)  Increase Lantus to 65 units, follow-up with me in 2 to 3 weeks with blood sugar results for dosage adjustment  - Hemoglobin A1c; Future  - insulin glargine (LANTUS SOLOSTAR) 100 UNIT/ML pen; ADMINISTER 65 UNITS UNDER THE SKIN AT BEDTIME    Essential hypertension, benign  Stable on therapy and continue with current medical management  Order added for complete metabolic panel.    Hyperlipidemia LDL goal <100  LDL Cholesterol Calculated   Date Value Ref Range Status   06/06/2023 62 <=100 mg/dL Final   01/26/2021 40 <100 mg/dL Final     Comment:     Desirable:       <100 mg/dl   Stable at goal on therapy.  Continue with current medical management    Screening PSA (prostate specific antigen)  It as routine screening as part of routine lab work  - PSA, screen; Future    Recommended routine vaccine update inclusive of shingles and COVID.     See Patient Instructions    David Almendarez MD  [facility]    Joey Kwon is a 63 year old, presenting for the following health issues:  Diabetes      HPI     Follow up 6/7/23 A1c results   Has been working more aggressively on his diet as well as exercise as tolerated.  He has tried to limit his carbohydrate intake.  His A1c was slightly higher than his baseline as most recent check and reviewed.  He does not identify any obvious precipitating cause for such.  He has been compliant with therapy.    Unfortunately he has not always followed through with getting routine lab updates.    Diabetes Follow-up    How often are you checking your blood sugar? Two times daily  Blood sugar testing frequency justification:  Uncontrolled diabetes  What time of day are you checking your blood sugars (select all that apply)?  Before meals  Have you had any blood sugars above 200?  No  Have you had any blood sugars below 70?  No  What symptoms do you  "notice when your blood sugar is low?  Shaky and Other: sweaty   What concerns do you have today about your diabetes? Other: A1c, patient noticed that novolog was switched to generic form and is questioning if higher blood sugars may be related to this change    Do you have any of these symptoms? (Select all that apply)  No numbness or tingling in feet.  No redness, sores or blisters on feet.  No complaints of excessive thirst.  No reports of blurry vision.  No significant changes to weight.      BP Readings from Last 2 Encounters:   23 128/76   23 132/74     Hemoglobin A1C (%)   Date Value   2023 8.4 (H)   2022 6.8 (H)   2021 6.9 (H)   2020 6.2 (H)     LDL Cholesterol Calculated (mg/dL)   Date Value   2023 62   2022 36   2021 40   10/22/2019 40       Review of Systems   CONSTITUTIONAL: NEGATIVE for fever, chills, change in weight  EYES: NEGATIVE for vision changes or irritation  ENT/MOUTH: NEGATIVE for ear, mouth and throat problems  RESP: NEGATIVE for significant cough or SOB  CV: NEGATIVE for chest pain, palpitations or peripheral edema  GI: NEGATIVE for nausea, abdominal pain, heartburn, or change in bowel habits  : NEGATIVE for frequency, dysuria, or hematuria  MUSCULOSKELETAL: NEGATIVE for significant arthralgias or myalgia  NEURO: NEGATIVE for weakness, dizziness or paresthesias  HEME: NEGATIVE for bleeding problems  PSYCHIATRIC: NEGATIVE for changes in mood or affect      Objective    /76   Pulse 83   Temp 97.6  F (36.4  C) (Temporal)   Resp 17   Ht 1.816 m (5' 11.5\")   Wt 146.2 kg (322 lb 6.4 oz)   SpO2 98%   BMI 44.34 kg/m    Body mass index is 44.34 kg/m .  Physical Exam   GENERAL: alert and no distress  EYES: Eyes grossly normal to inspection, PERRL and conjunctivae and sclerae normal,  VA OU  HENT: ear canals and TM's normal, nose and mouth without ulcers or lesions  NECK: no adenopathy, no asymmetry, masses, or scars and " thyroid normal to palpation  RESP: lungs clear to auscultation - no rales, rhonchi or wheezes  CV: regular rate and rhythm, normal S1 S2, no S3 or S4, no murmur, click or rub  ABDOMEN: obese  MS: no gross musculoskeletal defects noted  NEURO: No focal changes  PSYCH: mentation appears normal, affect normal/bright    PSA   Date Value Ref Range Status   01/26/2021 0.50 0 - 4 ug/L Final     Comment:     Assay Method:  Chemiluminescence using Siemens Vista analyzer     Prostate Specific Antigen Screen   Date Value Ref Range Status   04/05/2022 0.40 0.00 - 4.00 ug/L Final       Lab Results   Component Value Date    A1C 8.4 06/06/2023    A1C 6.8 08/23/2022    A1C 6.6 01/18/2022    A1C 6.9 01/26/2021    A1C 6.2 02/03/2020    A1C 6.1 10/22/2019    A1C 6.4 03/06/2019    A1C 6.4 06/11/2018     LDL Cholesterol Calculated   Date Value Ref Range Status   06/06/2023 62 <=100 mg/dL Final   01/26/2021 40 <100 mg/dL Final     Comment:     Desirable:       <100 mg/dl     Lab Results   Component Value Date    ALT 18 06/06/2023    ALT 25 01/26/2021     Creatinine   Date Value Ref Range Status   04/05/2022 1.17 0.66 - 1.25 mg/dL Final   01/26/2021 1.03 0.66 - 1.25 mg/dL Final

## 2023-08-29 ENCOUNTER — LAB (OUTPATIENT)
Dept: LAB | Facility: CLINIC | Age: 63
End: 2023-08-29
Payer: COMMERCIAL

## 2023-08-29 ENCOUNTER — ANTICOAGULATION THERAPY VISIT (OUTPATIENT)
Dept: ANTICOAGULATION | Facility: CLINIC | Age: 63
End: 2023-08-29

## 2023-08-29 DIAGNOSIS — Z79.01 LONG TERM CURRENT USE OF ANTICOAGULANT THERAPY: ICD-10-CM

## 2023-08-29 DIAGNOSIS — I48.20 CHRONIC ATRIAL FIBRILLATION (H): ICD-10-CM

## 2023-08-29 DIAGNOSIS — I48.20 CHRONIC ATRIAL FIBRILLATION (H): Primary | ICD-10-CM

## 2023-08-29 LAB — INR BLD: 2.7 (ref 0.9–1.1)

## 2023-08-29 PROCEDURE — 36416 COLLJ CAPILLARY BLOOD SPEC: CPT

## 2023-08-29 PROCEDURE — 85610 PROTHROMBIN TIME: CPT

## 2023-08-29 NOTE — PROGRESS NOTES
ANTICOAGULATION MANAGEMENT     Mckay EMILY Shadi 63 year old male is on warfarin with therapeutic INR result. (Goal INR 2.0-3.0)    Recent labs: (last 7 days)     08/29/23  1252   INR 2.7*       ASSESSMENT     Source(s): Chart Review and Patient/Caregiver Call     Warfarin doses taken: Warfarin taken as instructed  Diet: No new diet changes identified  Medication/supplement changes: None noted  New illness, injury, or hospitalization: No  Signs or symptoms of bleeding or clotting: No  Previous result: Therapeutic last 2(+) visits  Additional findings: None       PLAN     Recommended plan for no diet, medication or health factor changes affecting INR     Dosing Instructions: Continue your current warfarin dose with next INR in 6 weeks       Summary  As of 8/29/2023      Full warfarin instructions:  3.75 mg every Tue, Fri; 7.5 mg all other days   Next INR check:  10/10/2023               Telephone call with Doyle who verbalizes understanding and agrees to plan    Lab visit scheduled    Education provided:   Please call back if any changes to your diet, medications or how you've been taking warfarin    Plan made per [facility] anticoagulation protocol    Gigi Castillo RN  Anticoagulation Clinic  8/29/2023    _______________________________________________________________________     Anticoagulation Episode Summary       Current INR goal:  2.0-3.0   TTR:  90.9 % (1 y)   Target end date:  Indefinite   [facility]R reminders [facility]    Indications    Chronic atrial fibrillation (H) [I48.20]  Long-term (current) use of anticoagulants [Z79.01] [Z79.01]             Comments:               Anticoagulation Care Providers       Provider Role Specialty Phone number    David Almendarez MD Referring Internal Medicine [phone]

## 2023-09-07 ENCOUNTER — TELEPHONE (OUTPATIENT)
Dept: INTERNAL MEDICINE | Facility: CLINIC | Age: 63
End: 2023-09-07
Payer: COMMERCIAL

## 2023-09-07 DIAGNOSIS — E11.9 TYPE 2 DIABETES MELLITUS WITHOUT COMPLICATION, WITH LONG-TERM CURRENT USE OF INSULIN (H): ICD-10-CM

## 2023-09-07 DIAGNOSIS — Z79.4 TYPE 2 DIABETES MELLITUS WITHOUT COMPLICATION, WITH LONG-TERM CURRENT USE OF INSULIN (H): ICD-10-CM

## 2023-09-07 RX ORDER — INSULIN GLARGINE 100 [IU]/ML
INJECTION, SOLUTION SUBCUTANEOUS
Qty: 60 ML | Refills: 5
Start: 2023-09-07 | End: 2023-11-06

## 2023-09-07 NOTE — TELEPHONE ENCOUNTER
FYI - Status Update    Who is Calling: Patient    Update: Sugar readings 14-day average was 161, & 7-day average was 156    Does caller want a call/response back: Yes     Okay to leave a detailed message?: Yes at Cell number on file:    Telephone Information:   Mobile [phone]

## 2023-09-07 NOTE — TELEPHONE ENCOUNTER
Called and spoke with pt. Pt stated no symptoms with these readings.     Pt stated PCP wanted an update since they made some changes. Pt stated the BG levels came down since those changes. BG readings- 14-day average was 161, & 7-day average was 156.     Routing to PCP to advise if further action is needed.     Please call pt back with PCPs recommendations.

## 2023-09-07 NOTE — TELEPHONE ENCOUNTER
Provider Response to 2nd Level Triage Request    I have reviewed the RN documentation. My recommendation is:  Increasing long-acting Lantus to 67 units and follow-up with me in 2 to 3 weeks with similar blood sugar results

## 2023-09-08 NOTE — TELEPHONE ENCOUNTER
Relayed providers message to patient. Patient verbalized understanding and agreement, has no further questions at this time.

## 2023-09-22 ENCOUNTER — ANCILLARY PROCEDURE (OUTPATIENT)
Dept: CARDIOLOGY | Facility: CLINIC | Age: 63
End: 2023-09-22
Attending: INTERNAL MEDICINE
Payer: COMMERCIAL

## 2023-09-22 DIAGNOSIS — I44.2 ATRIOVENTRICULAR BLOCK, COMPLETE (H): ICD-10-CM

## 2023-09-22 DIAGNOSIS — Z95.0 CARDIAC PACEMAKER IN SITU: Primary | ICD-10-CM

## 2023-09-22 DIAGNOSIS — Z95.810 ICD (IMPLANTABLE CARDIOVERTER-DEFIBRILLATOR), BIVENTRICULAR, IN SITU: ICD-10-CM

## 2023-09-22 DIAGNOSIS — Z95.0 CARDIAC PACEMAKER IN SITU: ICD-10-CM

## 2023-09-22 LAB
MDC_IDC_LEAD_IMPLANT_DT: NORMAL
MDC_IDC_LEAD_IMPLANT_DT: NORMAL
MDC_IDC_LEAD_LOCATION: NORMAL
MDC_IDC_LEAD_LOCATION: NORMAL
MDC_IDC_LEAD_LOCATION_DETAIL_1: NORMAL
MDC_IDC_LEAD_LOCATION_DETAIL_1: NORMAL
MDC_IDC_LEAD_MFG: NORMAL
MDC_IDC_LEAD_MFG: NORMAL
MDC_IDC_LEAD_MODEL: NORMAL
MDC_IDC_LEAD_MODEL: NORMAL
MDC_IDC_LEAD_POLARITY_TYPE: NORMAL
MDC_IDC_LEAD_POLARITY_TYPE: NORMAL
MDC_IDC_LEAD_SERIAL: NORMAL
MDC_IDC_LEAD_SERIAL: NORMAL
MDC_IDC_MSMT_BATTERY_DTM: NORMAL
MDC_IDC_MSMT_BATTERY_REMAINING_LONGEVITY: 76 MO
MDC_IDC_MSMT_BATTERY_RRT_TRIGGER: 2.6
MDC_IDC_MSMT_BATTERY_STATUS: NORMAL
MDC_IDC_MSMT_BATTERY_VOLTAGE: 2.98 V
MDC_IDC_MSMT_LEADCHNL_LV_IMPEDANCE_VALUE: 266 OHM
MDC_IDC_MSMT_LEADCHNL_LV_IMPEDANCE_VALUE: 361 OHM
MDC_IDC_MSMT_LEADCHNL_LV_IMPEDANCE_VALUE: 589 OHM
MDC_IDC_MSMT_LEADCHNL_LV_IMPEDANCE_VALUE: 646 OHM
MDC_IDC_MSMT_LEADCHNL_LV_IMPEDANCE_VALUE: 779 OHM
MDC_IDC_MSMT_LEADCHNL_RA_IMPEDANCE_VALUE: 3401 OHM
MDC_IDC_MSMT_LEADCHNL_RA_IMPEDANCE_VALUE: 3401 OHM
MDC_IDC_MSMT_LEADCHNL_RV_IMPEDANCE_VALUE: 323 OHM
MDC_IDC_MSMT_LEADCHNL_RV_IMPEDANCE_VALUE: 361 OHM
MDC_IDC_MSMT_LEADCHNL_RV_PACING_THRESHOLD_AMPLITUDE: 0.62 V
MDC_IDC_MSMT_LEADCHNL_RV_PACING_THRESHOLD_PULSEWIDTH: 0.4 MS
MDC_IDC_MSMT_LEADCHNL_RV_SENSING_INTR_AMPL: 4.62 MV
MDC_IDC_MSMT_LEADCHNL_RV_SENSING_INTR_AMPL: 4.62 MV
MDC_IDC_PG_IMPLANT_DTM: NORMAL
MDC_IDC_PG_MFG: NORMAL
MDC_IDC_PG_MODEL: NORMAL
MDC_IDC_PG_SERIAL: NORMAL
MDC_IDC_PG_TYPE: NORMAL
MDC_IDC_SESS_CLINIC_NAME: NORMAL
MDC_IDC_SESS_DTM: NORMAL
MDC_IDC_SESS_TYPE: NORMAL
MDC_IDC_SET_BRADY_LOWRATE: 70 {BEATS}/MIN
MDC_IDC_SET_BRADY_MAX_SENSOR_RATE: 130 {BEATS}/MIN
MDC_IDC_SET_BRADY_MODE: NORMAL
MDC_IDC_SET_CRT_LVRV_DELAY: 0 MS
MDC_IDC_SET_CRT_PACED_CHAMBERS: NORMAL
MDC_IDC_SET_LEADCHNL_LV_PACING_AMPLITUDE: 2 V
MDC_IDC_SET_LEADCHNL_LV_PACING_ANODE_ELECTRODE_1: NORMAL
MDC_IDC_SET_LEADCHNL_LV_PACING_ANODE_LOCATION_1: NORMAL
MDC_IDC_SET_LEADCHNL_LV_PACING_CAPTURE_MODE: NORMAL
MDC_IDC_SET_LEADCHNL_LV_PACING_CATHODE_ELECTRODE_1: NORMAL
MDC_IDC_SET_LEADCHNL_LV_PACING_CATHODE_LOCATION_1: NORMAL
MDC_IDC_SET_LEADCHNL_LV_PACING_POLARITY: NORMAL
MDC_IDC_SET_LEADCHNL_LV_PACING_PULSEWIDTH: 0.5 MS
MDC_IDC_SET_LEADCHNL_RA_SENSING_ANODE_ELECTRODE_1: NORMAL
MDC_IDC_SET_LEADCHNL_RA_SENSING_ANODE_LOCATION_1: NORMAL
MDC_IDC_SET_LEADCHNL_RA_SENSING_CATHODE_ELECTRODE_1: NORMAL
MDC_IDC_SET_LEADCHNL_RA_SENSING_CATHODE_LOCATION_1: NORMAL
MDC_IDC_SET_LEADCHNL_RA_SENSING_POLARITY: NORMAL
MDC_IDC_SET_LEADCHNL_RA_SENSING_SENSITIVITY: NORMAL
MDC_IDC_SET_LEADCHNL_RV_PACING_AMPLITUDE: 2 V
MDC_IDC_SET_LEADCHNL_RV_PACING_ANODE_ELECTRODE_1: NORMAL
MDC_IDC_SET_LEADCHNL_RV_PACING_ANODE_LOCATION_1: NORMAL
MDC_IDC_SET_LEADCHNL_RV_PACING_CAPTURE_MODE: NORMAL
MDC_IDC_SET_LEADCHNL_RV_PACING_CATHODE_ELECTRODE_1: NORMAL
MDC_IDC_SET_LEADCHNL_RV_PACING_CATHODE_LOCATION_1: NORMAL
MDC_IDC_SET_LEADCHNL_RV_PACING_POLARITY: NORMAL
MDC_IDC_SET_LEADCHNL_RV_PACING_PULSEWIDTH: 0.4 MS
MDC_IDC_SET_LEADCHNL_RV_SENSING_ANODE_ELECTRODE_1: NORMAL
MDC_IDC_SET_LEADCHNL_RV_SENSING_ANODE_LOCATION_1: NORMAL
MDC_IDC_SET_LEADCHNL_RV_SENSING_CATHODE_ELECTRODE_1: NORMAL
MDC_IDC_SET_LEADCHNL_RV_SENSING_CATHODE_LOCATION_1: NORMAL
MDC_IDC_SET_LEADCHNL_RV_SENSING_POLARITY: NORMAL
MDC_IDC_SET_LEADCHNL_RV_SENSING_SENSITIVITY: 0.9 MV
MDC_IDC_SET_ZONE_DETECTION_INTERVAL: 400 MS
MDC_IDC_SET_ZONE_TYPE: NORMAL
MDC_IDC_STAT_BRADY_AP_VP_PERCENT: 0 %
MDC_IDC_STAT_BRADY_AP_VS_PERCENT: 0 %
MDC_IDC_STAT_BRADY_AS_VP_PERCENT: 99.95 %
MDC_IDC_STAT_BRADY_AS_VS_PERCENT: 0.05 %
MDC_IDC_STAT_BRADY_DTM_END: NORMAL
MDC_IDC_STAT_BRADY_DTM_START: NORMAL
MDC_IDC_STAT_BRADY_RA_PERCENT_PACED: 0 %
MDC_IDC_STAT_BRADY_RV_PERCENT_PACED: 99.95 %
MDC_IDC_STAT_CRT_DTM_END: NORMAL
MDC_IDC_STAT_CRT_DTM_START: NORMAL
MDC_IDC_STAT_CRT_LV_PERCENT_PACED: 99.93 %
MDC_IDC_STAT_CRT_PERCENT_PACED: 99.93 %
MDC_IDC_STAT_EPISODE_RECENT_COUNT: 0
MDC_IDC_STAT_EPISODE_RECENT_COUNT_DTM_END: NORMAL
MDC_IDC_STAT_EPISODE_RECENT_COUNT_DTM_START: NORMAL
MDC_IDC_STAT_EPISODE_TOTAL_COUNT: 0
MDC_IDC_STAT_EPISODE_TOTAL_COUNT: 1
MDC_IDC_STAT_EPISODE_TOTAL_COUNT: 8
MDC_IDC_STAT_EPISODE_TOTAL_COUNT_DTM_END: NORMAL
MDC_IDC_STAT_EPISODE_TOTAL_COUNT_DTM_START: NORMAL
MDC_IDC_STAT_EPISODE_TYPE: NORMAL

## 2023-09-22 PROCEDURE — 93294 REM INTERROG EVL PM/LDLS PM: CPT | Performed by: INTERNAL MEDICINE

## 2023-09-22 PROCEDURE — 93296 REM INTERROG EVL PM/IDS: CPT | Performed by: INTERNAL MEDICINE

## 2023-09-25 DIAGNOSIS — E11.9 TYPE 2 DIABETES MELLITUS WITHOUT COMPLICATION, WITH LONG-TERM CURRENT USE OF INSULIN (H): ICD-10-CM

## 2023-09-25 DIAGNOSIS — Z79.4 TYPE 2 DIABETES MELLITUS WITHOUT COMPLICATION, WITH LONG-TERM CURRENT USE OF INSULIN (H): ICD-10-CM

## 2023-09-25 RX ORDER — BLOOD SUGAR DIAGNOSTIC
STRIP MISCELLANEOUS
Qty: 300 STRIP | Refills: 1 | Status: SHIPPED | OUTPATIENT
Start: 2023-09-25 | End: 2024-02-19

## 2023-09-25 RX ORDER — INSULIN ASPART 100 [IU]/ML
INJECTION, SOLUTION INTRAVENOUS; SUBCUTANEOUS
Qty: 60 ML | Refills: 3 | Status: ON HOLD | OUTPATIENT
Start: 2023-09-25 | End: 2024-07-12

## 2023-09-28 ENCOUNTER — TELEPHONE (OUTPATIENT)
Dept: INTERNAL MEDICINE | Facility: CLINIC | Age: 63
End: 2023-09-28
Payer: COMMERCIAL

## 2023-09-28 NOTE — TELEPHONE ENCOUNTER
Pt called the clinic to update PCP on recent BG.     2 week average is 135.     Routing to PCP to review and advise if further action is needed.     Please call pt back with PCPs response.

## 2023-09-28 NOTE — TELEPHONE ENCOUNTER
Called and spoke with pt to relay provider message below.   He verbalized understanding and has no further questions/concerns at this time.     Thank you,  Hanna Branch RN

## 2023-10-18 ENCOUNTER — ANTICOAGULATION THERAPY VISIT (OUTPATIENT)
Dept: ANTICOAGULATION | Facility: CLINIC | Age: 63
End: 2023-10-18

## 2023-10-18 ENCOUNTER — LAB (OUTPATIENT)
Dept: LAB | Facility: CLINIC | Age: 63
End: 2023-10-18
Payer: COMMERCIAL

## 2023-10-18 DIAGNOSIS — Z79.01 LONG TERM CURRENT USE OF ANTICOAGULANT THERAPY: ICD-10-CM

## 2023-10-18 DIAGNOSIS — I48.20 CHRONIC ATRIAL FIBRILLATION (H): ICD-10-CM

## 2023-10-18 DIAGNOSIS — I48.20 CHRONIC ATRIAL FIBRILLATION (H): Primary | ICD-10-CM

## 2023-10-18 LAB — INR BLD: 2.2 (ref 0.9–1.1)

## 2023-10-18 PROCEDURE — 85610 PROTHROMBIN TIME: CPT

## 2023-10-18 PROCEDURE — 36416 COLLJ CAPILLARY BLOOD SPEC: CPT

## 2023-10-18 NOTE — PROGRESS NOTES
ANTICOAGULATION MANAGEMENT     Mckay A Shadi 63 year old male is on warfarin with therapeutic INR result. (Goal INR 2.0-3.0)    Recent labs: (last 7 days)     10/18/23  1228   INR 2.2*       ASSESSMENT     Source(s): Chart Review and Patient/Caregiver Call     Warfarin doses taken: Warfarin taken as instructed  Diet: No new diet changes identified  Medication/supplement changes: None noted  New illness, injury, or hospitalization: No  Signs or symptoms of bleeding or clotting: No  Previous result: Therapeutic last 2(+) visits  Additional findings: None       PLAN     Recommended plan for no diet, medication or health factor changes affecting INR     Dosing Instructions: Continue your current warfarin dose with next INR in 6 weeks       Summary  As of 10/18/2023      Full warfarin instructions:  3.75 mg every Tue, Fri; 7.5 mg all other days   Next INR check:  11/28/2023               Telephone call with Doyle who verbalizes understanding and agrees to plan    Lab visit scheduled    Education provided:   Please call back if any changes to your diet, medications or how you've been taking warfarin  Contact [phone]  with any changes, questions or concerns.     Plan made per [facility] anticoagulation protocol    Pastora Valenzuela RN  Anticoagulation Clinic  10/18/2023    _______________________________________________________________________     Anticoagulation Episode Summary       Current INR goal:  2.0-3.0   TTR:  98.5% (1 y)   Target end date:  Indefinite   Send INR reminders to:  [facility]    Indications    Chronic atrial fibrillation (H) [I48.20]  Long-term (current) use of anticoagulants [Z79.01] [Z79.01]             Comments:               Anticoagulation Care Providers       Provider Role Specialty Phone number    David Almendarez MD Referring Internal Medicine [phone]

## 2023-11-06 DIAGNOSIS — E11.9 TYPE 2 DIABETES MELLITUS WITHOUT COMPLICATION, WITH LONG-TERM CURRENT USE OF INSULIN (H): ICD-10-CM

## 2023-11-06 DIAGNOSIS — Z79.4 TYPE 2 DIABETES MELLITUS WITHOUT COMPLICATION, WITH LONG-TERM CURRENT USE OF INSULIN (H): ICD-10-CM

## 2023-11-06 RX ORDER — INSULIN GLARGINE 100 [IU]/ML
INJECTION, SOLUTION SUBCUTANEOUS
Qty: 60 ML | Refills: 5 | Status: ON HOLD | OUTPATIENT
Start: 2023-11-06 | End: 2024-07-12

## 2023-11-06 NOTE — TELEPHONE ENCOUNTER
CC: Patient calling requesting refill of the following:     insulin glargine (LANTUS SOLOSTAR) 100 UNIT/ML pen     States he is being told by pharmacy that a new prescription is needed     Patient is taking 67 units daily at bedtime - needs rx sent to [facility] in [place]    Pended to preferred pharmacy - routing to refill pool     Nikita Mckinney RN  [facility]

## 2023-11-13 DIAGNOSIS — I48.91 NEW ONSET ATRIAL FIBRILLATION (H): ICD-10-CM

## 2023-11-14 RX ORDER — WARFARIN SODIUM 7.5 MG/1
TABLET ORAL
Qty: 90 TABLET | Refills: 1 | Status: ON HOLD | OUTPATIENT
Start: 2023-11-14 | End: 2024-07-12

## 2023-11-14 NOTE — TELEPHONE ENCOUNTER
ANTICOAGULATION MANAGEMENT:  Medication Refill    Anticoagulation Summary  As of 10/18/2023      Warfarin maintenance plan:  3.75 mg (7.5 mg x 0.5) every Tue, Fri; 7.5 mg (7.5 mg x 1) all other days   Next INR check:  11/28/2023   Target end date:  Indefinite    Indications    Chronic atrial fibrillation (H) [I48.20]  Long-term (current) use of anticoagulants [Z79.01] [Z79.01]                 Anticoagulation Care Providers       Provider Role Specialty Phone number    David Almendarez MD Referring Internal Medicine [phone]            Refill Criteria    Visit with referring provider/group: Meets criteria: office visit within referring provider group in the last 1 year on 8/22/23    [facility] referral signed last signed: 01/10/2023; within last year: Yes    Lab monitoring not exceeding 2 weeks overdue: Yes    Mckay meets all criteria for refill. Rx instructions and quantity match patient's current dosing plan. Warfarin 90 day supply with 1 refill granted per [facility] protocol     Pastora Valenzuela RN  Anticoagulation Clinic

## 2023-12-07 ENCOUNTER — LAB (OUTPATIENT)
Dept: LAB | Facility: CLINIC | Age: 63
End: 2023-12-07
Payer: COMMERCIAL

## 2023-12-07 ENCOUNTER — ANTICOAGULATION THERAPY VISIT (OUTPATIENT)
Dept: ANTICOAGULATION | Facility: CLINIC | Age: 63
End: 2023-12-07

## 2023-12-07 DIAGNOSIS — Z79.01 LONG TERM CURRENT USE OF ANTICOAGULANT THERAPY: ICD-10-CM

## 2023-12-07 DIAGNOSIS — I48.20 CHRONIC ATRIAL FIBRILLATION (H): ICD-10-CM

## 2023-12-07 DIAGNOSIS — I48.20 CHRONIC ATRIAL FIBRILLATION (H): Primary | ICD-10-CM

## 2023-12-07 LAB — INR BLD: 1.9 (ref 0.9–1.1)

## 2023-12-07 PROCEDURE — 36416 COLLJ CAPILLARY BLOOD SPEC: CPT

## 2023-12-07 PROCEDURE — 85610 PROTHROMBIN TIME: CPT

## 2023-12-07 NOTE — PROGRESS NOTES
ANTICOAGULATION MANAGEMENT     Mckay A Shdai 63 year old male is on warfarin with subtherapeutic INR result. (Goal INR 2.0-3.0)    Recent labs: (last 7 days)     12/07/23  1218   INR 1.9*       ASSESSMENT     Source(s): Chart Review and Patient/Caregiver Call     Warfarin doses taken: Warfarin taken as instructed  Diet: No new diet changes identified  Medication/supplement changes: None noted  New illness, injury, or hospitalization: No  Signs or symptoms of bleeding or clotting: No  Previous result: Therapeutic last 2(+) visits  Additional findings: None       PLAN     Recommended plan for no diet, medication or health factor changes affecting INR     Dosing Instructions: Continue your current warfarin dose with next INR in 2 weeks       Summary  As of 12/7/2023      Full warfarin instructions:  3.75 mg every Tue, Fri; 7.5 mg all other days   Next INR check:  12/28/2023               Telephone call with Doyle who verbalizes understanding and agrees to plan    Patient elected to schedule next visit in 3 weeks    Education provided:   Please call back if any changes to your diet, medications or how you've been taking warfarin  Contact [phone]  with any changes, questions or concerns.     Plan made per [facility] anticoagulation protocol    Pastora Valenzuela RN  Anticoagulation Clinic  12/7/2023    _______________________________________________________________________     Anticoagulation Episode Summary       Current INR goal:  2.0-3.0   TTR:  95.4% (1 y)   Target end date:  Indefinite   Send INR reminders to:  [facility]    Indications    Chronic atrial fibrillation (H) [I48.20]  Long-term (current) use of anticoagulants [Z79.01] [Z79.01]             Comments:               Anticoagulation Care Providers       Provider Role Specialty Phone number    David Almendarez MD Referring Internal Medicine [phone]

## 2023-12-28 ENCOUNTER — TELEPHONE (OUTPATIENT)
Dept: ANTICOAGULATION | Facility: CLINIC | Age: 63
End: 2023-12-28

## 2023-12-28 ENCOUNTER — LAB (OUTPATIENT)
Dept: LAB | Facility: CLINIC | Age: 63
End: 2023-12-28
Payer: COMMERCIAL

## 2023-12-28 ENCOUNTER — ANTICOAGULATION THERAPY VISIT (OUTPATIENT)
Dept: ANTICOAGULATION | Facility: CLINIC | Age: 63
End: 2023-12-28

## 2023-12-28 DIAGNOSIS — Z79.01 LONG TERM CURRENT USE OF ANTICOAGULANT THERAPY: ICD-10-CM

## 2023-12-28 DIAGNOSIS — I48.20 CHRONIC ATRIAL FIBRILLATION (H): Primary | ICD-10-CM

## 2023-12-28 DIAGNOSIS — I48.20 CHRONIC ATRIAL FIBRILLATION (H): ICD-10-CM

## 2023-12-28 LAB — INR BLD: 2.4 (ref 0.9–1.1)

## 2023-12-28 PROCEDURE — 85610 PROTHROMBIN TIME: CPT

## 2023-12-28 PROCEDURE — 36415 COLL VENOUS BLD VENIPUNCTURE: CPT

## 2023-12-28 NOTE — PROGRESS NOTES
ANTICOAGULATION MANAGEMENT     Mckay A Shadi 63 year old male is on warfarin with therapeutic INR result. (Goal INR 2.0-3.0)    Recent labs: (last 7 days)     12/28/23  1303   INR 2.4*       ASSESSMENT     Source(s): Chart Review and Patient/Caregiver Call     Warfarin doses taken: Warfarin taken as instructed  Diet: No new diet changes identified  Medication/supplement changes: None noted  New illness, injury, or hospitalization: No  Signs or symptoms of bleeding or clotting: No  Previous result: Subtherapeutic  Additional findings: None       PLAN     Recommended plan for no diet, medication or health factor changes affecting INR     Dosing Instructions: Continue your current warfarin dose with next INR in 4 weeks       Summary  As of 12/28/2023      Full warfarin instructions:  3.75 mg every Tue, Fri; 7.5 mg all other days   Next INR check:  1/23/2024               Telephone call with Doyle who verbalizes understanding and agrees to plan    Lab visit scheduled    Education provided:   Please call back if any changes to your diet, medications or how you've been taking warfarin    Plan made per [facility] anticoagulation protocol    Gigi Castillo RN  Anticoagulation Clinic  12/28/2023    _______________________________________________________________________     Anticoagulation Episode Summary       Current INR goal:  2.0-3.0   TTR:  94.3% (1 y)   Target end date:  Indefinite   Send INR reminders to:  [facility]    Indications    Chronic atrial fibrillation (H) [I48.20]  Long-term (current) use of anticoagulants [Z79.01] [Z79.01]             Comments:               Anticoagulation Care Providers       Provider Role Specialty Phone number    David Almendarez MD Referring Internal Medicine [phone]

## 2023-12-28 NOTE — TELEPHONE ENCOUNTER
ANTICOAGULATION CLINIC REFERRAL RENEWAL REQUEST       An annual renewal order is required for all patients referred to [facility] Anticoagulation Clinic.?  Please review and sign the pended referral order for Mckay Hsu.       ANTICOAGULATION SUMMARY      Warfarin indication(s)   Atrial Fibrillation    Mechanical heart valve present?  NO       Current goal range   INR: 2.0-3.0     Goal appropriate for indication? Goal INR 2-3, standard for indication(s) above     Time in Therapeutic Range (TTR)  (Goal > 60%) 94.3%       Office visit with referring provider's group within last year yes on 4/25/23       Gigi Castillo RN  [facility] Anticoagulation Clinic

## 2024-01-23 ENCOUNTER — ANTICOAGULATION THERAPY VISIT (OUTPATIENT)
Dept: ANTICOAGULATION | Facility: CLINIC | Age: 64
End: 2024-01-23

## 2024-01-23 ENCOUNTER — ANCILLARY PROCEDURE (OUTPATIENT)
Dept: CARDIOLOGY | Facility: CLINIC | Age: 64
End: 2024-01-23
Attending: INTERNAL MEDICINE
Payer: COMMERCIAL

## 2024-01-23 ENCOUNTER — LAB (OUTPATIENT)
Dept: LAB | Facility: CLINIC | Age: 64
End: 2024-01-23
Payer: COMMERCIAL

## 2024-01-23 DIAGNOSIS — I48.20 CHRONIC ATRIAL FIBRILLATION (H): ICD-10-CM

## 2024-01-23 DIAGNOSIS — I48.20 CHRONIC ATRIAL FIBRILLATION (H): Primary | ICD-10-CM

## 2024-01-23 DIAGNOSIS — Z95.0 CARDIAC PACEMAKER IN SITU: ICD-10-CM

## 2024-01-23 DIAGNOSIS — I44.2 ATRIOVENTRICULAR BLOCK, COMPLETE (H): ICD-10-CM

## 2024-01-23 DIAGNOSIS — Z79.01 LONG TERM CURRENT USE OF ANTICOAGULANT THERAPY: ICD-10-CM

## 2024-01-23 LAB — INR BLD: 2.2 (ref 0.9–1.1)

## 2024-01-23 PROCEDURE — 85610 PROTHROMBIN TIME: CPT

## 2024-01-23 PROCEDURE — 93294 REM INTERROG EVL PM/LDLS PM: CPT | Performed by: INTERNAL MEDICINE

## 2024-01-23 PROCEDURE — 36416 COLLJ CAPILLARY BLOOD SPEC: CPT

## 2024-01-23 PROCEDURE — 93296 REM INTERROG EVL PM/IDS: CPT | Performed by: INTERNAL MEDICINE

## 2024-01-23 NOTE — PROGRESS NOTES
ANTICOAGULATION MANAGEMENT     Mckay EMIYL Shadi 63 year old male is on warfarin with therapeutic INR result. (Goal INR 2.0-3.0)    Recent labs: (last 7 days)     01/23/24  1235   INR 2.2*       ASSESSMENT     Source(s): Chart Review  Previous INR was Therapeutic last visit; previously outside of goal range  Medication, diet, health changes since last INR chart reviewed; none identified         PLAN     Recommended plan for no diet, medication or health factor changes affecting INR     Dosing Instructions: Continue your current warfarin dose with next INR in 5 weeks       Summary  As of 1/23/2024      Full warfarin instructions:  3.75 mg every Tue, Fri; 7.5 mg all other days   Next INR check:  2/27/2024               Detailed voice message left for Doyle with dosing instructions and follow up date.     Contact [phone]  to schedule and with any changes, questions or concerns.     Education provided:   Please call back if any changes to your diet, medications or how you've been taking warfarin  Contact [phone]  with any changes, questions or concerns.     Plan made per [facility] anticoagulation protocol    Pastora Valenzuela RN  Anticoagulation Clinic  1/23/2024    _______________________________________________________________________     Anticoagulation Episode Summary       Current INR goal:  2.0-3.0   TTR:  94.3% (1 y)   Target end date:  Indefinite   Send INR reminders to:  [facility]    Indications    Chronic atrial fibrillation (H) [I48.20]  Long-term (current) use of anticoagulants [Z79.01] [Z79.01]             Comments:               Anticoagulation Care Providers       Provider Role Specialty Phone number    David Almendarez MD Referring Internal Medicine [phone]

## 2024-02-05 LAB
MDC_IDC_EPISODE_DTM: NORMAL
MDC_IDC_EPISODE_DTM: NORMAL
MDC_IDC_EPISODE_DURATION: 11 S
MDC_IDC_EPISODE_DURATION: 7 S
MDC_IDC_EPISODE_ID: 28
MDC_IDC_EPISODE_ID: 29
MDC_IDC_EPISODE_TYPE: NORMAL
MDC_IDC_EPISODE_TYPE: NORMAL
MDC_IDC_LEAD_CONNECTION_STATUS: NORMAL
MDC_IDC_LEAD_CONNECTION_STATUS: NORMAL
MDC_IDC_LEAD_IMPLANT_DT: NORMAL
MDC_IDC_LEAD_IMPLANT_DT: NORMAL
MDC_IDC_LEAD_LOCATION: NORMAL
MDC_IDC_LEAD_LOCATION: NORMAL
MDC_IDC_LEAD_LOCATION_DETAIL_1: NORMAL
MDC_IDC_LEAD_LOCATION_DETAIL_1: NORMAL
MDC_IDC_LEAD_MFG: NORMAL
MDC_IDC_LEAD_MFG: NORMAL
MDC_IDC_LEAD_MODEL: NORMAL
MDC_IDC_LEAD_MODEL: NORMAL
MDC_IDC_LEAD_POLARITY_TYPE: NORMAL
MDC_IDC_LEAD_POLARITY_TYPE: NORMAL
MDC_IDC_LEAD_SERIAL: NORMAL
MDC_IDC_LEAD_SERIAL: NORMAL
MDC_IDC_MSMT_BATTERY_DTM: NORMAL
MDC_IDC_MSMT_BATTERY_REMAINING_LONGEVITY: 76 MO
MDC_IDC_MSMT_BATTERY_RRT_TRIGGER: 2.6
MDC_IDC_MSMT_BATTERY_STATUS: NORMAL
MDC_IDC_MSMT_BATTERY_VOLTAGE: 2.98 V
MDC_IDC_MSMT_LEADCHNL_LV_IMPEDANCE_VALUE: 266 OHM
MDC_IDC_MSMT_LEADCHNL_LV_IMPEDANCE_VALUE: 380 OHM
MDC_IDC_MSMT_LEADCHNL_LV_IMPEDANCE_VALUE: 646 OHM
MDC_IDC_MSMT_LEADCHNL_LV_IMPEDANCE_VALUE: 703 OHM
MDC_IDC_MSMT_LEADCHNL_LV_IMPEDANCE_VALUE: 836 OHM
MDC_IDC_MSMT_LEADCHNL_RA_IMPEDANCE_VALUE: 3401 OHM
MDC_IDC_MSMT_LEADCHNL_RA_IMPEDANCE_VALUE: 3401 OHM
MDC_IDC_MSMT_LEADCHNL_RV_IMPEDANCE_VALUE: 380 OHM
MDC_IDC_MSMT_LEADCHNL_RV_IMPEDANCE_VALUE: 418 OHM
MDC_IDC_MSMT_LEADCHNL_RV_PACING_THRESHOLD_AMPLITUDE: 0.88 V
MDC_IDC_MSMT_LEADCHNL_RV_PACING_THRESHOLD_PULSEWIDTH: 0.4 MS
MDC_IDC_MSMT_LEADCHNL_RV_SENSING_INTR_AMPL: 4.62 MV
MDC_IDC_MSMT_LEADCHNL_RV_SENSING_INTR_AMPL: 4.62 MV
MDC_IDC_PG_IMPLANT_DTM: NORMAL
MDC_IDC_PG_MFG: NORMAL
MDC_IDC_PG_MODEL: NORMAL
MDC_IDC_PG_SERIAL: NORMAL
MDC_IDC_PG_TYPE: NORMAL
MDC_IDC_SESS_CLINIC_NAME: NORMAL
MDC_IDC_SESS_DTM: NORMAL
MDC_IDC_SESS_TYPE: NORMAL
MDC_IDC_SET_BRADY_LOWRATE: 70 {BEATS}/MIN
MDC_IDC_SET_BRADY_MAX_SENSOR_RATE: 130 {BEATS}/MIN
MDC_IDC_SET_BRADY_MODE: NORMAL
MDC_IDC_SET_CRT_LVRV_DELAY: 0 MS
MDC_IDC_SET_CRT_PACED_CHAMBERS: NORMAL
MDC_IDC_SET_LEADCHNL_LV_PACING_AMPLITUDE: 2 V
MDC_IDC_SET_LEADCHNL_LV_PACING_ANODE_ELECTRODE_1: NORMAL
MDC_IDC_SET_LEADCHNL_LV_PACING_ANODE_LOCATION_1: NORMAL
MDC_IDC_SET_LEADCHNL_LV_PACING_CAPTURE_MODE: NORMAL
MDC_IDC_SET_LEADCHNL_LV_PACING_CATHODE_ELECTRODE_1: NORMAL
MDC_IDC_SET_LEADCHNL_LV_PACING_CATHODE_LOCATION_1: NORMAL
MDC_IDC_SET_LEADCHNL_LV_PACING_POLARITY: NORMAL
MDC_IDC_SET_LEADCHNL_LV_PACING_PULSEWIDTH: 0.5 MS
MDC_IDC_SET_LEADCHNL_RA_SENSING_ANODE_ELECTRODE_1: NORMAL
MDC_IDC_SET_LEADCHNL_RA_SENSING_ANODE_LOCATION_1: NORMAL
MDC_IDC_SET_LEADCHNL_RA_SENSING_CATHODE_ELECTRODE_1: NORMAL
MDC_IDC_SET_LEADCHNL_RA_SENSING_CATHODE_LOCATION_1: NORMAL
MDC_IDC_SET_LEADCHNL_RA_SENSING_POLARITY: NORMAL
MDC_IDC_SET_LEADCHNL_RA_SENSING_SENSITIVITY: NORMAL
MDC_IDC_SET_LEADCHNL_RV_PACING_AMPLITUDE: 2 V
MDC_IDC_SET_LEADCHNL_RV_PACING_ANODE_ELECTRODE_1: NORMAL
MDC_IDC_SET_LEADCHNL_RV_PACING_ANODE_LOCATION_1: NORMAL
MDC_IDC_SET_LEADCHNL_RV_PACING_CAPTURE_MODE: NORMAL
MDC_IDC_SET_LEADCHNL_RV_PACING_CATHODE_ELECTRODE_1: NORMAL
MDC_IDC_SET_LEADCHNL_RV_PACING_CATHODE_LOCATION_1: NORMAL
MDC_IDC_SET_LEADCHNL_RV_PACING_POLARITY: NORMAL
MDC_IDC_SET_LEADCHNL_RV_PACING_PULSEWIDTH: 0.4 MS
MDC_IDC_SET_LEADCHNL_RV_SENSING_ANODE_ELECTRODE_1: NORMAL
MDC_IDC_SET_LEADCHNL_RV_SENSING_ANODE_LOCATION_1: NORMAL
MDC_IDC_SET_LEADCHNL_RV_SENSING_CATHODE_ELECTRODE_1: NORMAL
MDC_IDC_SET_LEADCHNL_RV_SENSING_CATHODE_LOCATION_1: NORMAL
MDC_IDC_SET_LEADCHNL_RV_SENSING_POLARITY: NORMAL
MDC_IDC_SET_LEADCHNL_RV_SENSING_SENSITIVITY: 0.9 MV
MDC_IDC_SET_ZONE_DETECTION_INTERVAL: 400 MS
MDC_IDC_SET_ZONE_STATUS: NORMAL
MDC_IDC_SET_ZONE_TYPE: NORMAL
MDC_IDC_SET_ZONE_VENDOR_TYPE: NORMAL
MDC_IDC_STAT_BRADY_AP_VP_PERCENT: 0 %
MDC_IDC_STAT_BRADY_AP_VS_PERCENT: 0 %
MDC_IDC_STAT_BRADY_AS_VP_PERCENT: 99.97 %
MDC_IDC_STAT_BRADY_AS_VS_PERCENT: 0.03 %
MDC_IDC_STAT_BRADY_DTM_END: NORMAL
MDC_IDC_STAT_BRADY_DTM_START: NORMAL
MDC_IDC_STAT_BRADY_RA_PERCENT_PACED: 0 %
MDC_IDC_STAT_BRADY_RV_PERCENT_PACED: 99.97 %
MDC_IDC_STAT_CRT_DTM_END: NORMAL
MDC_IDC_STAT_CRT_DTM_START: NORMAL
MDC_IDC_STAT_CRT_LV_PERCENT_PACED: 99.94 %
MDC_IDC_STAT_CRT_PERCENT_PACED: 99.94 %
MDC_IDC_STAT_EPISODE_RECENT_COUNT: 0
MDC_IDC_STAT_EPISODE_RECENT_COUNT_DTM_END: NORMAL
MDC_IDC_STAT_EPISODE_RECENT_COUNT_DTM_START: NORMAL
MDC_IDC_STAT_EPISODE_TOTAL_COUNT: 0
MDC_IDC_STAT_EPISODE_TOTAL_COUNT: 1
MDC_IDC_STAT_EPISODE_TOTAL_COUNT: 8
MDC_IDC_STAT_EPISODE_TOTAL_COUNT_DTM_END: NORMAL
MDC_IDC_STAT_EPISODE_TOTAL_COUNT_DTM_START: NORMAL
MDC_IDC_STAT_EPISODE_TYPE: NORMAL
MDC_IDC_STAT_TACHYTHERAPY_RECENT_DTM_END: NORMAL
MDC_IDC_STAT_TACHYTHERAPY_RECENT_DTM_START: NORMAL
MDC_IDC_STAT_TACHYTHERAPY_TOTAL_DTM_END: NORMAL
MDC_IDC_STAT_TACHYTHERAPY_TOTAL_DTM_START: NORMAL

## 2024-02-09 DIAGNOSIS — I10 ESSENTIAL HYPERTENSION, BENIGN: ICD-10-CM

## 2024-02-09 DIAGNOSIS — E11.9 TYPE 2 DIABETES MELLITUS WITHOUT COMPLICATION, WITH LONG-TERM CURRENT USE OF INSULIN (H): ICD-10-CM

## 2024-02-09 DIAGNOSIS — Z79.4 TYPE 2 DIABETES MELLITUS WITHOUT COMPLICATION, WITH LONG-TERM CURRENT USE OF INSULIN (H): ICD-10-CM

## 2024-02-09 RX ORDER — LOSARTAN POTASSIUM 100 MG/1
100 TABLET ORAL DAILY
Qty: 90 TABLET | Refills: 3 | Status: SHIPPED | OUTPATIENT
Start: 2024-02-09

## 2024-02-09 RX ORDER — AMLODIPINE BESYLATE 10 MG/1
TABLET ORAL
Qty: 90 TABLET | Refills: 3 | Status: SHIPPED | OUTPATIENT
Start: 2024-02-09

## 2024-02-16 DIAGNOSIS — E11.9 TYPE 2 DIABETES MELLITUS WITHOUT COMPLICATION, WITH LONG-TERM CURRENT USE OF INSULIN (H): ICD-10-CM

## 2024-02-16 DIAGNOSIS — Z79.4 TYPE 2 DIABETES MELLITUS WITHOUT COMPLICATION, WITH LONG-TERM CURRENT USE OF INSULIN (H): ICD-10-CM

## 2024-02-16 RX ORDER — FLURBIPROFEN SODIUM 0.3 MG/ML
SOLUTION/ DROPS OPHTHALMIC
Qty: 400 EACH | Refills: 0 | Status: SHIPPED | OUTPATIENT
Start: 2024-02-16 | End: 2024-07-06

## 2024-02-19 DIAGNOSIS — Z79.4 TYPE 2 DIABETES MELLITUS WITHOUT COMPLICATION, WITH LONG-TERM CURRENT USE OF INSULIN (H): ICD-10-CM

## 2024-02-19 DIAGNOSIS — E11.9 TYPE 2 DIABETES MELLITUS WITHOUT COMPLICATION, WITH LONG-TERM CURRENT USE OF INSULIN (H): ICD-10-CM

## 2024-02-19 RX ORDER — BLOOD SUGAR DIAGNOSTIC
1 STRIP MISCELLANEOUS 4 TIMES DAILY
Qty: 400 STRIP | Refills: 1 | Status: SHIPPED | OUTPATIENT
Start: 2024-02-19

## 2024-02-19 NOTE — TELEPHONE ENCOUNTER
[facility] will only covers 50 test strips with directions test blood sugar 3 times daily.  Pt needs 90 test strips for 30 days but insurance will not fill 100 test scripts. Test strips are only dispensed on pack of 50 or 100.     Pt asked if directions for test strips can be changed indicating he test 4 times daily. That way he will be have enough test strips to check BS 3 times daily.   Or asked if PCP has any other advice.     Pt needs a call back with providers response.,

## 2024-02-27 ENCOUNTER — ANTICOAGULATION THERAPY VISIT (OUTPATIENT)
Dept: ANTICOAGULATION | Facility: CLINIC | Age: 64
End: 2024-02-27

## 2024-02-27 ENCOUNTER — LAB (OUTPATIENT)
Dept: LAB | Facility: CLINIC | Age: 64
End: 2024-02-27
Payer: COMMERCIAL

## 2024-02-27 DIAGNOSIS — Z79.01 LONG TERM CURRENT USE OF ANTICOAGULANT THERAPY: ICD-10-CM

## 2024-02-27 DIAGNOSIS — I48.20 CHRONIC ATRIAL FIBRILLATION (H): ICD-10-CM

## 2024-02-27 DIAGNOSIS — I48.20 CHRONIC ATRIAL FIBRILLATION (H): Primary | ICD-10-CM

## 2024-02-27 LAB — INR BLD: 2.7 (ref 0.9–1.1)

## 2024-02-27 PROCEDURE — 85610 PROTHROMBIN TIME: CPT

## 2024-02-27 PROCEDURE — 36416 COLLJ CAPILLARY BLOOD SPEC: CPT

## 2024-02-27 NOTE — PROGRESS NOTES
ANTICOAGULATION MANAGEMENT     Mckay A Shadi 63 year old male is on warfarin with therapeutic INR result. (Goal INR 2.0-3.0)    Recent labs: (last 7 days)     02/27/24  1251   INR 2.7*       ASSESSMENT     Source(s): Chart Review and Patient/Caregiver Call     Warfarin doses taken: Warfarin taken as instructed  Diet: No new diet changes identified  Medication/supplement changes: None noted  New illness, injury, or hospitalization: No  Signs or symptoms of bleeding or clotting: No  Previous result: Therapeutic last 2(+) visits  Additional findings: None       PLAN     Recommended plan for no diet, medication or health factor changes affecting INR     Dosing Instructions: Continue your current warfarin dose with next INR in 6 weeks       Summary  As of 2/27/2024      Full warfarin instructions:  3.75 mg every Tue, Fri; 7.5 mg all other days   Next INR check:  4/9/2024               Telephone call with Doyle who verbalizes understanding and agrees to plan    Lab visit scheduled    Education provided:   Goal range and lab monitoring: goal range and significance of current result and Importance of therapeutic range    Plan made per [facility] anticoagulation protocol    Modesto Davalos RN  Anticoagulation Clinic  2/27/2024    _______________________________________________________________________     Anticoagulation Episode Summary       Current INR goal:  2.0-3.0   TTR:  94.3% (1 y)   Target end date:  Indefinite   Send INR reminders to:  [facility]    Indications    Chronic atrial fibrillation (H) [I48.20]  Long-term (current) use of anticoagulants [Z79.01] [Z79.01]             Comments:               Anticoagulation Care Providers       Provider Role Specialty Phone number    David Almendarez MD Referring Internal Medicine [phone]

## 2024-03-26 ENCOUNTER — PATIENT OUTREACH (OUTPATIENT)
Dept: CARE COORDINATION | Facility: CLINIC | Age: 64
End: 2024-03-26
Payer: COMMERCIAL

## 2024-04-09 ENCOUNTER — PATIENT OUTREACH (OUTPATIENT)
Dept: CARE COORDINATION | Facility: CLINIC | Age: 64
End: 2024-04-09

## 2024-04-09 ENCOUNTER — ANTICOAGULATION THERAPY VISIT (OUTPATIENT)
Dept: ANTICOAGULATION | Facility: CLINIC | Age: 64
End: 2024-04-09

## 2024-04-09 ENCOUNTER — LAB (OUTPATIENT)
Dept: LAB | Facility: CLINIC | Age: 64
End: 2024-04-09
Payer: COMMERCIAL

## 2024-04-09 DIAGNOSIS — Z79.01 LONG TERM CURRENT USE OF ANTICOAGULANT THERAPY: ICD-10-CM

## 2024-04-09 DIAGNOSIS — Z12.5 SCREENING FOR PROSTATE CANCER: ICD-10-CM

## 2024-04-09 DIAGNOSIS — I48.20 CHRONIC ATRIAL FIBRILLATION (H): ICD-10-CM

## 2024-04-09 DIAGNOSIS — E11.9 TYPE 2 DIABETES MELLITUS WITHOUT COMPLICATION, WITH LONG-TERM CURRENT USE OF INSULIN (H): ICD-10-CM

## 2024-04-09 DIAGNOSIS — I10 ESSENTIAL HYPERTENSION, BENIGN: Primary | ICD-10-CM

## 2024-04-09 DIAGNOSIS — I48.20 CHRONIC ATRIAL FIBRILLATION (H): Primary | ICD-10-CM

## 2024-04-09 DIAGNOSIS — Z79.4 TYPE 2 DIABETES MELLITUS WITHOUT COMPLICATION, WITH LONG-TERM CURRENT USE OF INSULIN (H): ICD-10-CM

## 2024-04-09 LAB — INR BLD: 2.8 (ref 0.9–1.1)

## 2024-04-09 PROCEDURE — 36416 COLLJ CAPILLARY BLOOD SPEC: CPT

## 2024-04-09 PROCEDURE — 85610 PROTHROMBIN TIME: CPT

## 2024-04-09 NOTE — PROGRESS NOTES
ANTICOAGULATION MANAGEMENT     Mckay A Shadi 64 year old male is on warfarin with therapeutic INR result. (Goal INR 2.0-3.0)    Recent labs: (last 7 days)     04/09/24  1252   INR 2.8*       ASSESSMENT     Source(s): Chart Review and Patient/Caregiver Call     Warfarin doses taken: Warfarin taken as instructed  Diet: No new diet changes identified  Medication/supplement changes: None noted  New illness, injury, or hospitalization: No  Signs or symptoms of bleeding or clotting: No  Previous result: Therapeutic last 2(+) visits  Additional findings: None       PLAN     Recommended plan for no diet, medication or health factor changes affecting INR     Dosing Instructions: Continue your current warfarin dose with next INR in 6 weeks       Summary  As of 4/9/2024      Full warfarin instructions:  3.75 mg every Tue, Fri; 7.5 mg all other days   Next INR check:  5/21/2024               Telephone call with Doyle who verbalizes understanding and agrees to plan    Lab visit scheduled    Education provided:   None required    Plan made per [facility] anticoagulation protocol    Ginette Delacruz RN  Anticoagulation Clinic  4/9/2024    _______________________________________________________________________     Anticoagulation Episode Summary       Current INR goal:  2.0-3.0   TTR:  94.3% (1 y)   Target end date:  Indefinite   Send INR reminders to:  [facility]    Indications    Chronic atrial fibrillation (H) [I48.20]  Long-term (current) use of anticoagulants [Z79.01] [Z79.01]             Comments:               Anticoagulation Care Providers       Provider Role Specialty Phone number    David Almendarez MD Referring Internal Medicine [phone]

## 2024-05-06 DIAGNOSIS — E78.5 HYPERLIPIDEMIA LDL GOAL <100: ICD-10-CM

## 2024-05-07 ENCOUNTER — ANCILLARY PROCEDURE (OUTPATIENT)
Dept: CARDIOLOGY | Facility: CLINIC | Age: 64
End: 2024-05-07
Attending: INTERNAL MEDICINE
Payer: COMMERCIAL

## 2024-05-07 DIAGNOSIS — I44.2 ATRIOVENTRICULAR BLOCK, COMPLETE (H): ICD-10-CM

## 2024-05-07 DIAGNOSIS — Z95.0 CARDIAC PACEMAKER IN SITU: ICD-10-CM

## 2024-05-07 DIAGNOSIS — Z95.0 CARDIAC PACEMAKER IN SITU: Primary | ICD-10-CM

## 2024-05-07 PROCEDURE — 93294 REM INTERROG EVL PM/LDLS PM: CPT | Performed by: INTERNAL MEDICINE

## 2024-05-07 PROCEDURE — 93296 REM INTERROG EVL PM/IDS: CPT | Performed by: INTERNAL MEDICINE

## 2024-05-07 RX ORDER — SIMVASTATIN 80 MG
TABLET ORAL
Qty: 90 TABLET | Refills: 0 | Status: SHIPPED | OUTPATIENT
Start: 2024-05-07

## 2024-05-08 LAB
MDC_IDC_LEAD_CONNECTION_STATUS: NORMAL
MDC_IDC_LEAD_CONNECTION_STATUS: NORMAL
MDC_IDC_LEAD_IMPLANT_DT: NORMAL
MDC_IDC_LEAD_IMPLANT_DT: NORMAL
MDC_IDC_LEAD_LOCATION: NORMAL
MDC_IDC_LEAD_LOCATION: NORMAL
MDC_IDC_LEAD_LOCATION_DETAIL_1: NORMAL
MDC_IDC_LEAD_LOCATION_DETAIL_1: NORMAL
MDC_IDC_LEAD_MFG: NORMAL
MDC_IDC_LEAD_MFG: NORMAL
MDC_IDC_LEAD_MODEL: NORMAL
MDC_IDC_LEAD_MODEL: NORMAL
MDC_IDC_LEAD_POLARITY_TYPE: NORMAL
MDC_IDC_LEAD_POLARITY_TYPE: NORMAL
MDC_IDC_LEAD_SERIAL: NORMAL
MDC_IDC_LEAD_SERIAL: NORMAL
MDC_IDC_MSMT_BATTERY_DTM: NORMAL
MDC_IDC_MSMT_BATTERY_REMAINING_LONGEVITY: 66 MO
MDC_IDC_MSMT_BATTERY_RRT_TRIGGER: 2.6
MDC_IDC_MSMT_BATTERY_STATUS: NORMAL
MDC_IDC_MSMT_BATTERY_VOLTAGE: 2.97 V
MDC_IDC_MSMT_LEADCHNL_LV_IMPEDANCE_VALUE: 266 OHM
MDC_IDC_MSMT_LEADCHNL_LV_IMPEDANCE_VALUE: 361 OHM
MDC_IDC_MSMT_LEADCHNL_LV_IMPEDANCE_VALUE: 627 OHM
MDC_IDC_MSMT_LEADCHNL_LV_IMPEDANCE_VALUE: 684 OHM
MDC_IDC_MSMT_LEADCHNL_LV_IMPEDANCE_VALUE: 817 OHM
MDC_IDC_MSMT_LEADCHNL_RA_IMPEDANCE_VALUE: 3401 OHM
MDC_IDC_MSMT_LEADCHNL_RA_IMPEDANCE_VALUE: 3401 OHM
MDC_IDC_MSMT_LEADCHNL_RV_IMPEDANCE_VALUE: 361 OHM
MDC_IDC_MSMT_LEADCHNL_RV_IMPEDANCE_VALUE: 380 OHM
MDC_IDC_MSMT_LEADCHNL_RV_PACING_THRESHOLD_AMPLITUDE: 0.88 V
MDC_IDC_MSMT_LEADCHNL_RV_PACING_THRESHOLD_PULSEWIDTH: 0.4 MS
MDC_IDC_MSMT_LEADCHNL_RV_SENSING_INTR_AMPL: 4.62 MV
MDC_IDC_MSMT_LEADCHNL_RV_SENSING_INTR_AMPL: 4.62 MV
MDC_IDC_PG_IMPLANT_DTM: NORMAL
MDC_IDC_PG_MFG: NORMAL
MDC_IDC_PG_MODEL: NORMAL
MDC_IDC_PG_SERIAL: NORMAL
MDC_IDC_PG_TYPE: NORMAL
MDC_IDC_SESS_CLINIC_NAME: NORMAL
MDC_IDC_SESS_DTM: NORMAL
MDC_IDC_SESS_TYPE: NORMAL
MDC_IDC_SET_BRADY_LOWRATE: 70 {BEATS}/MIN
MDC_IDC_SET_BRADY_MAX_SENSOR_RATE: 130 {BEATS}/MIN
MDC_IDC_SET_BRADY_MODE: NORMAL
MDC_IDC_SET_CRT_LVRV_DELAY: 0 MS
MDC_IDC_SET_CRT_PACED_CHAMBERS: NORMAL
MDC_IDC_SET_LEADCHNL_LV_PACING_AMPLITUDE: 2 V
MDC_IDC_SET_LEADCHNL_LV_PACING_ANODE_ELECTRODE_1: NORMAL
MDC_IDC_SET_LEADCHNL_LV_PACING_ANODE_LOCATION_1: NORMAL
MDC_IDC_SET_LEADCHNL_LV_PACING_CAPTURE_MODE: NORMAL
MDC_IDC_SET_LEADCHNL_LV_PACING_CATHODE_ELECTRODE_1: NORMAL
MDC_IDC_SET_LEADCHNL_LV_PACING_CATHODE_LOCATION_1: NORMAL
MDC_IDC_SET_LEADCHNL_LV_PACING_POLARITY: NORMAL
MDC_IDC_SET_LEADCHNL_LV_PACING_PULSEWIDTH: 0.5 MS
MDC_IDC_SET_LEADCHNL_RA_SENSING_ANODE_ELECTRODE_1: NORMAL
MDC_IDC_SET_LEADCHNL_RA_SENSING_ANODE_LOCATION_1: NORMAL
MDC_IDC_SET_LEADCHNL_RA_SENSING_CATHODE_ELECTRODE_1: NORMAL
MDC_IDC_SET_LEADCHNL_RA_SENSING_CATHODE_LOCATION_1: NORMAL
MDC_IDC_SET_LEADCHNL_RA_SENSING_POLARITY: NORMAL
MDC_IDC_SET_LEADCHNL_RA_SENSING_SENSITIVITY: NORMAL
MDC_IDC_SET_LEADCHNL_RV_PACING_AMPLITUDE: 2 V
MDC_IDC_SET_LEADCHNL_RV_PACING_ANODE_ELECTRODE_1: NORMAL
MDC_IDC_SET_LEADCHNL_RV_PACING_ANODE_LOCATION_1: NORMAL
MDC_IDC_SET_LEADCHNL_RV_PACING_CAPTURE_MODE: NORMAL
MDC_IDC_SET_LEADCHNL_RV_PACING_CATHODE_ELECTRODE_1: NORMAL
MDC_IDC_SET_LEADCHNL_RV_PACING_CATHODE_LOCATION_1: NORMAL
MDC_IDC_SET_LEADCHNL_RV_PACING_POLARITY: NORMAL
MDC_IDC_SET_LEADCHNL_RV_PACING_PULSEWIDTH: 0.4 MS
MDC_IDC_SET_LEADCHNL_RV_SENSING_ANODE_ELECTRODE_1: NORMAL
MDC_IDC_SET_LEADCHNL_RV_SENSING_ANODE_LOCATION_1: NORMAL
MDC_IDC_SET_LEADCHNL_RV_SENSING_CATHODE_ELECTRODE_1: NORMAL
MDC_IDC_SET_LEADCHNL_RV_SENSING_CATHODE_LOCATION_1: NORMAL
MDC_IDC_SET_LEADCHNL_RV_SENSING_POLARITY: NORMAL
MDC_IDC_SET_LEADCHNL_RV_SENSING_SENSITIVITY: 0.9 MV
MDC_IDC_SET_ZONE_DETECTION_INTERVAL: 400 MS
MDC_IDC_SET_ZONE_STATUS: NORMAL
MDC_IDC_SET_ZONE_TYPE: NORMAL
MDC_IDC_SET_ZONE_VENDOR_TYPE: NORMAL
MDC_IDC_STAT_BRADY_AP_VP_PERCENT: 0 %
MDC_IDC_STAT_BRADY_AP_VS_PERCENT: 0 %
MDC_IDC_STAT_BRADY_AS_VP_PERCENT: 99.97 %
MDC_IDC_STAT_BRADY_AS_VS_PERCENT: 0.03 %
MDC_IDC_STAT_BRADY_DTM_END: NORMAL
MDC_IDC_STAT_BRADY_DTM_START: NORMAL
MDC_IDC_STAT_BRADY_RA_PERCENT_PACED: 0 %
MDC_IDC_STAT_BRADY_RV_PERCENT_PACED: 99.97 %
MDC_IDC_STAT_CRT_DTM_END: NORMAL
MDC_IDC_STAT_CRT_DTM_START: NORMAL
MDC_IDC_STAT_CRT_LV_PERCENT_PACED: 99.94 %
MDC_IDC_STAT_CRT_PERCENT_PACED: 99.94 %
MDC_IDC_STAT_EPISODE_RECENT_COUNT: 0
MDC_IDC_STAT_EPISODE_RECENT_COUNT_DTM_END: NORMAL
MDC_IDC_STAT_EPISODE_RECENT_COUNT_DTM_START: NORMAL
MDC_IDC_STAT_EPISODE_TOTAL_COUNT: 0
MDC_IDC_STAT_EPISODE_TOTAL_COUNT: 1
MDC_IDC_STAT_EPISODE_TOTAL_COUNT: 8
MDC_IDC_STAT_EPISODE_TOTAL_COUNT_DTM_END: NORMAL
MDC_IDC_STAT_EPISODE_TOTAL_COUNT_DTM_START: NORMAL
MDC_IDC_STAT_EPISODE_TYPE: NORMAL
MDC_IDC_STAT_TACHYTHERAPY_RECENT_DTM_END: NORMAL
MDC_IDC_STAT_TACHYTHERAPY_RECENT_DTM_START: NORMAL
MDC_IDC_STAT_TACHYTHERAPY_TOTAL_DTM_END: NORMAL
MDC_IDC_STAT_TACHYTHERAPY_TOTAL_DTM_START: NORMAL

## 2024-05-09 DIAGNOSIS — I50.22 CHRONIC SYSTOLIC CONGESTIVE HEART FAILURE (H): ICD-10-CM

## 2024-05-09 RX ORDER — METOPROLOL SUCCINATE 100 MG/1
100 TABLET, EXTENDED RELEASE ORAL DAILY
Qty: 90 TABLET | Refills: 0 | Status: SHIPPED | OUTPATIENT
Start: 2024-05-09

## 2024-05-28 ENCOUNTER — LAB (OUTPATIENT)
Dept: LAB | Facility: CLINIC | Age: 64
End: 2024-05-28
Payer: COMMERCIAL

## 2024-05-28 ENCOUNTER — ANTICOAGULATION THERAPY VISIT (OUTPATIENT)
Dept: ANTICOAGULATION | Facility: CLINIC | Age: 64
End: 2024-05-28

## 2024-05-28 DIAGNOSIS — Z79.01 LONG TERM CURRENT USE OF ANTICOAGULANT THERAPY: ICD-10-CM

## 2024-05-28 DIAGNOSIS — I48.20 CHRONIC ATRIAL FIBRILLATION (H): Primary | ICD-10-CM

## 2024-05-28 DIAGNOSIS — I48.20 CHRONIC ATRIAL FIBRILLATION (H): ICD-10-CM

## 2024-05-28 LAB — INR BLD: 4.6 (ref 0.9–1.1)

## 2024-05-28 PROCEDURE — 36415 COLL VENOUS BLD VENIPUNCTURE: CPT

## 2024-05-28 PROCEDURE — 85610 PROTHROMBIN TIME: CPT

## 2024-05-28 NOTE — PROGRESS NOTES
ANTICOAGULATION MANAGEMENT     Mckay A Shadi 64 year old male is on warfarin with supratherapeutic INR result. (Goal INR 2.0-3.0)    Recent labs: (last 7 days)     05/28/24  1305   INR 4.6*       ASSESSMENT     Source(s): Chart Review and Patient/Caregiver Call     Warfarin doses taken: Warfarin taken as instructed  Diet: No new diet changes identified  Medication/supplement changes: None noted  New illness, injury, or hospitalization: No  Signs or symptoms of bleeding or clotting: No  Previous result: Therapeutic last 2(+) visits  Additional findings: None       PLAN     Recommended plan for no diet, medication or health factor changes affecting INR     Dosing Instructions: hold dose then decrease your warfarin dose (8.3% change) with next INR in 10 days       Summary  As of 5/28/2024      Full warfarin instructions:  5/28: Hold; Otherwise 3.75 mg every Sun, Tue, Fri; 7.5 mg all other days   Next INR check:  6/11/2024               Telephone call with Doyle who verbalizes understanding and agrees to plan    Patient elected to schedule next visit 6/11/24 ( 2 weeks)    Education provided:   Please call back if any changes to your diet, medications or how you've been taking warfarin    Plan made per [facility] anticoagulation protocol; closest % change with current tablet size made per protocol for for INR 4-4.9 (goal 2-3)    Gigi Castillo RN  Anticoagulation Clinic  5/28/2024    _______________________________________________________________________     Anticoagulation Episode Summary       Current INR goal:  2.0-3.0   TTR:  82.4% (1 y)   Target end date:  Indefinite   Send INR reminders to:  [facility]    Indications    Chronic atrial fibrillation (H) [I48.20]  Long-term (current) use of anticoagulants [Z79.01] [Z79.01]             Comments:               Anticoagulation Care Providers       Provider Role Specialty Phone number    David Almendarez MD Referring Internal Medicine [phone]

## 2024-06-11 ENCOUNTER — ANTICOAGULATION THERAPY VISIT (OUTPATIENT)
Dept: ANTICOAGULATION | Facility: CLINIC | Age: 64
End: 2024-06-11

## 2024-06-11 ENCOUNTER — LAB (OUTPATIENT)
Dept: LAB | Facility: CLINIC | Age: 64
End: 2024-06-11
Payer: COMMERCIAL

## 2024-06-11 DIAGNOSIS — Z79.01 LONG TERM CURRENT USE OF ANTICOAGULANT THERAPY: ICD-10-CM

## 2024-06-11 DIAGNOSIS — I48.20 CHRONIC ATRIAL FIBRILLATION (H): ICD-10-CM

## 2024-06-11 DIAGNOSIS — I48.20 CHRONIC ATRIAL FIBRILLATION (H): Primary | ICD-10-CM

## 2024-06-11 LAB — INR BLD: 2.8 (ref 0.9–1.1)

## 2024-06-11 PROCEDURE — 36416 COLLJ CAPILLARY BLOOD SPEC: CPT

## 2024-06-11 PROCEDURE — 85610 PROTHROMBIN TIME: CPT

## 2024-06-11 NOTE — PROGRESS NOTES
"ANTICOAGULATION MANAGEMENT     Mckay A Shadi 64 year old male is on warfarin with therapeutic INR result. (Goal INR 2.0-3.0)    Recent labs: (last 7 days)     06/11/24  1303   INR 2.8*       ASSESSMENT     Source(s): Chart Review and Patient/Caregiver Call     Warfarin doses taken: Warfarin taken as instructed  Diet: No new diet changes identified  Medication/supplement changes: None noted  New illness, injury, or hospitalization: No  Signs or symptoms of bleeding or clotting: No  Previous result: Supratherapeutic at 4.6 with no clear factors identified, so 1 dose held then weekly dose reduced 8.3%  Additional findings: None       PLAN     Recommended plan for no diet, medication or health factor changes affecting INR     Dosing Instructions: Continue your current warfarin dose with next INR in 3 weeks       Summary  As of 6/11/2024      Full warfarin instructions:  3.75 mg every Sun, Tue, Fri; 7.5 mg all other days   Next INR check:  7/9/2024               Telephone call with Doyle who verbalizes understanding and agrees to plan    Patient elected to schedule next visit in 4 weeks rather than recommended 3. Kept priority for follow-up at \"high\".    Education provided:   Please call back if any changes to your diet, medications or how you've been taking warfarin  Goal range and lab monitoring: Importance of following up at instructed interval  Contact [phone]  with any changes, questions or concerns.     Plan made per [facility] anticoagulation protocol    Pastora Valenzuela RN  Anticoagulation Clinic  6/11/2024    _______________________________________________________________________     Anticoagulation Episode Summary       Current INR goal:  2.0-3.0   TTR:  79.0% (1 y)   Target end date:  Indefinite   Send INR reminders to:  [facility]    Indications    Chronic atrial fibrillation (H) [I48.20]  Long-term (current) use of anticoagulants [Z79.01] [Z79.01]             Comments:           "     Anticoagulation Care Providers       Provider Role Specialty Phone number    David Almendarez MD Referring Internal Medicine [phone]

## 2024-07-06 ENCOUNTER — ANESTHESIA (OUTPATIENT)
Dept: SURGERY | Facility: CLINIC | Age: 64
End: 2024-07-06
Payer: COMMERCIAL

## 2024-07-06 ENCOUNTER — ANESTHESIA EVENT (OUTPATIENT)
Dept: SURGERY | Facility: CLINIC | Age: 64
End: 2024-07-06
Payer: COMMERCIAL

## 2024-07-06 ENCOUNTER — HOSPITAL ENCOUNTER (INPATIENT)
Facility: CLINIC | Age: 64
LOS: 6 days | Discharge: HOME | End: 2024-07-12
Attending: EMERGENCY MEDICINE | Admitting: STUDENT IN AN ORGANIZED HEALTH CARE EDUCATION/TRAINING PROGRAM
Payer: COMMERCIAL

## 2024-07-06 ENCOUNTER — APPOINTMENT (OUTPATIENT)
Dept: GENERAL RADIOLOGY | Facility: CLINIC | Age: 64
End: 2024-07-06
Attending: EMERGENCY MEDICINE
Payer: COMMERCIAL

## 2024-07-06 ENCOUNTER — APPOINTMENT (OUTPATIENT)
Dept: CT IMAGING | Facility: CLINIC | Age: 64
End: 2024-07-06
Attending: EMERGENCY MEDICINE
Payer: COMMERCIAL

## 2024-07-06 ENCOUNTER — APPOINTMENT (OUTPATIENT)
Dept: ULTRASOUND IMAGING | Facility: CLINIC | Age: 64
End: 2024-07-06
Attending: PODIATRIST
Payer: COMMERCIAL

## 2024-07-06 DIAGNOSIS — N17.9 ACUTE KIDNEY INJURY (H): ICD-10-CM

## 2024-07-06 DIAGNOSIS — T07.XXXA MULTIPLE CONTUSIONS: ICD-10-CM

## 2024-07-06 DIAGNOSIS — I48.91 NEW ONSET ATRIAL FIBRILLATION (H): ICD-10-CM

## 2024-07-06 DIAGNOSIS — R79.1 SUPRATHERAPEUTIC INR: ICD-10-CM

## 2024-07-06 DIAGNOSIS — M86.9 OSTEOMYELITIS OF GREAT TOE OF RIGHT FOOT (H): ICD-10-CM

## 2024-07-06 DIAGNOSIS — Z79.4 TYPE 2 DIABETES MELLITUS WITHOUT COMPLICATION, WITH LONG-TERM CURRENT USE OF INSULIN (H): ICD-10-CM

## 2024-07-06 DIAGNOSIS — K59.09 OTHER CONSTIPATION: ICD-10-CM

## 2024-07-06 DIAGNOSIS — E11.9 TYPE 2 DIABETES MELLITUS WITHOUT COMPLICATION, WITH LONG-TERM CURRENT USE OF INSULIN (H): ICD-10-CM

## 2024-07-06 DIAGNOSIS — L29.9 ITCHING: ICD-10-CM

## 2024-07-06 DIAGNOSIS — L03.115 CELLULITIS OF RIGHT LOWER EXTREMITY: ICD-10-CM

## 2024-07-06 DIAGNOSIS — B95.61 MSSA BACTEREMIA: Primary | ICD-10-CM

## 2024-07-06 DIAGNOSIS — T79.6XXA TRAUMATIC RHABDOMYOLYSIS, INITIAL ENCOUNTER (H): ICD-10-CM

## 2024-07-06 DIAGNOSIS — R78.81 MSSA BACTEREMIA: Primary | ICD-10-CM

## 2024-07-06 DIAGNOSIS — M25.551 RIGHT HIP PAIN: ICD-10-CM

## 2024-07-06 LAB
ALBUMIN SERPL BCG-MCNC: 3 G/DL (ref 3.5–5.2)
ALP SERPL-CCNC: 108 U/L (ref 40–150)
ALT SERPL W P-5'-P-CCNC: 37 U/L (ref 0–70)
ANION GAP SERPL CALCULATED.3IONS-SCNC: 14 MMOL/L (ref 7–15)
ANION GAP SERPL CALCULATED.3IONS-SCNC: 15 MMOL/L (ref 7–15)
AST SERPL W P-5'-P-CCNC: 83 U/L (ref 0–45)
ATRIAL RATE - MUSE: NORMAL BPM
B-OH-BUTYR SERPL-SCNC: 0.4 MMOL/L
BACTERIA SPEC CULT: ABNORMAL
BASE EXCESS BLDV CALC-SCNC: -5 MMOL/L (ref -3–3)
BASOPHILS # BLD AUTO: 0 10E3/UL (ref 0–0.2)
BASOPHILS NFR BLD AUTO: 0 %
BILIRUB SERPL-MCNC: 0.6 MG/DL
BUN SERPL-MCNC: 40.3 MG/DL (ref 8–23)
BUN SERPL-MCNC: 47 MG/DL (ref 8–23)
CALCIUM SERPL-MCNC: 8.3 MG/DL (ref 8.8–10.2)
CALCIUM SERPL-MCNC: 9 MG/DL (ref 8.8–10.2)
CHLORIDE SERPL-SCNC: 101 MMOL/L (ref 98–107)
CHLORIDE SERPL-SCNC: 93 MMOL/L (ref 98–107)
CK SERPL-CCNC: 1243 U/L (ref 39–308)
CK SERPL-CCNC: 3076 U/L (ref 39–308)
CREAT SERPL-MCNC: 2.64 MG/DL (ref 0.67–1.17)
CREAT SERPL-MCNC: 3.05 MG/DL (ref 0.67–1.17)
CRP SERPL-MCNC: 334.73 MG/L
DEPRECATED HCO3 PLAS-SCNC: 18 MMOL/L (ref 22–29)
DEPRECATED HCO3 PLAS-SCNC: 20 MMOL/L (ref 22–29)
DIASTOLIC BLOOD PRESSURE - MUSE: NORMAL MMHG
EGFRCR SERPLBLD CKD-EPI 2021: 22 ML/MIN/1.73M2
EGFRCR SERPLBLD CKD-EPI 2021: 26 ML/MIN/1.73M2
EOSINOPHIL # BLD AUTO: 0 10E3/UL (ref 0–0.7)
EOSINOPHIL NFR BLD AUTO: 0 %
ERYTHROCYTE [DISTWIDTH] IN BLOOD BY AUTOMATED COUNT: 13.7 % (ref 10–15)
ERYTHROCYTE [SEDIMENTATION RATE] IN BLOOD BY WESTERGREN METHOD: 108 MM/HR (ref 0–20)
FERRITIN SERPL-MCNC: 935 NG/ML (ref 31–409)
GLUCOSE BLDC GLUCOMTR-MCNC: 150 MG/DL (ref 70–99)
GLUCOSE BLDC GLUCOMTR-MCNC: 158 MG/DL (ref 70–99)
GLUCOSE BLDC GLUCOMTR-MCNC: 185 MG/DL (ref 70–99)
GLUCOSE BLDC GLUCOMTR-MCNC: 198 MG/DL (ref 70–99)
GLUCOSE SERPL-MCNC: 164 MG/DL (ref 70–99)
GLUCOSE SERPL-MCNC: 214 MG/DL (ref 70–99)
GRAM STAIN RESULT: ABNORMAL
GRAM STAIN RESULT: ABNORMAL
HBA1C MFR BLD: 7.6 %
HCO3 BLDV-SCNC: 20 MMOL/L (ref 21–28)
HCT VFR BLD AUTO: 35.2 % (ref 40–53)
HGB BLD-MCNC: 11 G/DL (ref 13.3–17.7)
HGB BLD-MCNC: 11.9 G/DL (ref 13.3–17.7)
HOLD SPECIMEN: NORMAL
IMM GRANULOCYTES # BLD: 0.1 10E3/UL
IMM GRANULOCYTES NFR BLD: 1 %
INR PPP: 2.75 (ref 0.85–1.15)
INR PPP: 3.32 (ref 0.85–1.15)
INR PPP: >10 (ref 0.85–1.15)
INTERPRETATION ECG - MUSE: NORMAL
IRON BINDING CAPACITY (ROCHE): 137 UG/DL (ref 240–430)
IRON SATN MFR SERPL: 12 % (ref 15–46)
IRON SERPL-MCNC: 16 UG/DL (ref 61–157)
LACTATE BLD-SCNC: 1.8 MMOL/L
LYMPHOCYTES # BLD AUTO: 1.4 10E3/UL (ref 0.8–5.3)
LYMPHOCYTES NFR BLD AUTO: 9 %
MAGNESIUM SERPL-MCNC: 2.5 MG/DL (ref 1.7–2.3)
MCH RBC QN AUTO: 29.5 PG (ref 26.5–33)
MCHC RBC AUTO-ENTMCNC: 33.8 G/DL (ref 31.5–36.5)
MCV RBC AUTO: 87 FL (ref 78–100)
MONOCYTES # BLD AUTO: 1.2 10E3/UL (ref 0–1.3)
MONOCYTES NFR BLD AUTO: 7 %
NEUTROPHILS # BLD AUTO: 13.3 10E3/UL (ref 1.6–8.3)
NEUTROPHILS NFR BLD AUTO: 83 %
NRBC # BLD AUTO: 0 10E3/UL
NRBC BLD AUTO-RTO: 0 /100
P AXIS - MUSE: NORMAL DEGREES
PCO2 BLDV: 37 MM HG (ref 40–50)
PH BLDV: 7.34 [PH] (ref 7.32–7.43)
PLATELET # BLD AUTO: 481 10E3/UL (ref 150–450)
PO2 BLDV: 23 MM HG (ref 25–47)
POTASSIUM SERPL-SCNC: 3.6 MMOL/L (ref 3.4–5.3)
POTASSIUM SERPL-SCNC: 3.9 MMOL/L (ref 3.4–5.3)
PR INTERVAL - MUSE: NORMAL MS
PROT SERPL-MCNC: 7.5 G/DL (ref 6.4–8.3)
QRS DURATION - MUSE: 132 MS
QT - MUSE: 496 MS
QTC - MUSE: 535 MS
R AXIS - MUSE: -42 DEGREES
RBC # BLD AUTO: 4.04 10E6/UL (ref 4.4–5.9)
RETICS # AUTO: 0.03 10E6/UL (ref 0.03–0.1)
RETICS/RBC NFR AUTO: 0.8 % (ref 0.5–2)
SAO2 % BLDV: 37 % (ref 70–75)
SODIUM SERPL-SCNC: 128 MMOL/L (ref 135–145)
SODIUM SERPL-SCNC: 133 MMOL/L (ref 135–145)
SYSTOLIC BLOOD PRESSURE - MUSE: NORMAL MMHG
T AXIS - MUSE: 71 DEGREES
VENTRICULAR RATE- MUSE: 70 BPM
WBC # BLD AUTO: 16 10E3/UL (ref 4–11)

## 2024-07-06 PROCEDURE — 258N000003 HC RX IP 258 OP 636: Performed by: STUDENT IN AN ORGANIZED HEALTH CARE EDUCATION/TRAINING PROGRAM

## 2024-07-06 PROCEDURE — 0Y6M0Z4 DETACHMENT AT RIGHT FOOT, COMPLETE 1ST RAY, OPEN APPROACH: ICD-10-PCS | Performed by: PODIATRIST

## 2024-07-06 PROCEDURE — 999N000141 HC STATISTIC PRE-PROCEDURE NURSING ASSESSMENT: Performed by: PODIATRIST

## 2024-07-06 PROCEDURE — 83550 IRON BINDING TEST: CPT | Performed by: STUDENT IN AN ORGANIZED HEALTH CARE EDUCATION/TRAINING PROGRAM

## 2024-07-06 PROCEDURE — 28122 PARTIAL REMOVAL OF FOOT BONE: CPT | Performed by: ANESTHESIOLOGY

## 2024-07-06 PROCEDURE — 87205 SMEAR GRAM STAIN: CPT | Performed by: PODIATRIST

## 2024-07-06 PROCEDURE — 85610 PROTHROMBIN TIME: CPT | Performed by: STUDENT IN AN ORGANIZED HEALTH CARE EDUCATION/TRAINING PROGRAM

## 2024-07-06 PROCEDURE — 250N000011 HC RX IP 250 OP 636: Performed by: STUDENT IN AN ORGANIZED HEALTH CARE EDUCATION/TRAINING PROGRAM

## 2024-07-06 PROCEDURE — 93922 UPR/L XTREMITY ART 2 LEVELS: CPT

## 2024-07-06 PROCEDURE — 99222 1ST HOSP IP/OBS MODERATE 55: CPT | Mod: 57 | Performed by: PODIATRIST

## 2024-07-06 PROCEDURE — 86140 C-REACTIVE PROTEIN: CPT | Performed by: EMERGENCY MEDICINE

## 2024-07-06 PROCEDURE — 88305 TISSUE EXAM BY PATHOLOGIST: CPT | Mod: 26 | Performed by: PATHOLOGY

## 2024-07-06 PROCEDURE — 87070 CULTURE OTHR SPECIMN AEROBIC: CPT | Performed by: PODIATRIST

## 2024-07-06 PROCEDURE — 85045 AUTOMATED RETICULOCYTE COUNT: CPT | Performed by: STUDENT IN AN ORGANIZED HEALTH CARE EDUCATION/TRAINING PROGRAM

## 2024-07-06 PROCEDURE — 85652 RBC SED RATE AUTOMATED: CPT | Performed by: EMERGENCY MEDICINE

## 2024-07-06 PROCEDURE — 88311 DECALCIFY TISSUE: CPT | Mod: 26 | Performed by: PATHOLOGY

## 2024-07-06 PROCEDURE — 370N000017 HC ANESTHESIA TECHNICAL FEE, PER MIN: Performed by: PODIATRIST

## 2024-07-06 PROCEDURE — 83036 HEMOGLOBIN GLYCOSYLATED A1C: CPT | Performed by: STUDENT IN AN ORGANIZED HEALTH CARE EDUCATION/TRAINING PROGRAM

## 2024-07-06 PROCEDURE — 36415 COLL VENOUS BLD VENIPUNCTURE: CPT | Performed by: STUDENT IN AN ORGANIZED HEALTH CARE EDUCATION/TRAINING PROGRAM

## 2024-07-06 PROCEDURE — 73502 X-RAY EXAM HIP UNI 2-3 VIEWS: CPT

## 2024-07-06 PROCEDURE — 83735 ASSAY OF MAGNESIUM: CPT | Performed by: STUDENT IN AN ORGANIZED HEALTH CARE EDUCATION/TRAINING PROGRAM

## 2024-07-06 PROCEDURE — 87075 CULTR BACTERIA EXCEPT BLOOD: CPT | Performed by: PODIATRIST

## 2024-07-06 PROCEDURE — 82728 ASSAY OF FERRITIN: CPT | Performed by: STUDENT IN AN ORGANIZED HEALTH CARE EDUCATION/TRAINING PROGRAM

## 2024-07-06 PROCEDURE — 250N000012 HC RX MED GY IP 250 OP 636 PS 637: Performed by: STUDENT IN AN ORGANIZED HEALTH CARE EDUCATION/TRAINING PROGRAM

## 2024-07-06 PROCEDURE — 82040 ASSAY OF SERUM ALBUMIN: CPT | Performed by: EMERGENCY MEDICINE

## 2024-07-06 PROCEDURE — 82010 KETONE BODYS QUAN: CPT | Performed by: STUDENT IN AN ORGANIZED HEALTH CARE EDUCATION/TRAINING PROGRAM

## 2024-07-06 PROCEDURE — 36415 COLL VENOUS BLD VENIPUNCTURE: CPT | Performed by: EMERGENCY MEDICINE

## 2024-07-06 PROCEDURE — 272N000001 HC OR GENERAL SUPPLY STERILE: Performed by: PODIATRIST

## 2024-07-06 PROCEDURE — 120N000001 HC R&B MED SURG/OB

## 2024-07-06 PROCEDURE — 85018 HEMOGLOBIN: CPT | Performed by: STUDENT IN AN ORGANIZED HEALTH CARE EDUCATION/TRAINING PROGRAM

## 2024-07-06 PROCEDURE — 710N000009 HC RECOVERY PHASE 1, LEVEL 1, PER MIN: Performed by: PODIATRIST

## 2024-07-06 PROCEDURE — 250N000011 HC RX IP 250 OP 636: Performed by: PODIATRIST

## 2024-07-06 PROCEDURE — 250N000009 HC RX 250: Performed by: PODIATRIST

## 2024-07-06 PROCEDURE — 250N000011 HC RX IP 250 OP 636: Performed by: EMERGENCY MEDICINE

## 2024-07-06 PROCEDURE — 250N000013 HC RX MED GY IP 250 OP 250 PS 637: Performed by: PODIATRIST

## 2024-07-06 PROCEDURE — 96375 TX/PRO/DX INJ NEW DRUG ADDON: CPT

## 2024-07-06 PROCEDURE — 250N000011 HC RX IP 250 OP 636

## 2024-07-06 PROCEDURE — 99285 EMERGENCY DEPT VISIT HI MDM: CPT | Mod: 25

## 2024-07-06 PROCEDURE — 85025 COMPLETE CBC W/AUTO DIFF WBC: CPT | Performed by: EMERGENCY MEDICINE

## 2024-07-06 PROCEDURE — 250N000009 HC RX 250

## 2024-07-06 PROCEDURE — 96367 TX/PROPH/DG ADDL SEQ IV INF: CPT

## 2024-07-06 PROCEDURE — 71250 CT THORAX DX C-: CPT

## 2024-07-06 PROCEDURE — 258N000003 HC RX IP 258 OP 636: Performed by: EMERGENCY MEDICINE

## 2024-07-06 PROCEDURE — 82550 ASSAY OF CK (CPK): CPT | Performed by: EMERGENCY MEDICINE

## 2024-07-06 PROCEDURE — 258N000003 HC RX IP 258 OP 636

## 2024-07-06 PROCEDURE — 99223 1ST HOSP IP/OBS HIGH 75: CPT | Performed by: STUDENT IN AN ORGANIZED HEALTH CARE EDUCATION/TRAINING PROGRAM

## 2024-07-06 PROCEDURE — 28122 PARTIAL REMOVAL OF FOOT BONE: CPT | Mod: RT | Performed by: PODIATRIST

## 2024-07-06 PROCEDURE — 88311 DECALCIFY TISSUE: CPT | Mod: TC | Performed by: PODIATRIST

## 2024-07-06 PROCEDURE — 96366 THER/PROPH/DIAG IV INF ADDON: CPT

## 2024-07-06 PROCEDURE — 82550 ASSAY OF CK (CPK): CPT | Performed by: STUDENT IN AN ORGANIZED HEALTH CARE EDUCATION/TRAINING PROGRAM

## 2024-07-06 PROCEDURE — 360N000076 HC SURGERY LEVEL 3, PER MIN: Performed by: PODIATRIST

## 2024-07-06 PROCEDURE — 93005 ELECTROCARDIOGRAM TRACING: CPT

## 2024-07-06 PROCEDURE — 99254 IP/OBS CNSLTJ NEW/EST MOD 60: CPT | Performed by: SPECIALIST

## 2024-07-06 PROCEDURE — 73560 X-RAY EXAM OF KNEE 1 OR 2: CPT | Mod: 50

## 2024-07-06 PROCEDURE — 271N000001 HC OR GENERAL SUPPLY NON-STERILE: Performed by: PODIATRIST

## 2024-07-06 PROCEDURE — 73630 X-RAY EXAM OF FOOT: CPT | Mod: RT

## 2024-07-06 PROCEDURE — 82803 BLOOD GASES ANY COMBINATION: CPT

## 2024-07-06 PROCEDURE — 85610 PROTHROMBIN TIME: CPT | Performed by: EMERGENCY MEDICINE

## 2024-07-06 PROCEDURE — 250N000013 HC RX MED GY IP 250 OP 250 PS 637: Performed by: STUDENT IN AN ORGANIZED HEALTH CARE EDUCATION/TRAINING PROGRAM

## 2024-07-06 PROCEDURE — 96365 THER/PROPH/DIAG IV INF INIT: CPT

## 2024-07-06 PROCEDURE — 87149 DNA/RNA DIRECT PROBE: CPT | Performed by: EMERGENCY MEDICINE

## 2024-07-06 PROCEDURE — 87186 SC STD MICRODIL/AGAR DIL: CPT | Performed by: EMERGENCY MEDICINE

## 2024-07-06 PROCEDURE — 28122 PARTIAL REMOVAL OF FOOT BONE: CPT

## 2024-07-06 RX ORDER — PIPERACILLIN SODIUM, TAZOBACTAM SODIUM 3; .375 G/15ML; G/15ML
3.38 INJECTION, POWDER, LYOPHILIZED, FOR SOLUTION INTRAVENOUS EVERY 6 HOURS
Status: DISCONTINUED | OUTPATIENT
Start: 2024-07-07 | End: 2024-07-08

## 2024-07-06 RX ORDER — NICOTINE POLACRILEX 4 MG
15-30 LOZENGE BUCCAL
Status: DISCONTINUED | OUTPATIENT
Start: 2024-07-06 | End: 2024-07-12 | Stop reason: HOSPADM

## 2024-07-06 RX ORDER — HYDRALAZINE HYDROCHLORIDE 10 MG/1
10 TABLET, FILM COATED ORAL EVERY 4 HOURS PRN
Status: DISCONTINUED | OUTPATIENT
Start: 2024-07-06 | End: 2024-07-12 | Stop reason: HOSPADM

## 2024-07-06 RX ORDER — HYDROMORPHONE HCL IN WATER/PF 6 MG/30 ML
0.4 PATIENT CONTROLLED ANALGESIA SYRINGE INTRAVENOUS EVERY 5 MIN PRN
Status: DISCONTINUED | OUTPATIENT
Start: 2024-07-06 | End: 2024-07-06 | Stop reason: HOSPADM

## 2024-07-06 RX ORDER — LIDOCAINE 40 MG/G
CREAM TOPICAL
Status: DISCONTINUED | OUTPATIENT
Start: 2024-07-06 | End: 2024-07-12 | Stop reason: HOSPADM

## 2024-07-06 RX ORDER — NALOXONE HYDROCHLORIDE 0.4 MG/ML
0.1 INJECTION, SOLUTION INTRAMUSCULAR; INTRAVENOUS; SUBCUTANEOUS
Status: DISCONTINUED | OUTPATIENT
Start: 2024-07-06 | End: 2024-07-06 | Stop reason: HOSPADM

## 2024-07-06 RX ORDER — PROPOFOL 10 MG/ML
INJECTION, EMULSION INTRAVENOUS PRN
Status: DISCONTINUED | OUTPATIENT
Start: 2024-07-06 | End: 2024-07-06

## 2024-07-06 RX ORDER — LIDOCAINE 40 MG/G
CREAM TOPICAL
Status: DISCONTINUED | OUTPATIENT
Start: 2024-07-06 | End: 2024-07-06 | Stop reason: HOSPADM

## 2024-07-06 RX ORDER — FENTANYL CITRATE 0.05 MG/ML
50 INJECTION, SOLUTION INTRAMUSCULAR; INTRAVENOUS EVERY 5 MIN PRN
Status: DISCONTINUED | OUTPATIENT
Start: 2024-07-06 | End: 2024-07-06 | Stop reason: HOSPADM

## 2024-07-06 RX ORDER — ONDANSETRON 4 MG/1
4 TABLET, ORALLY DISINTEGRATING ORAL EVERY 30 MIN PRN
Status: DISCONTINUED | OUTPATIENT
Start: 2024-07-06 | End: 2024-07-06 | Stop reason: HOSPADM

## 2024-07-06 RX ORDER — CLINDAMYCIN PHOSPHATE 900 MG/50ML
900 INJECTION, SOLUTION INTRAVENOUS EVERY 8 HOURS
Status: DISCONTINUED | OUTPATIENT
Start: 2024-07-06 | End: 2024-07-08

## 2024-07-06 RX ORDER — ONDANSETRON 4 MG/1
4 TABLET, ORALLY DISINTEGRATING ORAL EVERY 6 HOURS PRN
Status: DISCONTINUED | OUTPATIENT
Start: 2024-07-06 | End: 2024-07-06

## 2024-07-06 RX ORDER — CLINDAMYCIN PHOSPHATE 600 MG/50ML
600 INJECTION, SOLUTION INTRAVENOUS EVERY 8 HOURS
Status: DISCONTINUED | OUTPATIENT
Start: 2024-07-06 | End: 2024-07-06

## 2024-07-06 RX ORDER — NALOXONE HYDROCHLORIDE 0.4 MG/ML
0.4 INJECTION, SOLUTION INTRAMUSCULAR; INTRAVENOUS; SUBCUTANEOUS
Status: DISCONTINUED | OUTPATIENT
Start: 2024-07-06 | End: 2024-07-12 | Stop reason: HOSPADM

## 2024-07-06 RX ORDER — BUPIVACAINE HYDROCHLORIDE 5 MG/ML
INJECTION, SOLUTION EPIDURAL; INTRACAUDAL PRN
Status: DISCONTINUED | OUTPATIENT
Start: 2024-07-06 | End: 2024-07-06 | Stop reason: HOSPADM

## 2024-07-06 RX ORDER — ACETAMINOPHEN 325 MG/1
975 TABLET ORAL 3 TIMES DAILY
Status: DISCONTINUED | OUTPATIENT
Start: 2024-07-06 | End: 2024-07-06

## 2024-07-06 RX ORDER — PROPOFOL 10 MG/ML
INJECTION, EMULSION INTRAVENOUS CONTINUOUS PRN
Status: DISCONTINUED | OUTPATIENT
Start: 2024-07-06 | End: 2024-07-06

## 2024-07-06 RX ORDER — AMOXICILLIN 250 MG
1 CAPSULE ORAL 2 TIMES DAILY PRN
Status: DISCONTINUED | OUTPATIENT
Start: 2024-07-06 | End: 2024-07-12 | Stop reason: HOSPADM

## 2024-07-06 RX ORDER — DEXTROSE MONOHYDRATE 25 G/50ML
25-50 INJECTION, SOLUTION INTRAVENOUS
Status: DISCONTINUED | OUTPATIENT
Start: 2024-07-06 | End: 2024-07-12 | Stop reason: HOSPADM

## 2024-07-06 RX ORDER — POLYETHYLENE GLYCOL 3350 17 G/17G
17 POWDER, FOR SOLUTION ORAL DAILY
Status: DISCONTINUED | OUTPATIENT
Start: 2024-07-07 | End: 2024-07-12 | Stop reason: HOSPADM

## 2024-07-06 RX ORDER — FENTANYL CITRATE 0.05 MG/ML
25 INJECTION, SOLUTION INTRAMUSCULAR; INTRAVENOUS EVERY 5 MIN PRN
Status: DISCONTINUED | OUTPATIENT
Start: 2024-07-06 | End: 2024-07-06 | Stop reason: HOSPADM

## 2024-07-06 RX ORDER — DEXAMETHASONE SODIUM PHOSPHATE 4 MG/ML
4 INJECTION, SOLUTION INTRA-ARTICULAR; INTRALESIONAL; INTRAMUSCULAR; INTRAVENOUS; SOFT TISSUE
Status: DISCONTINUED | OUTPATIENT
Start: 2024-07-06 | End: 2024-07-06 | Stop reason: HOSPADM

## 2024-07-06 RX ORDER — HYDROMORPHONE HYDROCHLORIDE 1 MG/ML
0.3 INJECTION, SOLUTION INTRAMUSCULAR; INTRAVENOUS; SUBCUTANEOUS ONCE
Status: COMPLETED | OUTPATIENT
Start: 2024-07-06 | End: 2024-07-06

## 2024-07-06 RX ORDER — PIPERACILLIN SODIUM, TAZOBACTAM SODIUM 4; .5 G/20ML; G/20ML
4.5 INJECTION, POWDER, LYOPHILIZED, FOR SOLUTION INTRAVENOUS EVERY 6 HOURS
Status: DISCONTINUED | OUTPATIENT
Start: 2024-07-06 | End: 2024-07-06

## 2024-07-06 RX ORDER — ONDANSETRON 2 MG/ML
4 INJECTION INTRAMUSCULAR; INTRAVENOUS EVERY 6 HOURS PRN
Status: DISCONTINUED | OUTPATIENT
Start: 2024-07-06 | End: 2024-07-06

## 2024-07-06 RX ORDER — AMOXICILLIN 250 MG
1 CAPSULE ORAL 2 TIMES DAILY
Status: DISCONTINUED | OUTPATIENT
Start: 2024-07-06 | End: 2024-07-12 | Stop reason: HOSPADM

## 2024-07-06 RX ORDER — PIPERACILLIN SODIUM, TAZOBACTAM SODIUM 4; .5 G/20ML; G/20ML
4.5 INJECTION, POWDER, LYOPHILIZED, FOR SOLUTION INTRAVENOUS ONCE
Status: COMPLETED | OUTPATIENT
Start: 2024-07-06 | End: 2024-07-06

## 2024-07-06 RX ORDER — NALOXONE HYDROCHLORIDE 0.4 MG/ML
0.2 INJECTION, SOLUTION INTRAMUSCULAR; INTRAVENOUS; SUBCUTANEOUS
Status: DISCONTINUED | OUTPATIENT
Start: 2024-07-06 | End: 2024-07-12 | Stop reason: HOSPADM

## 2024-07-06 RX ORDER — SODIUM CHLORIDE, SODIUM LACTATE, POTASSIUM CHLORIDE, CALCIUM CHLORIDE 600; 310; 30; 20 MG/100ML; MG/100ML; MG/100ML; MG/100ML
INJECTION, SOLUTION INTRAVENOUS CONTINUOUS
Status: DISCONTINUED | OUTPATIENT
Start: 2024-07-06 | End: 2024-07-06 | Stop reason: HOSPADM

## 2024-07-06 RX ORDER — HYDROMORPHONE HCL IN WATER/PF 6 MG/30 ML
0.2 PATIENT CONTROLLED ANALGESIA SYRINGE INTRAVENOUS EVERY 5 MIN PRN
Status: DISCONTINUED | OUTPATIENT
Start: 2024-07-06 | End: 2024-07-06 | Stop reason: HOSPADM

## 2024-07-06 RX ORDER — CALCIUM CARBONATE 500 MG/1
1000 TABLET, CHEWABLE ORAL 4 TIMES DAILY PRN
Status: DISCONTINUED | OUTPATIENT
Start: 2024-07-06 | End: 2024-07-12 | Stop reason: HOSPADM

## 2024-07-06 RX ORDER — PROCHLORPERAZINE MALEATE 10 MG
10 TABLET ORAL EVERY 6 HOURS PRN
Status: DISCONTINUED | OUTPATIENT
Start: 2024-07-06 | End: 2024-07-09

## 2024-07-06 RX ORDER — SODIUM CHLORIDE, SODIUM LACTATE, POTASSIUM CHLORIDE, CALCIUM CHLORIDE 600; 310; 30; 20 MG/100ML; MG/100ML; MG/100ML; MG/100ML
INJECTION, SOLUTION INTRAVENOUS CONTINUOUS PRN
Status: DISCONTINUED | OUTPATIENT
Start: 2024-07-06 | End: 2024-07-06

## 2024-07-06 RX ORDER — BISACODYL 10 MG
10 SUPPOSITORY, RECTAL RECTAL DAILY PRN
Status: DISCONTINUED | OUTPATIENT
Start: 2024-07-09 | End: 2024-07-12 | Stop reason: HOSPADM

## 2024-07-06 RX ORDER — SODIUM CHLORIDE 9 MG/ML
INJECTION, SOLUTION INTRAVENOUS CONTINUOUS
Status: DISCONTINUED | OUTPATIENT
Start: 2024-07-06 | End: 2024-07-11

## 2024-07-06 RX ORDER — ACETAMINOPHEN 325 MG/1
650 TABLET ORAL EVERY 4 HOURS PRN
Status: DISCONTINUED | OUTPATIENT
Start: 2024-07-09 | End: 2024-07-12 | Stop reason: HOSPADM

## 2024-07-06 RX ORDER — LIDOCAINE HYDROCHLORIDE 20 MG/ML
INJECTION, SOLUTION INFILTRATION; PERINEURAL PRN
Status: DISCONTINUED | OUTPATIENT
Start: 2024-07-06 | End: 2024-07-06

## 2024-07-06 RX ORDER — AMOXICILLIN 250 MG
2 CAPSULE ORAL 2 TIMES DAILY PRN
Status: DISCONTINUED | OUTPATIENT
Start: 2024-07-06 | End: 2024-07-12 | Stop reason: HOSPADM

## 2024-07-06 RX ORDER — OXYCODONE HYDROCHLORIDE 5 MG/1
10 TABLET ORAL EVERY 4 HOURS PRN
Status: DISCONTINUED | OUTPATIENT
Start: 2024-07-06 | End: 2024-07-12 | Stop reason: HOSPADM

## 2024-07-06 RX ORDER — HYDRALAZINE HYDROCHLORIDE 20 MG/ML
10 INJECTION INTRAMUSCULAR; INTRAVENOUS EVERY 4 HOURS PRN
Status: DISCONTINUED | OUTPATIENT
Start: 2024-07-06 | End: 2024-07-12 | Stop reason: HOSPADM

## 2024-07-06 RX ORDER — ONDANSETRON 2 MG/ML
4 INJECTION INTRAMUSCULAR; INTRAVENOUS EVERY 30 MIN PRN
Status: DISCONTINUED | OUTPATIENT
Start: 2024-07-06 | End: 2024-07-06 | Stop reason: HOSPADM

## 2024-07-06 RX ORDER — ONDANSETRON 2 MG/ML
INJECTION INTRAMUSCULAR; INTRAVENOUS PRN
Status: DISCONTINUED | OUTPATIENT
Start: 2024-07-06 | End: 2024-07-06

## 2024-07-06 RX ORDER — OXYCODONE HYDROCHLORIDE 5 MG/1
5 TABLET ORAL EVERY 4 HOURS PRN
Status: DISCONTINUED | OUTPATIENT
Start: 2024-07-06 | End: 2024-07-12 | Stop reason: HOSPADM

## 2024-07-06 RX ORDER — MAGNESIUM HYDROXIDE 1200 MG/15ML
LIQUID ORAL PRN
Status: DISCONTINUED | OUTPATIENT
Start: 2024-07-06 | End: 2024-07-06 | Stop reason: HOSPADM

## 2024-07-06 RX ORDER — ACETAMINOPHEN 325 MG/1
975 TABLET ORAL EVERY 8 HOURS
Status: DISCONTINUED | OUTPATIENT
Start: 2024-07-06 | End: 2024-07-09

## 2024-07-06 RX ADMIN — SODIUM CHLORIDE, POTASSIUM CHLORIDE, SODIUM LACTATE AND CALCIUM CHLORIDE: 600; 310; 30; 20 INJECTION, SOLUTION INTRAVENOUS at 16:20

## 2024-07-06 RX ADMIN — PHENYLEPHRINE HYDROCHLORIDE 100 MCG: 10 INJECTION INTRAVENOUS at 18:05

## 2024-07-06 RX ADMIN — VANCOMYCIN HYDROCHLORIDE 2500 MG: 10 INJECTION, POWDER, LYOPHILIZED, FOR SOLUTION INTRAVENOUS at 04:25

## 2024-07-06 RX ADMIN — SODIUM CHLORIDE: 9 INJECTION, SOLUTION INTRAVENOUS at 20:13

## 2024-07-06 RX ADMIN — SENNOSIDES AND DOCUSATE SODIUM 1 TABLET: 50; 8.6 TABLET ORAL at 21:50

## 2024-07-06 RX ADMIN — PIPERACILLIN AND TAZOBACTAM 4.5 G: 4; .5 INJECTION, POWDER, FOR SOLUTION INTRAVENOUS at 03:56

## 2024-07-06 RX ADMIN — LIDOCAINE HYDROCHLORIDE 100 MG: 20 INJECTION, SOLUTION INFILTRATION; PERINEURAL at 17:47

## 2024-07-06 RX ADMIN — SODIUM CHLORIDE, POTASSIUM CHLORIDE, SODIUM LACTATE AND CALCIUM CHLORIDE: 600; 310; 30; 20 INJECTION, SOLUTION INTRAVENOUS at 17:47

## 2024-07-06 RX ADMIN — PHYTONADIONE 2 MG: 2 INJECTION, EMULSION INTRAMUSCULAR; INTRAVENOUS; SUBCUTANEOUS at 14:41

## 2024-07-06 RX ADMIN — PHENYLEPHRINE HYDROCHLORIDE 100 MCG: 10 INJECTION INTRAVENOUS at 17:54

## 2024-07-06 RX ADMIN — PIPERACILLIN AND TAZOBACTAM 4.5 G: 4; .5 INJECTION, POWDER, FOR SOLUTION INTRAVENOUS at 09:36

## 2024-07-06 RX ADMIN — PIPERACILLIN AND TAZOBACTAM 4.5 G: 4; .5 INJECTION, POWDER, FOR SOLUTION INTRAVENOUS at 17:01

## 2024-07-06 RX ADMIN — SODIUM CHLORIDE 1000 ML: 9 INJECTION, SOLUTION INTRAVENOUS at 04:03

## 2024-07-06 RX ADMIN — PHENYLEPHRINE HYDROCHLORIDE 100 MCG: 10 INJECTION INTRAVENOUS at 17:56

## 2024-07-06 RX ADMIN — OXYCODONE HYDROCHLORIDE 10 MG: 5 TABLET ORAL at 07:47

## 2024-07-06 RX ADMIN — PHENYLEPHRINE HYDROCHLORIDE 200 MCG: 10 INJECTION INTRAVENOUS at 18:00

## 2024-07-06 RX ADMIN — SODIUM CHLORIDE: 9 INJECTION, SOLUTION INTRAVENOUS at 09:25

## 2024-07-06 RX ADMIN — HYDROMORPHONE HYDROCHLORIDE 0.3 MG: 1 INJECTION, SOLUTION INTRAMUSCULAR; INTRAVENOUS; SUBCUTANEOUS at 06:33

## 2024-07-06 RX ADMIN — PIPERACILLIN AND TAZOBACTAM 4.5 G: 4; .5 INJECTION, POWDER, FOR SOLUTION INTRAVENOUS at 21:52

## 2024-07-06 RX ADMIN — PROPOFOL 50 MG: 10 INJECTION, EMULSION INTRAVENOUS at 17:47

## 2024-07-06 RX ADMIN — PHYTONADIONE 10 MG: 10 INJECTION, EMULSION INTRAMUSCULAR; INTRAVENOUS; SUBCUTANEOUS at 07:23

## 2024-07-06 RX ADMIN — SODIUM CHLORIDE 1000 ML: 9 INJECTION, SOLUTION INTRAVENOUS at 04:26

## 2024-07-06 RX ADMIN — INSULIN ASPART 2 UNITS: 100 INJECTION, SOLUTION INTRAVENOUS; SUBCUTANEOUS at 14:53

## 2024-07-06 RX ADMIN — PROPOFOL 75 MCG/KG/MIN: 10 INJECTION, EMULSION INTRAVENOUS at 17:47

## 2024-07-06 RX ADMIN — ACETAMINOPHEN 975 MG: 325 TABLET, FILM COATED ORAL at 09:36

## 2024-07-06 RX ADMIN — CLINDAMYCIN PHOSPHATE 900 MG: 900 INJECTION, SOLUTION INTRAVENOUS at 10:52

## 2024-07-06 RX ADMIN — ONDANSETRON 4 MG: 2 INJECTION INTRAMUSCULAR; INTRAVENOUS at 17:47

## 2024-07-06 ASSESSMENT — ACTIVITIES OF DAILY LIVING (ADL)
ADLS_ACUITY_SCORE: 55
ADLS_ACUITY_SCORE: 51
ADLS_ACUITY_SCORE: 35
ADLS_ACUITY_SCORE: 35
ADLS_ACUITY_SCORE: 51
ADLS_ACUITY_SCORE: 55
ADLS_ACUITY_SCORE: 35
ADLS_ACUITY_SCORE: 51
ADLS_ACUITY_SCORE: 51
ADLS_ACUITY_SCORE: 35
ADLS_ACUITY_SCORE: 35
ADLS_ACUITY_SCORE: 55
ADLS_ACUITY_SCORE: 35
ADLS_ACUITY_SCORE: 35
ADLS_ACUITY_SCORE: 51
ADLS_ACUITY_SCORE: 35
ADLS_ACUITY_SCORE: 55
ADLS_ACUITY_SCORE: 51
ADLS_ACUITY_SCORE: 35
ADLS_ACUITY_SCORE: 51

## 2024-07-06 ASSESSMENT — ENCOUNTER SYMPTOMS: DYSRHYTHMIAS: 1

## 2024-07-06 NOTE — ED NOTES
[facility]  ED Nurse Handoff Report    ED Chief complaint: Fall      ED Diagnosis:   Final diagnoses:   None       Code Status: Admitting MD to assess     Allergies: No Known Allergies    Patient Story:   Patient BIBA for evaluation after a fall. Patient was found down after a fall around 0800 yesterday complaining of right hip pain but denies any pain meds offered by EMS. Patient was unable to call for help because he lives alone. Patient was found laying around bags of feces. EMS removed the patient's sock, and found that the right big toe is black in color and the right foot is swollen and hot to the touch. Of note, patient is currently legally blind, lives alone, doesn't have a working toilet or shower, and order delivery for every meal, and has bowel movements and urinates in plastic bags. PD has filed vulnerable adult report. Pt received 500 Ns bolus en route.     Focused Assessment:    Neuro: Alert, oriented x 4  Respiratory:Clear lung sounds, on room air   Cardiology:  Heart Rates 65-80 BPM   Gastrointestinal: soft, non tender, non distended   Genitourinary/Renal:    Musculoskeletal: moves all extremities   Skin: Intact skin   Lines: 18G r Ac and 20G Left hand    Labs Ordered and Resulted from Time of ED Arrival to Time of ED Departure   COMPREHENSIVE METABOLIC PANEL - Abnormal       Result Value    Sodium 128 (*)     Potassium 3.6      Carbon Dioxide (CO2) 20 (*)     Anion Gap 15      Urea Nitrogen 40.3 (*)     Creatinine 2.64 (*)     GFR Estimate 26 (*)     Calcium 9.0      Chloride 93 (*)     Glucose 214 (*)     Alkaline Phosphatase 108      AST 83 (*)     ALT 37      Protein Total 7.5      Albumin 3.0 (*)     Bilirubin Total 0.6     CBC WITH PLATELETS AND DIFFERENTIAL - Abnormal    WBC Count 16.0 (*)     RBC Count 4.04 (*)     Hemoglobin 11.9 (*)     Hematocrit 35.2 (*)     MCV 87      MCH 29.5      MCHC 33.8      RDW 13.7      Platelet Count 481 (*)     % Neutrophils 83      %  Lymphocytes 9      % Monocytes 7      % Eosinophils 0      % Basophils 0      % Immature Granulocytes 1      NRBCs per 100 WBC 0      Absolute Neutrophils 13.3 (*)     Absolute Lymphocytes 1.4      Absolute Monocytes 1.2      Absolute Eosinophils 0.0      Absolute Basophils 0.0      Absolute Immature Granulocytes 0.1      Absolute NRBCs 0.0     ISTAT GASES LACTATE VENOUS POCT - Abnormal    Lactic Acid POCT 1.8      Bicarbonate Venous POCT 20 (*)     O2 Sat, Venous POCT 37 (*)     pCO2 Venous POCT 37 (*)     pH Venous POCT 7.34      pO2 Venous POCT 23 (*)     Base Excess/Deficit (+/-) POCT -5.0 (*)    ERYTHROCYTE SEDIMENTATION RATE AUTO - Abnormal    Erythrocyte Sedimentation Rate 108 (*)    CRP INFLAMMATION - Abnormal    CRP Inflammation 334.73 (*)    CK TOTAL   INR   BLOOD CULTURE   BLOOD CULTURE        Foot  XR, G/E 3 views, right    (Results Pending)   XR Toe Right G/E 2 Views    (Results Pending)   XR Pelvis w Hip Right 1 View    (Results Pending)   Chest XR,  PA & LAT    (Results Pending)   CT Chest/Abdomen/Pelvis w Contrast    (Results Pending)         Treatments and/or interventions provided:      Medications   vancomycin (VANCOCIN) 2,500 mg in 0.9% NaCl 500 mL intermittent infusion (2,500 mg Intravenous $New Bag 7/6/24 0425)   sodium chloride 0.9% BOLUS 1,000 mL (1,000 mLs Intravenous $New Bag 7/6/24 0426)   piperacillin-tazobactam (ZOSYN) 4.5 g vial to attach to  mL bag (0 g Intravenous Stopped 7/6/24 0427)   sodium chloride 0.9% BOLUS 1,000 mL (0 mLs Intravenous Stopped 7/6/24 0427)        Patient's response to treatments and/or interventions:   Resting comfortably    To be done/followed up on inpatient unit:    See any in-patient orders    Does this patient have any cognitive concerns?:  None    Activity level - Baseline/Home:    Independent    Activity Level - Current:     Unknown    Patient's Preferred language: English     Needed?: No    Isolation: None  Infection: Not  Applicable  Patient tested for COVID 19 prior to admission: NO    Bariatric?: No    Vital Signs:   Vitals:    07/06/24 0308 07/06/24 0347   BP: 127/79    Pulse: 86    Resp: 18    Temp: 97.7  F (36.5  C)    TempSrc: Oral    SpO2: 96%    Weight:  149.7 kg (330 lb)       Cardiac Rhythm:     Was the PSS-3 completed:   Yes    Family Comments: No family present at this time.    For the majority of the shift this patient's behavior was Green.   Behavioral interventions performed were     ED NURSE PHONE NUMBER: *[phone]

## 2024-07-06 NOTE — PROGRESS NOTES
Shift Summary [id number]    Admitting Diagnosis: Right hip pain [M25.551]  Multiple contusions [T07.XXXA]  Acute kidney injury (H24) [N17.9]  Supratherapeutic INR [R79.1]  Cellulitis of right lower extremity [L03.115]  Traumatic rhabdomyolysis, initial encounter (H24) [T79.6XXA]  Osteomyelitis of great toe of right foot (H) [M86.9]   Vitals VSS  Pain 0/10. Taking N/A PRN. Last dose n/a  A&Ox4  Voiding- Not yet. Bladder scan 377ml. Pure-wick in place.   Mobility-Not oob Yet  Tele -Yes  CMS - Sensation absent on BLE  Lung Sounds Clear on 0L via RA  GI - continent per pt  Dressing -None.     Orders Placed This Encounter      NPO per Anesthesia Guidelines for Procedure/Surgery Except for: Meds       Plan: R foot first ray resection.

## 2024-07-06 NOTE — ED PROVIDER NOTES
Emergency Department Note      History of Present Illness     Chief Complaint   Fall      HPI   Mckay Hsu is a 64 year old male who presents to the emergency department after having had a fall yesterday.  Patient reports that he fell at approximately 9 or 9:30 in the morning.  He was unable to get himself up from the floor and eventually EMS was called and he was brought here to the ER.  He notes that he has pain in his right hip primarily.  EMS had noted that his right great toe was necrotic and black appearing.  He denies having any pain in that location.  He denies any fevers.  Does have pain in both knees as he was trying to use them to help him get up.  He has a history of A-fib and is on warfarin.  Denies any chest pain or difficulty breathing initially but later reports that he was having pain in his left side lower ribs and left upper abdomen.  He denies striking his head or having any headache or neck pain.  He is legally blind.    EMS noted that his house was not well.  There was stool and food bags all over.  Police filed a vulnerable adult report.    Independent Historian   EMS as detailed above.      Past Medical History     Medical History and Problem List   Past Medical History:   Diagnosis Date    Atrial fibrillation (H)     Congestive heart failure (H)     Essential hypertension, benign     Hyperlipidemia LDL goal <100 10/31/2010    Hyponatremia 9/8/2009    Morbid obesity (H)     Open wound of foot except toe(s) alone, without mention of complication     Retinitis pigmentosa     Type 2 diabetes, HbA1C goal < 8% (H) 6/26/2012       Medications   alcohol swab prep pads  amLODIPine (NORVASC) 10 MG tablet  blood glucose (ACCU-CHEK GUIDE) test strip  blood glucose calibration (NO BRAND SPECIFIED) solution  blood glucose monitoring (NO BRAND SPECIFIED) meter device kit  fish oil-omega-3 fatty acids 1000 MG capsule  Insulin Aspart FlexPen 100 UNIT/ML SOPN  insulin glargine (LANTUS SOLOSTAR) 100  UNIT/ML pen  insulin pen needle (B-D U/F) 31G X 5 MM miscellaneous  losartan (COZAAR) 100 MG tablet  metFORMIN (GLUCOPHAGE) 1000 MG tablet  metoprolol succinate ER (TOPROL XL) 100 MG 24 hr tablet  MULTIVITAMIN OR  NITROSTAT 0.4 MG SL SUBL  simvastatin (ZOCOR) 80 MG tablet  thin (NO BRAND SPECIFIED) lancets  warfarin ANTICOAGULANT (COUMADIN) 7.5 MG tablet        Surgical History   Past Surgical History:   Procedure Laterality Date    CARDIOVERSION  9/2009, 10/2009    COLONOSCOPY N/A 5/9/2019    Procedure: COLONOSCOPY;  Surgeon: Godwin Santillan MD;  Location: [facility]    H ABLATION AV NODE  4/8/10    IMPLANT PACEMAKER  4/8/10    BIV PPM- [employer] InSync III    TONSILLECTOMY      as kid    [facility] NONSPECIFIC PROCEDURE  06/2007    debritement       Physical Exam     Patient Vitals for the past 24 hrs:   BP Temp Temp src Pulse Resp SpO2 Weight   07/06/24 0542 135/71 -- -- 70 -- 92 % --   07/06/24 0347 -- -- -- -- -- -- 149.7 kg (330 lb)   07/06/24 0308 127/79 97.7  F (36.5  C) Oral 86 18 96 % --     Physical Exam  Eyes:  The pupils are equal and round    Conjunctivae and sclerae are normal  ENT:    The nose is normal    Pinnae are normal    The oropharynx is dry  Neck:  Normal range of motion    There is no rigidity noted    There is no midline cervical spine tenderness    Trachea is in the midline  CV:  Regular rate and rhythm     No edema    Normal right DP pulse  Resp:  Lungs are clear    Non-labored    No rales    No wheezing   GI:  Abdomen is soft with mild LUQ tenderness, there is no rigidity    No distension    No rebound tenderness   MS:  Normal muscular tone    No asymmetric leg swelling    Necrotic wound on right great toe.  Right great toe is black in appearance.  Tenderness of the right hip.  Bruising on bilateral knees.    Hematoma on left buttock noted.    No midline back tenderness.  Mild left paraspinous muscle tenderness at approximately level of T10.  Skin:  No rash or acute skin lesions noted  Neuro:    Awake, alert.  GCS=15    Speech is normal and fluent.    Face is symmetric.     Moves all extremities    No sensation in right distal foot.  Sensation intact in right midfoot.    Diagnostics     Lab Results   Labs Ordered and Resulted from Time of ED Arrival to Time of ED Departure   COMPREHENSIVE METABOLIC PANEL - Abnormal       Result Value    Sodium 128 (*)     Potassium 3.6      Carbon Dioxide (CO2) 20 (*)     Anion Gap 15      Urea Nitrogen 40.3 (*)     Creatinine 2.64 (*)     GFR Estimate 26 (*)     Calcium 9.0      Chloride 93 (*)     Glucose 214 (*)     Alkaline Phosphatase 108      AST 83 (*)     ALT 37      Protein Total 7.5      Albumin 3.0 (*)     Bilirubin Total 0.6     CBC WITH PLATELETS AND DIFFERENTIAL - Abnormal    WBC Count 16.0 (*)     RBC Count 4.04 (*)     Hemoglobin 11.9 (*)     Hematocrit 35.2 (*)     MCV 87      MCH 29.5      MCHC 33.8      RDW 13.7      Platelet Count 481 (*)     % Neutrophils 83      % Lymphocytes 9      % Monocytes 7      % Eosinophils 0      % Basophils 0      % Immature Granulocytes 1      NRBCs per 100 WBC 0      Absolute Neutrophils 13.3 (*)     Absolute Lymphocytes 1.4      Absolute Monocytes 1.2      Absolute Eosinophils 0.0      Absolute Basophils 0.0      Absolute Immature Granulocytes 0.1      Absolute NRBCs 0.0     ISTAT GASES LACTATE VENOUS POCT - Abnormal    Lactic Acid POCT 1.8      Bicarbonate Venous POCT 20 (*)     O2 Sat, Venous POCT 37 (*)     pCO2 Venous POCT 37 (*)     pH Venous POCT 7.34      pO2 Venous POCT 23 (*)     Base Excess/Deficit (+/-) POCT -5.0 (*)    ERYTHROCYTE SEDIMENTATION RATE AUTO - Abnormal    Erythrocyte Sedimentation Rate 108 (*)    CRP INFLAMMATION - Abnormal    CRP Inflammation 334.73 (*)    CK TOTAL - Abnormal    CK 3,076 (*)    INR - Abnormal    INR >10.00 (*)    BLOOD CULTURE   BLOOD CULTURE       Imaging   CT Chest Abdomen Pelvis w/o Contrast   Preliminary Result   IMPRESSION:   1.  High-density subcutaneous stranding overlying  the left buttock with a 2.5 cm circumscribed high-density fluid collection. Findings likely reflect hematoma/contusion in the setting of trauma.      2.  Otherwise, no acute or traumatic findings elsewhere. No definite fractures identified.      3.  Subpleural scarring and/or atelectasis in the lungs. No evidence for pneumonitis.      4.  See remainder of the details above.            Foot  XR, G/E 3 views, right   Final Result   IMPRESSION: There is deformity of the distal phalanx right great toe with some cortical irregularity and erosive change. There is also some air in the surrounding soft tissues. The findings are strongly concerning for osteomyelitis. Degenerative changes    at the first MTP joint. Multiple hammertoe deformities. Plantar calcaneal spur. Vascular calcification. Remainder unremarkable.         XR Pelvis w Hip Right 1 View   Final Result   IMPRESSION: Negative for fracture or dislocation. Mild degenerative changes both hips and moderate degenerative changes lower lumbar spine. Vascular calcification. Remainder unremarkable.         XR Knee Bilateral 1/2 Views   Final Result   IMPRESSION:    RIGHT KNEE: Negative for acute fracture or dislocation. Probable old fracture deformity with callus formation proximal right fibula. No knee effusion. Vascular calcification. Remainder unremarkable.      LEFT KNEE: Negative for fracture or dislocation. No knee joint effusion. Vascular calcification.             EKG   ECG results from 07/06/24   EKG 12 lead     Value    Systolic Blood Pressure     Diastolic Blood Pressure     Ventricular Rate 70    Atrial Rate     ID Interval     QRS Duration 132        QTc 535    P Axis     R AXIS -42    T Axis 71    Interpretation ECG      Ventricular paced rhythm  Left axis deviation  Non-specific intra-ventricular conduction block  Abnormal ECG  When compared with ECG of 29-NOV-2018 14:56,            Independent Interpretation   X-ray right great toe shows erosions  and gas at level of the toe    ED Course      Medications Administered   Medications   vancomycin (VANCOCIN) 2,500 mg in 0.9% NaCl 500 mL intermittent infusion (2,500 mg Intravenous $New Bag 7/6/24 0425)   HYDROmorphone (PF) (DILAUDID) injection 0.3 mg (has no administration in time range)   piperacillin-tazobactam (ZOSYN) 4.5 g vial to attach to  mL bag (0 g Intravenous Stopped 7/6/24 0427)   sodium chloride 0.9% BOLUS 1,000 mL (0 mLs Intravenous Stopped 7/6/24 0427)   sodium chloride 0.9% BOLUS 1,000 mL (0 mLs Intravenous Stopped 7/6/24 4982)       Procedures   Procedures     Discussion of Management   Admitting Hospitalist, Dr. Monte  Podiatry, Dr. Harley    ED Course        Optional/Additional Documentation  None    Medical Decision Making / Diagnosis     CMS Diagnoses: None    MIPS       None    MDM   Mckay Hsu is a 64 year old male who presents to the emergency department after not being able to get up after falling yesterday.  He has evidence of osteomyelitis on exam as well as necrosis of his right great toe.  X-ray shows a small amount of soft tissue gas in his toe.  Consulted with podiatry who will evaluate the patient this morning to further plan.    Patient's INR was found to be elevated and greater than 10.  Discussed with hospitalist and will initiate vitamin K reversal strategy given the possibility of surgery today.  Patient was given vancomycin and Zosyn for antibiotics for the cellulitis and osteomyelitis noted.    Patient later noted that he is having pain in his left lower ribs and left upper quadrant area.  CT scan of the chest abdomen pelvis was obtained without contrast due to the patient's renal insufficiency.  CT scan did not reveal any rib fractures, splenic abnormalities, or other intra-abdominal or intrathoracic traumatic abnormalities.  It was noted that he had a hematoma in his left buttock area.  This is noted on exam as well.    The x-rays were negative for fracture.  Hip  x-ray was negative for fracture.  Laboratory workup also showed evidence of rhabdomyolysis.  Patient was given 2 L of normal saline here for rehydration.    Patient also has evidence of an acute kidney injury.    Recommend admission to the hospital.  Patient was given IV Dilaudid for pain control.  Discussed patient with the hospitalist agreed except the patient as an admission.        Disposition   The patient was admitted to the hospital.     Diagnosis     ICD-10-CM    1. Traumatic rhabdomyolysis, initial encounter (H24)  T79.6XXA       2. Osteomyelitis of great toe of right foot (H)  M86.9       3. Cellulitis of right lower extremity  L03.115       4. Multiple contusions  T07.XXXA       5. Right hip pain  M25.551       6. Acute kidney injury (H24)  N17.9       7. Supratherapeutic INR  R79.1              MD Ester Bartlett Aaron Joseph, MD  07/06/24 0629

## 2024-07-06 NOTE — PROGRESS NOTES
Brief rounding note    Patient admitted early this morning, see Dr. Bishop's excellent H&P for full details.    On my assessment with patient is comfortable in bed. He reports feeling achey everywhere but no pain to his foot. See imaging in chart of R foot wound. R Leg is warm and swollen.     - will follow the q12h labs results later today  - Hb checked and stable so unlikely to have any brisk bleeding, continue warfarin reversal for surgery  - Given INR >10 on admission low concern for DVT in his R leg, but would recommend lovenox ppx when able from surgery standpoint.  - rest of care per H&P    Full note to follow tomorrow  Denice Tracy DO

## 2024-07-06 NOTE — PHARMACY-ADMISSION MEDICATION HISTORY
Pharmacy Intern Admission Medication History    Admission medication history is complete. The information provided in this note is only as accurate as the sources available at the time of the update.    Information Source(s): Patient and CareEverywhere/SureScripts via in-person    Pertinent Information:   -- Pt reports managing his own medications and being adherent. Confirmed that he takes warfarin 3.75mg half-tab on Tue/Fri/Sun, 7.5mg tab all other days    Changes made to PTA medication list:  Added: None  Deleted: no longer on multivitamin, fish oil  Changed: insulin glargine 67 units QHS to 60 units QAM, warfarin 3.75mg half-tab Tue/Fri to Tue/Fri/Sun    Allergies reviewed with patient and updates made in EHR: yes    Medication History Completed By: MIKE MEDINA 7/6/2024 8:08 AM    PTA Med List   Medication Sig Last Dose    amLODIPine (NORVASC) 10 MG tablet TAKE 1 TABLET(10 MG) BY MOUTH DAILY 7/5/2024 at am    Insulin Aspart FlexPen 100 UNIT/ML SOPN INJECT 1 UNIT SUBCUTANEOUSLY PER 7G OF CARBS THREE TIMES DAILY BEFORE MEALS. MAX 60 UNITS PER DAY 7/5/2024 at am    insulin glargine (LANTUS SOLOSTAR) 100 UNIT/ML pen ADMINISTER 67 UNITS UNDER THE SKIN AT BEDTIME (Patient taking differently: ADMINISTER 60 UNITS UNDER THE SKIN IN THE MORNING) 7/5/2024 at am    losartan (COZAAR) 100 MG tablet TAKE 1 TABLET(100 MG) BY MOUTH DAILY 7/5/2024 at am    metFORMIN (GLUCOPHAGE) 1000 MG tablet TAKE 1 TABLET(1000 MG) BY MOUTH TWICE DAILY WITH MEALS 7/5/2024 at am    metoprolol succinate ER (TOPROL XL) 100 MG 24 hr tablet TAKE 1 TABLET(100 MG) BY MOUTH DAILY 7/5/2024 at am    NITROSTAT 0.4 MG SL SUBL Place 0.4 mg under the tongue every 5 minutes as needed for chest pain  at PRN    simvastatin (ZOCOR) 80 MG tablet TAKE 1/2 TABLET BY MOUTH AT BEDTIME 7/4/2024 at pm    warfarin ANTICOAGULANT (COUMADIN) 7.5 MG tablet TAKE 1 TABLET (7.5 MG) BY MOUTH DAILY. EXCEPT ONE-HALF TABLET (3.75 MG) ON TUESDAY/ FRIDAY AS DIRECTED BY COAGULATION  CLINIC (Patient taking differently: TAKE 1 TABLET (7.5 MG) BY MOUTH DAILY. EXCEPT ONE-HALF TABLET (3.75 MG) ON TUESDAY/ FRIDAY/SUNDAY AS DIRECTED BY COAGULATION CLINIC) 7/4/2024 at pm x 7.5mg

## 2024-07-06 NOTE — PROGRESS NOTES
RECEIVING UNIT ED HANDOFF REVIEW    ED Nurse Handoff Report was reviewed by: Letty Flower RN on July 6, 2024 at 8:25 AM

## 2024-07-06 NOTE — OP NOTE
PREOPERATIVE DIAGNOSES:     1.  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  Right foot first ray resection         ANESTHESIA:  MAC with local.       HEMOSTASIS:  Electrocautery.       ESTIMATED BLOOD LOSS:  10 mL.       SPECIMENS:  Soft tissue culture, first ray resection for pathology       MATERIALS:  None     Indications: Mckay Hsu  has an ulcer and active infection to the right foot. Xray was done and shows gas gangrene and osteomyelitis to the distal hallux. Clinically patient with gangrene to the digit, extending to the first metatarsal. Given this, recommendation was made for a first ray resection.  Procedure was discussed with patient at length, including all risks and benefits. Patient agrees to planned procedure.     Procedure: Under mild sedation pt was brought into the OR & placed on the operating table in a supine position. A total of 10 cc of .5% marcaine were injected into the first met in a luna block formation  The foot was scrubbed, prepped and draped in an aseptic manner.  An incision was made along the first ray, excising all necrotic and infection tissue. The freer elevator was then used to reflect all soft tissue. The sagittal saw was then used to cut through the metatarsal, starting dorsally and moving plantarly. This was continued in through and through cuts and the bones and digits were sharply excised and sent to pathology. All bleeders were ligated and cauterized as necessary.     All resected bone was sent to pathology.  All nonviable soft tissue was resected  insuring no necrotic tissue proximal to the amputation remained. Next copious amounts of saline were used to flush the amputation site. Site was then loosely reapproximated using 2-0 prolene. Upon completion of suturing, a non-adhesive sterile dressing was then placed over the surgical site followed by 4 x  4s, kerlix, & an ACE wrap. Cultures were taken consisting of deep soft tissue.    The pt tolerated the procedure & anesthesia well.  He was transferred to the recovery room with vital signs stable and vascular status intact to the right foot.  Following a period of post-op monitoring the pt will return to his hospital bed with the following written and oral post-op instructions:    Keep dressing clean, dry and intact.  Do not remove dressing.  WB to heel only.  Elevate foot at rest.  Continue IV antibiotics  Contact Dr. Lainez if any post-op questions or arrise.     Intraop findings: Noted significant necrosis to first MPJ. Less than expected bleeding for procedure.     Post op plan: Will see patient on POD#1. Will order MRI and vascular studies. Will need a second procedure for debridement and closure of sites.

## 2024-07-06 NOTE — CONSULTS
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 Additionally has developed Rhabomyolysis with EV    -Gas gangene and osteomyelitis of the right great toe  -Diabetes Mellitus --8.4 %  -Fall and prolonged immobilization  -EV  -Rhabdomyolysis    Recommendations:  Podiatry team has evaluated patient and right great toe amputation is planned  Maintain on zosyn and clindamycin  Further antibiotic de escalation pending cx data  Glycemic control  Follow clinical status, labs and blood cxs    Vielka Tinajero MD    Reason for Consult   Reason for consult: I was asked to evaluate this patient for diabetic foot infection.    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 Additionally has developed Rhabomyolysis with EV    Patient has had recurrent falls , per EMS reports the house was in poor shape and patient is now unable to care for himself . He had marked leukocytosis with ESR and CRP elevation. He does not know how long his foot infection has been ongoing for, denies foot pain or fever, has neuropathy    Patient has been initiated on zosyn and ID has been asked to assist with further  management.  Past Medical History   I have reviewed this patient's medical history and updated it with pertinent information if needed.   Past Medical History:   Diagnosis Date    Atrial fibrillation (H)     s/p AVN ablation, BIV PPM    Congestive heart failure (H)     tachycardia-induced cardiomyopathy    Essential hypertension, benign     Hyperlipidemia LDL goal <100 10/31/2010    Hyponatremia 2009    Morbid obesity (H)     Open wound of foot except toe(s) alone, without mention of complication     Retinitis pigmentosa     Type 2 diabetes, HbA1C goal < 8% (H) 2012       Past Surgical History   I have reviewed this patient's surgical history and updated it with pertinent information if needed.  Past Surgical History:   Procedure Laterality Date    CARDIOVERSION  2009, 10/2009    COLONOSCOPY N/A 2019    Procedure: COLONOSCOPY;  Surgeon: Godwin Santillan MD;  Location: [facility]    H ABLATION AV NODE  4/8/10    IMPLANT PACEMAKER  4/8/10    BIV PPM- [employer] InSync III    TONSILLECTOMY      as kid    [facility] NONSPECIFIC PROCEDURE  2007    debritement       Prior to Admission Medications   Prior to Admission Medications   Prescriptions Last Dose Informant Patient Reported? Taking?   Insulin Aspart FlexPen 100 UNIT/ML SOPN 2024 at am  No Yes   Sig: INJECT 1 UNIT SUBCUTANEOUSLY PER 7G OF CARBS THREE TIMES DAILY BEFORE MEALS. MAX 60 UNITS PER DAY   NITROSTAT 0.4 MG SL SUBL  at PRN Self Yes Yes   Sig: Place 0.4 mg under the tongue every 5 minutes as needed for chest pain   amLODIPine (NORVASC) 10 MG tablet 2024 at am  No Yes   Sig: TAKE 1 TABLET(10 MG) BY MOUTH DAILY   blood glucose (ACCU-CHEK GUIDE) test strip   No No   Si strip by In Vitro route 4 times daily Use to test blood sugar 4 times daily or as directed.   insulin glargine (LANTUS SOLOSTAR) 100 UNIT/ML pen 2024 at am  No Yes   Sig: ADMINISTER 67 UNITS UNDER THE SKIN AT BEDTIME   Patient taking differently: ADMINISTER 60 UNITS UNDER THE  SKIN IN THE MORNING   losartan (COZAAR) 100 MG tablet 7/5/2024 at am  No Yes   Sig: TAKE 1 TABLET(100 MG) BY MOUTH DAILY   metFORMIN (GLUCOPHAGE) 1000 MG tablet 7/5/2024 at am  No Yes   Sig: TAKE 1 TABLET(1000 MG) BY MOUTH TWICE DAILY WITH MEALS   metoprolol succinate ER (TOPROL XL) 100 MG 24 hr tablet 7/5/2024 at am  No Yes   Sig: TAKE 1 TABLET(100 MG) BY MOUTH DAILY   simvastatin (ZOCOR) 80 MG tablet 7/4/2024 at pm  No Yes   Sig: TAKE 1/2 TABLET BY MOUTH AT BEDTIME   warfarin ANTICOAGULANT (COUMADIN) 7.5 MG tablet 7/4/2024 at pm x 7.5mg  No Yes   Sig: TAKE 1 TABLET (7.5 MG) BY MOUTH DAILY. EXCEPT ONE-HALF TABLET (3.75 MG) ON TUESDAY/ FRIDAY AS DIRECTED BY COAGULATION CLINIC   Patient taking differently: TAKE 1 TABLET (7.5 MG) BY MOUTH DAILY. EXCEPT ONE-HALF TABLET (3.75 MG) ON TUESDAY/ FRIDAY/SUNDAY AS DIRECTED BY COAGULATION CLINIC      Facility-Administered Medications: None     Allergies   No Known Allergies    Immunization History   Immunization History   Administered Date(s) Administered    COVID-19 Vaccine (Hope) 01/13/2022    Influenza (H1N1) 01/09/2010    Influenza (IIV3) PF 09/25/2009, 11/17/2010, 11/19/2011    Influenza (intradermal) 01/23/2013    Influenza Vaccine 18-64 (Flublok) 10/16/2018, 10/22/2019, 01/13/2022    Influenza Vaccine >6 months,quad, PF 11/05/2013, 02/09/2016, 10/31/2016, 10/17/2017    Pneumo Conj 13-V (2010&after) 02/09/2016    Pneumococcal 23 valent 08/27/2008    TD,PF 7+ (Tenivac) 06/01/2007    TDAP Vaccine (Adacel) 06/13/2018       Social History   I have reviewed this patient's social history and updated it with pertinent information if needed. Mckay Hsu  reports that he has never smoked. He has never used smokeless tobacco. He reports that he does not drink alcohol and does not use drugs.    Family History   I have reviewed this patient's family history and updated it with pertinent information if needed.   Family History   Problem Relation Age of Onset    Diabetes Mother      Hypertension Father     Diabetes Father     Hypertension Maternal Grandmother     Diabetes Maternal Grandmother     Diabetes Maternal Grandfather     Hypertension Maternal Grandfather     Cardiovascular Maternal Grandfather     Hypertension Paternal Grandfather     Cardiovascular Paternal Grandfather     Diabetes Sister     Hypertension Sister        Review of Systems   The 10 point Review of Systems is as per HPI    Physical Exam   Temp: 97.4  F (36.3  C) Temp src: Oral BP: 124/62 Pulse: 71   Resp: 16 SpO2: 97 % O2 Device: None (Room air)    Vital Signs with Ranges  Temp:  [97.4  F (36.3  C)-97.7  F (36.5  C)] 97.4  F (36.3  C)  Pulse:  [70-86] 71  Resp:  [16-18] 16  BP: (111-135)/(62-79) 124/62  SpO2:  [92 %-97 %] 97 %  330 lbs 0 oz  Body mass index is 45.38 kg/m .    GENERAL APPEARANCE:  awake,   EYES: blindness  NECK: no adenopathy  RESP: lungs clear   CV: S1S2  ABDOMEN: soft, nontender  MS: R great toe black discoloration, erythema of right forefoot  SKIN: no rash      Data   Component      Latest Ref [facility] 7/6/2024  3:15 AM   Hemoglobin A1C      <5.7 % 7.6 (H)       Component      Latest Ref [facility] 7/6/2024  3:15 AM   Sed Rate      0 - 20 mm/hr 108 (H)    CRP Inflammation      <5.00 mg/L 334.73 (H)       Component      Latest Ref [facility] 7/6/2024  3:15 AM   WBC      4.0 - 11.0 10e3/uL 16.0 (H)    RBC Count      4.40 - 5.90 10e6/uL 4.04 (L)    Hemoglobin      13.3 - 17.7 g/dL 11.9 (L)    Hematocrit      40.0 - 53.0 % 35.2 (L)    MCV      78 - 100 fL 87    MCH      26.5 - 33.0 pg 29.5    MCHC      31.5 - 36.5 g/dL 33.8    RDW      10.0 - 15.0 % 13.7    Platelet Count      150 - 450 10e3/uL 481 (H)       Component      Latest Ref [facility] 7/6/2024  3:15 AM   Sodium      135 - 145 mmol/L 128 (L)    Potassium      3.4 - 5.3 mmol/L 3.6    Carbon Dioxide (CO2)      22 - 29 mmol/L 20 (L)    Anion Gap      7 - 15 mmol/L 15    Urea Nitrogen      8.0 - 23.0 mg/dL 40.3 (H)    Creatinine      0.67 - 1.17 mg/dL 2.64 (H)    GFR  Estimate      >60 mL/min/1.73m2 26 (L)    Calcium      8.8 - 10.2 mg/dL 9.0    Chloride      98 - 107 mmol/L 93 (L)    Glucose      70 - 99 mg/dL 214 (H)    Alkaline Phosphatase      40 - 150 U/L 108    AST      0 - 45 U/L 83 (H)    ALT      0 - 70 U/L 37    Protein Total      6.4 - 8.3 g/dL 7.5    Albumin      3.5 - 5.2 g/dL 3.0 (L)    Bilirubin Total      <=1.2 mg/dL 0.6       Microbiology  07/06/2024 0357 07/06/2024 0413 Blood Culture Peripheral Blood [[id number]]   Peripheral Blood    In process Component Value   No component results             07/06/2024 0315 07/06/2024 0320 Blood Culture Arm, Right [[id number]]   Blood from Arm, Right    In process Component Value   No component results        Imaging  EXAM: CT CHEST, ABDOMEN, PELVIS WITHOUT CONTRAST  LOCATION: [facility] [place]  DATE: 07/06/2024     INDICATION: Chest pain, lower rib pain, left upper quadrant pain after fall.  COMPARISON: None.  TECHNIQUE: CT scan of the chest, abdomen, and pelvis was performed without IV contrast. Multiplanar reformats were obtained. Dose reduction techniques were used.   CONTRAST: None.     FINDINGS:   LUNGS AND PLEURA: Mild subpleural scarring and/or atelectasis in the lower lungs. No acute infiltrates or consolidation. No pleural effusion or pneumothorax.     MEDIASTINUM/AXILLAE: No adenopathy. Normal heart size. No pericardial effusion. Normal caliber thoracic aorta. Esophagus is grossly negative. Left-sided cardiac pacer in place.     CORONARY ARTERY CALCIFICATION: Moderate.     HEPATOBILIARY: Normal.     PANCREAS: Normal.     SPLEEN: Normal.     ADRENAL GLANDS: Normal.     KIDNEYS/BLADDER: Benign cyst in the right kidney posteriorly with no specific follow-up needed. Kidneys are otherwise unremarkable. No hydronephrosis. Bladder is negative.     BOWEL: Normal.     LYMPH NODES: A few mildly prominent but morphologically normal-appearing right groin nodes with normal fatty david are likely reactive  in nature.     VASCULATURE: Normal.     PELVIC ORGANS: Normal.     MUSCULOSKELETAL: No definite acute bony findings. Mild to moderate degenerative changes throughout the spine. High-density subcutaneous stranding overlying the left buttock with a 2.5 cm high-density collection on series 3 image 256. Findings likely   reflect contusion/hematoma in the setting of trauma. Moderate-sized fat-containing anterior abdominal wall hernia above the umbilicus. Additional small fat-containing umbilical hernia.                                                                      IMPRESSION:  1.  High-density subcutaneous stranding overlying the left buttock with a 2.5 cm circumscribed high-density fluid collection. Findings likely reflect hematoma/contusion in the setting of trauma.     2.  Otherwise, no acute or traumatic findings elsewhere. No definite fractures identified.     3.  Subpleural scarring and/or atelectasis in the lungs. No evidence for pneumonitis.     4.  See remainder of the details above.       EXAM: XR FOOT RIGHT G/E 3 VIEWS  LOCATION: [facility]  DATE: 07/06/2024     INDICATION: Pain, swelling.  COMPARISON: None.                                                                      IMPRESSION: There is deformity of the distal phalanx right great toe with some cortical irregularity and erosive change. There is also some air in the surrounding soft tissues. The findings are strongly concerning for osteomyelitis. Degenerative changes   at the first MTP joint. Multiple hammertoe deformities. Plantar calcaneal spur. Vascular calcification. Remainder unremarkable.

## 2024-07-06 NOTE — ED TRIAGE NOTES
Patient BIBA for evaluation after a fall. Patient was found down after a fall around 0800 yesterday complaining of right hip pain but denies any pain meds offered by EMS. Patient was unable to call for help because he lives alone. Patient was found laying around bags of feces. EMS removed the patient's sock, and found that the right big toe is black in color and the right foot is swollen and hot to the touch. Of note, patient is currently legally blind, lives alone, doesn't have a working toilet or shower, and order delivery for every meal, and has bowel movements and urinates in plastic bags. PD has filed vulnerable adult report. Pt received 500 Ns bolus en route.

## 2024-07-06 NOTE — ED NOTES
Patient covered in feces from head to toe and covered in the remnants of his carpet including food scraps, wrappers, and crumbs found covering the patient's back. Assist of 2 to bathe and clean patient, reposition, and place Male purewick.

## 2024-07-06 NOTE — CONSULTS
Chart reviewed,  received Provider Order -     low albumin, morbid obesity, T2D       Patient currently NPO with plans for surgery    Will see patient on 7/7/24 and/or once able to eat (post-op)    Xochitl Washington RD, LD, CNSC   Clinical Dietitian - [facility]

## 2024-07-06 NOTE — H&P
Assessment / Plan    64M hx of afib on AC, HTN, HLD, T2D, Blindness 2/2 retinitis pigmentosa, CKD2, presenting with a mechanical fall resulting in prolonged immobilization along w/ a necrotic and gangrenous R great toe.     R first toe gangrene, necrosis and OM   Mechanical fall w/ prolonged immobilization.   Thrombocytosis (likely reactive)  ``Hx: Patient reports falling at 9am on 7/5/2024, and was on the ground till 2am 7/6/2024 when he banged on the floor to attract the attention of his downstairs neighbor. Patient reports he tripped and was unable to get up afterwards, no headtrauma/LOC. Endroses L sided Rib pain, and R knee/hip/ankle pain. Reports that when he fell, he defecated, and cleaned himself and place all feces into bags just for today (to avoid slipping) as he couldn't get to his toilet (EMS states otherwise saying apartment was filled w./ urine/feces bags and no working shower/toilet). Patient denies fevers/chills. Lives alone, is legally blind, and states that he can no longer take care of self.  Reports having another fall months ago with no head trauma/LOC. Patient reports no pain in his R foot due to lack of sensation (in either feet, chronic) and because of his blindness unsure when it started to become necrotic.  ``Vitals/Exam: vitally stable, Blind, obese, ventral hernia present. Lack of sensation to gross touch in LE. Bilateral LE edema R>L. R first toe gangrenous/necrotic (nontender), ROM intact. Active bleeding from skin sloughing medial and lateral to the toe. L buttock enlarged bruise  ``EKG: paced rhythm with suspected LBBB  ``Labs: , WBC 16, Plts 481  ``Imaging: deformity of the distal phalanx right great toe with some cortical irregularity and erosive change. There is also some air in the surrounding soft tissues. The findings are strongly concerning for osteomyelitis. Degenerative changes   at the first MTP joint  ``ER Course: vanc, zosyn, dilaudid, 2L fluids, Vit K  "10mg  --Vanc, zosyn to be continued. Will add clinda for toxin reduction  --ID consult  --follow-up blood cultures  --Podiatry made aware of the patient  --NPO, IVF  --Reversal of warfarin prior to surgical management  --standing tylenol  --PRN oxy  --PT OT ordered for after surgical tx  --WOC    EV on CKD2  Rhabdomyolysis w/ elevated AST  Anion gap acidosis  ``baseline creatinine 1.1 currently 2.6. CK 3000s, Lactate negative  ``Given rhabdo, concern for both prerenal and ATN  --IVF  --trend creatinine  --strict intake/output  --If no improvements in creatinine will need nephrology consult  --CK q12  --betahydroxy    Permanent Afib, s/p AV node ablation and CRT 2010  Chronic anticoagulation  Supratheraputic INR  Hx of NSVT  HTN HLD  ``failed amiodarone in the past  ``INR>10, unclear the reason. No grapefruit ingestion, no new medications in the past month.   --Vit K 10mg  --Repeat INR 9am  --telemetry  --resume metoprolol, amlodipine once dose confirmed  --Hold simvastatin in the setting of rhabdo  --Hold losartan given EV  --TTE ordered    T2D  --ISS  --Hold metformin  --Mod CHO diet once NPO completed  --30u glargine one time dose (half is baseline dose as patient will be NPO today)    Concern for vulnerable adult status  Blindness 2/2 retinitis pigmentose  ``EMS reports \"Patient covered in feces from head to toe and covered in the remnants of his carpet including food scraps, wrappers, and crumbs found covering the patient's back \" and that \"doesn't have a working toilet or shower, and order delivery for every meal, and has bowel movements and urinates in plastic bags \"  ``patient informed nocturnist that the above story was inaccurate, and that he only had bags of stool for today after being unable to reach the toilet (which he states does work)  ``Patient states he needs help as he can't live alone anymore  --SW    Hyponatremia  --while treatment would be fluid restriction, given EV will prioritize giving " fluid for now  --trend creatinine    Hx recurrent anemia  L buttock Hematoma  ``labile Hb 12-14, currently 11.9  ``CT showing High-density subcutaneous stranding overlying the left buttock with a 2.5 cm circumscribed high-density fluid collection   --trend Hb daily  --anemia workup    Hypoalbuminemia  Obesity  Ventral hernia  --Nutrition consult    Degenerative Joint and Bone disease  --Tylenol    Supportive Care  DVT ppx: Warfarin  Diet: NPO   Pain Control: tylenol  Upper GI ppx: zofran  Lower GI ppx: senna  Lines/Catheters: IV  TTE/Dopplers: TTE  Contact/Seizure/Fall/Delerium Precautions: None  PT/OT/Social/Wound/Palliative Consults: PT OT  Code Status: Full    History of Present Illness   Patient reports falling at 9am on 7/5/2024, and was on the ground till 2am 7/6/2024 when he banged on the floor to attract the attention of his downstairs neighbor. Patient reports he tripped and was unable to get up afterwards, no headtrauma/LOC. Endroses L sided Rib pain, and R knee/hip/ankle pain. Reports that when he fell, he defecated, and cleaned himself and place all feces into bags just for today (to avoid slipping) as he couldn't get to his toilet (EMS states otherwise saying apartment was filled w./ urine/feces bags and no working shower/toilet). Patient denies fevers/chills. Lives alone, is legally blind, and states that he can no longer take care of self.  Reports having another fall months ago with no head trauma/LOC. Patient reports no pain in his R foot due to lack of sensation (in either feet, chronic) and because of his blindness unsure when it started to become necrotic.    ROS: 10 point ROS neg other than the symptoms noted above in the HPI.     Objective History  /71 (BP Location: Right arm, Patient Position: Semi-Ford's, Cuff Size: Adult Regular)   Pulse 70   Temp 97.7  F (36.5  C) (Oral)   Resp 18   Wt 149.7 kg (330 lb)   SpO2 92%   BMI 45.38 kg/m      Constitutional: Alert and oriented to  person, place and time; no apparent distress.   HEENT: Normocephalic, no scleral icterus  Abdomen: soft, no tenderness/guarding, ventral hernia present.   Lungs: lungs clear to auscultation bilaterally  Heart: Regular s1s2, no evidence of murmurs  Neuro:No focal strength deficits. Lack of sensation to gross touch in LE (chronic)  Skin/Extremities: Bilateral LE edema R>L. R first toe gangrenous/necrotic (nontender), ROM intact. Active bleeding from skin sloughing medial and lateral to the toe. L buttock enlarged bruise  Psychiatric: appropriate affect, insight and judgment  Back: No midline tenderness, no CVA tenderness    I have personally spent 87 minutes total time today in preparing to see the patient (eg, review of tests), obtaining and/or reviewing separately obtained history, performing a medically appropriate examination and/or evaluation, counseling and educating the patient/family/caregiver, ordering medications, tests, or procedures, referring and communicating with other health care professionals, documenting clinical information in the electronic or other health record, independently interpreting results and communicating results to the patient/ family/caregiver and care coordination.

## 2024-07-06 NOTE — CONSULTS
[facility]  CONSULTATION NOTE    CHIEF COMPLAINT:      I was asked by Lavell Bishop MD  to evaluate this patient for right foot.    PATIENT HISTORY:  Mckay Hsu is a 64 year old male  with a past medical history significant for what's listed below, presented following a fall at home. He fell and was unable to get up and per documentation was down from 9 am to 2 am. EMS brought him to the hospital and he was found to have a necrotic toe and poor living conditions. He's legally blind and lives alone.   -At bedside, patient states feels well. No noted pain to right foot. States cannot see his toe and does not know the extent of the necrosis. States has tried to managed his diabetes at home, but notes some difficulty. States feels fairly well and doesn't know why he tripped. Denies weakness, fatigue or N/F/V/C/D.         Review of Systems:  A 10 point review of systems was performed and is positive for that noted above in the patient history.  All other areas are negative.     PAST MEDICAL HISTORY:   Past Medical History:   Diagnosis Date    Atrial fibrillation (H)     s/p AVN ablation, BIV PPM    Congestive heart failure (H)     tachycardia-induced cardiomyopathy    Essential hypertension, benign     Hyperlipidemia LDL goal <100 10/31/2010    Hyponatremia 9/8/2009    Morbid obesity (H)     Open wound of foot except toe(s) alone, without mention of complication     Retinitis pigmentosa     Type 2 diabetes, HbA1C goal < 8% (H) 6/26/2012        PAST SURGICAL HISTORY:   Past Surgical History:   Procedure Laterality Date    CARDIOVERSION  9/2009, 10/2009    COLONOSCOPY N/A 5/9/2019    Procedure: COLONOSCOPY;  Surgeon: Godwin Santillan MD;  Location:  GI    H ABLATION AV NODE  4/8/10    IMPLANT PACEMAKER  4/8/10    BIV PPM- [employer] InSync III    TONSILLECTOMY      as kid    [facility] NONSPECIFIC PROCEDURE  06/2007    debritement        MEDICATIONS:  Reviewed in Epic. Current.     ALLERGIES:   No Known Allergies     SOCIAL HISTORY:   Social History     Socioeconomic History    Marital status: Single     Spouse name: Not on file    Number of children: Not on file    Years of education: Not on file    Highest education level: Not on file   Occupational History    Occupation: repair printer     Employer: [employer]   Tobacco Use    Smoking status: Never    Smokeless tobacco: Never   Substance and Sexual Activity    Alcohol use: No    Drug use: No    Sexual activity: Never   Other Topics Concern     Service Not Asked    Blood Transfusions Not Asked    Caffeine Concern Yes     Comment: occasionally soda    Occupational Exposure Not Asked    Hobby Hazards Not Asked    Sleep Concern Not Asked    Stress Concern Not Asked    Weight Concern Not Asked    Special Diet Yes     Comment: low sodium, low fat, DM diet     Back Care Not Asked    Exercise Yes     Comment: walking, stairs     Bike Helmet Not Asked    Seat Belt Not Asked    Self-Exams Not Asked    Parent/sibling w/ CABG, MI or angioplasty before 65F 55M? Not Asked   Social History Narrative    Not on file     Social Determinants of Health     Financial Resource Strain: Not on file   Food Insecurity: Not on file   Transportation Needs: Not on file   Physical Activity: Not on file   Stress: Not on file   Social Connections: Not on file   Interpersonal Safety: Not on file   Housing Stability: Not on file        FAMILY HISTORY:   Family History   Problem Relation Age of Onset    Diabetes Mother     Hypertension Father     Diabetes Father     Hypertension Maternal Grandmother     Diabetes Maternal Grandmother     Diabetes Maternal Grandfather     Hypertension Maternal Grandfather     Cardiovascular Maternal Grandfather     Hypertension Paternal Grandfather     Cardiovascular Paternal Grandfather     Diabetes Sister     Hypertension Sister         EXAM:Vitals: /70   Pulse 77   Temp 97.7  F (36.5  C) (Oral)   Resp 18   Wt 149.7 kg (330 lb)    SpO2 93%   BMI 45.38 kg/m    BMI= Body mass index is 45.38 kg/m .    LABS:     Last Comprehensive Metabolic Panel:  Sodium   Date Value Ref Range Status   07/06/2024 128 (L) 135 - 145 mmol/L Final   01/26/2021 134 133 - 144 mmol/L Final     Potassium   Date Value Ref Range Status   07/06/2024 3.6 3.4 - 5.3 mmol/L Final   04/05/2022 4.1 3.4 - 5.3 mmol/L Final   01/26/2021 4.3 3.4 - 5.3 mmol/L Final     Chloride   Date Value Ref Range Status   07/06/2024 93 (L) 98 - 107 mmol/L Final   04/05/2022 103 94 - 109 mmol/L Final   01/26/2021 101 94 - 109 mmol/L Final     Carbon Dioxide   Date Value Ref Range Status   01/26/2021 28 20 - 32 mmol/L Final     Carbon Dioxide (CO2)   Date Value Ref Range Status   07/06/2024 20 (L) 22 - 29 mmol/L Final   04/05/2022 25 20 - 32 mmol/L Final     Anion Gap   Date Value Ref Range Status   07/06/2024 15 7 - 15 mmol/L Final   04/05/2022 8 3 - 14 mmol/L Final   01/26/2021 5 3 - 14 mmol/L Final     Glucose   Date Value Ref Range Status   07/06/2024 214 (H) 70 - 99 mg/dL Final   04/05/2022 120 (H) 70 - 99 mg/dL Final   01/26/2021 163 (H) 70 - 99 mg/dL Final     Comment:     Fasting specimen     Urea Nitrogen   Date Value Ref Range Status   07/06/2024 40.3 (H) 8.0 - 23.0 mg/dL Final   04/05/2022 16 7 - 30 mg/dL Final   01/26/2021 19 7 - 30 mg/dL Final     Creatinine   Date Value Ref Range Status   07/06/2024 2.64 (H) 0.67 - 1.17 mg/dL Final   01/26/2021 1.03 0.66 - 1.25 mg/dL Final     GFR Estimate   Date Value Ref Range Status   07/06/2024 26 (L) >60 mL/min/1.73m2 Final     Comment:     eGFR calculated using 2021 CKD-EPI equation.   01/26/2021 79 >60 mL/min/[1.73_m2] Final     Comment:     Non  GFR Calc  Starting 12/18/2018, serum creatinine based estimated GFR (eGFR) will be   calculated using the Chronic Kidney Disease Epidemiology Collaboration   (CKD-EPI) equation.       Calcium   Date Value Ref Range Status   07/06/2024 9.0 8.8 - 10.2 mg/dL Final   01/26/2021 9.7 8.5 -  10.1 mg/dL Final     Lab Results   Component Value Date    WBC 16.0 07/06/2024    WBC 10.2 11/29/2018     Lab Results   Component Value Date    RBC 4.04 07/06/2024    RBC 4.42 11/29/2018     Lab Results   Component Value Date    HGB 11.9 07/06/2024    HGB 14.1 02/03/2020     Lab Results   Component Value Date    HCT 35.2 07/06/2024    HCT 39.2 11/29/2018     Lab Results   Component Value Date     07/06/2024     02/03/2020      Lab Results   Component Value Date    INR >10.00 07/06/2024    INR 2.8 06/11/2024    INR 4.6 05/28/2024    INR 2.8 04/09/2024    INR 2.7 02/27/2024    INR 2.2 01/23/2024    INR 2.4 12/28/2023    INR 1.9 12/07/2023    INR 2.2 10/18/2023    INR 2.7 08/29/2023    INR 2.8 07/18/2023    INR 2.6 06/06/2023    INR 2.4 04/04/2023    INR 2.80 06/01/2021    INR 3.00 04/20/2021    INR 2.60 03/09/2021    INR 2.30 01/26/2021    INR 2.80 01/05/2021    INR 3.20 12/01/2020    INR 1.90 11/10/2020    INR 2.50 09/29/2020    INR 2.60 08/18/2020    INR 2.70 07/07/2020    INR 2.20 06/02/2020    INR 3.40 05/12/2020        General appearance: Patient is alert and fully cooperative with history & exam.  No sign of distress is noted during the visit.      Respiratory: Breathing is regular & unlabored while sitting.      HEENT: Hearing is intact to spoken word.  Speech is clear.  No gross evidence of visual impairment that would impact ambulation.      Dermatologic: Right foot hallux: grossly gangrenous and edematous. Cellulitis and tissue changes extending to first metatarsal. Cellulitis to midfoot. Nontender.      Vascular: Dorsalis pedis and posterior tibial pulses are intact & regular bilaterally.  CFT and skin temperature is normal to both lower extremities.       Neurologic: Lower extremity sensation is diminished, bilateral foot, to light touch.  No evidence of neurological-based weakness or contracture in the lower extremities.       Musculoskeletal: Patient is ambulatory without an assistive  "device or brace.  No gross foot or ankle deformity noted.     Psychiatric: Affect is pleasant & appropriate.      All cultures:  No results for input(s): \"CULTURE\" in the last 168 hours.     IMAGING:     IMPRESSION: There is deformity of the distal phalanx right great toe with some cortical irregularity and erosive change. There is also some air in the surrounding soft tissues. The findings are strongly concerning for osteomyelitis. Degenerative changes   at the first MTP joint. Multiple hammertoe deformities. Plantar calcaneal spur. Vascular calcification. Remainder unremarkable.    Noted gas gangrene and distal phalanx osteomyelitis             ASSESSMENT:  Right foot infection, gas gangene and osteomyelitis   Diabetes Mellitus --> hba1c: 8.4  Supra therapeutic INR: >10  --> hx of atrial fibrillation     MEDICAL DECISION MAKING:   -Discussed all findings with patient. Chart and imaging reviewed.     -Patient with gas gangrene seen on xray with osteomyelitis to at least distal phalanx. Discussed with patient need for amputation of hallux and possible resection of first metatarsal head. He understands and agrees to this. Will likely need more that one procedure, given extent of infection.    -Currently though, last INR was over 10. Goal INR for this surgical procedure is typically 1.7. Given patient's sepsis, could do procedure with this higher, but clinically patient is hemodynamically stable. A repeat INR is pending for this morning.   -Cont NPO for now. Will follow repeat INR and make decision of whether to schedule patient for today vs tomorrow.   -Will also need vascular studies and an MRI at some point.  These could be done before or after first podiatry procedure.   -Cont IV abx    Thank you for the consultation request and the opportunity to participate in the care of Mckay Lainez DPM   [facility]  [phone]             "

## 2024-07-06 NOTE — PROVIDER NOTIFICATION
MD Notification    Notified Person: MD    Notified Person Name:Denice Tracy    Notification Date/Time:7/6/24 @ 1448    Notification Interaction:[employer] Message    Purpose of Notification:. Pt is NPO. Should i give insulin or not?    Orders Received:Given short acting.    Comments:

## 2024-07-06 NOTE — ED NOTES
Bed: ED14  Expected date:   Expected time:   Means of arrival:   Comments:  427 69M fall, hip injury. On the ground for 15 hrs. Obvious necrotiing wound on foot

## 2024-07-06 NOTE — ANESTHESIA PREPROCEDURE EVALUATION
Anesthesia Pre-Procedure Evaluation    Patient: Mckay Hsu   MRN: [id number] : 1960        Procedure : Procedure(s):  Right foot first ray resection  Right foot first ray resection          Past Medical History:   Diagnosis Date    Atrial fibrillation (H)     s/p AVN ablation, BIV PPM    Congestive heart failure (H)     tachycardia-induced cardiomyopathy    Essential hypertension, benign     Hyperlipidemia LDL goal <100 10/31/2010    Hyponatremia 2009    Morbid obesity (H)     Open wound of foot except toe(s) alone, without mention of complication     Retinitis pigmentosa     Type 2 diabetes, HbA1C goal < 8% (H) 2012      Past Surgical History:   Procedure Laterality Date    CARDIOVERSION  2009, 10/2009    COLONOSCOPY N/A 2019    Procedure: COLONOSCOPY;  Surgeon: Godwin Santillan MD;  Location: [facility]    H ABLATION AV NODE  4/8/10    IMPLANT PACEMAKER  4/8/10    BIV PPM- [employer] InSync III    TONSILLECTOMY      as kid    [facility] NONSPECIFIC PROCEDURE  2007    debritement      No Known Allergies   Social History     Tobacco Use    Smoking status: Never    Smokeless tobacco: Never   Substance Use Topics    Alcohol use: No      Wt Readings from Last 1 Encounters:   24 149.7 kg (330 lb)        Anesthesia Evaluation            ROS/MED HX  ENT/Pulmonary:     (+)     SINCERE risk factors,  hypertension, obese,                                Neurologic: Comment: Blindness 2/2 retinitis pigmentosa      Cardiovascular:     (+) Dyslipidemia hypertension- -   -  - -   Taking blood thinners   CHF        pacemaker,          dysrhythmias, a-fib,             METS/Exercise Tolerance:     Hematologic:       Musculoskeletal:       GI/Hepatic:    (-) GERD   Renal/Genitourinary:     (+) renal disease,             Endo:     (+)  type II DM,       Diabetic complications: neuropathy. thyroid problem,     Obesity (morbid),       Psychiatric/Substance Use:       Infectious Disease:       Malignancy:       Other:             Physical Exam    Airway        Mallampati: III   TM distance: > 3 FB   Neck ROM: full   Mouth opening: > 3 cm    Respiratory Devices and Support         Dental  no notable dental history     (+) Modest Abnormalities - crowns, retainers, 1 or 2 missing teeth      Cardiovascular          Rhythm and rate: regular and normal     Pulmonary   pulmonary exam normal                OUTSIDE LABS:  CBC:   Lab Results   Component Value Date    WBC 16.0 (H) 07/06/2024    WBC 10.5 06/06/2023    HGB 11.0 (L) 07/06/2024    HGB 11.9 (L) 07/06/2024    HCT 35.2 (L) 07/06/2024    HCT 43.8 06/06/2023     (H) 07/06/2024     06/06/2023     BMP:   Lab Results   Component Value Date     (L) 07/06/2024     04/05/2022    POTASSIUM 3.6 07/06/2024    POTASSIUM 4.1 04/05/2022    CHLORIDE 93 (L) 07/06/2024    CHLORIDE 103 04/05/2022    CO2 20 (L) 07/06/2024    CO2 25 04/05/2022    BUN 40.3 (H) 07/06/2024    BUN 16 04/05/2022    CR 2.64 (H) 07/06/2024    CR 1.17 04/05/2022     (H) 07/06/2024     (H) 07/06/2024     COAGS:   Lab Results   Component Value Date    PTT 80 (H) 08/30/2009    INR 2.75 (H) 07/06/2024     POC:   Lab Results   Component Value Date    BGM 84 05/09/2019     HEPATIC:   Lab Results   Component Value Date    ALBUMIN 3.0 (L) 07/06/2024    PROTTOTAL 7.5 07/06/2024    ALT 37 07/06/2024    AST 83 (H) 07/06/2024    ALKPHOS 108 07/06/2024    BILITOTAL 0.6 07/06/2024     OTHER:   Lab Results   Component Value Date    LACT 1.8 07/06/2024    A1C 7.6 (H) 07/06/2024    JACI 9.0 07/06/2024    PHOS 4.1 04/07/2008    MAG 2.5 (H) 07/06/2024    TSH 0.46 06/06/2023    T4 1.40 05/12/2020    CRP 3.9 06/21/2007     (H) 07/06/2024       Anesthesia Plan    ASA Status:  3, emergent    NPO Status:  NPO Appropriate    Anesthesia Type: MAC.     - Reason for MAC: straight local not clinically adequate, chronic cardiopulmonary disease   Induction: N/a.   Maintenance: TIVA.        Consents    Anesthesia  Plan(s) and associated risks, benefits, and realistic alternatives discussed. Questions answered and patient/representative(s) expressed understanding.     - Discussed:     - Discussed with:  Patient            Postoperative Care    Pain management: IV analgesics.        Comments:               Kulwinder Olivares MD    I have reviewed the pertinent notes and labs in the chart from the past 30 days and (re)examined the patient.  Any updates or changes from those notes are reflected in this note.     # Hyponatremia: Lowest Na = 128 mmol/L in last 2 days, will monitor as appropriate      # Hypoalbuminemia: Lowest albumin = 3 g/dL at 7/6/2024  3:15 AM, will monitor as appropriate   # Drug Induced Coagulation Defect: home medication list includes an anticoagulant medication   # DMII: A1C = 7.6 % (Ref range: <5.7 %) within past 6 months

## 2024-07-06 NOTE — ANESTHESIA CARE TRANSFER NOTE
Patient: Mckay Hsu    Procedure: Procedure(s):  Right foot first ray resection  Right foot first ray resection       Diagnosis: Osteomyelitis of great toe of right foot (H) [M86.9]  Diagnosis Additional Information: No value filed.    Anesthesia Type:   MAC     Note:    Oropharynx: oropharynx clear of all foreign objects and spontaneously breathing  Level of Consciousness: awake  Oxygen Supplementation: face mask  Level of Supplemental Oxygen (L/min / FiO2): 6  Independent Airway: airway patency satisfactory and stable  Dentition: dentition unchanged  Vital Signs Stable: post-procedure vital signs reviewed and stable  Report to RN Given: handoff report given  Patient transferred to: PACU    Handoff Report: Identifed the Patient, Identified the Reponsible Provider, Reviewed the pertinent medical history, Discussed the surgical course, Reviewed Intra-OP anesthesia mangement and issues during anesthesia, Set expectations for post-procedure period and Allowed opportunity for questions and acknowledgement of understanding      Vitals:  Vitals Value Taken Time   /85    Temp     Pulse 69 07/06/24 1836   Resp 15 07/06/24 1836   SpO2 99 % 07/06/24 1836   Vitals shown include unfiled device data.    Electronically Signed By: KENNETH Chen CRNA  July 6, 2024  6:37 PM

## 2024-07-07 ENCOUNTER — APPOINTMENT (OUTPATIENT)
Dept: CARDIOLOGY | Facility: CLINIC | Age: 64
End: 2024-07-07
Attending: STUDENT IN AN ORGANIZED HEALTH CARE EDUCATION/TRAINING PROGRAM
Payer: COMMERCIAL

## 2024-07-07 ENCOUNTER — APPOINTMENT (OUTPATIENT)
Dept: CT IMAGING | Facility: CLINIC | Age: 64
End: 2024-07-07
Attending: PODIATRIST
Payer: COMMERCIAL

## 2024-07-07 LAB
ALBUMIN SERPL BCG-MCNC: 2.4 G/DL (ref 3.5–5.2)
ALBUMIN UR-MCNC: 20 MG/DL
ALP SERPL-CCNC: 85 U/L (ref 40–150)
ALT SERPL W P-5'-P-CCNC: 32 U/L (ref 0–70)
AMORPH CRY #/AREA URNS HPF: ABNORMAL /HPF
AMORPH CRY #/AREA URNS HPF: ABNORMAL /HPF
ANION GAP SERPL CALCULATED.3IONS-SCNC: 16 MMOL/L (ref 7–15)
ANION GAP SERPL CALCULATED.3IONS-SCNC: 16 MMOL/L (ref 7–15)
APPEARANCE UR: ABNORMAL
AST SERPL W P-5'-P-CCNC: 57 U/L (ref 0–45)
BACTERIA #/AREA URNS HPF: ABNORMAL /HPF
BILIRUB SERPL-MCNC: 0.7 MG/DL
BILIRUB UR QL STRIP: NEGATIVE
BUN SERPL-MCNC: 46.9 MG/DL (ref 8–23)
BUN SERPL-MCNC: 46.9 MG/DL (ref 8–23)
CALCIUM SERPL-MCNC: 8.5 MG/DL (ref 8.8–10.2)
CALCIUM SERPL-MCNC: 8.5 MG/DL (ref 8.8–10.2)
CHLORIDE SERPL-SCNC: 101 MMOL/L (ref 98–107)
CHLORIDE SERPL-SCNC: 101 MMOL/L (ref 98–107)
CK SERPL-CCNC: 756 U/L (ref 39–308)
COLOR UR AUTO: YELLOW
CREAT SERPL-MCNC: 3.11 MG/DL (ref 0.67–1.17)
CREAT SERPL-MCNC: 3.11 MG/DL (ref 0.67–1.17)
DEPRECATED HCO3 PLAS-SCNC: 17 MMOL/L (ref 22–29)
DEPRECATED HCO3 PLAS-SCNC: 17 MMOL/L (ref 22–29)
EGFRCR SERPLBLD CKD-EPI 2021: 22 ML/MIN/1.73M2
EGFRCR SERPLBLD CKD-EPI 2021: 22 ML/MIN/1.73M2
ENTEROCOCCUS FAECALIS: NOT DETECTED
ENTEROCOCCUS FAECIUM: NOT DETECTED
ERYTHROCYTE [DISTWIDTH] IN BLOOD BY AUTOMATED COUNT: 14.2 % (ref 10–15)
GLUCOSE BLDC GLUCOMTR-MCNC: 138 MG/DL (ref 70–99)
GLUCOSE BLDC GLUCOMTR-MCNC: 141 MG/DL (ref 70–99)
GLUCOSE BLDC GLUCOMTR-MCNC: 147 MG/DL (ref 70–99)
GLUCOSE BLDC GLUCOMTR-MCNC: 253 MG/DL (ref 70–99)
GLUCOSE BLDC GLUCOMTR-MCNC: 259 MG/DL (ref 70–99)
GLUCOSE BLDC GLUCOMTR-MCNC: 286 MG/DL (ref 70–99)
GLUCOSE SERPL-MCNC: 144 MG/DL (ref 70–99)
GLUCOSE SERPL-MCNC: 144 MG/DL (ref 70–99)
GLUCOSE UR STRIP-MCNC: NEGATIVE MG/DL
HCT VFR BLD AUTO: 31.9 % (ref 40–53)
HGB BLD-MCNC: 10.5 G/DL (ref 13.3–17.7)
HGB UR QL STRIP: ABNORMAL
INR PPP: 2.07 (ref 0.85–1.15)
KETONES UR STRIP-MCNC: NEGATIVE MG/DL
LEUKOCYTE ESTERASE UR QL STRIP: NEGATIVE
LISTERIA SPECIES (DETECTED/NOT DETECTED): NOT DETECTED
LVEF ECHO: NORMAL
MCH RBC QN AUTO: 29.1 PG (ref 26.5–33)
MCHC RBC AUTO-ENTMCNC: 32.9 G/DL (ref 31.5–36.5)
MCV RBC AUTO: 88 FL (ref 78–100)
NITRATE UR QL: NEGATIVE
PH UR STRIP: 5 [PH] (ref 5–7)
PLATELET # BLD AUTO: 479 10E3/UL (ref 150–450)
POTASSIUM SERPL-SCNC: 4 MMOL/L (ref 3.4–5.3)
POTASSIUM SERPL-SCNC: 4 MMOL/L (ref 3.4–5.3)
PROT SERPL-MCNC: 6.8 G/DL (ref 6.4–8.3)
RBC # BLD AUTO: 3.61 10E6/UL (ref 4.4–5.9)
RBC CLUMPS #/AREA URNS HPF: ABNORMAL /HPF
RBC URINE: 20 /HPF
SODIUM SERPL-SCNC: 134 MMOL/L (ref 135–145)
SODIUM SERPL-SCNC: 134 MMOL/L (ref 135–145)
SP GR UR STRIP: 1.02 (ref 1–1.03)
SQUAMOUS EPITHELIAL: <1 /HPF
STAPHYLOCOCCUS AUREUS: DETECTED
STAPHYLOCOCCUS EPIDERMIDIS: NOT DETECTED
STAPHYLOCOCCUS LUGDUNENSIS: NOT DETECTED
STREPTOCOCCUS AGALACTIAE: NOT DETECTED
STREPTOCOCCUS ANGINOSUS GROUP: NOT DETECTED
STREPTOCOCCUS PNEUMONIAE: NOT DETECTED
STREPTOCOCCUS PYOGENES: NOT DETECTED
STREPTOCOCCUS SPECIES: NOT DETECTED
URATE CRY #/AREA URNS HPF: ABNORMAL /HPF
URATE CRY #/AREA URNS HPF: ABNORMAL /HPF
UROBILINOGEN UR STRIP-MCNC: NORMAL MG/DL
WBC # BLD AUTO: 13 10E3/UL (ref 4–11)
WBC URINE: 16 /HPF

## 2024-07-07 PROCEDURE — 85027 COMPLETE CBC AUTOMATED: CPT | Performed by: STUDENT IN AN ORGANIZED HEALTH CARE EDUCATION/TRAINING PROGRAM

## 2024-07-07 PROCEDURE — 250N000012 HC RX MED GY IP 250 OP 636 PS 637: Performed by: STUDENT IN AN ORGANIZED HEALTH CARE EDUCATION/TRAINING PROGRAM

## 2024-07-07 PROCEDURE — 36415 COLL VENOUS BLD VENIPUNCTURE: CPT | Performed by: STUDENT IN AN ORGANIZED HEALTH CARE EDUCATION/TRAINING PROGRAM

## 2024-07-07 PROCEDURE — 250N000011 HC RX IP 250 OP 636: Performed by: STUDENT IN AN ORGANIZED HEALTH CARE EDUCATION/TRAINING PROGRAM

## 2024-07-07 PROCEDURE — 87086 URINE CULTURE/COLONY COUNT: CPT | Performed by: STUDENT IN AN ORGANIZED HEALTH CARE EDUCATION/TRAINING PROGRAM

## 2024-07-07 PROCEDURE — 258N000003 HC RX IP 258 OP 636: Performed by: STUDENT IN AN ORGANIZED HEALTH CARE EDUCATION/TRAINING PROGRAM

## 2024-07-07 PROCEDURE — 81001 URINALYSIS AUTO W/SCOPE: CPT | Performed by: STUDENT IN AN ORGANIZED HEALTH CARE EDUCATION/TRAINING PROGRAM

## 2024-07-07 PROCEDURE — 89060 EXAM SYNOVIAL FLUID CRYSTALS: CPT | Performed by: STUDENT IN AN ORGANIZED HEALTH CARE EDUCATION/TRAINING PROGRAM

## 2024-07-07 PROCEDURE — 82040 ASSAY OF SERUM ALBUMIN: CPT | Performed by: STUDENT IN AN ORGANIZED HEALTH CARE EDUCATION/TRAINING PROGRAM

## 2024-07-07 PROCEDURE — 250N000013 HC RX MED GY IP 250 OP 250 PS 637: Performed by: PODIATRIST

## 2024-07-07 PROCEDURE — 120N000001 HC R&B MED SURG/OB

## 2024-07-07 PROCEDURE — 85610 PROTHROMBIN TIME: CPT | Performed by: STUDENT IN AN ORGANIZED HEALTH CARE EDUCATION/TRAINING PROGRAM

## 2024-07-07 PROCEDURE — 99232 SBSQ HOSP IP/OBS MODERATE 35: CPT | Performed by: STUDENT IN AN ORGANIZED HEALTH CARE EDUCATION/TRAINING PROGRAM

## 2024-07-07 PROCEDURE — 255N000002 HC RX 255 OP 636: Performed by: STUDENT IN AN ORGANIZED HEALTH CARE EDUCATION/TRAINING PROGRAM

## 2024-07-07 PROCEDURE — 93306 TTE W/DOPPLER COMPLETE: CPT | Mod: 26 | Performed by: INTERNAL MEDICINE

## 2024-07-07 PROCEDURE — 99232 SBSQ HOSP IP/OBS MODERATE 35: CPT | Performed by: SPECIALIST

## 2024-07-07 PROCEDURE — 84132 ASSAY OF SERUM POTASSIUM: CPT | Performed by: STUDENT IN AN ORGANIZED HEALTH CARE EDUCATION/TRAINING PROGRAM

## 2024-07-07 PROCEDURE — 82550 ASSAY OF CK (CPK): CPT | Performed by: STUDENT IN AN ORGANIZED HEALTH CARE EDUCATION/TRAINING PROGRAM

## 2024-07-07 PROCEDURE — 73700 CT LOWER EXTREMITY W/O DYE: CPT | Mod: RT

## 2024-07-07 PROCEDURE — 87040 BLOOD CULTURE FOR BACTERIA: CPT | Performed by: STUDENT IN AN ORGANIZED HEALTH CARE EDUCATION/TRAINING PROGRAM

## 2024-07-07 PROCEDURE — C8929 TTE W OR WO FOL WCON,DOPPLER: HCPCS

## 2024-07-07 RX ORDER — METOPROLOL SUCCINATE 100 MG/1
100 TABLET, EXTENDED RELEASE ORAL DAILY
Status: DISCONTINUED | OUTPATIENT
Start: 2024-07-07 | End: 2024-07-12 | Stop reason: HOSPADM

## 2024-07-07 RX ORDER — AMLODIPINE BESYLATE 5 MG/1
5 TABLET ORAL DAILY
Status: DISCONTINUED | OUTPATIENT
Start: 2024-07-07 | End: 2024-07-09

## 2024-07-07 RX ADMIN — PIPERACILLIN AND TAZOBACTAM 3.38 G: 3; .375 INJECTION, POWDER, FOR SOLUTION INTRAVENOUS at 21:47

## 2024-07-07 RX ADMIN — INSULIN ASPART 1 UNITS: 100 INJECTION, SOLUTION INTRAVENOUS; SUBCUTANEOUS at 03:29

## 2024-07-07 RX ADMIN — ACETAMINOPHEN 975 MG: 325 TABLET, FILM COATED ORAL at 04:14

## 2024-07-07 RX ADMIN — SODIUM CHLORIDE: 9 INJECTION, SOLUTION INTRAVENOUS at 17:52

## 2024-07-07 RX ADMIN — INSULIN ASPART 1 UNITS: 100 INJECTION, SOLUTION INTRAVENOUS; SUBCUTANEOUS at 10:06

## 2024-07-07 RX ADMIN — CLINDAMYCIN PHOSPHATE 900 MG: 900 INJECTION, SOLUTION INTRAVENOUS at 03:17

## 2024-07-07 RX ADMIN — PIPERACILLIN AND TAZOBACTAM 3.38 G: 3; .375 INJECTION, POWDER, FOR SOLUTION INTRAVENOUS at 17:49

## 2024-07-07 RX ADMIN — ACETAMINOPHEN 975 MG: 325 TABLET, FILM COATED ORAL at 13:02

## 2024-07-07 RX ADMIN — POLYETHYLENE GLYCOL 3350 17 G: 17 POWDER, FOR SOLUTION ORAL at 08:35

## 2024-07-07 RX ADMIN — CLINDAMYCIN PHOSPHATE 900 MG: 900 INJECTION, SOLUTION INTRAVENOUS at 18:49

## 2024-07-07 RX ADMIN — SENNOSIDES AND DOCUSATE SODIUM 1 TABLET: 50; 8.6 TABLET ORAL at 08:35

## 2024-07-07 RX ADMIN — INSULIN GLARGINE 20 UNITS: 100 INJECTION, SOLUTION SUBCUTANEOUS at 16:38

## 2024-07-07 RX ADMIN — HUMAN ALBUMIN MICROSPHERES AND PERFLUTREN 9 ML: 10; .22 INJECTION, SOLUTION INTRAVENOUS at 10:59

## 2024-07-07 RX ADMIN — ACETAMINOPHEN 975 MG: 325 TABLET, FILM COATED ORAL at 21:46

## 2024-07-07 RX ADMIN — SODIUM CHLORIDE: 9 INJECTION, SOLUTION INTRAVENOUS at 07:06

## 2024-07-07 RX ADMIN — PIPERACILLIN AND TAZOBACTAM 3.38 G: 3; .375 INJECTION, POWDER, FOR SOLUTION INTRAVENOUS at 04:14

## 2024-07-07 RX ADMIN — CLINDAMYCIN PHOSPHATE 900 MG: 900 INJECTION, SOLUTION INTRAVENOUS at 10:07

## 2024-07-07 RX ADMIN — PIPERACILLIN AND TAZOBACTAM 3.38 G: 3; .375 INJECTION, POWDER, FOR SOLUTION INTRAVENOUS at 10:41

## 2024-07-07 ASSESSMENT — ACTIVITIES OF DAILY LIVING (ADL)
ADLS_ACUITY_SCORE: 58
ADLS_ACUITY_SCORE: 51
ADLS_ACUITY_SCORE: 52
ADLS_ACUITY_SCORE: 51
ADLS_ACUITY_SCORE: 51
ADLS_ACUITY_SCORE: 52
ADLS_ACUITY_SCORE: 51
ADLS_ACUITY_SCORE: 54
ADLS_ACUITY_SCORE: 54
ADLS_ACUITY_SCORE: 51
ADLS_ACUITY_SCORE: 52
ADLS_ACUITY_SCORE: 54
ADLS_ACUITY_SCORE: 58
ADLS_ACUITY_SCORE: 54
ADLS_ACUITY_SCORE: 54
ADLS_ACUITY_SCORE: 58
ADLS_ACUITY_SCORE: 58
ADLS_ACUITY_SCORE: 54
ADLS_ACUITY_SCORE: 54
ADLS_ACUITY_SCORE: 51
ADLS_ACUITY_SCORE: 52

## 2024-07-07 NOTE — PROGRESS NOTES
[facility]  PROGRESS NOTE    CHIEF COMPLAINT:      I was asked by Lavell Bishop MD  to evaluate this patient for right foot.    PATIENT HISTORY:  Mckay Hsu is a 64 year old male seen in follow up for his right foot. States pain is improved to site. Feels well overall. Dressing clean and intact up until approx 1300 where some drainage was to site following getting up to the chair. Then message received about increasing bloody drainage shortly after that.         Review of Systems:  A 10 point review of systems was performed and is positive for that noted above in the patient history.  All other areas are negative.     PAST MEDICAL HISTORY:   Past Medical History:   Diagnosis Date    Atrial fibrillation (H)     s/p AVN ablation, BIV PPM    Congestive heart failure (H)     tachycardia-induced cardiomyopathy    Essential hypertension, benign     Hyperlipidemia LDL goal <100 10/31/2010    Hyponatremia 9/8/2009    Morbid obesity (H)     Open wound of foot except toe(s) alone, without mention of complication     Retinitis pigmentosa     Type 2 diabetes, HbA1C goal < 8% (H) 6/26/2012        PAST SURGICAL HISTORY:   Past Surgical History:   Procedure Laterality Date    CARDIOVERSION  9/2009, 10/2009    COLONOSCOPY N/A 5/9/2019    Procedure: COLONOSCOPY;  Surgeon: Godwin Santillan MD;  Location: [facility]    H ABLATION AV NODE  4/8/10    IMPLANT PACEMAKER  4/8/10    BIV PPM- [employer] InSync III    TONSILLECTOMY      as kid    [facility] NONSPECIFIC PROCEDURE  06/2007    debritement        MEDICATIONS:  Reviewed in Epic. Current.     ALLERGIES:  No Known Allergies     SOCIAL HISTORY:   Social History     Socioeconomic History    Marital status: Single     Spouse name: Not on file    Number of children: Not on file    Years of education: Not on file    Highest education level: Not on file   Occupational History    Occupation: repair printer     Employer: [employer]   Tobacco Use    Smoking status:  Never    Smokeless tobacco: Never   Substance and Sexual Activity    Alcohol use: No    Drug use: No    Sexual activity: Never   Other Topics Concern     Service Not Asked    Blood Transfusions Not Asked    Caffeine Concern Yes     Comment: occasionally soda    Occupational Exposure Not Asked    Hobby Hazards Not Asked    Sleep Concern Not Asked    Stress Concern Not Asked    Weight Concern Not Asked    Special Diet Yes     Comment: low sodium, low fat, DM diet     Back Care Not Asked    Exercise Yes     Comment: walking, stairs     Bike Helmet Not Asked    Seat Belt Not Asked    Self-Exams Not Asked    Parent/sibling w/ CABG, MI or angioplasty before 65F 55M? Not Asked   Social History Narrative    Not on file     Social Determinants of Health     Financial Resource Strain: Not on file   Food Insecurity: Not on file   Transportation Needs: Not on file   Physical Activity: Not on file   Stress: Not on file   Social Connections: Not on file   Interpersonal Safety: Not on file   Housing Stability: Not on file        FAMILY HISTORY:   Family History   Problem Relation Age of Onset    Diabetes Mother     Hypertension Father     Diabetes Father     Hypertension Maternal Grandmother     Diabetes Maternal Grandmother     Diabetes Maternal Grandfather     Hypertension Maternal Grandfather     Cardiovascular Maternal Grandfather     Hypertension Paternal Grandfather     Cardiovascular Paternal Grandfather     Diabetes Sister     Hypertension Sister         EXAM:Vitals: /65 (BP Location: Right arm)   Pulse 71   Temp 97.5  F (36.4  C) (Oral)   Resp 18   Wt 149.7 kg (330 lb)   SpO2 93%   BMI 45.38 kg/m    BMI= Body mass index is 45.38 kg/m .    LABS:     Last Comprehensive Metabolic Panel:  Sodium   Date Value Ref Range Status   07/07/2024 134 (L) 135 - 145 mmol/L Final   07/07/2024 134 (L) 135 - 145 mmol/L Final   01/26/2021 134 133 - 144 mmol/L Final     Potassium   Date Value Ref Range Status   07/07/2024  4.0 3.4 - 5.3 mmol/L Final   07/07/2024 4.0 3.4 - 5.3 mmol/L Final   04/05/2022 4.1 3.4 - 5.3 mmol/L Final   01/26/2021 4.3 3.4 - 5.3 mmol/L Final     Chloride   Date Value Ref Range Status   07/07/2024 101 98 - 107 mmol/L Final   07/07/2024 101 98 - 107 mmol/L Final   04/05/2022 103 94 - 109 mmol/L Final   01/26/2021 101 94 - 109 mmol/L Final     Carbon Dioxide   Date Value Ref Range Status   01/26/2021 28 20 - 32 mmol/L Final     Carbon Dioxide (CO2)   Date Value Ref Range Status   07/07/2024 17 (L) 22 - 29 mmol/L Final   07/07/2024 17 (L) 22 - 29 mmol/L Final   04/05/2022 25 20 - 32 mmol/L Final     Anion Gap   Date Value Ref Range Status   07/07/2024 16 (H) 7 - 15 mmol/L Final   07/07/2024 16 (H) 7 - 15 mmol/L Final   04/05/2022 8 3 - 14 mmol/L Final   01/26/2021 5 3 - 14 mmol/L Final     Glucose   Date Value Ref Range Status   07/07/2024 144 (H) 70 - 99 mg/dL Final   07/07/2024 144 (H) 70 - 99 mg/dL Final   04/05/2022 120 (H) 70 - 99 mg/dL Final   01/26/2021 163 (H) 70 - 99 mg/dL Final     Comment:     Fasting specimen     GLUCOSE BY METER POCT   Date Value Ref Range Status   07/07/2024 147 (H) 70 - 99 mg/dL Final     Urea Nitrogen   Date Value Ref Range Status   07/07/2024 46.9 (H) 8.0 - 23.0 mg/dL Final   07/07/2024 46.9 (H) 8.0 - 23.0 mg/dL Final   04/05/2022 16 7 - 30 mg/dL Final   01/26/2021 19 7 - 30 mg/dL Final     Creatinine   Date Value Ref Range Status   07/07/2024 3.11 (H) 0.67 - 1.17 mg/dL Final   07/07/2024 3.11 (H) 0.67 - 1.17 mg/dL Final   01/26/2021 1.03 0.66 - 1.25 mg/dL Final     GFR Estimate   Date Value Ref Range Status   07/07/2024 22 (L) >60 mL/min/1.73m2 Final     Comment:     eGFR calculated using 2021 CKD-EPI equation.   07/07/2024 22 (L) >60 mL/min/1.73m2 Final     Comment:     eGFR calculated using 2021 CKD-EPI equation.   01/26/2021 79 >60 mL/min/[1.73_m2] Final     Comment:     Non  GFR Calc  Starting 12/18/2018, serum creatinine based estimated GFR (eGFR) will be    calculated using the Chronic Kidney Disease Epidemiology Collaboration   (CKD-EPI) equation.       Calcium   Date Value Ref Range Status   07/07/2024 8.5 (L) 8.8 - 10.2 mg/dL Final   07/07/2024 8.5 (L) 8.8 - 10.2 mg/dL Final   01/26/2021 9.7 8.5 - 10.1 mg/dL Final     Lab Results   Component Value Date    WBC 16.0 07/06/2024    WBC 10.2 11/29/2018     Lab Results   Component Value Date    RBC 4.04 07/06/2024    RBC 4.42 11/29/2018     Lab Results   Component Value Date    HGB 11.9 07/06/2024    HGB 14.1 02/03/2020     Lab Results   Component Value Date    HCT 35.2 07/06/2024    HCT 39.2 11/29/2018     Lab Results   Component Value Date     07/06/2024     02/03/2020      Lab Results   Component Value Date    INR >10.00 07/06/2024    INR 2.8 06/11/2024    INR 4.6 05/28/2024    INR 2.8 04/09/2024    INR 2.7 02/27/2024    INR 2.2 01/23/2024    INR 2.4 12/28/2023    INR 1.9 12/07/2023    INR 2.2 10/18/2023    INR 2.7 08/29/2023    INR 2.8 07/18/2023    INR 2.6 06/06/2023    INR 2.4 04/04/2023    INR 2.80 06/01/2021    INR 3.00 04/20/2021    INR 2.60 03/09/2021    INR 2.30 01/26/2021    INR 2.80 01/05/2021    INR 3.20 12/01/2020    INR 1.90 11/10/2020    INR 2.50 09/29/2020    INR 2.60 08/18/2020    INR 2.70 07/07/2020    INR 2.20 06/02/2020    INR 3.40 05/12/2020        General appearance: Patient is alert and fully cooperative with history & exam.  No sign of distress is noted during the visit.      Respiratory: Breathing is regular & unlabored while sitting.      HEENT: Hearing is intact to spoken word.  Speech is clear.  No gross evidence of visual impairment that would impact ambulation.      Dermatologic: Right foot dressing changed: sanguinous dressing to bandage. Noted area of sanguinous ooze to open site. Pressure applied by bedside RN and stopped. Cellulitis improved to foot. Plantar site hypopigmented.      Vascular: Dorsalis pedis and posterior tibial pulses are intact & regular bilaterally.   CFT and skin temperature is normal to both lower extremities.       Neurologic: Lower extremity sensation is diminished, bilateral foot, to light touch.  No evidence of neurological-based weakness or contracture in the lower extremities.       Musculoskeletal: Patient is ambulatory without an assistive device or brace.  No gross foot or ankle deformity noted.     Psychiatric: Affect is pleasant & appropriate.      All cultures:  Recent Labs   Lab 07/06/24  1814 07/06/24  0357 07/06/24  0315   CULTURE See corresponding culture for results No growth after 1 day Positive on the 1st day of incubation*  Gram positive cocci*        IMAGING:     IMPRESSION: There is deformity of the distal phalanx right great toe with some cortical irregularity and erosive change. There is also some air in the surrounding soft tissues. The findings are strongly concerning for osteomyelitis. Degenerative changes   at the first MTP joint. Multiple hammertoe deformities. Plantar calcaneal spur. Vascular calcification. Remainder unremarkable.    Noted gas gangrene and distal phalanx osteomyelitis       Vascular Studies:     SHANNON FINDINGS:  RIGHT  Brachial: 128  Ankle (PT): 130 Index: 0.98  Ankle (DP): 121 Index: 0.92        LEFT  Brachial: 132  Ankle (PT): 173 Index: 1.31  Ankle (DP): 122 Index: 0.92     The right SHANNON at rest is 0.98. The left SHANNON at rest is 1.31.       WAVEFORMS: The dorsalis pedis and posterior tibial arteries are mostly biphasic in nature.                                                                      IMPRESSION:  1.  RIGHT LOWER EXTREMITY: SHANNON at rest is normal.  2.  LEFT LOWER EXTREMITY: SHANNON at rest is normal.    ASSESSMENT:  S/p right foot first ray resection on 7/6  Diabetes Mellitus --> hba1c: 8.4  Supra therapeutic INR: >10  --> hx of atrial fibrillation  Sepsis with bacteremia      MEDICAL DECISION MAKING:   -Discussed all findings with patient. Chart and imaging reviewed.   -Vascular studies reviewed and with  appropriate blood flow to site.   -Intra-op: site had significant necrotic tissue and less than expected bleeding. At bedside today, site did have increased bleeding. Planned to pack it with surgicel, but pressure applied by bedside RN and this resolved.   -Cont to elevate foot at all times. Will order CAM boot for tomorrow. Recommend bed rest for next 6 hours. Will place orders.   -Will change MRI order to CT given patient's pacemaker.   -Will need further debridement of site, likely planned for 7/9      Denia Lainez DPM   [place] [facility] & Ankle Surgery  [phone]

## 2024-07-07 NOTE — PROVIDER NOTIFICATION
MD Notification    Notified Person: MD    Notified Person Name: Lavell Bishop    Notification Date/Time:07/7/24     Notification Interaction: vocera    Purpose of Notification: PCR came back as staphylococcus aureus for his right arm blood cultures     Orders Received: Aware    Comments:

## 2024-07-07 NOTE — PROGRESS NOTES
Pt was being transferred to pre op area at change of shift.Did not nurses this patient this shift.Pt still in aline anesthesia area, repost to be given to incoming nurse.

## 2024-07-07 NOTE — PROVIDER NOTIFICATION
MD Notification    Notified Person: MD    Notified Person Name:Denia Lainez    Notification Date/Time:7/7/24 @ 1422    Notification Interaction:Vocera message    Purpose of Notification:NA just reported that small amount of blood shoots out from the R foot while transferring pt back to bed. Pt denies lightheadedness, dizziness and pain. /66, P 71.    Orders Received:    Comments: Coming to see pt soon per podiatry.

## 2024-07-07 NOTE — PROGRESS NOTES
[facility]    Infectious Disease Progress Note    Date of Service : 07/07/2024     Assessment:  64YM with diabetes (HbA1c 8.4) ,blindness related to retinitis pigmentosa and CKD who has been admitted following a fall and prolonged immobilization, and was found to have right foot infection,with gas gangrene and osteomyelitis of the right great toe and is s/p surgical amputation. Additionally has developed Rhabomyolysis with EV. Operative tissue cxs and blood cxs are positive for MSSA     -MSSA bacteremia related to septic foot  -Gas gangene and osteomyelitis of the right great toe. S/p R foot ray resection  -Diabetes Mellitus --8.4 %  -Fall and prolonged immobilization  -EV  -Rhabdomyolysis     Recommendations:  Repeat blood cxs X 2 sets have been collected, follow susceptibility data  Discontinue Zosyn and Clindamycin  Treat with Cefazolin  Monitor for sites of seeding  Will need IV antibiotics at discharge in view of bacteremia  Glycemic control    Discussed with the hospitalist service    Vielka Tinajero MD    Interval History   Remains afebrile, leukocytosis is improving, blood cxs have turned positive for staph aureus, s/p R foot ray resection      Physical Exam   Temp: 97.5  F (36.4  C) Temp src: Oral BP: 99/59 Pulse: 71   Resp: 18 SpO2: 93 % O2 Device: None (Room air) Oxygen Delivery: 6 LPM  Vitals:    07/06/24 0347   Weight: 149.7 kg (330 lb)     Vital Signs with Ranges  Temp:  [96.2  F (35.7  C)-97.5  F (36.4  C)] 97.5  F (36.4  C)  Pulse:  [69-74] 71  Resp:  [15-40] 18  BP: ()/(56-95) 99/59  SpO2:  [91 %-99 %] 93 %    GENERAL APPEARANCE:  awake,   EYES: blindness  NECK: no adenopathy  RESP: lungs clear   CV: S1S2  ABDOMEN: soft, nontender  MS: R foot in dressing  SKIN: no rash    Other:    Medications   Current Facility-Administered Medications   Medication Dose Route Frequency Provider Last Rate Last Admin    sodium chloride 0.9 % infusion   Intravenous Continuous Demarcus  Denice LUX  mL/hr at 07/07/24 0706 New Bag at 07/07/24 0706     Current Facility-Administered Medications   Medication Dose Route Frequency Provider Last Rate Last Admin    acetaminophen (TYLENOL) tablet 975 mg  975 mg Oral Q8H Denia Lainez DPM   975 mg at 07/07/24 1302    amLODIPine (NORVASC) tablet 5 mg  5 mg Oral Daily Denice Tracy DO        clindamycin (CLEOCIN) 900 mg in 50 mL D5W intermittent infusion  900 mg Intravenous Q8H Denice Tracy  mL/hr at 07/07/24 1007 900 mg at 07/07/24 1007    insulin aspart (NovoLOG) injection (RAPID ACTING)  1-7 Units Subcutaneous TID AC Denice Tracy DO        insulin aspart (NovoLOG) injection (RAPID ACTING)  1-5 Units Subcutaneous At Bedtime Denice Tracy DO        metoprolol succinate ER (TOPROL XL) 24 hr tablet 100 mg  100 mg Oral Daily Denice Tracy DO        piperacillin-tazobactam (ZOSYN) 3.375 g vial to attach to  mL bag  3.375 g Intravenous Q6H Lavell Bishop MD   3.375 g at 07/07/24 1041    polyethylene glycol (MIRALAX) Packet 17 g  17 g Oral Daily Denia Lainez DPM   17 g at 07/07/24 0835    senna-docusate (SENOKOT-S/PERICOLACE) 8.6-50 MG per tablet 1 tablet  1 tablet Oral BID Denia Lainez DPM   1 tablet at 07/07/24 0835    sodium chloride (PF) 0.9% PF flush 3 mL  3 mL Intracatheter Q8H Lavell Bishop MD   3 mL at 07/06/24 0925    sodium chloride (PF) 0.9% PF flush 3 mL  3 mL Intracatheter Q8H Denia Lainez DPM   3 mL at 07/07/24 0415       Data   All microbiology laboratory data reviewed.  Recent Labs   Lab Test 07/07/24  0756 07/06/24  1017 07/06/24  0315 06/06/23  0838   WBC 13.0*  --  16.0* 10.5   HGB 10.5* 11.0* 11.9* 14.4   HCT 31.9*  --  35.2* 43.8   MCV 88  --  87 89   *  --  481* 287     Recent Labs   Lab Test 07/07/24 0756 07/06/24 2057 07/06/24 0315   CR 3.11*  3.11* 3.05* 2.64*     Recent Labs   Lab Test 07/06/24 0315   *      Microbiology  7/07/2024 1244  07/07/2024 1251 Blood Culture Hand, Right [[id number]]   Blood from Hand, Right    In process Component Value   No component results             07/07/2024 1244 07/07/2024 1251 Blood Culture Hand, Right [[id number]]   Blood from Hand, Right    In process Component Value   No component results             07/06/2024 1814 07/06/2024 1822 Anaerobic Bacterial Culture Routine [[id number]]   Tissue from Foot, Right    In process Component Value   No component results             07/06/2024 1814 07/06/2024 2247 Gram Stain [[id number]]   (Abnormal)   Tissue from Foot, Right    Final result Component Value   GS Culture See corresponding culture for results   Gram Stain Result 1+ Gram positive cocci Abnormal    Gram Stain Result No white blood cells seen Abnormal           07/06/2024 1814 07/07/2024 1400 Tissue Aerobic Bacterial Culture Routine [[id number]]   (Abnormal)   Tissue from Foot, Right    Preliminary result Component Value   Culture Culture in progress P    2+ Staphylococcus aureus Abnormal  P    Isolated in broth only Gram negative bacilli Abnormal  P    On day 1 of incubation             07/06/2024 0357 07/07/2024 1016 Blood Culture Peripheral Blood [[id number]]   Peripheral Blood    Preliminary result Component Value   Culture No growth after 1 day P             07/06/2024 0315 07/07/2024 0225 Blood Culture Arm, Right [[id number]]   (Abnormal)   Blood from Arm, Right    Preliminary result Component Value   Culture Positive on the 1st day of incubation Abnormal  P    Gram positive cocci Panic  P    1 of 2 bottles             07/06/2024 0315 07/07/2024 0507 Verigene GP Panel [[id number]]    (Abnormal)   Blood from Arm, Right    Final result Component Value   Staphylococcus aureus Detected Abnormal    Positive for Staphylococcus aureus and negative for the mecA gene (not resistant to methicillin) by [employer] multiplex nucleic acid test. Final identification and antimicrobial susceptibility testing  will be verified by standard methods.   Staphylococcus epidermidis Not Detected   Staphylococcus lugdunensis Not Detected   Enterococcus faecalis Not Detected   Enterococcus faecium Not Detected   Streptococcus species Not Detected   Streptococcus agalactiae Not Detected   Streptococcus anginosus group Not Detected   Streptococcus pneumoniae Not Detected   Streptococcus pyogenes Not Detected   Listeria species Not Detected

## 2024-07-07 NOTE — PROGRESS NOTES
A/O x4. Legally blind. VSS on RA. UA sample collected and down to lab. ECHO done. Noted some bleeding. Podiatry noted. Dressing changed by podiatry. R foot elevated with wedge. No PT today. NS infusing @ 125ml/hr. Denies pain. Need help ordering food/feeding. Plan of care ongoing.

## 2024-07-07 NOTE — CONSULTS
"BRIEF NUTRITION ASSESSMENT      REASON FOR ASSESSMENT:  Mckay Hsu is a 64 year old male seen by Registered Dietitian for Provider Order - low albumin, morbid obesity, T2D     NUTRITION HISTORY:  Patient notes that appetite was fair PTA  Noted in progress notes on admit that he has been ordering out deliver for every meal d/t inability to care for himself --> Noted SW consult pending     CURRENT DIET AND INTAKE:  Diet:  Regular diet               Patient states that he ate all of his breakfast today --> Eggs, apple juice, and yogurt  He is willing to try a protein supplement tonight     ANTHROPOMETRICS:  Height: 5'11.5\"  Weight:  149.7 kg (330#)(7/6)  Body mass index is 45.38 kg/m   Weight Status: Obesity Grade III BMI >40  IBW:  79.5 kg   %IBW: 188%  Weight History:   Wt Readings from Last 10 Encounters:   07/06/24 149.7 kg (330 lb)   08/22/23 146.2 kg (322 lb 6.4 oz)   06/20/23 146.4 kg (322 lb 11.2 oz)   04/25/23 143.3 kg (316 lb)   06/14/22 138.1 kg (304 lb 8 oz)   02/15/22 138.3 kg (304 lb 14.4 oz)   06/24/21 142 kg (313 lb)   03/09/20 148.8 kg (328 lb)   06/21/19 147 kg (324 lb)   05/09/19 144.7 kg (319 lb)     No weight loss noted (weight has actually trended upward)    LABS:  Albumin 2.4 (L) -- Likely inflammatory as CRP is 334.73 (H)  Albumin is not an evidence based indicator of nutrition status/malnutrition.  Albumin is a negative acute phase protein and will be low in states of inflammation.      MALNUTRITION:  Visual Nutrition Focused Physical Assessment (NFPA) completed -- no muscle/fat losses noted .  Patient does not meet two of the following criteria necessary for diagnosing malnutrition.     % Weight Loss:  None noted  % Intake:  Decreased intake does not meet criteria for malnutrition, however patient somewhat vague on intake PTA  Subcutaneous Fat Loss:  None observed  Muscle Loss:  None observed  Fluid Retention:  None noted    NUTRITION INTERVENTION:  Nutrition Diagnosis:  No nutrition " diagnosis at this time.    Implementation:  Nutrition Education:  Not appropriate at this time due to patient condition - did educate patient on higher protein supplements post-op for healing   If diabetes education desired may be better served on an outpatient basis     Consider changing to a diet with carb controlled features as he is currently on a regular diet     Added Glucerna daily at dinner     FOLLOW UP/MONITORING:   Will re-evaluate in 7 - 10 days, or sooner, if re-consulted.    Xochitl Washington RD, LD, CNSC   Clinical Dietitian - [facility]

## 2024-07-07 NOTE — PLAN OF CARE
Goal Outcome Evaluation:    Summary:       Diagnosis: Osteomyelitis of great toe of right foot    Care Plan Summary Note:POD#0 Right foot first ray resection   Orientation: A&Ox4, legally blind  Activity Level: Did not get out of bed  Fall Risk: yes  Behavior & Aggression Tool Color: Green  Pain Management: Denies pain  ABNL VS/O2: VSS on RA  ABNL Lab/B  Diet:   Bowel/Bladder: Continent-External catheter in place  Drains/Devices: PIV infusing  ml/hr with int. abx  Tests/Procedures for next shift: MRI check list need to complete with patient- scheduled for Monday  Anticipated DC date: TBD  Other Important Info: R foot dressing is ace wrap and CDI elevated on the wedge. Patient is at US the end of the shift.

## 2024-07-07 NOTE — PLAN OF CARE
Goal Outcome Evaluation:    Diagnosis: Osteomyelitis of great toe of right foot     Care Plan Summary Note:POD#1 Right foot first ray resection   Orientation: A&Ox4, legally blind  Activity Level: not oob yet  Fall Risk: yes  Behavior & Aggression Tool Color: Green  Pain Management: Denies pain  ABNL VS/O2: VSS on RA  ABNL Lab/B,138  Diet:   Bowel/Bladder: Continent-External catheter in place  Drains/Devices: PIV infusing  ml/hr with int. abx  Tests/Procedures for next shift: MRI check list need to complete with patient- scheduled for Monday  Anticipated DC date: TBD  Other Important Info: R foot dressing is ace wrap and CDI

## 2024-07-07 NOTE — PROVIDER NOTIFICATION
MD Notification    Notified Person: MD    Notified Person Name:Denia Lainez    Notification Date/Time:7/7/24@ 1026    Notification Interaction: Vocera Message    Purpose of Notification:Just needed clarification. Does pt need orthosis boot before getting out of bed?    Orders Received:    Comments: He can get up to go to the bathroom or to his chair, but I'd wait for like PT or anything more for the boot per Podiatry DPM.

## 2024-07-07 NOTE — PROVIDER NOTIFICATION
MD Notification    Notified Person: MD    Notified Person Name:Denia Lainez    Notification Date/Time:7/7/24 @ 1317    Notification Interaction: Vocera message     Purpose of Notification:FYI Pt R foot dangled while in the chair eating lunch. Noted some blood on the dressing. Legs elevated with 2 pillows @ the moment.     Orders Received:    Comments: Coming to see pt.

## 2024-07-07 NOTE — PROGRESS NOTES
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      R first toe gangrene, necrosis and OM s/p resection  Gas gangrene  Staph aureus bacteremia  ``Hx: Patient reports falling at 9am on 7/5/2024, and was on the ground till 2am 7/6/2024 when he banged on the floor to attract the attention of his downstairs neighbor. Patient reports he tripped and was unable to get up afterwards, no headtrauma/LOC. Endroses L sided Rib pain, and R knee/hip/ankle pain. Reports that when he fell, he defecated, and cleaned himself and place all feces into bags just for today (to avoid slipping) as he couldn't get to his toilet (EMS states otherwise saying apartment was filled w./ urine/feces bags and no working shower/toilet). Patient denies fevers/chills. Lives alone, is legally blind, and states that he can no longer take care of self.  Reports having another fall months ago with no head trauma/LOC. Patient reports no pain in his R foot due to lack of sensation (in either feet, chronic) and because of his blindness unsure when it started to become necrotic.  ``Vitals/Exam: vitally stable, Blind, obese, ventral hernia present. Lack of sensation to gross touch in LE. Bilateral LE edema R>L. R first toe gangrenous/necrotic (nontender), ROM intact. Active bleeding from skin sloughing medial and lateral to the toe. L buttock enlarged bruise  ``EKG: paced rhythm with suspected LBBB  ``Labs: , WBC 16, Plts 481  ``Imaging: deformity of the distal phalanx right great toe with some cortical irregularity and erosive change. There is also some air in the surrounding soft tissues. The findings are strongly concerning for osteomyelitis. Degenerative changes  "  at the first MTP joint  ``ER Course: vanc, zosyn, dilaudid, 2L fluids, Vit K 10mg    - blood cultures from admission positive for staph aureus  - underwent resection by podiatry yesterday  - remains on zosyn and clinda with ID following     EV on CKD2, likely ATN  Rhabdomyolysis w/ elevated AST  Anion gap acidosis  ``baseline creatinine 1.1 on admission 2.6. CK 3000s, Lactate negative  ``Given rhabdo, concern for both prerenal and ATN    - Cr continues to elevate but likely reaching a peak  - continue high rate IVF today but decrease if any signs of volume overload  - CK trending down, stop checking  - follow BMP tomorrow     Permanent Afib, s/p AV node ablation and CRT 2010  Chronic anticoagulation  Supratheraputic INR  Hx of NSVT  HTN   HLD  ``failed amiodarone in the past  ``INR>10 on admission    - pharmacy consulted for INR reversal given need to surgery  - defer to ortho on resumption of AC. May consider DOAC now  - resumed metoprolol, amlodipine at 5mg  - Hold simvastatin in the setting of rhabdo  - Hold losartan given EV  - TTE pending     T2D  PTA taking glargine 60 units with metformin  - Holding metformin  - BG remains controlled only on sliding scale, monitor and add back glargine as needed    Concern for vulnerable adult status  Blindness 2/2 retinitis pigmentose  ``EMS reports \"Patient covered in feces from head to toe and covered in the remnants of his carpet including food scraps, wrappers, and crumbs found covering the patient's back \" and that \"doesn't have a working toilet or shower, and order delivery for every meal, and has bowel movements and urinates in plastic bags \"  ``patient informed nocturnist that the above story was inaccurate, and that he only had bags of stool for today after being unable to reach the toilet (which he states does work)  ``Patient states he needs help as he can't live alone anymore  --SW consult     Hyponatremia  - likely volume depletion, improving with IVF  - " monitor daily BMP     Hx recurrent anemia  L buttock Hematoma  ``labile Hb 12-14  ``CT showing High-density subcutaneous stranding overlying the left buttock with a 2.5 cm circumscribed high-density fluid collection   - Hb mostly stable since admission and do not suspect brisk bleed  - INR management as above  - daily CBC     Hypoalbuminemia  Obesity  Ventral hernia  --Nutrition consult     Degenerative Joint and Bone disease  --Tylenol          Diet: Advance Diet as Tolerated: Regular Diet Adult    DVT Prophylaxis: Pneumatic Compression Devices  Duran Catheter: Not present  Lines: None     Cardiac Monitoring: None  Code Status: Full Code      Clinically Significant Risk Factors         # Hyponatremia: Lowest Na = 128 mmol/L in last 2 days, will monitor as appropriate   # Hypercalcemia: corrected calcium is >10.1, will monitor as appropriate    # Hypoalbuminemia: Lowest albumin = 2.4 g/dL at 7/7/2024  7:56 AM, will monitor as appropriate  # Coagulation Defect: INR = 2.07 (Ref range: 0.85 - 1.15) and/or PTT = N/A, will monitor for bleeding    # Hypertension: Noted on problem list             # DMII: A1C = 7.6 % (Ref range: <5.7 %) within past 6 months, PRESENT ON ADMISSION       # Pacemaker present       Disposition Plan     Medically Ready for Discharge: Anticipated in 2-4 Days             Denice Tracy DO  Hospitalist Service  [facility]  Securely message with [employer] (more info)  Text page via [facility] Paging/Directory   ______________________________________________________________________    Interval History   Patient in bed without TV on. He reports having some anxiety last night and he did not sleep well. He continues to deny pain to his foot. He GI complaints. No dry mouth issues today. Breathing without difficulty or cough. He has some gas pains    Physical Exam   Vital Signs: Temp: 97.5  F (36.4  C) Temp src: Oral BP: 129/65 Pulse: 71   Resp: 18 SpO2: 93 % O2 Device: None (Room air)  Oxygen Delivery: 6 LPM  Weight: 330 lbs 0 oz    Constitutional: Awake, alert, cooperative, no apparent distress  Respiratory: Clear to auscultation bilaterally, no crackles or wheezing  Cardiovascular: no murmur no edema  GI: umbilical hernia present, +BS  Skin/Integumen: scaring to LE with dry skin,   Other:  R foot with bandage on    Medical Decision Making       48 MINUTES SPENT BY ME on the date of service doing chart review, history, exam, documentation & further activities per the note.      Data     I have personally reviewed the following data over the past 24 hrs:    13.0 (H)  \   10.5 (L)   / 479 (H)     134 (L); 134 (L) 101; 101 46.9 (H); 46.9 (H) /  147 (H)   4.0; 4.0 17 (L); 17 (L) 3.11 (H); 3.11 (H) \     ALT: 32 AST: 57 (H) AP: 85 TBILI: 0.7   ALB: 2.4 (L) TOT PROTEIN: 6.8 LIPASE: N/A     INR:  2.07 (H) PTT:  N/A   D-dimer:  N/A Fibrinogen:  N/A       Imaging results reviewed over the past 24 hrs:   Recent Results (from the past 24 hour(s))   US SHANNON Doppler No Exercise    Narrative    EXAM: RESTING ANKLE-BRACHIAL INDICES (ABIS)  LOCATION: [facility]  DATE: 7/6/2024    INDICATION: Right foot ulcer, evaluate for PAD.  COMPARISON: None.    SHANNON FINDINGS:  RIGHT  Brachial: 128  Ankle (PT): 130 Index: 0.98  Ankle (DP): 121 Index: 0.92      LEFT  Brachial: 132  Ankle (PT): 173 Index: 1.31  Ankle (DP): 122 Index: 0.92    The right SHANNON at rest is 0.98. The left SHANNON at rest is 1.31.      WAVEFORMS: The dorsalis pedis and posterior tibial arteries are mostly biphasic in nature.      Impression    IMPRESSION:  1.  RIGHT LOWER EXTREMITY: SHANNON at rest is normal.  2.  LEFT LOWER EXTREMITY: SHANNON at rest is normal.

## 2024-07-07 NOTE — PROVIDER NOTIFICATION
MD Notification    Notified Person: MD    Notified Person Name:Denice Tracy    Notification Date/Time:7/7/24 @ 1306    Notification Interaction: vocRossolini message    Purpose of Notification:hi, BP is 99/59 P 71. Schedule BP med (Amlodipine and Metoprolol) does not have parameter. Should I give them or not?    Orders Received:    Comments: Hold meds.

## 2024-07-08 ENCOUNTER — DOCUMENTATION ONLY (OUTPATIENT)
Dept: ORTHOPEDICS | Facility: CLINIC | Age: 64
End: 2024-07-08
Payer: COMMERCIAL

## 2024-07-08 LAB
ANION GAP SERPL CALCULATED.3IONS-SCNC: 11 MMOL/L (ref 7–15)
BACTERIA UR CULT: NO GROWTH
BASOPHILS # BLD AUTO: 0 10E3/UL (ref 0–0.2)
BASOPHILS NFR BLD AUTO: 0 %
BUN SERPL-MCNC: 39.3 MG/DL (ref 8–23)
CALCIUM SERPL-MCNC: 8.5 MG/DL (ref 8.8–10.2)
CHLORIDE SERPL-SCNC: 102 MMOL/L (ref 98–107)
CREAT SERPL-MCNC: 2.46 MG/DL (ref 0.67–1.17)
DEPRECATED HCO3 PLAS-SCNC: 21 MMOL/L (ref 22–29)
EGFRCR SERPLBLD CKD-EPI 2021: 29 ML/MIN/1.73M2
EOSINOPHIL # BLD AUTO: 0.3 10E3/UL (ref 0–0.7)
EOSINOPHIL NFR BLD AUTO: 3 %
ERYTHROCYTE [DISTWIDTH] IN BLOOD BY AUTOMATED COUNT: 14.1 % (ref 10–15)
GLUCOSE BLDC GLUCOMTR-MCNC: 153 MG/DL (ref 70–99)
GLUCOSE BLDC GLUCOMTR-MCNC: 160 MG/DL (ref 70–99)
GLUCOSE BLDC GLUCOMTR-MCNC: 210 MG/DL (ref 70–99)
GLUCOSE BLDC GLUCOMTR-MCNC: 224 MG/DL (ref 70–99)
GLUCOSE BLDC GLUCOMTR-MCNC: 246 MG/DL (ref 70–99)
GLUCOSE BLDC GLUCOMTR-MCNC: 268 MG/DL (ref 70–99)
GLUCOSE SERPL-MCNC: 177 MG/DL (ref 70–99)
HCT VFR BLD AUTO: 29.9 % (ref 40–53)
HGB BLD-MCNC: 10.1 G/DL (ref 13.3–17.7)
IMM GRANULOCYTES # BLD: 0.1 10E3/UL
IMM GRANULOCYTES NFR BLD: 1 %
INR PPP: 2.17 (ref 0.85–1.15)
INR PPP: 3.08 (ref 0.85–1.15)
LYMPHOCYTES # BLD AUTO: 1 10E3/UL (ref 0.8–5.3)
LYMPHOCYTES NFR BLD AUTO: 8 %
MAGNESIUM SERPL-MCNC: 2 MG/DL (ref 1.7–2.3)
MCH RBC QN AUTO: 29.5 PG (ref 26.5–33)
MCHC RBC AUTO-ENTMCNC: 33.8 G/DL (ref 31.5–36.5)
MCV RBC AUTO: 87 FL (ref 78–100)
MONOCYTES # BLD AUTO: 0.7 10E3/UL (ref 0–1.3)
MONOCYTES NFR BLD AUTO: 5 %
NEUTROPHILS # BLD AUTO: 10.3 10E3/UL (ref 1.6–8.3)
NEUTROPHILS NFR BLD AUTO: 84 %
NRBC # BLD AUTO: 0 10E3/UL
NRBC BLD AUTO-RTO: 0 /100
PLATELET # BLD AUTO: 483 10E3/UL (ref 150–450)
POTASSIUM SERPL-SCNC: 3.7 MMOL/L (ref 3.4–5.3)
RBC # BLD AUTO: 3.42 10E6/UL (ref 4.4–5.9)
SODIUM SERPL-SCNC: 134 MMOL/L (ref 135–145)
WBC # BLD AUTO: 12.4 10E3/UL (ref 4–11)

## 2024-07-08 PROCEDURE — 80048 BASIC METABOLIC PNL TOTAL CA: CPT | Performed by: STUDENT IN AN ORGANIZED HEALTH CARE EDUCATION/TRAINING PROGRAM

## 2024-07-08 PROCEDURE — 85610 PROTHROMBIN TIME: CPT | Performed by: STUDENT IN AN ORGANIZED HEALTH CARE EDUCATION/TRAINING PROGRAM

## 2024-07-08 PROCEDURE — 250N000011 HC RX IP 250 OP 636: Performed by: STUDENT IN AN ORGANIZED HEALTH CARE EDUCATION/TRAINING PROGRAM

## 2024-07-08 PROCEDURE — 83735 ASSAY OF MAGNESIUM: CPT | Performed by: STUDENT IN AN ORGANIZED HEALTH CARE EDUCATION/TRAINING PROGRAM

## 2024-07-08 PROCEDURE — 99232 SBSQ HOSP IP/OBS MODERATE 35: CPT | Performed by: SPECIALIST

## 2024-07-08 PROCEDURE — 99233 SBSQ HOSP IP/OBS HIGH 50: CPT | Performed by: HOSPITALIST

## 2024-07-08 PROCEDURE — 250N000013 HC RX MED GY IP 250 OP 250 PS 637: Performed by: PODIATRIST

## 2024-07-08 PROCEDURE — 36415 COLL VENOUS BLD VENIPUNCTURE: CPT | Performed by: STUDENT IN AN ORGANIZED HEALTH CARE EDUCATION/TRAINING PROGRAM

## 2024-07-08 PROCEDURE — 258N000003 HC RX IP 258 OP 636: Performed by: STUDENT IN AN ORGANIZED HEALTH CARE EDUCATION/TRAINING PROGRAM

## 2024-07-08 PROCEDURE — 250N000011 HC RX IP 250 OP 636: Performed by: SPECIALIST

## 2024-07-08 PROCEDURE — 120N000001 HC R&B MED SURG/OB

## 2024-07-08 PROCEDURE — 250N000013 HC RX MED GY IP 250 OP 250 PS 637: Performed by: STUDENT IN AN ORGANIZED HEALTH CARE EDUCATION/TRAINING PROGRAM

## 2024-07-08 PROCEDURE — 999N000197 HC STATISTIC WOC PT EDUCATION, 0-15 MIN

## 2024-07-08 PROCEDURE — L4361 PNEUMA/VAC WALK BOOT PRE OTS: HCPCS

## 2024-07-08 PROCEDURE — 85004 AUTOMATED DIFF WBC COUNT: CPT | Performed by: STUDENT IN AN ORGANIZED HEALTH CARE EDUCATION/TRAINING PROGRAM

## 2024-07-08 PROCEDURE — 258N000003 HC RX IP 258 OP 636: Performed by: HOSPITALIST

## 2024-07-08 PROCEDURE — 250N000011 HC RX IP 250 OP 636: Performed by: HOSPITALIST

## 2024-07-08 RX ORDER — LANOLIN ALCOHOL/MO/W.PET/CERES
3 CREAM (GRAM) TOPICAL
Status: DISCONTINUED | OUTPATIENT
Start: 2024-07-08 | End: 2024-07-12 | Stop reason: HOSPADM

## 2024-07-08 RX ORDER — PIPERACILLIN SODIUM, TAZOBACTAM SODIUM 4; .5 G/20ML; G/20ML
4.5 INJECTION, POWDER, LYOPHILIZED, FOR SOLUTION INTRAVENOUS EVERY 6 HOURS
Status: DISCONTINUED | OUTPATIENT
Start: 2024-07-08 | End: 2024-07-08

## 2024-07-08 RX ORDER — CEFAZOLIN SODIUM 2 G/100ML
2 INJECTION, SOLUTION INTRAVENOUS EVERY 8 HOURS
Status: DISCONTINUED | OUTPATIENT
Start: 2024-07-08 | End: 2024-07-12 | Stop reason: HOSPADM

## 2024-07-08 RX ADMIN — CLINDAMYCIN PHOSPHATE 900 MG: 900 INJECTION, SOLUTION INTRAVENOUS at 02:08

## 2024-07-08 RX ADMIN — ACETAMINOPHEN 975 MG: 325 TABLET, FILM COATED ORAL at 04:53

## 2024-07-08 RX ADMIN — PIPERACILLIN AND TAZOBACTAM 4.5 G: 4; .5 INJECTION, POWDER, FOR SOLUTION INTRAVENOUS at 16:32

## 2024-07-08 RX ADMIN — CEFAZOLIN SODIUM 2 G: 2 INJECTION, SOLUTION INTRAVENOUS at 18:41

## 2024-07-08 RX ADMIN — CLINDAMYCIN PHOSPHATE 900 MG: 900 INJECTION, SOLUTION INTRAVENOUS at 11:38

## 2024-07-08 RX ADMIN — ACETAMINOPHEN 975 MG: 325 TABLET, FILM COATED ORAL at 12:48

## 2024-07-08 RX ADMIN — PHYTONADIONE 2 MG: 2 INJECTION, EMULSION INTRAMUSCULAR; INTRAVENOUS; SUBCUTANEOUS at 15:30

## 2024-07-08 RX ADMIN — PIPERACILLIN AND TAZOBACTAM 3.38 G: 3; .375 INJECTION, POWDER, FOR SOLUTION INTRAVENOUS at 04:53

## 2024-07-08 RX ADMIN — PIPERACILLIN AND TAZOBACTAM 3.38 G: 3; .375 INJECTION, POWDER, FOR SOLUTION INTRAVENOUS at 10:14

## 2024-07-08 RX ADMIN — INSULIN GLARGINE 20 UNITS: 100 INJECTION, SOLUTION SUBCUTANEOUS at 08:55

## 2024-07-08 RX ADMIN — AMLODIPINE BESYLATE 5 MG: 5 TABLET ORAL at 08:41

## 2024-07-08 RX ADMIN — METOPROLOL SUCCINATE 100 MG: 100 TABLET, EXTENDED RELEASE ORAL at 08:41

## 2024-07-08 RX ADMIN — SODIUM CHLORIDE: 9 INJECTION, SOLUTION INTRAVENOUS at 04:55

## 2024-07-08 RX ADMIN — ACETAMINOPHEN 975 MG: 325 TABLET, FILM COATED ORAL at 21:30

## 2024-07-08 ASSESSMENT — ACTIVITIES OF DAILY LIVING (ADL)
ADLS_ACUITY_SCORE: 50
ADLS_ACUITY_SCORE: 50
ADLS_ACUITY_SCORE: 58
ADLS_ACUITY_SCORE: 53
ADLS_ACUITY_SCORE: 53
ADLS_ACUITY_SCORE: 58
ADLS_ACUITY_SCORE: 58
ADLS_ACUITY_SCORE: 49
ADLS_ACUITY_SCORE: 58
ADLS_ACUITY_SCORE: 52
ADLS_ACUITY_SCORE: 58
ADLS_ACUITY_SCORE: 59
ADLS_ACUITY_SCORE: 49
ADLS_ACUITY_SCORE: 52
ADLS_ACUITY_SCORE: 58
ADLS_ACUITY_SCORE: 59
ADLS_ACUITY_SCORE: 52
ADLS_ACUITY_SCORE: 58
ADLS_ACUITY_SCORE: 56
ADLS_ACUITY_SCORE: 50
ADLS_ACUITY_SCORE: 53

## 2024-07-08 NOTE — PLAN OF CARE
Goal Outcome Evaluation:                         Orientation: A&Ox4. Legally blind      Vitals/Tele: VSS on RA     IV Access/drain: IV infusing NS at 100 mL/hr     Diet: Regular for now and NPO 12 MN     Mobility:. Heel weightbearing on RLE. Did not get out of bed.     GI/: Incontinent. X 3 large incontinent stool . External male catheter in place with rd OP.      Wound/Skin: R knee abrasion present. R foot dressing  CDI. L plantar foot callus/corn      Consults: Podiatry, ID, PT/OT,walking boot available.     Discharge Plan: Pending medical progress

## 2024-07-08 NOTE — PLAN OF CARE
Goal Outcome Evaluation:    Patient vital signs are at baseline: Yes, on 2 L O2 via NC  Patient able to ambulate as they were prior to admission or with assist devices provided by therapies during their stay:  No,  Reason:  Not OOB this shift  Patient MUST void prior to discharge:  No,  Reason:  Male purewick in place w/ adequate output  Patient able to tolerate oral intake:   Pain has adequate pain control using Oral analgesics:  Yes, schedule tylenol  Does patient have an identified :  Yes  Has goal D/C date and time been discussed with patient:  No,  Reason:  TBD    A&Ox4, legally blind. CMS intact ex baseline neuropathy on BLE. R foot elevated on wedge pillow. Ace wrap on & CDI. IV infusing NS at 100 mL/hr with intermittent IV abx. Orthotics consulted and patient needs to be fitted in walking boot today. WOC, ID, PT/OT following.

## 2024-07-08 NOTE — CONSULTS
"[facility] Nurse Inpatient Assessment     Consulted for: R foot    Summary: Writer completed chart review for this patient. Per nursing and Podiatry notes, wound is currently being managed by Podiatry. Tentative plan is for further debridement of right foot tomorrow, Tues 7/9.  [facility] nurse will sign off at this time. Please re-consult for future wound care needs.    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 at name or group \"[facility] nurse\"- M-F [phone]-[phone]  Leave  @ *[phone] for non-urgent needs. Checked occasionally M-F.   "

## 2024-07-08 NOTE — PROGRESS NOTES
S: Mckay is a 64 yom that was seen today at the [facility], Room 2407 for a right Short Cam Walking Boot.    Dx: S/p right foot surgery    O: The patient wears normally wears a size 13 shoe. The patient was fit with a Large Procare Cam Waking Boot.    A: I demonstrated the donning and doffing protocol for the Pneumatic Walking Boot. Questions were invited and answered. I then provided the manufactures instructions to the patient.    P: The patient should follow the physician s recommendation for use. Duration wear will also be at the direction of the patient s physician.    Here are two important NOTES:    NOTE 1: When applying the boot, tighten the bootstraps starting at the ankle, then the foot, then the leg. Also, remember after tightening the boot s straps, to inflate the pneumatic boot. The number of pumps will vary depending upon the tightness of the straps.    NOTE 2: Please release the air (turn the release valve to the left) from the boot before removing the bootstraps and the boot. This will reset the air bladder for its next use and preserve the integrity of the air bladder.    G: The goal of the walking boot is to immobilize the patient s involved ankle and foot bones to allow the patient to bear weight more comfortably. The pneumatic feature aids in immobilization of the foot and ankle within the boot by filling voids between the brace and the patient s anatomy.    Please call me at the [facility] Orthotics & Prosthetics Department ([phone]) if you have any questions or concerns.    Electronically signed by Boo Medellin, Board Eligible

## 2024-07-08 NOTE — PLAN OF CARE
Orientation: A&Ox4. Legally blind     Vitals/Tele: VSS on RA    IV Access/drain: IV infusing NS at 125 mL/hr    Diet: Regular     Mobility: Bedrest until 2030. Heel weightbearing on RLE.     GI/: Incontinent. X1 large incontinent stool this evening. External male catheter in place with rd OP.     Wound/Skin: R knee abrasion present. R foot dressing changed by podiatry and CDI. L plantar foot callus/corn     Consults: Podiatry, ID, PT/OT, Orthotics consulted and patient needs to be fitted in walking boot tomorrow     Discharge Plan: Pending medical progress       See Flow sheets for assessment

## 2024-07-08 NOTE — PROGRESS NOTES
[facility]  PROGRESS NOTE - UPDATE     ASSESSMENT:  S/p right foot first ray resection on 7/6  Diabetes Mellitus --> hba1c: 8.4  Supra therapeutic INR: >10  --> hx of atrial fibrillation  Sepsis with bacteremia     INR: 3.08 on 7/8      MEDICAL DECISION MAKING:   -Patient admitted for right foot infection, had open first ray resection on 7/6  -Discussed with him yesterday need for further debridement of site. Will schedule this for tomorrow.   -INR today elevated to 3.08. Goal INR for procedure approx 1.7. Will defer to pharmacy and hospitalist for assistance with this.   -CAM boot for out of bed. WB to heel of RLE   -NPO after midnight. Current dressing can stay on until surgery.       Denia Lainez DPM   [place] Department of Podiatry/Foot & Ankle Surgery  [phone]

## 2024-07-08 NOTE — PROGRESS NOTES
[facility]    Infectious Disease Progress Note    Date of Service : 07/08/2024     Assessment:  64YM with diabetes (HbA1c 8.4) ,blindness related to retinitis pigmentosa and CKD who has been admitted following a fall and prolonged immobilization, and was found to have right foot infection,with gas gangrene and osteomyelitis of the right great toe and is s/p surgical amputation. Additionally has developed Rhabomyolysis with EV. Operative tissue cxs and blood cxs are positive for MSSA     -MSSA bacteremia related to septic foot  -Gas gangene and osteomyelitis of the right great toe. S/p R foot ray resection  -Diabetes Mellitus --8.4 %  -Fall and prolonged immobilization  -EV  -Rhabdomyolysis     Recommendations:  Follow blood cxs  Treat with Cefazolin  Additional debridement is planned for tomorrow  No apparent sites of secondary seeding  Will need IV antibiotics at discharge in view of bacteremia until 7/21  Glycemic control      Vielka Tinajero MD    Interval History   Feels ok, no new complaints, tolerating antibiotics without side effects    Physical Exam   Temp: 97.9  F (36.6  C) Temp src: Oral BP: 134/71 Pulse: 70   Resp: 18 SpO2: 90 % O2 Device: None (Room air) Oxygen Delivery: 2 LPM  Vitals:    07/06/24 0347 07/08/24 0602   Weight: 149.7 kg (330 lb) 143.5 kg (316 lb 5.8 oz)     Vital Signs with Ranges  Temp:  [97.5  F (36.4  C)-98.1  F (36.7  C)] 97.9  F (36.6  C)  Pulse:  [70-73] 70  Resp:  [16-20] 18  BP: ()/(59-71) 134/71  SpO2:  [89 %-95 %] 90 %    Constitutional: Awake, alert, cooperative, no apparent distress  Lungs: Clear   Skin: No rash  MS:  R foot in dressing    Other:    Medications   Current Facility-Administered Medications   Medication Dose Route Frequency Provider Last Rate Last Admin    sodium chloride 0.9 % infusion   Intravenous Continuous Denice Tracy  mL/hr at 07/08/24 0455 New Bag at 07/08/24 0455     Current Facility-Administered Medications    Medication Dose Route Frequency Provider Last Rate Last Admin    acetaminophen (TYLENOL) tablet 975 mg  975 mg Oral Q8H Denia Lainez DPM   975 mg at 07/08/24 0453    amLODIPine (NORVASC) tablet 5 mg  5 mg Oral Daily Denice Tracy, DO   5 mg at 07/08/24 0841    clindamycin (CLEOCIN) 900 mg in 50 mL D5W intermittent infusion  900 mg Intravenous Q8H Denice Tracy  mL/hr at 07/08/24 1138 900 mg at 07/08/24 1138    insulin aspart (NovoLOG) injection (RAPID ACTING)  1-7 Units Subcutaneous TID AC Denice Tracy DO   1 Units at 07/08/24 0854    insulin aspart (NovoLOG) injection (RAPID ACTING)  1-5 Units Subcutaneous At Bedtime Denice Tracy DO   2 Units at 07/07/24 2316    insulin glargine (LANTUS PEN) injection 20 Units  20 Units Subcutaneous QAM AC Denice Tracy DO   20 Units at 07/08/24 0855    metoprolol succinate ER (TOPROL XL) 24 hr tablet 100 mg  100 mg Oral Daily Denice Tracy DO   100 mg at 07/08/24 0841    piperacillin-tazobactam (ZOSYN) 3.375 g vial to attach to  mL bag  3.375 g Intravenous Q6H Lavell Bishop MD   3.375 g at 07/08/24 1014    polyethylene glycol (MIRALAX) Packet 17 g  17 g Oral Daily Denia Lainez DPM   17 g at 07/07/24 0835    senna-docusate (SENOKOT-S/PERICOLACE) 8.6-50 MG per tablet 1 tablet  1 tablet Oral BID Denia Lainez DPM   1 tablet at 07/07/24 0835    sodium chloride (PF) 0.9% PF flush 3 mL  3 mL Intracatheter Q8H Lavell Bishop MD   3 mL at 07/07/24 1645    sodium chloride (PF) 0.9% PF flush 3 mL  3 mL Intracatheter Q8H Denia Lainez DPM   3 mL at 07/07/24 0415       Data   All microbiology laboratory data reviewed.  Recent Labs   Lab Test 07/08/24  0834 07/07/24  0756 07/06/24  1017 07/06/24  0315   WBC 12.4* 13.0*  --  16.0*   HGB 10.1* 10.5* 11.0* 11.9*   HCT 29.9* 31.9*  --  35.2*   MCV 87 88  --  87   * 479*  --  481*     Recent Labs   Lab Test 07/08/24  0834 07/07/24  0756 07/06/24  2057   CR  2.46* 3.11*  3.11* 3.05*     Recent Labs   Lab Test 07/06/24  0315   *     Microbiology         07/07/2024 1244 07/08/2024 0346 Blood Culture Hand, Right [[id number]]    Blood from Hand, Right    Preliminary result Component Value   Culture No growth after 12 hours P             07/07/2024 1244 07/08/2024 0346 Blood Culture Hand, Right [[id number]]    Blood from Hand, Right    Preliminary result Component Value   Culture No growth after 12 hours P             07/07/2024 0925 07/08/2024 0956 Urine Culture [[id number]]   Urine, Clean Catch    Final result Component Value   Culture No Growth             07/06/2024 1814 07/08/2024 1134 Anaerobic Bacterial Culture Routine [[id number]]   (Abnormal)   Tissue from Foot, Right    Preliminary result Component Value   Culture 1+ Anaerobic Gram positive cocci Abnormal  P    Susceptibilities not routinely done, refer to antibiogram to view typical susceptibility profiles             07/06/2024 1814 07/06/2024 2247 Gram Stain [[id number]]   (Abnormal)   Tissue from Foot, Right    Final result Component Value   GS Culture See corresponding culture for results   Gram Stain Result 1+ Gram positive cocci Abnormal    Gram Stain Result No white blood cells seen Abnormal           07/06/2024 1814 07/08/2024 1120 Tissue Aerobic Bacterial Culture Routine [[id number]]   (Abnormal)   Tissue from Foot, Right    Preliminary result Component Value   Culture Culture in progress P    2+ Staphylococcus aureus Abnormal  P    Isolated in broth only Enterobacter cloacae complex Abnormal  P    On day 1 of incubation             07/06/2024 0357 07/08/2024 1016 Blood Culture Peripheral Blood [[id number]]   Peripheral Blood    Preliminary result Component Value   Culture No growth after 2 days P             07/06/2024 0315 07/08/2024 1019 Blood Culture Arm, Right [[id number]]   (Abnormal)   Blood from Arm, Right    Preliminary result Component Value   Culture Positive on the 1st  day of incubation Abnormal  P    Staphylococcus aureus Panic  P    1 of 2 bottles             07/06/2024 0315 07/07/2024 0507 Verigene GP Panel [[id number]]    (Abnormal)   Blood from Arm, Right    Final result Component Value   Staphylococcus aureus Detected Abnormal    Positive for Staphylococcus aureus and negative for the mecA gene (not resistant to methicillin) by [employer] multiplex nucleic acid test. Final identification and antimicrobial susceptibility testing will be verified by standard methods.   Staphylococcus epidermidis Not Detected   Staphylococcus lugdunensis Not Detected   Enterococcus faecalis Not Detected   Enterococcus faecium Not Detected   Streptococcus species Not Detected   Streptococcus agalactiae Not Detected   Streptococcus anginosus group Not Detected   Streptococcus pneumoniae Not Detected   Streptococcus pyogenes Not Detected   Listeria species Not Detected

## 2024-07-08 NOTE — PROGRESS NOTES
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      R first toe gangrene, necrosis and OM s/p resection R first ray 7/6  Gas gangrene  Staph aureus bacteremia  *Hx: Patient reports falling at 9am on 7/5/2024, and was on the ground till 2am 7/6/2024 when he banged on the floor to attract the attention of his downstairs neighbor. Patient reports he tripped and was unable to get up afterwards, no headtrauma/LOC. Endroses L sided Rib pain, and R knee/hip/ankle pain. Reports that when he fell, he defecated, and cleaned himself and place all feces into bags just for today (to avoid slipping) as he couldn't get to his toilet (EMS states otherwise saying apartment was filled w./ urine/feces bags and no working shower/toilet). Patient denies fevers/chills. Lives alone, is legally blind, and states that he can no longer take care of self.  Reports having another fall months ago with no head trauma/LOC. Patient reports no pain in his R foot due to lack of sensation (in either feet, chronic) and because of his blindness unsure when it started to become necrotic.  *Vitals/Exam: vitally stable, Blind, obese, ventral hernia present. Lack of sensation to gross touch in LE. Bilateral LE edema R>L. R first toe gangrenous/necrotic (nontender), ROM intact. Active bleeding from skin sloughing medial and lateral to the toe. L buttock enlarged bruise  *EKG: paced rhythm with suspected LBBB  *Labs: , WBC 16, Plts 481  *Imaging: deformity of the distal phalanx right great toe with some cortical irregularity and erosive change. There is also some air in the surrounding soft tissues. The findings are strongly concerning for osteomyelitis.  "Degenerative changes   at the first MTP joint  *ER Course: vanc, zosyn, dilaudid, 2L fluids, Vit K 10mg     - blood cultures from admission positive for staph aureus  - underwent resection by podiatry 7/6, plans for further debridement 7/9 - podiatry has INR goal of 1.7 - will request pharmacy assistance   - remains on zosyn and clinda with ID following   - ID transitioned to cefazolin, will need continued IV Abx thru 7/21 given bacteremia     EV on CKD2, likely ATN  Rhabdomyolysis w/ elevated AST  Anion gap acidosis  *baseline creatinine 1.1 on admission 2.6. CK 3000s, Lactate negative  *Given rhabdo, concern for both prerenal and ATN     - Cr continues to elevate but likely reaching a peak  - IVF continued, low threshold to decrease or stop with any signs of fluid overload  - CK trending down, stop checking  - 7/8 creat 2.5 (down from over 3 in recent days)  - follow BMP      Permanent Afib, s/p AV node ablation and CRT 2010  Chronic anticoagulation  Supratheraputic INR  Hx of NSVT  HTN   HLD  *failed amiodarone in the past  *INR>10 on admission     - pharmacy consulted for INR reversal given need to surgery, will consult again 7/8 as above  - defer to ortho on resumption of AC. May consider DOAC now  - resumed metoprolol, amlodipine at 5mg  - Hold simvastatin in the setting of rhabdo  - Hold losartan given EV  - TTE 7/7: EF 60-65%, difficult study, RV nml s/f. Mild to mod TR.   - 7/8 INR 3.08 - goal inr 1.7 per podiatry, pharmacy request as above     T2D  PTA taking glargine 60 units with metformin  - Holding metformin  - BG remains controlled only on sliding scale, monitor and add back glargine as needed  - glargine 20 units qAM started 7/7 - anticipate adjustment to be needed - 22 units in AM 7/9     Concern for vulnerable adult status  Blindness 2/2 retinitis pigmentose  *EMS reports \"Patient covered in feces from head to toe and covered in the remnants of his carpet including food scraps, wrappers, and " "crumbs found covering the patient's back \" and that \"doesn't have a working toilet or shower, and order delivery for every meal, and has bowel movements and urinates in plastic bags \"  *patient informed nocturnist that the above story was inaccurate, and that he only had bags of stool for today after being unable to reach the toilet (which he states does work)  *Patient states he needs help as he can't live alone anymore  - SW consulted     Hyponatremia  - likely volume depletion, improving with IVF - stable at 134 7/8  - monitor daily BMP     Hx recurrent anemia  L buttock Hematoma  *labile Hb 12-14  *CT showing High-density subcutaneous stranding overlying the left buttock with a 2.5 cm circumscribed high-density fluid collection   - Hb mostly stable since admission and do not suspect brisk bleed  - INR management as above  - daily CBC     Hypoalbuminemia  Obesity  Ventral hernia  - Nutrition consult     Degenerative Joint and Bone disease  - Tylenol          Diet: Advance Diet as Tolerated: Regular Diet Adult  Snacks/Supplements Pediatric: Glucerna; With Meals  Room Service    DVT Prophylaxis: Pneumatic Compression Devices  Duran Catheter: Not present  Lines: None     Cardiac Monitoring: None  Code Status: Full Code      Clinically Significant Risk Factors              # Hypoalbuminemia: Lowest albumin = 2.4 g/dL at 7/7/2024  7:56 AM, will monitor as appropriate  # Coagulation Defect: INR = 3.08 (Ref range: 0.85 - 1.15) and/or PTT = N/A, will monitor for bleeding    # Hypertension: Noted on problem list  # Chronic heart failure with preserved ejection fraction: heart failure noted on problem list and last echo with EF >50%            # DMII: A1C = 7.6 % (Ref range: <5.7 %) within past 6 months, PRESENT ON ADMISSION       # Pacemaker present       Disposition Plan     Medically Ready for Discharge: Anticipated in 2-4 Days             Andrea Huang MD  Hospitalist Service  [facility]  Securely message with [employer] (more info)  Text page via [facility] Paging/Directory   ______________________________________________________________________    Interval History   Care assumed today. Pt seen and examined, chart reviewed. No new complaints. Awaiting OR with podiatry again tmrw if inr ok.     Physical Exam   Vital Signs: Temp: 97.6  F (36.4  C) Temp src: Oral BP: (!) 143/64 Pulse: 73   Resp: 20 SpO2: 92 % O2 Device: None (Room air) Oxygen Delivery: 2 LPM  Weight: 316 lbs 5.76 oz    Gen: NAD, pleasant  HEENT: EOMI, MMM  Resp: no focal crackles,  no wheezes, no increased work of resp  CV: S1S2 heard, reg rhythm, reg rate  Abdo: soft, nontender, nondistended, bowel sounds present  Ext: calves nontender, well perfused  Neuro: aa, conversant, moving ext, CN grossly intact, no facial asymmetry      Medical Decision Making       41 MINUTES SPENT BY ME on the date of service doing chart review, history, exam, documentation & further activities per the note.      Data     I have personally reviewed the following data over the past 24 hrs:    12.4 (H)  \   10.1 (L)   / 483 (H)     134 (L) 102 39.3 (H) /  268 (H)   3.7 21 (L) 2.46 (H) \     INR:  3.08 (H) PTT:  N/A   D-dimer:  N/A Fibrinogen:  N/A       Imaging results reviewed over the past 24 hrs:   Recent Results (from the past 24 hour(s))   CT Foot Right w/o Contrast    Narrative    EXAM: CT FOOT RIGHT W/O CONTRAST  LOCATION: [facility]  DATE: 7/7/2024    INDICATION: Evaluate for osteomyelitis to first metatarsal  COMPARISON: None.  TECHNIQUE: Noncontrast. Axial, sagittal and coronal thin-section reconstruction. Dose reduction techniques were used.    FINDINGS:    BONES AND JOINTS:  -Status post partial amputation of the first ray at the level of the metatarsal neck. No osseous erosions or periostitis to suggest acute osteomyelitis although MRI would be more sensitive. No acute fracture or malalignment. Mild to moderate  degenerative   changes throughout the midfoot. Plantar calcaneal enthesophyte. Osteopenia.    SOFT TISSUES:  -There is a 1.7 cm fluid collection plantar to the residual first metatarsal distally concerning for abscess. There is an overlying skin ulcer with surrounding soft tissue swelling and a few gas locules. Soft tissue swelling extends throughout the dorsal   forefoot. Diffuse muscle atrophy.      Impression    IMPRESSION:  1.  Status post partial amputation of the first ray at the level of the metatarsal neck.   2.  No definite evidence of acute osteomyelitis although MRI would be more sensitive.   3.  There is a 1.7 cm fluid collection abutting the residual first metatarsal concerning for abscess. Overlying skin ulcer.  4.  Soft tissue swelling of the forefoot may represent cellulitis.   5.  Other ancillary findings as described above.

## 2024-07-09 ENCOUNTER — ANESTHESIA EVENT (OUTPATIENT)
Dept: SURGERY | Facility: CLINIC | Age: 64
End: 2024-07-09
Payer: COMMERCIAL

## 2024-07-09 ENCOUNTER — ANESTHESIA (OUTPATIENT)
Dept: SURGERY | Facility: CLINIC | Age: 64
End: 2024-07-09
Payer: COMMERCIAL

## 2024-07-09 ENCOUNTER — APPOINTMENT (OUTPATIENT)
Dept: GENERAL RADIOLOGY | Facility: CLINIC | Age: 64
End: 2024-07-09
Attending: PODIATRIST
Payer: COMMERCIAL

## 2024-07-09 LAB
ANION GAP SERPL CALCULATED.3IONS-SCNC: 8 MMOL/L (ref 7–15)
BACTERIA BLD CULT: ABNORMAL
BACTERIA BLD CULT: ABNORMAL
BACTERIA TISS BX CULT: ABNORMAL
BACTERIA TISS BX CULT: NORMAL
BUN SERPL-MCNC: 23.1 MG/DL (ref 8–23)
CALCIUM SERPL-MCNC: 8.5 MG/DL (ref 8.8–10.2)
CHLORIDE SERPL-SCNC: 104 MMOL/L (ref 98–107)
CREAT SERPL-MCNC: 1.54 MG/DL (ref 0.67–1.17)
DEPRECATED HCO3 PLAS-SCNC: 23 MMOL/L (ref 22–29)
EGFRCR SERPLBLD CKD-EPI 2021: 50 ML/MIN/1.73M2
ERYTHROCYTE [DISTWIDTH] IN BLOOD BY AUTOMATED COUNT: 14.4 % (ref 10–15)
GLUCOSE BLDC GLUCOMTR-MCNC: 144 MG/DL (ref 70–99)
GLUCOSE BLDC GLUCOMTR-MCNC: 147 MG/DL (ref 70–99)
GLUCOSE BLDC GLUCOMTR-MCNC: 166 MG/DL (ref 70–99)
GLUCOSE BLDC GLUCOMTR-MCNC: 176 MG/DL (ref 70–99)
GLUCOSE BLDC GLUCOMTR-MCNC: 196 MG/DL (ref 70–99)
GLUCOSE BLDC GLUCOMTR-MCNC: 238 MG/DL (ref 70–99)
GLUCOSE SERPL-MCNC: 177 MG/DL (ref 70–99)
HCT VFR BLD AUTO: 30.6 % (ref 40–53)
HGB BLD-MCNC: 10.1 G/DL (ref 13.3–17.7)
INR PPP: 1.92 (ref 0.85–1.15)
MCH RBC QN AUTO: 28.9 PG (ref 26.5–33)
MCHC RBC AUTO-ENTMCNC: 33 G/DL (ref 31.5–36.5)
MCV RBC AUTO: 87 FL (ref 78–100)
PLATELET # BLD AUTO: 500 10E3/UL (ref 150–450)
POTASSIUM SERPL-SCNC: 3.6 MMOL/L (ref 3.4–5.3)
RBC # BLD AUTO: 3.5 10E6/UL (ref 4.4–5.9)
SODIUM SERPL-SCNC: 135 MMOL/L (ref 135–145)
WBC # BLD AUTO: 11.8 10E3/UL (ref 4–11)

## 2024-07-09 PROCEDURE — 250N000011 HC RX IP 250 OP 636: Performed by: STUDENT IN AN ORGANIZED HEALTH CARE EDUCATION/TRAINING PROGRAM

## 2024-07-09 PROCEDURE — 80048 BASIC METABOLIC PNL TOTAL CA: CPT | Performed by: HOSPITALIST

## 2024-07-09 PROCEDURE — 250N000011 HC RX IP 250 OP 636: Performed by: NURSE ANESTHETIST, CERTIFIED REGISTERED

## 2024-07-09 PROCEDURE — 258N000003 HC RX IP 258 OP 636: Performed by: STUDENT IN AN ORGANIZED HEALTH CARE EDUCATION/TRAINING PROGRAM

## 2024-07-09 PROCEDURE — 28820 AMPUTATION OF TOE: CPT | Performed by: ANESTHESIOLOGY

## 2024-07-09 PROCEDURE — 88304 TISSUE EXAM BY PATHOLOGIST: CPT | Mod: TC | Performed by: PODIATRIST

## 2024-07-09 PROCEDURE — 250N000013 HC RX MED GY IP 250 OP 250 PS 637: Performed by: PODIATRIST

## 2024-07-09 PROCEDURE — 36415 COLL VENOUS BLD VENIPUNCTURE: CPT | Performed by: STUDENT IN AN ORGANIZED HEALTH CARE EDUCATION/TRAINING PROGRAM

## 2024-07-09 PROCEDURE — 258N000003 HC RX IP 258 OP 636: Performed by: NURSE ANESTHETIST, CERTIFIED REGISTERED

## 2024-07-09 PROCEDURE — 250N000011 HC RX IP 250 OP 636: Mod: JZ | Performed by: SPECIALIST

## 2024-07-09 PROCEDURE — 250N000011 HC RX IP 250 OP 636: Performed by: REGISTERED NURSE

## 2024-07-09 PROCEDURE — 250N000013 HC RX MED GY IP 250 OP 250 PS 637: Performed by: STUDENT IN AN ORGANIZED HEALTH CARE EDUCATION/TRAINING PROGRAM

## 2024-07-09 PROCEDURE — 120N000001 HC R&B MED SURG/OB

## 2024-07-09 PROCEDURE — 28820 AMPUTATION OF TOE: CPT | Performed by: NURSE ANESTHETIST, CERTIFIED REGISTERED

## 2024-07-09 PROCEDURE — 272N000001 HC OR GENERAL SUPPLY STERILE: Performed by: PODIATRIST

## 2024-07-09 PROCEDURE — 258N000003 HC RX IP 258 OP 636: Performed by: ANESTHESIOLOGY

## 2024-07-09 PROCEDURE — 99233 SBSQ HOSP IP/OBS HIGH 50: CPT | Performed by: HOSPITALIST

## 2024-07-09 PROCEDURE — 0Y6M0Z0 DETACHMENT AT RIGHT FOOT, COMPLETE, OPEN APPROACH: ICD-10-PCS | Performed by: PODIATRIST

## 2024-07-09 PROCEDURE — 88311 DECALCIFY TISSUE: CPT | Mod: 26 | Performed by: PATHOLOGY

## 2024-07-09 PROCEDURE — 370N000017 HC ANESTHESIA TECHNICAL FEE, PER MIN: Performed by: PODIATRIST

## 2024-07-09 PROCEDURE — 999N000065 XR FOOT PORT RIGHT 3 VIEWS: Mod: RT

## 2024-07-09 PROCEDURE — 85610 PROTHROMBIN TIME: CPT | Performed by: STUDENT IN AN ORGANIZED HEALTH CARE EDUCATION/TRAINING PROGRAM

## 2024-07-09 PROCEDURE — 999N000141 HC STATISTIC PRE-PROCEDURE NURSING ASSESSMENT: Performed by: PODIATRIST

## 2024-07-09 PROCEDURE — 250N000013 HC RX MED GY IP 250 OP 250 PS 637: Performed by: HOSPITALIST

## 2024-07-09 PROCEDURE — 88311 DECALCIFY TISSUE: CPT | Mod: TC | Performed by: PODIATRIST

## 2024-07-09 PROCEDURE — 271N000001 HC OR GENERAL SUPPLY NON-STERILE: Performed by: PODIATRIST

## 2024-07-09 PROCEDURE — 360N000085 HC SURGERY LEVEL 5 W/ FLUORO, PER MIN: Performed by: PODIATRIST

## 2024-07-09 PROCEDURE — 250N000009 HC RX 250: Performed by: PODIATRIST

## 2024-07-09 PROCEDURE — 88305 TISSUE EXAM BY PATHOLOGIST: CPT | Mod: 26 | Performed by: PATHOLOGY

## 2024-07-09 PROCEDURE — 250N000009 HC RX 250: Performed by: REGISTERED NURSE

## 2024-07-09 PROCEDURE — 710N000009 HC RECOVERY PHASE 1, LEVEL 1, PER MIN: Performed by: PODIATRIST

## 2024-07-09 PROCEDURE — 85014 HEMATOCRIT: CPT | Performed by: HOSPITALIST

## 2024-07-09 RX ORDER — AMOXICILLIN 250 MG
1 CAPSULE ORAL 2 TIMES DAILY
Status: DISCONTINUED | OUTPATIENT
Start: 2024-07-09 | End: 2024-07-09

## 2024-07-09 RX ORDER — ONDANSETRON 4 MG/1
4 TABLET, ORALLY DISINTEGRATING ORAL EVERY 6 HOURS PRN
Status: DISCONTINUED | OUTPATIENT
Start: 2024-07-09 | End: 2024-07-12 | Stop reason: HOSPADM

## 2024-07-09 RX ORDER — POLYETHYLENE GLYCOL 3350 17 G/17G
17 POWDER, FOR SOLUTION ORAL DAILY
Status: DISCONTINUED | OUTPATIENT
Start: 2024-07-10 | End: 2024-07-09

## 2024-07-09 RX ORDER — BISACODYL 10 MG
10 SUPPOSITORY, RECTAL RECTAL DAILY PRN
Status: DISCONTINUED | OUTPATIENT
Start: 2024-07-12 | End: 2024-07-09

## 2024-07-09 RX ORDER — FENTANYL CITRATE 50 UG/ML
50 INJECTION, SOLUTION INTRAMUSCULAR; INTRAVENOUS EVERY 5 MIN PRN
Status: DISCONTINUED | OUTPATIENT
Start: 2024-07-09 | End: 2024-07-09 | Stop reason: HOSPADM

## 2024-07-09 RX ORDER — LIDOCAINE HYDROCHLORIDE 10 MG/ML
INJECTION, SOLUTION EPIDURAL; INFILTRATION; INTRACAUDAL; PERINEURAL PRN
Status: DISCONTINUED | OUTPATIENT
Start: 2024-07-09 | End: 2024-07-09 | Stop reason: HOSPADM

## 2024-07-09 RX ORDER — LIDOCAINE HYDROCHLORIDE 20 MG/ML
INJECTION, SOLUTION INFILTRATION; PERINEURAL PRN
Status: DISCONTINUED | OUTPATIENT
Start: 2024-07-09 | End: 2024-07-09

## 2024-07-09 RX ORDER — ACETAMINOPHEN 325 MG/1
975 TABLET ORAL ONCE
Status: DISCONTINUED | OUTPATIENT
Start: 2024-07-09 | End: 2024-07-09 | Stop reason: HOSPADM

## 2024-07-09 RX ORDER — ONDANSETRON 4 MG/1
4 TABLET, ORALLY DISINTEGRATING ORAL EVERY 30 MIN PRN
Status: DISCONTINUED | OUTPATIENT
Start: 2024-07-09 | End: 2024-07-09 | Stop reason: HOSPADM

## 2024-07-09 RX ORDER — ONDANSETRON 2 MG/ML
4 INJECTION INTRAMUSCULAR; INTRAVENOUS EVERY 6 HOURS PRN
Status: DISCONTINUED | OUTPATIENT
Start: 2024-07-09 | End: 2024-07-12 | Stop reason: HOSPADM

## 2024-07-09 RX ORDER — FENTANYL CITRATE 50 UG/ML
25 INJECTION, SOLUTION INTRAMUSCULAR; INTRAVENOUS EVERY 5 MIN PRN
Status: DISCONTINUED | OUTPATIENT
Start: 2024-07-09 | End: 2024-07-09 | Stop reason: HOSPADM

## 2024-07-09 RX ORDER — HYDROXYZINE HYDROCHLORIDE 25 MG/1
25 TABLET, FILM COATED ORAL EVERY 6 HOURS PRN
Status: DISCONTINUED | OUTPATIENT
Start: 2024-07-09 | End: 2024-07-09 | Stop reason: HOSPADM

## 2024-07-09 RX ORDER — HYDRALAZINE HYDROCHLORIDE 20 MG/ML
2.5-5 INJECTION INTRAMUSCULAR; INTRAVENOUS EVERY 10 MIN PRN
Status: DISCONTINUED | OUTPATIENT
Start: 2024-07-09 | End: 2024-07-09 | Stop reason: HOSPADM

## 2024-07-09 RX ORDER — ONDANSETRON 2 MG/ML
4 INJECTION INTRAMUSCULAR; INTRAVENOUS EVERY 30 MIN PRN
Status: DISCONTINUED | OUTPATIENT
Start: 2024-07-09 | End: 2024-07-09 | Stop reason: HOSPADM

## 2024-07-09 RX ORDER — MAGNESIUM HYDROXIDE 1200 MG/15ML
LIQUID ORAL PRN
Status: DISCONTINUED | OUTPATIENT
Start: 2024-07-09 | End: 2024-07-09 | Stop reason: HOSPADM

## 2024-07-09 RX ORDER — ACETAMINOPHEN 325 MG/1
650 TABLET ORAL EVERY 4 HOURS PRN
Status: DISCONTINUED | OUTPATIENT
Start: 2024-07-12 | End: 2024-07-09

## 2024-07-09 RX ORDER — AMLODIPINE BESYLATE 10 MG/1
10 TABLET ORAL DAILY
Status: DISCONTINUED | OUTPATIENT
Start: 2024-07-10 | End: 2024-07-12 | Stop reason: HOSPADM

## 2024-07-09 RX ORDER — SODIUM CHLORIDE, SODIUM LACTATE, POTASSIUM CHLORIDE, CALCIUM CHLORIDE 600; 310; 30; 20 MG/100ML; MG/100ML; MG/100ML; MG/100ML
INJECTION, SOLUTION INTRAVENOUS CONTINUOUS
Status: DISCONTINUED | OUTPATIENT
Start: 2024-07-09 | End: 2024-07-09 | Stop reason: HOSPADM

## 2024-07-09 RX ORDER — ACETAMINOPHEN 325 MG/1
975 TABLET ORAL EVERY 8 HOURS
Status: COMPLETED | OUTPATIENT
Start: 2024-07-09 | End: 2024-07-12

## 2024-07-09 RX ORDER — NALOXONE HYDROCHLORIDE 0.4 MG/ML
0.1 INJECTION, SOLUTION INTRAMUSCULAR; INTRAVENOUS; SUBCUTANEOUS
Status: DISCONTINUED | OUTPATIENT
Start: 2024-07-09 | End: 2024-07-09 | Stop reason: HOSPADM

## 2024-07-09 RX ORDER — PROPOFOL 10 MG/ML
INJECTION, EMULSION INTRAVENOUS CONTINUOUS PRN
Status: DISCONTINUED | OUTPATIENT
Start: 2024-07-09 | End: 2024-07-09

## 2024-07-09 RX ORDER — LABETALOL HYDROCHLORIDE 5 MG/ML
10 INJECTION, SOLUTION INTRAVENOUS
Status: DISCONTINUED | OUTPATIENT
Start: 2024-07-09 | End: 2024-07-09 | Stop reason: HOSPADM

## 2024-07-09 RX ORDER — LIDOCAINE 40 MG/G
CREAM TOPICAL
Status: DISCONTINUED | OUTPATIENT
Start: 2024-07-09 | End: 2024-07-12 | Stop reason: HOSPADM

## 2024-07-09 RX ORDER — HYDROMORPHONE HCL IN WATER/PF 6 MG/30 ML
0.4 PATIENT CONTROLLED ANALGESIA SYRINGE INTRAVENOUS EVERY 5 MIN PRN
Status: DISCONTINUED | OUTPATIENT
Start: 2024-07-09 | End: 2024-07-09 | Stop reason: HOSPADM

## 2024-07-09 RX ORDER — DEXAMETHASONE SODIUM PHOSPHATE 4 MG/ML
4 INJECTION, SOLUTION INTRA-ARTICULAR; INTRALESIONAL; INTRAMUSCULAR; INTRAVENOUS; SOFT TISSUE
Status: DISCONTINUED | OUTPATIENT
Start: 2024-07-09 | End: 2024-07-09 | Stop reason: HOSPADM

## 2024-07-09 RX ORDER — ACETAMINOPHEN 325 MG/1
975 TABLET ORAL EVERY 8 HOURS
Status: DISCONTINUED | OUTPATIENT
Start: 2024-07-09 | End: 2024-07-09

## 2024-07-09 RX ORDER — ONDANSETRON 2 MG/ML
INJECTION INTRAMUSCULAR; INTRAVENOUS PRN
Status: DISCONTINUED | OUTPATIENT
Start: 2024-07-09 | End: 2024-07-09

## 2024-07-09 RX ORDER — SODIUM CHLORIDE 9 MG/ML
INJECTION, SOLUTION INTRAVENOUS CONTINUOUS
Status: DISCONTINUED | OUTPATIENT
Start: 2024-07-09 | End: 2024-07-09 | Stop reason: HOSPADM

## 2024-07-09 RX ORDER — HYDROMORPHONE HCL IN WATER/PF 6 MG/30 ML
0.2 PATIENT CONTROLLED ANALGESIA SYRINGE INTRAVENOUS EVERY 5 MIN PRN
Status: DISCONTINUED | OUTPATIENT
Start: 2024-07-09 | End: 2024-07-09 | Stop reason: HOSPADM

## 2024-07-09 RX ORDER — PROCHLORPERAZINE MALEATE 10 MG
10 TABLET ORAL EVERY 6 HOURS PRN
Status: DISCONTINUED | OUTPATIENT
Start: 2024-07-09 | End: 2024-07-12 | Stop reason: HOSPADM

## 2024-07-09 RX ADMIN — ACETAMINOPHEN 975 MG: 325 TABLET, FILM COATED ORAL at 04:19

## 2024-07-09 RX ADMIN — AMLODIPINE BESYLATE 5 MG: 5 TABLET ORAL at 08:25

## 2024-07-09 RX ADMIN — CEFAZOLIN SODIUM 2 G: 2 INJECTION, SOLUTION INTRAVENOUS at 02:03

## 2024-07-09 RX ADMIN — ONDANSETRON 4 MG: 2 INJECTION INTRAMUSCULAR; INTRAVENOUS at 11:43

## 2024-07-09 RX ADMIN — MIDAZOLAM 1 MG: 1 INJECTION INTRAMUSCULAR; INTRAVENOUS at 11:16

## 2024-07-09 RX ADMIN — PHYTONADIONE 2 MG: 2 INJECTION, EMULSION INTRAMUSCULAR; INTRAVENOUS; SUBCUTANEOUS at 03:06

## 2024-07-09 RX ADMIN — CEFAZOLIN SODIUM 2 G: 2 INJECTION, SOLUTION INTRAVENOUS at 08:25

## 2024-07-09 RX ADMIN — PHENYLEPHRINE HYDROCHLORIDE 100 MCG: 10 INJECTION INTRAVENOUS at 11:50

## 2024-07-09 RX ADMIN — PHENYLEPHRINE HYDROCHLORIDE 100 MCG: 10 INJECTION INTRAVENOUS at 12:02

## 2024-07-09 RX ADMIN — PROPOFOL 150 MCG/KG/MIN: 10 INJECTION, EMULSION INTRAVENOUS at 11:23

## 2024-07-09 RX ADMIN — PHENYLEPHRINE HYDROCHLORIDE 100 MCG: 10 INJECTION INTRAVENOUS at 12:11

## 2024-07-09 RX ADMIN — CEFAZOLIN SODIUM 2 G: 2 INJECTION, SOLUTION INTRAVENOUS at 16:49

## 2024-07-09 RX ADMIN — CEFAZOLIN SODIUM 2 G: 2 INJECTION, SOLUTION INTRAVENOUS at 11:15

## 2024-07-09 RX ADMIN — SODIUM CHLORIDE: 9 INJECTION, SOLUTION INTRAVENOUS at 20:46

## 2024-07-09 RX ADMIN — MIDAZOLAM 1 MG: 1 INJECTION INTRAMUSCULAR; INTRAVENOUS at 11:19

## 2024-07-09 RX ADMIN — ACETAMINOPHEN 975 MG: 325 TABLET, FILM COATED ORAL at 15:09

## 2024-07-09 RX ADMIN — LIDOCAINE HYDROCHLORIDE 60 MG: 20 INJECTION, SOLUTION INFILTRATION; PERINEURAL at 11:21

## 2024-07-09 RX ADMIN — SODIUM CHLORIDE: 9 INJECTION, SOLUTION INTRAVENOUS at 10:20

## 2024-07-09 RX ADMIN — ACETAMINOPHEN 975 MG: 325 TABLET, FILM COATED ORAL at 22:38

## 2024-07-09 RX ADMIN — PHENYLEPHRINE HYDROCHLORIDE 100 MCG: 10 INJECTION INTRAVENOUS at 11:55

## 2024-07-09 RX ADMIN — METOPROLOL SUCCINATE 100 MG: 100 TABLET, EXTENDED RELEASE ORAL at 08:24

## 2024-07-09 RX ADMIN — SODIUM CHLORIDE: 9 INJECTION, SOLUTION INTRAVENOUS at 05:51

## 2024-07-09 RX ADMIN — PHENYLEPHRINE HYDROCHLORIDE 100 MCG: 10 INJECTION INTRAVENOUS at 11:43

## 2024-07-09 RX ADMIN — MICONAZOLE NITRATE: 2 POWDER TOPICAL at 20:47

## 2024-07-09 ASSESSMENT — ACTIVITIES OF DAILY LIVING (ADL)
ADLS_ACUITY_SCORE: 45
ADLS_ACUITY_SCORE: 48
ADLS_ACUITY_SCORE: 45
ADLS_ACUITY_SCORE: 49
ADLS_ACUITY_SCORE: 50
ADLS_ACUITY_SCORE: 45
ADLS_ACUITY_SCORE: 50
ADLS_ACUITY_SCORE: 49
ADLS_ACUITY_SCORE: 50
ADLS_ACUITY_SCORE: 48
ADLS_ACUITY_SCORE: 50
ADLS_ACUITY_SCORE: 50
ADLS_ACUITY_SCORE: 45
ADLS_ACUITY_SCORE: 50
ADLS_ACUITY_SCORE: 51
ADLS_ACUITY_SCORE: 50
ADLS_ACUITY_SCORE: 45
ADLS_ACUITY_SCORE: 45
ADLS_ACUITY_SCORE: 49
ADLS_ACUITY_SCORE: 51
ADLS_ACUITY_SCORE: 50

## 2024-07-09 ASSESSMENT — ENCOUNTER SYMPTOMS: DYSRHYTHMIAS: 1

## 2024-07-09 NOTE — PROGRESS NOTES
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      R first toe gangrene, necrosis and OM s/p resection R first ray 7/6  Gas gangrene  Staph aureus bacteremia  *Hx: Patient reports falling at 9am on 7/5/2024, and was on the ground till 2am 7/6/2024 when he banged on the floor to attract the attention of his downstairs neighbor. Patient reports he tripped and was unable to get up afterwards, no headtrauma/LOC. Endroses L sided Rib pain, and R knee/hip/ankle pain. Reports that when he fell, he defecated, and cleaned himself and place all feces into bags just for today (to avoid slipping) as he couldn't get to his toilet (EMS states otherwise saying apartment was filled w./ urine/feces bags and no working shower/toilet). Patient denies fevers/chills. Lives alone, is legally blind, and states that he can no longer take care of self.  Reports having another fall months ago with no head trauma/LOC. Patient reports no pain in his R foot due to lack of sensation (in either feet, chronic) and because of his blindness unsure when it started to become necrotic.  *Vitals/Exam: vitally stable, Blind, obese, ventral hernia present. Lack of sensation to gross touch in LE. Bilateral LE edema R>L. R first toe gangrenous/necrotic (nontender), ROM intact. Active bleeding from skin sloughing medial and lateral to the toe. L buttock enlarged bruise  *EKG: paced rhythm with suspected LBBB  *Labs: , WBC 16, Plts 481  *Imaging: deformity of the distal phalanx right great toe with some cortical irregularity and erosive change. There is also some air in the surrounding soft tissues. The findings are strongly concerning for osteomyelitis.  "Degenerative changes   at the first MTP joint  *ER Course: vanc, zosyn, dilaudid, 2L fluids, Vit K 10mg     - blood cultures from admission positive for staph aureus  - underwent resection by podiatry 7/6, plans for further debridement 7/9 - podiatry has INR goal of 1.7 - will request pharmacy assistance   - remains on zosyn and clinda with ID following              - ID transitioned to cefazolin, will need continued IV Abx thru 7/21 given bacteremia  - 7/9 INR 1.9, OR with podiatry      EV on CKD2, likely ATN  Rhabdomyolysis w/ elevated AST  Anion gap acidosis  *baseline creatinine 1.1 on admission 2.6. CK 3000s, Lactate negative  *Given rhabdo, concern for both prerenal and ATN     - Cr continues to elevate but likely reaching a peak  - IVF continued, low threshold to decrease or stop with any signs of fluid overload  - CK trending down, stop checking  - 7/8 creat 2.5 (down from over 3 in recent days)  - 7/9 creat 1.54  - follow BMP      Permanent Afib, s/p AV node ablation and CRT 2010  Chronic anticoagulation  Supratheraputic INR  Hx of NSVT  HTN   HLD  *failed amiodarone in the past  *INR>10 on admission     - pharmacy consulted for INR reversal given need to surgery, will consult again 7/8 as above  - defer to ortho on resumption of AC. May consider DOAC now  - resumed metoprolol, amlodipine (was on 5mg daily, PTA is 10mg daily, will return to 10 mg dosing 7/10)  - Hold simvastatin in the setting of rhabdo  - Hold losartan given EV  - TTE 7/7: EF 60-65%, difficult study, RV nml s/f. Mild to mod TR.   - 7/8 INR 3.08 - goal inr 1.7 per podiatry, pharmacy request as above  - 7/9 INR 1.92     T2D  PTA taking glargine 60 units with metformin  - Holding metformin  - BG remains controlled only on sliding scale, monitor and add back glargine as needed  - anticipate adjustment to be needed - glargine 22 units in AM 7/9     Concern for vulnerable adult status  Blindness 2/2 retinitis pigmentose  *EMS reports \"Patient " "covered in feces from head to toe and covered in the remnants of his carpet including food scraps, wrappers, and crumbs found covering the patient's back \" and that \"doesn't have a working toilet or shower, and order delivery for every meal, and has bowel movements and urinates in plastic bags \"  *patient informed nocturnist that the above story was inaccurate, and that he only had bags of stool for today after being unable to reach the toilet (which he states does work)  *Patient states he needs help as he can't live alone anymore  - SW consulted     Hyponatremia  - likely volume depletion, improving with IVF - stable at 134 7/8 --> 135 7/9  - monitor daily BMP     Hx recurrent anemia  L buttock Hematoma  *labile Hb 12-14  *CT showing High-density subcutaneous stranding overlying the left buttock with a 2.5 cm circumscribed high-density fluid collection   - Hb mostly stable since admission and do not suspect brisk bleed  - INR management as above  - daily CBC     Hypoalbuminemia  Obesity  Ventral hernia  - Nutrition consult     Degenerative Joint and Bone disease  - Tylenol          Diet: Advance Diet as Tolerated: Regular Diet Adult  Snacks/Supplements Pediatric: Glucerna; With Meals  Room Service    DVT Prophylaxis: Pneumatic Compression Devices  Duran Catheter: Not present  Lines: None     Cardiac Monitoring: None  Code Status: Full Code      Clinically Significant Risk Factors              # Hypoalbuminemia: Lowest albumin = 2.4 g/dL at 7/7/2024  7:56 AM, will monitor as appropriate  # Coagulation Defect: INR = 2.17 (Ref range: 0.85 - 1.15) and/or PTT = N/A, will monitor for bleeding    # Hypertension: Noted on problem list  # Chronic heart failure with preserved ejection fraction: heart failure noted on problem list and last echo with EF >50%            # DMII: A1C = 7.6 % (Ref range: <5.7 %) within past 6 months, PRESENT ON ADMISSION       # Pacemaker present       Disposition Plan     Medically Ready for " Discharge: Anticipated in 2-4 Days             Andrea Huang MD  Hospitalist Service  [facility]  Securely message with [employer] (more info)  Text page via [employer] Paging/Directory   ______________________________________________________________________    Interval History   Pt seen and examined in pre op. No new complaints or issues. Denies sob, pain, fevers or chills.    Physical Exam   Vital Signs: Temp: 97.5  F (36.4  C) Temp src: Oral BP: (!) 156/76 Pulse: 72   Resp: 18 SpO2: 94 % O2 Device: None (Room air)    Weight: 316 lbs 5.76 oz    Gen: NAD, pleasant  HEENT: EOMI, MMM  Resp: no focal crackles,  no wheezes, no increased work of resp  CV: S1S2 heard, reg rhythm, reg rate  Abdo: soft, nontender, nondistended, bowel sounds present  Ext: calves nontender, RLE dressing in place  Neuro: aa, conversant, CN grossly intact, no facial asymmetry      Medical Decision Making       41 MINUTES SPENT BY ME on the date of service doing chart review, history, exam, documentation & further activities per the note.      Data     I have personally reviewed the following data over the past 24 hrs:    11.8 (H)  \   10.1 (L)   / 500 (H)     N/A N/A N/A /  147 (H)   N/A N/A N/A \     INR:  2.17 (H) PTT:  N/A   D-dimer:  N/A Fibrinogen:  N/A       Imaging results reviewed over the past 24 hrs:   No results found for this or any previous visit (from the past 24 hour(s)).

## 2024-07-09 NOTE — PLAN OF CARE
Goal Outcome Evaluation:      Plan of Care Reviewed With: patient        PT alert and oriented X4. Able to make needs known. Denied SOB, CP, N/V. Pt had CHG bath before going down for debridement of R foot. Pt is heel WB on R foot. Turned and repositioned Q2 hours. BS check. Heel elevated through out shift. Antifungal ordered for skin folds. On 60g cho per meal regular diet. Fluids running at 100 per hr for kidney function. LS clear.

## 2024-07-09 NOTE — OP NOTE
PREOPERATIVE DIAGNOSES:     1.  Right foot open amputation site, following first ray resection on 7/6          POSTOPERATIVE DIAGNOSES:   1.   Right foot open amputation site, following first ray resection on 7/6        PROCEDURE:     1.  Right foot excisional debridement down to and including soft tissue, site measuring 5 cm x 3 cm x 1 cm   2. Right foot first metatarsal resection         ANESTHESIA:  MAC with local.       HEMOSTASIS:  Electrocautery.       ESTIMATED BLOOD LOSS:  20 mL.       SPECIMENS:  First metatarsal for pathology        MATERIALS:  Surgicell mesh     Indications: Mckay Hsu was admitted for gas gangrene and underwent an open first ray resection on 7/6. He's been followed inpatient and today's plan is for repeat debridement and further resection of first metatarsal if needed.  Discussed with patient that ideally will be the last procedure, but it's possible he'd need further surgery, pending ongoing clinical progress. Procedure was discussed with patient at length, including all risks and benefits. Patient agrees to planned procedure.     Procedure: Under mild sedation pt was brought into the OR & placed on the operating table in a supine position. A total of 20 cc of 1% lidocaine were injected into the first met in a luna block formation  The foot was scrubbed, prepped and draped in an aseptic manner. The previous incision site was opened and the wound bed was explored. There was residual necrotic tissue, particularly to the plantar tendons. This was excised. It tracked along the FHL. The plantar flap of the previously made incision was nonviable and excised to viable tissue. Debridement was done until all tissue appeared viable.    The incision medially was extended, exposing the first metatarsal.  The freer elevator was then used to reflect all soft tissue. The sagittal saw was then used to cut through the metatarsal, starting dorsally and moving plantarly. This was continued in through  and through cuts and the bones and digits were sharply excised and sent to pathology. All bleeders were ligated and cauterized as necessary.     All resected bone was sent to pathology.  All nonviable soft tissue was resected  insuring no necrotic tissue proximal to the amputation remained. Next copious amounts of saline were used to flush the amputation site. Site was then closed using 2-0 and 3-0 prolene. Upon completion of suturing, a non-adhesive sterile dressing was then placed over the surgical site followed by 4 x 4s, kerlix, & an ACE wrap. Cultures were taken consisting of deep soft tissue. A small amount of tissue was left open distally.     The pt tolerated the procedure & anesthesia well.  He was transferred to the recovery room with vital signs stable and vascular status intact to the right foot.  Following a period of post-op monitoring the pt will return to his hospital bed with the following written and oral post-op instructions:    Keep dressing clean, dry and intact.  Do not remove dressing.  WB to heel only.  Elevate foot at rest.  Continue IV antibiotics  Contact Dr. Lainez if any post-op questions or arrise.     Intraop findings: Noted necrosis excised. Plantar flap nonviable and excised. Adequate bleeding for procedure. Site closed as much as able.     Post op plan: Will monitor for overall improvement. Continue to hold anticoagulation. Will re-evaluate site tomorrow.

## 2024-07-09 NOTE — ANESTHESIA POSTPROCEDURE EVALUATION
Patient: Mckay Hsu    Procedure: Procedure(s):  Right foot debridement,  first metatarsal resection       Anesthesia Type:  MAC    Note:     Postop Pain Control: Uneventful            Sign Out: Well controlled pain   PONV: No   Neuro/Psych: Uneventful            Sign Out: Acceptable/Baseline neuro status   Airway/Respiratory: Uneventful            Sign Out: Acceptable/Baseline resp. status   CV/Hemodynamics: Uneventful            Sign Out: Acceptable CV status   Other NRE: NONE   DID A NON-ROUTINE EVENT OCCUR?            Last vitals:  Vitals Value Taken Time   /81 07/09/24 1315   Temp 36  C (96.8  F) 07/09/24 1300   Pulse 70 07/09/24 1318   Resp 17 07/09/24 1318   SpO2 95 % 07/09/24 1318   Vitals shown include unfiled device data.    Electronically Signed By: Dago Pantoja MD  July 9, 2024  1:41 PM

## 2024-07-09 NOTE — ANESTHESIA CARE TRANSFER NOTE
Patient: Mckay Hsu    Procedure: Procedure(s):  Right foot debridement,  first metatarsal resection       Diagnosis: Osteomyelitis of great toe of right foot (H) [M86.9]  Diagnosis Additional Information: No value filed.    Anesthesia Type:   MAC     Note:    Oropharynx: oropharynx clear of all foreign objects and spontaneously breathing  Level of Consciousness: awake  Oxygen Supplementation: face mask  Level of Supplemental Oxygen (L/min / FiO2): 6  Independent Airway: airway patency satisfactory and stable  Dentition: dentition unchanged  Vital Signs Stable: post-procedure vital signs reviewed and stable  Report to RN Given: handoff report given  Patient transferred to: PACU  Comments: At end of procedure, spontaneous respirations, patient alert to voice, able to follow commands. Oxygen via facemask at 6 liters per minute to PACU. Oxygen tubing connected to wall O2 in PACU, SpO2, NiBP, and EKG monitors and alarms on and functioning, Otilia Hugger warmer connected to patient gown, report on patient's clinical status given to PACU RN, RN questions answered.      Handoff Report: Identifed the Patient, Identified the Reponsible Provider, Reviewed the pertinent medical history, Discussed the surgical course, Reviewed Intra-OP anesthesia mangement and issues during anesthesia, Set expectations for post-procedure period and Allowed opportunity for questions and acknowledgement of understanding      Vitals:  Vitals Value Taken Time   /85 07/09/24 1230   Temp     Pulse 76 07/09/24 1236   Resp 21 07/09/24 1236   SpO2 97 % 07/09/24 1236   Vitals shown include unfiled device data.    Electronically Signed By: KENNETH Toribio CRNA  July 9, 2024  12:36 PM   absent

## 2024-07-09 NOTE — PLAN OF CARE
Goal Outcome Evaluation:  Patient vital signs are at baseline: yes  Patient able to ambulate as they were prior to admission or with assist devices provided by therapies during their stay:  no,   Patient MUST void prior to discharge:  yes,   Patient able to tolerate oral intake:  yes  Pain has adequate pain control using Oral analgesics:  yes  Does patient have an identified :  no  Has goal D/C date and time been discussed with patient:  no  A & O X4, VSS on RA. Npo since midnight. IV NS infusing 100 ml/hr. External male catheter in place with adequate output.

## 2024-07-09 NOTE — ANESTHESIA PREPROCEDURE EVALUATION
Anesthesia Pre-Procedure Evaluation    Patient: Mckay Hsu   MRN: [id number] : 1960        Procedure : Procedure(s):  Right foot debridement,  first metatarsal resection          Past Medical History:   Diagnosis Date    Atrial fibrillation (H)     s/p AVN ablation, BIV PPM    Congestive heart failure (H)     tachycardia-induced cardiomyopathy    Essential hypertension, benign     Hyperlipidemia LDL goal <100 10/31/2010    Hyponatremia 2009    Morbid obesity (H)     Open wound of foot except toe(s) alone, without mention of complication     Retinitis pigmentosa     Type 2 diabetes, HbA1C goal < 8% (H) 2012      Past Surgical History:   Procedure Laterality Date    AMPUTATE FOOT Right 2024    Procedure: Right foot first ray resection;  Surgeon: Denia Lainez DPM;  Location: [facility] OR    AMPUTATE TOE(S) Right 2024    Procedure: Right foot first ray resection;  Surgeon: Denia Lainez DPM;  Location: [facility] OR    CARDIOVERSION  2009, 10/2009    COLONOSCOPY N/A 2019    Procedure: COLONOSCOPY;  Surgeon: Godwin Santillan MD;  Location: [facility]    H ABLATION AV NODE  4/8/10    IMPLANT PACEMAKER  4/8/10    BIV PPM- [employer] InSync III    TONSILLECTOMY      as kid    Z NONSPECIFIC PROCEDURE  2007    debritement      No Known Allergies   Social History     Tobacco Use    Smoking status: Never    Smokeless tobacco: Never   Substance Use Topics    Alcohol use: No      Wt Readings from Last 1 Encounters:   24 143.5 kg (316 lb 5.8 oz)        Anesthesia Evaluation   Pt has had prior anesthetic. Type: General.    No history of anesthetic complications       ROS/MED HX  ENT/Pulmonary:       Neurologic:       Cardiovascular:     (+) Dyslipidemia hypertension- -   -  - -   Taking blood thinners (Coumadin)   CHF        pacemaker,          dysrhythmias (s/p AV ablation), a-fib,        Previous cardiac testing   Echo: Date: 24 Results:  Interpretation Summary  Technically difficult study.  The visual ejection fraction is 60-65%. The right ventricle is normal in size and function. There is a pacemaker lead in the right ventricle. There is mild to moderate (1-2+) tricuspid regurgitation. The inferior vena cava was normal in size with preserved respiratory variability.      Left Ventricle  The left ventricle is normal in size. There is normal left ventricular wall  thickness. Left ventricular systolic function is normal. The visual ejection  fraction is 60-65%. Diastolic Doppler findings (E/E' ratio and/or other  parameters) suggest left ventricular filling pressures are indeterminate. No  regional wall motion abnormalities noted.     Right Ventricle  The right ventricle is not well visualized. The right ventricle is normal in  size and function. There is a pacemaker lead in the right ventricle.     Atria  Normal left atrial size. Right atrial size is normal.     Mitral Valve  The mitral valve is normal in structure and function. There is mild mitral  annular calcification. There is trace mitral regurgitation. There is no mitral  valve stenosis.     Tricuspid Valve  The tricuspid valve is not well visualized. There is mild to moderate (1-2+)  tricuspid regurgitation. The right ventricular systolic pressure is  approximated at 32.4 mmHg plus the right atrial pressure.     Aortic Valve  The aortic valve is trileaflet with aortic valve sclerosis. No aortic  regurgitation is present. No hemodynamically significant valvular aortic  stenosis.     Pulmonic Valve  The pulmonic valve is not well visualized.     Vessels  The aortic root is normal size. Normal size ascending aorta. The inferior vena  cava was normal in size with preserved respiratory variability.     Pericardium  There is no pericardial effusion.  Stress Test:  Date: Results:    ECG Reviewed:  Date: Results:    Cath:  Date: Results:      METS/Exercise Tolerance:     Hematologic:       Musculoskeletal:       GI/Hepatic:       Renal/Genitourinary:      (+) renal disease (Stage 2), type: CRI,            Endo:     (+)  type II DM,   Using insulin,     thyroid problem, hyperthyroidism,    Obesity,       Psychiatric/Substance Use:       Infectious Disease:       Malignancy:       Other:      (+)  , ,, other significant disability Blind         Physical Exam    Airway        Mallampati: II   TM distance: > 3 FB   Neck ROM: full   Mouth opening: > 3 cm    Respiratory Devices and Support         Dental           Cardiovascular          Rhythm and rate: regular and normal     Pulmonary           breath sounds clear to auscultation           OUTSIDE LABS:  CBC:   Lab Results   Component Value Date    WBC 12.4 (H) 07/08/2024    WBC 13.0 (H) 07/07/2024    HGB 10.1 (L) 07/08/2024    HGB 10.5 (L) 07/07/2024    HCT 29.9 (L) 07/08/2024    HCT 31.9 (L) 07/07/2024     (H) 07/08/2024     (H) 07/07/2024     BMP:   Lab Results   Component Value Date     (L) 07/08/2024     (L) 07/07/2024     (L) 07/07/2024    POTASSIUM 3.7 07/08/2024    POTASSIUM 4.0 07/07/2024    POTASSIUM 4.0 07/07/2024    CHLORIDE 102 07/08/2024    CHLORIDE 101 07/07/2024    CHLORIDE 101 07/07/2024    CO2 21 (L) 07/08/2024    CO2 17 (L) 07/07/2024    CO2 17 (L) 07/07/2024    BUN 39.3 (H) 07/08/2024    BUN 46.9 (H) 07/07/2024    BUN 46.9 (H) 07/07/2024    CR 2.46 (H) 07/08/2024    CR 3.11 (H) 07/07/2024    CR 3.11 (H) 07/07/2024     (H) 07/09/2024     (H) 07/09/2024     COAGS:   Lab Results   Component Value Date    PTT 80 (H) 08/30/2009    INR 2.17 (H) 07/08/2024     POC:   Lab Results   Component Value Date    BGM 84 05/09/2019     HEPATIC:   Lab Results   Component Value Date    ALBUMIN 2.4 (L) 07/07/2024    PROTTOTAL 6.8 07/07/2024    ALT 32 07/07/2024    AST 57 (H) 07/07/2024    ALKPHOS 85 07/07/2024    BILITOTAL 0.7 07/07/2024     OTHER:   Lab Results   Component Value Date    LACT 1.8 07/06/2024    A1C 7.6 (H) 07/06/2024    JACI 8.5 (L) 07/08/2024    PHOS 4.1  04/07/2008    MAG 2.0 07/08/2024    TSH 0.46 06/06/2023    T4 1.40 05/12/2020    CRP 3.9 06/21/2007     (H) 07/06/2024       Anesthesia Plan    ASA Status:  3    NPO Status:  NPO Appropriate    Anesthesia Type: MAC.     - Reason for MAC: straight local not clinically adequate              Consents    Anesthesia Plan(s) and associated risks, benefits, and realistic alternatives discussed. Questions answered and patient/representative(s) expressed understanding.     - Discussed:     - Discussed with:  Patient            Postoperative Care    Pain management: IV analgesics, Oral pain medications, Multi-modal analgesia.   PONV prophylaxis: Ondansetron (or other 5HT-3)     Comments:               Dago Pantoja MD    I have reviewed the pertinent notes and labs in the chart from the past 30 days and (re)examined the patient.  Any updates or changes from those notes are reflected in this note.

## 2024-07-09 NOTE — OR NURSING
Fingerstick blood glucose 144.  Anesthesiologist Dr Pantoja informed. No orders- OK to transport to  per Dr Pantoja- X Ray being completed at bedside - Report given to Boo JOHNSON

## 2024-07-10 ENCOUNTER — APPOINTMENT (OUTPATIENT)
Dept: PHYSICAL THERAPY | Facility: CLINIC | Age: 64
End: 2024-07-10
Attending: STUDENT IN AN ORGANIZED HEALTH CARE EDUCATION/TRAINING PROGRAM
Payer: COMMERCIAL

## 2024-07-10 LAB
ANION GAP SERPL CALCULATED.3IONS-SCNC: 12 MMOL/L (ref 7–15)
BUN SERPL-MCNC: 15.9 MG/DL (ref 8–23)
CALCIUM SERPL-MCNC: 8 MG/DL (ref 8.8–10.2)
CHLORIDE SERPL-SCNC: 105 MMOL/L (ref 98–107)
CREAT SERPL-MCNC: 1.14 MG/DL (ref 0.67–1.17)
DEPRECATED HCO3 PLAS-SCNC: 21 MMOL/L (ref 22–29)
EGFRCR SERPLBLD CKD-EPI 2021: 72 ML/MIN/1.73M2
ERYTHROCYTE [DISTWIDTH] IN BLOOD BY AUTOMATED COUNT: 14.5 % (ref 10–15)
GLUCOSE BLDC GLUCOMTR-MCNC: 215 MG/DL (ref 70–99)
GLUCOSE BLDC GLUCOMTR-MCNC: 222 MG/DL (ref 70–99)
GLUCOSE BLDC GLUCOMTR-MCNC: 226 MG/DL (ref 70–99)
GLUCOSE BLDC GLUCOMTR-MCNC: 269 MG/DL (ref 70–99)
GLUCOSE BLDC GLUCOMTR-MCNC: 289 MG/DL (ref 70–99)
GLUCOSE SERPL-MCNC: 240 MG/DL (ref 70–99)
HCT VFR BLD AUTO: 28.9 % (ref 40–53)
HGB BLD-MCNC: 9.5 G/DL (ref 13.3–17.7)
INR PPP: 1.66 (ref 0.85–1.15)
MCH RBC QN AUTO: 29 PG (ref 26.5–33)
MCHC RBC AUTO-ENTMCNC: 32.9 G/DL (ref 31.5–36.5)
MCV RBC AUTO: 88 FL (ref 78–100)
PLATELET # BLD AUTO: 492 10E3/UL (ref 150–450)
POTASSIUM SERPL-SCNC: 3.6 MMOL/L (ref 3.4–5.3)
RBC # BLD AUTO: 3.28 10E6/UL (ref 4.4–5.9)
SODIUM SERPL-SCNC: 138 MMOL/L (ref 135–145)
WBC # BLD AUTO: 11.1 10E3/UL (ref 4–11)

## 2024-07-10 PROCEDURE — 97530 THERAPEUTIC ACTIVITIES: CPT | Mod: GP | Performed by: PHYSICAL THERAPIST

## 2024-07-10 PROCEDURE — 250N000013 HC RX MED GY IP 250 OP 250 PS 637: Performed by: HOSPITALIST

## 2024-07-10 PROCEDURE — 85610 PROTHROMBIN TIME: CPT | Performed by: STUDENT IN AN ORGANIZED HEALTH CARE EDUCATION/TRAINING PROGRAM

## 2024-07-10 PROCEDURE — 258N000003 HC RX IP 258 OP 636: Performed by: STUDENT IN AN ORGANIZED HEALTH CARE EDUCATION/TRAINING PROGRAM

## 2024-07-10 PROCEDURE — 97161 PT EVAL LOW COMPLEX 20 MIN: CPT | Mod: GP | Performed by: PHYSICAL THERAPIST

## 2024-07-10 PROCEDURE — 99232 SBSQ HOSP IP/OBS MODERATE 35: CPT | Performed by: SPECIALIST

## 2024-07-10 PROCEDURE — 85027 COMPLETE CBC AUTOMATED: CPT | Performed by: HOSPITALIST

## 2024-07-10 PROCEDURE — 250N000013 HC RX MED GY IP 250 OP 250 PS 637: Performed by: STUDENT IN AN ORGANIZED HEALTH CARE EDUCATION/TRAINING PROGRAM

## 2024-07-10 PROCEDURE — 99233 SBSQ HOSP IP/OBS HIGH 50: CPT | Performed by: HOSPITALIST

## 2024-07-10 PROCEDURE — 36415 COLL VENOUS BLD VENIPUNCTURE: CPT | Performed by: HOSPITALIST

## 2024-07-10 PROCEDURE — 250N000013 HC RX MED GY IP 250 OP 250 PS 637: Performed by: SPECIALIST

## 2024-07-10 PROCEDURE — 120N000001 HC R&B MED SURG/OB

## 2024-07-10 PROCEDURE — 250N000011 HC RX IP 250 OP 636: Mod: JZ | Performed by: SPECIALIST

## 2024-07-10 PROCEDURE — 80048 BASIC METABOLIC PNL TOTAL CA: CPT | Performed by: HOSPITALIST

## 2024-07-10 RX ORDER — SULFAMETHOXAZOLE/TRIMETHOPRIM 800-160 MG
1 TABLET ORAL 2 TIMES DAILY
Status: DISCONTINUED | OUTPATIENT
Start: 2024-07-10 | End: 2024-07-12 | Stop reason: HOSPADM

## 2024-07-10 RX ORDER — CEFAZOLIN SODIUM 2 G/100ML
2 INJECTION, SOLUTION INTRAVENOUS EVERY 8 HOURS
Status: ON HOLD | DISCHARGE
Start: 2024-07-10 | End: 2024-07-16

## 2024-07-10 RX ADMIN — AMLODIPINE BESYLATE 10 MG: 10 TABLET ORAL at 08:43

## 2024-07-10 RX ADMIN — ACETAMINOPHEN 975 MG: 325 TABLET, FILM COATED ORAL at 21:05

## 2024-07-10 RX ADMIN — CEFAZOLIN SODIUM 2 G: 2 INJECTION, SOLUTION INTRAVENOUS at 08:44

## 2024-07-10 RX ADMIN — ACETAMINOPHEN 975 MG: 325 TABLET, FILM COATED ORAL at 15:27

## 2024-07-10 RX ADMIN — SULFAMETHOXAZOLE AND TRIMETHOPRIM 1 TABLET: 800; 160 TABLET ORAL at 12:34

## 2024-07-10 RX ADMIN — SULFAMETHOXAZOLE AND TRIMETHOPRIM 1 TABLET: 800; 160 TABLET ORAL at 21:05

## 2024-07-10 RX ADMIN — ACETAMINOPHEN 975 MG: 325 TABLET, FILM COATED ORAL at 06:41

## 2024-07-10 RX ADMIN — MICONAZOLE NITRATE: 2 POWDER TOPICAL at 21:01

## 2024-07-10 RX ADMIN — SODIUM CHLORIDE: 9 INJECTION, SOLUTION INTRAVENOUS at 21:00

## 2024-07-10 RX ADMIN — MICONAZOLE NITRATE: 2 POWDER TOPICAL at 08:44

## 2024-07-10 RX ADMIN — CEFAZOLIN SODIUM 2 G: 2 INJECTION, SOLUTION INTRAVENOUS at 00:35

## 2024-07-10 RX ADMIN — METOPROLOL SUCCINATE 100 MG: 100 TABLET, EXTENDED RELEASE ORAL at 08:42

## 2024-07-10 RX ADMIN — SODIUM CHLORIDE: 9 INJECTION, SOLUTION INTRAVENOUS at 06:41

## 2024-07-10 RX ADMIN — CEFAZOLIN SODIUM 2 G: 2 INJECTION, SOLUTION INTRAVENOUS at 17:29

## 2024-07-10 ASSESSMENT — ACTIVITIES OF DAILY LIVING (ADL)
ADLS_ACUITY_SCORE: 46
ADLS_ACUITY_SCORE: 47
ADLS_ACUITY_SCORE: 46
ADLS_ACUITY_SCORE: 45
ADLS_ACUITY_SCORE: 46
ADLS_ACUITY_SCORE: 45
ADLS_ACUITY_SCORE: 46
ADLS_ACUITY_SCORE: 46
ADLS_ACUITY_SCORE: 47
ADLS_ACUITY_SCORE: 45
ADLS_ACUITY_SCORE: 46
ADLS_ACUITY_SCORE: 47
ADLS_ACUITY_SCORE: 46

## 2024-07-10 NOTE — PLAN OF CARE
Occupational Therapy: Orders received. Chart reviewed and discussed with care team.? Occupational Therapy not indicated due to per PT, pt will require TCU. PT will cont to follow for mobility needs, defer OT to next level of care.? Defer discharge recommendations to care team.? Will complete orders.

## 2024-07-10 NOTE — PROGRESS NOTES
D: Writer received a VM from [facility], Kiki Melgar, requesting a call back.    Writer is aware of the patient's social dynamics. According to Ems the patient living conditions are poor with the patient being found surrounded by bodily fluids and waste. Per EMS patient's toilet and shower were not working. Patient is legally blind and lives home alone.     Per the provider, the patient is trying to get a hold of his sister, but no contact info is listed in the chart.     Consult to be completed.    FIORDALIZA Murguia  [employer]  Social Work

## 2024-07-10 NOTE — PLAN OF CARE
History     Chief Complaint   Patient presents with     Alcohol Intoxication     Pt found intoxicated in the middle of the road by [facility] [employer].     HPI  Kari Mackay is a 53 year old female who is brought to us in the emergency room by her  paramedics after she was discovered by [facility] department laying down on the road.  Patient was acutely intoxicated unable to answer most questions when she was initially brought to the emergency room eventually was able to answer questions appropriately.  Patient has a long history of chemical dependency she is requesting detox and treatment at this time.  Patient was also somewhat poor historian and while here in the emergency room had several episodes where she was shaking as though she was having seizure activity but was alert and able to answer some questions while she was having these episodes she was also able to stop these episodes voluntarily I believe that these represent pseudoseizures.  Patient has had similar presentations in the emergency room in the past also thought to be pseudoseizures.    I have reviewed the Medications, Allergies, Past Medical and Surgical History, and Social History in the Epic system.    PERSONAL MEDICAL HISTORY  Past Medical History:   Diagnosis Date     Alcohol dependence with withdrawal (H)     multiple detox attempts     Osteoarthritis of right knee      Seizures (H)     alcohol withdrawal     Substance abuse      PAST SURGICAL HISTORY  Past Surgical History:   Procedure Laterality Date     GALLBLADDER SURGERY       GYN SURGERY       FAMILY HISTORY  No family history on file.  SOCIAL HISTORY  Social History   Substance Use Topics     Smoking status: Current Every Day Smoker     Packs/day: 0.25     Smokeless tobacco: Never Used     Alcohol use Yes      Comment: Daily 1-2 liters per day     MEDICATIONS  No current facility-administered medications for this encounter.      Current Outpatient  Goal Outcome Evaluation:      Plan of Care Reviewed With: patient        Pt alert and oriented X4. Able to make needs known. Denied SOB, CP, N/V. Pain 2/10. Turn and reposition Q 2 hrs. Up in chair. Back to bed with sarasteady. IV NS at 100 ml/hr. Pt refused foot foam pillow and requested multiple pillows.           Prescriptions   Medication     loratadine (CLARITIN) 10 MG tablet     multivitamin, therapeutic with minerals (THERA-VIT-M) TABS     ALLERGIES  Allergies   Allergen Reactions     Acetaminophen      Penicillins Other (See Comments)     Doesn't know     Percocet [Oxycodone-Acetaminophen]          Review of Systems   Constitutional: Negative for fever.   Respiratory: Negative for shortness of breath.    Cardiovascular: Negative for chest pain.   Gastrointestinal: Negative for abdominal pain.   Neurological: Negative for syncope, speech difficulty, weakness and numbness.   All other systems reviewed and are negative.      Physical Exam   BP: 113/68  Heart Rate: 107  Temp: 97  F (36.1  C)  Resp: 18  SpO2: 96 %      Physical Exam   Constitutional: She is oriented to person, place, and time. No distress.   HENT:   Head: Atraumatic.   Mouth/Throat: Oropharynx is clear and moist. No oropharyngeal exudate.   Eyes: Pupils are equal, round, and reactive to light. No scleral icterus.   Cardiovascular: Normal heart sounds and intact distal pulses.    Pulmonary/Chest: Breath sounds normal. No respiratory distress.   Abdominal: Soft. Bowel sounds are normal. There is no tenderness.   Musculoskeletal: She exhibits no edema or tenderness.   Neurological: She is alert and oriented to person, place, and time. No cranial nerve deficit. She exhibits normal muscle tone. Coordination normal.   Skin: Skin is warm. No rash noted. She is not diaphoretic.   Psychiatric: Her mood appears anxious. She expresses no suicidal ideation.       ED Course     ED Course     Procedures         Critical Care time:  none    Labs Ordered and Resulted from Time of ED Arrival Up to the Time of Departure from the ED   DRUG ABUSE SCREEN 6 CHEM DEP URINE ([facility]) - Abnormal; Notable for the following:        Result Value    Benzodiazepine Qual Urine Positive (*)     Cocaine Qual Urine Positive (*)     Ethanol Qual Urine Positive (*)     All other components within  normal limits   CBC WITH PLATELETS DIFFERENTIAL - Abnormal; Notable for the following:     RBC Count 3.31 (*)     Hemoglobin 9.5 (*)     Hematocrit 29.8 (*)     RDW 16.4 (*)     All other components within normal limits   COMPREHENSIVE METABOLIC PANEL - Abnormal; Notable for the following:     Sodium 152 (*)     Chloride 115 (*)     Calcium 7.6 (*)     Albumin 3.1 (*)     All other components within normal limits   ALCOHOL ETHYL - Abnormal; Notable for the following:     Ethanol g/dL 0.34 (*)     All other components within normal limits   ALCOHOL BREATH TEST POCT - Abnormal; Notable for the following:     Alcohol Breath Test 0.284 (*)     All other components within normal limits   LIPASE   BASIC METABOLIC PANEL     I note patient's initial sodium was 152 and chloride 115 at this time her alcohol level was 0.34 she will be hydrated with banana bag and 1 L of lactated Ringer's and will have a recheck of her electrolytes.  On recheck patient's sodium remained elevated at 151 and at this time will be admitted medically I also note that her glucose is 61 when she will receive oral glucose and have this rechecked prior to admission.       Assessments & Plan (with Medical Decision Making)       I have reviewed the nursing notes.    I have reviewed the findings, diagnosis, plan and need for follow up with the patient.  Patient with chronic alcohol dependence now presenting with acute intoxication along with electrolyte abnormalities.  Patient had an episode of pseudoseizure while here in the emergency room and has been consistently having similar episodes on previous evaluations as well.  Patient will be admitted to the medicine service for further stabilization and treatment as well as monitoring of her electrolytes.    New Prescriptions    No medications on file       Final diagnoses:   Alcohol dependence with intoxication with complication (H)   Pseudoseizures       6/30/2018   [facility], EMERGENCY DEPARTMENT      Louis Workman MD  07/01/18 [id number]

## 2024-07-10 NOTE — PROGRESS NOTES
[facility]  PROGRESS NOTE    CHIEF COMPLAINT:      I was asked by Lavell Bishop MD  to evaluate this patient for right foot.    PATIENT HISTORY:  Mckay Hsu is a 64 year old male seen in follow up for his right foot. Had second surgery yesterday. States feels well and has worked with PT. Did note some weakness initially with PT, but improved. Denies pain to right foot.     Review of Systems:  A 10 point review of systems was performed and is positive for that noted above in the patient history.  All other areas are negative.     PAST MEDICAL HISTORY:   Past Medical History:   Diagnosis Date    Atrial fibrillation (H)     s/p AVN ablation, BIV PPM    Congestive heart failure (H)     tachycardia-induced cardiomyopathy    Essential hypertension, benign     Hyperlipidemia LDL goal <100 10/31/2010    Hyponatremia 9/8/2009    Morbid obesity (H)     Open wound of foot except toe(s) alone, without mention of complication     Retinitis pigmentosa     Type 2 diabetes, HbA1C goal < 8% (H) 6/26/2012        PAST SURGICAL HISTORY:   Past Surgical History:   Procedure Laterality Date    AMPUTATE FOOT Right 7/6/2024    Procedure: Right foot first ray resection;  Surgeon: Denia Lainez DPM;  Location: [facility] OR    AMPUTATE TOE(S) Right 7/6/2024    Procedure: Right foot first ray resection;  Surgeon: Denia Lainez DPM;  Location:  OR    CARDIOVERSION  9/2009, 10/2009    COLONOSCOPY N/A 5/9/2019    Procedure: COLONOSCOPY;  Surgeon: Godwin Santillan MD;  Location:  GI    H ABLATION AV NODE  4/8/10    IMPLANT PACEMAKER  4/8/10    BIV PPM- [employer] InSync III    TONSILLECTOMY      as kid    [facility] NONSPECIFIC PROCEDURE  06/2007    debritement        MEDICATIONS:  Reviewed in Epic. Current.     ALLERGIES:  No Known Allergies     SOCIAL HISTORY:   Social History     Socioeconomic History    Marital status: Single     Spouse name: Not on file    Number of children: Not on file    Years of  education: Not on file    Highest education level: Not on file   Occupational History    Occupation: repair printer     Employer: [employer]   Tobacco Use    Smoking status: Never    Smokeless tobacco: Never   Substance and Sexual Activity    Alcohol use: No    Drug use: No    Sexual activity: Never   Other Topics Concern     Service Not Asked    Blood Transfusions Not Asked    Caffeine Concern Yes     Comment: occasionally soda    Occupational Exposure Not Asked    Hobby Hazards Not Asked    Sleep Concern Not Asked    Stress Concern Not Asked    Weight Concern Not Asked    Special Diet Yes     Comment: low sodium, low fat, DM diet     Back Care Not Asked    Exercise Yes     Comment: walking, stairs     Bike Helmet Not Asked    Seat Belt Not Asked    Self-Exams Not Asked    Parent/sibling w/ CABG, MI or angioplasty before 65F 55M? Not Asked   Social History Narrative    Not on file     Social Determinants of Health     Financial Resource Strain: Not on file   Food Insecurity: Not on file   Transportation Needs: Not on file   Physical Activity: Not on file   Stress: Not on file   Social Connections: Not on file   Interpersonal Safety: Not on file   Housing Stability: Not on file        FAMILY HISTORY:   Family History   Problem Relation Age of Onset    Diabetes Mother     Hypertension Father     Diabetes Father     Hypertension Maternal Grandmother     Diabetes Maternal Grandmother     Diabetes Maternal Grandfather     Hypertension Maternal Grandfather     Cardiovascular Maternal Grandfather     Hypertension Paternal Grandfather     Cardiovascular Paternal Grandfather     Diabetes Sister     Hypertension Sister         EXAM:Vitals: BP (!) 149/73 (BP Location: Right arm)   Pulse 71   Temp 97.7  F (36.5  C) (Oral)   Resp 17   Wt 143.5 kg (316 lb 5.8 oz)   SpO2 93%   BMI 43.51 kg/m    BMI= Body mass index is 43.51 kg/m .    LABS:     Last Comprehensive Metabolic Panel:  Sodium   Date Value Ref Range  Status   07/10/2024 138 135 - 145 mmol/L Final   01/26/2021 134 133 - 144 mmol/L Final     Potassium   Date Value Ref Range Status   07/10/2024 3.6 3.4 - 5.3 mmol/L Final   04/05/2022 4.1 3.4 - 5.3 mmol/L Final   01/26/2021 4.3 3.4 - 5.3 mmol/L Final     Chloride   Date Value Ref Range Status   07/10/2024 105 98 - 107 mmol/L Final   04/05/2022 103 94 - 109 mmol/L Final   01/26/2021 101 94 - 109 mmol/L Final     Carbon Dioxide   Date Value Ref Range Status   01/26/2021 28 20 - 32 mmol/L Final     Carbon Dioxide (CO2)   Date Value Ref Range Status   07/10/2024 21 (L) 22 - 29 mmol/L Final   04/05/2022 25 20 - 32 mmol/L Final     Anion Gap   Date Value Ref Range Status   07/10/2024 12 7 - 15 mmol/L Final   04/05/2022 8 3 - 14 mmol/L Final   01/26/2021 5 3 - 14 mmol/L Final     Glucose   Date Value Ref Range Status   07/10/2024 240 (H) 70 - 99 mg/dL Final   04/05/2022 120 (H) 70 - 99 mg/dL Final   01/26/2021 163 (H) 70 - 99 mg/dL Final     Comment:     Fasting specimen     GLUCOSE BY METER POCT   Date Value Ref Range Status   07/10/2024 289 (H) 70 - 99 mg/dL Final     Urea Nitrogen   Date Value Ref Range Status   07/10/2024 15.9 8.0 - 23.0 mg/dL Final   04/05/2022 16 7 - 30 mg/dL Final   01/26/2021 19 7 - 30 mg/dL Final     Creatinine   Date Value Ref Range Status   07/10/2024 1.14 0.67 - 1.17 mg/dL Final   01/26/2021 1.03 0.66 - 1.25 mg/dL Final     GFR Estimate   Date Value Ref Range Status   07/10/2024 72 >60 mL/min/1.73m2 Final     Comment:     eGFR calculated using 2021 CKD-EPI equation.   01/26/2021 79 >60 mL/min/[1.73_m2] Final     Comment:     Non  GFR Calc  Starting 12/18/2018, serum creatinine based estimated GFR (eGFR) will be   calculated using the Chronic Kidney Disease Epidemiology Collaboration   (CKD-EPI) equation.       Calcium   Date Value Ref Range Status   07/10/2024 8.0 (L) 8.8 - 10.2 mg/dL Final   01/26/2021 9.7 8.5 - 10.1 mg/dL Final     Lab Results   Component Value Date    WBC  16.0 07/06/2024    WBC 10.2 11/29/2018     Lab Results   Component Value Date    RBC 4.04 07/06/2024    RBC 4.42 11/29/2018     Lab Results   Component Value Date    HGB 11.9 07/06/2024    HGB 14.1 02/03/2020     Lab Results   Component Value Date    HCT 35.2 07/06/2024    HCT 39.2 11/29/2018     Lab Results   Component Value Date     07/06/2024     02/03/2020      Lab Results   Component Value Date    INR >10.00 07/06/2024    INR 2.8 06/11/2024    INR 4.6 05/28/2024    INR 2.8 04/09/2024    INR 2.7 02/27/2024    INR 2.2 01/23/2024    INR 2.4 12/28/2023    INR 1.9 12/07/2023    INR 2.2 10/18/2023    INR 2.7 08/29/2023    INR 2.8 07/18/2023    INR 2.6 06/06/2023    INR 2.4 04/04/2023    INR 2.80 06/01/2021    INR 3.00 04/20/2021    INR 2.60 03/09/2021    INR 2.30 01/26/2021    INR 2.80 01/05/2021    INR 3.20 12/01/2020    INR 1.90 11/10/2020    INR 2.50 09/29/2020    INR 2.60 08/18/2020    INR 2.70 07/07/2020    INR 2.20 06/02/2020    INR 3.40 05/12/2020        General appearance: Patient is alert and fully cooperative with history & exam.  No sign of distress is noted during the visit.      Respiratory: Breathing is regular & unlabored while sitting.      HEENT: Hearing is intact to spoken word.  Speech is clear.  No gross evidence of visual impairment that would impact ambulation.      Dermatologic: Right foot dressing changed: incision site intact. Periwound viable. Small site that was left open is granular. No recurrent necrosis.     Vascular: Dorsalis pedis and posterior tibial pulses are intact & regular bilaterally.  CFT and skin temperature is normal to both lower extremities.       Neurologic: Lower extremity sensation is diminished, bilateral foot, to light touch.  No evidence of neurological-based weakness or contracture in the lower extremities.       Musculoskeletal: Patient is ambulatory without an assistive device or brace.  No gross foot or ankle deformity noted.     Psychiatric: Affect  is pleasant & appropriate.      All cultures:  Recent Labs   Lab 07/07/24  1244 07/07/24  0925 07/06/24  1814 07/06/24  0357 07/06/24  0315   CULTURE No growth after 2 days  No growth after 2 days No Growth 2+ Staphylococcus aureus*  Isolated in broth only Enterobacter cloacae complex*  1+ Corynebacterium jeikeium*  2+ Mixed Anaerobic Organisms Present  See corresponding culture for results No growth after 4 days Positive on the 1st day of incubation*  Staphylococcus aureus*        IMAGING:     IMPRESSION: There is deformity of the distal phalanx right great toe with some cortical irregularity and erosive change. There is also some air in the surrounding soft tissues. The findings are strongly concerning for osteomyelitis. Degenerative changes   at the first MTP joint. Multiple hammertoe deformities. Plantar calcaneal spur. Vascular calcification. Remainder unremarkable.    Noted gas gangrene and distal phalanx osteomyelitis       Vascular Studies:     SHANNON FINDINGS:  RIGHT  Brachial: 128  Ankle (PT): 130 Index: 0.98  Ankle (DP): 121 Index: 0.92        LEFT  Brachial: 132  Ankle (PT): 173 Index: 1.31  Ankle (DP): 122 Index: 0.92     The right SHANNON at rest is 0.98. The left SHANNON at rest is 1.31.       WAVEFORMS: The dorsalis pedis and posterior tibial arteries are mostly biphasic in nature.                                                                      IMPRESSION:  1.  RIGHT LOWER EXTREMITY: SHANNON at rest is normal.  2.  LEFT LOWER EXTREMITY: SHANNON at rest is normal.    Cultures:   Foot, Right; Tissue   0 Result Notes  Culture 2+ Staphylococcus aureus Abnormal       Isolated in broth only Enterobacter cloacae complex Abnormal    On day 1 of incubation   1+ Corynebacterium jeikeium Abnormal    Susceptibilities not routinely done, refer to antibiogram to view typical susceptibility profiles        Resulting Agency: [facility]     Susceptibility       Staphylococcus aureus Enterobacter cloacae complex     SATHYA SATHYA      Ampicillin    Resistant 1     Ampicillin/ Sulbactam    Resistant 1     Cefepime   <=1 ug/mL Susceptible     Ceftazidime    Resistant     Ceftriaxone    Resistant     Ciprofloxacin   <=0.25 ug/mL Susceptible     Clindamycin 0.25 ug/mL Susceptible       Daptomycin 0.5 ug/mL Susceptible       Doxycycline 8 ug/mL Intermediate       Erythromycin <=0.25 ug/mL Susceptible       Gentamicin <=0.5 ug/mL Susceptible <=1 ug/mL Susceptible     Levofloxacin   <=0.12 ug/mL Susceptible     Meropenem   <=0.25 ug/mL Susceptible     Oxacillin 0.5 ug/mL Susceptible 2       Piperacillin/Tazobactam    Resistant     Tetracycline >=16 ug/mL Resistant       Tobramycin   <=1 ug/mL Susceptible     Trimethoprim/Sulfamethoxazole <=0.5/9.5 u... Susceptible <=1/19 ug/mL Susceptible     Vancomycin 1 ug/mL Susceptible                ASSESSMENT:  S/p right foot first ray resection on 7/6, repeat debridement and further resection on 7/9.   Diabetes Mellitus --> hba1c: 8.4  Supra therapeutic INR: >10  --> hx of atrial fibrillation  Sepsis with bacteremia      MEDICAL DECISION MAKING:   -Discussed all findings with patient. Chart and imaging reviewed.   -Incision site viable. Cellulitis much improved.   -No further surgery planned. Will follow up and reevaluate site tomorrow. Has pathology results pending.   -WB to heel of RLE   -Ancef and bactrim for antibiotics for MSSA bacteremia and tissue cultures.   -Recommend cont to hold coumadin.   -Will follow      CINTHYA Welch Department of Podiatry/Foot & Ankle Surgery  [phone]

## 2024-07-10 NOTE — PROGRESS NOTES
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      R first toe gangrene, necrosis and OM   S/p resection R first ray 7/6   S/p debridement, 1st MT resection 7/9   Gas gangrene  Enterobacter cloacae in wound  Staph aureus bacteremia  **Labs: , WBC 16, Plts 481  *initial imaging concerning for osteo  *elevated WBC, CRP on adm; vanc/zosyn, IVF, and IV vit k given in ED  - blood cultures from admission positive for staph aureus, updated Bcx clearing  - underwent resection by podiatry 7/6  - further debridement and 1st MT resection 7/9  - ID transitioned to cefazolin, will need continued IV Abx thru 7/21 given bacteremia  - 7/10 podiatry, ID following - not planning further surgery, follow pathology, abx as above - + bactrim DS BID x 7 d added for enterobacter     EV on CKD2, likely ATN (resolving)  Rhabdomyolysis w/ elevated AST  Anion gap acidosis  *baseline creatinine 1.1 on admission 2.6. CK 3000s, Lactate negative  *Given rhabdo, concern for both prerenal and ATN. On IVF initially.  - 7/10 creat 1.1  - follow BMP      Permanent Afib, s/p AV node ablation and CRT 2010  Chronic anticoagulation  Supratheraputic INR  Hx of NSVT  HTN   HLD  *failed amiodarone in the past  *INR>10 on admission  - pharmacy consulted for INR reversal with surgeries  - holding AC per podiatry as of 7/10  - PTA metoprolol and amlodipine resumed  - likely resume statin and losartan in the coming days (initially held with rhabdo and EV)  - TTE 7/7: EF 60-65%, difficult study, RV nml s/f. Mild to mod TR.      T2D  PTA taking glargine 60 units with metformin  - Holding metformin  - BG variable, missed glargine 7/9 apparently - will need to improve  "this with wound healing in mind as well  - continue SSI  - anticipate adjustment to be needed - glargine 25 units in AM 7/11     Concern for vulnerable adult status  Blindness 2/2 retinitis pigmentose  *EMS reports \"Patient covered in feces from head to toe and covered in the remnants of his carpet including food scraps, wrappers, and crumbs found covering the patient's back \" and that \"doesn't have a working toilet or shower, and order delivery for every meal, and has bowel movements and urinates in plastic bags \"  *patient informed nocturnist that the above story was inaccurate, and that he only had bags of stool for today after being unable to reach the toilet (which he states does work)  *Patient states he needs help as he can't live alone anymore  - SW consulted and appreciated     Hyponatremia  - likely volume depletion, improving with IVF - stable at 134 7/8 --> 135 7/9  - monitor daily BMP     Hx recurrent anemia  L buttock Hematoma  *labile Hb 12-14  *CT showing High-density subcutaneous stranding overlying the left buttock with a 2.5 cm circumscribed high-density fluid collection   - Hb mostly stable since admission and do not suspect brisk bleed  - 7/10 hgb 9.5  - INR management as above  - daily CBC     Hypoalbuminemia  Obesity  Ventral hernia  - Nutrition consult     Degenerative Joint and Bone disease  - Tylenol             Diet: Snacks/Supplements Pediatric: Glucerna; With Meals  Room Service  Advance Diet as Tolerated: Regular Diet Adult; Moderate Consistent Carb (60 g CHO per Meal) Diet    DVT Prophylaxis: Pneumatic Compression Devices  Duran Catheter: Not present  Lines: None     Cardiac Monitoring: None  Code Status: Full Code      Clinically Significant Risk Factors              # Hypoalbuminemia: Lowest albumin = 2.4 g/dL at 7/7/2024  7:56 AM, will monitor as appropriate  # Coagulation Defect: INR = 1.66 (Ref range: 0.85 - 1.15) and/or PTT = N/A, will monitor for bleeding    # Hypertension: " Noted on problem list  # Chronic heart failure with preserved ejection fraction: heart failure noted on problem list and last echo with EF >50%          #Precipitous drop in Hgb/Hct: Lowest Hgb this hospitalization: 9.5 g/dL. Will continue to monitor and treat/transfuse as appropriate.    # DMII: A1C = 7.6 % (Ref range: <5.7 %) within past 6 months        # Pacemaker present       Disposition Plan     Medically Ready for Discharge: Anticipated in 2-4 Days             Andrea Huang MD  Hospitalist Service  [facility]  Securely message with [employer] (more info)  Text page via [facility] Paging/Directory   ______________________________________________________________________    Interval History   Patient seen and examined. He is POD 1 from R foot debridement with podiatry. Some sweating when getting up to chair but no fevers chills sob or new pain. ID and Pod following.     Physical Exam   Vital Signs: Temp: 97.7  F (36.5  C) Temp src: Oral BP: (!) 149/73 Pulse: 71   Resp: 17 SpO2: 93 % O2 Device: None (Room air) Oxygen Delivery: 1/2 LPM  Weight: 316 lbs 5.76 oz    Gen: NAD, pleasant  HEENT: EOMI, MMM, blind at baseline  Resp: no focal crackles,  no wheezes, no increased work of resp  CV: S1S2 heard, reg rhythm, reg rate  Abdo: soft, nontender, nondistended, bowel sounds present  Ext: calves nontender, RLE dressing intact  Neuro: aa, conversing, moving ext, CN grossly intact, no facial asymmetry      Medical Decision Making       52 MINUTES SPENT BY ME on the date of service doing chart review, history, exam, documentation & further activities per the note.      Data     I have personally reviewed the following data over the past 24 hrs:    11.1 (H)  \   9.5 (L)   / 492 (H)     N/A N/A N/A /  215 (H)   N/A N/A N/A \     INR:  1.66 (H) PTT:  N/A   D-dimer:  N/A Fibrinogen:  N/A       Imaging results reviewed over the past 24 hrs:   Recent Results (from the past 24 hour(s))   XR Foot Port Right 3  Views    Narrative    XR FOOT PORT RIGHT 3 VIEWS 7/9/2024 1:22 PM     HISTORY: s/p first ray resection    COMPARISON: None.         Impression    IMPRESSION: Postoperative changes of amputation of the first ray to  the level of the proximal first metatarsal. Mild hammertoe deformities  within the remaining toes. No evidence for acute fracture. Plantar  calcaneal spurring.    GLENROY NICOLE MD         SYSTEM ID:  [id number]

## 2024-07-10 NOTE — PLAN OF CARE
Goal Outcome Evaluation:         Summary:  POD 1 R foot I&D  Shift: 7-10-24, [id number]   Orientation: A&Ox4. Legally blind   Vitals/Tele: VSS on .5 L weined to RA  IV Access/drain: IV infusing NS at 100 mL/hr  Diet: Mod carb  Mobility:. Heel weightbearing on RLE. Did not get out of bed.  GI/: Incontinent. X 1 incontinent stool . External male catheter in place with rd OP.    Labs: , creat 1.54  Wound/Skin: R knee abrasion present. R foot dressing  CDI. L plantar foot callus/corn   Consults: Podiatry, ID, PT/OT,walking boot available.  Discharge Plan: Pending medical progress   Other:  No nausea, SOB. Lung sounds clear. Bowel sounds active. CMS intact ex BLE numbness. Continue to monitor.

## 2024-07-10 NOTE — PROGRESS NOTES
Shift [id number]    Admitting Diagnosis: Right hip pain [M25.551]  Multiple contusions [T07.XXXA]  Acute kidney injury (H24) [N17.9]  Supratherapeutic INR [R79.1]  Cellulitis of right lower extremity [L03.115]  Traumatic rhabdomyolysis, initial encounter (H24) [T79.6XXA]  Osteomyelitis of great toe of right foot (H) [M86.9]   Vitals - VS, except HTN  Pain 0/10. Taking schedule Tylenol PRN. Last dose N/A  A&Ox4  Voiding -Yes via pure-wick.   Mobility- Not OOb yet  Tele- N/A  CMS- BLE Numbness   Lung Sounds Clear on 0.5L via NC  GI -Yes continent  Dressing -R foot dressing CDI    Orders Placed This Encounter      Advance Diet as Tolerated: Regular Diet Adult; Moderate Consistent Carb (60 g CHO per Meal) Diet       Other: WB on R heel. R foot elevated with 2 pillows. NS infusing @ 100ml/hr. BG checked and covered with insulin. Plan of care ongoing.

## 2024-07-10 NOTE — PROGRESS NOTES
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 Rhabdomyolysis and EV have also resolved     -MSSA bacteremia related to septic foot  -Gas gangene and osteomyelitis of the right great toe. S/p R foot ray resection  -Diabetes Mellitus --8.4 %  -Fall and prolonged immobilization  -EV  -Rhabdomyolysis     Recommendations:  Blood cxs are clear, foot infection has been controlled  Treat with Cefazolin for 2 weeks in view of bacteremia until 7/21  Add Bactrim DS 1 PO BID X 1 week to cover Enterobacter cloacae isolated in tissue cx  Follow tissue pathology  Glycemic control  OPIV antibiotic orders placed in Epic for cefazolin until 7/21  Midline at discharge and midline removal upon completion of antibiotics      Vielka Tinajero MD    Interval History   Remains afebrile, leukocytosis is improving, blood cxs are clear, had surgery yesterday with excisional debridement and right foot metatarsal resection. Tissue cx also growing Enterobacter cloacae    Physical Exam   Temp: 97.7  F (36.5  C) Temp src: Oral BP: (!) 149/73 Pulse: 71   Resp: 17 SpO2: 93 % O2 Device: None (Room air) Oxygen Delivery: 1/2 LPM  Vitals:    07/06/24 0347 07/08/24 0602   Weight: 149.7 kg (330 lb) 143.5 kg (316 lb 5.8 oz)     Vital Signs with Ranges  Temp:  [96.4  F (35.8  C)-98.1  F (36.7  C)] 97.7  F (36.5  C)  Pulse:  [69-73] 71  Resp:  [14-21] 17  BP: (117-164)/(67-97) 149/73  SpO2:  [93 %-97 %] 93 %    Constitutional: Awake, alert, cooperative, no apparent distress  Lungs: Clear   Skin: No rash  MS:  R foot in dressing    Other:    Medications   Current  Facility-Administered Medications   Medication Dose Route Frequency Provider Last Rate Last Admin    sodium chloride 0.9 % infusion   Intravenous Continuous Denice Tracy  mL/hr at 07/10/24 0641 New Bag at 07/10/24 0641     Current Facility-Administered Medications   Medication Dose Route Frequency Provider Last Rate Last Admin    acetaminophen (TYLENOL) tablet 975 mg  975 mg Oral Q8H Lavell Bishop MD   975 mg at 07/10/24 0641    amLODIPine (NORVASC) tablet 10 mg  10 mg Oral Daily Andrea Huang MD   10 mg at 07/10/24 0843    ceFAZolin (ANCEF) 2 g in 100 mL D5W intermittent infusion  2 g Intravenous Q8H Vielka Tinajero  mL/hr at 07/10/24 0844 2 g at 07/10/24 0844    insulin aspart (NovoLOG) injection (RAPID ACTING)  1-7 Units Subcutaneous TID AC Denice Tracy DO   2 Units at 07/10/24 0843    insulin aspart (NovoLOG) injection (RAPID ACTING)  1-5 Units Subcutaneous At Bedtime Denice Tracy DO   1 Units at 07/09/24 2255    insulin glargine (LANTUS PEN) injection 22 Units  22 Units Subcutaneous QAM AC Andrea Huang MD   22 Units at 07/10/24 0843    metoprolol succinate ER (TOPROL XL) 24 hr tablet 100 mg  100 mg Oral Daily Andrea Huang MD   100 mg at 07/10/24 0842    miconazole (MICATIN) 2 % powder   Topical BID Andrea Huang MD   Given at 07/10/24 0844    polyethylene glycol (MIRALAX) Packet 17 g  17 g Oral Daily Denia Lainez DPM   17 g at 07/07/24 0835    senna-docusate (SENOKOT-S/PERICOLACE) 8.6-50 MG per tablet 1 tablet  1 tablet Oral BID Denia Lainez DPM   1 tablet at 07/07/24 0835    sodium chloride (PF) 0.9% PF flush 3 mL  3 mL Intracatheter Q8H Denia Lainez DPM        sodium chloride (PF) 0.9% PF flush 3 mL  3 mL Intracatheter Q8H Lavell Bishop MD   3 mL at 07/08/24 1632    sodium chloride (PF) 0.9% PF flush 3 mL  3 mL Intracatheter Q8H Denia Lainez DPM   3 mL at 07/09/24 0423       Data   All microbiology laboratory  data reviewed.  Recent Labs   Lab Test 07/10/24  0900 07/09/24  0928 07/08/24  0834   WBC 11.1* 11.8* 12.4*   HGB 9.5* 10.1* 10.1*   HCT 28.9* 30.6* 29.9*   MCV 88 87 87   * 500* 483*     Recent Labs   Lab Test 07/10/24  0900 07/09/24  0928 07/08/24  0834   CR 1.14 1.54* 2.46*     Recent Labs   Lab Test 07/06/24  0315   *       07/07/2024 1244 07/09/2024 1547 Blood Culture Hand, Right [[id number]]    Blood from Hand, Right    Preliminary result Component Value   Culture No growth after 2 days P             07/07/2024 1244 07/09/2024 1547 Blood Culture Hand, Right [[id number]]    Blood from Hand, Right    Preliminary result Component Value   Culture No growth after 2 days P             07/07/2024 0925 07/08/2024 0956 Urine Culture [[id number]]   Urine, Clean Catch    Final result Component Value   Culture No Growth             07/06/2024 1814 07/09/2024 0757 Anaerobic Bacterial Culture Routine [[id number]]   Tissue from Foot, Right    Final result Component Value   Culture 2+ Mixed Anaerobic Organisms Present             07/06/2024 1814 07/06/2024 2247 Gram Stain [[id number]]   (Abnormal)   Tissue from Foot, Right    Final result Component Value   GS Culture See corresponding culture for results   Gram Stain Result 1+ Gram positive cocci Abnormal    Gram Stain Result No white blood cells seen Abnormal           07/06/2024 1814 07/09/2024 1033 Tissue Aerobic Bacterial Culture Routine [[id number]]    (Abnormal)   Tissue from Foot, Right    Final result Component Value   Culture 2+ Staphylococcus aureus Abnormal     Isolated in broth only Enterobacter cloacae complex Abnormal     On day 1 of incubation    1+ Corynebacterium jeikeium Abnormal     Susceptibilities not routinely done, refer to antibiogram to view typical susceptibility profiles       Susceptibility     Staphylococcus aureus Enterobacter cloacae complex     SATHYA SATHYA     Amikacin   <=2 ug/mL Susceptible *     Ampicillin    Resistant  1     Ampicillin/ Sulbactam    Resistant 1     Cefazolin    Resistant *,1     Cefepime   <=1 ug/mL Susceptible     Cefoxitin    Resistant *,1     Ceftazidime    Resistant     Ceftriaxone    Resistant     Ciprofloxacin >=8 ug/mL Resistant * <=0.25 ug/mL Susceptible     Clindamycin 0.25 ug/mL Susceptible       Daptomycin 0.5 ug/mL Susceptible       Doxycycline 8 ug/mL Intermediate       Erythromycin <=0.25 ug/mL Susceptible       Gentamicin <=0.5 ug/mL Susceptible <=1 ug/mL Susceptible     Inducible macrolide resistance test Negative ug/mL Negative *       Levofloxacin >=8 ug/mL Resistant * <=0.12 ug/mL Susceptible     Linezolid 4 ug/mL Susceptible *       Meropenem   <=0.25 ug/mL Susceptible     Moxifloxacin >=8 ug/mL Resistant *       Nitrofurantoin <=16 ug/mL Susceptible * 64 ug/mL Intermediate *     Oxacillin 0.5 ug/mL Susceptible 2       Piperacillin/Tazobactam    Resistant     Rifampin <=0.5 ug/mL Susceptible *       Tetracycline >=16 ug/mL Resistant       Tigecycline <=0.12 ug/mL Susceptible *       Tobramycin   <=1 ug/mL Susceptible     Trimethoprim/Sulfamethoxazole <=0.5/9.5 u... Susceptible <=1/19 ug/mL Susceptible     Vancomycin 1 ug/mL Susceptible

## 2024-07-10 NOTE — PROGRESS NOTES
"   07/10/24 0900   Appointment Info   Signing Clinician's Name / Credentials (PT) Ander Cabrera DPT   Rehab Comments (PT) Heel WBing on RLE with CAM boot; pt legally blind       Present no   Living Environment   People in Home alone   Current Living Arrangements condominium   Home Accessibility stairs to enter home   Number of Stairs, Main Entrance greater than 10 stairs  (12)   Stair Railings, Main Entrance railing on right side (ascending)   Living Environment Comments Pt lives alone in a condo. Stairs to enter. Pt unsure of how he will transport upon discharge. Pt does not have access to assist at discharge.   Self-Care   Usual Activity Tolerance moderate   Current Activity Tolerance fair   Regular Exercise No   Equipment Currently Used at Home walker, rolling   Fall history within last six months yes   Number of times patient has fallen within last six months 1   Activity/Exercise/Self-Care Comment Pt reports being IND at baseline with all ADLs but reports it is difficult. Pt ambulates w/ FWW at baseline.   General Information   Onset of Illness/Injury or Date of Surgery 07/10/24   Referring Physician Lavell Bishop MD   Patient/Family Therapy Goals Statement (PT) \"To get better\"   Pertinent History of Current Problem (include personal factors and/or comorbidities that impact the POC) Per Chart: s/p Right foot debridement,  first metatarsal resection  POD#1   Existing Precautions/Restrictions fall   Weight-Bearing Status - LLE full weight-bearing   Weight-Bearing Status - RLE   (Heel WBing with CAM boot donned)   General Observations Pt legally blind   Cognition   Orientation Status (Cognition) oriented x 4   Pain Assessment   Patient Currently in Pain No   Integumentary/Edema   Integumentary/Edema Comments RLE in Ace wrap post-surgery   Posture    Posture Forward head position;Protracted shoulders   Range of Motion (ROM)   Range of Motion ROM is WFL   Strength (Manual Muscle Testing) "   Strength (Manual Muscle Testing) Deficits observed during functional mobility   Strength Comments BLE Hip Flexion: 3/5   Bed Mobility   Comment, (Bed Mobility) Supine>sit w/ min A x 1   Transfers   Comment, (Transfers) Sit>stand w/ FWW and mod A x 1   Gait/Stairs (Locomotion)   Comment, (Gait/Stairs) Unable to initiate   Balance   Balance Comments Impaired static sitting and standing balance w/ FWW   Sensory Examination   Sensory Perception patient reports no sensory changes   Clinical Impression   Criteria for Skilled Therapeutic Intervention Yes, treatment indicated   PT Diagnosis (PT) Impaired gait   Influenced by the following impairments Decreased activity tolerance; decreased balance; decreased strength; WBing precautions   Functional limitations due to impairments Impaired functional mobility   Clinical Presentation (PT Evaluation Complexity) stable   Clinical Presentation Rationale Clinical judgement   Clinical Decision Making (Complexity) low complexity   Planned Therapy Interventions (PT) balance training;bed mobility training;gait training;patient/family education;strengthening;stair training;transfer training;progressive activity/exercise   Risk & Benefits of therapy have been explained evaluation/treatment results reviewed;care plan/treatment goals reviewed;risks/benefits reviewed;participants voiced agreement with care plan;current/potential barriers reviewed;participants included;patient   PT Total Evaluation Time   PT Eval, Low Complexity Minutes (82636) 10   Physical Therapy Goals   PT Frequency 5x/week   PT Predicted Duration/Target Date for Goal Attainment 07/16/24   PT Goals Bed Mobility;Transfers;Stairs;Gait   PT: Bed Mobility Independent;Supine to/from sit   PT: Transfers Modified independent;Sit to/from stand;Assistive device;Within precautions   PT: Gait Modified independent;Assistive device;Within precautions;100 feet   PT: Stairs Modified independent;Within precautions;Greater than 10  stairs;Rail on right   Interventions   Interventions Quick Adds Therapeutic Activity   Therapeutic Activity   Therapeutic Activities: dynamic activities to improve functional performance Minutes (23089) 30   Symptoms Noted During/After Treatment Fatigue   Treatment Detail/Skilled Intervention Greeted pt supine in bed, agreed to PT. VSS on RA throughout session. Pt legally blind, unable to make out objects in room. PT educated pt on heel WBing and need to don CAM boot for OOB activity, pt voiced understanding. Pt performed supine>sit w/ min A x 1, needing assist for trunk control. Once in sitting, pt able to scoot self to EOB but with increased difficulty. Pt requiring BUE support on bed to maintain static sitting balance. Total assist to don CAM boot onto RLE. Pt performed sit>stand x 2 w/ FWW and mod A x 1, needing assist to stand fully upright. Pt not consistently adhering to WBing precautions despite cues. Pt able to perform pivot tranfser to chair w/ FWW and mod A x 1, needing assist for safety and to locate self in space. Discussed discharge recommendations with pt, in agreement for TCU. Pt ended session sitting in chair, with all needs met and call light within reach.   PT Discharge Planning   PT Plan Bed mobility; sit>stands; initiate gait w/ FWW and WC follow as able; review WBing precautions   PT Discharge Recommendation (DC Rec) Transitional Care Facility   PT Rationale for DC Rec Pt is below baseline. Pt currently requiring heavy assist with all functional mobility. Pt presents with deficits in activity tolerance, balance, and strength. Due to these deficits, pt is a high falls risk, unable to ambulate, and currently unsafe to return home alone. Pt would benefit from continued skilled PT services via TCU to address deficits and improve IND with safety and functional mobility.   PT Brief overview of current status Recommend nursing use A x 2 for transfers   Total Session Time   Timed Code Treatment Minutes  30   Total Session Time (sum of timed and untimed services) 40

## 2024-07-11 ENCOUNTER — APPOINTMENT (OUTPATIENT)
Dept: PHYSICAL THERAPY | Facility: CLINIC | Age: 64
End: 2024-07-11
Payer: COMMERCIAL

## 2024-07-11 LAB
ANION GAP SERPL CALCULATED.3IONS-SCNC: 11 MMOL/L (ref 7–15)
BACTERIA BLD CULT: NO GROWTH
BUN SERPL-MCNC: 11 MG/DL (ref 8–23)
CALCIUM SERPL-MCNC: 7.9 MG/DL (ref 8.8–10.2)
CHLORIDE SERPL-SCNC: 101 MMOL/L (ref 98–107)
CREAT SERPL-MCNC: 0.95 MG/DL (ref 0.67–1.17)
CRP SERPL-MCNC: 98.08 MG/L
DEPRECATED HCO3 PLAS-SCNC: 23 MMOL/L (ref 22–29)
EGFRCR SERPLBLD CKD-EPI 2021: 90 ML/MIN/1.73M2
ERYTHROCYTE [DISTWIDTH] IN BLOOD BY AUTOMATED COUNT: 14.6 % (ref 10–15)
GLUCOSE BLDC GLUCOMTR-MCNC: 159 MG/DL (ref 70–99)
GLUCOSE BLDC GLUCOMTR-MCNC: 197 MG/DL (ref 70–99)
GLUCOSE BLDC GLUCOMTR-MCNC: 202 MG/DL (ref 70–99)
GLUCOSE BLDC GLUCOMTR-MCNC: 216 MG/DL (ref 70–99)
GLUCOSE BLDC GLUCOMTR-MCNC: 218 MG/DL (ref 70–99)
GLUCOSE SERPL-MCNC: 205 MG/DL (ref 70–99)
HCT VFR BLD AUTO: 29.9 % (ref 40–53)
HGB BLD-MCNC: 9.9 G/DL (ref 13.3–17.7)
INR PPP: 1.52 (ref 0.85–1.15)
MCH RBC QN AUTO: 29.4 PG (ref 26.5–33)
MCHC RBC AUTO-ENTMCNC: 33.1 G/DL (ref 31.5–36.5)
MCV RBC AUTO: 89 FL (ref 78–100)
PLATELET # BLD AUTO: 542 10E3/UL (ref 150–450)
POTASSIUM SERPL-SCNC: 3.2 MMOL/L (ref 3.4–5.3)
POTASSIUM SERPL-SCNC: 3.5 MMOL/L (ref 3.4–5.3)
RBC # BLD AUTO: 3.37 10E6/UL (ref 4.4–5.9)
SODIUM SERPL-SCNC: 135 MMOL/L (ref 135–145)
WBC # BLD AUTO: 11.6 10E3/UL (ref 4–11)

## 2024-07-11 PROCEDURE — 36415 COLL VENOUS BLD VENIPUNCTURE: CPT | Performed by: HOSPITALIST

## 2024-07-11 PROCEDURE — 99232 SBSQ HOSP IP/OBS MODERATE 35: CPT | Performed by: HOSPITALIST

## 2024-07-11 PROCEDURE — 250N000011 HC RX IP 250 OP 636: Mod: JZ | Performed by: SPECIALIST

## 2024-07-11 PROCEDURE — 85027 COMPLETE CBC AUTOMATED: CPT | Performed by: HOSPITALIST

## 2024-07-11 PROCEDURE — 97530 THERAPEUTIC ACTIVITIES: CPT | Mod: GP

## 2024-07-11 PROCEDURE — 120N000001 HC R&B MED SURG/OB

## 2024-07-11 PROCEDURE — 250N000013 HC RX MED GY IP 250 OP 250 PS 637: Performed by: SPECIALIST

## 2024-07-11 PROCEDURE — 86140 C-REACTIVE PROTEIN: CPT | Performed by: PODIATRIST

## 2024-07-11 PROCEDURE — 84132 ASSAY OF SERUM POTASSIUM: CPT | Performed by: HOSPITALIST

## 2024-07-11 PROCEDURE — 250N000013 HC RX MED GY IP 250 OP 250 PS 637: Performed by: STUDENT IN AN ORGANIZED HEALTH CARE EDUCATION/TRAINING PROGRAM

## 2024-07-11 PROCEDURE — 85610 PROTHROMBIN TIME: CPT | Performed by: STUDENT IN AN ORGANIZED HEALTH CARE EDUCATION/TRAINING PROGRAM

## 2024-07-11 PROCEDURE — 250N000013 HC RX MED GY IP 250 OP 250 PS 637: Performed by: HOSPITALIST

## 2024-07-11 PROCEDURE — 80048 BASIC METABOLIC PNL TOTAL CA: CPT | Performed by: HOSPITALIST

## 2024-07-11 RX ORDER — POTASSIUM CHLORIDE 1.5 G/1.58G
40 POWDER, FOR SOLUTION ORAL ONCE
Status: COMPLETED | OUTPATIENT
Start: 2024-07-11 | End: 2024-07-11

## 2024-07-11 RX ADMIN — CEFAZOLIN SODIUM 2 G: 2 INJECTION, SOLUTION INTRAVENOUS at 17:14

## 2024-07-11 RX ADMIN — POTASSIUM CHLORIDE 40 MEQ: 1.5 POWDER, FOR SOLUTION ORAL at 14:01

## 2024-07-11 RX ADMIN — MICONAZOLE NITRATE: 2 POWDER TOPICAL at 21:21

## 2024-07-11 RX ADMIN — CEFAZOLIN SODIUM 2 G: 2 INJECTION, SOLUTION INTRAVENOUS at 09:07

## 2024-07-11 RX ADMIN — ACETAMINOPHEN 975 MG: 325 TABLET, FILM COATED ORAL at 05:04

## 2024-07-11 RX ADMIN — MICONAZOLE NITRATE: 2 POWDER TOPICAL at 09:06

## 2024-07-11 RX ADMIN — ACETAMINOPHEN 975 MG: 325 TABLET, FILM COATED ORAL at 21:21

## 2024-07-11 RX ADMIN — AMLODIPINE BESYLATE 10 MG: 10 TABLET ORAL at 09:06

## 2024-07-11 RX ADMIN — METOPROLOL SUCCINATE 100 MG: 100 TABLET, EXTENDED RELEASE ORAL at 09:06

## 2024-07-11 RX ADMIN — SULFAMETHOXAZOLE AND TRIMETHOPRIM 1 TABLET: 800; 160 TABLET ORAL at 21:21

## 2024-07-11 RX ADMIN — CEFAZOLIN SODIUM 2 G: 2 INJECTION, SOLUTION INTRAVENOUS at 02:35

## 2024-07-11 RX ADMIN — ACETAMINOPHEN 975 MG: 325 TABLET, FILM COATED ORAL at 14:01

## 2024-07-11 RX ADMIN — SULFAMETHOXAZOLE AND TRIMETHOPRIM 1 TABLET: 800; 160 TABLET ORAL at 09:07

## 2024-07-11 ASSESSMENT — ACTIVITIES OF DAILY LIVING (ADL)
ADLS_ACUITY_SCORE: 45
ADLS_ACUITY_SCORE: 45
ADLS_ACUITY_SCORE: 47
ADLS_ACUITY_SCORE: 45
ADLS_ACUITY_SCORE: 45
ADLS_ACUITY_SCORE: 49
ADLS_ACUITY_SCORE: 45
ADLS_ACUITY_SCORE: 46
ADLS_ACUITY_SCORE: 45
ADLS_ACUITY_SCORE: 46
ADLS_ACUITY_SCORE: 49
ADLS_ACUITY_SCORE: 45
ADLS_ACUITY_SCORE: 47
ADLS_ACUITY_SCORE: 45
ADLS_ACUITY_SCORE: 49
ADLS_ACUITY_SCORE: 47
ADLS_ACUITY_SCORE: 46
DEPENDENT_IADLS:: CLEANING;COOKING;MEAL PREPARATION;TRANSPORTATION

## 2024-07-11 NOTE — PROGRESS NOTES
Shift [id number]     Admitting Diagnosis: Right hip pain [M25.551]  Multiple contusions [T07.XXXA]  Acute kidney injury (H24) [N17.9]  Supratherapeutic INR [R79.1]  Cellulitis of right lower extremity [L03.115]  Traumatic rhabdomyolysis, initial encounter (H24) [T79.6XXA]  Osteomyelitis of great toe of right foot (H) [M86.9]   Vitals - VS, except HTN  Pain 0/10. Taking schedule Tylenol PRN. Last dose N/A  A&Ox4  Voiding -Yes via pure-wick.   Mobility- Not OOb yet  Tele- N/A  CMS- BLE Numbness   Lung Sounds Clear on 0L via RA  GI -Yes continent  Dressing -R foot dressing CDI     Orders Placed This Encounter      Advance Diet as Tolerated: Regular Diet Adult; Moderate Consistent Carb (60 g CHO per Meal) Diet        Other: WB on R heel. R foot elevated with 2 pillows. NS infusing @ 100ml/hr. BG checked and covered with insulin. Plan of care ongoing.

## 2024-07-11 NOTE — PROGRESS NOTES
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      R first toe gangrene, necrosis and OM   S/p resection R first ray 7/6   S/p debridement, 1st MT resection 7/9   Gas gangrene  Enterobacter cloacae in wound  Staph aureus bacteremia  **Labs: , WBC 16, Plts 481  *initial imaging concerning for osteo  *elevated WBC, CRP on adm; vanc/zosyn, IVF, and IV vit k given in ED  - blood cultures from admission positive for staph aureus, updated Bcx clearing  - underwent resection by podiatry 7/6  - further debridement and 1st MT resection 7/9  - ID transitioned to cefazolin, will need continued IV Abx thru 7/21 given bacteremia  - 7/10 podiatry, ID following - not planning further surgery, follow pathology, abx as above - + bactrim DS BID x 7 d added for enterobacter  - 7/11 - continue to hold warfarin as pathology results awaited, as above, no further surgery anticipated, however. SW updated that we may be nearing discharge ready pending results/podiatry plan. Will need midline thru 7/21.   - crp 98 <--- 334 on adm     EV on CKD2, likely ATN (resolving)  Rhabdomyolysis w/ elevated AST  Anion gap acidosis  *baseline creatinine 1.1 on admission 2.6. CK 3000s, Lactate negative  *Given rhabdo, concern for both prerenal and ATN. On IVF initially.  - 7/10 creat 1.1  - follow BMP      Permanent Afib, s/p AV node ablation and CRT 2010  Chronic anticoagulation  Supratheraputic INR  Hx of NSVT  HTN   HLD  *failed amiodarone in the past  *INR>10 on admission  - pharmacy consulted for INR reversal with surgeries  - holding AC per podiatry as of 7/11  - PTA metoprolol and amlodipine resumed  - likely resume statin and losartan in the coming  "days (initially held with rhabdo and EV)  - TTE 7/7: EF 60-65%, difficult study, RV nml s/f. Mild to mod TR.      T2D  PTA taking glargine 60 units with metformin  - Holding metformin  - BG variable, missed glargine 7/9 apparently - will need to improve this with wound healing in mind as well  - continue sliding scale insulin, adjusted to high res 7/10, consider adding carb coverage if persisting >200  - anticipate adjustment to be needed - glargine 25 units in AM 7/11     Concern for vulnerable adult status  Blindness 2/2 retinitis pigmentose  *EMS reports \"Patient covered in feces from head to toe and covered in the remnants of his carpet including food scraps, wrappers, and crumbs found covering the patient's back \" and that \"doesn't have a working toilet or shower, and order delivery for every meal, and has bowel movements and urinates in plastic bags \"  *patient informed nocturnist that the above story was inaccurate, and that he only had bags of stool for today after being unable to reach the toilet (which he states does work)  *Patient states he needs help as he can't live alone anymore  - SW consulted and appreciated     Hyponatremia - resolved  - likely volume depletion, improving with IVF - stable at 134 7/8 --> 135 7/9  - 7/11 Na 135  - monitor daily BMP     Mild hypokalemia  K 3.2. replacement protocol added.    Hx recurrent anemia  L buttock Hematoma  *labile Hb 12-14  *CT showing High-density subcutaneous stranding overlying the left buttock with a 2.5 cm circumscribed high-density fluid collection   - Hb mostly stable since admission and do not suspect brisk bleed  - 7/10 hgb 9.5   - 7/11 hgb 9.9  - INR management as above  - daily CBC     Hypoalbuminemia  Obesity  Ventral hernia  - Nutrition consult     Degenerative Joint and Bone disease  - Tylenol          Diet: Snacks/Supplements Pediatric: Glucerna; With Meals  Room Service  Advance Diet as Tolerated: Regular Diet Adult; Moderate Consistent Carb " (60 g CHO per Meal) Diet    DVT Prophylaxis: Pneumatic Compression Devices  Duran Catheter: Not present  Lines: None     Cardiac Monitoring: None  Code Status: Full Code      Clinically Significant Risk Factors              # Hypoalbuminemia: Lowest albumin = 2.4 g/dL at 7/7/2024  7:56 AM, will monitor as appropriate  # Coagulation Defect: INR = 1.66 (Ref range: 0.85 - 1.15) and/or PTT = N/A, will monitor for bleeding    # Hypertension: Noted on problem list  # Chronic heart failure with preserved ejection fraction: heart failure noted on problem list and last echo with EF >50%          #Precipitous drop in Hgb/Hct: Lowest Hgb this hospitalization: 9.5 g/dL. Will continue to monitor and treat/transfuse as appropriate.    # DMII: A1C = 7.6 % (Ref range: <5.7 %) within past 6 months        # Pacemaker present       Disposition Plan     Medically Ready for Discharge: Anticipated in 2-4 Days             Andrea Huang MD  Hospitalist Service  [facility]  Securely message with [employer] (more info)  Text page via PopUp Paging/Directory   ______________________________________________________________________    Interval History   Seen and examined. Urinary issues with supplies and sealing, stable now this AM. No new pain, sob, fevers, or chills.   Discussed with RN - ok to stop IVF. Replace K.    Physical Exam   Vital Signs: Temp: 97.4  F (36.3  C) Temp src: Oral BP: (!) 157/74 Pulse: 71   Resp: 18 SpO2: 94 % O2 Device: None (Room air)    Weight: 316 lbs 5.76 oz    Gen: NAD, pleasant  HEENT: EOMI, MMM, blind at baseline  Resp: no focal crackles,  no wheezes, no increased work of resp  CV: S1S2 heard, reg rhythm, reg rate  Abdo: soft, nontender, nondistended, bowel sounds present  Ext: calves nontender, RLE dressing intact  Neuro: aa, conversing, moving ext, CN grossly intact, no facial asymmetry    Medical Decision Making       42 MINUTES SPENT BY ME on the date of service doing chart review,  history, exam, documentation & further activities per the note.      Data     I have personally reviewed the following data over the past 24 hrs:    11.6 (H)  \   9.9 (L)   / 542 (H)     135 101 11.0 /  205 (H)   3.2 (L) 23 0.95 \     Procal: N/A CRP: 98.08 (H) Lactic Acid: N/A       INR:  1.52 (H) PTT:  N/A   D-dimer:  N/A Fibrinogen:  N/A       Imaging results reviewed over the past 24 hrs:   No results found for this or any previous visit (from the past 24 hour(s)).

## 2024-07-11 NOTE — PROGRESS NOTES
Care Management Follow Up    Length of Stay (days): 5    Expected Discharge Date: 07/15/2024     Concerns to be Addressed: discharge planning     Patient plan of care discussed at interdisciplinary rounds: Yes    Anticipated Discharge Disposition: Long Term Care, Skilled Nursing Facility     Anticipated Discharge Services:    Anticipated Discharge DME:      Patient/family educated on Medicare website which has current facility and service quality ratings: other (see comments) (Can not see - verbal)  Education Provided on the Discharge Plan:    Patient/Family in Agreement with the Plan: yes    Referrals Placed by CM/SW: Post Acute Facilities  Private pay costs discussed: Not applicable    Additional Information:  Writer completed PAS in anticipation of discharge. Went to discuss with patient but he is having  PT session. Patient is fixated on what he needs to do to get his condo cleaned up and sold but understands for his deepthi and well being he needs to go to a facility and is in agreement for this.     PAS-RR    D: Per DHS regulation, SW completed and submitted PAS-RR to [place] [employer] via the Senior [employer] Line.  PAS-RR confirmation # is : [id number]    I: SW spoke with Pt  and they are aware a PAS-RR has been submitted.  SW reviewed with Pt that they may be contacted for a follow up appointment within 10 days of hospital discharge if their SNF stay is < 30 days.  Contact information for [employer] Line was also provided.    A: Pt  verbalized understanding.    P: Further questions may be directed to [employer] Line at #[phone], option #4 for PAS-RR staff.    FIORDALIZA Bucio

## 2024-07-11 NOTE — PROGRESS NOTES
[facility]  PROGRESS NOTE    CHIEF COMPLAINT:      I was asked by Lavell Bishop MD  to evaluate this patient for right foot.    PATIENT HISTORY:  Mckay Hsu is a 64 year old male seen in follow up for his right foot. Had second surgery on 7/9. States he feels fairly well. Is speaking with care management currently. Denies pain to right foot. Denies N/F/V/C/D.       Review of Systems:  A 10 point review of systems was performed and is positive for that noted above in the patient history.  All other areas are negative.     PAST MEDICAL HISTORY:   Past Medical History:   Diagnosis Date    Atrial fibrillation (H)     s/p AVN ablation, BIV PPM    Congestive heart failure (H)     tachycardia-induced cardiomyopathy    Essential hypertension, benign     Hyperlipidemia LDL goal <100 10/31/2010    Hyponatremia 9/8/2009    Morbid obesity (H)     Open wound of foot except toe(s) alone, without mention of complication     Retinitis pigmentosa     Type 2 diabetes, HbA1C goal < 8% (H) 6/26/2012        PAST SURGICAL HISTORY:   Past Surgical History:   Procedure Laterality Date    AMPUTATE FOOT Right 7/6/2024    Procedure: Right foot first ray resection;  Surgeon: Denia Lainez DPM;  Location: [facility] OR    AMPUTATE TOE(S) Right 7/6/2024    Procedure: Right foot first ray resection;  Surgeon: Denia Lainez DPM;  Location: [facility]    AMPUTATE TOE(S) Right 7/9/2024    Procedure: first metatarsal resection;  Surgeon: Denia Lainez DPM;  Location: [facility]    CARDIOVERSION  9/2009, 10/2009    COLONOSCOPY N/A 5/9/2019    Procedure: COLONOSCOPY;  Surgeon: Godwin Santillan MD;  Location: [facility]    H ABLATION AV NODE  4/8/10    IMPLANT PACEMAKER  4/8/10    BIV PPM- [employer] InSync III    IRRIGATION AND DEBRIDEMENT FOOT, COMBINED Right 7/9/2024    Procedure: Right foot debridement,;  Surgeon: Denia Lainez DPM;  Location: [facility] OR    TONSILLECTOMY      as kid    [facility] NONSPECIFIC PROCEDURE  06/2007     debritement        MEDICATIONS:  Reviewed in Epic. Current.     ALLERGIES:  No Known Allergies     SOCIAL HISTORY:   Social History     Socioeconomic History    Marital status: Single     Spouse name: Not on file    Number of children: Not on file    Years of education: Not on file    Highest education level: Not on file   Occupational History    Occupation: repair printer     Employer: [employer]   Tobacco Use    Smoking status: Never    Smokeless tobacco: Never   Substance and Sexual Activity    Alcohol use: No    Drug use: No    Sexual activity: Never   Other Topics Concern     Service Not Asked    Blood Transfusions Not Asked    Caffeine Concern Yes     Comment: occasionally soda    Occupational Exposure Not Asked    Hobby Hazards Not Asked    Sleep Concern Not Asked    Stress Concern Not Asked    Weight Concern Not Asked    Special Diet Yes     Comment: low sodium, low fat, DM diet     Back Care Not Asked    Exercise Yes     Comment: walking, stairs     Bike Helmet Not Asked    Seat Belt Not Asked    Self-Exams Not Asked    Parent/sibling w/ CABG, MI or angioplasty before 65F 55M? Not Asked   Social History Narrative    Not on file     Social Determinants of Health     Financial Resource Strain: Not on file   Food Insecurity: Not on file   Transportation Needs: Not on file   Physical Activity: Not on file   Stress: Not on file   Social Connections: Not on file   Interpersonal Safety: Not on file   Housing Stability: Not on file        FAMILY HISTORY:   Family History   Problem Relation Age of Onset    Diabetes Mother     Hypertension Father     Diabetes Father     Hypertension Maternal Grandmother     Diabetes Maternal Grandmother     Diabetes Maternal Grandfather     Hypertension Maternal Grandfather     Cardiovascular Maternal Grandfather     Hypertension Paternal Grandfather     Cardiovascular Paternal Grandfather     Diabetes Sister     Hypertension Sister         EXAM:Vitals: BP (!) 157/74  (BP Location: Left arm)   Pulse 71   Temp 97.4  F (36.3  C) (Oral)   Resp 18   Wt 143.5 kg (316 lb 5.8 oz)   SpO2 94%   BMI 43.51 kg/m    BMI= Body mass index is 43.51 kg/m .    LABS:     Last Comprehensive Metabolic Panel:  Sodium   Date Value Ref Range Status   07/11/2024 135 135 - 145 mmol/L Final   01/26/2021 134 133 - 144 mmol/L Final     Potassium   Date Value Ref Range Status   07/11/2024 3.2 (L) 3.4 - 5.3 mmol/L Final   04/05/2022 4.1 3.4 - 5.3 mmol/L Final   01/26/2021 4.3 3.4 - 5.3 mmol/L Final     Chloride   Date Value Ref Range Status   07/11/2024 101 98 - 107 mmol/L Final   04/05/2022 103 94 - 109 mmol/L Final   01/26/2021 101 94 - 109 mmol/L Final     Carbon Dioxide   Date Value Ref Range Status   01/26/2021 28 20 - 32 mmol/L Final     Carbon Dioxide (CO2)   Date Value Ref Range Status   07/11/2024 23 22 - 29 mmol/L Final   04/05/2022 25 20 - 32 mmol/L Final     Anion Gap   Date Value Ref Range Status   07/11/2024 11 7 - 15 mmol/L Final   04/05/2022 8 3 - 14 mmol/L Final   01/26/2021 5 3 - 14 mmol/L Final     Glucose   Date Value Ref Range Status   07/11/2024 205 (H) 70 - 99 mg/dL Final   04/05/2022 120 (H) 70 - 99 mg/dL Final   01/26/2021 163 (H) 70 - 99 mg/dL Final     Comment:     Fasting specimen     GLUCOSE BY METER POCT   Date Value Ref Range Status   07/11/2024 202 (H) 70 - 99 mg/dL Final     Urea Nitrogen   Date Value Ref Range Status   07/11/2024 11.0 8.0 - 23.0 mg/dL Final   04/05/2022 16 7 - 30 mg/dL Final   01/26/2021 19 7 - 30 mg/dL Final     Creatinine   Date Value Ref Range Status   07/11/2024 0.95 0.67 - 1.17 mg/dL Final   01/26/2021 1.03 0.66 - 1.25 mg/dL Final     GFR Estimate   Date Value Ref Range Status   07/11/2024 90 >60 mL/min/1.73m2 Final     Comment:     eGFR calculated using 2021 CKD-EPI equation.   01/26/2021 79 >60 mL/min/[1.73_m2] Final     Comment:     Non  GFR Calc  Starting 12/18/2018, serum creatinine based estimated GFR (eGFR) will be    calculated using the Chronic Kidney Disease Epidemiology Collaboration   (CKD-EPI) equation.       Calcium   Date Value Ref Range Status   07/11/2024 7.9 (L) 8.8 - 10.2 mg/dL Final   01/26/2021 9.7 8.5 - 10.1 mg/dL Final     Lab Results   Component Value Date    WBC 16.0 07/06/2024    WBC 10.2 11/29/2018     Lab Results   Component Value Date    RBC 4.04 07/06/2024    RBC 4.42 11/29/2018     Lab Results   Component Value Date    HGB 11.9 07/06/2024    HGB 14.1 02/03/2020     Lab Results   Component Value Date    HCT 35.2 07/06/2024    HCT 39.2 11/29/2018     Lab Results   Component Value Date     07/06/2024     02/03/2020      Lab Results   Component Value Date    INR >10.00 07/06/2024    INR 2.8 06/11/2024    INR 4.6 05/28/2024    INR 2.8 04/09/2024    INR 2.7 02/27/2024    INR 2.2 01/23/2024    INR 2.4 12/28/2023    INR 1.9 12/07/2023    INR 2.2 10/18/2023    INR 2.7 08/29/2023    INR 2.8 07/18/2023    INR 2.6 06/06/2023    INR 2.4 04/04/2023    INR 2.80 06/01/2021    INR 3.00 04/20/2021    INR 2.60 03/09/2021    INR 2.30 01/26/2021    INR 2.80 01/05/2021    INR 3.20 12/01/2020    INR 1.90 11/10/2020    INR 2.50 09/29/2020    INR 2.60 08/18/2020    INR 2.70 07/07/2020    INR 2.20 06/02/2020    INR 3.40 05/12/2020        General appearance: Patient is alert and fully cooperative with history & exam.  No sign of distress is noted during the visit.      Respiratory: Breathing is regular & unlabored while sitting.      HEENT: Hearing is intact to spoken word.  Speech is clear.  No gross evidence of visual impairment that would impact ambulation.      Dermatologic: Right foot dressing changed: incision site intact. Periwound viable. Small site that was left open is granular. No recurrent necrosis. --> cellulitis resolved      Vascular: Dorsalis pedis and posterior tibial pulses are intact & regular bilaterally.  CFT and skin temperature is normal to both lower extremities.       Neurologic: Lower  extremity sensation is diminished, bilateral foot, to light touch.  No evidence of neurological-based weakness or contracture in the lower extremities.       Musculoskeletal: Patient is ambulatory without an assistive device or brace.  No gross foot or ankle deformity noted.     Psychiatric: Affect is pleasant & appropriate.      All cultures:  Recent Labs   Lab 07/07/24  1244 07/07/24  0925 07/06/24  1814 07/06/24  0357 07/06/24  0315   CULTURE No growth after 3 days  No growth after 3 days No Growth 2+ Staphylococcus aureus*  Isolated in broth only Enterobacter cloacae complex*  1+ Corynebacterium jeikeium*  2+ Mixed Anaerobic Organisms Present  See corresponding culture for results No Growth Positive on the 1st day of incubation*  Staphylococcus aureus*        IMAGING:     IMPRESSION: There is deformity of the distal phalanx right great toe with some cortical irregularity and erosive change. There is also some air in the surrounding soft tissues. The findings are strongly concerning for osteomyelitis. Degenerative changes   at the first MTP joint. Multiple hammertoe deformities. Plantar calcaneal spur. Vascular calcification. Remainder unremarkable.    Noted gas gangrene and distal phalanx osteomyelitis       Vascular Studies:     SHANNON FINDINGS:  RIGHT  Brachial: 128  Ankle (PT): 130 Index: 0.98  Ankle (DP): 121 Index: 0.92        LEFT  Brachial: 132  Ankle (PT): 173 Index: 1.31  Ankle (DP): 122 Index: 0.92     The right SHANNON at rest is 0.98. The left SHANNON at rest is 1.31.       WAVEFORMS: The dorsalis pedis and posterior tibial arteries are mostly biphasic in nature.                                                                      IMPRESSION:  1.  RIGHT LOWER EXTREMITY: SHANNON at rest is normal.  2.  LEFT LOWER EXTREMITY: SHANNON at rest is normal.    Cultures:   Foot, Right; Tissue   0 Result Notes  Culture 2+ Staphylococcus aureus Abnormal       Isolated in broth only Enterobacter cloacae complex Abnormal     On day 1 of incubation   1+ Corynebacterium jeikeium Abnormal    Susceptibilities not routinely done, refer to antibiogram to view typical susceptibility profiles        Resulting Agency: [facility]     Susceptibility       Staphylococcus aureus Enterobacter cloacae complex     SATHYA SATHYA     Ampicillin    Resistant 1     Ampicillin/ Sulbactam    Resistant 1     Cefepime   <=1 ug/mL Susceptible     Ceftazidime    Resistant     Ceftriaxone    Resistant     Ciprofloxacin   <=0.25 ug/mL Susceptible     Clindamycin 0.25 ug/mL Susceptible       Daptomycin 0.5 ug/mL Susceptible       Doxycycline 8 ug/mL Intermediate       Erythromycin <=0.25 ug/mL Susceptible       Gentamicin <=0.5 ug/mL Susceptible <=1 ug/mL Susceptible     Levofloxacin   <=0.12 ug/mL Susceptible     Meropenem   <=0.25 ug/mL Susceptible     Oxacillin 0.5 ug/mL Susceptible 2       Piperacillin/Tazobactam    Resistant     Tetracycline >=16 ug/mL Resistant       Tobramycin   <=1 ug/mL Susceptible     Trimethoprim/Sulfamethoxazole <=0.5/9.5 u... Susceptible <=1/19 ug/mL Susceptible     Vancomycin 1 ug/mL Susceptible                Pathology 7/6: pending     Pathology 7/9: pending    ASSESSMENT:  S/p right foot first ray resection on 7/6, repeat debridement and further resection on 7/9.   Diabetes Mellitus --> hba1c: 8.4  Supra therapeutic INR: >10  --> hx of atrial fibrillation  Sepsis with bacteremia      MEDICAL DECISION MAKING:   -Discussed all findings with patient. Chart and imaging reviewed.   -Incision site viable. Cellulitis resolved.   -Recommend awaiting pathology results from 7/9 to confirm no further osteomyelitis. CRP improved from 334 to 98.   -Will follow up with patient when pathology results available. Cont to hold coumadin in case further surgery necessary.   -WB to heel of STARR Lainez DPM   [place] Department of Podiatry/Foot & Ankle Surgery  [phone]

## 2024-07-11 NOTE — CONSULTS
Care Management Initial Consult    General Information  Assessment completed with: Patient, Family,  (Emily Kwon)  Type of CM/SW Visit: Initial Assessment    Primary Care Provider verified and updated as needed: Yes   Readmission within the last 30 days: no previous admission in last 30 days      Reason for Consult: discharge planning  Advance Care Planning:            Communication Assessment  Patient's communication style: spoken language (English or Bilingual)    Hearing Difficulty or Deaf: no   Wear Glasses or Blind: yes    Cognitive  Cognitive/Neuro/Behavioral: WDL                      Living Environment:   People in home: alone     Current living Arrangements: condominium      Able to return to prior arrangements: no       Family/Social Support:  Care provided by: self  Provides care for: no one  Marital Status: Single  None          Description of Support System: Uninvolved    Support Assessment: Lacks necessary supervision and assistance    Current Resources:   Patient receiving home care services: No     Community Resources:    Equipment currently used at home: walker, rolling  Supplies currently used at home: None    Employment/Financial:  Employment Status: disabled        Financial Concerns: unable to afford rent/mortgage, other (see comments) (behind in associations fees etc.)   Referral to Financial Worker: No  Finance Comments:  (When his sister sells the family home - he is entitled to half of the profits)    Does the patient's insurance plan have a 3 day qualifying hospital stay waiver?  No    Lifestyle & Psychosocial Needs:  Social Determinants of Health     Food Insecurity: Not on file   Depression: Not at risk (4/25/2023)    PHQ-2     PHQ-2 Score: 0   Housing Stability: Not on file   Tobacco Use: Low Risk  (7/9/2024)    Patient History     Smoking Tobacco Use: Never     Smokeless Tobacco Use: Never     Passive Exposure: Not on file   Financial Resource Strain: Not on file   Alcohol Use: Not on  "file   Transportation Needs: Not on file   Physical Activity: Not on file   Interpersonal Safety: Not on file   Stress: Not on file   Social Connections: Not on file   Health Literacy: Not on file       Functional Status:  Prior to admission patient needed assistance:   Dependent ADLs:: Ambulation-walker, Ambulation-cane  Dependent IADLs:: Cleaning, Cooking, Meal Preparation, Transportation       Mental Health Status:  Mental Health Status: No Current Concerns       Chemical Dependency Status:  Chemical Dependency Status: No Current Concerns             Values/Beliefs:  Spiritual, Cultural Beliefs, Confucianist Practices, Values that affect care:                 Additional Information:  Consult for discharge planning. 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      Patient admitted on 2024 and a tentative discharge date of 2024.  Writer reviewed chart and PT recommendations at discharge. Writer met with patient at bedside and introduced self and role. Writer lives alone in a condo that has been paid for. Flight of stairs to enter and \"the house is no longer somewhere I feel safe and comfortable to take care of myself at, my eyesight has gotten so much worse.\" Patient does not have income coming in and needs to apply for SSDI. Patient willing to go to TCU and LTC and has MA in place.     Patient asked Writer to contact his only family member Emily Zaragoza. Emily lives in his  fathers home and patient reports that when the home sells, the profit would be split between them. Writer was able to connect with patients sister at 1 [phone] she wants nothing to do with helping patient and does not want him to contact her except by email at [email or web address]    Patient has NO services in " place, no income, no disability, no waivered services but would qualify based on information known, however, with his worsening eye sight patient prefers to go to a LTC facility.    Writer confirmed patient's primary doctor is Dr Alegria.     Writer will send TCU referrals with the assumption that it may turn into LTC at some point.       FIORDALIZA Bucio

## 2024-07-11 NOTE — PROGRESS NOTES
Care Management Follow Up    Length of Stay (days): 5    Expected Discharge Date: 07/15/2024     Concerns to be Addressed: discharge planning     Patient plan of care discussed at interdisciplinary rounds: Yes    Anticipated Discharge Disposition: Long Term Care, Skilled Nursing Facility     Anticipated Discharge Services:    Anticipated Discharge DME:      Patient/family educated on Medicare website which has current facility and service quality ratings: other (see comments) (Can not see - verbal)  Education Provided on the Discharge Plan:    Patient/Family in Agreement with the Plan: yes    Referrals Placed by CM/SW: Post Acute Facilities  Private pay costs discussed: Not applicable    Additional Information:  [facility]. Writer inquiring if they have bed availability tomorrow      [facility] can accept patient tomorrow/Fri    ADD - Writer arranged tentative transport using [email or web address] , patient aware of costs, ride time is for between 1336 - 1421      FIORDALIZA Bucio

## 2024-07-11 NOTE — PLAN OF CARE
Goal Outcome Evaluation:      Plan of Care Reviewed With: patient               Pt alert and oriented X4. Able to make needs known. Denied SOB, CP, N/V. Pain 2/10. Turn and reposition Q 2 hrs. Refused to get oob. K + replaced. IV fluids stopped.  Pt refused foot foam pillow and requested multiple pillows. Dressing in place to foot.

## 2024-07-12 ENCOUNTER — TELEPHONE (OUTPATIENT)
Dept: ANTICOAGULATION | Facility: CLINIC | Age: 64
End: 2024-07-12
Payer: COMMERCIAL

## 2024-07-12 ENCOUNTER — HOSPITAL ENCOUNTER (INPATIENT)
Facility: CLINIC | Age: 64
LOS: 4 days | Discharge: NURSING, REHAB OR LONG-TERM CARE | End: 2024-07-16
Attending: STUDENT IN AN ORGANIZED HEALTH CARE EDUCATION/TRAINING PROGRAM | Admitting: HOSPITALIST
Payer: COMMERCIAL

## 2024-07-12 VITALS
OXYGEN SATURATION: 94 % | BODY MASS INDEX: 43.51 KG/M2 | WEIGHT: 315 LBS | DIASTOLIC BLOOD PRESSURE: 76 MMHG | RESPIRATION RATE: 16 BRPM | HEART RATE: 71 BPM | TEMPERATURE: 97.7 F | SYSTOLIC BLOOD PRESSURE: 170 MMHG

## 2024-07-12 DIAGNOSIS — Z79.01 LONG TERM CURRENT USE OF ANTICOAGULANT THERAPY: ICD-10-CM

## 2024-07-12 DIAGNOSIS — R78.81 MSSA BACTEREMIA: ICD-10-CM

## 2024-07-12 DIAGNOSIS — I48.20 CHRONIC ATRIAL FIBRILLATION (H): Primary | ICD-10-CM

## 2024-07-12 DIAGNOSIS — G93.40 ENCEPHALOPATHY: ICD-10-CM

## 2024-07-12 DIAGNOSIS — B95.61 MSSA BACTEREMIA: ICD-10-CM

## 2024-07-12 DIAGNOSIS — I48.91 NEW ONSET ATRIAL FIBRILLATION (H): ICD-10-CM

## 2024-07-12 PROBLEM — K59.09 OTHER CONSTIPATION: Status: ACTIVE | Noted: 2024-07-12

## 2024-07-12 PROBLEM — L29.9 ITCHING: Status: ACTIVE | Noted: 2024-07-12

## 2024-07-12 LAB
ALBUMIN SERPL BCG-MCNC: 2.7 G/DL (ref 3.5–5.2)
ALP SERPL-CCNC: 135 U/L (ref 40–150)
ALT SERPL W P-5'-P-CCNC: 17 U/L (ref 0–70)
ANION GAP SERPL CALCULATED.3IONS-SCNC: 13 MMOL/L (ref 7–15)
AST SERPL W P-5'-P-CCNC: 43 U/L (ref 0–45)
ATRIAL RATE - MUSE: NORMAL BPM
BACTERIA BLD CULT: NO GROWTH
BACTERIA BLD CULT: NO GROWTH
BASE EXCESS BLDV CALC-SCNC: -1 MMOL/L (ref -3–3)
BASOPHILS # BLD AUTO: 0 10E3/UL (ref 0–0.2)
BASOPHILS NFR BLD AUTO: 0 %
BILIRUB SERPL-MCNC: 0.3 MG/DL
BUN SERPL-MCNC: 13.2 MG/DL (ref 8–23)
CALCIUM SERPL-MCNC: 8.3 MG/DL (ref 8.8–10.2)
CHLORIDE SERPL-SCNC: 94 MMOL/L (ref 98–107)
CREAT SERPL-MCNC: 1.03 MG/DL (ref 0.67–1.17)
DEPRECATED HCO3 PLAS-SCNC: 22 MMOL/L (ref 22–29)
DIASTOLIC BLOOD PRESSURE - MUSE: NORMAL MMHG
EGFRCR SERPLBLD CKD-EPI 2021: 82 ML/MIN/1.73M2
EOSINOPHIL # BLD AUTO: 0.1 10E3/UL (ref 0–0.7)
EOSINOPHIL NFR BLD AUTO: 0 %
ERYTHROCYTE [DISTWIDTH] IN BLOOD BY AUTOMATED COUNT: 14.9 % (ref 10–15)
GLUCOSE BLDC GLUCOMTR-MCNC: 167 MG/DL (ref 70–99)
GLUCOSE BLDC GLUCOMTR-MCNC: 168 MG/DL (ref 70–99)
GLUCOSE BLDC GLUCOMTR-MCNC: 224 MG/DL (ref 70–99)
GLUCOSE SERPL-MCNC: 228 MG/DL (ref 70–99)
HCO3 BLDV-SCNC: 26 MMOL/L (ref 21–28)
HCT VFR BLD AUTO: 29.4 % (ref 40–53)
HGB BLD-MCNC: 9.6 G/DL (ref 13.3–17.7)
IMM GRANULOCYTES # BLD: 0.1 10E3/UL
IMM GRANULOCYTES NFR BLD: 1 %
INR PPP: 1.55 (ref 0.85–1.15)
INTERPRETATION ECG - MUSE: NORMAL
LACTATE BLD-SCNC: 3.8 MMOL/L
LYMPHOCYTES # BLD AUTO: 1.5 10E3/UL (ref 0.8–5.3)
LYMPHOCYTES NFR BLD AUTO: 10 %
MCH RBC QN AUTO: 28.5 PG (ref 26.5–33)
MCHC RBC AUTO-ENTMCNC: 32.7 G/DL (ref 31.5–36.5)
MCV RBC AUTO: 87 FL (ref 78–100)
MONOCYTES # BLD AUTO: 0.9 10E3/UL (ref 0–1.3)
MONOCYTES NFR BLD AUTO: 6 %
NEUTROPHILS # BLD AUTO: 13 10E3/UL (ref 1.6–8.3)
NEUTROPHILS NFR BLD AUTO: 83 %
NRBC # BLD AUTO: 0 10E3/UL
NRBC BLD AUTO-RTO: 0 /100
P AXIS - MUSE: NORMAL DEGREES
PATH REPORT.COMMENTS IMP SPEC: NORMAL
PATH REPORT.COMMENTS IMP SPEC: NORMAL
PATH REPORT.FINAL DX SPEC: NORMAL
PATH REPORT.GROSS SPEC: NORMAL
PATH REPORT.MICROSCOPIC SPEC OTHER STN: NORMAL
PATH REPORT.RELEVANT HX SPEC: NORMAL
PCO2 BLDV: 48 MM HG (ref 40–50)
PH BLDV: 7.34 [PH] (ref 7.32–7.43)
PHOTO IMAGE: NORMAL
PLATELET # BLD AUTO: 573 10E3/UL (ref 150–450)
PO2 BLDV: 24 MM HG (ref 25–47)
POTASSIUM SERPL-SCNC: 3.8 MMOL/L (ref 3.4–5.3)
PR INTERVAL - MUSE: NORMAL MS
PROT SERPL-MCNC: 7.3 G/DL (ref 6.4–8.3)
QRS DURATION - MUSE: 128 MS
QT - MUSE: 482 MS
QTC - MUSE: 520 MS
R AXIS - MUSE: -64 DEGREES
RBC # BLD AUTO: 3.37 10E6/UL (ref 4.4–5.9)
SAO2 % BLDV: 38 % (ref 70–75)
SODIUM SERPL-SCNC: 129 MMOL/L (ref 135–145)
SYSTOLIC BLOOD PRESSURE - MUSE: NORMAL MMHG
T AXIS - MUSE: 103 DEGREES
VENTRICULAR RATE- MUSE: 70 BPM
WBC # BLD AUTO: 15.6 10E3/UL (ref 4–11)

## 2024-07-12 PROCEDURE — 82803 BLOOD GASES ANY COMBINATION: CPT

## 2024-07-12 PROCEDURE — 36569 INSJ PICC 5 YR+ W/O IMAGING: CPT

## 2024-07-12 PROCEDURE — 258N000003 HC RX IP 258 OP 636: Performed by: STUDENT IN AN ORGANIZED HEALTH CARE EDUCATION/TRAINING PROGRAM

## 2024-07-12 PROCEDURE — 250N000013 HC RX MED GY IP 250 OP 250 PS 637: Performed by: HOSPITALIST

## 2024-07-12 PROCEDURE — 99232 SBSQ HOSP IP/OBS MODERATE 35: CPT | Performed by: SPECIALIST

## 2024-07-12 PROCEDURE — 250N000009 HC RX 250: Performed by: PHYSICIAN ASSISTANT

## 2024-07-12 PROCEDURE — 250N000013 HC RX MED GY IP 250 OP 250 PS 637: Performed by: STUDENT IN AN ORGANIZED HEALTH CARE EDUCATION/TRAINING PROGRAM

## 2024-07-12 PROCEDURE — 96367 TX/PROPH/DG ADDL SEQ IV INF: CPT

## 2024-07-12 PROCEDURE — 99285 EMERGENCY DEPT VISIT HI MDM: CPT | Mod: 25

## 2024-07-12 PROCEDURE — 82962 GLUCOSE BLOOD TEST: CPT

## 2024-07-12 PROCEDURE — 96375 TX/PRO/DX INJ NEW DRUG ADDON: CPT

## 2024-07-12 PROCEDURE — 96368 THER/DIAG CONCURRENT INF: CPT

## 2024-07-12 PROCEDURE — 87040 BLOOD CULTURE FOR BACTERIA: CPT | Performed by: STUDENT IN AN ORGANIZED HEALTH CARE EDUCATION/TRAINING PROGRAM

## 2024-07-12 PROCEDURE — 99233 SBSQ HOSP IP/OBS HIGH 50: CPT | Performed by: HOSPITALIST

## 2024-07-12 PROCEDURE — 999N000040 HC STATISTIC CONSULT NO CHARGE VASC ACCESS

## 2024-07-12 PROCEDURE — 85610 PROTHROMBIN TIME: CPT | Performed by: STUDENT IN AN ORGANIZED HEALTH CARE EDUCATION/TRAINING PROGRAM

## 2024-07-12 PROCEDURE — 250N000011 HC RX IP 250 OP 636: Mod: JZ | Performed by: SPECIALIST

## 2024-07-12 PROCEDURE — 85025 COMPLETE CBC W/AUTO DIFF WBC: CPT | Performed by: STUDENT IN AN ORGANIZED HEALTH CARE EDUCATION/TRAINING PROGRAM

## 2024-07-12 PROCEDURE — 96376 TX/PRO/DX INJ SAME DRUG ADON: CPT

## 2024-07-12 PROCEDURE — 250N000013 HC RX MED GY IP 250 OP 250 PS 637: Performed by: SPECIALIST

## 2024-07-12 PROCEDURE — 93005 ELECTROCARDIOGRAM TRACING: CPT

## 2024-07-12 PROCEDURE — 99239 HOSP IP/OBS DSCHRG MGMT >30: CPT | Performed by: HOSPITALIST

## 2024-07-12 PROCEDURE — 120N000001 HC R&B MED SURG/OB

## 2024-07-12 PROCEDURE — 272N000433 HC KIT CATH IV 18 OR 20G CM, POWERGLIDE W MAX BARRIER

## 2024-07-12 PROCEDURE — 36415 COLL VENOUS BLD VENIPUNCTURE: CPT | Performed by: STUDENT IN AN ORGANIZED HEALTH CARE EDUCATION/TRAINING PROGRAM

## 2024-07-12 PROCEDURE — 96365 THER/PROPH/DIAG IV INF INIT: CPT

## 2024-07-12 PROCEDURE — 99418 PROLNG IP/OBS E/M EA 15 MIN: CPT | Performed by: HOSPITALIST

## 2024-07-12 PROCEDURE — 250N000011 HC RX IP 250 OP 636: Performed by: STUDENT IN AN ORGANIZED HEALTH CARE EDUCATION/TRAINING PROGRAM

## 2024-07-12 PROCEDURE — 84460 ALANINE AMINO (ALT) (SGPT): CPT | Performed by: STUDENT IN AN ORGANIZED HEALTH CARE EDUCATION/TRAINING PROGRAM

## 2024-07-12 RX ORDER — LORAZEPAM 2 MG/ML
INJECTION INTRAMUSCULAR
Status: DISCONTINUED
Start: 2024-07-12 | End: 2024-07-13 | Stop reason: HOSPADM

## 2024-07-12 RX ORDER — LORAZEPAM 2 MG/ML
1 INJECTION INTRAMUSCULAR ONCE
Status: COMPLETED | OUTPATIENT
Start: 2024-07-12 | End: 2024-07-12

## 2024-07-12 RX ORDER — CEFAZOLIN SODIUM 2 G/100ML
2 INJECTION, SOLUTION INTRAVENOUS EVERY 8 HOURS
Status: DISCONTINUED | OUTPATIENT
Start: 2024-07-12 | End: 2024-07-13

## 2024-07-12 RX ORDER — AMOXICILLIN 250 MG
1 CAPSULE ORAL 2 TIMES DAILY
DISCHARGE
Start: 2024-07-12

## 2024-07-12 RX ORDER — OLANZAPINE 10 MG/2ML
10 INJECTION, POWDER, FOR SOLUTION INTRAMUSCULAR DAILY PRN
Status: DISCONTINUED | OUTPATIENT
Start: 2024-07-12 | End: 2024-07-12

## 2024-07-12 RX ORDER — LORAZEPAM 2 MG/ML
2 INJECTION INTRAMUSCULAR ONCE
Status: DISCONTINUED | OUTPATIENT
Start: 2024-07-12 | End: 2024-07-12

## 2024-07-12 RX ORDER — CIPROFLOXACIN 2 MG/ML
400 INJECTION, SOLUTION INTRAVENOUS EVERY 12 HOURS
Status: DISCONTINUED | OUTPATIENT
Start: 2024-07-12 | End: 2024-07-13

## 2024-07-12 RX ORDER — BISACODYL 10 MG
10 SUPPOSITORY, RECTAL RECTAL DAILY PRN
DISCHARGE
Start: 2024-07-12

## 2024-07-12 RX ORDER — POLYETHYLENE GLYCOL 3350 17 G/17G
17 POWDER, FOR SOLUTION ORAL DAILY
DISCHARGE
Start: 2024-07-13

## 2024-07-12 RX ORDER — SULFAMETHOXAZOLE/TRIMETHOPRIM 800-160 MG
1 TABLET ORAL 2 TIMES DAILY
Status: DISCONTINUED | OUTPATIENT
Start: 2024-07-12 | End: 2024-07-13

## 2024-07-12 RX ORDER — SULFAMETHOXAZOLE/TRIMETHOPRIM 800-160 MG
1 TABLET ORAL 2 TIMES DAILY
DISCHARGE
Start: 2024-07-12 | End: 2024-07-17

## 2024-07-12 RX ORDER — MAGNESIUM SULFATE HEPTAHYDRATE 40 MG/ML
2 INJECTION, SOLUTION INTRAVENOUS ONCE
Status: COMPLETED | OUTPATIENT
Start: 2024-07-12 | End: 2024-07-12

## 2024-07-12 RX ORDER — LOSARTAN POTASSIUM 100 MG/1
100 TABLET ORAL DAILY
Status: DISCONTINUED | OUTPATIENT
Start: 2024-07-12 | End: 2024-07-12 | Stop reason: HOSPADM

## 2024-07-12 RX ORDER — WARFARIN SODIUM 7.5 MG/1
TABLET ORAL
Qty: 90 TABLET | Refills: 1 | Status: ON HOLD | OUTPATIENT
Start: 2024-07-12 | End: 2024-07-16

## 2024-07-12 RX ADMIN — CEFAZOLIN SODIUM 2 G: 2 INJECTION, SOLUTION INTRAVENOUS at 21:15

## 2024-07-12 RX ADMIN — MAGNESIUM SULFATE HEPTAHYDRATE 2 G: 40 INJECTION, SOLUTION INTRAVENOUS at 22:28

## 2024-07-12 RX ADMIN — MICONAZOLE NITRATE: 2 POWDER TOPICAL at 08:36

## 2024-07-12 RX ADMIN — LIDOCAINE HYDROCHLORIDE 1 ML: 10 INJECTION, SOLUTION INFILTRATION; PERINEURAL at 14:20

## 2024-07-12 RX ADMIN — SODIUM CHLORIDE 1000 ML: 9 INJECTION, SOLUTION INTRAVENOUS at 22:26

## 2024-07-12 RX ADMIN — SULFAMETHOXAZOLE AND TRIMETHOPRIM 1 TABLET: 800; 160 TABLET ORAL at 08:35

## 2024-07-12 RX ADMIN — LOSARTAN POTASSIUM 100 MG: 100 TABLET, FILM COATED ORAL at 11:24

## 2024-07-12 RX ADMIN — AMLODIPINE BESYLATE 10 MG: 10 TABLET ORAL at 08:35

## 2024-07-12 RX ADMIN — METOPROLOL SUCCINATE 100 MG: 100 TABLET, EXTENDED RELEASE ORAL at 08:35

## 2024-07-12 RX ADMIN — CIPROFLOXACIN 400 MG: 400 INJECTION, SOLUTION INTRAVENOUS at 23:16

## 2024-07-12 RX ADMIN — LORAZEPAM 1 MG: 2 INJECTION INTRAMUSCULAR; INTRAVENOUS at 23:53

## 2024-07-12 RX ADMIN — CEFAZOLIN SODIUM 2 G: 2 INJECTION, SOLUTION INTRAVENOUS at 08:36

## 2024-07-12 RX ADMIN — CEFAZOLIN SODIUM 2 G: 2 INJECTION, SOLUTION INTRAVENOUS at 00:23

## 2024-07-12 RX ADMIN — LORAZEPAM 1 MG: 2 INJECTION INTRAMUSCULAR; INTRAVENOUS at 20:58

## 2024-07-12 ASSESSMENT — ACTIVITIES OF DAILY LIVING (ADL)
ADLS_ACUITY_SCORE: 45
ADLS_ACUITY_SCORE: 47
ADLS_ACUITY_SCORE: 47
ADLS_ACUITY_SCORE: 45
ADLS_ACUITY_SCORE: 45
ADLS_ACUITY_SCORE: 38
ADLS_ACUITY_SCORE: 47
ADLS_ACUITY_SCORE: 45
ADLS_ACUITY_SCORE: 47
ADLS_ACUITY_SCORE: 47
ADLS_ACUITY_SCORE: 45
ADLS_ACUITY_SCORE: 47
ADLS_ACUITY_SCORE: 47
ADLS_ACUITY_SCORE: 38
ADLS_ACUITY_SCORE: 38
ADLS_ACUITY_SCORE: 45
ADLS_ACUITY_SCORE: 45
ADLS_ACUITY_SCORE: 38

## 2024-07-12 ASSESSMENT — COLUMBIA-SUICIDE SEVERITY RATING SCALE - C-SSRS
1. IN THE PAST MONTH, HAVE YOU WISHED YOU WERE DEAD OR WISHED YOU COULD GO TO SLEEP AND NOT WAKE UP?: NO
6. HAVE YOU EVER DONE ANYTHING, STARTED TO DO ANYTHING, OR PREPARED TO DO ANYTHING TO END YOUR LIFE?: NO
2. HAVE YOU ACTUALLY HAD ANY THOUGHTS OF KILLING YOURSELF IN THE PAST MONTH?: NO

## 2024-07-12 NOTE — PROGRESS NOTES
Care Management Discharge Note    Discharge Date: 07/12/2024       Discharge Disposition: Skilled Nursing Facility    Discharge Services: None    Discharge DME: None    Discharge Transportation:      Private pay costs discussed: Not applicable    Does the patient's insurance plan have a 3 day qualifying hospital stay waiver?  Yes     Which insurance plan 3 day waiver is available? Alternative insurance waiver    Will the waiver be used for post-acute placement? No    PAS Confirmation Code: [id number]  Patient/family educated on Medicare website which has current facility and service quality ratings: other (see comments) (Can not see - verbal)    Education Provided on the Discharge Plan: Yes  Persons Notified of Discharge Plans: Patient, TCU, and [facility] staff.   Patient/Family in Agreement with the Plan: yes    Handoff Referral Completed: No    Additional Information:  Patient will discharge from [facility] to [facility] with W/C ride  at [phone].  Please refer to  progress notes for further details.      FIORDALIZA Murguia  [facility]  Social Work

## 2024-07-12 NOTE — PROGRESS NOTES
[place] [facility]  PROGRESS NOTE - UPDATE     Cultures:   Foot, Right; Tissue   0 Result Notes  Culture 2+ Staphylococcus aureus Abnormal       Isolated in broth only Enterobacter cloacae complex Abnormal    On day 1 of incubation   1+ Corynebacterium jeikeium Abnormal    Susceptibilities not routinely done, refer to antibiogram to view typical susceptibility profiles        Resulting Agency: [facility]     Susceptibility       Staphylococcus aureus Enterobacter cloacae complex     SATHYA SATHYA     Ampicillin    Resistant 1     Ampicillin/ Sulbactam    Resistant 1     Cefepime   <=1 ug/mL Susceptible     Ceftazidime    Resistant     Ceftriaxone    Resistant     Ciprofloxacin   <=0.25 ug/mL Susceptible     Clindamycin 0.25 ug/mL Susceptible       Daptomycin 0.5 ug/mL Susceptible       Doxycycline 8 ug/mL Intermediate       Erythromycin <=0.25 ug/mL Susceptible       Gentamicin <=0.5 ug/mL Susceptible <=1 ug/mL Susceptible     Levofloxacin   <=0.12 ug/mL Susceptible     Meropenem   <=0.25 ug/mL Susceptible     Oxacillin 0.5 ug/mL Susceptible 2       Piperacillin/Tazobactam    Resistant     Tetracycline >=16 ug/mL Resistant       Tobramycin   <=1 ug/mL Susceptible     Trimethoprim/Sulfamethoxazole <=0.5/9.5 u... Susceptible <=1/19 ug/mL Susceptible     Vancomycin 1 ug/mL Susceptible                Pathology 7/6: pending     Pathology 7/9:   Final Diagnosis   Right foot, first metatarsal, resection-  -Bone with focal fat necrosis of marrow; negative for osteomyelitis.       ASSESSMENT:  S/p right foot first ray resection on 7/6, repeat debridement and further resection on 7/9.   Diabetes Mellitus --> hba1c: 8.4  Supra therapeutic INR: >10  --> hx of atrial fibrillation  Sepsis with bacteremia      MEDICAL DECISION MAKING:   -Pathology now available and negative for osteomyelitis. Okay for discharge to TCU, plan for this for today.   -Will provide follow up information and dressing change orders.   -Follow up  with myself in 1-2 weeks.   -WB to heel of RLE   -Okay for discharge per podiatry.     Denia Lainze DPM   [place] Department of Podiatry/Foot & Ankle Surgery  [phone]

## 2024-07-12 NOTE — PROGRESS NOTES
Shift [id number]     Admitting Diagnosis: Right hip pain [M25.551]  Multiple contusions [T07.XXXA]  Acute kidney injury (H24) [N17.9]  Supratherapeutic INR [R79.1]  Cellulitis of right lower extremity [L03.115]  Traumatic rhabdomyolysis, initial encounter (H24) [T79.6XXA]  Osteomyelitis of great toe of right foot (H) [M86.9]   Vitals - VS, except HTN  Pain 0/10. Taking schedule Tylenol PRN. Last dose N/A  A&Ox4  Voiding -Yes via pure-wick.   Mobility- Up with PT at bedside. Refused to get OOb for dinner.  Tele- N/A  CMS- BLE Numbness   Lung Sounds Clear on 0L via RA  GI -Yes continent  Dressing -R foot dressing CDI     Orders Placed This Encounter      Advance Diet as Tolerated: Regular Diet Adult; Moderate Consistent Carb (60 g CHO per Meal) Diet        Other: WB on R heel. R foot elevated with 2 pillows. PIV SL. BG checked and covered with insulin. Plan of care ongoing.

## 2024-07-12 NOTE — PLAN OF CARE
Goal Outcome Evaluation:    .Date/Time 7/12/24 2303-2208    Trauma/Ortho/Medical: Medical    Diagnosis: R first toe gangrene  POD#:2 I&D and 1st MT resection POD4 resection R first ray.   Mental Status: A&Ox4  Activity/dangle: Declined OOB activity. T&R.   Diet: Mod carb diet, required feed assistance d/t blindness.   Pain: Denies pain.   Duran/Voiding: Ex cath in place.   Tele/Restraints/Iso: N/A  02/LDA: Midline placed, Ok to use per IV.   D/C Date: This afternoon to [facility]   Other Info: Transport set up [id number]. Dressing CDI to R foot, podiatry will place dressing change orders for discharge.

## 2024-07-12 NOTE — PROGRESS NOTES
Care Management Follow Up    Length of Stay (days): 6    Expected Discharge Date: 07/12/2024     Concerns to be Addressed: discharge planning     Patient plan of care discussed at interdisciplinary rounds: Yes    Anticipated Discharge Disposition: Long Term Care, Skilled Nursing Facility     Anticipated Discharge Services:    Anticipated Discharge DME:      Patient/family educated on Medicare website which has current facility and service quality ratings: other (see comments) (Can not see - verbal)  Education Provided on the Discharge Plan:    Patient/Family in Agreement with the Plan: yes    Referrals Placed by CM/SW: Post Acute Facilities  Private pay costs discussed: transportation costs    Additional Information:  Writer paged out to the provider inquiring if this patient was ready today and updating there was a ride set up for him. Provider stated they are waiting on Podiatry's input.     Writer reached out to the bedside rn to page out to podiatry.     Writer updated the patient on the ride time and accepting facility. Patient stated he would like to hear reassurance from the provider that he is ok to go. Patient inquired with the writer if we were able to reach out to his sister. Writer stated the previous Sw was able to and the sister only wanted him to contact her via email. Patient stated he was not able to do that due to his Vision. Writer stated understanding. Lastly, patient requested writer to find resources that would assist the patient with moving all of his belongings to a LTC(once there is one available) and assistance with selling the home. Writer stated he will look at what he can do with timing.     Writer spoke with bedside rn who stated podiatry was hoping to have pathology follow up to place final [facility].     Per podiatry note, they were ok with discharge after pathlogy update.     Writer reached out to the provider who stated he will follow up shortly.     Writer contacted Ann with  [facility] and updated.     Per ID note, patient was going with IV abx. Writer spoke with bedside rn to have a order for a midline placed.     Writer contacted [facility] transport and requested the ride to be backed up from [phone] to [phone].    Writer is awaiting Midline to be placed and orders.     Addendum 1333:  Orders received and sent via DOD.     Addendum 1522:  Mid line has been placed.      FIORDALIZA Murguia  [facility]  Social Work

## 2024-07-12 NOTE — PROVIDER NOTIFICATION
Writer paged Dr. Lainez inquiring if patient cleared from podiatry standpoint to discharge to [facility] TC today- accepting facility. Awaiting response.     Podiatry would like to await pathology results before discharging.

## 2024-07-12 NOTE — TELEPHONE ENCOUNTER
ANTICOAGULATION  MANAGEMENT: Discharge Review    [facility] chart reviewed for anticoagulation continuity of care    Hospital Admission on 7/6/24-7/12/224 for mechanical fall resulting in prolonged immobilization along w/ a necrotic and gangrenous R great toe .    Discharge disposition: TCU    Results:    Recent labs: (last 7 days)     07/06/24  0315 07/06/24  1017 07/06/24  1254 07/07/24  0756 07/08/24  0834 07/08/24  2304 07/09/24  0928 07/10/24  0900 07/11/24  0911   INR >10.00* 3.32* 2.75* 2.07* 3.08* 2.17* 1.92* 1.66* 1.52*     Anticoagulation inpatient management:     reversed with Phytonadione IJ on 7/6/24, 7/8/24 and 7/9/24    Anticoagulation discharge instructions:     Warfarin dosing: hold per podiatry   Bridging: No   INR goal change: No      Medication changes affecting anticoagulation: Yes: IV cefazolin thru 7/21/24 and bactrim DS to complete 1 week at discharge. Podiatry follow up planned per their office.     Additional factors affecting anticoagulation: No     PLAN     No adjustment to anticoagulation plan needed    ACC will resume monitoring upon discharge from TCU    Anticoagulation Calendar updated    Elvia Davis RN

## 2024-07-12 NOTE — DISCHARGE SUMMARY
[facility]  Hospitalist Discharge Summary      Date of Admission:  7/6/2024  Date of Discharge:  7/12/2024  Discharging Provider: Andrea Huang MD  Discharge Service: Hospitalist Service    Discharge Diagnoses   R first toe gangrene, necrosis and OM   S/p resection R first ray 7/6   S/p debridement, 1st MT resection 7/9   Gas gangrene  Enterobacter cloacae in wound  Staph aureus bacteremia  EV on CKD2, likely ATN (resolving)  Rhabdomyolysis w/ elevated AST  Anion gap acidosis  Permanent Afib, s/p AV node ablation and CRT 2010  Chronic anticoagulation  Supratheraputic INR  Hx of NSVT  HTN   HLD  T2DM  Concern for vulnerable adult status  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 Phone number is   [phone]. Call to schedule appt in 1-2 weeks.        Follow Up and recommended labs and tests      1. TCU MD for hospital follow up with BMP and CBC ~ 7/15 - 7/17  2. INR check 7/13 or 7/14 - Anticoagulation clinic follow up for warfarin   dosing  3. Podiatry clinic            Unresulted Labs Ordered in the Past 30 Days of this Admission       Date and Time Order Name Status Description    7/7/2024 11:31 AM Blood Culture Hand, Right Preliminary     7/7/2024 11:31 AM Blood Culture Hand, Right Preliminary     7/6/2024  6:14 PM Surgical Pathology Exam In process         These results will be followed up by ID/Podiatry    Discharge Disposition   Discharged to short-term care facility  Condition at  discharge: Stable    Hospital Course   64M hx of afib on AC, HTN, HLD, T2D, Blindness 2/2 retinitis pigmentosa, CKD2, presenting with a mechanical fall resulting in prolonged immobilization along w/ a necrotic and gangrenous R great toe.      R first toe gangrene, necrosis and OM   S/p resection R first ray 7/6   S/p debridement, 1st MT resection 7/9   Gas gangrene  Enterobacter cloacae in wound  Staph aureus bacteremia  **Labs: , WBC 16, Plts 481  *initial imaging concerning for osteo  *elevated WBC, CRP on adm; vanc/zosyn, IVF, and IV vit k given in ED  - blood cultures from admission positive for staph aureus, updated Bcx clearing  - underwent resection by podiatry 7/6  - further debridement and 1st MT resection 7/9  - ID transitioned to cefazolin, will need continued IV Abx thru 7/21 given bacteremia  - 7/10 podiatry, ID following - not planning further surgery, follow pathology, abx as above - + bactrim DS BID x 7 d added for enterobacter  - 7/11 - continue to hold warfarin as pathology results awaited, as above, no further surgery anticipated, however. SW updated that we may be nearing discharge ready pending results/podiatry plan. Will need midline thru 7/21.   - crp 98 <--- 334 on adm  - greatly appreciate SW arranging TCU  - ID ordered midline for IV cefazolin thru 7/21 and bactrim DS to complete 1 week at discharge. Podiatry follow up planned per their office.     EV on CKD2, likely ATN (resolving)  Rhabdomyolysis w/ elevated AST  Anion gap acidosis  *baseline creatinine 1.1 on admission 2.6. CK 3000s, Lactate negative  *Given rhabdo, concern for both prerenal and ATN. On IVF initially.  - 7/10 creat 1.1  - follow BMP      Permanent Afib, s/p AV node ablation and CRT 2010  Chronic anticoagulation  Supratheraputic INR  Hx of NSVT  HTN   HLD  *failed amiodarone in the past  *INR>10 on admission  - pharmacy consulted for INR reversal with surgeries  - holding AC per podiatry as of 7/11 - ok to resume  "warfarin 7/12 with INR recheck in 1-2 days and INR clinic evaluation for further warfarin dosing  - PTA metoprolol and amlodipine resumed  - resume statin and losartan in the coming days (initially held with rhabdo and EV)  - TTE 7/7: EF 60-65%, difficult study, RV nml s/f. Mild to mod TR.      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  *EMS reports \"Patient covered in feces from head to toe and covered in the remnants of his carpet including food scraps, wrappers, and crumbs found covering the patient's back \" and that \"doesn't have a working toilet or shower, and order delivery for every meal, and has bowel movements and urinates in plastic bags \"  *patient informed nocturnist that the above story was inaccurate, and that he only had bags of stool for today after being unable to reach the toilet (which he states does work)  *Patient states he needs help as he can't live alone anymore  - SW consulted and appreciated     Hyponatremia - resolved  - likely volume depletion, improving with IVF - stable at 134 7/8 --> 135 7/9  - 7/11 Na 135  - monitor daily BMP     Mild hypokalemia  K 3.2. replacement protocol added.     Hx recurrent anemia  L buttock Hematoma  *labile Hb 12-14  *CT showing High-density subcutaneous stranding overlying the left buttock with a 2.5 cm circumscribed high-density fluid collection   - Hb mostly stable since admission and do not suspect brisk bleed  - 7/10 hgb 9.5   - 7/11 hgb 9.9  - INR " management as above     Hypoalbuminemia  Obesity  Ventral hernia  - Nutrition consult     Degenerative Joint and Bone disease  - Tylenol       Consultations This Hospital Stay   PHARMACY TO DOSE VANCO  PHARMACY TO DOSE PHYTONADIONE  PHARMACY TO DOSE WARFARIN  PODIATRY IP CONSULT  NUTRITION SERVICES ADULT IP CONSULT  WOUND OSTOMY CONTINENCE NURSE  IP CONSULT  PODIATRY IP CONSULT  INFECTIOUS DISEASES IP CONSULT  CARE MANAGEMENT / SOCIAL WORK IP CONSULT  OCCUPATIONAL THERAPY ADULT IP CONSULT  PHYSICAL THERAPY ADULT IP CONSULT  PHARMACY IP CONSULT  VASCULAR ACCESS ADULT IP CONSULT  PHYSICAL THERAPY ADULT IP CONSULT  OCCUPATIONAL THERAPY ADULT IP CONSULT    Code Status   Full Code    Time Spent on this Encounter   I, Andrea Huang MD, personally saw the patient today and spent greater than 30 minutes discharging this patient.       Andrea Huang MD  [facility]TH[facility]  [address] [facility] [address]  Phone: [phone]  Fax: [phone]  ______________________________________________________________________    Physical Exam   Vital Signs: Temp: 97.7  F (36.5  C) Temp src: Oral BP: (!) 170/76 Pulse: 71   Resp: 16 SpO2: 94 % O2 Device: None (Room air)    Weight: 316 lbs 5.76 oz    Gen: NAD, pleasant  HEENT: EOMI, MMM, legally blind  Resp: no focal crackles,  no wheezes, no increased work of resp  CV: S1S2 heard, reg rhythm, reg rate  Abdo: soft, nontender, nondistended, bowel sounds present  Ext: calves nontender, well perfused, RLE wrap in place  Neuro: aa, conversing, CN grossly intact, no facial asymmetry         Primary Care Physician   David Almendarez    Discharge Orders      Follow Up and recommended labs and tests    Follow up with [facility]z DPM at [facility], located at [address] [place] [address] [address] [id number]. Phone number is [phone]. Call to schedule appt in 1-2 weeks.     Weight bearing status    WB to heel of LLE  in surgical shoe.  Elevate while at rest. Place ice behind knee for 15 minutes at a time.  Leave dressing to foot  at all times. Do not get wet in the shower.     Every other day dressing changes: Cleanse incision site with wound cleanser. Pad dry. Paint incision and open defect with betadine. Cover with gauze, gauze wrap and an ACE bandage.   Contact the office if you have any concerns.     General info for SNF    Length of Stay Estimate: Short Term Care: Estimated # of Days <30  Condition at Discharge: Improving  Level of care:skilled   Rehabilitation Potential: Good  Admission H&P remains valid and up-to-date: Yes  Recent Chemotherapy: N/A  Use Nursing Home Standing Orders: Yes     Mantoux instructions    Give two-step Mantoux (PPD) Per Facility Policy Yes     Follow Up and recommended labs and tests    1. TCU MD for hospital follow up with BMP and CBC ~ 7/15 - 7/17  2. INR check 7/13 or 7/14 - Anticoagulation clinic follow up for warfarin dosing  3. Podiatry clinic     Reason for your hospital stay    R foot wound concerning for osteomyelitis requiring operative intervention and IV antibiotics     Glucose monitor nursing POCT    Before meals and at bedtime     Intake and output    Every shift     Daily weights    Call Provider for weight gain of more than 2 pounds per day or 5 pounds per week.     Activity - Up with nursing assistance     Physical Therapy Adult Consult    Evaluate and treat as clinically indicated.    Reason:  RLE wound and surgery, deconditioned, legally blind     Occupational Therapy Adult Consult    Evaluate and treat as clinically indicated.    Reason:  RLE wound and surgery, deconditioned, legally blind     Fall precautions     Diet    Follow this diet upon discharge: Orders Placed This Encounter      Room Service      Snacks/Supplements Pediatric: Glucerna; Between Meals      Advance Diet as Tolerated: Regular Diet Adult; Moderate Consistent Carb (60 g CHO per Meal) Diet       Significant Results and  Procedures   Most Recent 3 CBC's:  Recent Labs   Lab Test 07/11/24  0911 07/10/24  0900 07/09/24  0928   WBC 11.6* 11.1* 11.8*   HGB 9.9* 9.5* 10.1*   MCV 89 88 87   * 492* 500*     Most Recent 3 BMP's:  Recent Labs   Lab Test 07/12/24  0815 07/12/24  0210 07/11/24  2126 07/11/24  1735 07/11/24  1709 07/11/24  1211 07/11/24  0911 07/10/24  1136 07/10/24  0900 07/09/24  1244 07/09/24  0928   NA  --   --   --   --   --   --  135  --  138  --  135   POTASSIUM  --   --   --   --  3.5  --  3.2*  --  3.6  --  3.6   CHLORIDE  --   --   --   --   --   --  101  --  105  --  104   CO2  --   --   --   --   --   --  23  --  21*  --  23   BUN  --   --   --   --   --   --  11.0  --  15.9  --  23.1*   CR  --   --   --   --   --   --  0.95  --  1.14  --  1.54*   ANIONGAP  --   --   --   --   --   --  11  --  12  --  8   JACI  --   --   --   --   --   --  7.9*  --  8.0*  --  8.5*   * 168* 159*   < >  --    < > 205*   < > 240*   < > 177*    < > = values in this interval not displayed.     7-Day Micro Results       Collected Updated Procedure Result Status      07/07/2024 1244 07/11/2024 1546 Blood Culture Hand, Right [[id number]]    Blood from Hand, Right    Preliminary result Component Value   Culture No growth after 4 days  [P]                07/07/2024 1244 07/11/2024 1546 Blood Culture Hand, Right [[id number]]    Blood from Hand, Right    Preliminary result Component Value   Culture No growth after 4 days  [P]                07/07/2024 0925 07/08/2024 0956 Urine Culture [[id number]]   Urine, Clean Catch    Final result Component Value   Culture No Growth               07/06/2024 1814 07/09/2024 0757 Anaerobic Bacterial Culture Routine [[id number]]   Tissue from Foot, Right    Final result Component Value   Culture 2+ Mixed Anaerobic Organisms Present               07/06/2024 1814 07/06/2024 2247 Gram Stain [[id number]]   (Abnormal)   Tissue from Foot, Right    Final result Component Value   GS Culture See  corresponding culture for results   Gram Stain Result 1+ Gram positive cocci   Gram Stain Result No white blood cells seen            07/06/2024 1814 07/09/2024 1033 Tissue Aerobic Bacterial Culture Routine [[id number]]    (Abnormal)   Tissue from Foot, Right    Final result Component Value   Culture 2+ Staphylococcus aureus    Isolated in broth only Enterobacter cloacae complex    On day 1 of incubation    1+ Corynebacterium jeikeium    Susceptibilities not routinely done, refer to antibiogram to view typical susceptibility profiles        Susceptibility        Staphylococcus aureus      SATHYA      Clindamycin 0.25 ug/mL Susceptible      Daptomycin 0.5 ug/mL Susceptible      Doxycycline 8 ug/mL Intermediate      Erythromycin <=0.25 ug/mL Susceptible      Gentamicin <=0.5 ug/mL Susceptible      Oxacillin 0.5 ug/mL Susceptible  [1]       Tetracycline >=16 ug/mL Resistant      Trimethoprim/Sulfamethoxazole <=0.5/9.5 ug/mL Susceptible      Vancomycin 1 ug/mL Susceptible                   [1]  Oxacillin susceptible isolates are susceptible to cephalosporins (example: cefazolin and cephalexin) and beta lactam combination agents. Oxacillin resistant isolates are resistant to these agents.               Susceptibility        Enterobacter cloacae complex      SATHYA      Ampicillin  Resistant  [1]       Ampicillin/ Sulbactam  Resistant  [1]       Cefepime <=1 ug/mL Susceptible      Ceftazidime  Resistant      Ceftriaxone  Resistant      Ciprofloxacin <=0.25 ug/mL Susceptible      Gentamicin <=1 ug/mL Susceptible      Levofloxacin <=0.12 ug/mL Susceptible      Meropenem <=0.25 ug/mL Susceptible      Piperacillin/Tazobactam  Resistant      Tobramycin <=1 ug/mL Susceptible      Trimethoprim/Sulfamethoxazole <=1/19 ug/mL Susceptible                   [1]  Intrinsically Resistant               Susceptibility Comments       Enterobacter cloacae complex    Enterobacter cloacae, Klebsiella aerogenes, and Citrobacter freundii have  moderate to high levels of inducible AmpC ß-lactamase expression. The use of 3rd generation cephalosporins including ceftriaxone and ceftazidime, as well as   piperacillin-tazobactam, should be avoided for invasive infections, regardless of susceptibility results.               07/06/2024 0357 07/11/2024 1016 Blood Culture Peripheral Blood [[id number]]   Peripheral Blood    Final result Component Value   Culture No Growth               07/06/2024 0315 07/09/2024 0732 Blood Culture Arm, Right [[id number]]    (Abnormal)   Blood from Arm, Right    Final result Component Value   Culture Positive on the 1st day of incubation    Staphylococcus aureus    1 of 2 bottles        Susceptibility        Staphylococcus aureus      SATHYA      Clindamycin 0.25 ug/mL Susceptible      Daptomycin 0.25 ug/mL Susceptible      Doxycycline >=16 ug/mL Resistant      Erythromycin <=0.25 ug/mL Susceptible      Gentamicin <=0.5 ug/mL Susceptible      Oxacillin <=0.25 ug/mL Susceptible  [1]       Tetracycline >=16 ug/mL Resistant      Trimethoprim/Sulfamethoxazole <=0.5/9.5 ug/mL Susceptible      Vancomycin 1 ug/mL Susceptible                   [1]  Oxacillin susceptible isolates are susceptible to cephalosporins (example: cefazolin and cephalexin) and beta lactam combination agents. Oxacillin resistant isolates are resistant to these agents.                    07/06/2024 0315 07/07/2024 0507 Verigene GP Panel [[id number]]    (Abnormal)   Blood from Arm, Right    Final result Component Value   Staphylococcus aureus Detected   Positive for Staphylococcus aureus and negative for the mecA gene (not resistant to methicillin) by [employer] multiplex nucleic acid test. Final identification and antimicrobial susceptibility testing will be verified by standard methods.   Staphylococcus epidermidis Not Detected   Staphylococcus lugdunensis Not Detected   Enterococcus faecalis Not Detected   Enterococcus faecium Not Detected   Streptococcus species  Not Detected   Streptococcus agalactiae Not Detected   Streptococcus anginosus group Not Detected   Streptococcus pneumoniae Not Detected   Streptococcus pyogenes Not Detected   Listeria species Not Detected                  Most Recent Urinalysis:  Recent Labs   Lab Test 07/07/24  0925   COLOR Yellow   APPEARANCE Slightly Cloudy*   URINEGLC Negative   URINEBILI Negative   URINEKETONE Negative   SG 1.016   UBLD Moderate*   URINEPH 5.0   PROTEIN 20*   NITRITE Negative   LEUKEST Negative   RBCU 20*   WBCU 16*     Most Recent ESR & CRP:  Recent Labs   Lab Test 07/11/24  0911 07/06/24  0315   SED  --  108*   CRPI 98.08* 334.73*   ,   Results for orders placed or performed during the hospital encounter of 07/06/24   Foot  XR, G/E 3 views, right    Narrative    EXAM: XR FOOT RIGHT G/E 3 VIEWS  LOCATION: [facility]  DATE: 07/06/2024    INDICATION: Pain, swelling.  COMPARISON: None.      Impression    IMPRESSION: There is deformity of the distal phalanx right great toe with some cortical irregularity and erosive change. There is also some air in the surrounding soft tissues. The findings are strongly concerning for osteomyelitis. Degenerative changes   at the first MTP joint. Multiple hammertoe deformities. Plantar calcaneal spur. Vascular calcification. Remainder unremarkable.     XR Pelvis w Hip Right 1 View    Narrative    EXAM: XR PELVIS AND HIP RIGHT 1 VIEW  LOCATION: [facility]  DATE: 07/06/2024    INDICATION: Fall, pain.  COMPARISON: None.      Impression    IMPRESSION: Negative for fracture or dislocation. Mild degenerative changes both hips and moderate degenerative changes lower lumbar spine. Vascular calcification. Remainder unremarkable.     CT Chest Abdomen Pelvis w/o Contrast    Narrative    EXAM: CT CHEST, ABDOMEN, PELVIS WITHOUT CONTRAST  LOCATION: [facility]  DATE: 07/06/2024    INDICATION: Chest pain, lower rib pain, left upper  quadrant pain after fall.  COMPARISON: None.  TECHNIQUE: CT scan of the chest, abdomen, and pelvis was performed without IV contrast. Multiplanar reformats were obtained. Dose reduction techniques were used.   CONTRAST: None.    FINDINGS:   LUNGS AND PLEURA: Mild subpleural scarring and/or atelectasis in the lower lungs. No acute infiltrates or consolidation. No pleural effusion or pneumothorax.    MEDIASTINUM/AXILLAE: No adenopathy. Normal heart size. No pericardial effusion. Normal caliber thoracic aorta. Esophagus is grossly negative. Left-sided cardiac pacer in place.    CORONARY ARTERY CALCIFICATION: Moderate.    HEPATOBILIARY: Normal.    PANCREAS: Normal.    SPLEEN: Normal.    ADRENAL GLANDS: Normal.    KIDNEYS/BLADDER: Benign cyst in the right kidney posteriorly with no specific follow-up needed. Kidneys are otherwise unremarkable. No hydronephrosis. Bladder is negative.    BOWEL: Normal.    LYMPH NODES: A few mildly prominent but morphologically normal-appearing right groin nodes with normal fatty david are likely reactive in nature.    VASCULATURE: Normal.    PELVIC ORGANS: Normal.    MUSCULOSKELETAL: No definite acute bony findings. Mild to moderate degenerative changes throughout the spine. High-density subcutaneous stranding overlying the left buttock with a 2.5 cm high-density collection on series 3 image 256. Findings likely   reflect contusion/hematoma in the setting of trauma. Moderate-sized fat-containing anterior abdominal wall hernia above the umbilicus. Additional small fat-containing umbilical hernia.      Impression    IMPRESSION:  1.  High-density subcutaneous stranding overlying the left buttock with a 2.5 cm circumscribed high-density fluid collection. Findings likely reflect hematoma/contusion in the setting of trauma.    2.  Otherwise, no acute or traumatic findings elsewhere. No definite fractures identified.    3.  Subpleural scarring and/or atelectasis in the lungs. No evidence for  pneumonitis.    4.  See remainder of the details above.       XR Knee Bilateral 1/2 Views    Narrative    EXAM: XR KNEE BILATERAL 1/2 VIEWS  LOCATION: [facility]  DATE: 07/06/2024    INDICATION: Fall, pain.  COMPARISON: None.      Impression    IMPRESSION:   RIGHT KNEE: Negative for acute fracture or dislocation. Probable old fracture deformity with callus formation proximal right fibula. No knee effusion. Vascular calcification. Remainder unremarkable.    LEFT KNEE: Negative for fracture or dislocation. No knee joint effusion. Vascular calcification.     US SHANNON Doppler No Exercise    Narrative    EXAM: RESTING ANKLE-BRACHIAL INDICES (ABIS)  LOCATION: [facility]  DATE: 7/6/2024    INDICATION: Right foot ulcer, evaluate for PAD.  COMPARISON: None.    SHANNON FINDINGS:  RIGHT  Brachial: 128  Ankle (PT): 130 Index: 0.98  Ankle (DP): 121 Index: 0.92      LEFT  Brachial: 132  Ankle (PT): 173 Index: 1.31  Ankle (DP): 122 Index: 0.92    The right SHANNON at rest is 0.98. The left SHANNON at rest is 1.31.      WAVEFORMS: The dorsalis pedis and posterior tibial arteries are mostly biphasic in nature.      Impression    IMPRESSION:  1.  RIGHT LOWER EXTREMITY: SHANNON at rest is normal.  2.  LEFT LOWER EXTREMITY: SHANNON at rest is normal.   CT Foot Right w/o Contrast    Narrative    EXAM: CT FOOT RIGHT W/O CONTRAST  LOCATION: [facility]  DATE: 7/7/2024    INDICATION: Evaluate for osteomyelitis to first metatarsal  COMPARISON: None.  TECHNIQUE: Noncontrast. Axial, sagittal and coronal thin-section reconstruction. Dose reduction techniques were used.    FINDINGS:    BONES AND JOINTS:  -Status post partial amputation of the first ray at the level of the metatarsal neck. No osseous erosions or periostitis to suggest acute osteomyelitis although MRI would be more sensitive. No acute fracture or malalignment. Mild to moderate degenerative   changes throughout the midfoot.  Plantar calcaneal enthesophyte. Osteopenia.    SOFT TISSUES:  -There is a 1.7 cm fluid collection plantar to the residual first metatarsal distally concerning for abscess. There is an overlying skin ulcer with surrounding soft tissue swelling and a few gas locules. Soft tissue swelling extends throughout the dorsal   forefoot. Diffuse muscle atrophy.      Impression    IMPRESSION:  1.  Status post partial amputation of the first ray at the level of the metatarsal neck.   2.  No definite evidence of acute osteomyelitis although MRI would be more sensitive.   3.  There is a 1.7 cm fluid collection abutting the residual first metatarsal concerning for abscess. Overlying skin ulcer.  4.  Soft tissue swelling of the forefoot may represent cellulitis.   5.  Other ancillary findings as described above.     XR Foot Port Right 3 Views    Narrative    XR FOOT PORT RIGHT 3 VIEWS 2024 1:22 PM     HISTORY: s/p first ray resection    COMPARISON: None.         Impression    IMPRESSION: Postoperative changes of amputation of the first ray to  the level of the proximal first metatarsal. Mild hammertoe deformities  within the remaining toes. No evidence for acute fracture. Plantar  calcaneal spurring.    GLENROY NICOLE MD         SYSTEM ID:  [id number]   Echocardiogram Complete     Value    LVEF  60-65%    Narrative    [id number]  [id number]  [id number]  [id number]^IRENE^MARGE     [facility]  Echocardiography Laboratory  [address] [place] [address]     Name: OCTAVIA ZHAO  MRN: [id number]  : 1960  Study Date: 2024 10:41 AM  Age: 64 yrs  Gender: Male  Patient Location: [facility]  Reason For Study: Dyspnea  Ordering Physician: MARGE BONILLA  Referring Physician: David Almendarez  Performed By: Juan Suarez RDCS     BSA: 2.6 m2  Height: 72 in  Weight: 330 lb  HR: 70  BP: 129/65 mmHg  ______________________________________________________________________________  Procedure  Complete Portable  Echo Adult. Optison (NDC #5908-5814) given intravenously.  Technically difficult study.  ______________________________________________________________________________  Interpretation Summary     Technically difficult study.  The visual ejection fraction is 60-65%.  The right ventricle is normal in size and function.  There is a pacemaker lead in the right ventricle.  There is mild to moderate (1-2+) tricuspid regurgitation.  The inferior vena cava was normal in size with preserved respiratory  variability.  ______________________________________________________________________________  Left Ventricle  The left ventricle is normal in size. There is normal left ventricular wall  thickness. Left ventricular systolic function is normal. The visual ejection  fraction is 60-65%. Diastolic Doppler findings (E/E' ratio and/or other  parameters) suggest left ventricular filling pressures are indeterminate. No  regional wall motion abnormalities noted.     Right Ventricle  The right ventricle is not well visualized. The right ventricle is normal in  size and function. There is a pacemaker lead in the right ventricle.     Atria  Normal left atrial size. Right atrial size is normal.     Mitral Valve  The mitral valve is normal in structure and function. There is mild mitral  annular calcification. There is trace mitral regurgitation. There is no mitral  valve stenosis.     Tricuspid Valve  The tricuspid valve is not well visualized. There is mild to moderate (1-2+)  tricuspid regurgitation. The right ventricular systolic pressure is  approximated at 32.4 mmHg plus the right atrial pressure.     Aortic Valve  The aortic valve is trileaflet with aortic valve sclerosis. No aortic  regurgitation is present. No hemodynamically significant valvular aortic  stenosis.     Pulmonic Valve  The pulmonic valve is not well visualized.     Vessels  The aortic root is normal size. Normal size ascending aorta. The inferior vena  cava was  normal in size with preserved respiratory variability.     Pericardium  There is no pericardial effusion.     ______________________________________________________________________________  MMode/2D Measurements & Calculations  IVSd: 0.90 cm  LVIDd: 4.4 cm  LVIDs: 2.4 cm  LVPWd: 1.1 cm  FS: 44.5 %  LV mass(C)d: 145.6 grams  LV mass(C)dI: 55.2 grams/m2     Ao root diam: 3.4 cm  LA dimension: 4.2 cm  asc Aorta Diam: 3.3 cm  LA/Ao: 1.2  Ao root diam index Ht(cm/m): 1.9  Ao root diam index BSA (cm/m2): 1.3  Asc Ao diam index BSA (cm/m2): 1.2  Asc Ao diam index Ht(cm/m): 1.8  LA Volume (BP): 82.4 ml  LA Volume Index (BP): 31.2 ml/m2  RWT: 0.49     Doppler Measurements & Calculations  MV E max jaleel: 106.0 cm/sec  MV dec slope: 423.4 cm/sec2  MV dec time: 0.25 sec  PA acc time: 0.09 sec  TR max jaleel: 284.4 cm/sec  TR max P.4 mmHg  E/E' av.2  Lateral E/e': 9.1  Medial E/e': 13.3     ______________________________________________________________________________  Report approved by: Jailyn Puente 2024 12:17 PM             Discharge Medications   Current Discharge Medication List        START taking these medications    Details   bisacodyl (DULCOLAX) 10 MG suppository Place 1 suppository (10 mg) rectally daily as needed for constipation (Use if magnesium hydroxide (MILK of MAGNESIA) is not effective after 24 hours. May discontinue if patient having bowel movement.)    Associated Diagnoses: Other constipation      ceFAZolin (ANCEF) intermittent infusion 2 g in 100 mL dextrose PRE-MIX Inject 100 mLs (2 g) into the vein every 8 hours for 11 days    Comments: Check weekly CBC/diff, creatinine, AST, CRP   Send results to [facility] Dr Tinajero  Associated Diagnoses: MSSA bacteremia      miconazole (MICATIN) 2 % external powder Apply topically 2 times daily    Associated Diagnoses: Itching      polyethylene glycol (MIRALAX) 17 g packet Take 17 g by mouth daily    Associated Diagnoses: Other constipation       senna-docusate (SENOKOT-S/PERICOLACE) 8.6-50 MG tablet Take 1 tablet by mouth 2 times daily    Associated Diagnoses: Other constipation      sulfamethoxazole-trimethoprim (BACTRIM DS) 800-160 MG tablet Take 1 tablet by mouth 2 times daily for 5 days    Associated Diagnoses: MSSA bacteremia           CONTINUE these medications which have CHANGED    Details   insulin aspart (NOVOLOG PEN) 100 UNIT/ML pen Inject 1-10 Units subcutaneously 3 times daily (before meals) Correction Scale - HIGH INSULIN RESISTANCE DOSING   Do Not give Correction Insulin if Pre-Meal BG less than 140. For Pre-Meal  - 164 give 1 unit. For Pre-Meal  - 189 give 2 units. For Pre-Meal  - 214 give 3 units. For Pre-Meal  - 239 give 4 units. For Pre-Meal  - 264 give 5 units. For Pre-Meal  - 289 give 6 units. For Pre-Meal  - 314 give 7 units. For Pre-Meal  - 339 give 8 units. For Pre-Meal  - 364 give 9 units.  For Pre-Meal BG greater than or equal to 365 give 10 units To be given with prandial insulin, and based on pre-meal blood glucose. Administering insulin within 5 minutes of the start of the meal is ideal. Administer insulin no more than 30 minutes after the start of the meal, unless directed otherwise by provider. Notify provider if glucose greater than or equal to 350 mg/dL after administration of correction dose.    Associated Diagnoses: Type 2 diabetes mellitus without complication, with long-term current use of insulin (H)      insulin glargine (LANTUS PEN) 100 UNIT/ML pen Inject 25 Units subcutaneously every morning (before breakfast)    Comments: If Lantus is not covered by insurance, may substitute Basaglar or Semglee or other insulin glargine product per insurance preference at same dose and frequency.    Associated Diagnoses: Type 2 diabetes mellitus without complication, with long-term current use of insulin (H)      warfarin ANTICOAGULANT (COUMADIN) 7.5 MG tablet TAKE 1 TABLET (7.5  MG) BY MOUTH DAILY. EXCEPT ONE-HALF TABLET (3.75 MG) ON TUESDAY/ FRIDAY AS DIRECTED BY COAGULATION CLINIC - INR to be checked 7/13 or 7/14 ideally  Qty: 90 tablet, Refills: 1    Associated Diagnoses: New onset atrial fibrillation (H)           CONTINUE these medications which have NOT CHANGED    Details   amLODIPine (NORVASC) 10 MG tablet TAKE 1 TABLET(10 MG) BY MOUTH DAILY  Qty: 90 tablet, Refills: 3    Associated Diagnoses: Essential hypertension, benign      losartan (COZAAR) 100 MG tablet TAKE 1 TABLET(100 MG) BY MOUTH DAILY  Qty: 90 tablet, Refills: 3    Associated Diagnoses: Type 2 diabetes mellitus without complication, with long-term current use of insulin (H)      metFORMIN (GLUCOPHAGE) 1000 MG tablet TAKE 1 TABLET(1000 MG) BY MOUTH TWICE DAILY WITH MEALS  Qty: 180 tablet, Refills: 3    Associated Diagnoses: Type 2 diabetes mellitus without complication, with long-term current use of insulin (H)      metoprolol succinate ER (TOPROL XL) 100 MG 24 hr tablet TAKE 1 TABLET(100 MG) BY MOUTH DAILY  Qty: 90 tablet, Refills: 0    Associated Diagnoses: Chronic systolic congestive heart failure (H)      NITROSTAT 0.4 MG SL SUBL Place 0.4 mg under the tongue every 5 minutes as needed for chest pain      simvastatin (ZOCOR) 80 MG tablet TAKE 1/2 TABLET BY MOUTH AT BEDTIME  Qty: 90 tablet, Refills: 0    Associated Diagnoses: Hyperlipidemia LDL goal <100      blood glucose (ACCU-CHEK GUIDE) test strip 1 strip by In Vitro route 4 times daily Use to test blood sugar 4 times daily or as directed.  Qty: 400 strip, Refills: 1    Associated Diagnoses: Type 2 diabetes mellitus without complication, with long-term current use of insulin (H)           Allergies   No Known Allergies

## 2024-07-12 NOTE — PLAN OF CARE
Goal Outcome Evaluation:       A&O x4. VSS on RA. Pt is Legally blind.R foot dressing is CDI. Pt refuses blue foam wedge pillow, R leg elevated with pillows. Denies pain. CMS intact other than Lower extremity numbness Voiding adequately with external catheter. Tolerating a Regular Mod Carb 60 gram diet. R heel weight bearing. Turn and reposition every 2 hours. PIV saline lock. Up with assistance of 2 with Sera steady or lift.

## 2024-07-12 NOTE — PROCEDURES
[facility]    Single Lumen Midline Placement    Date/Time: 7/12/2024 2:20 PM    Performed by: Jennifer Lock RN  Authorized by: Sarah Jimenez PA-C  Indications: vascular access      UNIVERSAL PROTOCOL   Site Marked: Yes  Prior Images Obtained and Reviewed:  Yes  Required items: Required blood products, implants, devices and special equipment available    Patient identity confirmed:  Verbally with patient, arm band and hospital-assigned identification number  NA - No sedation, light sedation, or local anesthesia  Confirmation Checklist:  Patient's identity using two indicators, relevant allergies, procedure was appropriate and matched the consent or emergent situation and correct equipment/implants were available  Time out: Immediately prior to the procedure a time out was called    Universal Protocol: the Joint Commission Universal Protocol was followed    Preparation: Patient was prepped and draped in usual sterile fashion    ESBL (mL):  1     ANESTHESIA    Anesthesia:  Local infiltration  Local Anesthetic:  Lidocaine 1% without epinephrine  Anesthetic Total (mL):  1      SEDATION    Patient Sedated: No        Preparation: skin prepped with ChloraPrep  Skin prep agent: skin prep agent completely dried prior to procedure  Sterile barriers: maximum sterile barriers were used: cap, mask, sterile gown, sterile gloves, and large sterile sheet  Hand hygiene: hand hygiene performed prior to central venous catheter insertion  Type of line used: Midline  Catheter type: single lumen  Catheter size: 4 Fr  Brand: Bard  Placement method: venipuncture, MST and ultrasound  Number of attempts: 1  Difficulty threading catheter: no  Successful placement: yes  Orientation: right    Location: cephalic vein  Tip Location: distal to axillary vein  Arm circumference: adults 10 cm  Extremity circumference: 39  Visible catheter length: 0  Total catheter length: 15  Dressing and securement: adhesive  securement device, blood cleaned with CHG, chlorhexidine disc applied, occlusive dressing applied, site cleansed, statlock, sterile dressing applied and transparent dressing  Post procedure assessment: blood return through all ports and free fluid flow  PROCEDURE   Patient Tolerance:  Patient tolerated the procedure well with no immediate complicationsDescribe Procedure: Successful placement of single lumen midline RUE cephalic vein. Good blood return and line flushes readily. Midline is ready for immediate use. Bedside RN notified.

## 2024-07-12 NOTE — PROGRESS NOTES
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 Rhabdomyolysis and EV have also resolved. Tissue cx from foot grew MSSA and Enterobacter cloacae complex.     -MSSA bacteremia related to septic foot  -Gas gangene and osteomyelitis of the right great toe. S/p R foot ray resection  -Diabetes Mellitus --8.4 %  -Fall and prolonged immobilization  -EV  -Rhabdomyolysis     Recommendations:  Blood cxs are clear, foot infection has been controlled  Treat with Cefazolin for 2 weeks in view of bacteremia until 7/21  Bactrim DS 1 PO BID X 1 week to cover Enterobacter cloacae isolated in tissue cx  Follow tissue pathology  Glycemic control  OPIV antibiotic orders placed in Epic for cefazolin until 7/21  Midline removal upon completion of antibiotics    Please call if questions over the weekend    Vielka Tinajero MD    Interval History   Resting, no new complaints, tolerating antibiotics without side effects      Physical Exam   Temp: 97.7  F (36.5  C) Temp src: Oral BP: (!) 170/76 Pulse: 71   Resp: 16 SpO2: 94 % O2 Device: None (Room air)    Vitals:    07/06/24 0347 07/08/24 0602   Weight: 149.7 kg (330 lb) 143.5 kg (316 lb 5.8 oz)     Vital Signs with Ranges  Temp:  [97.3  F (36.3  C)-97.7  F (36.5  C)] 97.7  F (36.5  C)  Pulse:  [71-74] 71  Resp:  [16-20] 16  BP: (148-173)/(72-80) 170/76  SpO2:  [93 %-94 %] 94 %    Constitutional: Awake, alert, cooperative, no apparent distress  Lungs: Clear   Skin: No rash  MS:  R foot in dressing    Other:    Medications   Current Facility-Administered Medications   Medication Dose Route Frequency  Provider Last Rate Last Admin     Current Facility-Administered Medications   Medication Dose Route Frequency Provider Last Rate Last Admin    amLODIPine (NORVASC) tablet 10 mg  10 mg Oral Daily Andrea Huang MD   10 mg at 07/12/24 0835    ceFAZolin (ANCEF) 2 g in 100 mL D5W intermittent infusion  2 g Intravenous Q8H Vielka Tinajero  mL/hr at 07/12/24 0836 2 g at 07/12/24 0836    insulin aspart (NovoLOG) injection (RAPID ACTING)  1-10 Units Subcutaneous TID  Andrea Huang MD   2 Units at 07/12/24 0843    insulin aspart (NovoLOG) injection (RAPID ACTING)  1-7 Units Subcutaneous At Bedtime Andrea Huang MD   3 Units at 07/10/24 2254    insulin glargine (LANTUS PEN) injection 25 Units  25 Units Subcutaneous QAM  Andrea Huang MD   25 Units at 07/12/24 0842    losartan (COZAAR) tablet 100 mg  100 mg Oral Daily Andrea Huang MD        metoprolol succinate ER (TOPROL XL) 24 hr tablet 100 mg  100 mg Oral Daily Andrea Huang MD   100 mg at 07/12/24 0835    miconazole (MICATIN) 2 % powder   Topical BID Andrea Huang MD   Given at 07/12/24 0836    polyethylene glycol (MIRALAX) Packet 17 g  17 g Oral Daily Denia Lainez DPM   17 g at 07/07/24 0835    senna-docusate (SENOKOT-S/PERICOLACE) 8.6-50 MG per tablet 1 tablet  1 tablet Oral BID Denia Lainez DPM   1 tablet at 07/07/24 0835    sodium chloride (PF) 0.9% PF flush 3 mL  3 mL Intracatheter Q8H Denia Lainez DPM   3 mL at 07/12/24 0630    sodium chloride (PF) 0.9% PF flush 3 mL  3 mL Intracatheter Q8H Lavell Bishop MD   3 mL at 07/12/24 0837    sodium chloride (PF) 0.9% PF flush 3 mL  3 mL Intracatheter Q8H Denia Lainez DPM   3 mL at 07/11/24 2124    sulfamethoxazole-trimethoprim (BACTRIM DS) 800-160 MG per tablet 1 tablet  1 tablet Oral BID Vielka Tinajero MD   1 tablet at 07/12/24 0819       Data   All microbiology laboratory data reviewed.  Recent Labs   Lab Test 07/11/24  0911  07/10/24  0900 07/09/24  0928   WBC 11.6* 11.1* 11.8*   HGB 9.9* 9.5* 10.1*   HCT 29.9* 28.9* 30.6*   MCV 89 88 87   * 492* 500*     Recent Labs   Lab Test 07/11/24  0911 07/10/24  0900 07/09/24  0928   CR 0.95 1.14 1.54*     Recent Labs   Lab Test 07/06/24  0315   *     Microbiology         07/07/2024 1244 07/11/2024 1546 Blood Culture Hand, Right [[id number]]    Blood from Hand, Right    Preliminary result Component Value   Culture No growth after 4 days P             07/07/2024 1244 07/11/2024 1546 Blood Culture Hand, Right [[id number]]    Blood from Hand, Right    Preliminary result Component Value   Culture No growth after 4 days P             07/07/2024 0925 07/08/2024 0956 Urine Culture [[id number]]   Urine, Clean Catch    Final result Component Value   Culture No Growth             07/06/2024 1814 07/09/2024 0757 Anaerobic Bacterial Culture Routine [[id number]]   Tissue from Foot, Right    Final result Component Value   Culture 2+ Mixed Anaerobic Organisms Present             07/06/2024 1814 07/06/2024 2247 Gram Stain [[id number]]   (Abnormal)   Tissue from Foot, Right    Final result Component Value   GS Culture See corresponding culture for results   Gram Stain Result 1+ Gram positive cocci Abnormal    Gram Stain Result No white blood cells seen Abnormal           07/06/2024 1814 07/09/2024 1033 Tissue Aerobic Bacterial Culture Routine [[id number]]    (Abnormal)   Tissue from Foot, Right    Final result Component Value   Culture 2+ Staphylococcus aureus Abnormal     Isolated in broth only Enterobacter cloacae complex Abnormal     On day 1 of incubation    1+ Corynebacterium jeikeium Abnormal     Susceptibilities not routinely done, refer to antibiogram to view typical susceptibility profiles       Susceptibility     Staphylococcus aureus Enterobacter cloacae complex     SATHYA SATHYA     Amikacin   <=2 ug/mL Susceptible *     Ampicillin    Resistant 1     Ampicillin/ Sulbactam     Resistant 1     Cefazolin    Resistant *,1     Cefepime   <=1 ug/mL Susceptible     Cefoxitin    Resistant *,1     Ceftazidime    Resistant     Ceftriaxone    Resistant     Ciprofloxacin >=8 ug/mL Resistant * <=0.25 ug/mL Susceptible     Clindamycin 0.25 ug/mL Susceptible       Daptomycin 0.5 ug/mL Susceptible       Doxycycline 8 ug/mL Intermediate       Erythromycin <=0.25 ug/mL Susceptible       Gentamicin <=0.5 ug/mL Susceptible <=1 ug/mL Susceptible     Inducible macrolide resistance test Negative ug/mL Negative *       Levofloxacin >=8 ug/mL Resistant * <=0.12 ug/mL Susceptible     Linezolid 4 ug/mL Susceptible *       Meropenem   <=0.25 ug/mL Susceptible     Moxifloxacin >=8 ug/mL Resistant *       Nitrofurantoin <=16 ug/mL Susceptible * 64 ug/mL Intermediate *     Oxacillin 0.5 ug/mL Susceptible 2       Piperacillin/Tazobactam    Resistant     Rifampin <=0.5 ug/mL Susceptible *       Tetracycline >=16 ug/mL Resistant       Tigecycline <=0.12 ug/mL Susceptible *       Tobramycin   <=1 ug/mL Susceptible     Trimethoprim/Sulfamethoxazole <=0.5/9.5 u... Susceptible <=1/19 ug/mL Susceptible     Vancomycin 1 ug/mL Susceptible

## 2024-07-13 ENCOUNTER — APPOINTMENT (OUTPATIENT)
Dept: GENERAL RADIOLOGY | Facility: CLINIC | Age: 64
End: 2024-07-13
Attending: HOSPITALIST
Payer: COMMERCIAL

## 2024-07-13 LAB
ANION GAP SERPL CALCULATED.3IONS-SCNC: 9 MMOL/L (ref 7–15)
ATRIAL RATE - MUSE: 500 BPM
BASOPHILS # BLD AUTO: 0 10E3/UL (ref 0–0.2)
BASOPHILS NFR BLD AUTO: 0 %
BUN SERPL-MCNC: 13.7 MG/DL (ref 8–23)
CALCIUM SERPL-MCNC: 7.8 MG/DL (ref 8.8–10.2)
CHLORIDE SERPL-SCNC: 97 MMOL/L (ref 98–107)
CK SERPL-CCNC: 219 U/L (ref 39–308)
CREAT SERPL-MCNC: 1.07 MG/DL (ref 0.67–1.17)
CRP SERPL-MCNC: 72.19 MG/L
DEPRECATED HCO3 PLAS-SCNC: 26 MMOL/L (ref 22–29)
DIASTOLIC BLOOD PRESSURE - MUSE: NORMAL MMHG
EGFRCR SERPLBLD CKD-EPI 2021: 78 ML/MIN/1.73M2
EOSINOPHIL # BLD AUTO: 0 10E3/UL (ref 0–0.7)
EOSINOPHIL NFR BLD AUTO: 0 %
ERYTHROCYTE [DISTWIDTH] IN BLOOD BY AUTOMATED COUNT: 14.8 % (ref 10–15)
GLUCOSE BLDC GLUCOMTR-MCNC: 120 MG/DL (ref 70–99)
GLUCOSE BLDC GLUCOMTR-MCNC: 146 MG/DL (ref 70–99)
GLUCOSE BLDC GLUCOMTR-MCNC: 174 MG/DL (ref 70–99)
GLUCOSE BLDC GLUCOMTR-MCNC: 176 MG/DL (ref 70–99)
GLUCOSE BLDC GLUCOMTR-MCNC: 216 MG/DL (ref 70–99)
GLUCOSE SERPL-MCNC: 147 MG/DL (ref 70–99)
HCT VFR BLD AUTO: 25.9 % (ref 40–53)
HGB BLD-MCNC: 8.6 G/DL (ref 13.3–17.7)
IMM GRANULOCYTES # BLD: 0.1 10E3/UL
IMM GRANULOCYTES NFR BLD: 1 %
INR PPP: 1.59 (ref 0.85–1.15)
INTERPRETATION ECG - MUSE: NORMAL
LACTATE SERPL-SCNC: 1.2 MMOL/L (ref 0.7–2)
LACTATE SERPL-SCNC: 1.4 MMOL/L (ref 0.7–2)
LYMPHOCYTES # BLD AUTO: 0.9 10E3/UL (ref 0.8–5.3)
LYMPHOCYTES NFR BLD AUTO: 8 %
MCH RBC QN AUTO: 29.6 PG (ref 26.5–33)
MCHC RBC AUTO-ENTMCNC: 33.2 G/DL (ref 31.5–36.5)
MCV RBC AUTO: 89 FL (ref 78–100)
MONOCYTES # BLD AUTO: 0.6 10E3/UL (ref 0–1.3)
MONOCYTES NFR BLD AUTO: 5 %
NEUTROPHILS # BLD AUTO: 10 10E3/UL (ref 1.6–8.3)
NEUTROPHILS NFR BLD AUTO: 86 %
NRBC # BLD AUTO: 0 10E3/UL
NRBC BLD AUTO-RTO: 0 /100
P AXIS - MUSE: NORMAL DEGREES
PLATELET # BLD AUTO: 419 10E3/UL (ref 150–450)
POTASSIUM SERPL-SCNC: 3.4 MMOL/L (ref 3.4–5.3)
PR INTERVAL - MUSE: NORMAL MS
QRS DURATION - MUSE: 132 MS
QT - MUSE: 496 MS
QTC - MUSE: 535 MS
R AXIS - MUSE: -40 DEGREES
RBC # BLD AUTO: 2.91 10E6/UL (ref 4.4–5.9)
SODIUM SERPL-SCNC: 132 MMOL/L (ref 135–145)
SYSTOLIC BLOOD PRESSURE - MUSE: NORMAL MMHG
T AXIS - MUSE: 107 DEGREES
VENTRICULAR RATE- MUSE: 70 BPM
WBC # BLD AUTO: 11.6 10E3/UL (ref 4–11)

## 2024-07-13 PROCEDURE — 71045 X-RAY EXAM CHEST 1 VIEW: CPT

## 2024-07-13 PROCEDURE — 82962 GLUCOSE BLOOD TEST: CPT

## 2024-07-13 PROCEDURE — 258N000003 HC RX IP 258 OP 636: Performed by: HOSPITALIST

## 2024-07-13 PROCEDURE — 85610 PROTHROMBIN TIME: CPT | Performed by: HOSPITALIST

## 2024-07-13 PROCEDURE — 83605 ASSAY OF LACTIC ACID: CPT

## 2024-07-13 PROCEDURE — 250N000011 HC RX IP 250 OP 636: Mod: JZ | Performed by: HOSPITALIST

## 2024-07-13 PROCEDURE — 36415 COLL VENOUS BLD VENIPUNCTURE: CPT

## 2024-07-13 PROCEDURE — 250N000013 HC RX MED GY IP 250 OP 250 PS 637: Performed by: HOSPITALIST

## 2024-07-13 PROCEDURE — 80048 BASIC METABOLIC PNL TOTAL CA: CPT | Performed by: HOSPITALIST

## 2024-07-13 PROCEDURE — 99232 SBSQ HOSP IP/OBS MODERATE 35: CPT | Performed by: HOSPITALIST

## 2024-07-13 PROCEDURE — 120N000001 HC R&B MED SURG/OB

## 2024-07-13 PROCEDURE — 250N000011 HC RX IP 250 OP 636: Performed by: STUDENT IN AN ORGANIZED HEALTH CARE EDUCATION/TRAINING PROGRAM

## 2024-07-13 PROCEDURE — 85025 COMPLETE CBC W/AUTO DIFF WBC: CPT | Performed by: HOSPITALIST

## 2024-07-13 PROCEDURE — 250N000012 HC RX MED GY IP 250 OP 636 PS 637: Performed by: HOSPITALIST

## 2024-07-13 PROCEDURE — 86140 C-REACTIVE PROTEIN: CPT | Performed by: HOSPITALIST

## 2024-07-13 PROCEDURE — 82550 ASSAY OF CK (CPK): CPT | Performed by: HOSPITALIST

## 2024-07-13 PROCEDURE — 83605 ASSAY OF LACTIC ACID: CPT | Performed by: HOSPITALIST

## 2024-07-13 PROCEDURE — 36415 COLL VENOUS BLD VENIPUNCTURE: CPT | Performed by: HOSPITALIST

## 2024-07-13 RX ORDER — SODIUM CHLORIDE 9 MG/ML
INJECTION, SOLUTION INTRAVENOUS CONTINUOUS
Status: ACTIVE | OUTPATIENT
Start: 2024-07-13 | End: 2024-07-13

## 2024-07-13 RX ORDER — AMOXICILLIN 250 MG
2 CAPSULE ORAL 2 TIMES DAILY PRN
Status: DISCONTINUED | OUTPATIENT
Start: 2024-07-13 | End: 2024-07-16 | Stop reason: HOSPADM

## 2024-07-13 RX ORDER — METOPROLOL SUCCINATE 50 MG/1
50 TABLET, EXTENDED RELEASE ORAL DAILY
Status: DISCONTINUED | OUTPATIENT
Start: 2024-07-13 | End: 2024-07-14

## 2024-07-13 RX ORDER — AMOXICILLIN 250 MG
1 CAPSULE ORAL 2 TIMES DAILY PRN
Status: DISCONTINUED | OUTPATIENT
Start: 2024-07-13 | End: 2024-07-16 | Stop reason: HOSPADM

## 2024-07-13 RX ORDER — BISACODYL 10 MG
10 SUPPOSITORY, RECTAL RECTAL DAILY PRN
Status: DISCONTINUED | OUTPATIENT
Start: 2024-07-13 | End: 2024-07-16 | Stop reason: HOSPADM

## 2024-07-13 RX ORDER — POLYETHYLENE GLYCOL 3350 17 G/17G
17 POWDER, FOR SOLUTION ORAL DAILY
Status: DISCONTINUED | OUTPATIENT
Start: 2024-07-13 | End: 2024-07-14

## 2024-07-13 RX ORDER — LIDOCAINE 40 MG/G
CREAM TOPICAL
Status: DISCONTINUED | OUTPATIENT
Start: 2024-07-13 | End: 2024-07-16 | Stop reason: HOSPADM

## 2024-07-13 RX ORDER — OLANZAPINE 10 MG/2ML
5 INJECTION, POWDER, FOR SOLUTION INTRAMUSCULAR
Status: COMPLETED | OUTPATIENT
Start: 2024-07-13 | End: 2024-07-13

## 2024-07-13 RX ORDER — CEFAZOLIN SODIUM 2 G/100ML
2 INJECTION, SOLUTION INTRAVENOUS EVERY 8 HOURS
Status: DISCONTINUED | OUTPATIENT
Start: 2024-07-13 | End: 2024-07-16 | Stop reason: HOSPADM

## 2024-07-13 RX ORDER — LORAZEPAM 2 MG/ML
.5-1 INJECTION INTRAMUSCULAR EVERY 4 HOURS PRN
Status: DISCONTINUED | OUTPATIENT
Start: 2024-07-13 | End: 2024-07-15

## 2024-07-13 RX ORDER — DEXTROSE MONOHYDRATE 25 G/50ML
25-50 INJECTION, SOLUTION INTRAVENOUS
Status: DISCONTINUED | OUTPATIENT
Start: 2024-07-13 | End: 2024-07-16 | Stop reason: HOSPADM

## 2024-07-13 RX ORDER — AMLODIPINE BESYLATE 10 MG/1
10 TABLET ORAL DAILY
Status: DISCONTINUED | OUTPATIENT
Start: 2024-07-13 | End: 2024-07-16 | Stop reason: HOSPADM

## 2024-07-13 RX ORDER — WATER 10 ML/10ML
INJECTION INTRAMUSCULAR; INTRAVENOUS; SUBCUTANEOUS
Status: DISCONTINUED
Start: 2024-07-13 | End: 2024-07-13 | Stop reason: HOSPADM

## 2024-07-13 RX ORDER — AMOXICILLIN 250 MG
1 CAPSULE ORAL 2 TIMES DAILY
Status: DISCONTINUED | OUTPATIENT
Start: 2024-07-13 | End: 2024-07-14

## 2024-07-13 RX ORDER — ACETAMINOPHEN 650 MG/1
650 SUPPOSITORY RECTAL EVERY 4 HOURS PRN
Status: DISCONTINUED | OUTPATIENT
Start: 2024-07-13 | End: 2024-07-16 | Stop reason: HOSPADM

## 2024-07-13 RX ORDER — CALCIUM CARBONATE 500 MG/1
1000 TABLET, CHEWABLE ORAL 4 TIMES DAILY PRN
Status: DISCONTINUED | OUTPATIENT
Start: 2024-07-13 | End: 2024-07-16 | Stop reason: HOSPADM

## 2024-07-13 RX ORDER — ONDANSETRON 2 MG/ML
4 INJECTION INTRAMUSCULAR; INTRAVENOUS EVERY 6 HOURS PRN
Status: DISCONTINUED | OUTPATIENT
Start: 2024-07-13 | End: 2024-07-16 | Stop reason: HOSPADM

## 2024-07-13 RX ORDER — SULFAMETHOXAZOLE/TRIMETHOPRIM 800-160 MG
1 TABLET ORAL 2 TIMES DAILY
Status: DISCONTINUED | OUTPATIENT
Start: 2024-07-13 | End: 2024-07-16 | Stop reason: HOSPADM

## 2024-07-13 RX ORDER — ONDANSETRON 4 MG/1
4 TABLET, ORALLY DISINTEGRATING ORAL EVERY 6 HOURS PRN
Status: DISCONTINUED | OUTPATIENT
Start: 2024-07-13 | End: 2024-07-16 | Stop reason: HOSPADM

## 2024-07-13 RX ORDER — NICOTINE POLACRILEX 4 MG
15-30 LOZENGE BUCCAL
Status: DISCONTINUED | OUTPATIENT
Start: 2024-07-13 | End: 2024-07-16 | Stop reason: HOSPADM

## 2024-07-13 RX ORDER — ACETAMINOPHEN 325 MG/1
650 TABLET ORAL EVERY 4 HOURS PRN
Status: DISCONTINUED | OUTPATIENT
Start: 2024-07-13 | End: 2024-07-16 | Stop reason: HOSPADM

## 2024-07-13 RX ORDER — GUAIFENESIN/DEXTROMETHORPHAN 100-10MG/5
10 SYRUP ORAL EVERY 4 HOURS PRN
Status: DISCONTINUED | OUTPATIENT
Start: 2024-07-13 | End: 2024-07-16 | Stop reason: HOSPADM

## 2024-07-13 RX ORDER — SIMVASTATIN 40 MG
40 TABLET ORAL AT BEDTIME
Status: DISCONTINUED | OUTPATIENT
Start: 2024-07-13 | End: 2024-07-16 | Stop reason: HOSPADM

## 2024-07-13 RX ADMIN — SODIUM CHLORIDE: 9 INJECTION, SOLUTION INTRAVENOUS at 00:54

## 2024-07-13 RX ADMIN — METOPROLOL SUCCINATE 50 MG: 50 TABLET, EXTENDED RELEASE ORAL at 12:42

## 2024-07-13 RX ADMIN — INSULIN ASPART 1 UNITS: 100 INJECTION, SOLUTION INTRAVENOUS; SUBCUTANEOUS at 03:59

## 2024-07-13 RX ADMIN — WARFARIN SODIUM 3.75 MG: 2.5 TABLET ORAL at 18:58

## 2024-07-13 RX ADMIN — SULFAMETHOXAZOLE AND TRIMETHOPRIM 1 TABLET: 800; 160 TABLET ORAL at 12:43

## 2024-07-13 RX ADMIN — SIMVASTATIN 40 MG: 40 TABLET, FILM COATED ORAL at 23:16

## 2024-07-13 RX ADMIN — CIPROFLOXACIN 400 MG: 400 INJECTION, SOLUTION INTRAVENOUS at 12:43

## 2024-07-13 RX ADMIN — INSULIN ASPART 1 UNITS: 100 INJECTION, SOLUTION INTRAVENOUS; SUBCUTANEOUS at 08:45

## 2024-07-13 RX ADMIN — SULFAMETHOXAZOLE AND TRIMETHOPRIM 1 TABLET: 800; 160 TABLET ORAL at 23:16

## 2024-07-13 RX ADMIN — AMLODIPINE BESYLATE 10 MG: 10 TABLET ORAL at 12:43

## 2024-07-13 RX ADMIN — OLANZAPINE 5 MG: 10 INJECTION, POWDER, FOR SOLUTION INTRAMUSCULAR at 05:02

## 2024-07-13 RX ADMIN — CEFAZOLIN SODIUM 2 G: 2 INJECTION, SOLUTION INTRAVENOUS at 03:56

## 2024-07-13 RX ADMIN — CEFAZOLIN SODIUM 2 G: 2 INJECTION, SOLUTION INTRAVENOUS at 23:17

## 2024-07-13 RX ADMIN — INSULIN ASPART 1 UNITS: 100 INJECTION, SOLUTION INTRAVENOUS; SUBCUTANEOUS at 18:57

## 2024-07-13 RX ADMIN — CEFAZOLIN SODIUM 2 G: 2 INJECTION, SOLUTION INTRAVENOUS at 14:04

## 2024-07-13 ASSESSMENT — ACTIVITIES OF DAILY LIVING (ADL)
ADLS_ACUITY_SCORE: 38
ADLS_ACUITY_SCORE: 55
ADLS_ACUITY_SCORE: 42
ADLS_ACUITY_SCORE: 38
ADLS_ACUITY_SCORE: 55
ADLS_ACUITY_SCORE: 51
ADLS_ACUITY_SCORE: 55
ADLS_ACUITY_SCORE: 51
ADLS_ACUITY_SCORE: 55
ADLS_ACUITY_SCORE: 55
ADLS_ACUITY_SCORE: 54
ADLS_ACUITY_SCORE: 38
ADLS_ACUITY_SCORE: 51
ADLS_ACUITY_SCORE: 54
ADLS_ACUITY_SCORE: 38
ADLS_ACUITY_SCORE: 38
ADLS_ACUITY_SCORE: 55
ADLS_ACUITY_SCORE: 38
ADLS_ACUITY_SCORE: 55

## 2024-07-13 NOTE — H&P
[facility]    History and Physical  Hospitalist       Date of Admission:  7/12/2024  Date of Service (when I saw the patient): 07/12/24    ASSESSMENT  Mckay Hsu is a 64 year old gentleman with past medical history that is most notable for permanent atrial fibrillation on Warfarin, as well as Type 2 Diabetes mellitus, legal blindness, and vulnerable adult status, who was admitted here from 7/6/24 through 7/12/24 for right first toe gangrene, necrosis and osteomyelitis. He was discharged earlier today to a TCU, but returns now for acute agitation.    PLAN     Severe acute metabolic encephalopathy: Please see the excellent Discharge Summary done earlier today for complete details of Mr. Hsu's hospitalization here for right first toe gangrene, necrosis and osteomyelitis, requiring debridement and surgical resection of the 1st metatarsal. ID was consulted and Ancef has been prescribed for S aureus bacteremia, as well as Bactrim.    Now, he returns for evaluation of severe acute agitation with delirium, paranoia, and hallucinations. In the ED, he is markedly agitated and requires soft restraints and IV Ativan for relief of symptoms. Now he is somnolent. He is afebrile, without hypotension, tachycardia, or hypoxia. He has acute leukocytosis and elevated Lactate, and progression of mild ongoing thrombocytosis, as well as mild acute hyponatremia. EKG shows paced rhythm. Overall, his symptoms are consistent with acute metabolic encephalopathy, perhaps related to underlying psycho-cognitive deficits, perhaps related to ongoing infection. Suspect elevated Lactate to be due to agitation and dehydration today though severe sepsis is possible.       -- Inpatient. NPO. Fall precautions. Bedside sitter and soft restraints ordered. PRN IV Ativan ordered carefully. Will plan to order prn IM Zyprexa as well if needed, with prn EKG order to monitor QTc interval if that is administered.    -- IV Ancef  continued. He got a dose of IV Cipro tonight in lieu of oral Bactrim. Reassess ability to resume oral medication in AM. Follow up blood cultures drawn in the ED.    -- IV NS bolus given in the ED. 100 ml/hour NS ordered overnight for 10 hours with a stop date for clinical reassessment in AM. Repeat Lactate ordered    -- Consider ID consultation with repeat foot imaging pending clinical reassessment in AM. Consider brain imaging/EEG if not improving soon.    -- Repeat CBC and BMP in AM. SW consulted for ongoing disposition planning.    Acute hypochloremic hyponatremia: Mild at 129. Suspect due to hypovolemia. IVF ordered as above; repeat BMP in AM.    Resolving EV due to ATN, with rhabdomyolysis: Baseline creatinine 1.1 on admission 2.6. CK 3000s, improved during admission. Will repeat CK this AM    Permanent Afib, status post AV node ablation and CRT 2010  Chronic anticoagulation  Supratheraputic INR  History of NSVT  Hypertension   Hyperlipidemia      Reportedly having failed Amiodarone in the past. INR was greater than 10 on admission and reversed for surgery. Plan had been to consider resumption of Warfarin on 7/12. TTE on admission showed preserved LVEF with mild to moderate TR.      -- INR ordered this AM    -- Resume home statin and ARB when stable clinically. Resume home Norvasc and metoprolol when verified    Type 2 Diabetes mellitus:   Recent Labs   Lab Test 07/06/24  0315   A1C 7.6*   . On Glargine and Metofrmin as an outpatient.    -- ISS insulin while hospitalized. Hold oral anti-diabetic medications. Resume home insulin when verified.    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 Monitor closely while hospitalized.  Recent Labs   Lab Test  07/12/24 2057 07/11/24  0911 07/10/24  0900   HGB 9.6* 9.9* 9.5*     I have spent 60 minutes on the date of service doing chart review, history, examination, documentation, and further activities per the note.    Chief Complaint   Agitation    Unable to obtain a history from the patient due to confusion; the ED physician whom I have spoken with    History of Present Illness   Mckay Hsu is a 64 year old gentleman who presents with agitation.It seems that after arrival to his TCU today he became acutely paranoid and agitated, believing that he was being trapped in an unsafe environment. He was brought to the ED. IV Ativan was given in the ED. He is now somnolent, though arousable. Further history can not as yet be obtained.    In the ED,   07/12 2012 144/109  97.8  F (36.6  C) 70 18 92 %     CBC and CMP were notable for WBC 15.6, HGB 9.6, , Na 129, Cl 94, Ca 8.3, Alb 2.7, Glucose 228, otherwise were within the normal reference range. Lactate was 3.8. VBG showed 7.34/48. INR was 1.55. EKG showed a paced rhythm. Blood cultures were sent.    Bactrim was given but spit out. IV Ancef, IV Ceprio, IV Magnesium sulfate, and IV fluid were given in the ED.    PHYSICAL EXAM  Blood pressure (!) 149/95, pulse (!) 28, temperature 97.8  F (36.6  C), temperature source Temporal, resp. rate 25, SpO2 97%.  Constitutional: Somnolent but arousable; laying naked in the ED bed with soft restraints in place  Respiratory: lungs clear to auscultation bilaterally  Cardiovascular: regular S1 S2  GI: abdomen soft non tender non distended bowel sounds positive     DVT Prophylaxis: Warfarin  Code Status: Full Code    Disposition: Expected discharge in 2-5 more days    Mckay Barboza MD, MD    Past Medical History    I have reviewed this patient's medical history and updated it with pertinent information if needed.   Past Medical History:   Diagnosis Date    Atrial fibrillation (H)     s/p AVN ablation, BIV PPM    Congestive  heart failure (H)     tachycardia-induced cardiomyopathy    Essential hypertension, benign     Hyperlipidemia LDL goal <100 10/31/2010    Hyponatremia 9/8/2009    Morbid obesity (H)     Open wound of foot except toe(s) alone, without mention of complication     Retinitis pigmentosa     Type 2 diabetes, HbA1C goal < 8% (H) 6/26/2012       Past Surgical History   I have reviewed this patient's surgical history and updated it with pertinent information if needed.  Past Surgical History:   Procedure Laterality Date    AMPUTATE FOOT Right 7/6/2024    Procedure: Right foot first ray resection;  Surgeon: Denia Lainez DPM;  Location: [facility] OR    AMPUTATE TOE(S) Right 7/6/2024    Procedure: Right foot first ray resection;  Surgeon: Denia Lainez DPM;  Location: [facility] OR    AMPUTATE TOE(S) Right 7/9/2024    Procedure: first metatarsal resection;  Surgeon: Denia Lainez DPM;  Location: [facility] OR    CARDIOVERSION  9/2009, 10/2009    COLONOSCOPY N/A 5/9/2019    Procedure: COLONOSCOPY;  Surgeon: Godwin Santillan MD;  Location: [facility] GI    H ABLATION AV NODE  4/8/10    IMPLANT PACEMAKER  4/8/10    BIV PPM- [employer] InSync III    IRRIGATION AND DEBRIDEMENT FOOT, COMBINED Right 7/9/2024    Procedure: Right foot debridement,;  Surgeon: Denia Lainez DPM;  Location: [facility] OR    TONSILLECTOMY      as kid    [facility] NONSPECIFIC PROCEDURE  06/2007    debritement       Prior to Admission Medications   Prior to Admission Medications   Prescriptions Last Dose Informant Patient Reported? Taking?   NITROSTAT 0.4 MG SL SUBL  Self Yes No   Sig: Place 0.4 mg under the tongue every 5 minutes as needed for chest pain   amLODIPine (NORVASC) 10 MG tablet   No No   Sig: TAKE 1 TABLET(10 MG) BY MOUTH DAILY   bisacodyl (DULCOLAX) 10 MG suppository   No No   Sig: Place 1 suppository (10 mg) rectally daily as needed for constipation (Use if magnesium hydroxide (MILK of MAGNESIA) is not effective after 24 hours. May discontinue if patient having  bowel movement.)   blood glucose (ACCU-CHEK GUIDE) test strip   No No   Si strip by In Vitro route 4 times daily Use to test blood sugar 4 times daily or as directed.   ceFAZolin (ANCEF) intermittent infusion 2 g in 100 mL dextrose PRE-MIX   No No   Sig: Inject 100 mLs (2 g) into the vein every 8 hours for 11 days   insulin aspart (NOVOLOG PEN) 100 UNIT/ML pen   No No   Sig: Inject 1-10 Units subcutaneously 3 times daily (before meals) Correction Scale - HIGH INSULIN RESISTANCE DOSING   Do Not give Correction Insulin if Pre-Meal BG less than 140. For Pre-Meal  - 164 give 1 unit. For Pre-Meal  - 189 give 2 units. For Pre-Meal  - 214 give 3 units. For Pre-Meal  - 239 give 4 units. For Pre-Meal  - 264 give 5 units. For Pre-Meal  - 289 give 6 units. For Pre-Meal  - 314 give 7 units. For Pre-Meal  - 339 give 8 units. For Pre-Meal  - 364 give 9 units.  For Pre-Meal BG greater than or equal to 365 give 10 units To be given with prandial insulin, and based on pre-meal blood glucose. Administering insulin within 5 minutes of the start of the meal is ideal. Administer insulin no more than 30 minutes after the start of the meal, unless directed otherwise by provider. Notify provider if glucose greater than or equal to 350 mg/dL after administration of correction dose.   insulin glargine (LANTUS PEN) 100 UNIT/ML pen   No No   Sig: Inject 25 Units subcutaneously every morning (before breakfast)   losartan (COZAAR) 100 MG tablet   No No   Sig: TAKE 1 TABLET(100 MG) BY MOUTH DAILY   metFORMIN (GLUCOPHAGE) 1000 MG tablet   No No   Sig: TAKE 1 TABLET(1000 MG) BY MOUTH TWICE DAILY WITH MEALS   metoprolol succinate ER (TOPROL XL) 100 MG 24 hr tablet   No No   Sig: TAKE 1 TABLET(100 MG) BY MOUTH DAILY   miconazole (MICATIN) 2 % external powder   No No   Sig: Apply topically 2 times daily   polyethylene glycol (MIRALAX) 17 g packet   No No   Sig: Take 17 g by mouth daily    senna-docusate (SENOKOT-S/PERICOLACE) 8.6-50 MG tablet   No No   Sig: Take 1 tablet by mouth 2 times daily   simvastatin (ZOCOR) 80 MG tablet   No No   Sig: TAKE 1/2 TABLET BY MOUTH AT BEDTIME   sulfamethoxazole-trimethoprim (BACTRIM DS) 800-160 MG tablet   No No   Sig: Take 1 tablet by mouth 2 times daily for 5 days   warfarin ANTICOAGULANT (COUMADIN) 7.5 MG tablet   No No   Sig: TAKE 1 TABLET (7.5 MG) BY MOUTH DAILY. EXCEPT ONE-HALF TABLET (3.75 MG) ON TUESDAY/ FRIDAY AS DIRECTED BY COAGULATION CLINIC - INR to be checked 7/13 or 7/14 ideally      Facility-Administered Medications: None     Allergies   No Known Allergies    Social History   I have reviewed this patient's social history and updated it with pertinent information if needed. Mckay Hsu  reports that he has never smoked. He has never used smokeless tobacco. He reports that he does not drink alcohol and does not use drugs.    Family History   Family history assessed and, except as above, is non-contributory.    Family History   Problem Relation Age of Onset    Diabetes Mother     Hypertension Father     Diabetes Father     Hypertension Maternal Grandmother     Diabetes Maternal Grandmother     Diabetes Maternal Grandfather     Hypertension Maternal Grandfather     Cardiovascular Maternal Grandfather     Hypertension Paternal Grandfather     Cardiovascular Paternal Grandfather     Diabetes Sister     Hypertension Sister        Review of Systems   The 10 point Review of Systems is negative other than noted in the HPI or here.     Primary Care Physician   David Almendarez    Data   Labs Ordered and Resulted from Time of ED Arrival to Time of ED Departure   COMPREHENSIVE METABOLIC PANEL - Abnormal       Result Value    Sodium 129 (*)     Potassium 3.8      Carbon Dioxide (CO2) 22      Anion Gap 13      Urea Nitrogen 13.2      Creatinine 1.03      GFR Estimate 82      Calcium 8.3 (*)     Chloride 94 (*)     Glucose 228 (*)     Alkaline Phosphatase 135       AST 43      ALT 17      Protein Total 7.3      Albumin 2.7 (*)     Bilirubin Total 0.3     INR - Abnormal    INR 1.55 (*)    CBC WITH PLATELETS AND DIFFERENTIAL - Abnormal    WBC Count 15.6 (*)     RBC Count 3.37 (*)     Hemoglobin 9.6 (*)     Hematocrit 29.4 (*)     MCV 87      MCH 28.5      MCHC 32.7      RDW 14.9      Platelet Count 573 (*)     % Neutrophils 83      % Lymphocytes 10      % Monocytes 6      % Eosinophils 0      % Basophils 0      % Immature Granulocytes 1      NRBCs per 100 WBC 0      Absolute Neutrophils 13.0 (*)     Absolute Lymphocytes 1.5      Absolute Monocytes 0.9      Absolute Eosinophils 0.1      Absolute Basophils 0.0      Absolute Immature Granulocytes 0.1      Absolute NRBCs 0.0     GLUCOSE BY METER - Abnormal    GLUCOSE BY METER POCT 224 (*)    ISTAT GASES LACTATE VENOUS POCT - Abnormal    Lactic Acid POCT 3.8 (*)     Bicarbonate Venous POCT 26      O2 Sat, Venous POCT 38 (*)     pCO2 Venous POCT 48      pH Venous POCT 7.34      pO2 Venous POCT 24 (*)     Base Excess/Deficit (+/-) POCT -1.0     GLUCOSE MONITOR NURSING POCT   LACTIC ACID WHOLE BLOOD   BLOOD CULTURE   BLOOD CULTURE       Data reviewed today:  I personally reviewed the EKG tracing showing paced rhythm .

## 2024-07-13 NOTE — PROGRESS NOTES
[facility]    Medicine Progress Note - Hospitalist Service    Date of Admission:  7/12/2024    Assessment & Plan   Mckay Hsu is a 64 year old gentleman with past medical history that is most notable for permanent atrial fibrillation on Warfarin, as well as Type 2 Diabetes mellitus, legal blindness, and vulnerable adult status, who was admitted here from 7/6/24 through 7/12/24 for right first toe gangrene, necrosis and osteomyelitis. He was discharged earlier today to a TCU, but returned for acute agitation, delirium, and paranoia. He initially had findings concerning for severe sepsis with elevated lactic acid 3.8. He has since improved later 7/13.         Severe acute metabolic encephalopathy - improved  RLE gangrene, osteomyelitis 2/2 debridement and resection of 1st MT  Recent bacteremia  Recently admitted 7/6 - 7/12 (adm day) for right first toe gangrene, necrosis and osteomyelitis, requiring debridement and surgical resection of the 1st metatarsal. ID was consulted and Ancef has been prescribed for S aureus bacteremia, as well as Bactrim.  *Discharged to TCU 7/12 and was doing well until after dinner when staff reported he became confused and agitated and was apparently paranoid that he was part of a scam and not at a care facility.   *initially with severe acute agitation with delirium, paranoia, and hallucinations.   *required soft restraints and IV ativan in ED and was somnolent after. Later on 7/13 calm, cooperative, and restraints off.  *lactic acid improved - possibly d/t agitation+dehydration vs ongoing infection/severe sepsis  - initially npo, awake and alert since and mod carb diet  - resume ID abx plan with bactrim DS for a week total and continue cefazolin (has midline in place)  - sitter in place  - prn lorazepam or zyprexa  - IVF given  - will consider ID consultation pending clinical course  - Will plan to monitor thru 7/15 at least.   - CC/SW consult for planning of  discharge hopefully 7/15 or so - staff please continue to update patient with referrals etc and plan for possible discharge     Acute hypochloremic hyponatremia - improving  Mild at 129. Suspect due to hypovolemia. IVF given.   - Na 131 improving  - follow BMP     Resolving EV due to ATN, with rhabdomyolysis  Baseline creatinine 1.1 on admission 2.6. CK 3000s, improved during recent admission.   - CK now WNL     Permanent Afib, status post AV node ablation and CRT 2010  Chronic anticoagulation  Supratheraputic INR  History of NSVT  Hypertension   Hyperlipidemia  *Reportedly having failed Amiodarone in the past. INR was greater than 10 on admission and reversed for surgery. Plan had been to consider resumption of Warfarin on 7/12. TTE on admission showed preserved LVEF with mild to moderate TR.  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 Monitor closely while hospitalized.  - Hgb stable in 9-10 range          Diet: NPO for Medical/Clinical Reasons Except for: Meds, Ice Chips    DVT Prophylaxis: Warfarin  Duran Catheter: Not present  Lines: PRESENT             Cardiac Monitoring: None  Code Status: Full Code      Clinically Significant Risk Factors Present on Admission        # Hypokalemia: Lowest K = 3.2 mmol/L in last 2 days, will replace as needed  #  Hyponatremia: Lowest Na = 129 mmol/L in last 2 days, will monitor as appropriate      # Hypoalbuminemia: Lowest albumin = 2.7 g/dL at 7/12/2024  8:57 PM, will monitor as appropriate  # Drug Induced Coagulation Defect: home medication list includes an anticoagulant medication    # Hypertension: Noted on problem list  # Chronic heart failure with preserved ejection fraction: heart failure noted on problem list and last echo with EF >50%  # Acute Respiratory Failure: Documented O2 saturation < 91%.  Continue supplemental oxygen as needed   # Anemia: based on hgb <11          # DMII: A1C = 7.6 % (Ref range: <5.7 %) within past 6 months        # Financial/Environmental Concerns:    # Support System: poor social support noted in nursing assessment   # Pacemaker present       Disposition Plan     Medically Ready for Discharge: Anticipated in 2-4 Days             Andrea Huang MD  Hospitalist Service  [facility]  Securely message with [employer] (more info)  Text page via [employer] Paging/Directory   ______________________________________________________________________    Interval History   Familiar to me from discharge yesterday. Still confused in ED this morning but reportedly improved later today and restraints able to come off. Vitals and labs improving. Resumed ID abx plan.    Physical Exam   Vital Signs: Temp: 97.8  F (36.6  C) Temp src: Temporal BP: 133/86 Pulse: 71   Resp: 20 SpO2: 95 % O2 Device: Nasal cannula Oxygen Delivery: 4 LPM  Weight: 0 lbs 0 oz    Gen: NAD, pleasant  HEENT: MMM  Resp: no focal crackles,  no wheezes, no increased work of resp  CV: S1S2 heard, reg rhythm, reg rate  Abdo: soft, nontender, nondistended, bowel sounds present  Ext: calves nontender, well perfused  Neuro: aa, conversant, forgetful of settings in morning, baseline legally blind, moving ext      Medical Decision Making       41 MINUTES SPENT BY ME on the date of service doing chart review, history, exam,  documentation & further activities per the note.      Data     I have personally reviewed the following data over the past 24 hrs:    11.6 (H)  \   8.6 (L)   / 419     132 (L) 97 (L) 13.7 /  120 (H)   3.4 26 1.07 \     ALT: 17 AST: 43 AP: 135 TBILI: 0.3   ALB: 2.7 (L) TOT PROTEIN: 7.3 LIPASE: N/A     Procal: N/A CRP: 72.19 (H) Lactic Acid: 1.4       INR:  1.59 (H) PTT:  N/A   D-dimer:  N/A Fibrinogen:  N/A

## 2024-07-13 NOTE — ED NOTES
Charge nurse attempted to talk to patient about line and labs.  Patient refused care and was informed that due to his confusion (thinking that he is in a van) we are concerned and need to get labs and use antibiotics.

## 2024-07-13 NOTE — ED NOTES
[facility]  ED Nurse Handoff Report    ED Chief complaint: Altered Mental Status      ED Diagnosis:   Final diagnoses:   None       Code Status: deferred to admitting    Allergies: No Known Allergies    Patient Story:   BIBA from [facility]. Was discharged form hospital 2 hours prior to going to [facility]. At [facility] patient became agitated and did not want to stay thinking he was in a truck in the parking lot. Per administration at [facility] he had to return to Ed.     Focused Assessment:    Neuro: Alert, oriented x 2  Respiratory:Clear lung sounds, on room air   Cardiology:  pacemaker   Gastrointestinal: soft, non tender, non distended   Genitourinary/Renal:    Musculoskeletal: moves all extremities   Skin: Intact skin   Lines: midline right upper arm    Labs Ordered and Resulted from Time of ED Arrival to Time of ED Departure   COMPREHENSIVE METABOLIC PANEL - Abnormal       Result Value    Sodium 129 (*)     Potassium 3.8      Carbon Dioxide (CO2) 22      Anion Gap 13      Urea Nitrogen 13.2      Creatinine 1.03      GFR Estimate 82      Calcium 8.3 (*)     Chloride 94 (*)     Glucose 228 (*)     Alkaline Phosphatase 135      AST 43      ALT 17      Protein Total 7.3      Albumin 2.7 (*)     Bilirubin Total 0.3     INR - Abnormal    INR 1.55 (*)    CBC WITH PLATELETS AND DIFFERENTIAL - Abnormal    WBC Count 15.6 (*)     RBC Count 3.37 (*)     Hemoglobin 9.6 (*)     Hematocrit 29.4 (*)     MCV 87      MCH 28.5      MCHC 32.7      RDW 14.9      Platelet Count 573 (*)     % Neutrophils 83      % Lymphocytes 10      % Monocytes 6      % Eosinophils 0      % Basophils 0      % Immature Granulocytes 1      NRBCs per 100 WBC 0      Absolute Neutrophils 13.0 (*)     Absolute Lymphocytes 1.5      Absolute Monocytes 0.9      Absolute Eosinophils 0.1      Absolute Basophils 0.0      Absolute Immature Granulocytes 0.1      Absolute NRBCs 0.0     GLUCOSE BY METER - Abnormal    GLUCOSE BY METER POCT 224 (*)     ISTAT GASES LACTATE VENOUS POCT - Abnormal    Lactic Acid POCT 3.8 (*)     Bicarbonate Venous POCT 26      O2 Sat, Venous POCT 38 (*)     pCO2 Venous POCT 48      pH Venous POCT 7.34      pO2 Venous POCT 24 (*)     Base Excess/Deficit (+/-) POCT -1.0     GLUCOSE MONITOR NURSING POCT   LACTIC ACID WHOLE BLOOD   BLOOD CULTURE   BLOOD CULTURE        No orders to display         Treatments and/or interventions provided:      Medications   ceFAZolin (ANCEF) 2 g in 100 mL D5W intermittent infusion (0 g Intravenous Stopped 7/12/24 2226)   sulfamethoxazole-trimethoprim (BACTRIM DS) 800-160 MG per tablet 1 tablet (1 tablet Oral Not Given 7/12/24 2116)   LORazepam (ATIVAN) 2 MG/ML injection (has no administration in time range)   ciprofloxacin (CIPRO) infusion 400 mg (has no administration in time range)   magnesium sulfate 2 g in 50 mL sterile water intermittent infusion (2 g Intravenous $New Bag 7/12/24 2228)   LORazepam (ATIVAN) injection 1 mg (1 mg Intravenous $Given 7/12/24 2058)   sodium chloride 0.9% BOLUS 1,000 mL (1,000 mLs Intravenous $New Bag 7/12/24 2226)        Patient's response to treatments and/or interventions:   Resting comfortably    To be done/followed up on inpatient unit:    See any in-patient orders    Does this patient have any cognitive concerns?: Disoriented to place    Activity level - Baseline/Home:    Total Care    Activity Level - Current:     Total Care    Patient's Preferred language: English     Needed?: No    Isolation: None  Infection: Not Applicable  Patient tested for COVID 19 prior to admission: NO    Bariatric?: Yes    Vital Signs:   Vitals:    07/12/24 2012 07/12/24 2200 07/12/24 2245   BP: (!) 144/109 (!) 172/79 (!) 149/95   Pulse: 70 72 (!) 28   Resp: 18 19 25   Temp: 97.8  F (36.6  C)     TempSrc: Temporal     SpO2: 92% 97%        Cardiac Rhythm:     Was the PSS-3 completed:   Yes    Family Comments: na    For the majority of the shift this patient's behavior was Yellow.    Behavioral interventions performed were reorientation; in soft restraints;     ED NURSE PHONE NUMBER: [phone]

## 2024-07-13 NOTE — ED PROVIDER NOTES
"  Emergency Department Note      History of Present Illness     Chief Complaint   Altered Mental Status      HPI   Mckay Hsu is a 64 year old male anticoagulated on Warfarin with history of atrial fibrillation, CHF, hypertension, hyperlipidemia, type 2 diabetes mellitus, traumatic rhabdomyolysis, and retinitis pigmentosa who presents to the ED via EMS for altered mental status. EMS reports patient was discharged 2 hours prior to arrival from [facility] in [place]. Patient was at the facility stating, \"he was not getting appropriate care\" and \"the facility was misrepresented.\" Staff called police. No complaints today or acute health concerns. EMS unable to reach social work but did speak with the charge nurse at [facility]. He was mildly short of breath due to the situation and stress. Patient vitally stable.   Patient reports there was no building or room for him to stay and he would be living on a mattress in a van. He did not get any care or medications and felt unsafe.          Independent Historian   EMS as detailed above.    Review of External Notes   I reviewed Dr. Huang's Discharge Summary from earlier today regarding MSSA bacteremia.     Past Medical History     Medical History and Problem List   Atrial fibrillation   Congestive heart failure   Hypertension   Hyperlipidemia  Hyponatremia  Obesity   Retinitis pigmentosa  Type 2 diabetes  Subclinical hyperthyroidism   Primary cardiomyopathy   Atrioventricular block, complete   Long-term (current) use of anticoagulants   Acute kidney injury   Cellulitis   Traumatic rhabdomyolysis  Osteomyelitis  NSVT  Blindness   Ventral hernia     Medications   Norvasc  Dulcolax  Ancef  Novolog   Lantus  Cozaar  Glucophage  Toprol XL  Nitrostat  Zocor  Bactrim   Warfarin     Surgical History   Right foot amputation  Amputate toe(s), right x2  Cardioversion  Colonoscopy  Ablation AV node  Implant pacemaker  Irrigation and debridement right foot  Tonsillectomy "   Debridement     Physical Exam     Patient Vitals for the past 24 hrs:   BP Temp Temp src Pulse Resp SpO2   07/13/24 0230 (!) 107/97 -- -- 79 -- --   07/13/24 0215 -- -- -- -- -- 91 %   07/13/24 0200 (!) 138/91 -- -- 70 -- 91 %   07/13/24 0130 121/79 -- -- 70 -- 95 %   07/13/24 0100 129/63 -- -- 69 -- 93 %   07/13/24 0030 114/58 -- -- 70 -- 95 %   07/13/24 0015 134/67 -- -- 70 -- 93 %   07/13/24 0000 (!) 146/69 -- -- 70 -- (!) 76 %   07/12/24 2330 (!) 141/104 -- -- 73 28 --   07/12/24 2245 (!) 149/95 -- -- (!) 28 25 --   07/12/24 2200 (!) 172/79 -- -- 72 19 97 %   07/12/24 2012 (!) 144/109 97.8  F (36.6  C) Temporal 70 18 92 %     Physical Exam  General: Awake, alert, in no acute distress   HEENT: Atraumatic   EOM normal   External ears normal   Trachea midline  Neck: Supple, normal ROM  CV: Regular rate, regular rhythm   No lower extremity edema  2+ radial and DP pulses  PULM: Breath sounds normal bilaterally  No wheezes or rales  ABD: Soft, non-tender, non-distended  Normal bowel sounds   No rebound or guarding   MSK: No gross deformities  NEURO: Alert, no focal deficits. Legally blind. Not tracking much visually. CN 2-12 otherwise intact. No obvious strength deficits  Skin: diffuse bruising on arms    Diagnostics     Lab Results   Labs Ordered and Resulted from Time of ED Arrival to Time of ED Departure   COMPREHENSIVE METABOLIC PANEL - Abnormal       Result Value    Sodium 129 (*)     Potassium 3.8      Carbon Dioxide (CO2) 22      Anion Gap 13      Urea Nitrogen 13.2      Creatinine 1.03      GFR Estimate 82      Calcium 8.3 (*)     Chloride 94 (*)     Glucose 228 (*)     Alkaline Phosphatase 135      AST 43      ALT 17      Protein Total 7.3      Albumin 2.7 (*)     Bilirubin Total 0.3     INR - Abnormal    INR 1.55 (*)    CBC WITH PLATELETS AND DIFFERENTIAL - Abnormal    WBC Count 15.6 (*)     RBC Count 3.37 (*)     Hemoglobin 9.6 (*)     Hematocrit 29.4 (*)     MCV 87      MCH 28.5      MCHC 32.7      RDW  14.9      Platelet Count 573 (*)     % Neutrophils 83      % Lymphocytes 10      % Monocytes 6      % Eosinophils 0      % Basophils 0      % Immature Granulocytes 1      NRBCs per 100 WBC 0      Absolute Neutrophils 13.0 (*)     Absolute Lymphocytes 1.5      Absolute Monocytes 0.9      Absolute Eosinophils 0.1      Absolute Basophils 0.0      Absolute Immature Granulocytes 0.1      Absolute NRBCs 0.0     GLUCOSE BY METER - Abnormal    GLUCOSE BY METER POCT 224 (*)    ISTAT GASES LACTATE VENOUS POCT - Abnormal    Lactic Acid POCT 3.8 (*)     Bicarbonate Venous POCT 26      O2 Sat, Venous POCT 38 (*)     pCO2 Venous POCT 48      pH Venous POCT 7.34      pO2 Venous POCT 24 (*)     Base Excess/Deficit (+/-) POCT -1.0     LACTIC ACID WHOLE BLOOD - Normal    Lactic Acid 1.2     GLUCOSE MONITOR NURSING POCT   GLUCOSE MONITOR NURSING POCT   GLUCOSE MONITOR NURSING POCT   BLOOD CULTURE   BLOOD CULTURE       Imaging   No orders to display       EKG   ECG taken at 2118, ECG read at 2313  Wide QRS rhythm   Left axis deviation  Nonspecific intraventricular block  Nonspecific T wave abnormality   Abnormal ECG   No change as compared to prior, dated 6/6/24.  Rate 70 bpm. NV interval * ms. QRS duration 128 ms. QT/QTc 482/520 ms. P-R-T axes * -64 103.    Independent Interpretation   None    ED Course      Medications Administered   Medications   LORazepam (ATIVAN) 2 MG/ML injection (has no administration in time range)   ciprofloxacin (CIPRO) infusion 400 mg (0 mg Intravenous Stopped 7/13/24 0024)   OLANZapine (zyPREXA) injection 5 mg (has no administration in time range)   lidocaine 1 % 0.1-1 mL (has no administration in time range)   lidocaine (LMX4) cream (has no administration in time range)   sodium chloride (PF) 0.9% PF flush 3 mL (has no administration in time range)   sodium chloride (PF) 0.9% PF flush 3 mL (has no administration in time range)   acetaminophen (TYLENOL) tablet 650 mg (has no administration in time range)      Or   acetaminophen (TYLENOL) Suppository 650 mg (has no administration in time range)   senna-docusate (SENOKOT-S/PERICOLACE) 8.6-50 MG per tablet 1 tablet (has no administration in time range)     Or   senna-docusate (SENOKOT-S/PERICOLACE) 8.6-50 MG per tablet 2 tablet (has no administration in time range)   ondansetron (ZOFRAN ODT) ODT tab 4 mg (has no administration in time range)     Or   ondansetron (ZOFRAN) injection 4 mg (has no administration in time range)   calcium carbonate (TUMS) chewable tablet 1,000 mg (has no administration in time range)   benzocaine-menthol (CHLORASEPTIC) 6-10 MG lozenge 1 lozenge (has no administration in time range)   guaiFENesin-dextromethorphan (ROBITUSSIN DM) 100-10 MG/5ML syrup 10 mL (has no administration in time range)   glucose gel 15-30 g (has no administration in time range)     Or   dextrose 50 % injection 25-50 mL (has no administration in time range)     Or   glucagon injection 1 mg (has no administration in time range)   ceFAZolin (ANCEF) 2 g in 100 mL D5W intermittent infusion (has no administration in time range)   sulfamethoxazole-trimethoprim (BACTRIM DS) 800-160 MG per tablet 1 tablet (has no administration in time range)   LORazepam (ATIVAN) injection 0.5-1 mg (has no administration in time range)   melatonin tablet 5 mg (has no administration in time range)   insulin aspart (NovoLOG) injection (RAPID ACTING) (has no administration in time range)   sodium chloride 0.9 % infusion ( Intravenous $New Bag 7/13/24 0054)   LORazepam (ATIVAN) injection 1 mg (1 mg Intravenous $Given 7/12/24 2058)   sodium chloride 0.9% BOLUS 1,000 mL (0 mLs Intravenous Stopped 7/12/24 2351)   magnesium sulfate 2 g in 50 mL sterile water intermittent infusion (0 g Intravenous Stopped 7/12/24 2319)   LORazepam (ATIVAN) injection 1 mg (1 mg Intravenous $Given 7/12/24 2353)       Procedures   Procedures     Discussion of Management   Admitting Hospitalist, Dr. Barboza    ED Course   ED  Course as of 07/13/24 0317 Fri Jul 12, 2024 1944 I obtained history and examined the patient as noted above.    2006 Spoke with staff at [facility]. He had a room. He was fine and acting normal. He ate dinner and then became confused.    2313 I spoke with Dr. Barboza, hospitalist, regarding the patient's history and presentation in the emergency department today.         Optional/Additional Documentation  None  In person face to face assessment completed, including an evaluation of the patient's immediate reaction to the intervention, complete review of systems assessment, behavioral assessment and review/assessment of history, drugs and medications, recent labs, etc., and behavioral condition.  The patient experienced: No adverse physical outcome from seclusion/restraint initiation.  The intervention of restraint or seclusion needs to continue.    3:09 AM       Medical Decision Making / Diagnosis     CMS Diagnoses: The Lactic acid level is elevated due to acute stress reaction, agitation, at this time there is no sign of severe sepsis or septic shock.    MIPS       None    [facility]   Mckay Hsu is a 64 year old male who presents by EMS after being discharged from the hospital same day.  Patient had prolonged hospital stay for MSSA bacteremia, osteomyelitis and soft tissue skin infection.  When he arrived to rehab facility he became acutely delirious thinking that he was being held in a transport van with a bed as a temporary solution due to no rooms being available.  I spoke personally with the care facility Lyndsay Louise of the [facility].  The patient was in fact in her room.  Description of him becoming delirious, not redirectable, trying to get out of bed.  He did not fall or hurt himself.    Here, lactic is initially elevated though cleared remarkably after 1 L of IV fluids.  Suspect most likely acute stress reaction and agitation requiring benzodiazepines though nonetheless we will repeat his blood cultures.  Very  low suspicion for recurrent sepsis as he has not missed any doses of antibiotics and has no new signs of infection.  Received his scheduled IV Ancef via his midline.  He was refusing oral Bactrim so given IV Cipro instead.     I suspect most likely cause of his encephalopathy is hospital related delirium, multiple housing changes in the last week compounded by his almost near complete blindness creating difficulty in orienting him to the time of day and location.  Was requiring soft restraints as to not pull out his midline IV.     Admitted to hospital medicine Dr. Barboza.        Disposition   The patient was admitted to the hospital.     Diagnosis     ICD-10-CM    1. Encephalopathy  G93.40                    Scribe Disclosure:  I, Peggy Nicolas, am serving as a scribe at 7:43 PM on 7/12/2024 to document services personally performed by Tarah Berkowitz DO based on my observations and the provider's statements to me.        Tarah Berkowitz DO  07/13/24 0317

## 2024-07-13 NOTE — PROGRESS NOTES
"Pt. Calm and cooperative with cares. States that the place that he was supposed to go for therapy does not exist. States it was \"some form of targeted scam, let's leave it at that\".  "

## 2024-07-13 NOTE — PROVIDER NOTIFICATION
It does not have to be stat   Writer sent message to hospitalist regarding patients status currently, calm and cooperative. Verbalized understanding of criteria for discontinuation of soft restraints. Patient verbalized understanding. Currently patient A&Ox3. Calm and cooperative with staff. Repositioned, Incontinence care provided, ice chips offered. Patient desires to eat, requested from hospitalist if NPO still needed. Soft restraints removed.

## 2024-07-13 NOTE — ED TRIAGE NOTES
BIBA from [facility].  Was discharged form hospital 2 hours prior to going to [facility].  At [facility] patient became agitated and did not want to stay thinking he was in a truck in the parking lot.  Per administration at [facility] he had to return to Ed.      Triage Assessment (Adult)       Row Name 07/12/24 2013          Triage Assessment    Airway WDL WDL        Respiratory WDL    Respiratory WDL WDL        Skin Circulation/Temperature WDL    Skin Circulation/Temperature WDL WDL        Cardiac WDL    Cardiac WDL WDL        Peripheral/Neurovascular WDL    Peripheral Neurovascular WDL WDL        Cognitive/Neuro/Behavioral WDL    Cognitive/Neuro/Behavioral WDL X  thinks he is in a van being driven to hospital        [place] Coma Scale    Best Eye Response 4-->(E4) spontaneous     Best Motor Response 6-->(M6) obeys commands     Best Verbal Response 4-->(V4) confused     Rob Coma Scale Score 14

## 2024-07-13 NOTE — ED NOTES
Patient refusing nursing care because he does not trust nurse and does not think that he is at [facility].

## 2024-07-13 NOTE — PLAN OF CARE
Goal Outcome Evaluation:    .Date/Time 7/13/24     Trauma/Ortho/Medical: Medical    Diagnosis: Acute encephalopathy and agitation.   POD#: Most recent I&D and resection of metatarsals on R foot 7/9/24  Mental Status: Pt A&Ox3-4 since arrival to the unit. Legally blind.   Activity/dangle: Not OOB, T&R while in bed A2 OOB.   Diet: Moderate carb diet, feed assistance.   Pain: Denies.   Duran/Voiding: Using urinal, incon of stool x4 today.   Tele/Restraints/Iso: N/A  02/LDA: Midline to E, SL.   D/C Date: TBD  Other Info: SW following. Dressing to R foot CDI, oncoming nurse aware to assess. Patient calm and cooperative.

## 2024-07-13 NOTE — PROGRESS NOTES
.RECEIVING UNIT ED HANDOFF REVIEW    ED Nurse Handoff Report was reviewed by: Sarita Rodriges RN on July 13, 2024 at 9:39 AM

## 2024-07-13 NOTE — ED NOTES
"Charge RN called to bedside, pt confused, refusing care, pt believes he is in a van. RN attempted de escalation and reorientation. Pt still believes he is in a van. Pt swinging legs over the side of the bed, pt reports \"your job is to take my money, you are delusional.\"   "

## 2024-07-13 NOTE — PHARMACY-ANTICOAGULATION SERVICE
Clinical Pharmacy - Warfarin Dosing Consult     Pharmacy has been consulted to manage this patient s warfarin therapy.  Indication: Atrial Fibrillation  Therapy Goal: INR 2-3  Warfarin Prior to Admission: Yes  Warfarin PTA Regimen: 3.75 mg Tu/F/Eid; 7.5 mg AOD  Recent documented change in oral intake/nutrition: Unknown    INR   Date Value Ref Range Status   07/13/2024 1.59 (H) 0.85 - 1.15 Final   07/12/2024 1.55 (H) 0.85 - 1.15 Final       Recommend warfarin 3.75 mg today.  Pharmacy will monitor Mckay Hsu daily and order warfarin doses to achieve specified goal.      Please contact pharmacy as soon as possible if the warfarin needs to be held for a procedure or if the warfarin goals change.    Lyudmila Love, PharmD, BCPS

## 2024-07-14 LAB
ERYTHROCYTE [DISTWIDTH] IN BLOOD BY AUTOMATED COUNT: 15.1 % (ref 10–15)
GLUCOSE BLDC GLUCOMTR-MCNC: 163 MG/DL (ref 70–99)
GLUCOSE BLDC GLUCOMTR-MCNC: 206 MG/DL (ref 70–99)
GLUCOSE BLDC GLUCOMTR-MCNC: 207 MG/DL (ref 70–99)
GLUCOSE BLDC GLUCOMTR-MCNC: 247 MG/DL (ref 70–99)
GLUCOSE BLDC GLUCOMTR-MCNC: 270 MG/DL (ref 70–99)
HCT VFR BLD AUTO: 27.4 % (ref 40–53)
HGB BLD-MCNC: 8.8 G/DL (ref 13.3–17.7)
INR PPP: 1.6 (ref 0.85–1.15)
MCH RBC QN AUTO: 28.8 PG (ref 26.5–33)
MCHC RBC AUTO-ENTMCNC: 32.1 G/DL (ref 31.5–36.5)
MCV RBC AUTO: 90 FL (ref 78–100)
PLATELET # BLD AUTO: 488 10E3/UL (ref 150–450)
RBC # BLD AUTO: 3.06 10E6/UL (ref 4.4–5.9)
WBC # BLD AUTO: 11.3 10E3/UL (ref 4–11)

## 2024-07-14 PROCEDURE — 250N000013 HC RX MED GY IP 250 OP 250 PS 637: Performed by: HOSPITALIST

## 2024-07-14 PROCEDURE — 36415 COLL VENOUS BLD VENIPUNCTURE: CPT | Performed by: HOSPITALIST

## 2024-07-14 PROCEDURE — 250N000012 HC RX MED GY IP 250 OP 636 PS 637: Performed by: HOSPITALIST

## 2024-07-14 PROCEDURE — 85014 HEMATOCRIT: CPT | Performed by: HOSPITALIST

## 2024-07-14 PROCEDURE — 85610 PROTHROMBIN TIME: CPT | Performed by: HOSPITALIST

## 2024-07-14 PROCEDURE — 250N000011 HC RX IP 250 OP 636: Mod: JZ | Performed by: HOSPITALIST

## 2024-07-14 PROCEDURE — 120N000001 HC R&B MED SURG/OB

## 2024-07-14 PROCEDURE — 99232 SBSQ HOSP IP/OBS MODERATE 35: CPT | Performed by: HOSPITALIST

## 2024-07-14 RX ORDER — NICOTINE POLACRILEX 4 MG
15-30 LOZENGE BUCCAL
Status: DISCONTINUED | OUTPATIENT
Start: 2024-07-14 | End: 2024-07-14

## 2024-07-14 RX ORDER — DEXTROSE MONOHYDRATE 25 G/50ML
25-50 INJECTION, SOLUTION INTRAVENOUS
Status: DISCONTINUED | OUTPATIENT
Start: 2024-07-14 | End: 2024-07-14

## 2024-07-14 RX ORDER — AMOXICILLIN 250 MG
1 CAPSULE ORAL 2 TIMES DAILY PRN
Status: DISCONTINUED | OUTPATIENT
Start: 2024-07-14 | End: 2024-07-14

## 2024-07-14 RX ORDER — METOPROLOL SUCCINATE 100 MG/1
100 TABLET, EXTENDED RELEASE ORAL DAILY
Status: DISCONTINUED | OUTPATIENT
Start: 2024-07-15 | End: 2024-07-16 | Stop reason: HOSPADM

## 2024-07-14 RX ORDER — POLYETHYLENE GLYCOL 3350 17 G/17G
17 POWDER, FOR SOLUTION ORAL DAILY PRN
Status: DISCONTINUED | OUTPATIENT
Start: 2024-07-14 | End: 2024-07-16 | Stop reason: HOSPADM

## 2024-07-14 RX ORDER — LOSARTAN POTASSIUM 100 MG/1
100 TABLET ORAL DAILY
Status: DISCONTINUED | OUTPATIENT
Start: 2024-07-14 | End: 2024-07-16 | Stop reason: HOSPADM

## 2024-07-14 RX ADMIN — SULFAMETHOXAZOLE AND TRIMETHOPRIM 1 TABLET: 800; 160 TABLET ORAL at 20:22

## 2024-07-14 RX ADMIN — INSULIN ASPART 1 UNITS: 100 INJECTION, SOLUTION INTRAVENOUS; SUBCUTANEOUS at 10:21

## 2024-07-14 RX ADMIN — AMLODIPINE BESYLATE 10 MG: 10 TABLET ORAL at 10:18

## 2024-07-14 RX ADMIN — INSULIN ASPART 2 UNITS: 100 INJECTION, SOLUTION INTRAVENOUS; SUBCUTANEOUS at 01:14

## 2024-07-14 RX ADMIN — SIMVASTATIN 40 MG: 40 TABLET, FILM COATED ORAL at 22:23

## 2024-07-14 RX ADMIN — INSULIN GLARGINE 22 UNITS: 100 INJECTION, SOLUTION SUBCUTANEOUS at 10:21

## 2024-07-14 RX ADMIN — INSULIN ASPART 5 UNITS: 100 INJECTION, SOLUTION INTRAVENOUS; SUBCUTANEOUS at 13:32

## 2024-07-14 RX ADMIN — MICONAZOLE NITRATE: 2 POWDER TOPICAL at 10:19

## 2024-07-14 RX ADMIN — INSULIN ASPART 6 UNITS: 100 INJECTION, SOLUTION INTRAVENOUS; SUBCUTANEOUS at 18:20

## 2024-07-14 RX ADMIN — CEFAZOLIN SODIUM 2 G: 2 INJECTION, SOLUTION INTRAVENOUS at 13:32

## 2024-07-14 RX ADMIN — SULFAMETHOXAZOLE AND TRIMETHOPRIM 1 TABLET: 800; 160 TABLET ORAL at 10:18

## 2024-07-14 RX ADMIN — LOSARTAN POTASSIUM 100 MG: 100 TABLET, FILM COATED ORAL at 16:47

## 2024-07-14 RX ADMIN — MICONAZOLE NITRATE: 2 POWDER TOPICAL at 22:24

## 2024-07-14 RX ADMIN — METOPROLOL SUCCINATE 50 MG: 50 TABLET, EXTENDED RELEASE ORAL at 10:18

## 2024-07-14 RX ADMIN — CEFAZOLIN SODIUM 2 G: 2 INJECTION, SOLUTION INTRAVENOUS at 03:59

## 2024-07-14 RX ADMIN — CEFAZOLIN SODIUM 2 G: 2 INJECTION, SOLUTION INTRAVENOUS at 20:23

## 2024-07-14 RX ADMIN — WARFARIN SODIUM 3.75 MG: 2.5 TABLET ORAL at 18:20

## 2024-07-14 ASSESSMENT — ACTIVITIES OF DAILY LIVING (ADL)
ADLS_ACUITY_SCORE: 55
ADLS_ACUITY_SCORE: 54
ADLS_ACUITY_SCORE: 49
ADLS_ACUITY_SCORE: 49
ADLS_ACUITY_SCORE: 48
ADLS_ACUITY_SCORE: 54
ADLS_ACUITY_SCORE: 49
ADLS_ACUITY_SCORE: 48
ADLS_ACUITY_SCORE: 55
ADLS_ACUITY_SCORE: 55
ADLS_ACUITY_SCORE: 50
ADLS_ACUITY_SCORE: 49
ADLS_ACUITY_SCORE: 54
ADLS_ACUITY_SCORE: 54
ADLS_ACUITY_SCORE: 49
ADLS_ACUITY_SCORE: 54
ADLS_ACUITY_SCORE: 54
ADLS_ACUITY_SCORE: 49
ADLS_ACUITY_SCORE: 48
ADLS_ACUITY_SCORE: 48
ADLS_ACUITY_SCORE: 55
ADLS_ACUITY_SCORE: 50
ADLS_ACUITY_SCORE: 54

## 2024-07-14 NOTE — PROGRESS NOTES
Pt alert and oriented x4. Assist of 2 with lift. Urinal at bedside. Vss on 2L. Denies pain. RLE dressing intact.

## 2024-07-14 NOTE — PLAN OF CARE
Goal Outcome Evaluation:  Shift Summary [id number]    Admitting Diagnosis: Encephalopathy [G93.40]   Vitals Vital signs:  Temp: 97.4  F (36.3  C) Temp src: Oral BP: 138/73 Pulse: 70   Resp: 16 SpO2: 93 % O2 Device: None (Room air)    Pain Denies  A&Ox3  Voiding Incontinent  Mobility A2  Tele N/A  CMS Numbness present  Lung Sounds Diminished   GI BS +4  Dressing Intact    Orders Placed This Encounter      Moderate Consistent Carb (60 g CHO per Meal) Diet       Plan:

## 2024-07-14 NOTE — PROGRESS NOTES
[facility]    Medicine Progress Note - Hospitalist Service    Date of Admission:  7/12/2024    Assessment & Plan   Mckay Hsu is a 64 year old gentleman with past medical history that is most notable for permanent atrial fibrillation on Warfarin, as well as Type 2 Diabetes mellitus, legal blindness, and vulnerable adult status, who was admitted here from 7/6/24 through 7/12/24 for right first toe gangrene, necrosis and osteomyelitis. He was discharged earlier today to a TCU, but returned for acute agitation, delirium, and paranoia. He initially had findings concerning for severe sepsis with elevated lactic acid 3.8. He has since improved later 7/13.         Severe acute metabolic encephalopathy - improved/resolved  RLE gangrene, osteomyelitis 2/2 debridement and resection of 1st MT  Recent bacteremia  Recently admitted 7/6 - 7/12 (adm day) for right first toe gangrene, necrosis and osteomyelitis, requiring debridement and surgical resection of the 1st metatarsal. ID was consulted and Ancef has been prescribed for S aureus bacteremia, as well as Bactrim.  *Discharged to TCU 7/12 and was doing well until after dinner when staff reported he became confused and agitated and was apparently paranoid that he was part of a scam and not at a care facility.   *initially with severe acute agitation with delirium, paranoia, and hallucinations.   *required soft restraints and IV ativan in ED and was somnolent after. Later on 7/13 calm, cooperative, and restraints off.  *lactic acid improved - possibly d/t agitation+dehydration vs ongoing infection/severe sepsis  - initially npo, awake and alert since and mod carb diet  - resume ID abx plan with bactrim DS for a week total and continue cefazolin (has midline in place)  - sitter in place  - prn lorazepam or zyprexa  - IVF given  - will consider ID consultation pending clinical course  - Will plan to monitor thru 7/15 at least.   - CC/SW consult for  planning of discharge hopefully 7/15 or so - staff please continue to update patient with referrals etc and plan for possible discharge   - doing alright so far 7/14, continue to monitor, consider discharge to TCU 7/15 at earliest pending clinical course - monitor to ensure no other infection somehow brewing up vs other cause of recent encephalopathy     Acute hypochloremic hyponatremia - improving  Mild at 129. Suspect due to hypovolemia. IVF given.   - Na 131 improving  - follow BMP     Resolving EV due to ATN, with rhabdomyolysis  Baseline creatinine 1.1 on admission 2.6. CK 3000s, improved during recent admission.   - CK now WNL     Permanent Afib, status post AV node ablation and CRT 2010  Chronic anticoagulation  Supratheraputic INR  History of NSVT  Hypertension   Hyperlipidemia  *Reportedly having failed Amiodarone in the past. INR was greater than 10 on admission and reversed for surgery. Plan had been to consider resumption of Warfarin on 7/12. TTE on admission showed preserved LVEF with mild to moderate TR.  - follow INR  - pharmacy consulted for warfarin dosing (appreciated)  - PTA metop continued at reduced dose initially normal dose resuming 7/15, PTA amlodipine resumed  - PTA losartan held initially, resumed 7/15     Type 2 Diabetes mellitus  *HbA1 7.6%  *PTA glargine, metformin, sliding scale insulin  - continue high res sliding scale insulin  - restart glargine qA 7/14 - 22units for now - (was up to 25 units on 7/12 during discharge),   - will order 25units for 7/15 - likely to need further titration     Concern for vulnerable adult status  Blindness 2/2 retinitis pigmentosa  *As per recent documentation     Chronic anemia and left buttock hematoma: CT this admission had shown a high-density subcutaneous stranding overlying the left buttock with a 2.5 cm circumscribed high-density fluid collection. Monitor closely while hospitalized.  - Hgb stable in 9-10 range          Diet: Moderate Consistent  Carb (60 g CHO per Meal) Diet    DVT Prophylaxis: Warfarin  Duran Catheter: Not present  Lines: PRESENT             Cardiac Monitoring: None  Code Status: Full Code      Clinically Significant Risk Factors         # Hyponatremia: Lowest Na = 129 mmol/L in last 2 days, will monitor as appropriate      # Hypoalbuminemia: Lowest albumin = 2.7 g/dL at 7/12/2024  8:57 PM, will monitor as appropriate     # Hypertension: Noted on problem list  # Chronic heart failure with preserved ejection fraction: heart failure noted on problem list and last echo with EF >50%            # DMII: A1C = 7.6 % (Ref range: <5.7 %) within past 6 months, PRESENT ON ADMISSION      # Financial/Environmental Concerns:    # Support System: poor social support noted in nursing assessment   # Pacemaker present       Disposition Plan     Medically Ready for Discharge: Anticipated Tomorrow             Andrea Huang MD  Hospitalist Service  [facility]  Securely message with [employer] (more info)  Text page via [employer] Paging/Directory   ______________________________________________________________________    Interval History   Seen and examined in AM. Calm, cooperative, no new pain or concerns.   Oriented to self and place.   Monitoring in hospital another day at least given quick return and elevated lactic etc.    Physical Exam   Vital Signs: Temp: 97.6  F (36.4  C) Temp src: Oral BP: 121/85 Pulse: 78   Resp: 16 SpO2: 90 % O2 Device: None (Room air) Oxygen Delivery: 2 LPM  Weight: 0 lbs 0 oz    Gen: NAD, pleasant  HEENT: mmm, legally blind  Resp: no focal crackles, no wheezes, no increased work of resp  CV: S1S2 heard, reg rhythm, reg rate  Abdo: soft, nontender, nondistended, bowel sounds present  Ext: calves nontender, well perfused, RLE dressing CDI  Neuro: aa, conversing, ox3, moving ext, CN grossly intact, no facial asymmetry      Medical Decision Making       41 MINUTES SPENT BY ME on the date of service doing chart  review, history, exam, documentation & further activities per the note.      Data     I have personally reviewed the following data over the past 24 hrs:    Procal: N/A CRP: N/A Lactic Acid: N/A         Imaging results reviewed over the past 24 hrs:   Recent Results (from the past 24 hour(s))   XR Chest Port 1 View    Narrative    EXAM: XR CHEST PORT 1 VIEW  LOCATION: [facility]  DATE: 7/13/2024    INDICATION: sob  COMPARISON: CT chest, abdomen and pelvis 7/6/2024      Impression    IMPRESSION: Low lung volumes with bronchovascular crowding. Right infrahilar opacity is favored to reflect atelectasis. No focal consolidation, significant pleural effusion or pneumothorax. Similar cardiomediastinal silhouette and left chest wall cardiac   device.

## 2024-07-14 NOTE — PLAN OF CARE
Goal Outcome Evaluation:    .Date/Time 7/14/24 [id number]    Trauma/Ortho/Medical: Medical     Diagnosis: Severe acute metabolic encephalopathy- resolved  Recent fall at home, RLE gangrene/ osteomyelitis with debridement and resection of 1st MT.   Mental Status:A&Ox4  Activity/dangle: Up A2 Sarah steady. Patient up in recliner for a while today, up to BR had BM on toilet.  Diet: Moderate Carb Diet, tray set-up required d/t blindness.   Pain: Denies  Duran/Voiding: Voiding in urinal, adequately.   Tele/Restraints/Iso: N/A  02/LDA: Midline to RUE, blood return noted. SL.   D/C Date: TBD.  Other Info: Dressing changed to RLE, patient tolerated well.

## 2024-07-14 NOTE — PROGRESS NOTES
D: Writer sent a referral for [facility] and contacted [facility] ([phone]) and left a VM inquiring for a call back to know about a bed hold.     If needing to see a consult note, one will be completed, but an accurate consult note can be reviewed from  on 07/11    FIORDALIZA Murguia  [facility] Work

## 2024-07-15 LAB
ANION GAP SERPL CALCULATED.3IONS-SCNC: 7 MMOL/L (ref 7–15)
BUN SERPL-MCNC: 13.2 MG/DL (ref 8–23)
CALCIUM SERPL-MCNC: 8.3 MG/DL (ref 8.8–10.2)
CHLORIDE SERPL-SCNC: 98 MMOL/L (ref 98–107)
CREAT SERPL-MCNC: 1.14 MG/DL (ref 0.67–1.17)
EGFRCR SERPLBLD CKD-EPI 2021: 72 ML/MIN/1.73M2
GLUCOSE BLDC GLUCOMTR-MCNC: 188 MG/DL (ref 70–99)
GLUCOSE BLDC GLUCOMTR-MCNC: 194 MG/DL (ref 70–99)
GLUCOSE BLDC GLUCOMTR-MCNC: 214 MG/DL (ref 70–99)
GLUCOSE BLDC GLUCOMTR-MCNC: 215 MG/DL (ref 70–99)
GLUCOSE BLDC GLUCOMTR-MCNC: 229 MG/DL (ref 70–99)
GLUCOSE BLDC GLUCOMTR-MCNC: 258 MG/DL (ref 70–99)
GLUCOSE SERPL-MCNC: 251 MG/DL (ref 70–99)
HCO3 SERPL-SCNC: 27 MMOL/L (ref 22–29)
INR PPP: 1.56 (ref 0.85–1.15)
PATH REPORT.COMMENTS IMP SPEC: NORMAL
PATH REPORT.COMMENTS IMP SPEC: NORMAL
PATH REPORT.FINAL DX SPEC: NORMAL
PATH REPORT.GROSS SPEC: NORMAL
PATH REPORT.MICROSCOPIC SPEC OTHER STN: NORMAL
PATH REPORT.RELEVANT HX SPEC: NORMAL
PHOTO IMAGE: NORMAL
POTASSIUM SERPL-SCNC: 3.5 MMOL/L (ref 3.4–5.3)
SODIUM SERPL-SCNC: 132 MMOL/L (ref 135–145)

## 2024-07-15 PROCEDURE — 250N000013 HC RX MED GY IP 250 OP 250 PS 637: Performed by: HOSPITALIST

## 2024-07-15 PROCEDURE — 82565 ASSAY OF CREATININE: CPT | Performed by: INTERNAL MEDICINE

## 2024-07-15 PROCEDURE — 36415 COLL VENOUS BLD VENIPUNCTURE: CPT

## 2024-07-15 PROCEDURE — 120N000001 HC R&B MED SURG/OB

## 2024-07-15 PROCEDURE — 250N000011 HC RX IP 250 OP 636: Mod: JZ | Performed by: HOSPITALIST

## 2024-07-15 PROCEDURE — 36415 COLL VENOUS BLD VENIPUNCTURE: CPT | Performed by: INTERNAL MEDICINE

## 2024-07-15 PROCEDURE — 250N000013 HC RX MED GY IP 250 OP 250 PS 637

## 2024-07-15 PROCEDURE — 85610 PROTHROMBIN TIME: CPT

## 2024-07-15 PROCEDURE — 99232 SBSQ HOSP IP/OBS MODERATE 35: CPT | Performed by: INTERNAL MEDICINE

## 2024-07-15 RX ADMIN — LOSARTAN POTASSIUM 100 MG: 100 TABLET, FILM COATED ORAL at 08:50

## 2024-07-15 RX ADMIN — CEFAZOLIN SODIUM 2 G: 2 INJECTION, SOLUTION INTRAVENOUS at 11:49

## 2024-07-15 RX ADMIN — AMLODIPINE BESYLATE 10 MG: 10 TABLET ORAL at 08:50

## 2024-07-15 RX ADMIN — CEFAZOLIN SODIUM 2 G: 2 INJECTION, SOLUTION INTRAVENOUS at 21:33

## 2024-07-15 RX ADMIN — SIMVASTATIN 40 MG: 40 TABLET, FILM COATED ORAL at 21:50

## 2024-07-15 RX ADMIN — METOPROLOL SUCCINATE 100 MG: 100 TABLET, EXTENDED RELEASE ORAL at 08:50

## 2024-07-15 RX ADMIN — INSULIN GLARGINE 22 UNITS: 100 INJECTION, SOLUTION SUBCUTANEOUS at 08:52

## 2024-07-15 RX ADMIN — INSULIN ASPART 4 UNITS: 100 INJECTION, SOLUTION INTRAVENOUS; SUBCUTANEOUS at 18:30

## 2024-07-15 RX ADMIN — INSULIN ASPART 5 UNITS: 100 INJECTION, SOLUTION INTRAVENOUS; SUBCUTANEOUS at 11:49

## 2024-07-15 RX ADMIN — CEFAZOLIN SODIUM 2 G: 2 INJECTION, SOLUTION INTRAVENOUS at 04:36

## 2024-07-15 RX ADMIN — WARFARIN SODIUM 7.5 MG: 5 TABLET ORAL at 12:59

## 2024-07-15 RX ADMIN — SULFAMETHOXAZOLE AND TRIMETHOPRIM 1 TABLET: 800; 160 TABLET ORAL at 21:50

## 2024-07-15 RX ADMIN — INSULIN ASPART 3 UNITS: 100 INJECTION, SOLUTION INTRAVENOUS; SUBCUTANEOUS at 08:48

## 2024-07-15 RX ADMIN — MICONAZOLE NITRATE: 2 POWDER TOPICAL at 21:54

## 2024-07-15 RX ADMIN — MICONAZOLE NITRATE: 2 POWDER TOPICAL at 08:54

## 2024-07-15 RX ADMIN — SULFAMETHOXAZOLE AND TRIMETHOPRIM 1 TABLET: 800; 160 TABLET ORAL at 08:50

## 2024-07-15 ASSESSMENT — ACTIVITIES OF DAILY LIVING (ADL)
ADLS_ACUITY_SCORE: 48
ADLS_ACUITY_SCORE: 51
ADLS_ACUITY_SCORE: 48
ADLS_ACUITY_SCORE: 51
ADLS_ACUITY_SCORE: 51
ADLS_ACUITY_SCORE: 57
ADLS_ACUITY_SCORE: 48
ADLS_ACUITY_SCORE: 57
ADLS_ACUITY_SCORE: 48
ADLS_ACUITY_SCORE: 48
ADLS_ACUITY_SCORE: 57
ADLS_ACUITY_SCORE: 48
ADLS_ACUITY_SCORE: 48
DEPENDENT_IADLS:: CLEANING;COOKING;MEAL PREPARATION;TRANSPORTATION
ADLS_ACUITY_SCORE: 51
ADLS_ACUITY_SCORE: 57
ADLS_ACUITY_SCORE: 51
ADLS_ACUITY_SCORE: 57
ADLS_ACUITY_SCORE: 51
ADLS_ACUITY_SCORE: 51
ADLS_ACUITY_SCORE: 48
ADLS_ACUITY_SCORE: 51
ADLS_ACUITY_SCORE: 51
ADLS_ACUITY_SCORE: 48

## 2024-07-15 NOTE — PLAN OF CARE
Goal Outcome Evaluation:    Diagnosis: Severe acute metabolic encephalopathy- resolved  Recent fall at home, RLE gangrene/ osteomyelitis with debridement and resection of 1st MT, Legal blind. A&Ox4, Activity: Up A2 Sarah steady. Moderate Carb Diet. Denies pain & Dressing/Ace wrap on RLE, patient tolerated well. Utilizing a urinal @ bedside. Midline to RUE, blood return noted. SL.   D/C Date: TBD.
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
## 2024-07-15 NOTE — PLAN OF CARE
Goal Outcome Evaluation:  Shift Summary [id number]    Admitting Diagnosis: Encephalopathy [G93.40]   Vitals. Stable   Pain 0/10. Taking PRN. Last dose   A&Ox4  Voiding . Voided 1 time   Mobility. 2 assist with sid steady   Tele. NA  CMS. Baseline numbness and tingling , Right  foot wound and right knee scabs   Lung Sounds clear  on RA via   GI.Cont of bowel and bladder   Dressing. Right foot Dressing CDI     Orders Placed This Encounter      Moderate Consistent Carb (60 g CHO per Meal) Diet       Plan: Pt is legally blind , was up in chair , PICC CDI , denies pain , no bowel on shift. Waiting placement .

## 2024-07-15 NOTE — PROGRESS NOTES
Care Management Follow Up    Length of Stay (days): 3    Expected Discharge Date: 07/15/2024     Concerns to be Addressed: discharge planning     Patient plan of care discussed at interdisciplinary rounds: Yes    Anticipated Discharge Disposition: Transitional Care     Anticipated Discharge Services: None  Anticipated Discharge DME: None    Patient/family educated on Medicare website which has current facility and service quality ratings: other (see comments) (Returning to prior arangements)  Education Provided on the Discharge Plan: Yes  Patient/Family in Agreement with the Plan: yes    Referrals Placed by CM/SW: Post Acute Facilities  Private pay costs discussed: Not applicable    Additional Information:  Writer was informed by Dr. Mujica that patient is not wanting to go back to [facility]. Dr. Mujica stated that patient said if he went back he would have the same episode he had before.     Writer went in and spoke with patient at bedside. Patient stated they were not wanting to go back. Writer asked for other places they had and patient stated they wanted ones in the [facility] area. Writer asked about [facility] and patient stated he would be okay with that. Writer sent referrals to [facility], [facility], [facility], [facility], and [facility]     Writer will continue to follow for discharge plan.     LIZBETH Raymundo
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
## 2024-07-16 VITALS
RESPIRATION RATE: 18 BRPM | TEMPERATURE: 97.7 F | DIASTOLIC BLOOD PRESSURE: 83 MMHG | HEART RATE: 79 BPM | SYSTOLIC BLOOD PRESSURE: 165 MMHG | OXYGEN SATURATION: 92 %

## 2024-07-16 LAB
GLUCOSE BLDC GLUCOMTR-MCNC: 195 MG/DL (ref 70–99)
GLUCOSE BLDC GLUCOMTR-MCNC: 197 MG/DL (ref 70–99)
GLUCOSE BLDC GLUCOMTR-MCNC: 199 MG/DL (ref 70–99)
GLUCOSE BLDC GLUCOMTR-MCNC: 219 MG/DL (ref 70–99)
INR PPP: 1.68 (ref 0.85–1.15)

## 2024-07-16 PROCEDURE — 250N000013 HC RX MED GY IP 250 OP 250 PS 637: Performed by: HOSPITALIST

## 2024-07-16 PROCEDURE — 85610 PROTHROMBIN TIME: CPT

## 2024-07-16 PROCEDURE — 250N000013 HC RX MED GY IP 250 OP 250 PS 637

## 2024-07-16 PROCEDURE — 99239 HOSP IP/OBS DSCHRG MGMT >30: CPT | Performed by: HOSPITALIST

## 2024-07-16 PROCEDURE — 250N000011 HC RX IP 250 OP 636: Mod: JZ | Performed by: HOSPITALIST

## 2024-07-16 RX ORDER — WARFARIN SODIUM 6 MG/1
6 TABLET ORAL
Status: COMPLETED | OUTPATIENT
Start: 2024-07-16 | End: 2024-07-16

## 2024-07-16 RX ORDER — WARFARIN SODIUM 7.5 MG/1
TABLET ORAL
DISCHARGE
Start: 2024-07-16

## 2024-07-16 RX ORDER — CEFAZOLIN SODIUM 2 G/100ML
2 INJECTION, SOLUTION INTRAVENOUS EVERY 8 HOURS
DISCHARGE
Start: 2024-07-16 | End: 2024-07-22

## 2024-07-16 RX ADMIN — METOPROLOL SUCCINATE 100 MG: 100 TABLET, EXTENDED RELEASE ORAL at 08:55

## 2024-07-16 RX ADMIN — INSULIN ASPART 4 UNITS: 100 INJECTION, SOLUTION INTRAVENOUS; SUBCUTANEOUS at 12:19

## 2024-07-16 RX ADMIN — WARFARIN SODIUM 6 MG: 6 TABLET ORAL at 18:15

## 2024-07-16 RX ADMIN — SULFAMETHOXAZOLE AND TRIMETHOPRIM 1 TABLET: 800; 160 TABLET ORAL at 08:55

## 2024-07-16 RX ADMIN — CEFAZOLIN SODIUM 2 G: 2 INJECTION, SOLUTION INTRAVENOUS at 12:18

## 2024-07-16 RX ADMIN — INSULIN GLARGINE 22 UNITS: 100 INJECTION, SOLUTION SUBCUTANEOUS at 08:59

## 2024-07-16 RX ADMIN — AMLODIPINE BESYLATE 10 MG: 10 TABLET ORAL at 08:55

## 2024-07-16 RX ADMIN — CEFAZOLIN SODIUM 2 G: 2 INJECTION, SOLUTION INTRAVENOUS at 03:10

## 2024-07-16 RX ADMIN — LOSARTAN POTASSIUM 100 MG: 100 TABLET, FILM COATED ORAL at 08:55

## 2024-07-16 RX ADMIN — MICONAZOLE NITRATE: 2 POWDER TOPICAL at 09:01

## 2024-07-16 RX ADMIN — INSULIN ASPART 3 UNITS: 100 INJECTION, SOLUTION INTRAVENOUS; SUBCUTANEOUS at 08:56

## 2024-07-16 ASSESSMENT — ACTIVITIES OF DAILY LIVING (ADL)
ADLS_ACUITY_SCORE: 57
ADLS_ACUITY_SCORE: 51
ADLS_ACUITY_SCORE: 57
ADLS_ACUITY_SCORE: 47
ADLS_ACUITY_SCORE: 51
ADLS_ACUITY_SCORE: 57
ADLS_ACUITY_SCORE: 51
ADLS_ACUITY_SCORE: 57
ADLS_ACUITY_SCORE: 47
ADLS_ACUITY_SCORE: 57
ADLS_ACUITY_SCORE: 47
ADLS_ACUITY_SCORE: 47
ADLS_ACUITY_SCORE: 51
ADLS_ACUITY_SCORE: 51
ADLS_ACUITY_SCORE: 57

## 2024-07-16 NOTE — PLAN OF CARE
Goal Outcome Evaluation:      Plan of Care Reviewed With: patient    Overall Patient Progress: improvingOverall Patient Progress: improving     Summary:  07/15/[id number]  Patient is alert and oriented X2-3. Legally blind. VSS on RA with good sat. Up with assist 2 with sid steady.  On mod carb diet . PICC line on RUE  with good blood return  noted. On IV abx , no adverse reactions noted or reported.  Patient denies pain. BS monitored. Sodium lab value 132, improving per MD note. CMS intact exp numbness on BLE baseline per patient.   Continent of B&B, use urinal to voided. Fall and safety precaution in place. Patient make all his needs known. Patient slept throughout the shift. No behavior issued noted. Behavior aggression tools green. Awaiting TCU placement.

## 2024-07-16 NOTE — PROGRESS NOTES
Care Management Discharge Note    Discharge Date: 07/16/2024       Discharge Disposition: Transitional Care    Discharge Services: None    Discharge DME: None    Discharge Transportation: agency    Private pay costs discussed: Not applicable    Does the patient's insurance plan have a 3 day qualifying hospital stay waiver?  Yes     Which insurance plan 3 day waiver is available? Alternative insurance waiver    Will the waiver be used for post-acute placement? No    PAS Confirmation Code: [id number]  Patient/family educated on Medicare website which has current facility and service quality ratings: other (see comments) (Returning to prior arangements)    Education Provided on the Discharge Plan: Yes  Persons Notified of Discharge Plans: Patient, TCU, and [facility] staff  Patient/Family in Agreement with the Plan: yes    Handoff Referral Completed: No    Additional Information:  Patient will discharge from [facility] to [facility] with W/C ride  at [id number].  Please refer to  progress notes for further details.      FIORDALIZA Murguia  [facility]  Social Work

## 2024-07-16 NOTE — DISCHARGE SUMMARY
[facility]  Hospitalist Discharge Summary      Date of Admission:  7/12/2024  Date of Discharge:  7/16/2024  Discharging Provider: Andrea Huang MD  Discharge Service: Hospitalist Service    Discharge Diagnoses   Severe acute metabolic encephalopathy - improved/resolved  RLE gangrene, osteomyelitis 2/2 debridement and resection of 1st MT  Recent bacteremia  Acute hypochloremic hyponatremia   See below for multiple other diagnoses and details        Clinically Significant Risk Factors     # DMII: A1C = 7.6 % (Ref range: <5.7 %) within past 6 months       Follow-ups Needed After Discharge   Follow-up Appointments     Follow Up and recommended labs and tests      TCU MD for hospital follow up with BMP and CBC in next week  Podiatry as planned  INR check 7/17 - INR clinic for further dosing            Unresulted Labs Ordered in the Past 30 Days of this Admission       Date and Time Order Name Status Description    7/12/2024  9:44 PM Blood Culture Peripheral Blood Preliminary     7/12/2024  9:44 PM Blood Culture Peripheral Blood Preliminary         These results will be followed up by IM    Discharge Disposition   Discharged to short-term care facility  Condition at discharge: Stable    Hospital Course   Mckay Hsu is a 64 year old gentleman with past medical history that is most notable for permanent atrial fibrillation on Warfarin, as well as Type 2 Diabetes mellitus, legal blindness, and vulnerable adult status, who was admitted here from 7/6/24 through 7/12/24 for right first toe gangrene, necrosis and osteomyelitis. He was discharged earlier today to a TCU, but returned for acute agitation, delirium, and paranoia. He initially had findings concerning for severe sepsis with elevated lactic acid 3.8. He has since improved later 7/13.         Severe acute metabolic encephalopathy - improved/resolved  RLE gangrene, osteomyelitis 2/2 debridement and resection of 1st MT  Recent  bacteremia  - Recently admitted 7/6 - 7/12 (adm day) for right first toe gangrene, necrosis and osteomyelitis, requiring debridement and surgical resection of the 1st metatarsal.   - ID was consulted and Ancef has been prescribed for S aureus bacteremia, as well as Bactrim.  *Discharged to TCU 7/12 and was doing well until after dinner when staff reported he became confused and agitated and was apparently paranoid that he was part of a scam and not at a care facility.   *initially with severe acute agitation with delirium, paranoia, and hallucinations.   *required soft restraints and IV ativan in ED and was somnolent after. Later on 7/13 calm, cooperative, and restraints off.  *lactic acid improved - possibly d/t agitation+dehydration vs ongoing infection/severe sepsis  - initially npo, awake and alert since and mod carb diet  - resume ID abx plan with bactrim DS for a week total and continue cefazolin (has midline in place); of note   - No need for sitter at this time  -As needed Zyprexa okay for agitation  -Hold off on infectious disease consultation for now  -Ideally, patient is cleared for discharge at this time, awaiting further assistance from social work in finding placement.  - CC/SW consult for planning of discharge hopefully 7/15 or so - staff please continue to update patient with referrals etc and plan for possible discharge   - doing well thru 7/16, continue ID plan with cefazolin and will complete bactrim after 7/17 doses. TCU follow up and podiatry evaluation as previously planned. Patient remains alert, oriented, and conversant appropriately.   -he has been awake and alert in recent days prior to 7/16 discharge. Of note, he did make statements to staff in recent days, that if he was sent back to recent (brief) TCU, he would have another reaction similar to what he had right before he was readmitted to [facility]     Acute hypochloremic hyponatremia - improving  Mild at 129. Suspect due to hypovolemia.  IVF given.   - Na 131 improving  - follow BMP within a week at TCU     Resolving EV due to ATN, with rhabdomyolysis-rhabdomyolysis resolved.  - CK now WNL     Permanent Afib, status post AV node ablation and CRT 2010  Chronic anticoagulation  Supratheraputic INR  History of NSVT  Hypertension   Hyperlipidemia  *Reportedly having failed Amiodarone in the past. INR was greater than 10 on admission and reversed for surgery. Plan had been to consider resumption of Warfarin on 7/12. TTE on admission showed preserved LVEF with mild to moderate TR.  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 Monitor closely while hospitalized.  - Hgb stable in 9-10 range      Consultations This Hospital Stay   CARE MANAGEMENT / SOCIAL WORK IP CONSULT  CARE MANAGEMENT / SOCIAL WORK IP CONSULT  PHARMACY TO DOSE WARFARIN  PHYSICAL THERAPY ADULT IP CONSULT  OCCUPATIONAL THERAPY ADULT IP CONSULT    Code Status   Full Code    Time Spent on this Encounter   I, Andrea Huang MD, personally saw the patient today and spent greater than 30 minutes discharging this patient.       Andrea Huang MD  [facility]  [address] [address]  Phone: [phone]  Fax:  [phone]  ______________________________________________________________________    Physical Exam   Vital Signs: Temp: 97.7  F (36.5  C) Temp src: Oral BP: (!) 165/83 Pulse: 79   Resp: 18 SpO2: 92 % O2 Device: None (Room air)    Weight: 0 lbs 0 oz    Gen: NAD, pleasant  HEENT: EOMI, MMM, legally blind  Resp: no focal crackles, no wheezes, no increased work of resp  CV: S1S2 heard, reg rhythm, reg rate  Abdo: soft, nontender, nondistended, bowel sounds present  Ext: calves nontender, well perfused, RLE dressing CDI, no surrounding/proximal erythema, warmth  Neuro: aa, conversant, moving ext, CN grossly intact, no facial asymmetry         Primary Care Physician   David Almendarez    Discharge Orders      Primary Care - Care Coordination Referral      General info for SNF    Length of Stay Estimate: Short Term Care: Estimated # of Days <30  Condition at Discharge: Stable  Level of care:skilled   Rehabilitation Potential: Good  Admission H&P remains valid and up-to-date: Yes  Recent Chemotherapy: N/A  Use Nursing Home Standing Orders: Yes     Mantoux instructions    Give two-step Mantoux (PPD) Per Facility Policy Yes     Reason for your hospital stay    Agitation and confusion after arriving at another TCU, concern of new/worsening infection which has now been ruled out. Ongoing treatment for RLE wound and osteomyelitis with bacteremia.     Glucose monitor nursing POCT    Before meals and at bedtime     Intake and output    Every shift     Daily weights    Call Provider for weight gain of more than 2 pounds per day or 5 pounds per week.     Activity - Up with nursing assistance     Follow Up and recommended labs and tests    TCU MD for hospital follow up with BMP and CBC in next week  Podiatry as planned  INR check 7/17 - INR clinic for further dosing     Physical Therapy Adult Consult    Evaluate and treat as clinically indicated.    Reason:  RLE wound, blind, deconditioned     Occupational Therapy Adult Consult     Evaluate and treat as clinically indicated.    Reason:  RLE wound, blind, deconditioned     Fall precautions     Diet    Follow this diet upon discharge: Orders Placed This Encounter      Room Service      Moderate Consistent Carb (60 g CHO per Meal) Diet       Significant Results and Procedures   Most Recent 3 CBC's:  Recent Labs   Lab Test 07/14/24  0733 07/13/24 0559 07/12/24 2057   WBC 11.3* 11.6* 15.6*   HGB 8.8* 8.6* 9.6*   MCV 90 89 87   * 419 573*     Most Recent 3 BMP's:  Recent Labs   Lab Test 07/16/24  1157 07/16/24  0752 07/16/24  0603 07/15/24  2147 07/15/24  1753 07/13/24  0820 07/13/24 0559 07/12/24 2058 07/12/24 2057   NA  --   --   --   --  132*  --  132*  --  129*   POTASSIUM  --   --   --   --  3.5  --  3.4  --  3.8   CHLORIDE  --   --   --   --  98  --  97*  --  94*   CO2  --   --   --   --  27  --  26  --  22   BUN  --   --   --   --  13.2  --  13.7  --  13.2   CR  --   --   --   --  1.14  --  1.07  --  1.03   ANIONGAP  --   --   --   --  7  --  9  --  13   JACI  --   --   --   --  8.3*  --  7.8*  --  8.3*   * 195* 197*   < > 251*   < > 147*   < > 228*    < > = values in this interval not displayed.     7-Day Micro Results       Collected Updated Procedure Result Status      07/12/2024 2154 07/15/2024 2216 Blood Culture Peripheral Blood [[id number]]   Peripheral Blood    Preliminary result Component Value   Culture No growth after 3 days  [P]                07/12/2024 2154 07/15/2024 2216 Blood Culture Peripheral Blood [[id number]]   Peripheral Blood    Preliminary result Component Value   Culture No growth after 3 days  [P]                      Most Recent Urinalysis:  Recent Labs   Lab Test 07/07/24  0925   COLOR Yellow   APPEARANCE Slightly Cloudy*   URINEGLC Negative   URINEBILI Negative   URINEKETONE Negative   SG 1.016   UBLD Moderate*   URINEPH 5.0   PROTEIN 20*   NITRITE Negative   LEUKEST Negative   RBCU 20*   WBCU 16*   ,   Results for orders placed or performed  during the hospital encounter of 07/12/24   XR Chest Port 1 View    Narrative    EXAM: XR CHEST PORT 1 VIEW  LOCATION: [facility]  DATE: 7/13/2024    INDICATION: sob  COMPARISON: CT chest, abdomen and pelvis 7/6/2024      Impression    IMPRESSION: Low lung volumes with bronchovascular crowding. Right infrahilar opacity is favored to reflect atelectasis. No focal consolidation, significant pleural effusion or pneumothorax. Similar cardiomediastinal silhouette and left chest wall cardiac   device.       Discharge Medications   Current Discharge Medication List        CONTINUE these medications which have CHANGED    Details   ceFAZolin (ANCEF) intermittent infusion 2 g in 100 mL dextrose PRE-MIX Inject 100 mLs (2 g) into the vein every 8 hours for 6 days    Associated Diagnoses: MSSA bacteremia      warfarin ANTICOAGULANT (COUMADIN) 7.5 MG tablet TAKE 6mg daily and have INR check 7/17 - further dosing per INR clinic    Associated Diagnoses: New onset atrial fibrillation (H)           CONTINUE these medications which have NOT CHANGED    Details   amLODIPine (NORVASC) 10 MG tablet TAKE 1 TABLET(10 MG) BY MOUTH DAILY  Qty: 90 tablet, Refills: 3    Associated Diagnoses: Essential hypertension, benign      bisacodyl (DULCOLAX) 10 MG suppository Place 1 suppository (10 mg) rectally daily as needed for constipation (Use if magnesium hydroxide (MILK of MAGNESIA) is not effective after 24 hours. May discontinue if patient having bowel movement.)    Associated Diagnoses: Other constipation      insulin aspart (NOVOLOG PEN) 100 UNIT/ML pen Inject 1-10 Units subcutaneously 3 times daily (before meals) Correction Scale - HIGH INSULIN RESISTANCE DOSING   Do Not give Correction Insulin if Pre-Meal BG less than 140. For Pre-Meal  - 164 give 1 unit. For Pre-Meal  - 189 give 2 units. For Pre-Meal  - 214 give 3 units. For Pre-Meal  - 239 give 4 units. For Pre-Meal  - 264 give 5  units. For Pre-Meal  - 289 give 6 units. For Pre-Meal  - 314 give 7 units. For Pre-Meal  - 339 give 8 units. For Pre-Meal  - 364 give 9 units.  For Pre-Meal BG greater than or equal to 365 give 10 units To be given with prandial insulin, and based on pre-meal blood glucose. Administering insulin within 5 minutes of the start of the meal is ideal. Administer insulin no more than 30 minutes after the start of the meal, unless directed otherwise by provider. Notify provider if glucose greater than or equal to 350 mg/dL after administration of correction dose.    Associated Diagnoses: Type 2 diabetes mellitus without complication, with long-term current use of insulin (H)      insulin glargine (LANTUS PEN) 100 UNIT/ML pen Inject 25 Units subcutaneously every morning (before breakfast)    Comments: If Lantus is not covered by insurance, may substitute Basaglar or Semglee or other insulin glargine product per insurance preference at same dose and frequency.    Associated Diagnoses: Type 2 diabetes mellitus without complication, with long-term current use of insulin (H)      losartan (COZAAR) 100 MG tablet TAKE 1 TABLET(100 MG) BY MOUTH DAILY  Qty: 90 tablet, Refills: 3    Associated Diagnoses: Type 2 diabetes mellitus without complication, with long-term current use of insulin (H)      metFORMIN (GLUCOPHAGE) 1000 MG tablet TAKE 1 TABLET(1000 MG) BY MOUTH TWICE DAILY WITH MEALS  Qty: 180 tablet, Refills: 3    Associated Diagnoses: Type 2 diabetes mellitus without complication, with long-term current use of insulin (H)      metoprolol succinate ER (TOPROL XL) 100 MG 24 hr tablet TAKE 1 TABLET(100 MG) BY MOUTH DAILY  Qty: 90 tablet, Refills: 0    Associated Diagnoses: Chronic systolic congestive heart failure (H)      miconazole (MICATIN) 2 % external powder Apply topically 2 times daily    Associated Diagnoses: Itching      NITROSTAT 0.4 MG SL SUBL Place 0.4 mg under the tongue every 5 minutes as  needed for chest pain      polyethylene glycol (MIRALAX) 17 g packet Take 17 g by mouth daily    Associated Diagnoses: Other constipation      senna-docusate (SENOKOT-S/PERICOLACE) 8.6-50 MG tablet Take 1 tablet by mouth 2 times daily    Associated Diagnoses: Other constipation      simvastatin (ZOCOR) 80 MG tablet TAKE 1/2 TABLET BY MOUTH AT BEDTIME  Qty: 90 tablet, Refills: 0    Associated Diagnoses: Hyperlipidemia LDL goal <100      sulfamethoxazole-trimethoprim (BACTRIM DS) 800-160 MG tablet Take 1 tablet by mouth 2 times daily for 5 days    Associated Diagnoses: MSSA bacteremia      blood glucose (ACCU-CHEK GUIDE) test strip 1 strip by In Vitro route 4 times daily Use to test blood sugar 4 times daily or as directed.  Qty: 400 strip, Refills: 1    Associated Diagnoses: Type 2 diabetes mellitus without complication, with long-term current use of insulin (H)           Allergies   No Known Allergies

## 2024-07-16 NOTE — PLAN OF CARE
Goal Outcome Evaluation:  Shift Summary [id number]    Admitting Diagnosis: Encephalopathy [G93.40]     A&Ox4. Calm, Cooperative. No paranoia or behaviors. VSS. Denies Pain    Voiding. 2 assist with sid steady CMS. Baseline numbness and tingling , Right  foot wound and right knee scabs. Lung Sounds clear  on RA. Cont of bowel and bladder   Dressing. Right foot Dressing CDI     Plan: Pending placement. See SW note.

## 2024-07-16 NOTE — PROGRESS NOTES
Care Management Follow Up    Length of Stay (days): 4    Expected Discharge Date: 07/16/2024     Concerns to be Addressed: discharge planning     Patient plan of care discussed at interdisciplinary rounds: Yes    Anticipated Discharge Disposition: Transitional Care     Anticipated Discharge Services: None  Anticipated Discharge DME: None    Patient/family educated on Medicare website which has current facility and service quality ratings: other (see comments) (Returning to prior arangements)  Education Provided on the Discharge Plan: Yes  Patient/Family in Agreement with the Plan: yes    Referrals Placed by CM/SW: Post Acute Facilities  Private pay costs discussed: Not applicable    Additional Information:  [facility] ([phone][facility] who stated they would review the patient and contact later today.     Writer spoke with the provider who stated the patient was medically ready.     Writer contacted Tia again who stated they would be able to take the patient before 1600 today.     Writer updated the patient who consented to moving forward. Bedside rn was present during the interaction. Bedside rn stated patient was ok to go via W/C.    Writer contacted [facility] and set up a W/C ride between [phone].    Daianar contacted Tia at [facility] [place] and left a VM updating.     Writer received orders and sent them via [employer].     Writer awaiting ok from [place]        FIORDALIZA Murguia  [facility]  Social Work

## 2024-07-17 ENCOUNTER — TELEPHONE (OUTPATIENT)
Dept: ANTICOAGULATION | Facility: CLINIC | Age: 64
End: 2024-07-17
Payer: COMMERCIAL

## 2024-07-17 ENCOUNTER — PATIENT OUTREACH (OUTPATIENT)
Dept: CARE COORDINATION | Facility: CLINIC | Age: 64
End: 2024-07-17
Payer: COMMERCIAL

## 2024-07-17 DIAGNOSIS — I48.20 CHRONIC ATRIAL FIBRILLATION (H): Primary | ICD-10-CM

## 2024-07-17 DIAGNOSIS — Z79.01 LONG TERM CURRENT USE OF ANTICOAGULANT THERAPY: ICD-10-CM

## 2024-07-17 LAB
BACTERIA BLD CULT: NO GROWTH
BACTERIA BLD CULT: NO GROWTH

## 2024-07-17 NOTE — TELEPHONE ENCOUNTER
"ANTICOAGULATION  MANAGEMENT: Discharge Review    Mckay A Shadi chart reviewed for anticoagulation continuity of care    Hospital Admission (re-admission) on 7/12/24-7/16/24 for acute agitation, delirium, and paranoia.    Discharge disposition: TCU    Results:    Recent labs: (last 7 days)     07/10/24  0900 07/11/24  0911 07/12/24  2057 07/13/24  0559 07/14/24  0733 07/15/24  1107 07/16/24  1256   INR 1.66* 1.52* 1.55* 1.59* 1.60* 1.56* 1.68*     Anticoagulation inpatient management:     warfarin resumed on 7/13/24 and anticoagulation calendar updated with inpatient dosing    Anticoagulation discharge instructions:     Warfarin dosing: decrease dose to 6 mg on 7/16/24   Bridging: No   INR goal change: No      Medication changes affecting anticoagulation: Yes: Ancef IV Q8H for 6 days - per up-to-date, \"Cephalosporins may enhance the anticoagulant effect of Vitamin K Antagonists.\"    Additional factors affecting anticoagulation: No     PLAN     No adjustment to anticoagulation plan needed - INR to be checked today at TCU    ACC will resume monitoring upon discharge from TCU    Anticoagulation Calendar updated    Elvia Davis RN    "

## 2024-07-17 NOTE — PROGRESS NOTES
Clinic Care Coordination Contact  Care Coordination Transition Communication    Clinical Data: Patient was hospitalized at [facility] from 7/12/2024 to 7/16/2024 with diagnosis of   Severe acute metabolic encephalopathy - improved/resolved  RLE gangrene, osteomyelitis 2/2 debridement and resection of 1st MT  Recent bacteremia  Acute hypochloremic hyponatremia     Assessment: Patient has transitioned to TCU/ARU for short term rehabilitation:    Facility Name: [facility]  Transition Communication:  Notified facility of Primary Care- Care Coordination support via Epic fax.    Plan: Care Coordinator will await notification from facility staff informing of patient's discharge plans/needs. Care Coordinator will review chart and outreach to facility staff every 4 weeks and as needed.     Carmen Frank, RN Clinic Care Coordinator  [facility] Clinics: [place], [place] (on-site Wednesdays), [facility] (on-site Thursdays) & [facility].  [email or web address]  Phone: [phone]

## 2024-07-17 NOTE — LETTER
"[facility]   To:             Please give to facility    From:   Carmen Frank  RN  Care Coordinator   [facility]   P: [phone]  [email or web address]   Patient Name:  Mckay \"Doyle\" Shadi YOB: 1960   Admit date: 7/16/2024      *Information Needed:  Please contact me when the patient will discharge (or if they will move to long term care)- include the discharge date, disposition, and main diagnosis   If the patient is discharged with home care services, please provide the name of the agency    Also- Please inform me if a care conference is being held.   Phone or Email with information                       "

## 2024-07-17 NOTE — PROGRESS NOTES
VSS except htn. No behaviors. Plan to discharge later tonight. Pt declined to order food for supper. Said he will eat at other facility. Again offered food after ride was late. Pt again declined.     6MG Coumadin given. See MAR

## 2024-07-31 ENCOUNTER — TELEPHONE (OUTPATIENT)
Dept: ANTICOAGULATION | Facility: CLINIC | Age: 64
End: 2024-07-31
Payer: COMMERCIAL

## 2024-07-31 NOTE — TELEPHONE ENCOUNTER
ANTICOAGULATION     Mckay Hsu is overdue for an INR check.     Was sent to TCU 7/17/24. Called and left detailed message with Tia at [facility] ([phone]) asking if he is still currently at their facility and if so, if there is an anticipated discharge date.     Gigi Castillo RN

## 2024-08-07 ENCOUNTER — TELEPHONE (OUTPATIENT)
Dept: ANTICOAGULATION | Facility: CLINIC | Age: 64
End: 2024-08-07
Payer: COMMERCIAL

## 2024-08-07 DIAGNOSIS — I50.22 CHRONIC SYSTOLIC CONGESTIVE HEART FAILURE (H): ICD-10-CM

## 2024-08-07 RX ORDER — METOPROLOL SUCCINATE 100 MG/1
100 TABLET, EXTENDED RELEASE ORAL DAILY
Qty: 90 TABLET | Refills: 0 | OUTPATIENT
Start: 2024-08-07

## 2024-08-07 NOTE — TELEPHONE ENCOUNTER
ANTICOAGULATION     Called  Tia RN at [facility] TCU ([phone]) who reported Doyle is still in TCU at this time with no anticipated discharge date.     Gigi Castillo RN

## 2024-08-14 ENCOUNTER — PATIENT OUTREACH (OUTPATIENT)
Dept: CARE COORDINATION | Facility: CLINIC | Age: 64
End: 2024-08-14
Payer: COMMERCIAL

## 2024-08-14 NOTE — PROGRESS NOTES
Clinic Care Coordination Contact    Situation: Patient chart reviewed by care coordinator.    Background: Patient was hospitalized at [facility] from 7/12/2024 to 7/16/2024. Discharged to [facility] TCU on 7/16/2024.     Assessment: Patient continues his stay at the TCU.     Plan/Recommendations: Care Coordination will continue to monitor for discharge monthly and prn.     Carmen Frank RN Clinic Care Coordinator  [facility] Clinics: [place], [place] (on-site Wednesdays), [facility] (on-site Thursdays) & [facility].  [email or web address]  Phone: [phone]

## 2024-08-23 ENCOUNTER — TELEPHONE (OUTPATIENT)
Dept: PODIATRY | Facility: CLINIC | Age: 64
End: 2024-08-23
Payer: COMMERCIAL

## 2024-08-23 NOTE — TELEPHONE ENCOUNTER
Other: Haley from [facility] called to schedule an appointment for suture removal and follow up. Dr. Lainez did the procedure on 7/9/24 would we schedule with her or any podiatrist. Please call to discuss     Could we [facility] this [facility] to you [facility] or would you prefer to receive a phone call?:   Patient would prefer a phone call   Okay to leave a detailed message?: Yes at Other phone number:  Haley [phone]

## 2024-08-23 NOTE — TELEPHONE ENCOUNTER
Left voicemail for Wenonah to return call. No details left as no consent to communicate.     Please see Dr. Lainez's message below.     Ok to schedule patient next Wednesday at 8/28/24 at 11:40am (11:25am arrival) at the [place] location.     Nicole Arce MSA, ATC  Certified Athletic Trainer

## 2024-08-23 NOTE — TELEPHONE ENCOUNTER
I left a voicemail for jenifer at [facility].  Regarding the patient's concern, it appears that they have had sutures in postop for about 6 weeks.  I advised that they take the soonest available appointment that the patient can get into, and we will see if we can work him in any sooner.  Advised that if there are signs of infection, to present to the ED as this could be more serious.  If Jenifer call us back, please schedule patient for soonest available opening with Dr. Lainez.     Dominic Bonilla ATC

## 2024-08-26 NOTE — TELEPHONE ENCOUNTER
"I made another phone call to Jenifer.  Left a brief voicemail as no consent to communicate on file.  At this point, provided  number [phone] as a callback number.  If patient/Jenifer calls back, please schedule them at 11:40 AM on Wednesday, 8/28/2024 with Dr. Lainez.  This was okayed by her.    I made a separate phone call to the patient himself, the only phone number on file for the patient is \"temporarily unavailable\".  Unable to leave a voicemail or reach patient directly.    Dominic Bonilla, ATC   "

## 2024-09-04 ENCOUNTER — OFFICE VISIT (OUTPATIENT)
Dept: PODIATRY | Facility: CLINIC | Age: 64
End: 2024-09-04
Payer: COMMERCIAL

## 2024-09-04 VITALS
SYSTOLIC BLOOD PRESSURE: 100 MMHG | HEIGHT: 72 IN | WEIGHT: 315 LBS | BODY MASS INDEX: 42.66 KG/M2 | DIASTOLIC BLOOD PRESSURE: 68 MMHG

## 2024-09-04 DIAGNOSIS — E11.9 TYPE 2 DIABETES MELLITUS WITHOUT COMPLICATION, WITH LONG-TERM CURRENT USE OF INSULIN (H): ICD-10-CM

## 2024-09-04 DIAGNOSIS — Z79.4 TYPE 2 DIABETES MELLITUS WITHOUT COMPLICATION, WITH LONG-TERM CURRENT USE OF INSULIN (H): ICD-10-CM

## 2024-09-04 DIAGNOSIS — Z89.421 S/P AMPUTATION OF LESSER TOE, RIGHT (H): Primary | ICD-10-CM

## 2024-09-04 PROCEDURE — 99024 POSTOP FOLLOW-UP VISIT: CPT | Performed by: PODIATRIST

## 2024-09-04 NOTE — LETTER
9/4/2024      Mckay Hsu  [address]  [place]ospit[place] [id number]      Dear Colleague,    Thank you for referring your patient, Mckay Hsu, to the [facility] PODIATRY. Please see a copy of my visit note below.    [place] PODIATRY/FOOT & ANKLE SURGERY  CLINIC NOTE    CHIEF COMPLAINT:  right foot    PATIENT HISTORY:  Mckay Hsu is a 64 year old male who presents for follow up for his right foot. He was last seen in the hospital in July for a right foot infection, requiring a first ray resection, debridement and closure. He's now at a TCU and states things have been going well. Denies pain. Denies N/F/V/C/D. He uses a wheelchair for mobility. He also is legally blind.         Review of Systems:  A 10 point review of systems was performed and is positive for that noted above in the patient history.  All other areas are negative.     PAST MEDICAL HISTORY:   Past Medical History:   Diagnosis Date     Atrial fibrillation (H)     s/p AVN ablation, BIV PPM     Congestive heart failure (H)     tachycardia-induced cardiomyopathy     Essential hypertension, benign      Hyperlipidemia LDL goal <100 10/31/2010     Hyponatremia 9/8/2009     Morbid obesity (H)      Open wound of foot except toe(s) alone, without mention of complication      Retinitis pigmentosa      Type 2 diabetes, HbA1C goal < 8% (H) 6/26/2012        PAST SURGICAL HISTORY:   Past Surgical History:   Procedure Laterality Date     AMPUTATE FOOT Right 7/6/2024    Procedure: Right foot first ray resection;  Surgeon: Denia Lainez DPM;  Location: [facility] OR     AMPUTATE TOE(S) Right 7/6/2024    Procedure: Right foot first ray resection;  Surgeon: Denia Lainez DPM;  Location: [facility] OR     AMPUTATE TOE(S) Right 7/9/2024    Procedure: first metatarsal resection;  Surgeon: Denia Lainez DPM;  Location: [facility] OR     CARDIOVERSION  9/2009, 10/2009     COLONOSCOPY N/A 5/9/2019    Procedure: COLONOSCOPY;  Surgeon: Godwin Santillan  MD;  Location: [facility] GI     H ABLATION AV NODE  4/8/10     IMPLANT PACEMAKER  4/8/10    BIV PPM- Medtronic InSync III     IRRIGATION AND DEBRIDEMENT FOOT, COMBINED Right 7/9/2024    Procedure: Right foot debridement,;  Surgeon: Denia Lainez DPM;  Location: [facility] OR     TONSILLECTOMY      as kid     [facility] NONSPECIFIC PROCEDURE  06/2007    debritement        MEDICATIONS:  Reviewed in Epic.     ALLERGIES:  No Known Allergies     SOCIAL HISTORY:   Social History     Socioeconomic History     Marital status: Single     Spouse name: Not on file     Number of children: Not on file     Years of education: Not on file     Highest education level: Not on file   Occupational History     Occupation: repair printer     Employer: [employer]   Tobacco Use     Smoking status: Never     Smokeless tobacco: Never   Substance and Sexual Activity     Alcohol use: No     Drug use: No     Sexual activity: Never   Other Topics Concern      Service Not Asked     Blood Transfusions Not Asked     Caffeine Concern Yes     Comment: occasionally soda     Occupational Exposure Not Asked     Hobby Hazards Not Asked     Sleep Concern Not Asked     Stress Concern Not Asked     Weight Concern Not Asked     Special Diet Yes     Comment: low sodium, low fat, DM diet      Back Care Not Asked     Exercise Yes     Comment: walking, stairs      Bike Helmet Not Asked     Seat Belt Not Asked     Self-Exams Not Asked     Parent/sibling w/ CABG, MI or angioplasty before 65F 55M? Not Asked   Social History Narrative     Not on file     Social Determinants of Health     Financial Resource Strain: Not on file   Food Insecurity: Not on file   Transportation Needs: Not on file   Physical Activity: Not on file   Stress: Not on file   Social Connections: Not on file   Interpersonal Safety: Not on file   Housing Stability: Not on file        FAMILY HISTORY:   Family History   Problem Relation Age of Onset     Diabetes Mother      Hypertension Father       "Diabetes Father      Hypertension Maternal Grandmother      Diabetes Maternal Grandmother      Diabetes Maternal Grandfather      Hypertension Maternal Grandfather      Cardiovascular Maternal Grandfather      Hypertension Paternal Grandfather      Cardiovascular Paternal Grandfather      Diabetes Sister      Hypertension Sister         EXAM:Vitals: /68   Ht 1.816 m (5' 11.5\")   Wt 143.3 kg (316 lb)   BMI 43.46 kg/m    BMI= Body mass index is 43.46 kg/m .      General appearance: Patient is alert and fully cooperative with history & exam.  No sign of distress is noted during the visit.      Respiratory: Breathing is regular & unlabored while sitting.      HEENT: Hearing is intact to spoken word.  Speech is clear.  No gross evidence of visual impairment that would impact ambulation.      Dermatologic: Right foot incision site intact and healed. Well epithelialized. No dehiscence. No signs of infection cellulitis or open lesions.     Vascular: Dorsalis pedis and posterior tibial pulses are intact & regular bilaterally.  CFT and skin temperature is normal to both lower extremities.       Neurologic: Lower extremity sensation is diminished, bilateral foot, to light touch.  No evidence of neurological-based weakness or contracture in the lower extremities.       Musculoskeletal: Patient is nonambulatory. S/p partial first ray resection.    Psychiatric: Affect is pleasant & appropriate.      Foot, Right; Tissue   0 Result Notes  Culture 2+ Staphylococcus aureus Abnormal       Isolated in broth only Enterobacter cloacae complex Abnormal    On day 1 of incubation   1+ Corynebacterium jeikeium Abnormal    Susceptibilities not routinely done, refer to antibiogram to view typical susceptibility profiles        Resulting Agency: [facility]     Susceptibility       Staphylococcus aureus Enterobacter cloacae complex     SATHYA SATHYA     Ampicillin    Resistant 1     Ampicillin/ Sulbactam    Resistant 1     Cefepime   <=1 ug/mL " Susceptible     Ceftazidime    Resistant     Ceftriaxone    Resistant     Ciprofloxacin   <=0.25 ug/mL Susceptible     Clindamycin 0.25 ug/mL Susceptible       Daptomycin 0.5 ug/mL Susceptible       Doxycycline 8 ug/mL Intermediate       Erythromycin <=0.25 ug/mL Susceptible       Gentamicin <=0.5 ug/mL Susceptible <=1 ug/mL Susceptible     Levofloxacin   <=0.12 ug/mL Susceptible     Meropenem   <=0.25 ug/mL Susceptible     Oxacillin 0.5 ug/mL Susceptible 2       Piperacillin/Tazobactam    Resistant     Tetracycline >=16 ug/mL Resistant       Tobramycin   <=1 ug/mL Susceptible     Trimethoprim/Sulfamethoxazole <=0.5/9.5 u... Susceptible <=1/19 ug/mL Susceptible     Vancomycin 1 ug/mL Susceptible                Final Diagnosis   Right foot, first metatarsal, resection-  -Bone with focal fat necrosis of marrow; negative for osteomyelitis.       IMAGING:     ASSESSMENT:  S/p right foot partial first ray resection 7/9   Diabetes Mellitus --> hba1c: 7.6     MEDICAL DECISION MAKING:   -Discussed all findings with patient. Chart and imaging reviewed.   -Right foot evaluated following surgery in July. Incision well healed. Sutures removed.   -No new lesions or signs of infection.   -Discussed no further lower extremity restrictions. Can shower foot as needed. No weightbearing restrictions.   -Discharged today. Follow up for any future or further needs. Call to follow up.       Denia Lainez DPM   [place] Department of Podiatry/Foot & Ankle Surgery                Again, thank you for allowing me to participate in the care of your patient.        Sincerely,        Denia Lainez DPM

## 2024-09-05 ENCOUNTER — TELEPHONE (OUTPATIENT)
Dept: ANTICOAGULATION | Facility: CLINIC | Age: 64
End: 2024-09-05
Payer: COMMERCIAL

## 2024-09-05 NOTE — PROGRESS NOTES
[place] PODIATRY/FOOT & ANKLE SURGERY  CLINIC NOTE    CHIEF COMPLAINT:  right foot    PATIENT HISTORY:  Mckay Hsu is a 64 year old male who presents for follow up for his right foot. He was last seen in the hospital in July for a right foot infection, requiring a first ray resection, debridement and closure. He's now at a TCU and states things have been going well. Denies pain. Denies N/F/V/C/D. He uses a wheelchair for mobility. He also is legally blind.         Review of Systems:  A 10 point review of systems was performed and is positive for that noted above in the patient history.  All other areas are negative.     PAST MEDICAL HISTORY:   Past Medical History:   Diagnosis Date    Atrial fibrillation (H)     s/p AVN ablation, BIV PPM    Congestive heart failure (H)     tachycardia-induced cardiomyopathy    Essential hypertension, benign     Hyperlipidemia LDL goal <100 10/31/2010    Hyponatremia 9/8/2009    Morbid obesity (H)     Open wound of foot except toe(s) alone, without mention of complication     Retinitis pigmentosa     Type 2 diabetes, HbA1C goal < 8% (H) 6/26/2012        PAST SURGICAL HISTORY:   Past Surgical History:   Procedure Laterality Date    AMPUTATE FOOT Right 7/6/2024    Procedure: Right foot first ray resection;  Surgeon: Denia Lainez DPM;  [facility]    AMPUTATE TOE(S) Right 7/6/2024    Procedure: Right foot first ray resection;  Surgeon: Denia Lainez DPM;  [facility]: [facility] OR    AMPUTATE TOE(S) Right 7/9/2024    Procedure: first metatarsal resection;  Surgeon: Denia Lainez DPM;  Location:  OR    CARDIOVERSION  9/2009, 10/2009    COLONOSCOPY N/A 5/9/2019    Procedure: COLONOSCOPY;  Surgeon: Godwin Santillan MD;  Location:  GI    H ABLATION AV NODE  4/8/10    IMPLANT PACEMAKER  4/8/10    BIV PPM- Medtronic InSync III    IRRIGATION AND DEBRIDEMENT FOOT, COMBINED Right 7/9/2024    Procedure: Right foot debridement,;  Surgeon: Denia Lainez DPM;  Location:   OR    TONSILLECTOMY      as kid    [facility] NONSPECIFIC PROCEDURE  06/2007    debritement        MEDICATIONS:  Reviewed in Epic.     ALLERGIES:  No Known Allergies     SOCIAL HISTORY:   Social History     Socioeconomic History    Marital status: Single     Spouse name: Not on file    Number of children: Not on file    Years of education: Not on file    Highest education level: Not on file   Occupational History    Occupation: repair printer     Employer: [employer]   Tobacco Use    Smoking status: Never    Smokeless tobacco: Never   Substance and Sexual Activity    Alcohol use: No    Drug use: No    Sexual activity: Never   Other Topics Concern     Service Not Asked    Blood Transfusions Not Asked    Caffeine Concern Yes     Comment: occasionally soda    Occupational Exposure Not Asked    Hobby Hazards Not Asked    Sleep Concern Not Asked    Stress Concern Not Asked    Weight Concern Not Asked    Special Diet Yes     Comment: low sodium, low fat, DM diet     Back Care Not Asked    Exercise Yes     Comment: walking, stairs     Bike Helmet Not Asked    Seat Belt Not Asked    Self-Exams Not Asked    Parent/sibling w/ CABG, MI or angioplasty before 65F 55M? Not Asked   Social History Narrative    Not on file     Social Determinants of Health     Financial Resource Strain: Not on file   Food Insecurity: Not on file   Transportation Needs: Not on file   Physical Activity: Not on file   Stress: Not on file   Social Connections: Not on file   Interpersonal Safety: Not on file   Housing Stability: Not on file        FAMILY HISTORY:   Family History   Problem Relation Age of Onset    Diabetes Mother     Hypertension Father     Diabetes Father     Hypertension Maternal Grandmother     Diabetes Maternal Grandmother     Diabetes Maternal Grandfather     Hypertension Maternal Grandfather     Cardiovascular Maternal Grandfather     Hypertension Paternal Grandfather     Cardiovascular Paternal Grandfather     Diabetes Sister   "   Hypertension Sister         EXAM:Vitals: /68   Ht 1.816 m (5' 11.5\")   Wt 143.3 kg (316 lb)   BMI 43.46 kg/m    BMI= Body mass index is 43.46 kg/m .      General appearance: Patient is alert and fully cooperative with history & exam.  No sign of distress is noted during the visit.      Respiratory: Breathing is regular & unlabored while sitting.      HEENT: Hearing is intact to spoken word.  Speech is clear.  No gross evidence of visual impairment that would impact ambulation.      Dermatologic: Right foot incision site intact and healed. Well epithelialized. No dehiscence. No signs of infection cellulitis or open lesions.     Vascular: Dorsalis pedis and posterior tibial pulses are intact & regular bilaterally.  CFT and skin temperature is normal to both lower extremities.       Neurologic: Lower extremity sensation is diminished, bilateral foot, to light touch.  No evidence of neurological-based weakness or contracture in the lower extremities.       Musculoskeletal: Patient is nonambulatory. S/p partial first ray resection.    Psychiatric: Affect is pleasant & appropriate.      Foot, Right; Tissue   0 Result Notes  Culture 2+ Staphylococcus aureus Abnormal       Isolated in broth only Enterobacter cloacae complex Abnormal    On day 1 of incubation   1+ Corynebacterium jeikeium Abnormal    Susceptibilities not routinely done, refer to antibiogram to view typical susceptibility profiles        Resulting Agency: [facility]     Susceptibility       Staphylococcus aureus Enterobacter cloacae complex     SATHYA SATHYA     Ampicillin    Resistant 1     Ampicillin/ Sulbactam    Resistant 1     Cefepime   <=1 ug/mL Susceptible     Ceftazidime    Resistant     Ceftriaxone    Resistant     Ciprofloxacin   <=0.25 ug/mL Susceptible     Clindamycin 0.25 ug/mL Susceptible       Daptomycin 0.5 ug/mL Susceptible       Doxycycline 8 ug/mL Intermediate       Erythromycin <=0.25 ug/mL Susceptible       Gentamicin <=0.5 ug/mL " Susceptible <=1 ug/mL Susceptible     Levofloxacin   <=0.12 ug/mL Susceptible     Meropenem   <=0.25 ug/mL Susceptible     Oxacillin 0.5 ug/mL Susceptible 2       Piperacillin/Tazobactam    Resistant     Tetracycline >=16 ug/mL Resistant       Tobramycin   <=1 ug/mL Susceptible     Trimethoprim/Sulfamethoxazole <=0.5/9.5 u... Susceptible <=1/19 ug/mL Susceptible     Vancomycin 1 ug/mL Susceptible                Final Diagnosis   Right foot, first metatarsal, resection-  -Bone with focal fat necrosis of marrow; negative for osteomyelitis.       IMAGING:     ASSESSMENT:  S/p right foot partial first ray resection 7/9   Diabetes Mellitus --> hba1c: 7.6     MEDICAL DECISION MAKING:   -Discussed all findings with patient. Chart and imaging reviewed.   -Right foot evaluated following surgery in July. Incision well healed. Sutures removed.   -No new lesions or signs of infection.   -Discussed no further lower extremity restrictions. Can shower foot as needed. No weightbearing restrictions.   -Discharged today. Follow up for any future or further needs. Call to follow up.       Denia Lainez DPM   [place] Department of Podiatry/Foot & Ankle Surgery

## 2024-09-05 NOTE — TELEPHONE ENCOUNTER
ANTICOAGULATION     Called Tia RN at [facility] TCU ([phone]) and left  requesting callback with discharge update for patient.    Elvia Davis RN

## 2024-09-05 NOTE — TELEPHONE ENCOUNTER
Patient Returning Call    Reason for call:  Sharon would like to get a call back from Elvia from INR to discuss updates on pt    Information relayed to patient:  the INR team will give you a call back when they receive this pt    Patient has additional questions:  No      Okay to leave a detailed message?: Yes at Other phone number:  Sharon RN at TCU with [facility] - [phone]

## 2024-09-05 NOTE — TELEPHONE ENCOUNTER
Writer returned call to Tia at [facility] - RN noted patient is still at TCU, unfortunately, he is not safe to return to [facility] and needs ALEX placement. This process can take months and has not been started yet. Tia will keep [facility] team updated on ALEX placement and discharge date as it nears. Provided RN with direct [facility] homecare line for future use. Elvia Davis, ORENN, RN

## 2024-09-11 ENCOUNTER — PATIENT OUTREACH (OUTPATIENT)
Dept: CARE COORDINATION | Facility: CLINIC | Age: 64
End: 2024-09-11
Payer: COMMERCIAL

## 2024-09-11 NOTE — PROGRESS NOTES
Clinic Care Coordination Contact    Situation: Patient chart reviewed by care coordinator.    Background: Patient was hospitalized at [facility] from 7/12/2024 to 7/16/2024. Discharged to [facility] TCU on 7/16/2024.     Assessment: Currently still admitted to TCU. Per chart review, TCU is working on ALEX placement.     Plan/Recommendations: Will continue to monitor chart monthly and as needed.     Carmen Frank RN Clinic Care Coordinator  [facility] Clinics: [place], [place] (on-site Wednesdays), [place] (on-site Thursdays) & [facility].  [email or web address]  Phone: [phone]

## 2024-09-26 ENCOUNTER — DOCUMENTATION ONLY (OUTPATIENT)
Dept: CARDIOLOGY | Facility: CLINIC | Age: 64
End: 2024-09-26
Payer: COMMERCIAL

## 2024-09-26 NOTE — PROGRESS NOTES
Multiple attempts made to contact patient to schedule a device check. After failed attempts, patient has been inactivated from our device clinic. Patient can call the device clinic at any time to re-establish care.      RODRIGUEZ Garcia  Device Clinic

## 2024-10-08 ENCOUNTER — TELEPHONE (OUTPATIENT)
Dept: ANTICOAGULATION | Facility: CLINIC | Age: 64
End: 2024-10-08
Payer: COMMERCIAL

## 2024-10-08 NOTE — TELEPHONE ENCOUNTER
ANTICOAGULATION     Mckay Hsu is overdue for an INR check.     Called [facility] verified  Doyle is still at U    Catie Han, RN  10/8/2024  Anticoagulation Clinic  [facility] for routing messages: darleen HUNT [facility] patient phone line: [phone]

## 2024-10-15 ENCOUNTER — PATIENT OUTREACH (OUTPATIENT)
Dept: CARE COORDINATION | Facility: CLINIC | Age: 64
End: 2024-10-15
Payer: COMMERCIAL

## 2024-10-15 NOTE — PROGRESS NOTES
Clinic Care Coordination Contact    Situation: Patient chart reviewed by care coordinator.    Background:   Patient was inpatient at [facility] 7/12/24-7/16/24.     Discharged to [facility] TCU 7/16/24.     Assessment:   [phone][facility] After several rings, no one answered.     Called patient's number. No longer in service.     Plan/Recommendations:   Will attempt to reach TCU in 3-5 business days to see if patient is currently there or has discharged.     Carmen Frank, RN Clinic Care Coordinator  [facility] Clinics: [place], [place] (on-site Wednesdays), [facility] & [facility].  [email or web address]  Phone: [phone]

## 2024-10-22 ENCOUNTER — PATIENT OUTREACH (OUTPATIENT)
Dept: CARE COORDINATION | Facility: CLINIC | Age: 64
End: 2024-10-22
Payer: COMMERCIAL

## 2024-10-22 ENCOUNTER — TELEPHONE (OUTPATIENT)
Dept: ANTICOAGULATION | Facility: CLINIC | Age: 64
End: 2024-10-22
Payer: COMMERCIAL

## 2024-10-22 NOTE — TELEPHONE ENCOUNTER
ANTICOAGULATION     Mckay Hsu is overdue for an INR check.     Spoke with BROOK Ch, at [facility] TCU ([phone]). At this time, there are no specific plans for discharge. Ok to follow up in another 2 weeks.    Pastora Valenzuela, RN  10/22/2024  Anticoagulation Clinic  [facility] for routing messages: darleen HUNT [facility] patient phone line: [phone]

## 2024-10-23 NOTE — PROGRESS NOTES
Clinic Care Coordination Contact    Situation: Patient chart reviewed by care coordinator.     Background:   Patient was inpatient at [facility] 7/12/24-7/16/24.      Discharged to [facility]CU 7/16/24.      Assessment:   [phone] Confirmed that patient is still admitted to their TCU.     Plan/Recommendations:   Will monitor chart monthly and upon discharge notification.     Carmen Frank RN Clinic Care Coordinator  [facility] Clinics: [place], [place] (on-site Wednesdays), [place] (on-site Thursdays) & [facility].  [email or web address]  Phone: [phone]

## 2024-11-05 ENCOUNTER — TELEPHONE (OUTPATIENT)
Dept: ANTICOAGULATION | Facility: CLINIC | Age: 64
End: 2024-11-05
Payer: COMMERCIAL

## 2024-11-05 NOTE — TELEPHONE ENCOUNTER
ANTICOAGULATION     Mckay Hsu is overdue for an INR check.     Spoke with Ian, Unit Manager at [facility] TC. Doyle is up for transfer off the unit. It may take place anywhere from 1-3 weeks from now. He recommends another call in 1 week for a status update.    Pastora Valenzuela, RN  11/5/2024  Anticoagulation Clinic  [facility] for routing messages: darleen HUNT [facility] patient phone line: [phone]

## 2024-11-12 ENCOUNTER — TELEPHONE (OUTPATIENT)
Dept: ANTICOAGULATION | Facility: CLINIC | Age: 64
End: 2024-11-12
Payer: COMMERCIAL

## 2024-11-12 NOTE — TELEPHONE ENCOUNTER
ANTICOAGULATION     Mckay Hsu is overdue for an INR check.     Called [email or web address] ([phone]).   was able to confirm Doyle was still admitted with no known plans of discharge.     Nilda Cabrera RN  11/12/2024  Anticoagulation Clinic  [facility] for routing messages: darleen HUNT [facility]  ACC patient phone line: [phone]

## 2024-11-19 ENCOUNTER — TELEPHONE (OUTPATIENT)
Dept: ANTICOAGULATION | Facility: CLINIC | Age: 64
End: 2024-11-19
Payer: COMMERCIAL

## 2024-11-19 NOTE — TELEPHONE ENCOUNTER
[facility], verified Doyle is still admitted there.

## 2024-12-03 ENCOUNTER — PATIENT OUTREACH (OUTPATIENT)
Dept: CARE COORDINATION | Facility: CLINIC | Age: 64
End: 2024-12-03
Payer: COMMERCIAL

## 2024-12-03 NOTE — PROGRESS NOTES
Clinic Care Coordination Contact     Situation: Patient chart reviewed by care coordinator.     Background:   Patient was inpatient at [facility] 7/12/24-7/16/24.      Discharged to [facility] TCU 7/16/24.      Assessment:   [phone] Confirmed that patient is still admitted to their TCU.      Plan/Recommendations:   Will monitor chart monthly and upon discharge notification.      Carmen Frank RN Clinic Care Coordinator  [facility] Clinics: [place], [place] (on-site Wednesdays), [place] (on-site Thursdays) & [facility].  [email or web address]  Phone: [phone]

## 2024-12-04 ENCOUNTER — DOCUMENTATION ONLY (OUTPATIENT)
Dept: ANTICOAGULATION | Facility: CLINIC | Age: 64
End: 2024-12-04
Payer: COMMERCIAL

## 2024-12-05 NOTE — PROGRESS NOTES
ANTICOAGULATION     Mckay Hsu is overdue for an INR check.     Patient is noted to be in TCU (name/phone): [facility] TCU ([phone]) . Per chart review, care coordinator called TCU yesterday, 12/3/24 and confirmed patient is still admitted.    Pastora Valenzuela, RN  12/4/2024  Anticoagulation Clinic  [facility] for routing messages: darleen HUNT [facility] patient phone line: [phone]

## 2024-12-17 ENCOUNTER — TELEPHONE (OUTPATIENT)
Dept: ANTICOAGULATION | Facility: CLINIC | Age: 64
End: 2024-12-17
Payer: COMMERCIAL

## 2024-12-24 ENCOUNTER — TELEPHONE (OUTPATIENT)
Dept: ANTICOAGULATION | Facility: CLINIC | Age: 64
End: 2024-12-24
Payer: COMMERCIAL

## 2024-12-24 NOTE — TELEPHONE ENCOUNTER
ANTICOAGULATION     Mckay Hsu is overdue for an INR check.     Called [facility] TCU ([phone]). Spoke with nurse, Derick. No word yet on when he might be discharged.    Pastora Valenzuela RN  12/24/2024  Anticoagulation Clinic  [facility] for routing messages: darleen HUNT [facility] patient phone line: [phone]

## 2025-01-02 ENCOUNTER — TELEPHONE (OUTPATIENT)
Dept: ANTICOAGULATION | Facility: CLINIC | Age: 65
End: 2025-01-02

## 2025-01-02 NOTE — TELEPHONE ENCOUNTER
Paulette LOPEZ  Returned call, attempted to warm transfer first.   Here is Paulette's message:    We don't have a discharge date, he cannot return back home. They are looking at group home or assisted living in the future, he does have a relocation worker helping him. Does now have power of  for finances and helping with decisions. He is legally blind, that is his biggest impairment. He will remain at their facility until they have an appropriate discharge. He is under Dr Shun Bryan with [facility] he is the primary physician and he is getting INR checks and warfarin they do update the clinic. They do not know which INR clinic they are reporting to but they report to [facility]. They do INR checks every Friday morning and send results.    If any questions please call the nurses station back. If you cannot get ahold of them then you may call Paulette back but she is not clinical, her line is secure and you can leave a detailed message.    Nursing station, pt is on [address].     Uncontrolled type 2 diabetes mellitus with hyperglycemia, with long-term current use of insulin Debility

## 2025-01-02 NOTE — TELEPHONE ENCOUNTER
ANTICOAGULATION     Mckay Hsu is overdue for an INR check.     Patient is noted to be in TCU (name/phone): [facility] TCU . Spoke to floor nurse and transferred to , Paulette LOPEZ  and verified that patient is still there and managed by in house provider. Asked for update on possible.    Pastora Valenzuela, RN  1/2/2025  Anticoagulation Clinic  [facility] for routing messages: darleen HUNT [facility] patient phone line: [phone]

## 2025-01-07 ENCOUNTER — PATIENT OUTREACH (OUTPATIENT)
Dept: CARE COORDINATION | Facility: CLINIC | Age: 65
End: 2025-01-07
Payer: COMMERCIAL

## 2025-01-07 NOTE — PROGRESS NOTES
Clinic Care Coordination Contact     Situation: Patient chart reviewed by care coordinator.     Background:   Patient was inpatient at [facility] 7/12/24-7/16/24.      Discharged to [facility] 7/16/24.      Assessment:   Reviewed 1/2/25 telephone encounter from [facility].   Patient still admitted to [facility].   They are working on a place for him to discharge to.      Plan/Recommendations:   Will monitor chart monthly and upon discharge notification.      Carmen Frank RN Clinic Care Coordinator  [facility] Clinics: [place], [place] (on-site Wednesdays), [facility] (on-site Thursdays) & [facility].  [email or web address]  Phone: [phone]

## 2025-02-05 ENCOUNTER — TELEPHONE (OUTPATIENT)
Dept: INTERNAL MEDICINE | Facility: CLINIC | Age: 65
End: 2025-02-05
Payer: COMMERCIAL

## 2025-02-06 NOTE — TELEPHONE ENCOUNTER
FYI - Status Update    Who is Calling: Paulette    Update: PT is looking at discharge and working with relocating services, with a referral to a home in an assisted living/group home style setting in [place], called [facility].  Waiting for acceptance. He can change to [employer] rather than do INR checks.   [facility],  Hannah Hollenhorst [phone]    Does caller want a call/response back: Yes     Okay to leave a detailed message?: Yes at Other phone number:  [phone]

## 2025-02-20 ENCOUNTER — PATIENT OUTREACH (OUTPATIENT)
Dept: CARE COORDINATION | Facility: CLINIC | Age: 65
End: 2025-02-20
Payer: COMMERCIAL

## 2025-02-20 NOTE — PROGRESS NOTES
Clinic Care Coordination Contact    Situation: Patient chart reviewed by care coordinator.    Background:   Patient in identified status for Primary Care Coordination program.     Patient was inpatient at [facility] 7/12/24-7/16/24.      Discharged to [facility] 7/16/24.      Assessment:   Reviewed 2/5/2025 encounter.   SERENA Caldwell with [facility] reports working with patient to find a new home.     Left voicemail for SERENA Caldwell ([phone]) to inquire if patient has discharged from TCU.   Update:  Paulette called me back and left a voicemail.   States patient will be going to a group home in March. Patient is now being followed by [facility] now and in the future.     Plan/Recommendations:   Closed Primary Care Coordination program as patient is no longer a patient to [facility].       Carmen Frank RN Clinic Care Coordinator  [facility] Clinics: [place], [place] (on-site Wednesdays), [facility] (on-site Thursdays) & [facility].  [email or web address]  Phone: [phone]

## 2025-05-13 ENCOUNTER — MEDICAL CORRESPONDENCE (OUTPATIENT)
Dept: HEALTH INFORMATION MANAGEMENT | Facility: CLINIC | Age: 65
End: 2025-05-13
Payer: COMMERCIAL

## 2025-08-28 ENCOUNTER — LAB (OUTPATIENT)
Dept: LAB | Facility: CLINIC | Age: 65
End: 2025-08-28
Payer: COMMERCIAL

## 2025-08-28 DIAGNOSIS — E55.9 AVITAMINOSIS D: Primary | ICD-10-CM

## 2025-08-28 DIAGNOSIS — Z79.4 ENCOUNTER FOR LONG-TERM (CURRENT) USE OF INSULIN (H): ICD-10-CM

## 2025-08-28 DIAGNOSIS — I11.0 HYPERTENSIVE HEART DISEASE WITH CONGESTIVE HEART FAILURE (H): ICD-10-CM

## 2025-08-28 DIAGNOSIS — E11.9 DIABETES MELLITUS (H): ICD-10-CM

## 2025-08-28 DIAGNOSIS — E11.69 DIABETES MELLITUS ASSOCIATED WITH HORMONAL ETIOLOGY (H): ICD-10-CM

## 2025-08-28 DIAGNOSIS — Z12.5 SPECIAL SCREENING FOR MALIGNANT NEOPLASM OF PROSTATE: ICD-10-CM

## 2025-08-28 LAB
ALBUMIN SERPL BCG-MCNC: 3.7 G/DL (ref 3.5–5.2)
ALP SERPL-CCNC: 95 U/L (ref 40–150)
ALT SERPL W P-5'-P-CCNC: 19 U/L (ref 0–70)
ANION GAP SERPL CALCULATED.3IONS-SCNC: 13 MMOL/L (ref 7–15)
AST SERPL W P-5'-P-CCNC: 23 U/L (ref 0–45)
BASOPHILS # BLD AUTO: 0.04 10E3/UL (ref 0–0.2)
BASOPHILS NFR BLD AUTO: 0.4 %
BILIRUB SERPL-MCNC: 0.4 MG/DL
BUN SERPL-MCNC: 25.3 MG/DL (ref 8–23)
CALCIUM SERPL-MCNC: 9.1 MG/DL (ref 8.8–10.4)
CHLORIDE SERPL-SCNC: 97 MMOL/L (ref 98–107)
CHOLEST SERPL-MCNC: 107 MG/DL
CREAT SERPL-MCNC: 1.05 MG/DL (ref 0.67–1.17)
EGFRCR SERPLBLD CKD-EPI 2021: 79 ML/MIN/1.73M2
EOSINOPHIL # BLD AUTO: 0.36 10E3/UL (ref 0–0.7)
EOSINOPHIL NFR BLD AUTO: 4 %
ERYTHROCYTE [DISTWIDTH] IN BLOOD BY AUTOMATED COUNT: 13.5 % (ref 10–15)
EST. AVERAGE GLUCOSE BLD GHB EST-MCNC: 163 MG/DL
FASTING STATUS PATIENT QL REPORTED: NO
GLUCOSE SERPL-MCNC: 308 MG/DL (ref 70–99)
HBA1C MFR BLD: 7.3 %
HCO3 SERPL-SCNC: 22 MMOL/L (ref 22–29)
HCT VFR BLD AUTO: 38.7 % (ref 40–53)
HDLC SERPL-MCNC: 34 MG/DL
HGB BLD-MCNC: 12.8 G/DL (ref 13.3–17.7)
IMM GRANULOCYTES # BLD: 0.03 10E3/UL
IMM GRANULOCYTES NFR BLD: 0.3 %
LDLC SERPL CALC-MCNC: 27 MG/DL
LYMPHOCYTES # BLD AUTO: 1.55 10E3/UL (ref 0.8–5.3)
LYMPHOCYTES NFR BLD AUTO: 17.3 %
MCH RBC QN AUTO: 29.2 PG (ref 26.5–33)
MCHC RBC AUTO-ENTMCNC: 33.1 G/DL (ref 31.5–36.5)
MCV RBC AUTO: 88.4 FL (ref 78–100)
MONOCYTES # BLD AUTO: 0.7 10E3/UL (ref 0–1.3)
MONOCYTES NFR BLD AUTO: 7.8 %
NEUTROPHILS # BLD AUTO: 6.28 10E3/UL (ref 1.6–8.3)
NEUTROPHILS NFR BLD AUTO: 70.2 %
NONHDLC SERPL-MCNC: 73 MG/DL
NRBC # BLD AUTO: <0.03 10E3/UL
NRBC BLD AUTO-RTO: 0 /100
PLATELET # BLD AUTO: 291 10E3/UL (ref 150–450)
POTASSIUM SERPL-SCNC: 4.6 MMOL/L (ref 3.4–5.3)
PROT SERPL-MCNC: 7 G/DL (ref 6.4–8.3)
PSA SERPL DL<=0.01 NG/ML-MCNC: 0.16 NG/ML (ref 0–4.5)
RBC # BLD AUTO: 4.38 10E6/UL (ref 4.4–5.9)
SODIUM SERPL-SCNC: 132 MMOL/L (ref 135–145)
TRIGL SERPL-MCNC: 230 MG/DL
VIT D+METAB SERPL-MCNC: 15 NG/ML (ref 20–50)
WBC # BLD AUTO: 8.96 10E3/UL (ref 4–11)

## 2025-08-28 PROCEDURE — 82247 BILIRUBIN TOTAL: CPT

## 2025-08-28 PROCEDURE — 82465 ASSAY BLD/SERUM CHOLESTEROL: CPT

## 2025-08-28 PROCEDURE — 82306 VITAMIN D 25 HYDROXY: CPT

## 2025-08-28 PROCEDURE — 83036 HEMOGLOBIN GLYCOSYLATED A1C: CPT

## 2025-08-28 PROCEDURE — 36415 COLL VENOUS BLD VENIPUNCTURE: CPT

## 2025-08-28 PROCEDURE — 85025 COMPLETE CBC W/AUTO DIFF WBC: CPT

## 2025-08-28 PROCEDURE — G0103 PSA SCREENING: HCPCS

## (undated) DEVICE — GLOVE BIOGEL PI MICRO INDICATOR UNDERGLOVE SZ 7.0 48970

## (undated) DEVICE — DRAPE SHEET REV FOLD 3/4 9349

## (undated) DEVICE — SOL WATER IRRIG 1000ML BOTTLE 2F7114

## (undated) DEVICE — DRSG GAUZE 4X4" 3033

## (undated) DEVICE — NDL 25GA 1.5" 305127

## (undated) DEVICE — GLOVE BIOGEL PI SZ 6.5 40865

## (undated) DEVICE — SU PROLENE 2-0 FS 18" 8685H

## (undated) DEVICE — PREP SKIN SCRUB TRAY 4461A

## (undated) DEVICE — DRSG KERLIX 4 1/2"X4YDS ROLL 6715

## (undated) DEVICE — BLADE KNIFE SURG 15 371115

## (undated) DEVICE — SU SILK 3-0 TIE 24" SA74H

## (undated) DEVICE — BLADE SAW OSCIL/SAG STRK MICRO 5.5X25X0.38MM 2296-003-524

## (undated) DEVICE — ESU GROUND PAD E7506

## (undated) DEVICE — SYR 10ML FINGER CONTROL W/O NDL 309695

## (undated) DEVICE — ESU PENCIL W/HOLSTER E2350H

## (undated) DEVICE — DRSG ABDOMINAL 07 1/2X8" 7197D

## (undated) DEVICE — SPONGE RAY-TEC 4X8" 7318

## (undated) DEVICE — DRAPE STERI TOWEL LG 1010

## (undated) DEVICE — SURGICEL HEMOSTAT 2X3" 1953

## (undated) DEVICE — DRSG ADAPTIC 3X8" 6113

## (undated) DEVICE — CAST PADDING 4" STERILE 9044S

## (undated) DEVICE — CAST PADDING 6" STERILE 9046S

## (undated) DEVICE — DRSG KERLIX FLUFFS X5

## (undated) DEVICE — BNDG ELASTIC 4" DBL LENGTH UNSTERILE 6611-14

## (undated) DEVICE — SU PROLENE 3-0 PS-2 18" 8687H

## (undated) DEVICE — SPONGE LAP 18X18" X8435

## (undated) DEVICE — PACK EXTREMITY SOP15EXFSD

## (undated) DEVICE — BLADE SAW OSCILLATING STRYK MED 9.0X25X0.38MM 2296-003-111

## (undated) DEVICE — IMM LIMB ELEVATOR DC40-0203

## (undated) DEVICE — DECANTER VIAL 2006S

## (undated) DEVICE — DRAIN PENROSE 0.25"X18" LATEX FREE GR201

## (undated) DEVICE — ESU GROUND PAD UNIVERSAL W/O CORD

## (undated) DEVICE — LINEN TOWEL PACK X5 5464

## (undated) DEVICE — SU VICRYL 2-0 CP-2 27" UND J869H

## (undated) DEVICE — DECANTER BAG 2002S

## (undated) DEVICE — BNDG ELASTIC 4"X5YDS UNSTERILE 6611-40

## (undated) RX ORDER — ONDANSETRON 2 MG/ML
INJECTION INTRAMUSCULAR; INTRAVENOUS
Status: DISPENSED
Start: 2024-07-09

## (undated) RX ORDER — FENTANYL CITRATE 50 UG/ML
INJECTION, SOLUTION INTRAMUSCULAR; INTRAVENOUS
Status: DISPENSED
Start: 2019-05-09

## (undated) RX ORDER — FENTANYL CITRATE 50 UG/ML
INJECTION, SOLUTION INTRAMUSCULAR; INTRAVENOUS
Status: DISPENSED
Start: 2024-07-09

## (undated) RX ORDER — PROPOFOL 10 MG/ML
INJECTION, EMULSION INTRAVENOUS
Status: DISPENSED
Start: 2024-07-09

## (undated) RX ORDER — LIDOCAINE HYDROCHLORIDE 10 MG/ML
INJECTION, SOLUTION EPIDURAL; INFILTRATION; INTRACAUDAL; PERINEURAL
Status: DISPENSED
Start: 2018-11-29

## (undated) RX ORDER — LIDOCAINE HYDROCHLORIDE 10 MG/ML
INJECTION, SOLUTION EPIDURAL; INFILTRATION; INTRACAUDAL; PERINEURAL
Status: DISPENSED
Start: 2024-07-09

## (undated) RX ORDER — FENTANYL CITRATE 50 UG/ML
INJECTION, SOLUTION INTRAMUSCULAR; INTRAVENOUS
Status: DISPENSED
Start: 2018-11-29

## (undated) RX ORDER — BUPIVACAINE HYDROCHLORIDE 5 MG/ML
INJECTION, SOLUTION EPIDURAL; INTRACAUDAL
Status: DISPENSED
Start: 2024-07-09

## (undated) RX ORDER — BUPIVACAINE HYDROCHLORIDE 2.5 MG/ML
INJECTION, SOLUTION EPIDURAL; INFILTRATION; INTRACAUDAL
Status: DISPENSED
Start: 2018-11-29